# Patient Record
Sex: FEMALE | Race: WHITE | NOT HISPANIC OR LATINO | Employment: OTHER | URBAN - NONMETROPOLITAN AREA
[De-identification: names, ages, dates, MRNs, and addresses within clinical notes are randomized per-mention and may not be internally consistent; named-entity substitution may affect disease eponyms.]

---

## 2020-05-23 ENCOUNTER — APPOINTMENT (OUTPATIENT)
Dept: GENERAL RADIOLOGY | Facility: HOSPITAL | Age: 65
End: 2020-05-23

## 2020-05-23 ENCOUNTER — HOSPITAL ENCOUNTER (EMERGENCY)
Facility: HOSPITAL | Age: 65
Discharge: HOME | End: 2020-05-23
Attending: EMERGENCY MEDICINE | Admitting: EMERGENCY MEDICINE

## 2020-05-23 ENCOUNTER — APPOINTMENT (OUTPATIENT)
Dept: CT IMAGING | Facility: HOSPITAL | Age: 65
End: 2020-05-23

## 2020-05-23 VITALS
SYSTOLIC BLOOD PRESSURE: 142 MMHG | BODY MASS INDEX: 38.64 KG/M2 | HEIGHT: 62 IN | HEART RATE: 74 BPM | OXYGEN SATURATION: 95 % | WEIGHT: 210 LBS | TEMPERATURE: 98.5 F | RESPIRATION RATE: 18 BRPM | DIASTOLIC BLOOD PRESSURE: 87 MMHG

## 2020-05-23 DIAGNOSIS — R10.32 LEFT LOWER QUADRANT ABDOMINAL PAIN: Primary | ICD-10-CM

## 2020-05-23 DIAGNOSIS — N39.0 ACUTE LOWER UTI (URINARY TRACT INFECTION): ICD-10-CM

## 2020-05-23 LAB
ALBUMIN SERPL-MCNC: 4.2 G/DL (ref 3.5–5.2)
ALBUMIN/GLOB SERPL: 1.3 G/DL
ALP SERPL-CCNC: 88 U/L (ref 39–117)
ALT SERPL W P-5'-P-CCNC: 15 U/L (ref 1–33)
ANION GAP SERPL CALCULATED.3IONS-SCNC: 14.2 MMOL/L (ref 5–15)
AST SERPL-CCNC: 16 U/L (ref 1–32)
BACTERIA UR QL AUTO: ABNORMAL /HPF
BASOPHILS # BLD AUTO: 0.08 10*3/MM3 (ref 0–0.2)
BASOPHILS NFR BLD AUTO: 0.7 % (ref 0–1.5)
BILIRUB SERPL-MCNC: 0.2 MG/DL (ref 0.2–1.2)
BILIRUB UR QL STRIP: NEGATIVE
BUN BLD-MCNC: 23 MG/DL (ref 8–23)
BUN/CREAT SERPL: 15.8 (ref 7–25)
CALCIUM SPEC-SCNC: 9.8 MG/DL (ref 8.6–10.5)
CHLORIDE SERPL-SCNC: 104 MMOL/L (ref 98–107)
CLARITY UR: ABNORMAL
CO2 SERPL-SCNC: 22.8 MMOL/L (ref 22–29)
COLOR UR: YELLOW
CREAT BLD-MCNC: 1.46 MG/DL (ref 0.57–1)
DEPRECATED RDW RBC AUTO: 42.8 FL (ref 37–54)
EOSINOPHIL # BLD AUTO: 0.43 10*3/MM3 (ref 0–0.4)
EOSINOPHIL NFR BLD AUTO: 4 % (ref 0.3–6.2)
ERYTHROCYTE [DISTWIDTH] IN BLOOD BY AUTOMATED COUNT: 12.5 % (ref 12.3–15.4)
GFR SERPL CREATININE-BSD FRML MDRD: 36 ML/MIN/1.73
GLOBULIN UR ELPH-MCNC: 3.2 GM/DL
GLUCOSE BLD-MCNC: 89 MG/DL (ref 65–99)
GLUCOSE UR STRIP-MCNC: NEGATIVE MG/DL
HCT VFR BLD AUTO: 38.4 % (ref 34–46.6)
HGB BLD-MCNC: 12.7 G/DL (ref 12–15.9)
HGB UR QL STRIP.AUTO: NEGATIVE
HYALINE CASTS UR QL AUTO: ABNORMAL /LPF
IMM GRANULOCYTES # BLD AUTO: 0.13 10*3/MM3 (ref 0–0.05)
IMM GRANULOCYTES NFR BLD AUTO: 1.2 % (ref 0–0.5)
KETONES UR QL STRIP: NEGATIVE
LEUKOCYTE ESTERASE UR QL STRIP.AUTO: ABNORMAL
LIPASE SERPL-CCNC: 52 U/L (ref 13–60)
LYMPHOCYTES # BLD AUTO: 1.6 10*3/MM3 (ref 0.7–3.1)
LYMPHOCYTES NFR BLD AUTO: 14.9 % (ref 19.6–45.3)
MCH RBC QN AUTO: 31.2 PG (ref 26.6–33)
MCHC RBC AUTO-ENTMCNC: 33.1 G/DL (ref 31.5–35.7)
MCV RBC AUTO: 94.3 FL (ref 79–97)
MONOCYTES # BLD AUTO: 0.61 10*3/MM3 (ref 0.1–0.9)
MONOCYTES NFR BLD AUTO: 5.7 % (ref 5–12)
NEUTROPHILS # BLD AUTO: 7.9 10*3/MM3 (ref 1.7–7)
NEUTROPHILS NFR BLD AUTO: 73.5 % (ref 42.7–76)
NITRITE UR QL STRIP: POSITIVE
NRBC BLD AUTO-RTO: 0 /100 WBC (ref 0–0.2)
PH UR STRIP.AUTO: <=5 [PH] (ref 5–8)
PLATELET # BLD AUTO: 209 10*3/MM3 (ref 140–450)
PMV BLD AUTO: 11.2 FL (ref 6–12)
POTASSIUM BLD-SCNC: 3.8 MMOL/L (ref 3.5–5.2)
PROT SERPL-MCNC: 7.4 G/DL (ref 6–8.5)
PROT UR QL STRIP: ABNORMAL
RBC # BLD AUTO: 4.07 10*6/MM3 (ref 3.77–5.28)
RBC # UR: ABNORMAL /HPF
REF LAB TEST METHOD: ABNORMAL
SODIUM BLD-SCNC: 141 MMOL/L (ref 136–145)
SP GR UR STRIP: 1.01 (ref 1–1.03)
SQUAMOUS #/AREA URNS HPF: ABNORMAL /HPF
TROPONIN T SERPL-MCNC: <0.01 NG/ML (ref 0–0.03)
UROBILINOGEN UR QL STRIP: ABNORMAL
WBC NRBC COR # BLD: 10.75 10*3/MM3 (ref 3.4–10.8)
WBC UR QL AUTO: ABNORMAL /HPF

## 2020-05-23 PROCEDURE — 87186 SC STD MICRODIL/AGAR DIL: CPT | Performed by: EMERGENCY MEDICINE

## 2020-05-23 PROCEDURE — 84484 ASSAY OF TROPONIN QUANT: CPT | Performed by: EMERGENCY MEDICINE

## 2020-05-23 PROCEDURE — 96376 TX/PRO/DX INJ SAME DRUG ADON: CPT

## 2020-05-23 PROCEDURE — 25010000002 CEFTRIAXONE SODIUM-DEXTROSE 1-3.74 GM-%(50ML) RECONSTITUTED SOLUTION: Performed by: PHYSICIAN ASSISTANT

## 2020-05-23 PROCEDURE — 71045 X-RAY EXAM CHEST 1 VIEW: CPT

## 2020-05-23 PROCEDURE — 83690 ASSAY OF LIPASE: CPT | Performed by: EMERGENCY MEDICINE

## 2020-05-23 PROCEDURE — 25010000002 MORPHINE SULFATE (PF) 2 MG/ML SOLUTION: Performed by: EMERGENCY MEDICINE

## 2020-05-23 PROCEDURE — 74176 CT ABD & PELVIS W/O CONTRAST: CPT

## 2020-05-23 PROCEDURE — 80053 COMPREHEN METABOLIC PANEL: CPT | Performed by: EMERGENCY MEDICINE

## 2020-05-23 PROCEDURE — 96365 THER/PROPH/DIAG IV INF INIT: CPT

## 2020-05-23 PROCEDURE — 96361 HYDRATE IV INFUSION ADD-ON: CPT

## 2020-05-23 PROCEDURE — 87086 URINE CULTURE/COLONY COUNT: CPT | Performed by: EMERGENCY MEDICINE

## 2020-05-23 PROCEDURE — 96375 TX/PRO/DX INJ NEW DRUG ADDON: CPT

## 2020-05-23 PROCEDURE — 93005 ELECTROCARDIOGRAM TRACING: CPT | Performed by: EMERGENCY MEDICINE

## 2020-05-23 PROCEDURE — 99284 EMERGENCY DEPT VISIT MOD MDM: CPT

## 2020-05-23 PROCEDURE — 81001 URINALYSIS AUTO W/SCOPE: CPT | Performed by: EMERGENCY MEDICINE

## 2020-05-23 PROCEDURE — 25010000002 ONDANSETRON PER 1 MG: Performed by: EMERGENCY MEDICINE

## 2020-05-23 PROCEDURE — 25010000002 ONDANSETRON PER 1 MG: Performed by: PHYSICIAN ASSISTANT

## 2020-05-23 PROCEDURE — 85025 COMPLETE CBC W/AUTO DIFF WBC: CPT | Performed by: EMERGENCY MEDICINE

## 2020-05-23 RX ORDER — SODIUM CHLORIDE 0.9 % (FLUSH) 0.9 %
10 SYRINGE (ML) INJECTION AS NEEDED
Status: DISCONTINUED | OUTPATIENT
Start: 2020-05-23 | End: 2020-05-23 | Stop reason: HOSPADM

## 2020-05-23 RX ORDER — ONDANSETRON 4 MG/1
4 TABLET, ORALLY DISINTEGRATING ORAL EVERY 8 HOURS PRN
Qty: 8 TABLET | Refills: 0 | Status: SHIPPED | OUTPATIENT
Start: 2020-05-23 | End: 2021-05-06

## 2020-05-23 RX ORDER — CEFTRIAXONE 1 G/50ML
1 INJECTION, SOLUTION INTRAVENOUS ONCE
Status: COMPLETED | OUTPATIENT
Start: 2020-05-23 | End: 2020-05-23

## 2020-05-23 RX ORDER — CEFUROXIME AXETIL 500 MG/1
500 TABLET ORAL 2 TIMES DAILY
Qty: 14 TABLET | Refills: 0 | Status: SHIPPED | OUTPATIENT
Start: 2020-05-23 | End: 2021-05-06

## 2020-05-23 RX ORDER — HYDROCODONE BITARTRATE AND ACETAMINOPHEN 5; 325 MG/1; MG/1
1 TABLET ORAL ONCE
Status: COMPLETED | OUTPATIENT
Start: 2020-05-23 | End: 2020-05-23

## 2020-05-23 RX ORDER — SENNOSIDES 8.6 MG
650 CAPSULE ORAL EVERY 8 HOURS PRN
Qty: 18 TABLET | Refills: 0 | Status: SHIPPED | OUTPATIENT
Start: 2020-05-23 | End: 2021-05-06

## 2020-05-23 RX ORDER — MORPHINE SULFATE 2 MG/ML
2 INJECTION, SOLUTION INTRAMUSCULAR; INTRAVENOUS ONCE
Status: COMPLETED | OUTPATIENT
Start: 2020-05-23 | End: 2020-05-23

## 2020-05-23 RX ORDER — ONDANSETRON 2 MG/ML
4 INJECTION INTRAMUSCULAR; INTRAVENOUS ONCE
Status: COMPLETED | OUTPATIENT
Start: 2020-05-23 | End: 2020-05-23

## 2020-05-23 RX ORDER — ONDANSETRON 2 MG/ML
2 INJECTION INTRAMUSCULAR; INTRAVENOUS ONCE
Status: COMPLETED | OUTPATIENT
Start: 2020-05-23 | End: 2020-05-23

## 2020-05-23 RX ADMIN — HYDROCODONE BITARTRATE AND ACETAMINOPHEN 1 TABLET: 5; 325 TABLET ORAL at 20:27

## 2020-05-23 RX ADMIN — MORPHINE SULFATE 2 MG: 2 INJECTION, SOLUTION INTRAMUSCULAR; INTRAVENOUS at 19:02

## 2020-05-23 RX ADMIN — ONDANSETRON 4 MG: 2 INJECTION INTRAMUSCULAR; INTRAVENOUS at 19:02

## 2020-05-23 RX ADMIN — CEFTRIAXONE 1 G: 1 INJECTION, SOLUTION INTRAVENOUS at 20:22

## 2020-05-23 RX ADMIN — SODIUM CHLORIDE 1000 ML: 9 INJECTION, SOLUTION INTRAVENOUS at 19:00

## 2020-05-23 RX ADMIN — ONDANSETRON 2 MG: 2 INJECTION INTRAMUSCULAR; INTRAVENOUS at 20:26

## 2020-05-25 LAB — BACTERIA SPEC AEROBE CULT: ABNORMAL

## 2020-08-15 ENCOUNTER — APPOINTMENT (OUTPATIENT)
Dept: GENERAL RADIOLOGY | Facility: HOSPITAL | Age: 65
End: 2020-08-15

## 2020-08-15 ENCOUNTER — HOSPITAL ENCOUNTER (EMERGENCY)
Facility: HOSPITAL | Age: 65
Discharge: HOME | End: 2020-08-15
Attending: EMERGENCY MEDICINE | Admitting: EMERGENCY MEDICINE

## 2020-08-15 VITALS
TEMPERATURE: 97.3 F | DIASTOLIC BLOOD PRESSURE: 112 MMHG | HEIGHT: 62 IN | SYSTOLIC BLOOD PRESSURE: 159 MMHG | BODY MASS INDEX: 39.56 KG/M2 | RESPIRATION RATE: 18 BRPM | WEIGHT: 215 LBS | HEART RATE: 69 BPM | OXYGEN SATURATION: 98 %

## 2020-08-15 DIAGNOSIS — M54.50 CHRONIC LOW BACK PAIN, UNSPECIFIED BACK PAIN LATERALITY, UNSPECIFIED WHETHER SCIATICA PRESENT: Primary | ICD-10-CM

## 2020-08-15 DIAGNOSIS — R07.81 RIB PAIN: ICD-10-CM

## 2020-08-15 DIAGNOSIS — G89.29 CHRONIC LOW BACK PAIN, UNSPECIFIED BACK PAIN LATERALITY, UNSPECIFIED WHETHER SCIATICA PRESENT: Primary | ICD-10-CM

## 2020-08-15 LAB
BACTERIA UR QL AUTO: ABNORMAL /HPF
BILIRUB UR QL STRIP: NEGATIVE
CLARITY UR: CLEAR
COLOR UR: YELLOW
GLUCOSE UR STRIP-MCNC: NEGATIVE MG/DL
HGB UR QL STRIP.AUTO: NEGATIVE
HYALINE CASTS UR QL AUTO: ABNORMAL /LPF
KETONES UR QL STRIP: NEGATIVE
LEUKOCYTE ESTERASE UR QL STRIP.AUTO: NEGATIVE
NITRITE UR QL STRIP: NEGATIVE
PH UR STRIP.AUTO: 5.5 [PH] (ref 5–8)
PROT UR QL STRIP: ABNORMAL
RBC # UR: ABNORMAL /HPF
REF LAB TEST METHOD: ABNORMAL
SP GR UR STRIP: 1.02 (ref 1–1.03)
SQUAMOUS #/AREA URNS HPF: ABNORMAL /HPF
UROBILINOGEN UR QL STRIP: ABNORMAL
WBC UR QL AUTO: ABNORMAL /HPF

## 2020-08-15 PROCEDURE — 72100 X-RAY EXAM L-S SPINE 2/3 VWS: CPT

## 2020-08-15 PROCEDURE — 99283 EMERGENCY DEPT VISIT LOW MDM: CPT

## 2020-08-15 PROCEDURE — 81001 URINALYSIS AUTO W/SCOPE: CPT | Performed by: NURSE PRACTITIONER

## 2020-08-15 PROCEDURE — 71101 X-RAY EXAM UNILAT RIBS/CHEST: CPT

## 2020-08-15 RX ORDER — TRAMADOL HYDROCHLORIDE 50 MG/1
50 TABLET ORAL ONCE
Status: COMPLETED | OUTPATIENT
Start: 2020-08-15 | End: 2020-08-15

## 2020-08-15 RX ORDER — LIDOCAINE 50 MG/G
1 PATCH TOPICAL EVERY 24 HOURS
Qty: 15 EACH | Refills: 0 | Status: SHIPPED | OUTPATIENT
Start: 2020-08-15 | End: 2021-05-06

## 2020-08-15 RX ORDER — METHOCARBAMOL 500 MG/1
750 TABLET, FILM COATED ORAL ONCE
Status: COMPLETED | OUTPATIENT
Start: 2020-08-15 | End: 2020-08-15

## 2020-08-15 RX ORDER — METHOCARBAMOL 500 MG/1
500 TABLET, FILM COATED ORAL 3 TIMES DAILY
Qty: 21 TABLET | Refills: 0 | Status: SHIPPED | OUTPATIENT
Start: 2020-08-15 | End: 2021-05-06

## 2020-08-15 RX ORDER — MELOXICAM 7.5 MG/1
7.5 TABLET ORAL DAILY
Qty: 14 TABLET | Refills: 0 | Status: SHIPPED | OUTPATIENT
Start: 2020-08-15 | End: 2021-05-06

## 2020-08-15 RX ORDER — LIDOCAINE 50 MG/G
1 PATCH TOPICAL
Status: DISCONTINUED | OUTPATIENT
Start: 2020-08-15 | End: 2020-08-15 | Stop reason: HOSPADM

## 2020-08-15 RX ADMIN — TRAMADOL HYDROCHLORIDE 50 MG: 50 TABLET, FILM COATED ORAL at 20:33

## 2020-08-15 RX ADMIN — LIDOCAINE 1 PATCH: 50 PATCH CUTANEOUS at 20:35

## 2020-08-15 RX ADMIN — METHOCARBAMOL TABLETS 750 MG: 500 TABLET, COATED ORAL at 20:33

## 2020-08-16 NOTE — ED PROVIDER NOTES
Subjective   History of Present Illness  This is a 65 year old female who comes in today with multiple complaints. She reports a history of back pain and her lower back started hurting last week and has gotten worse. She also reports right lower rib pain for three days. She reports its point tenderness and does not relate to deep breathing or moving. She also reports some burning when she urinates. She reports she took some gabapentin that she had laying around. She reports she came in because she needs some help with the pain tonight.   Review of Systems   Constitutional: Negative.    HENT: Negative.    Eyes: Negative.    Respiratory: Negative.    Cardiovascular: Negative.    Gastrointestinal: Negative.    Endocrine: Negative.    Genitourinary: Positive for dysuria.   Musculoskeletal: Positive for arthralgias and back pain.   Skin: Negative.    Allergic/Immunologic: Negative.    Neurological: Negative.    Hematological: Negative.    Psychiatric/Behavioral: Negative.        Past Medical History:   Diagnosis Date   • Hypertension    • Injury of back        No Known Allergies    Past Surgical History:   Procedure Laterality Date   • ABDOMINAL SURGERY     • COLON SURGERY     • COLONOSCOPY     • TUBAL ABDOMINAL LIGATION         History reviewed. No pertinent family history.    Social History     Socioeconomic History   • Marital status:      Spouse name: Not on file   • Number of children: Not on file   • Years of education: Not on file   • Highest education level: Not on file   Tobacco Use   • Smoking status: Current Every Day Smoker     Types: Electronic Cigarette   Substance and Sexual Activity   • Alcohol use: Never     Frequency: Never   • Drug use: Never           Objective   Physical Exam   Constitutional: She appears well-developed and well-nourished.   Nursing note and vitals reviewed.  GEN: No acute distress  Head: Normocephalic, atraumatic  Eyes: Pupils equal round reactive to light  ENT: Posterior  pharynx normal in appearance, oral mucosa is moist  Chest: Nontender to palpation,  Right lower rib pain lateral side.   Cardiovascular: Regular rate  Lungs: Clear to auscultation bilaterally  Abdomen: Soft, nontender, nondistended, no peritoneal signs  Extremities: No edema, normal appearance  Neuro: GCS 15  Psych: Mood and affect are appropriate  Lumbar, right perivertebral tenderness lower lumbar. Neg straight leg raise.    Procedures           ED Course  ED Course as of Aug 15 2124   Sat Aug 15, 2020   2114 Starting to feel much better. She reports she stopped seeing her primary care provider some time ago. She reports she will get in to see someone new to help her address her numerous conditions. I advised her to we will treat her pain tonight and give her someone to follow up for her other chronic medical conditions.     [TW]      ED Course User Index  [TW] Morenita Connor, JOSÉ MANUEL                                           MDM  Number of Diagnoses or Management Options     Amount and/or Complexity of Data Reviewed  Clinical lab tests: ordered and reviewed  Tests in the radiology section of CPT®: ordered and reviewed  Review and summarize past medical records: yes  Discuss the patient with other providers: yes  Independent visualization of images, tracings, or specimens: yes    Risk of Complications, Morbidity, and/or Mortality  Presenting problems: low  Diagnostic procedures: low  Management options: low        Final diagnoses:   Chronic low back pain, unspecified back pain laterality, unspecified whether sciatica present   Rib pain            Morenita Connor APRN  08/15/20 2124

## 2020-10-04 ENCOUNTER — HOSPITAL ENCOUNTER (EMERGENCY)
Facility: HOSPITAL | Age: 65
Discharge: HOME | End: 2020-10-04
Attending: EMERGENCY MEDICINE | Admitting: EMERGENCY MEDICINE

## 2020-10-04 ENCOUNTER — APPOINTMENT (OUTPATIENT)
Dept: CT IMAGING | Facility: HOSPITAL | Age: 65
End: 2020-10-04

## 2020-10-04 VITALS
HEIGHT: 62 IN | OXYGEN SATURATION: 98 % | WEIGHT: 200 LBS | BODY MASS INDEX: 36.8 KG/M2 | SYSTOLIC BLOOD PRESSURE: 140 MMHG | TEMPERATURE: 98.7 F | HEART RATE: 72 BPM | DIASTOLIC BLOOD PRESSURE: 84 MMHG | RESPIRATION RATE: 18 BRPM

## 2020-10-04 DIAGNOSIS — R10.9 ABDOMINAL PAIN, UNSPECIFIED ABDOMINAL LOCATION: Primary | ICD-10-CM

## 2020-10-04 LAB
ALBUMIN SERPL-MCNC: 4.5 G/DL (ref 3.5–5.2)
ALBUMIN/GLOB SERPL: 1.5 G/DL
ALP SERPL-CCNC: 108 U/L (ref 39–117)
ALT SERPL W P-5'-P-CCNC: 10 U/L (ref 1–33)
ANION GAP SERPL CALCULATED.3IONS-SCNC: 7.5 MMOL/L (ref 5–15)
AST SERPL-CCNC: 13 U/L (ref 1–32)
BASOPHILS # BLD AUTO: 0.06 10*3/MM3 (ref 0–0.2)
BASOPHILS NFR BLD AUTO: 0.6 % (ref 0–1.5)
BILIRUB SERPL-MCNC: 0.3 MG/DL (ref 0–1.2)
BILIRUB UR QL STRIP: NEGATIVE
BUN SERPL-MCNC: 23 MG/DL (ref 8–23)
BUN/CREAT SERPL: 17.2 (ref 7–25)
CALCIUM SPEC-SCNC: 10 MG/DL (ref 8.6–10.5)
CHLORIDE SERPL-SCNC: 107 MMOL/L (ref 98–107)
CLARITY UR: CLEAR
CO2 SERPL-SCNC: 25.5 MMOL/L (ref 22–29)
COLOR UR: YELLOW
CREAT SERPL-MCNC: 1.34 MG/DL (ref 0.57–1)
DEPRECATED RDW RBC AUTO: 48.9 FL (ref 37–54)
EOSINOPHIL # BLD AUTO: 0.32 10*3/MM3 (ref 0–0.4)
EOSINOPHIL NFR BLD AUTO: 3 % (ref 0.3–6.2)
ERYTHROCYTE [DISTWIDTH] IN BLOOD BY AUTOMATED COUNT: 13.7 % (ref 12.3–15.4)
GFR SERPL CREATININE-BSD FRML MDRD: 40 ML/MIN/1.73
GLOBULIN UR ELPH-MCNC: 3.1 GM/DL
GLUCOSE SERPL-MCNC: 95 MG/DL (ref 65–99)
GLUCOSE UR STRIP-MCNC: NEGATIVE MG/DL
HCT VFR BLD AUTO: 39.3 % (ref 34–46.6)
HGB BLD-MCNC: 13.2 G/DL (ref 12–15.9)
HGB UR QL STRIP.AUTO: NEGATIVE
HOLD SPECIMEN: NORMAL
HOLD SPECIMEN: NORMAL
IMM GRANULOCYTES # BLD AUTO: 0.07 10*3/MM3 (ref 0–0.05)
IMM GRANULOCYTES NFR BLD AUTO: 0.6 % (ref 0–0.5)
KETONES UR QL STRIP: NEGATIVE
LEUKOCYTE ESTERASE UR QL STRIP.AUTO: NEGATIVE
LIPASE SERPL-CCNC: 46 U/L (ref 13–60)
LYMPHOCYTES # BLD AUTO: 1.55 10*3/MM3 (ref 0.7–3.1)
LYMPHOCYTES NFR BLD AUTO: 14.3 % (ref 19.6–45.3)
MCH RBC QN AUTO: 32.4 PG (ref 26.6–33)
MCHC RBC AUTO-ENTMCNC: 33.6 G/DL (ref 31.5–35.7)
MCV RBC AUTO: 96.6 FL (ref 79–97)
MONOCYTES # BLD AUTO: 0.75 10*3/MM3 (ref 0.1–0.9)
MONOCYTES NFR BLD AUTO: 6.9 % (ref 5–12)
NEUTROPHILS NFR BLD AUTO: 74.6 % (ref 42.7–76)
NEUTROPHILS NFR BLD AUTO: 8.09 10*3/MM3 (ref 1.7–7)
NITRITE UR QL STRIP: NEGATIVE
NRBC BLD AUTO-RTO: 0 /100 WBC (ref 0–0.2)
PH UR STRIP.AUTO: 6 [PH] (ref 5–8)
PLATELET # BLD AUTO: 199 10*3/MM3 (ref 140–450)
PMV BLD AUTO: 11.6 FL (ref 6–12)
POTASSIUM SERPL-SCNC: 3.8 MMOL/L (ref 3.5–5.2)
PROT SERPL-MCNC: 7.6 G/DL (ref 6–8.5)
PROT UR QL STRIP: NEGATIVE
RBC # BLD AUTO: 4.07 10*6/MM3 (ref 3.77–5.28)
SODIUM SERPL-SCNC: 140 MMOL/L (ref 136–145)
SP GR UR STRIP: 1.01 (ref 1–1.03)
UROBILINOGEN UR QL STRIP: NORMAL
WBC # BLD AUTO: 10.84 10*3/MM3 (ref 3.4–10.8)
WHOLE BLOOD HOLD SPECIMEN: NORMAL
WHOLE BLOOD HOLD SPECIMEN: NORMAL

## 2020-10-04 PROCEDURE — 25010000002 MORPHINE PER 10 MG: Performed by: EMERGENCY MEDICINE

## 2020-10-04 PROCEDURE — 96374 THER/PROPH/DIAG INJ IV PUSH: CPT

## 2020-10-04 PROCEDURE — 81003 URINALYSIS AUTO W/O SCOPE: CPT

## 2020-10-04 PROCEDURE — 96376 TX/PRO/DX INJ SAME DRUG ADON: CPT

## 2020-10-04 PROCEDURE — 99284 EMERGENCY DEPT VISIT MOD MDM: CPT

## 2020-10-04 PROCEDURE — 83690 ASSAY OF LIPASE: CPT

## 2020-10-04 PROCEDURE — 25010000002 ONDANSETRON PER 1 MG: Performed by: EMERGENCY MEDICINE

## 2020-10-04 PROCEDURE — 25010000002 IOPAMIDOL 61 % SOLUTION: Performed by: EMERGENCY MEDICINE

## 2020-10-04 PROCEDURE — 74177 CT ABD & PELVIS W/CONTRAST: CPT

## 2020-10-04 PROCEDURE — 96375 TX/PRO/DX INJ NEW DRUG ADDON: CPT

## 2020-10-04 PROCEDURE — 85025 COMPLETE CBC W/AUTO DIFF WBC: CPT

## 2020-10-04 PROCEDURE — 80053 COMPREHEN METABOLIC PANEL: CPT

## 2020-10-04 RX ORDER — POLYETHYLENE GLYCOL 3350 17 G/17G
17 POWDER, FOR SOLUTION ORAL DAILY
Qty: 100 EACH | Refills: 0 | Status: SHIPPED | OUTPATIENT
Start: 2020-10-04 | End: 2021-05-06

## 2020-10-04 RX ORDER — TRAMADOL HYDROCHLORIDE 50 MG/1
50 TABLET ORAL EVERY 8 HOURS PRN
Qty: 8 TABLET | Refills: 0 | Status: SHIPPED | OUTPATIENT
Start: 2020-10-04 | End: 2021-05-06

## 2020-10-04 RX ORDER — ONDANSETRON 2 MG/ML
4 INJECTION INTRAMUSCULAR; INTRAVENOUS ONCE
Status: COMPLETED | OUTPATIENT
Start: 2020-10-04 | End: 2020-10-04

## 2020-10-04 RX ORDER — DOCUSATE SODIUM 100 MG/1
100 CAPSULE, LIQUID FILLED ORAL 2 TIMES DAILY PRN
Qty: 30 CAPSULE | Refills: 0 | Status: SHIPPED | OUTPATIENT
Start: 2020-10-04 | End: 2021-05-06

## 2020-10-04 RX ORDER — DICYCLOMINE HCL 20 MG
20 TABLET ORAL EVERY 6 HOURS PRN
Qty: 12 TABLET | Refills: 0 | Status: SHIPPED | OUTPATIENT
Start: 2020-10-04 | End: 2021-05-06

## 2020-10-04 RX ORDER — MORPHINE SULFATE 4 MG/ML
4 INJECTION, SOLUTION INTRAMUSCULAR; INTRAVENOUS ONCE
Status: COMPLETED | OUTPATIENT
Start: 2020-10-04 | End: 2020-10-04

## 2020-10-04 RX ORDER — SODIUM CHLORIDE 0.9 % (FLUSH) 0.9 %
10 SYRINGE (ML) INJECTION AS NEEDED
Status: DISCONTINUED | OUTPATIENT
Start: 2020-10-04 | End: 2020-10-04 | Stop reason: HOSPADM

## 2020-10-04 RX ADMIN — ONDANSETRON 4 MG: 2 INJECTION INTRAMUSCULAR; INTRAVENOUS at 00:47

## 2020-10-04 RX ADMIN — SODIUM CHLORIDE 1000 ML: 9 INJECTION, SOLUTION INTRAVENOUS at 00:47

## 2020-10-04 RX ADMIN — MORPHINE SULFATE 4 MG: 4 INJECTION, SOLUTION INTRAMUSCULAR; INTRAVENOUS at 03:19

## 2020-10-04 RX ADMIN — MORPHINE SULFATE 4 MG: 4 INJECTION, SOLUTION INTRAMUSCULAR; INTRAVENOUS at 00:49

## 2020-10-04 RX ADMIN — IOPAMIDOL 100 ML: 612 INJECTION, SOLUTION INTRAVENOUS at 01:57

## 2020-10-04 NOTE — ED PROVIDER NOTES
TRIAGE CHIEF COMPLAINT:     Nursing and triage notes reviewed    Chief Complaint   Patient presents with   • Abdominal Pain      HPI: Tasha Yarbrough is a 65 y.o. female who presents to the emergency department complaining of abdominal pain.  Patient describes a sharp and aching left-sided abdominal pain that is intermittently been present over the past year.  She denies vomiting or diarrhea.  Patient states she has a history of perforated colon many years ago.  She has not had a fever that she is aware of.  Denies chest pain or shortness of breath.  No coughing.    REVIEW OF SYSTEMS: All other systems reviewed and are negative     PAST MEDICAL HISTORY:   Past Medical History:   Diagnosis Date   • Hypertension    • Injury of back         FAMILY HISTORY:   History reviewed. No pertinent family history.     SOCIAL HISTORY:   Social History     Socioeconomic History   • Marital status:      Spouse name: Not on file   • Number of children: Not on file   • Years of education: Not on file   • Highest education level: Not on file   Tobacco Use   • Smoking status: Current Every Day Smoker     Types: Electronic Cigarette   Substance and Sexual Activity   • Alcohol use: Never     Frequency: Never   • Drug use: Never        SURGICAL HISTORY:   Past Surgical History:   Procedure Laterality Date   • ABDOMINAL SURGERY     • COLON SURGERY     • COLONOSCOPY     • TUBAL ABDOMINAL LIGATION          CURRENT MEDICATIONS:      Medication List      ASK your doctor about these medications    acetaminophen 650 MG 8 hr tablet  Commonly known as: Tylenol 8 Hour  Take 1 tablet by mouth Every 8 (Eight) Hours As Needed for Mild Pain .     cefuroxime 500 MG tablet  Commonly known as: CEFTIN  Take 1 tablet by mouth 2 (Two) Times a Day.     lidocaine 5 %  Commonly known as: Lidoderm  Place 1 patch on the skin as directed by provider Daily. Remove & Discard patch within 12 hours or as directed by MD     meloxicam 7.5 MG tablet  Commonly known  as: MOBIC  Take 1 tablet by mouth Daily.     methocarbamol 500 MG tablet  Commonly known as: ROBAXIN  Take 1 tablet by mouth 3 (Three) Times a Day.     ondansetron ODT 4 MG disintegrating tablet  Commonly known as: ZOFRAN-ODT  Place 1 tablet on the tongue Every 8 (Eight) Hours As Needed for Nausea or Vomiting.             ALLERGIES: Patient has no known allergies.     PHYSICAL EXAM:   VITAL SIGNS:   Vitals:    10/04/20 0002   BP: (!) 208/114   Pulse: 78   Resp: 20   Temp: 98.7 °F (37.1 °C)   SpO2: 97%      CONSTITUTIONAL: Awake, oriented, appears non-toxic   HENT: Atraumatic, normocephalic, oral mucosa pink and moist, airway patent. Nares patent without drainage. External ears normal.   EYES: Conjunctiva clear   NECK: Trachea midline, non-tender, supple   CARDIOVASCULAR: Normal heart rate, Normal rhythm, No murmurs, rubs, gallops   PULMONARY/CHEST: Clear to auscultation, no rhonchi, wheezes, or rales. Symmetrical breath sounds.  ABDOMINAL: Non-distended, soft, left-sided abdominal tenderness- no rebound or guarding.  NEUROLOGIC: Non-focal, moving all four extremities, no gross sensory or motor deficits.   EXTREMITIES: No clubbing, cyanosis, or edema   SKIN: Warm, Dry, No erythema, No rash     ED COURSE / MEDICAL DECISION MAKING:   Tasha Yarbrough is a 65 y.o. female who presents to the emergency department for evaluation of abdominal pain.  Patient does appear significantly uncomfortable on arrival in the emergency department.  Patient is hypertensive but other vital signs stable.  Exam does reveal tenderness in the left abdomen.  Will obtain labs and imaging for further evaluation of her symptoms.    Laboratory tests are largely unremarkable.  CT scan of the abdomen and pelvis per radiology interpretation does not reveal any obvious acute abnormality.    Patient's pain is directly over her previous ostomy site, I wonder if patient has some scar tissue.  Review of CAT scan does reveal a rather large stool burden and  patient admits that she has been very constipated recently.  Will place patient on symptomatic therapy and have her follow with a GI specialist for further evaluation.    DECISION TO DISCHARGE/ADMIT: see ED care timeline     FINAL IMPRESSION:   1 --abdominal pain  2 --   3 --     Electronically signed by: Marion Scott MD, 10/4/2020 00:38 Marion Garcia MD  10/04/20 0347

## 2021-05-06 ENCOUNTER — OFFICE VISIT (OUTPATIENT)
Dept: GASTROENTEROLOGY | Facility: CLINIC | Age: 66
End: 2021-05-06

## 2021-05-06 VITALS
HEIGHT: 62 IN | DIASTOLIC BLOOD PRESSURE: 88 MMHG | BODY MASS INDEX: 37.36 KG/M2 | TEMPERATURE: 98 F | WEIGHT: 203 LBS | SYSTOLIC BLOOD PRESSURE: 140 MMHG

## 2021-05-06 DIAGNOSIS — R10.12 LEFT UPPER QUADRANT ABDOMINAL PAIN: Primary | ICD-10-CM

## 2021-05-06 DIAGNOSIS — Z01.812 PRE-PROCEDURE LAB EXAM: Primary | ICD-10-CM

## 2021-05-06 DIAGNOSIS — K58.1 IRRITABLE BOWEL SYNDROME WITH CONSTIPATION: ICD-10-CM

## 2021-05-06 DIAGNOSIS — R10.33 PERIUMBILICAL ABDOMINAL PAIN: ICD-10-CM

## 2021-05-06 DIAGNOSIS — Z12.11 ENCOUNTER FOR SCREENING FOR MALIGNANT NEOPLASM OF COLON: ICD-10-CM

## 2021-05-06 DIAGNOSIS — K59.00 CONSTIPATION, UNSPECIFIED CONSTIPATION TYPE: ICD-10-CM

## 2021-05-06 DIAGNOSIS — K43.2 INCISIONAL HERNIA, WITHOUT OBSTRUCTION OR GANGRENE: ICD-10-CM

## 2021-05-06 DIAGNOSIS — Z90.49 STATUS POST PARTIAL COLECTOMY: ICD-10-CM

## 2021-05-06 PROCEDURE — 99204 OFFICE O/P NEW MOD 45 MIN: CPT | Performed by: INTERNAL MEDICINE

## 2021-05-06 RX ORDER — BISACODYL 5 MG/1
20 TABLET, DELAYED RELEASE ORAL ONCE
Qty: 4 TABLET | Refills: 0 | Status: SHIPPED | OUTPATIENT
Start: 2021-05-06 | End: 2021-05-06

## 2021-05-06 RX ORDER — DOCUSATE SODIUM 100 MG/1
100 CAPSULE, LIQUID FILLED ORAL 2 TIMES DAILY
Qty: 60 CAPSULE | Refills: 2 | Status: ON HOLD | OUTPATIENT
Start: 2021-05-06 | End: 2022-07-27

## 2021-05-06 RX ORDER — POLYETHYLENE GLYCOL 3350 17 G/17G
17 POWDER, FOR SOLUTION ORAL DAILY
Qty: 510 G | Refills: 2 | Status: ON HOLD | OUTPATIENT
Start: 2021-05-06 | End: 2022-07-27

## 2021-05-06 RX ORDER — METOPROLOL SUCCINATE 50 MG/1
50 TABLET, EXTENDED RELEASE ORAL DAILY
COMMUNITY
End: 2022-08-03

## 2021-05-06 RX ORDER — IBUPROFEN 200 MG
200 TABLET ORAL EVERY 6 HOURS PRN
COMMUNITY
End: 2022-07-30 | Stop reason: HOSPADM

## 2021-05-06 RX ORDER — SODIUM CHLORIDE 9 MG/ML
70 INJECTION, SOLUTION INTRAVENOUS CONTINUOUS PRN
Status: SHIPPED | OUTPATIENT
Start: 2021-05-06

## 2021-05-06 RX ORDER — PANTOPRAZOLE SODIUM 40 MG/1
40 TABLET, DELAYED RELEASE ORAL DAILY
Qty: 90 TABLET | Refills: 0 | Status: SHIPPED | OUTPATIENT
Start: 2021-05-06 | End: 2021-08-04

## 2021-05-06 RX ORDER — SODIUM PHOSPHATE,MONO-DIBASIC 19G-7G/118
1 ENEMA (ML) RECTAL 2 TIMES DAILY WITH MEALS
COMMUNITY

## 2021-05-06 RX ORDER — GABAPENTIN 300 MG/1
300 CAPSULE ORAL 3 TIMES DAILY
Status: ON HOLD | COMMUNITY
End: 2022-07-27

## 2021-05-06 RX ORDER — DICYCLOMINE HCL 20 MG
20 TABLET ORAL 3 TIMES DAILY PRN
Qty: 180 TABLET | Refills: 1 | Status: ON HOLD | OUTPATIENT
Start: 2021-05-06 | End: 2022-07-27

## 2021-05-06 NOTE — PROGRESS NOTES
New Patient Consult      Date: 2021   Patient Name: Tasha Yarbrough  MRN: [id number]  : 1955     Referring Physician: Terrence Baldwin MD    Chief Complaint   Patient presents with   • Abdominal Pain       History of Present Illness: Tasha Yarbrough is a 66 y.o. female who is here today to establish care with Gastroenterology for evaluation of abdominal pain    History of  Left sided abdominal pain on and off for the last few yrs and got worse recently.   The pain is gradual in onset, intermittent, moderate to severe  in severity and sharp in nature. Associated nausea.  Frequency being almost daily now and constant. The pain may last for hours.  There is no radiation of abdominal pain. But also has some hip pain.  Eating worsens the abdominal discomfort.  No definite relieving factors of abdominal pain.     Deny any vomiting or abdominal distension.  She has history of acid eructation but no heartburn. No odynophagia or dysphagia. She is constipation and bowel movement once in 1-2 weeks. She was not taking any medication.  No recent history of change in bowel habit. Deny any hematochezia or melena. There is no prior history of liver or pancreatic disease. There is no history of anemia. Prior history of EGD or colonoscopy many yrs ago more than 15 yrs. No family history of colon cancer or any GI malignancy. Denies alcohol abuse . She is a chronic smoker.    She had a CT scan abdomen pelvis done at least twice last year in .  Which revealed prior colon surgery findings and fecal loading in the sigmoid otherwise unremarkable.  Lab studies were unremarkable except elevated creatinine.  She had left sided colectomy for ruptured colon 17 yrs ago. ??? She was been told it was ruptured. She had surgery again and had ostomy bag placed, that was changed later on.       Subjective      Past Medical History:   Past Medical History:   Diagnosis Date   • Hypertension    • Injury of back        Past Surgical  History:   Past Surgical History:   Procedure Laterality Date   • ABDOMINAL SURGERY     • COLON SURGERY     • COLONOSCOPY     • TUBAL ABDOMINAL LIGATION         Family History: History reviewed. No pertinent family history.    Social History:   Social History     Socioeconomic History   • Marital status:      Spouse name: Not on file   • Number of children: Not on file   • Years of education: Not on file   • Highest education level: Not on file   Tobacco Use   • Smoking status: Current Every Day Smoker     Types: Electronic Cigarette   Substance and Sexual Activity   • Alcohol use: Never   • Drug use: Never         Current Outpatient Medications:   •  gabapentin (NEURONTIN) 300 MG capsule, Take 300 mg by mouth 3 (Three) Times a Day., Disp: , Rfl:   •  glucosamine-chondroitin 500-400 MG capsule capsule, Take  by mouth 3 (Three) Times a Day With Meals., Disp: , Rfl:   •  ibuprofen (ADVIL,MOTRIN) 200 MG tablet, Take 200 mg by mouth Every 6 (Six) Hours As Needed for Mild Pain ., Disp: , Rfl:   •  metoprolol succinate XL (TOPROL-XL) 50 MG 24 hr tablet, Take 50 mg by mouth Daily., Disp: , Rfl:   •  bisacodyl (DULCOLAX) 5 MG EC tablet, Take 4 tablets by mouth 1 (One) Time for 1 dose. Please see prep instructions from office., Disp: 4 tablet, Rfl: 0  •  dicyclomine (BENTYL) 20 MG tablet, Take 1 tablet by mouth 3 (Three) Times a Day As Needed (abdominal pain)., Disp: 180 tablet, Rfl: 1  •  docusate sodium (COLACE) 100 MG capsule, Take 1 capsule by mouth 2 (Two) Times a Day., Disp: 60 capsule, Rfl: 2  •  pantoprazole (Protonix) 40 MG EC tablet, Take 1 tablet by mouth Daily for 90 days., Disp: 90 tablet, Rfl: 0  •  polyethylene glycol (GoLYTELY) 236 g solution, Take 4,000 mL by mouth 1 (One) Time for 1 dose. Please see prep instructions from office., Disp: 4000 mL, Rfl: 0  •  polyethylene glycol (MiraLax) 17 GM/SCOOP powder, Take 17 g by mouth Daily., Disp: 510 g, Rfl: 2    Current Facility-Administered Medications:   •  " sodium chloride 0.9 % infusion, 70 mL/hr, Intravenous, Continuous PRN, Tyree Fleming MD    No Known Allergies    Review of Systems:   Review of Systems   Constitutional: Negative for appetite change, fatigue, fever and unexpected weight loss.   HENT: Negative for trouble swallowing.    Respiratory: Negative for cough, shortness of breath and wheezing.    Cardiovascular: Negative for chest pain, palpitations and leg swelling.   Gastrointestinal: Positive for abdominal pain and nausea. Negative for abdominal distention, anal bleeding, blood in stool, constipation, diarrhea, rectal pain, vomiting, GERD and indigestion.   Genitourinary: Negative for dysuria, frequency and hematuria.   Musculoskeletal: Negative for back pain and joint swelling.   Skin: Negative for color change, rash and skin lesions.   Neurological: Negative for dizziness, syncope, speech difficulty, weakness, headache and memory problem.   Hematological: Negative for adenopathy. Does not bruise/bleed easily.   Psychiatric/Behavioral: Negative for agitation, behavioral problems, suicidal ideas and depressed mood.       The following portions of the patient's history were reviewed and updated as appropriate: allergies, current medications, past family history, past medical history, past social history, past surgical history and problem list.    Objective     Physical Exam:  Vital Signs:   Vitals:    05/06/21 1626   BP: 140/88   Temp: 98 °F (36.7 °C)   TempSrc: Temporal   Weight: 92.1 kg (203 lb)   Height: 157.5 cm (62\")       Physical Exam  Vitals and nursing note reviewed.   Constitutional:       Appearance: She is well-developed. She is obese.   HENT:      Head: Normocephalic and atraumatic.      Right Ear: External ear normal.      Left Ear: External ear normal.   Eyes:      Conjunctiva/sclera: Conjunctivae normal.   Neck:      Thyroid: No thyromegaly.      Trachea: No tracheal deviation.   Cardiovascular:      Rate and Rhythm: Normal rate " and regular rhythm.      Heart sounds: No murmur heard.     Pulmonary:      Effort: Pulmonary effort is normal. No respiratory distress.      Breath sounds: Normal breath sounds.   Abdominal:      General: Bowel sounds are normal. There is no distension.      Palpations: Abdomen is soft. There is no mass.      Tenderness: There is no abdominal tenderness (Tender abdomen in the left mid abdomen at the site of prior colostomy closure).      Hernia: No hernia (Reducible incisional hernia at the edge of her prior colostomy closure with a severe tenderness) is present.      Comments: Prior surgical midline scar noted   Musculoskeletal:         General: Normal range of motion.      Cervical back: Normal range of motion and neck supple.   Skin:     General: Skin is warm and dry.   Neurological:      General: No focal deficit present.      Mental Status: She is alert and oriented to person, place, and time.      Cranial Nerves: No cranial nerve deficit.      Sensory: No sensory deficit.   Psychiatric:         Mood and Affect: Mood normal.         Behavior: Behavior normal.         Thought Content: Thought content normal.         Judgment: Judgment normal.         Results Review:   I have reviewed the patient's new clinical and imaging results and agree with the interpretation.     No visits with results within 90 Day(s) from this visit.   Latest known visit with results is:   Admission on 10/04/2020, Discharged on 10/04/2020   Component Date Value Ref Range Status   • Glucose 10/04/2020 95  65 - 99 mg/dL Final   • BUN 10/04/2020 23  8 - 23 mg/dL Final   • Creatinine 10/04/2020 1.34* 0.57 - 1.00 mg/dL Final   • Sodium 10/04/2020 140  136 - 145 mmol/L Final   • Potassium 10/04/2020 3.8  3.5 - 5.2 mmol/L Final   • Chloride 10/04/2020 107  98 - 107 mmol/L Final   • CO2 10/04/2020 25.5  22.0 - 29.0 mmol/L Final   • Calcium 10/04/2020 10.0  8.6 - 10.5 mg/dL Final   • Total Protein 10/04/2020 7.6  6.0 - 8.5 g/dL Final   • Albumin  10/04/2020 4.50  3.50 - 5.20 g/dL Final   • ALT (SGPT) 10/04/2020 10  1 - 33 U/L Final   • AST (SGOT) 10/04/2020 13  1 - 32 U/L Final   • Alkaline Phosphatase 10/04/2020 108  39 - 117 U/L Final   • Total Bilirubin 10/04/2020 0.3  0.0 - 1.2 mg/dL Final   • eGFR Non African Amer 10/04/2020 40* >60 mL/min/1.73 Final   • Globulin 10/04/2020 3.1  gm/dL Final   • A/G Ratio 10/04/2020 1.5  g/dL Final   • BUN/Creatinine Ratio 10/04/2020 17.2  7.0 - 25.0 Final   • Anion Gap 10/04/2020 7.5  5.0 - 15.0 mmol/L Final   • Lipase 10/04/2020 46  13 - 60 U/L Final   • Color, UA 10/04/2020 Yellow  Yellow, Straw Final   • Appearance, UA 10/04/2020 Clear  Clear Final   • pH, UA 10/04/2020 6.0  5.0 - 8.0 Final   • Specific Gravity, UA 10/04/2020 1.012  1.005 - 1.030 Final   • Glucose, UA 10/04/2020 Negative  Negative Final   • Ketones, UA 10/04/2020 Negative  Negative Final   • Bilirubin, UA 10/04/2020 Negative  Negative Final   • Blood, UA 10/04/2020 Negative  Negative Final   • Protein, UA 10/04/2020 Negative  Negative Final   • Leuk Esterase, UA 10/04/2020 Negative  Negative Final   • Nitrite, UA 10/04/2020 Negative  Negative Final   • Urobilinogen, UA 10/04/2020 0.2 E.U./dL  0.2 - 1.0 E.U./dL Final   • Extra Tube 10/04/2020 hold for add-on   Final    Auto resulted   • Extra Tube 10/04/2020 Hold for add-ons.   Final    Auto resulted.   • Extra Tube 10/04/2020 hold for add-on   Final    Auto resulted   • Extra Tube 10/04/2020 Hold for add-ons.   Final    Auto resulted.   • WBC 10/04/2020 10.84* 3.40 - 10.80 10*3/mm3 Final   • RBC 10/04/2020 4.07  3.77 - 5.28 10*6/mm3 Final   • Hemoglobin 10/04/2020 13.2  12.0 - 15.9 g/dL Final   • Hematocrit 10/04/2020 39.3  34.0 - 46.6 % Final   • MCV 10/04/2020 96.6  79.0 - 97.0 fL Final   • MCH 10/04/2020 32.4  26.6 - 33.0 pg Final   • MCHC 10/04/2020 33.6  31.5 - 35.7 g/dL Final   • RDW 10/04/2020 13.7  12.3 - 15.4 % Final   • RDW-SD 10/04/2020 48.9  37.0 - 54.0 fl Final   • MPV 10/04/2020 11.6   6.0 - 12.0 fL Final   • Platelets 10/04/2020 199  140 - 450 10*3/mm3 Final   • Neutrophil % 10/04/2020 74.6  42.7 - 76.0 % Final   • Lymphocyte % 10/04/2020 14.3* 19.6 - 45.3 % Final   • Monocyte % 10/04/2020 6.9  5.0 - 12.0 % Final   • Eosinophil % 10/04/2020 3.0  0.3 - 6.2 % Final   • Basophil % 10/04/2020 0.6  0.0 - 1.5 % Final   • Immature Grans % 10/04/2020 0.6* 0.0 - 0.5 % Final   • Neutrophils, Absolute 10/04/2020 8.09* 1.70 - 7.00 10*3/mm3 Final   • Lymphocytes, Absolute 10/04/2020 1.55  0.70 - 3.10 10*3/mm3 Final   • Monocytes, Absolute 10/04/2020 0.75  0.10 - 0.90 10*3/mm3 Final   • Eosinophils, Absolute 10/04/2020 0.32  0.00 - 0.40 10*3/mm3 Final   • Basophils, Absolute 10/04/2020 0.06  0.00 - 0.20 10*3/mm3 Final   • Immature Grans, Absolute 10/04/2020 0.07* 0.00 - 0.05 10*3/mm3 Final   • nRBC 10/04/2020 0.0  0.0 - 0.2 /100 WBC Final      No radiology results for the last 90 days.    Assessment / Plan      Assessment & Plan:  1. Left upper quadrant abdominal pain  2. Periumbilical abdominal pain  3. Constipation, unspecified constipation type  4. Irritable bowel syndrome with constipation  5.  Incisional hernia    At baseline patient appears to have a irritable bowel syndrome with constipation.  However currently she has a severe pain at one site just about the prior colostomy closure with the swelling.  This appears to be a fat-containing incisional hernia with a possible intermittent obstruction.  Recent CT scan of the abdomen pelvis done showed a ventral hernia with fat as a content.  She had a recent CT scan couple of times.  That revealed a ventral hernia and colonic fecal loading.  Patient has a long history of left-sided abdominal pain with constipation.  She will have a bowel movement once in 1 to 2 weeks time with hard stool.    Patient needs to cut down and stop smoking  I have advised her to start on Metamucil 1 scoop p.o. daily  Prescription given for MiraLAX 17 g p.o. daily along with the  Colace 100 mg p.o. twice daily  I have given a prescription for antispasmodics.  Given her periumbilical pain and recent use of ibuprofen, prescription given for trial of PPI as well.   We will get a surgical consultation  We will schedule her for an EGD and colonoscopy for further evaluation    The indications, technique, alternatives and potential risk and complications were discussed with the patient including but not limited to bleeding, bowel perforations, missing lesions and anesthetic complications. The patient understands and wishes to proceed with the procedure and has given their verbal consent. Written patient education information was given to the patient.   The patient will call if they have further questions before procedure.     - Case Request; Standing  - Implement Anesthesia Orders Day of Procedure; Standing  - Obtain Informed Consent; Standing  - Verify Bowel Prep Was Successful; Standing  - Oxygen Therapy- Nasal Cannula; 2 LPM; Titrate for SPO2: equal to or greater than, 90%; Standing  - sodium chloride 0.9 % infusion  - Case Request  - Implement Anesthesia Orders Day of Procedure  - Obtain Informed Consent  - Verify Bowel Prep Was Successful  - Oxygen Therapy- Nasal Cannula; 2 LPM; Titrate for SPO2: equal to or greater than, 90%    5. Encounter for screening for malignant neoplasm of colon  Her last colonoscopy was more than 15 years ago.  No family history of any colon cancer.  She is due for screening colonoscopy and will schedule the same.     6. Status post partial colectomy  As per patient she ended up in the emergency room with abdominal pain and was told she had perforated colon for which she had a colectomy 17 years ago.  As per patient she subsequently had a laparotomy again with the stoma and stoma closure.  Etiology appears to be diverticular abscess and perforation.  No history of neoplasia.         Follow Up:   Return for Follow Up after procedure.    Tyree Fleming,  MD  [facility]  5/6/2021  17:49 EDT    Please note that portions of this note may have been completed with a voice recognition program.

## 2021-05-07 DIAGNOSIS — Z01.818 PREOP TESTING: Primary | ICD-10-CM

## 2021-05-10 ENCOUNTER — PREP FOR SURGERY (OUTPATIENT)
Dept: OTHER | Facility: HOSPITAL | Age: 66
End: 2021-05-10

## 2021-05-10 DIAGNOSIS — Z12.11 COLON CANCER SCREENING: Primary | ICD-10-CM

## 2021-05-10 RX ORDER — BISACODYL 5 MG
TABLET, DELAYED RELEASE (ENTERIC COATED) ORAL
Qty: 4 TABLET | Refills: 0 | Status: ON HOLD | OUTPATIENT
Start: 2021-05-10 | End: 2022-07-26

## 2021-05-10 RX ORDER — POLYETHYLENE GLYCOL 3350 17 G/17G
POWDER, FOR SOLUTION ORAL
Qty: 238 G | Refills: 0 | Status: ON HOLD | OUTPATIENT
Start: 2021-05-10 | End: 2022-07-27

## 2021-06-02 PROBLEM — Z12.11 COLON CANCER SCREENING: Status: ACTIVE | Noted: 2021-06-02

## 2021-06-25 ENCOUNTER — PREP FOR SURGERY (OUTPATIENT)
Dept: OTHER | Facility: HOSPITAL | Age: 66
End: 2021-06-25

## 2021-06-25 DIAGNOSIS — Z01.818 PREOP TESTING: Primary | ICD-10-CM

## 2021-06-25 DIAGNOSIS — K21.00 GASTROESOPHAGEAL REFLUX DISEASE WITH ESOPHAGITIS, UNSPECIFIED WHETHER HEMORRHAGE: Primary | ICD-10-CM

## 2021-07-02 PROBLEM — R10.32 LEFT LOWER QUADRANT ABDOMINAL PAIN: Status: ACTIVE | Noted: 2021-07-02

## 2021-07-02 PROBLEM — R10.33 PERIUMBILICAL ABDOMINAL PAIN: Status: ACTIVE | Noted: 2021-07-02

## 2021-07-02 PROBLEM — K58.1 IRRITABLE BOWEL SYNDROME WITH CONSTIPATION: Status: ACTIVE | Noted: 2021-07-02

## 2021-07-02 PROBLEM — Z12.11 ENCOUNTER FOR SCREENING FOR MALIGNANT NEOPLASM OF COLON: Status: ACTIVE | Noted: 2021-07-02

## 2021-07-08 ENCOUNTER — TELEPHONE (OUTPATIENT)
Dept: SURGERY | Facility: CLINIC | Age: 66
End: 2021-07-08

## 2022-07-26 ENCOUNTER — HOSPITAL ENCOUNTER (OUTPATIENT)
Facility: HOSPITAL | Age: 67
Setting detail: OBSERVATION
Discharge: HOME HEALTH | End: 2022-07-30
Attending: EMERGENCY MEDICINE | Admitting: FAMILY MEDICINE

## 2022-07-26 ENCOUNTER — APPOINTMENT (OUTPATIENT)
Dept: CT IMAGING | Facility: HOSPITAL | Age: 67
End: 2022-07-26

## 2022-07-26 DIAGNOSIS — N17.9 ACUTE KIDNEY INJURY: ICD-10-CM

## 2022-07-26 DIAGNOSIS — K52.9 COLITIS: ICD-10-CM

## 2022-07-26 DIAGNOSIS — N17.9 ACUTE RENAL FAILURE, UNSPECIFIED ACUTE RENAL FAILURE TYPE: ICD-10-CM

## 2022-07-26 DIAGNOSIS — R19.7 DIARRHEA OF PRESUMED INFECTIOUS ORIGIN: Primary | ICD-10-CM

## 2022-07-26 LAB
ALBUMIN SERPL-MCNC: 4 G/DL (ref 3.5–5.2)
ALBUMIN/GLOB SERPL: 1.4 G/DL
ALP SERPL-CCNC: 138 U/L (ref 39–117)
ALT SERPL W P-5'-P-CCNC: 9 U/L (ref 1–33)
ANION GAP SERPL CALCULATED.3IONS-SCNC: 18.7 MMOL/L (ref 5–15)
AST SERPL-CCNC: 9 U/L (ref 1–32)
B PARAPERT DNA SPEC QL NAA+PROBE: NOT DETECTED
B PERT DNA SPEC QL NAA+PROBE: NOT DETECTED
BASOPHILS # BLD AUTO: 0.05 10*3/MM3 (ref 0–0.2)
BASOPHILS NFR BLD AUTO: 0.4 % (ref 0–1.5)
BILIRUB SERPL-MCNC: <0.2 MG/DL (ref 0–1.2)
BUN SERPL-MCNC: 50 MG/DL (ref 8–23)
BUN/CREAT SERPL: 18.8 (ref 7–25)
C PNEUM DNA NPH QL NAA+NON-PROBE: NOT DETECTED
CALCIUM SPEC-SCNC: 9.1 MG/DL (ref 8.6–10.5)
CHLORIDE SERPL-SCNC: 113 MMOL/L (ref 98–107)
CO2 SERPL-SCNC: 10.3 MMOL/L (ref 22–29)
CREAT SERPL-MCNC: 2.66 MG/DL (ref 0.57–1)
D-LACTATE SERPL-SCNC: 0.7 MMOL/L (ref 0.5–2)
DEPRECATED RDW RBC AUTO: 53.8 FL (ref 37–54)
EGFRCR SERPLBLD CKD-EPI 2021: 19.1 ML/MIN/1.73
EOSINOPHIL # BLD AUTO: 0.17 10*3/MM3 (ref 0–0.4)
EOSINOPHIL NFR BLD AUTO: 1.5 % (ref 0.3–6.2)
ERYTHROCYTE [DISTWIDTH] IN BLOOD BY AUTOMATED COUNT: 14.7 % (ref 12.3–15.4)
FLUAV SUBTYP SPEC NAA+PROBE: NOT DETECTED
FLUBV RNA ISLT QL NAA+PROBE: NOT DETECTED
GLOBULIN UR ELPH-MCNC: 2.9 GM/DL
GLUCOSE SERPL-MCNC: 84 MG/DL (ref 65–99)
HADV DNA SPEC NAA+PROBE: NOT DETECTED
HCOV 229E RNA SPEC QL NAA+PROBE: NOT DETECTED
HCOV HKU1 RNA SPEC QL NAA+PROBE: NOT DETECTED
HCOV NL63 RNA SPEC QL NAA+PROBE: NOT DETECTED
HCOV OC43 RNA SPEC QL NAA+PROBE: NOT DETECTED
HCT VFR BLD AUTO: 41.7 % (ref 34–46.6)
HGB BLD-MCNC: 13.9 G/DL (ref 12–15.9)
HMPV RNA NPH QL NAA+NON-PROBE: NOT DETECTED
HPIV1 RNA ISLT QL NAA+PROBE: NOT DETECTED
HPIV2 RNA SPEC QL NAA+PROBE: NOT DETECTED
HPIV3 RNA NPH QL NAA+PROBE: NOT DETECTED
HPIV4 P GENE NPH QL NAA+PROBE: NOT DETECTED
HYPOCHROMIA BLD QL: NORMAL
IMM GRANULOCYTES # BLD AUTO: 0.07 10*3/MM3 (ref 0–0.05)
IMM GRANULOCYTES NFR BLD AUTO: 0.6 % (ref 0–0.5)
LYMPHOCYTES # BLD AUTO: 0.89 10*3/MM3 (ref 0.7–3.1)
LYMPHOCYTES NFR BLD AUTO: 7.8 % (ref 19.6–45.3)
M PNEUMO IGG SER IA-ACNC: NOT DETECTED
MAGNESIUM SERPL-MCNC: 2.4 MG/DL (ref 1.6–2.4)
MCH RBC QN AUTO: 32.7 PG (ref 26.6–33)
MCHC RBC AUTO-ENTMCNC: 33.3 G/DL (ref 31.5–35.7)
MCV RBC AUTO: 98.1 FL (ref 79–97)
MONOCYTES # BLD AUTO: 0.79 10*3/MM3 (ref 0.1–0.9)
MONOCYTES NFR BLD AUTO: 6.9 % (ref 5–12)
NEUTROPHILS NFR BLD AUTO: 82.8 % (ref 42.7–76)
NEUTROPHILS NFR BLD AUTO: 9.48 10*3/MM3 (ref 1.7–7)
NRBC BLD AUTO-RTO: 0 /100 WBC (ref 0–0.2)
PLATELET # BLD AUTO: 183 10*3/MM3 (ref 140–450)
PMV BLD AUTO: 12.4 FL (ref 6–12)
POTASSIUM SERPL-SCNC: 2.9 MMOL/L (ref 3.5–5.2)
PROCALCITONIN SERPL-MCNC: 0.35 NG/ML (ref 0–0.25)
PROT SERPL-MCNC: 6.9 G/DL (ref 6–8.5)
RBC # BLD AUTO: 4.25 10*6/MM3 (ref 3.77–5.28)
RHINOVIRUS RNA SPEC NAA+PROBE: NOT DETECTED
RSV RNA NPH QL NAA+NON-PROBE: NOT DETECTED
SARS-COV-2 RNA NPH QL NAA+NON-PROBE: NOT DETECTED
SMALL PLATELETS BLD QL SMEAR: ADEQUATE
SODIUM SERPL-SCNC: 142 MMOL/L (ref 136–145)
WBC MORPH BLD: NORMAL
WBC NRBC COR # BLD: 11.45 10*3/MM3 (ref 3.4–10.8)

## 2022-07-26 PROCEDURE — G0378 HOSPITAL OBSERVATION PER HR: HCPCS

## 2022-07-26 PROCEDURE — 99218 PR INITIAL OBSERVATION CARE/DAY 30 MINUTES: CPT | Performed by: INTERNAL MEDICINE

## 2022-07-26 PROCEDURE — 74176 CT ABD & PELVIS W/O CONTRAST: CPT

## 2022-07-26 PROCEDURE — 83605 ASSAY OF LACTIC ACID: CPT | Performed by: EMERGENCY MEDICINE

## 2022-07-26 PROCEDURE — 36415 COLL VENOUS BLD VENIPUNCTURE: CPT

## 2022-07-26 PROCEDURE — 83735 ASSAY OF MAGNESIUM: CPT | Performed by: EMERGENCY MEDICINE

## 2022-07-26 PROCEDURE — 85007 BL SMEAR W/DIFF WBC COUNT: CPT | Performed by: EMERGENCY MEDICINE

## 2022-07-26 PROCEDURE — 96365 THER/PROPH/DIAG IV INF INIT: CPT

## 2022-07-26 PROCEDURE — 0202U NFCT DS 22 TRGT SARS-COV-2: CPT | Performed by: EMERGENCY MEDICINE

## 2022-07-26 PROCEDURE — 25010000002 PIPERACILLIN SOD-TAZOBACTAM PER 1 G: Performed by: EMERGENCY MEDICINE

## 2022-07-26 PROCEDURE — 80053 COMPREHEN METABOLIC PANEL: CPT | Performed by: EMERGENCY MEDICINE

## 2022-07-26 PROCEDURE — 85025 COMPLETE CBC W/AUTO DIFF WBC: CPT | Performed by: EMERGENCY MEDICINE

## 2022-07-26 PROCEDURE — 99284 EMERGENCY DEPT VISIT MOD MDM: CPT

## 2022-07-26 PROCEDURE — 84145 PROCALCITONIN (PCT): CPT | Performed by: EMERGENCY MEDICINE

## 2022-07-26 PROCEDURE — 87040 BLOOD CULTURE FOR BACTERIA: CPT | Performed by: EMERGENCY MEDICINE

## 2022-07-26 RX ORDER — CALCIUM CARBONATE 200(500)MG
2 TABLET,CHEWABLE ORAL 2 TIMES DAILY PRN
Status: DISCONTINUED | OUTPATIENT
Start: 2022-07-26 | End: 2022-07-30 | Stop reason: HOSPADM

## 2022-07-26 RX ORDER — SODIUM CHLORIDE 0.9 % (FLUSH) 0.9 %
10 SYRINGE (ML) INJECTION EVERY 12 HOURS SCHEDULED
Status: DISCONTINUED | OUTPATIENT
Start: 2022-07-27 | End: 2022-07-30 | Stop reason: HOSPADM

## 2022-07-26 RX ORDER — ONDANSETRON 2 MG/ML
4 INJECTION INTRAMUSCULAR; INTRAVENOUS EVERY 6 HOURS PRN
Status: DISCONTINUED | OUTPATIENT
Start: 2022-07-26 | End: 2022-07-30 | Stop reason: HOSPADM

## 2022-07-26 RX ORDER — ACETAMINOPHEN 325 MG/1
650 TABLET ORAL EVERY 4 HOURS PRN
Status: DISCONTINUED | OUTPATIENT
Start: 2022-07-26 | End: 2022-07-30 | Stop reason: HOSPADM

## 2022-07-26 RX ORDER — SODIUM CHLORIDE, SODIUM LACTATE, POTASSIUM CHLORIDE, CALCIUM CHLORIDE 600; 310; 30; 20 MG/100ML; MG/100ML; MG/100ML; MG/100ML
125 INJECTION, SOLUTION INTRAVENOUS CONTINUOUS
Status: DISCONTINUED | OUTPATIENT
Start: 2022-07-27 | End: 2022-07-30 | Stop reason: HOSPADM

## 2022-07-26 RX ORDER — METOPROLOL SUCCINATE 50 MG/1
50 TABLET, EXTENDED RELEASE ORAL DAILY
Status: DISCONTINUED | OUTPATIENT
Start: 2022-07-27 | End: 2022-07-27

## 2022-07-26 RX ORDER — SODIUM CHLORIDE 0.9 % (FLUSH) 0.9 %
10 SYRINGE (ML) INJECTION AS NEEDED
Status: DISCONTINUED | OUTPATIENT
Start: 2022-07-26 | End: 2022-07-30 | Stop reason: HOSPADM

## 2022-07-26 RX ORDER — CHOLECALCIFEROL (VITAMIN D3) 125 MCG
5 CAPSULE ORAL NIGHTLY PRN
Status: DISCONTINUED | OUTPATIENT
Start: 2022-07-26 | End: 2022-07-30 | Stop reason: HOSPADM

## 2022-07-26 RX ORDER — HEPARIN SODIUM 5000 [USP'U]/ML
5000 INJECTION, SOLUTION INTRAVENOUS; SUBCUTANEOUS EVERY 8 HOURS SCHEDULED
Status: DISCONTINUED | OUTPATIENT
Start: 2022-07-27 | End: 2022-07-30 | Stop reason: HOSPADM

## 2022-07-26 RX ORDER — NICOTINE 21 MG/24HR
1 PATCH, TRANSDERMAL 24 HOURS TRANSDERMAL
Status: DISCONTINUED | OUTPATIENT
Start: 2022-07-26 | End: 2022-07-30 | Stop reason: HOSPADM

## 2022-07-26 RX ORDER — HYDROCODONE BITARTRATE AND ACETAMINOPHEN 7.5; 325 MG/1; MG/1
1 TABLET ORAL EVERY 8 HOURS PRN
COMMUNITY

## 2022-07-26 RX ORDER — ACETAMINOPHEN 650 MG/1
650 SUPPOSITORY RECTAL EVERY 4 HOURS PRN
Status: DISCONTINUED | OUTPATIENT
Start: 2022-07-26 | End: 2022-07-30 | Stop reason: HOSPADM

## 2022-07-26 RX ORDER — ACETAMINOPHEN 160 MG/5ML
650 SOLUTION ORAL EVERY 4 HOURS PRN
Status: DISCONTINUED | OUTPATIENT
Start: 2022-07-26 | End: 2022-07-30 | Stop reason: HOSPADM

## 2022-07-26 RX ORDER — GABAPENTIN 300 MG/1
300 CAPSULE ORAL 3 TIMES DAILY
Status: DISCONTINUED | OUTPATIENT
Start: 2022-07-27 | End: 2022-07-27

## 2022-07-26 RX ADMIN — SODIUM CHLORIDE 2000 ML: 9 INJECTION, SOLUTION INTRAVENOUS at 18:14

## 2022-07-26 RX ADMIN — Medication 1 PATCH: at 22:08

## 2022-07-26 RX ADMIN — TAZOBACTAM SODIUM AND PIPERACILLIN SODIUM 3.38 G: 375; 3 INJECTION, SOLUTION INTRAVENOUS at 21:34

## 2022-07-27 LAB
ANION GAP SERPL CALCULATED.3IONS-SCNC: 15.5 MMOL/L (ref 5–15)
ANION GAP SERPL CALCULATED.3IONS-SCNC: 18 MMOL/L (ref 5–15)
BASOPHILS # BLD AUTO: 0.02 10*3/MM3 (ref 0–0.2)
BASOPHILS NFR BLD AUTO: 0.2 % (ref 0–1.5)
BUN SERPL-MCNC: 39 MG/DL (ref 8–23)
BUN SERPL-MCNC: 46 MG/DL (ref 8–23)
BUN/CREAT SERPL: 20.8 (ref 7–25)
BUN/CREAT SERPL: 21.2 (ref 7–25)
CALCIUM SPEC-SCNC: 7.7 MG/DL (ref 8.6–10.5)
CALCIUM SPEC-SCNC: 7.9 MG/DL (ref 8.6–10.5)
CHLORIDE SERPL-SCNC: 118 MMOL/L (ref 98–107)
CHLORIDE SERPL-SCNC: 118 MMOL/L (ref 98–107)
CO2 SERPL-SCNC: 10 MMOL/L (ref 22–29)
CO2 SERPL-SCNC: 11.5 MMOL/L (ref 22–29)
CREAT SERPL-MCNC: 1.84 MG/DL (ref 0.57–1)
CREAT SERPL-MCNC: 2.21 MG/DL (ref 0.57–1)
DEPRECATED RDW RBC AUTO: 53.4 FL (ref 37–54)
EGFRCR SERPLBLD CKD-EPI 2021: 23.9 ML/MIN/1.73
EGFRCR SERPLBLD CKD-EPI 2021: 29.8 ML/MIN/1.73
EOSINOPHIL # BLD AUTO: 0.24 10*3/MM3 (ref 0–0.4)
EOSINOPHIL NFR BLD AUTO: 2.8 % (ref 0.3–6.2)
ERYTHROCYTE [DISTWIDTH] IN BLOOD BY AUTOMATED COUNT: 14.5 % (ref 12.3–15.4)
GLUCOSE SERPL-MCNC: 127 MG/DL (ref 65–99)
GLUCOSE SERPL-MCNC: 77 MG/DL (ref 65–99)
HCT VFR BLD AUTO: 35.8 % (ref 34–46.6)
HGB BLD-MCNC: 11.7 G/DL (ref 12–15.9)
IMM GRANULOCYTES # BLD AUTO: 0.05 10*3/MM3 (ref 0–0.05)
IMM GRANULOCYTES NFR BLD AUTO: 0.6 % (ref 0–0.5)
LYMPHOCYTES # BLD AUTO: 0.96 10*3/MM3 (ref 0.7–3.1)
LYMPHOCYTES NFR BLD AUTO: 11.4 % (ref 19.6–45.3)
MCH RBC QN AUTO: 32.4 PG (ref 26.6–33)
MCHC RBC AUTO-ENTMCNC: 32.7 G/DL (ref 31.5–35.7)
MCV RBC AUTO: 99.2 FL (ref 79–97)
MONOCYTES # BLD AUTO: 0.75 10*3/MM3 (ref 0.1–0.9)
MONOCYTES NFR BLD AUTO: 8.9 % (ref 5–12)
NEUTROPHILS NFR BLD AUTO: 6.42 10*3/MM3 (ref 1.7–7)
NEUTROPHILS NFR BLD AUTO: 76.1 % (ref 42.7–76)
NRBC BLD AUTO-RTO: 0 /100 WBC (ref 0–0.2)
PLATELET # BLD AUTO: 156 10*3/MM3 (ref 140–450)
PMV BLD AUTO: 12.3 FL (ref 6–12)
POTASSIUM SERPL-SCNC: 2.5 MMOL/L (ref 3.5–5.2)
POTASSIUM SERPL-SCNC: 2.7 MMOL/L (ref 3.5–5.2)
RBC # BLD AUTO: 3.61 10*6/MM3 (ref 3.77–5.28)
SODIUM SERPL-SCNC: 145 MMOL/L (ref 136–145)
SODIUM SERPL-SCNC: 146 MMOL/L (ref 136–145)
WBC NRBC COR # BLD: 8.44 10*3/MM3 (ref 3.4–10.8)

## 2022-07-27 PROCEDURE — 80048 BASIC METABOLIC PNL TOTAL CA: CPT | Performed by: INTERNAL MEDICINE

## 2022-07-27 PROCEDURE — 80048 BASIC METABOLIC PNL TOTAL CA: CPT | Performed by: FAMILY MEDICINE

## 2022-07-27 PROCEDURE — G0378 HOSPITAL OBSERVATION PER HR: HCPCS

## 2022-07-27 PROCEDURE — 85025 COMPLETE CBC W/AUTO DIFF WBC: CPT | Performed by: INTERNAL MEDICINE

## 2022-07-27 PROCEDURE — 25010000002 PIPERACILLIN SOD-TAZOBACTAM PER 1 G: Performed by: INTERNAL MEDICINE

## 2022-07-27 PROCEDURE — 25010000002 HEPARIN (PORCINE) PER 1000 UNITS: Performed by: INTERNAL MEDICINE

## 2022-07-27 PROCEDURE — 96372 THER/PROPH/DIAG INJ SC/IM: CPT

## 2022-07-27 PROCEDURE — 99225 PR SBSQ OBSERVATION CARE/DAY 25 MINUTES: CPT | Performed by: FAMILY MEDICINE

## 2022-07-27 RX ORDER — POTASSIUM CHLORIDE 750 MG/1
40 CAPSULE, EXTENDED RELEASE ORAL AS NEEDED
Status: DISCONTINUED | OUTPATIENT
Start: 2022-07-27 | End: 2022-07-27

## 2022-07-27 RX ORDER — POTASSIUM CHLORIDE 1.5 G/1.77G
40 POWDER, FOR SOLUTION ORAL AS NEEDED
Status: DISCONTINUED | OUTPATIENT
Start: 2022-07-27 | End: 2022-07-27

## 2022-07-27 RX ORDER — POTASSIUM CHLORIDE 20 MEQ/1
40 TABLET, EXTENDED RELEASE ORAL EVERY 4 HOURS
Status: COMPLETED | OUTPATIENT
Start: 2022-07-27 | End: 2022-07-27

## 2022-07-27 RX ORDER — POTASSIUM CHLORIDE 7.45 MG/ML
10 INJECTION INTRAVENOUS
Status: DISCONTINUED | OUTPATIENT
Start: 2022-07-27 | End: 2022-07-27

## 2022-07-27 RX ORDER — MONTELUKAST SODIUM 4 MG/1
1-3 TABLET, CHEWABLE ORAL DAILY
COMMUNITY

## 2022-07-27 RX ORDER — ONDANSETRON HYDROCHLORIDE 8 MG/1
8 TABLET, FILM COATED ORAL EVERY 8 HOURS PRN
COMMUNITY

## 2022-07-27 RX ORDER — METHOCARBAMOL 750 MG/1
750-1500 TABLET, FILM COATED ORAL 3 TIMES DAILY PRN
COMMUNITY

## 2022-07-27 RX ORDER — HYDROCODONE BITARTRATE AND ACETAMINOPHEN 7.5; 325 MG/1; MG/1
1 TABLET ORAL EVERY 8 HOURS PRN
Status: DISCONTINUED | OUTPATIENT
Start: 2022-07-27 | End: 2022-07-30 | Stop reason: HOSPADM

## 2022-07-27 RX ADMIN — TAZOBACTAM SODIUM AND PIPERACILLIN SODIUM 3.38 G: 375; 3 INJECTION, SOLUTION INTRAVENOUS at 21:06

## 2022-07-27 RX ADMIN — Medication 10 ML: at 09:00

## 2022-07-27 RX ADMIN — TAZOBACTAM SODIUM AND PIPERACILLIN SODIUM 3.38 G: 375; 3 INJECTION, SOLUTION INTRAVENOUS at 16:15

## 2022-07-27 RX ADMIN — POTASSIUM CHLORIDE 40 MEQ: 1500 TABLET, EXTENDED RELEASE ORAL at 12:57

## 2022-07-27 RX ADMIN — HEPARIN SODIUM 5000 UNITS: 5000 INJECTION INTRAVENOUS; SUBCUTANEOUS at 06:34

## 2022-07-27 RX ADMIN — GABAPENTIN 300 MG: 300 CAPSULE ORAL at 08:41

## 2022-07-27 RX ADMIN — METOPROLOL SUCCINATE 50 MG: 50 TABLET, EXTENDED RELEASE ORAL at 08:41

## 2022-07-27 RX ADMIN — POTASSIUM CHLORIDE 40 MEQ: 1500 TABLET, EXTENDED RELEASE ORAL at 21:06

## 2022-07-27 RX ADMIN — HEPARIN SODIUM 5000 UNITS: 5000 INJECTION INTRAVENOUS; SUBCUTANEOUS at 16:16

## 2022-07-27 RX ADMIN — POTASSIUM CHLORIDE 40 MEQ: 1500 TABLET, EXTENDED RELEASE ORAL at 17:28

## 2022-07-27 RX ADMIN — HEPARIN SODIUM 5000 UNITS: 5000 INJECTION INTRAVENOUS; SUBCUTANEOUS at 00:21

## 2022-07-27 RX ADMIN — SODIUM CHLORIDE, POTASSIUM CHLORIDE, SODIUM LACTATE AND CALCIUM CHLORIDE 125 ML/HR: 600; 310; 30; 20 INJECTION, SOLUTION INTRAVENOUS at 00:19

## 2022-07-27 RX ADMIN — Medication 10 ML: at 00:21

## 2022-07-27 RX ADMIN — TAZOBACTAM SODIUM AND PIPERACILLIN SODIUM 3.38 G: 375; 3 INJECTION, SOLUTION INTRAVENOUS at 06:24

## 2022-07-27 RX ADMIN — HEPARIN SODIUM 5000 UNITS: 5000 INJECTION INTRAVENOUS; SUBCUTANEOUS at 21:06

## 2022-07-28 PROBLEM — N17.9 ACUTE KIDNEY INJURY (HCC): Status: ACTIVE | Noted: 2022-07-28

## 2022-07-28 PROBLEM — A04.9 BACTERIAL COLITIS: Status: ACTIVE | Noted: 2022-07-28

## 2022-07-28 LAB
ADV 40+41 DNA STL QL NAA+NON-PROBE: NOT DETECTED
ANION GAP SERPL CALCULATED.3IONS-SCNC: 12.6 MMOL/L (ref 5–15)
ASTRO TYP 1-8 RNA STL QL NAA+NON-PROBE: NOT DETECTED
BUN SERPL-MCNC: 31 MG/DL (ref 8–23)
BUN/CREAT SERPL: 17.3 (ref 7–25)
C CAYETANENSIS DNA STL QL NAA+NON-PROBE: NOT DETECTED
C COLI+JEJ+UPSA DNA STL QL NAA+NON-PROBE: NOT DETECTED
C DIFF GDH STL QL: NEGATIVE
CALCIUM SPEC-SCNC: 8.9 MG/DL (ref 8.6–10.5)
CHLORIDE SERPL-SCNC: 123 MMOL/L (ref 98–107)
CO2 SERPL-SCNC: 10.4 MMOL/L (ref 22–29)
CREAT SERPL-MCNC: 1.79 MG/DL (ref 0.57–1)
CRYPTOSP DNA STL QL NAA+NON-PROBE: NOT DETECTED
E HISTOLYT DNA STL QL NAA+NON-PROBE: NOT DETECTED
EAEC PAA PLAS AGGR+AATA ST NAA+NON-PRB: NOT DETECTED
EC STX1+STX2 GENES STL QL NAA+NON-PROBE: NOT DETECTED
EGFRCR SERPLBLD CKD-EPI 2021: 30.8 ML/MIN/1.73
EPEC EAE GENE STL QL NAA+NON-PROBE: NOT DETECTED
ETEC LTA+ST1A+ST1B TOX ST NAA+NON-PROBE: NOT DETECTED
G LAMBLIA DNA STL QL NAA+NON-PROBE: NOT DETECTED
GLUCOSE SERPL-MCNC: 75 MG/DL (ref 65–99)
NOROVIRUS GI+II RNA STL QL NAA+NON-PROBE: NOT DETECTED
P SHIGELLOIDES DNA STL QL NAA+NON-PROBE: NOT DETECTED
POTASSIUM SERPL-SCNC: 3.2 MMOL/L (ref 3.5–5.2)
POTASSIUM SERPL-SCNC: 3.2 MMOL/L (ref 3.5–5.2)
POTASSIUM SERPL-SCNC: 3.4 MMOL/L (ref 3.5–5.2)
RVA RNA STL QL NAA+NON-PROBE: NOT DETECTED
S ENT+BONG DNA STL QL NAA+NON-PROBE: NOT DETECTED
SAPO I+II+IV+V RNA STL QL NAA+NON-PROBE: NOT DETECTED
SHIGELLA SP+EIEC IPAH ST NAA+NON-PROBE: NOT DETECTED
SODIUM SERPL-SCNC: 146 MMOL/L (ref 136–145)
V CHOL+PARA+VUL DNA STL QL NAA+NON-PROBE: NOT DETECTED
V CHOLERAE DNA STL QL NAA+NON-PROBE: NOT DETECTED
Y ENTEROCOL DNA STL QL NAA+NON-PROBE: NOT DETECTED

## 2022-07-28 PROCEDURE — 87324 CLOSTRIDIUM AG IA: CPT | Performed by: FAMILY MEDICINE

## 2022-07-28 PROCEDURE — 84132 ASSAY OF SERUM POTASSIUM: CPT | Performed by: FAMILY MEDICINE

## 2022-07-28 PROCEDURE — 25010000002 PIPERACILLIN SOD-TAZOBACTAM PER 1 G: Performed by: INTERNAL MEDICINE

## 2022-07-28 PROCEDURE — 25010000002 HEPARIN (PORCINE) PER 1000 UNITS: Performed by: INTERNAL MEDICINE

## 2022-07-28 PROCEDURE — 96372 THER/PROPH/DIAG INJ SC/IM: CPT

## 2022-07-28 PROCEDURE — G0378 HOSPITAL OBSERVATION PER HR: HCPCS

## 2022-07-28 PROCEDURE — 87507 IADNA-DNA/RNA PROBE TQ 12-25: CPT | Performed by: FAMILY MEDICINE

## 2022-07-28 PROCEDURE — 87449 NOS EACH ORGANISM AG IA: CPT | Performed by: FAMILY MEDICINE

## 2022-07-28 PROCEDURE — 80048 BASIC METABOLIC PNL TOTAL CA: CPT | Performed by: FAMILY MEDICINE

## 2022-07-28 PROCEDURE — 99225 PR SBSQ OBSERVATION CARE/DAY 25 MINUTES: CPT | Performed by: FAMILY MEDICINE

## 2022-07-28 RX ORDER — L.ACID,PARA/B.BIFIDUM/S.THERM 8B CELL
1 CAPSULE ORAL 2 TIMES DAILY
Status: DISCONTINUED | OUTPATIENT
Start: 2022-07-28 | End: 2022-07-30 | Stop reason: HOSPADM

## 2022-07-28 RX ORDER — POTASSIUM CHLORIDE 20 MEQ/1
40 TABLET, EXTENDED RELEASE ORAL ONCE
Status: COMPLETED | OUTPATIENT
Start: 2022-07-28 | End: 2022-07-28

## 2022-07-28 RX ADMIN — TAZOBACTAM SODIUM AND PIPERACILLIN SODIUM 3.38 G: 375; 3 INJECTION, SOLUTION INTRAVENOUS at 17:15

## 2022-07-28 RX ADMIN — POTASSIUM CHLORIDE 40 MEQ: 1500 TABLET, EXTENDED RELEASE ORAL at 08:40

## 2022-07-28 RX ADMIN — TAZOBACTAM SODIUM AND PIPERACILLIN SODIUM 3.38 G: 375; 3 INJECTION, SOLUTION INTRAVENOUS at 21:39

## 2022-07-28 RX ADMIN — HEPARIN SODIUM 5000 UNITS: 5000 INJECTION INTRAVENOUS; SUBCUTANEOUS at 05:08

## 2022-07-28 RX ADMIN — HEPARIN SODIUM 5000 UNITS: 5000 INJECTION INTRAVENOUS; SUBCUTANEOUS at 17:15

## 2022-07-28 RX ADMIN — Medication 1 CAPSULE: at 21:40

## 2022-07-28 RX ADMIN — TAZOBACTAM SODIUM AND PIPERACILLIN SODIUM 3.38 G: 375; 3 INJECTION, SOLUTION INTRAVENOUS at 05:08

## 2022-07-28 RX ADMIN — Medication 1 PATCH: at 08:34

## 2022-07-28 RX ADMIN — HEPARIN SODIUM 5000 UNITS: 5000 INJECTION INTRAVENOUS; SUBCUTANEOUS at 21:39

## 2022-07-28 RX ADMIN — Medication 10 ML: at 08:34

## 2022-07-29 LAB
ANION GAP SERPL CALCULATED.3IONS-SCNC: 10.6 MMOL/L (ref 5–15)
BUN SERPL-MCNC: 19 MG/DL (ref 8–23)
BUN/CREAT SERPL: 12.8 (ref 7–25)
CALCIUM SPEC-SCNC: 9.1 MG/DL (ref 8.6–10.5)
CHLORIDE SERPL-SCNC: 122 MMOL/L (ref 98–107)
CO2 SERPL-SCNC: 13.4 MMOL/L (ref 22–29)
CREAT SERPL-MCNC: 1.48 MG/DL (ref 0.57–1)
EGFRCR SERPLBLD CKD-EPI 2021: 38.7 ML/MIN/1.73
GLUCOSE SERPL-MCNC: 109 MG/DL (ref 65–99)
POTASSIUM SERPL-SCNC: 3.1 MMOL/L (ref 3.5–5.2)
SODIUM SERPL-SCNC: 146 MMOL/L (ref 136–145)

## 2022-07-29 PROCEDURE — 96372 THER/PROPH/DIAG INJ SC/IM: CPT

## 2022-07-29 PROCEDURE — 80048 BASIC METABOLIC PNL TOTAL CA: CPT | Performed by: FAMILY MEDICINE

## 2022-07-29 PROCEDURE — 99225 PR SBSQ OBSERVATION CARE/DAY 25 MINUTES: CPT | Performed by: FAMILY MEDICINE

## 2022-07-29 PROCEDURE — 25010000002 PIPERACILLIN SOD-TAZOBACTAM PER 1 G: Performed by: INTERNAL MEDICINE

## 2022-07-29 PROCEDURE — G0378 HOSPITAL OBSERVATION PER HR: HCPCS

## 2022-07-29 PROCEDURE — 25010000002 HEPARIN (PORCINE) PER 1000 UNITS: Performed by: INTERNAL MEDICINE

## 2022-07-29 RX ORDER — POTASSIUM CHLORIDE 750 MG/1
40 CAPSULE, EXTENDED RELEASE ORAL
Status: DISCONTINUED | OUTPATIENT
Start: 2022-07-29 | End: 2022-07-29

## 2022-07-29 RX ORDER — POTASSIUM CHLORIDE 750 MG/1
40 CAPSULE, EXTENDED RELEASE ORAL
Status: COMPLETED | OUTPATIENT
Start: 2022-07-29 | End: 2022-07-29

## 2022-07-29 RX ADMIN — HEPARIN SODIUM 5000 UNITS: 5000 INJECTION INTRAVENOUS; SUBCUTANEOUS at 14:51

## 2022-07-29 RX ADMIN — HEPARIN SODIUM 5000 UNITS: 5000 INJECTION INTRAVENOUS; SUBCUTANEOUS at 21:32

## 2022-07-29 RX ADMIN — POTASSIUM CHLORIDE 40 MEQ: 10 CAPSULE, COATED, EXTENDED RELEASE ORAL at 18:45

## 2022-07-29 RX ADMIN — POTASSIUM CHLORIDE 40 MEQ: 10 CAPSULE, COATED, EXTENDED RELEASE ORAL at 12:52

## 2022-07-29 RX ADMIN — TAZOBACTAM SODIUM AND PIPERACILLIN SODIUM 3.38 G: 375; 3 INJECTION, SOLUTION INTRAVENOUS at 05:29

## 2022-07-29 RX ADMIN — TAZOBACTAM SODIUM AND PIPERACILLIN SODIUM 3.38 G: 375; 3 INJECTION, SOLUTION INTRAVENOUS at 17:11

## 2022-07-29 RX ADMIN — Medication 1 PATCH: at 09:20

## 2022-07-29 RX ADMIN — HYDROCODONE BITARTRATE AND ACETAMINOPHEN 1 TABLET: 7.5; 325 TABLET ORAL at 21:32

## 2022-07-29 RX ADMIN — HEPARIN SODIUM 5000 UNITS: 5000 INJECTION INTRAVENOUS; SUBCUTANEOUS at 05:29

## 2022-07-29 RX ADMIN — Medication 1 CAPSULE: at 09:20

## 2022-07-29 RX ADMIN — TAZOBACTAM SODIUM AND PIPERACILLIN SODIUM 3.38 G: 375; 3 INJECTION, SOLUTION INTRAVENOUS at 21:32

## 2022-07-29 RX ADMIN — Medication 1 CAPSULE: at 21:32

## 2022-07-29 RX ADMIN — Medication 10 ML: at 10:00

## 2022-07-30 VITALS
RESPIRATION RATE: 18 BRPM | OXYGEN SATURATION: 95 % | BODY MASS INDEX: 32.05 KG/M2 | HEIGHT: 62 IN | TEMPERATURE: 98.3 F | DIASTOLIC BLOOD PRESSURE: 93 MMHG | HEART RATE: 79 BPM | SYSTOLIC BLOOD PRESSURE: 152 MMHG | WEIGHT: 174.16 LBS

## 2022-07-30 LAB
ANION GAP SERPL CALCULATED.3IONS-SCNC: 8.6 MMOL/L (ref 5–15)
BUN SERPL-MCNC: 17 MG/DL (ref 8–23)
BUN/CREAT SERPL: 12.1 (ref 7–25)
CALCIUM SPEC-SCNC: 9.7 MG/DL (ref 8.6–10.5)
CHLORIDE SERPL-SCNC: 124 MMOL/L (ref 98–107)
CO2 SERPL-SCNC: 14.4 MMOL/L (ref 22–29)
CREAT SERPL-MCNC: 1.4 MG/DL (ref 0.57–1)
EGFRCR SERPLBLD CKD-EPI 2021: 41.3 ML/MIN/1.73
GLUCOSE SERPL-MCNC: 89 MG/DL (ref 65–99)
POTASSIUM SERPL-SCNC: 3.5 MMOL/L (ref 3.5–5.2)
SODIUM SERPL-SCNC: 147 MMOL/L (ref 136–145)

## 2022-07-30 PROCEDURE — 25010000002 PIPERACILLIN SOD-TAZOBACTAM PER 1 G: Performed by: INTERNAL MEDICINE

## 2022-07-30 PROCEDURE — 96372 THER/PROPH/DIAG INJ SC/IM: CPT

## 2022-07-30 PROCEDURE — 96366 THER/PROPH/DIAG IV INF ADDON: CPT

## 2022-07-30 PROCEDURE — 99217 PR OBSERVATION CARE DISCHARGE MANAGEMENT: CPT | Performed by: FAMILY MEDICINE

## 2022-07-30 PROCEDURE — G0378 HOSPITAL OBSERVATION PER HR: HCPCS

## 2022-07-30 PROCEDURE — 25010000002 HEPARIN (PORCINE) PER 1000 UNITS: Performed by: INTERNAL MEDICINE

## 2022-07-30 PROCEDURE — 80048 BASIC METABOLIC PNL TOTAL CA: CPT | Performed by: FAMILY MEDICINE

## 2022-07-30 RX ORDER — NICOTINE 21 MG/24HR
1 PATCH, TRANSDERMAL 24 HOURS TRANSDERMAL
Qty: 14 PATCH | Refills: 0 | Status: SHIPPED | OUTPATIENT
Start: 2022-07-31 | End: 2022-08-14

## 2022-07-30 RX ORDER — METRONIDAZOLE 500 MG/1
500 TABLET ORAL 3 TIMES DAILY
Qty: 12 TABLET | Refills: 0 | Status: SHIPPED | OUTPATIENT
Start: 2022-07-30 | End: 2022-08-03

## 2022-07-30 RX ORDER — LEVOFLOXACIN 250 MG/1
250 TABLET ORAL DAILY
Qty: 2 TABLET | Refills: 0 | Status: SHIPPED | OUTPATIENT
Start: 2022-07-30 | End: 2022-08-01

## 2022-07-30 RX ORDER — L.ACID,PARA/B.BIFIDUM/S.THERM 8B CELL
1 CAPSULE ORAL 2 TIMES DAILY
Qty: 20 CAPSULE | Refills: 0 | Status: SHIPPED | OUTPATIENT
Start: 2022-07-30 | End: 2022-08-09

## 2022-07-30 RX ADMIN — Medication 1 CAPSULE: at 09:03

## 2022-07-30 RX ADMIN — Medication 1 PATCH: at 09:04

## 2022-07-30 RX ADMIN — Medication 10 ML: at 09:03

## 2022-07-30 RX ADMIN — HYDROCODONE BITARTRATE AND ACETAMINOPHEN 1 TABLET: 7.5; 325 TABLET ORAL at 10:33

## 2022-07-30 RX ADMIN — CALCIUM CARBONATE (ANTACID) CHEW TAB 500 MG 2 TABLET: 500 CHEW TAB at 00:29

## 2022-07-30 RX ADMIN — HEPARIN SODIUM 5000 UNITS: 5000 INJECTION INTRAVENOUS; SUBCUTANEOUS at 05:31

## 2022-07-30 RX ADMIN — TAZOBACTAM SODIUM AND PIPERACILLIN SODIUM 3.38 G: 375; 3 INJECTION, SOLUTION INTRAVENOUS at 05:31

## 2022-07-31 LAB
BACTERIA SPEC AEROBE CULT: NORMAL
BACTERIA SPEC AEROBE CULT: NORMAL

## 2022-08-01 ENCOUNTER — HOME HEALTH ADMISSION (OUTPATIENT)
Dept: HOME HEALTH SERVICES | Facility: HOME HEALTHCARE | Age: 67
End: 2022-08-01

## 2022-08-03 ENCOUNTER — HOME CARE VISIT (OUTPATIENT)
Dept: HOME HEALTH SERVICES | Facility: HOME HEALTHCARE | Age: 67
End: 2022-08-03

## 2022-08-03 VITALS
TEMPERATURE: 98.9 F | SYSTOLIC BLOOD PRESSURE: 156 MMHG | DIASTOLIC BLOOD PRESSURE: 89 MMHG | OXYGEN SATURATION: 96 % | HEART RATE: 92 BPM | RESPIRATION RATE: 20 BRPM

## 2022-08-03 PROCEDURE — G0299 HHS/HOSPICE OF RN EA 15 MIN: HCPCS

## 2022-08-03 NOTE — CASE MANAGEMENT/SOCIAL WORK
Discharge Planning Assessment  [facility]     Patient Name: Tasha Yarbrough  MRN: [id number]  Today's Date: 8/3/2022    Admit Date: 7/26/2022     Discharge Needs Assessment    No documentation.                Discharge Plan     Row Name 08/03/22 1500       Plan    Final Discharge Disposition Code 01 - home or self-care    Final Note Code changed due to Home Health unitl 8/3              Continued Care and Services - Discharged on 7/30/2022 Admission date: 7/26/2022 - Discharge disposition: Home-Health Care Svc    Durable Medical Equipment Coordination complete.    Service Provider Request Status Selected Services Address Phone Fax Patient Preferred    [facility]   Selected Durable Medical Equipment [address] [place], [place] [address] [address] [phone] --          Home Medical Care Coordination complete.    Service Provider Request Status Selected Services Address Phone Fax Patient Preferred     [facility]   Selected Home Health Services [address] [facility] [id number] [phone] --              Expected Discharge Date and Time     Expected Discharge Date Expected Discharge Time    Jul 30, 2022          Demographic Summary    No documentation.                Functional Status    No documentation.                Psychosocial    No documentation.                Abuse/Neglect    No documentation.                Legal    No documentation.                Substance Abuse    No documentation.                Patient Forms    No documentation.                   Deborah Montes RN

## 2022-08-04 ENCOUNTER — HOME CARE VISIT (OUTPATIENT)
Dept: HOME HEALTH SERVICES | Facility: HOME HEALTHCARE | Age: 67
End: 2022-08-04

## 2022-08-04 NOTE — HOME HEALTH
SOC Note:    Home Health ordered for: disciplines SN/PT/MSW    Reason for Hosp/Primary Dx/Co-morbidities: Colitis    Focus of Care:  assessment, medication teaching, Cardio/pulmonary assessments, nutrition/cardiac diet    Current Functional status/mobility/DME: Walker and cane. Contact gaurd assist.    HB status/Living Arrangements: Lives with spouse around the clock.    Skin Integrity/wound status: No wounds    Code Status: CPR    Fall Risk: Yes    POC confirmed with Tasha Yarbrough (representative for self) on date 8/3/22.

## 2022-08-04 NOTE — CASE COMMUNICATION
Physical therapy evaluation visit from [facility] was cancelled on 8/4/2022 due to patient adamantly refused per MSW this date stating she would like to defer any visits until next week as she is feeling unwell.      Patient contacted by phone and the following items were addressed:  -Caregiver available to provide care as needed.  -Adequate supplies in the home (e.g. food, meds)  -Patient has heat/electricity in the home    For  your records only.  As per home health protocol, MD must be notified of missed/cancelled visits; therefore the prescribed frequency was not met. 
1.83

## 2022-08-06 ENCOUNTER — HOSPITAL ENCOUNTER (OUTPATIENT)
Facility: HOSPITAL | Age: 67
Setting detail: OBSERVATION
Discharge: HOME | End: 2022-08-08
Attending: EMERGENCY MEDICINE | Admitting: FAMILY MEDICINE

## 2022-08-06 DIAGNOSIS — N17.9 AKI (ACUTE KIDNEY INJURY): Primary | ICD-10-CM

## 2022-08-06 LAB
ALBUMIN SERPL-MCNC: 3.5 G/DL (ref 3.5–5.2)
ALBUMIN/GLOB SERPL: 1.3 G/DL
ALP SERPL-CCNC: 103 U/L (ref 39–117)
ALT SERPL W P-5'-P-CCNC: 18 U/L (ref 1–33)
ANION GAP SERPL CALCULATED.3IONS-SCNC: 13.6 MMOL/L (ref 5–15)
AST SERPL-CCNC: 16 U/L (ref 1–32)
BACTERIA UR QL AUTO: ABNORMAL /HPF
BASOPHILS # BLD AUTO: 0.04 10*3/MM3 (ref 0–0.2)
BASOPHILS NFR BLD AUTO: 0.4 % (ref 0–1.5)
BILIRUB SERPL-MCNC: <0.2 MG/DL (ref 0–1.2)
BILIRUB UR QL STRIP: NEGATIVE
BUN SERPL-MCNC: 22 MG/DL (ref 8–23)
BUN/CREAT SERPL: 10.4 (ref 7–25)
CALCIUM SPEC-SCNC: 9.9 MG/DL (ref 8.6–10.5)
CHLORIDE SERPL-SCNC: 117 MMOL/L (ref 98–107)
CLARITY UR: CLEAR
CO2 SERPL-SCNC: 17.4 MMOL/L (ref 22–29)
COLOR UR: ABNORMAL
CREAT SERPL-MCNC: 2.12 MG/DL (ref 0.57–1)
DEPRECATED RDW RBC AUTO: 52.9 FL (ref 37–54)
EGFRCR SERPLBLD CKD-EPI 2021: 25.1 ML/MIN/1.73
EOSINOPHIL # BLD AUTO: 0.25 10*3/MM3 (ref 0–0.4)
EOSINOPHIL NFR BLD AUTO: 2.2 % (ref 0.3–6.2)
ERYTHROCYTE [DISTWIDTH] IN BLOOD BY AUTOMATED COUNT: 14.6 % (ref 12.3–15.4)
GLOBULIN UR ELPH-MCNC: 2.8 GM/DL
GLUCOSE SERPL-MCNC: 122 MG/DL (ref 65–99)
GLUCOSE UR STRIP-MCNC: NEGATIVE MG/DL
HCT VFR BLD AUTO: 34.3 % (ref 34–46.6)
HGB BLD-MCNC: 11.1 G/DL (ref 12–15.9)
HGB UR QL STRIP.AUTO: NEGATIVE
HOLD SPECIMEN: NORMAL
HOLD SPECIMEN: NORMAL
HYALINE CASTS UR QL AUTO: ABNORMAL /LPF
IMM GRANULOCYTES # BLD AUTO: 0.08 10*3/MM3 (ref 0–0.05)
IMM GRANULOCYTES NFR BLD AUTO: 0.7 % (ref 0–0.5)
KETONES UR QL STRIP: ABNORMAL
LEUKOCYTE ESTERASE UR QL STRIP.AUTO: ABNORMAL
LYMPHOCYTES # BLD AUTO: 0.87 10*3/MM3 (ref 0.7–3.1)
LYMPHOCYTES NFR BLD AUTO: 7.8 % (ref 19.6–45.3)
MAGNESIUM SERPL-MCNC: 2 MG/DL (ref 1.6–2.4)
MCH RBC QN AUTO: 32.1 PG (ref 26.6–33)
MCHC RBC AUTO-ENTMCNC: 32.4 G/DL (ref 31.5–35.7)
MCV RBC AUTO: 99.1 FL (ref 79–97)
MONOCYTES # BLD AUTO: 0.81 10*3/MM3 (ref 0.1–0.9)
MONOCYTES NFR BLD AUTO: 7.2 % (ref 5–12)
NEUTROPHILS NFR BLD AUTO: 81.7 % (ref 42.7–76)
NEUTROPHILS NFR BLD AUTO: 9.14 10*3/MM3 (ref 1.7–7)
NITRITE UR QL STRIP: NEGATIVE
NRBC BLD AUTO-RTO: 0 /100 WBC (ref 0–0.2)
PH UR STRIP.AUTO: 6 [PH] (ref 5–8)
PLATELET # BLD AUTO: 153 10*3/MM3 (ref 140–450)
PMV BLD AUTO: 11.9 FL (ref 6–12)
POTASSIUM SERPL-SCNC: 2.8 MMOL/L (ref 3.5–5.2)
PROT SERPL-MCNC: 6.3 G/DL (ref 6–8.5)
PROT UR QL STRIP: ABNORMAL
RBC # BLD AUTO: 3.46 10*6/MM3 (ref 3.77–5.28)
RBC # UR STRIP: ABNORMAL /HPF
REF LAB TEST METHOD: ABNORMAL
SODIUM SERPL-SCNC: 148 MMOL/L (ref 136–145)
SP GR UR STRIP: 1.02 (ref 1–1.03)
SQUAMOUS #/AREA URNS HPF: ABNORMAL /HPF
UROBILINOGEN UR QL STRIP: ABNORMAL
WBC # UR STRIP: ABNORMAL /HPF
WBC NRBC COR # BLD: 11.19 10*3/MM3 (ref 3.4–10.8)
WHOLE BLOOD HOLD COAG: NORMAL
WHOLE BLOOD HOLD SPECIMEN: NORMAL

## 2022-08-06 PROCEDURE — G0378 HOSPITAL OBSERVATION PER HR: HCPCS

## 2022-08-06 PROCEDURE — 80306 DRUG TEST PRSMV INSTRMNT: CPT | Performed by: FAMILY MEDICINE

## 2022-08-06 PROCEDURE — 81001 URINALYSIS AUTO W/SCOPE: CPT

## 2022-08-06 PROCEDURE — 80053 COMPREHEN METABOLIC PANEL: CPT

## 2022-08-06 PROCEDURE — 99284 EMERGENCY DEPT VISIT MOD MDM: CPT

## 2022-08-06 PROCEDURE — 93005 ELECTROCARDIOGRAM TRACING: CPT

## 2022-08-06 PROCEDURE — 85025 COMPLETE CBC W/AUTO DIFF WBC: CPT

## 2022-08-06 PROCEDURE — 83735 ASSAY OF MAGNESIUM: CPT

## 2022-08-06 PROCEDURE — 99220 PR INITIAL OBSERVATION CARE/DAY 70 MINUTES: CPT | Performed by: FAMILY MEDICINE

## 2022-08-06 RX ORDER — CHOLECALCIFEROL (VITAMIN D3) 125 MCG
5 CAPSULE ORAL NIGHTLY PRN
Status: DISCONTINUED | OUTPATIENT
Start: 2022-08-06 | End: 2022-08-08 | Stop reason: HOSPADM

## 2022-08-06 RX ORDER — POTASSIUM CHLORIDE 750 MG/1
40 CAPSULE, EXTENDED RELEASE ORAL ONCE
Status: COMPLETED | OUTPATIENT
Start: 2022-08-07 | End: 2022-08-07

## 2022-08-06 RX ORDER — ACETAMINOPHEN 160 MG/5ML
650 SOLUTION ORAL EVERY 4 HOURS PRN
Status: DISCONTINUED | OUTPATIENT
Start: 2022-08-06 | End: 2022-08-08 | Stop reason: HOSPADM

## 2022-08-06 RX ORDER — ONDANSETRON 2 MG/ML
4 INJECTION INTRAMUSCULAR; INTRAVENOUS EVERY 6 HOURS PRN
Status: DISCONTINUED | OUTPATIENT
Start: 2022-08-06 | End: 2022-08-08 | Stop reason: HOSPADM

## 2022-08-06 RX ORDER — ONDANSETRON 4 MG/1
4 TABLET, FILM COATED ORAL EVERY 6 HOURS PRN
Status: DISCONTINUED | OUTPATIENT
Start: 2022-08-06 | End: 2022-08-08 | Stop reason: HOSPADM

## 2022-08-06 RX ORDER — SODIUM CHLORIDE 0.9 % (FLUSH) 0.9 %
10 SYRINGE (ML) INJECTION AS NEEDED
Status: DISCONTINUED | OUTPATIENT
Start: 2022-08-06 | End: 2022-08-08 | Stop reason: HOSPADM

## 2022-08-06 RX ORDER — NICOTINE 21 MG/24HR
1 PATCH, TRANSDERMAL 24 HOURS TRANSDERMAL
Status: DISCONTINUED | OUTPATIENT
Start: 2022-08-07 | End: 2022-08-08 | Stop reason: HOSPADM

## 2022-08-06 RX ORDER — POTASSIUM CHLORIDE 750 MG/1
40 CAPSULE, EXTENDED RELEASE ORAL ONCE
Status: COMPLETED | OUTPATIENT
Start: 2022-08-06 | End: 2022-08-06

## 2022-08-06 RX ORDER — ACETAMINOPHEN 650 MG/1
650 SUPPOSITORY RECTAL EVERY 4 HOURS PRN
Status: DISCONTINUED | OUTPATIENT
Start: 2022-08-06 | End: 2022-08-08 | Stop reason: HOSPADM

## 2022-08-06 RX ORDER — DEXTROSE, SODIUM CHLORIDE, AND POTASSIUM CHLORIDE 5; .45; .15 G/100ML; G/100ML; G/100ML
100 INJECTION INTRAVENOUS CONTINUOUS
Status: DISCONTINUED | OUTPATIENT
Start: 2022-08-07 | End: 2022-08-07

## 2022-08-06 RX ORDER — SODIUM CHLORIDE 0.9 % (FLUSH) 0.9 %
10 SYRINGE (ML) INJECTION EVERY 12 HOURS SCHEDULED
Status: DISCONTINUED | OUTPATIENT
Start: 2022-08-07 | End: 2022-08-08 | Stop reason: HOSPADM

## 2022-08-06 RX ORDER — ACETAMINOPHEN 325 MG/1
650 TABLET ORAL EVERY 4 HOURS PRN
Status: DISCONTINUED | OUTPATIENT
Start: 2022-08-06 | End: 2022-08-08 | Stop reason: HOSPADM

## 2022-08-06 RX ADMIN — POTASSIUM CHLORIDE 40 MEQ: 10 CAPSULE, COATED, EXTENDED RELEASE ORAL at 22:53

## 2022-08-06 RX ADMIN — SODIUM CHLORIDE 1000 ML: 9 INJECTION, SOLUTION INTRAVENOUS at 22:53

## 2022-08-07 LAB
AMPHET+METHAMPHET UR QL: NEGATIVE
AMPHETAMINES UR QL: NEGATIVE
ANION GAP SERPL CALCULATED.3IONS-SCNC: 6.5 MMOL/L (ref 5–15)
BARBITURATES UR QL SCN: NEGATIVE
BENZODIAZ UR QL SCN: NEGATIVE
BUN SERPL-MCNC: 19 MG/DL (ref 8–23)
BUN/CREAT SERPL: 11.6 (ref 7–25)
BUPRENORPHINE SERPL-MCNC: NEGATIVE NG/ML
CALCIUM SPEC-SCNC: 8.3 MG/DL (ref 8.6–10.5)
CANNABINOIDS SERPL QL: NEGATIVE
CHLORIDE SERPL-SCNC: 118 MMOL/L (ref 98–107)
CO2 SERPL-SCNC: 14.5 MMOL/L (ref 22–29)
COCAINE UR QL: NEGATIVE
CREAT SERPL-MCNC: 1.64 MG/DL (ref 0.57–1)
CRP SERPL-MCNC: 1.12 MG/DL (ref 0–0.5)
EGFRCR SERPLBLD CKD-EPI 2021: 34.2 ML/MIN/1.73
ERYTHROCYTE [SEDIMENTATION RATE] IN BLOOD: 8 MM/HR (ref 0–30)
GLUCOSE BLDC GLUCOMTR-MCNC: 101 MG/DL (ref 70–130)
GLUCOSE SERPL-MCNC: 436 MG/DL (ref 65–99)
GLUCOSE SERPL-MCNC: 99 MG/DL (ref 65–99)
METHADONE UR QL SCN: NEGATIVE
OPIATES UR QL: POSITIVE
OXYCODONE UR QL SCN: NEGATIVE
PCP UR QL SCN: NEGATIVE
POTASSIUM SERPL-SCNC: 5 MMOL/L (ref 3.5–5.2)
PROPOXYPH UR QL: NEGATIVE
SARS-COV-2 RNA PNL SPEC NAA+PROBE: NOT DETECTED
SODIUM SERPL-SCNC: 139 MMOL/L (ref 136–145)
TRICYCLICS UR QL SCN: NEGATIVE

## 2022-08-07 PROCEDURE — 83993 ASSAY FOR CALPROTECTIN FECAL: CPT | Performed by: FAMILY MEDICINE

## 2022-08-07 PROCEDURE — 80048 BASIC METABOLIC PNL TOTAL CA: CPT | Performed by: FAMILY MEDICINE

## 2022-08-07 PROCEDURE — 82947 ASSAY GLUCOSE BLOOD QUANT: CPT | Performed by: FAMILY MEDICINE

## 2022-08-07 PROCEDURE — 96361 HYDRATE IV INFUSION ADD-ON: CPT

## 2022-08-07 PROCEDURE — 87635 SARS-COV-2 COVID-19 AMP PRB: CPT | Performed by: FAMILY MEDICINE

## 2022-08-07 PROCEDURE — 85652 RBC SED RATE AUTOMATED: CPT | Performed by: FAMILY MEDICINE

## 2022-08-07 PROCEDURE — 99225 PR SBSQ OBSERVATION CARE/DAY 25 MINUTES: CPT | Performed by: FAMILY MEDICINE

## 2022-08-07 PROCEDURE — 25010000002 ENOXAPARIN PER 10 MG: Performed by: FAMILY MEDICINE

## 2022-08-07 PROCEDURE — G0378 HOSPITAL OBSERVATION PER HR: HCPCS

## 2022-08-07 PROCEDURE — 96365 THER/PROPH/DIAG IV INF INIT: CPT

## 2022-08-07 PROCEDURE — 96372 THER/PROPH/DIAG INJ SC/IM: CPT

## 2022-08-07 PROCEDURE — 82962 GLUCOSE BLOOD TEST: CPT

## 2022-08-07 PROCEDURE — 86140 C-REACTIVE PROTEIN: CPT | Performed by: FAMILY MEDICINE

## 2022-08-07 PROCEDURE — 96366 THER/PROPH/DIAG IV INF ADDON: CPT

## 2022-08-07 RX ORDER — METHOCARBAMOL 500 MG/1
750 TABLET, FILM COATED ORAL EVERY 8 HOURS PRN
Status: DISCONTINUED | OUTPATIENT
Start: 2022-08-07 | End: 2022-08-08 | Stop reason: HOSPADM

## 2022-08-07 RX ORDER — CALCIUM CARBONATE 200(500)MG
2 TABLET,CHEWABLE ORAL 3 TIMES DAILY PRN
Status: DISCONTINUED | OUTPATIENT
Start: 2022-08-07 | End: 2022-08-08 | Stop reason: HOSPADM

## 2022-08-07 RX ORDER — ENOXAPARIN SODIUM 100 MG/ML
40 INJECTION SUBCUTANEOUS EVERY 24 HOURS
Status: DISCONTINUED | OUTPATIENT
Start: 2022-08-08 | End: 2022-08-08 | Stop reason: HOSPADM

## 2022-08-07 RX ORDER — DIPHENOXYLATE HYDROCHLORIDE AND ATROPINE SULFATE 2.5; .025 MG/1; MG/1
2 TABLET ORAL
Status: DISCONTINUED | OUTPATIENT
Start: 2022-08-07 | End: 2022-08-08 | Stop reason: HOSPADM

## 2022-08-07 RX ORDER — HYDROCODONE BITARTRATE AND ACETAMINOPHEN 7.5; 325 MG/1; MG/1
1 TABLET ORAL EVERY 8 HOURS PRN
Status: DISCONTINUED | OUTPATIENT
Start: 2022-08-07 | End: 2022-08-08 | Stop reason: HOSPADM

## 2022-08-07 RX ORDER — SODIUM CHLORIDE 450 MG/100ML
75 INJECTION, SOLUTION INTRAVENOUS CONTINUOUS
Status: DISCONTINUED | OUTPATIENT
Start: 2022-08-07 | End: 2022-08-08 | Stop reason: HOSPADM

## 2022-08-07 RX ORDER — ENOXAPARIN SODIUM 100 MG/ML
30 INJECTION SUBCUTANEOUS EVERY 24 HOURS
Status: DISCONTINUED | OUTPATIENT
Start: 2022-08-07 | End: 2022-08-07

## 2022-08-07 RX ADMIN — Medication 10 ML: at 08:33

## 2022-08-07 RX ADMIN — SODIUM CHLORIDE 75 ML/HR: 4.5 INJECTION, SOLUTION INTRAVENOUS at 20:12

## 2022-08-07 RX ADMIN — CALCIUM CARBONATE (ANTACID) CHEW TAB 500 MG 2 TABLET: 500 CHEW TAB at 18:39

## 2022-08-07 RX ADMIN — ENOXAPARIN SODIUM 30 MG: 30 INJECTION SUBCUTANEOUS at 08:33

## 2022-08-07 RX ADMIN — POTASSIUM CHLORIDE, DEXTROSE MONOHYDRATE AND SODIUM CHLORIDE 100 ML/HR: 150; 5; 450 INJECTION, SOLUTION INTRAVENOUS at 00:19

## 2022-08-07 RX ADMIN — SODIUM CHLORIDE 75 ML/HR: 4.5 INJECTION, SOLUTION INTRAVENOUS at 06:37

## 2022-08-07 RX ADMIN — Medication 1 PATCH: at 08:33

## 2022-08-07 RX ADMIN — Medication 10 ML: at 00:19

## 2022-08-07 RX ADMIN — HYDROCODONE BITARTRATE AND ACETAMINOPHEN 1 TABLET: 7.5; 325 TABLET ORAL at 08:32

## 2022-08-07 RX ADMIN — POTASSIUM CHLORIDE 40 MEQ: 10 CAPSULE, COATED, EXTENDED RELEASE ORAL at 00:40

## 2022-08-07 NOTE — ED PROVIDER NOTES
Subjective   Patient is a 67-year-old female here today with abnormal labs.  She is unsure of exactly what the abnormal lab is, she states that she received a phone call from her primary provider who informed her that she needed to go to the hospital for abnormal labs to be admitted.  Patient states that she was recently admitted here about a month ago for diarrhea and a kidney injury.  She states that she is still having diarrhea, but nowhere as intense.  Approximately having 2-3 episodes a day.  She describes her stools as green.  Does note some intermittent abdominal cramping and nausea.  No fever that she is aware of, but is having chills at times.  She did have a bowel movement after arrival to the emergency department.  History of a colon surgery stating that her colon ruptured many years ago.  She has had a history of some bowel blockages due to the scar tissue from this.        Review of Systems   Constitutional: Positive for chills and fatigue. Negative for fever.   Respiratory: Negative for cough and shortness of breath.    Cardiovascular: Negative for chest pain.   Gastrointestinal: Positive for abdominal pain, diarrhea and nausea. Negative for constipation and vomiting.   Genitourinary: Negative for dysuria and frequency.   Musculoskeletal: Negative for back pain.   Neurological: Negative for dizziness, light-headedness and headaches.   All other systems reviewed and are negative.      Past Medical History:   Diagnosis Date   • Hypertension    • Injury of back        No Known Allergies    Past Surgical History:   Procedure Laterality Date   • ABDOMINAL SURGERY     • COLON SURGERY     • COLONOSCOPY     • TUBAL ABDOMINAL LIGATION         History reviewed. No pertinent family history.    Social History     Socioeconomic History   • Marital status:    Tobacco Use   • Smoking status: Current Every Day Smoker     Packs/day: 1.00     Types: Cigarettes   • Smokeless tobacco: Never Used   Vaping Use   •  "Vaping Use: Never used   Substance and Sexual Activity   • Alcohol use: Never   • Drug use: Never   • Sexual activity: Defer           Objective    /76 (BP Location: Left arm, Patient Position: Sitting)   Pulse 84   Temp 98.4 °F (36.9 °C) (Oral)   Resp 16   Ht 157.5 cm (62\")   Wt 76.7 kg (169 lb)   SpO2 99%   BMI 30.91 kg/m²     Physical Exam  Vitals and nursing note reviewed.   Constitutional:       Appearance: Normal appearance. She is normal weight.   HENT:      Head: Normocephalic and atraumatic.   Cardiovascular:      Rate and Rhythm: Normal rate and regular rhythm.      Pulses: Normal pulses.      Heart sounds: Normal heart sounds.   Pulmonary:      Effort: Pulmonary effort is normal.      Breath sounds: Normal breath sounds.   Abdominal:      General: Abdomen is flat. Bowel sounds are normal. There is no distension.      Palpations: Abdomen is soft.      Tenderness: There is generalized abdominal tenderness.   Musculoskeletal:      Right lower leg: No edema.      Left lower leg: No edema.   Skin:     General: Skin is warm and dry.   Neurological:      General: No focal deficit present.      Mental Status: She is alert and oriented to person, place, and time.   Psychiatric:         Mood and Affect: Mood normal.         Behavior: Behavior normal.         Procedures           ED Course  ED Course as of 08/06/22 2250   Sat Aug 06, 2022   2157 WBC(!): 11.19 [TA]   2157 RBC(!): 3.46 [TA]   2157 Hemoglobin(!): 11.1 [TA]   2157 Hematocrit: 34.3 [TA]   2157 Platelets: 153 [TA]   2213 Glucose(!): 122 [TA]   2213 BUN: 22 [TA]   2213 Creatinine(!): 2.12 [TA]   2213 Sodium(!): 148 [TA]   2213 Potassium(!): 2.8 [TA]   2213 eGFR(!): 25.1 [TA]   2234 EKG interpreted by me: Sinus rhythm, normal rate, borderline QT, some nonspecific T waves, this is an abnormal EKG [MP]      ED Course User Index  [MP] Rj Lopez MD  [TA] Jignesh Valdes, APRN                                           [facility]  Number of " Diagnoses or Management Options  VIVIENNE (acute kidney injury) ([facility])  Diagnosis management comments: Patient is a 67-year-old female here today for abnormal labs.  Because she was unsure of which specific labs were abnormal, a repeat CBC and CMP were ordered.  It was found that she had a creatinine of 2.12, GFR of 25.1, and a potassium of 2.8.  Review of her medical record showed that her labs 7 days ago she had a potassium of 3.5, creatinine of 1.4, and a GFR of 43.  She has a normal magnesium level.  IV fluids and oral potassium were ordered.    Spoke with patient about her lab results and the need for potential admission.  She states that she would prefer to stay at the hospital as there has been a recent transportation issue, and it was difficult to find a ride to the hospital here as she does not have a working vehicle and the nearest family member lives in [place].  Spoke with Dr. Ballesteros about the patient and he agrees to accept the patient for observation.       Amount and/or Complexity of Data Reviewed  Clinical lab tests: reviewed and ordered  Tests in the medicine section of CPT®: reviewed and ordered  Discussion of test results with the performing providers: yes  Decide to obtain previous medical records or to obtain history from someone other than the patient: yes  Discuss the patient with other providers: yes    Patient Progress  Patient progress: stable      Final diagnoses:   VIVIENNE (acute kidney injury) ([facility])       ED Disposition  ED Disposition     ED Disposition   Decision to Admit    Condition   --    Comment   Level of Care: Med/Surg [1]   Diagnosis: VIVIENNE (acute kidney injury) ([facility]) [[id number]]   Admitting Physician: KATIE BALLESTEROS [[id number]]   Attending Physician: KATIE BALLESTEROS [[id number]]               No follow-up provider specified.       Medication List      No changes were made to your prescriptions during this visit.          Jignesh Valdes, APRN  08/06/22 [id number]

## 2022-08-07 NOTE — H&P
[facility]   HISTORY AND PHYSICAL      Name:  Tasha Yarbrough   Age:  67 y.o.  Sex:  female  :  1955  MRN:  [id number]   Visit Number:  [id number]  Admission Date:  2022  Date Of Service:  22  Primary Care Physician:  Terrence Baldwin MD    Chief Complaint:     Loose stools, abnormal labs    History Of Presenting Illness:      The patient is a 67-year-old female with medical history of chronic tobacco use, hypertension, prior perforated colon status post colectomy, with a recent diagnosis of colitis who was treated and discharged about a week ago, who had presented with complaints of loose stools.  Patient notes she had followed up with her PCP this week, Dr. Baldwin, who had instructed her to come to the emergency room secondary to abnormal kidney function.  Patient notes that she has continued to have diarrhea off and on sometimes up to 3-4 times a day.  She notes this has been present for well over a couple of months now.  She has lived with some abdominal pain for a long time, was due to have a colonoscopy over the last couple of years but never had this done.  She notes it seems like any food that she eats goes through fairly quickly.  She has not been drinking much because she is worried she will have diarrhea.  She denies any fevers or chills.  She does admit to some weight loss.    In the ER, patient was hemodynamically stable on room air.  Labs with a white count of 11 hemoglobin of 11 and platelets 153.  CMP with creatinine of 2.12 with a sodium 148 and potassium of 2.8 with a CO2 of 17.  Magnesium was 2.  Urinalysis with trace protein.  UDS for opioids.  Negative COVID testing.  The patient was given 1 L normal saline bolus and oral potassium.  Due to concern for acute on chronic renal failure with ongoing GI losses, we were asked to admit.    Review Of Systems:    All systems were reviewed and negative except as mentioned in history of presenting illness,  assessment and plan.    Past Medical History: Patient  has a past medical history of Hypertension and Injury of back.    Past Surgical History: Patient  has a past surgical history that includes Tubal ligation; Colonoscopy; Colon surgery; and Abdominal surgery.    Social History: Patient  reports that she has been smoking cigarettes. She has been smoking about 1.00 pack per day. She has never used smokeless tobacco. She reports that she does not drink alcohol and does not use drugs.    Family History: Patient's family history is not on file.    Allergies:      Patient has no known allergies.    Home Medications:    Prior to Admission Medications     Prescriptions Last Dose Informant Patient Reported? Taking?    colestipol (COLESTID) 1 g tablet   Yes No    Take 1-3 g by mouth Daily. Take one to three tablets daily    glucosamine-chondroitin 500-400 MG capsule capsule   Yes No    Take 1 capsule by mouth 3 (Three) Times a Day With Meals.    HYDROcodone-acetaminophen (NORCO) 7.5-325 MG per tablet  Medication Bottle Yes No    Take 1 tablet by mouth Every 8 (Eight) Hours As Needed for Moderate Pain .    lactobacillus acidophilus (RISAQUAD) capsule capsule   No No    Take 1 capsule by mouth 2 (Two) Times a Day for 10 days.    methocarbamol (ROBAXIN) 750 MG tablet   Yes No    Take 750-1,500 mg by mouth 3 (Three) Times a Day As Needed for Muscle Spasms. Take one to two tablets three times daily as needed    metoprolol tartrate (Lopressor) 50 MG tablet   Yes No    Take 50 mg by mouth Daily.    nicotine (NICODERM CQ) 21 MG/24HR patch  Self No No    Place 1 patch on the skin as directed by provider Daily for 14 days.    Patient not taking:  Reported on 8/3/2022    ondansetron (ZOFRAN) 8 MG tablet   Yes No    Take 8 mg by mouth Every 8 (Eight) Hours As Needed for Nausea or Vomiting.    sodium chloride 0.9 % infusion   No No        ED Medications:    Medications   sodium chloride 0.9 % flush 10 mL (has no administration in time  "range)   sodium chloride 0.9 % bolus 1,000 mL (1,000 mL Intravenous New Bag 8/6/22 2253)   potassium chloride (MICRO-K) CR capsule 40 mEq (40 mEq Oral Given 8/6/22 2253)     Vital Signs:  Temp:  [98.4 °F (36.9 °C)] 98.4 °F (36.9 °C)  Heart Rate:  [76-84] 76  Resp:  [16] 16  BP: (129-133)/(70-76) 129/70        08/06/22 2019   Weight: 76.7 kg (169 lb)     Body mass index is 30.91 kg/m².    Physical Exam:     Most recent vital Signs: /70   Pulse 76   Temp 98.4 °F (36.9 °C) (Oral)   Resp 16   Ht 157.5 cm (62\")   Wt 76.7 kg (169 lb)   SpO2 99%   BMI 30.91 kg/m²     Physical Exam  Constitutional:       Appearance: She is normal weight. She is not ill-appearing or diaphoretic.   HENT:      Right Ear: Tympanic membrane normal.      Left Ear: Tympanic membrane normal.      Nose: Nose normal. No congestion.      Mouth/Throat:      Mouth: Mucous membranes are dry.   Cardiovascular:      Rate and Rhythm: Normal rate and regular rhythm.      Pulses: Normal pulses.      Heart sounds: No murmur heard.    No gallop.   Pulmonary:      Effort: Pulmonary effort is normal. No respiratory distress.      Breath sounds: No wheezing or rales.   Abdominal:      General: Abdomen is flat. Bowel sounds are normal. There is no distension.      Palpations: There is no mass.      Tenderness: There is no abdominal tenderness. There is no guarding or rebound.      Hernia: No hernia is present.   Musculoskeletal:      Right lower leg: Edema present.      Left lower leg: Edema present.   Skin:     General: Skin is dry.      Capillary Refill: Capillary refill takes less than 2 seconds.      Findings: No bruising or lesion.   Neurological:      General: No focal deficit present.      Mental Status: She is alert and oriented to person, place, and time.      Motor: Weakness present.      Gait: Gait normal.   Psychiatric:         Mood and Affect: Mood normal.         Thought Content: Thought content normal.         Laboratory data:    I have " reviewed the labs done in the emergency room.    Results from last 7 days   Lab Units 08/06/22  2139   SODIUM mmol/L 148*   POTASSIUM mmol/L 2.8*   CHLORIDE mmol/L 117*   CO2 mmol/L 17.4*   BUN mg/dL 22   CREATININE mg/dL 2.12*   CALCIUM mg/dL 9.9   BILIRUBIN mg/dL <0.2   ALK PHOS U/L 103   ALT (SGPT) U/L 18   AST (SGOT) U/L 16   GLUCOSE mg/dL 122*     Results from last 7 days   Lab Units 08/06/22  2139   WBC 10*3/mm3 11.19*   HEMOGLOBIN g/dL 11.1*   HEMATOCRIT % 34.3   PLATELETS 10*3/mm3 153                             Results from last 7 days   Lab Units 08/06/22  2236   COLOR UA  Dark Yellow*   GLUCOSE UA  Negative   KETONES UA  Trace*   LEUKOCYTES UA  Trace*   PH, URINE  6.0   BILIRUBIN UA  Negative   UROBILINOGEN UA  0.2 E.U./dL   RBC UA /HPF 0-2*   WBC UA /HPF 0-2*       Pain Management Panel    There is no flowsheet data to display.         EKG:      Sinus rhythm, normal rate, no specific ST abnormality.    Radiology:    No radiology results for the last 3 days    Assessment:    1. Acute on chronic renal failure stage III, present admission  2. Recent colitis with ongoing diarrhea, present admission  3. Hypokalemia  4. Chronic diarrhea  5. Prior history of colectomy approximately 20 years ago  6. Chronic tobacco use  7. Chronic pain    Plan:    Acute on chronic renal failure:  Most likely related to GI losses and poor oral intake.  We will give D5 half-normal with potassium overnight.  Avoid nephrotoxic agents.    Hypokalemia:  Likely secondary to ongoing diarrheal losses.  Replace    Recent colitis and diarrhea:  Reviewed prior CT scan, as well as negative GI panel and C. difficile testing.  Patient had completed a course of oral antibiotic.  We will add as needed antidiarrheals.  Low suspicion for C. difficile.  Patient may have underlying inflammatory bowel disease.  She has failed to follow-up with both GI and general surgery for outpatient colonoscopies over the last 2-1/2 years.  Strongly recommend she  do this.  We will add a fecal calprotectin.    Patient otherwise meets observational of care with anticipate stay less than 2 midnights.  Further recommendation will be depend upon clinical course.    Risk Assessment: moderate   DVT Prophylaxis: lovenox  Code Status: Full  Diet: renal    Advance Care Planning   ACP discussion was held with the patient during this visit. Patient does not have an advance directive, declines further assistance.           Lacey Ballesteros,   08/06/22  23:33 EDT    Dictated utilizing Dragon dictation.

## 2022-08-07 NOTE — CASE MANAGEMENT/SOCIAL WORK
Discharge Planning Assessment  [facility]     Patient Name: Tasha Yarbrough  MRN: [id number]  Today's Date: 8/7/2022    Admit Date: 8/6/2022     Discharge Needs Assessment     Row Name 08/07/22 1813       Living Environment    People in Home child(debra), adult    Name(s) of People in Home Pt's 2 sons and spouse live with pt   Thai Yarbrough, spouse  Thai WORTHINGTON and Rj Yarbrough    Current Living Arrangements home    Duration at Residence 12 years    Primary Care Provided by self    Provides Primary Care For no one    Family Caregiver if Needed child(debra), adult    Family Caregiver Names see above comments    Quality of Family Relationships involved;supportive;helpful    Able to Return to Prior Arrangements yes       Resource/Environmental Concerns    Resource/Environmental Concerns none       Transition Planning    Patient/Family Anticipates Transition to home with family    Patient/Family Anticipated Services at Transition home health care    Transportation Anticipated family or friend will provide       Discharge Needs Assessment    Readmission Within the Last 30 Days no previous admission in last 30 days    Equipment Currently Used at Home walker, standard;cane, straight    Concerns to be Addressed discharge planning               Discharge Plan     Row Name 08/07/22 1817       Plan    Plan Spoke with pt and Thai Yarbrough, son about DCP   Confirmed current address and phone contacts  Thai Yarbrough, spouse [phone]  No POA or LW  PCP Terrence Baldwin MD  Pt states she can ambulate around the house using a straight cane or standard walker   Pt's 2 sons  provide transportation and take her to Dr fitzgerald, grocery store, shopping etc  Pt does her own hygiene and manages her own meds   Pt was discharged from [facility]  a week ago with [facility]   Pt states the DCP is for her to be discharged home with [facility]   Will continue to assess pt for any further needs prior to being discharged home with family  May need transportation home due to  car issues               Continued Care and Services - Admitted Since 8/6/2022    Coordination has not been started for this encounter.     Selected Continued Care - Prior Encounters Includes selections from prior encounters from 5/8/2022 to 8/7/2022    Discharged on 7/30/2022 Admission date: 7/26/2022 - Discharge disposition: Home-Health Care Svc    Durable Medical Equipment     Service Provider Selected Services Address Phone Fax Patient Preferred    [facility] [place]  Durable Medical Equipment [address] [id number] [phone] --          Home Medical Care     Service Provider Selected Services Address Phone Fax Patient Preferred    [facility]  Home Health Services [address] [place] [address] [phone] --                    Expected Discharge Date and Time     Expected Discharge Date Expected Discharge Time    Aug 9, 2022          Demographic Summary     Row Name 08/07/22 1806       General Information    Admission Type observation    Arrived From emergency department    Required Notices Provided Observation Status Notice    Referral Source admission list    Reason for Consult discharge planning    Preferred Language English       Contact Information    Permission Granted to Share Info With     Contact Information Obtained for                Functional Status     Row Name 08/07/22 1807       Functional Status    Usual Activity Tolerance fair    Functional Status Comments Pt uses a straight cane or standard walker to ambulate  Pt states she is still weak and asked [facility] to come when she is stronger  They were explaining home monitoring and showing her how to place the BP cuff etc so pt has not had [facility] but 1st visit       Functional Status, IADL    Medications independent    Meal Preparation independent    Housekeeping assistive person    Laundry assistive person    Shopping assistive person       Mental Status  Summary    Recent Changes in Mental Status/Cognitive Functioning no changes       Employment/    Employment Status retired               Psychosocial    No documentation.                Abuse/Neglect    No documentation.                Legal    No documentation.                Substance Abuse    No documentation.                Patient Forms    No documentation.                   Radha Protcor RN

## 2022-08-07 NOTE — PLAN OF CARE
Goal Outcome Evaluation:           Progress: no change  Outcome Evaluation: VSS, NEW ADMISSION, IVF'S AS ORDERED

## 2022-08-07 NOTE — PLAN OF CARE
Goal Outcome Evaluation:  Plan of Care Reviewed With: patient, family   No acute events this shift.

## 2022-08-07 NOTE — PROGRESS NOTES
"Pharmacy Consult - Enoxaparin Dosing    Tasha Yarbrough is a 67 y.o. female who has been consulted to dose enoxaparin for VTE prophylaxis.     Allergies    Patient has no known allergies.    Relevant clinical data and objective history reviewed:     [Ht: 157.5 cm (62\"); Wt: 77 kg (169 lb 12.1 oz)]  Body mass index is 31.05 kg/m².    Estimated Creatinine Clearance: 24.8 mL/min (A) (by C-G formula based on SCr of 2.12 mg/dL (H)).    Results from last 7 days   Lab Units 08/06/22  2139   HEMOGLOBIN g/dL 11.1*   HEMATOCRIT % 34.3   PLATELETS 10*3/mm3 153   CREATININE mg/dL 2.12*       Asessment/Plan    Initiate Enoxaparin 30 mg SQ every 24 hours  Pharmacy will monitor Ms. Yarbrough's renal function and clinical status and adjust the enoxaparin dose and/or frequency as needed.    Thank you,    Charlee Castano RPH,PharmD  8/7/2022  00:34 EDT      "

## 2022-08-07 NOTE — PROGRESS NOTES
[facility]    Name:  Tasha Yarbrough   Age:  67 y.o.  Sex:  female  :  1955  MRN:  [id number]   Visit Number:  [id number]  Admission Date:  2022  Date Of Service:  22  Primary Care Physician:  Terrence Baldwin MD     LOS: 0 days :    Chief Complaint:      Loose stools, abnormal labs    Subjective:    Patient was seen and examined at bedside today.  Patient sitting up comfortably in bed with no distress.  Patient is getting ready to eat her lunch meal.  Patient reports tolerating p.o. well with no nausea or vomiting however she continues to have loose stool on and off after discharge.  Patient reports that she was doing well and her diarrhea was improving after discharge however she went and followed up with her PCP,  after her labs came back, she was called to come back for abnormal kidney function.  Patient otherwise denies any abdominal pain currently.  Reports that her diarrhea has slowed down to 4 times yesterday of loose stool.  Vitals are stable and is afebrile.    Hospital Course:    The patient is a 67-year-old female with medical history of chronic tobacco use, hypertension, prior perforated colon status post colectomy, with a recent diagnosis of colitis who was treated and discharged about a week ago, who had presented with complaints of loose stools.  Patient notes she had followed up with her PCP this week, Dr. Baldwin, who had instructed her to come to the emergency room secondary to abnormal kidney function.  Patient notes that she has continued to have diarrhea off and on sometimes up to 3-4 times a day.  She notes this has been present for well over a couple of months now.  She has lived with some abdominal pain for a long time, was due to have a colonoscopy over the last couple of years but never had this done.  She notes it seems like any food that she eats goes through fairly quickly.  She has not been drinking much because she is worried  she will have diarrhea.  She denies any fevers or chills.  She does admit to some weight loss.     In the ER, patient was hemodynamically stable on room air.  Labs with a white count of 11 hemoglobin of 11 and platelets 153.  CMP with creatinine of 2.12 with a sodium 148 and potassium of 2.8 with a CO2 of 17.  Magnesium was 2.  Urinalysis with trace protein.  UDS for opioids.  Negative COVID testing.  The patient was given 1 L normal saline bolus and oral potassium.  Due to concern for acute on chronic renal failure with ongoing GI losses, we were asked to admit.    Review of Systems:     All systems were reviewed and negative except as mentioned in subjective, assessment and plan.    Vital Signs:    Temp:  [97.5 °F (36.4 °C)-98.4 °F (36.9 °C)] 97.6 °F (36.4 °C)  Heart Rate:  [63-84] 63  Resp:  [16-18] 16  BP: (114-150)/(67-90) 114/67    Intake and output:    I/O last 3 completed shifts:  In: 871.7 [P.O.:240; I.V.:631.7]  Out: 200 [Urine:200]  I/O this shift:  In: 240 [P.O.:240]  Out: -     Physical Examination:    General Appearance:  Alert and cooperative.  No acute distress.   Head:  Atraumatic and normocephalic.   Eyes: Conjunctivae and sclerae normal, no icterus. No pallor.   Throat: No oral lesions, no thrush, oral mucosa dry.   Neck: Supple, trachea midline, no thyromegaly.   Lungs:   Breath sounds heard bilaterally equally.  No wheezing or crackles. No Pleural rub or bronchial breathing.   Heart:  Normal S1 and S2, no murmur, no gallop, no rub. No JVD.   Abdomen:   Normal bowel sounds, no masses, no organomegaly. Soft, nontender, nondistended, no rebound tenderness.   Extremities: Supple, no edema, no cyanosis, no clubbing.   Skin: No bleeding or rash.   Neurologic: Alert and oriented x 3. No facial asymmetry. Moves all four limbs. No tremors.      Laboratory results:    Results from last 7 days   Lab Units 08/07/22  0627 08/07/22  0508 08/06/22  2139   SODIUM mmol/L  --  139 148*   POTASSIUM mmol/L  --  5.0  2.8*   CHLORIDE mmol/L  --  118* 117*   CO2 mmol/L  --  14.5* 17.4*   BUN mg/dL  --  19 22   CREATININE mg/dL  --  1.64* 2.12*   CALCIUM mg/dL  --  8.3* 9.9   BILIRUBIN mg/dL  --   --  <0.2   ALK PHOS U/L  --   --  103   ALT (SGPT) U/L  --   --  18   AST (SGOT) U/L  --   --  16   GLUCOSE mg/dL 99 436* 122*     Results from last 7 days   Lab Units 08/06/22  2139   WBC 10*3/mm3 11.19*   HEMOGLOBIN g/dL 11.1*   HEMATOCRIT % 34.3   PLATELETS 10*3/mm3 153                     I have reviewed the patient's laboratory results.    Radiology results:    No radiology results from the last 24 hrs  I have reviewed the patient's radiology reports.    Medication Review:     I have reviewed the patient's active and prn medications.     Problem List:      Gastroesophageal reflux disease with esophagitis    Left lower quadrant abdominal pain    VIVIENNE (acute kidney injury) (HCC)      Assessment:    1. Acute on chronic renal failure stage III, POA  2. Recent colitis with ongoing diarrhea, POA  3. Hypokalemia, POA  4. Chronic diarrhea  5. Prior history of colectomy approximately 20 years ago  6. Chronic tobacco use  7. Chronic pain    Plan:    Acute on chronic renal failure:  -Most likely related to GI losses and poor oral intake.    -cont half-normal.    -Avoid nephrotoxic agents.     Hypokalemia:  -Likely secondary to ongoing diarrheal losses.    -Replace per protocol     Recent colitis and diarrhea:  -Reviewed prior CT scan, as well as negative GI panel and C. difficile testing.    -Patient had completed a course of oral antibiotic.    -Low suspicion for C. difficile.  Patient may have underlying inflammatory bowel disease. .    -added as needed antidiarrheals.   - added a fecal calprotectin.  -She has failed to follow-up with both GI and general surgery for outpatient colonoscopies over the last 2-1/2 years.  Strongly recommend she do this    Further recommendation will be depend upon clinical course.    DVT Prophylaxis: lovenox  Code  Status: Full  Diet: renal  Discharge Plan: Likely discharge home in 1 to 2 days.    Vivienne Roa MD  08/07/22  09:59 EDT    Dictated utilizing Dragon dictation.

## 2022-08-08 VITALS
BODY MASS INDEX: 32.33 KG/M2 | OXYGEN SATURATION: 98 % | TEMPERATURE: 98 F | HEART RATE: 62 BPM | DIASTOLIC BLOOD PRESSURE: 74 MMHG | HEIGHT: 62 IN | SYSTOLIC BLOOD PRESSURE: 122 MMHG | RESPIRATION RATE: 16 BRPM | WEIGHT: 175.71 LBS

## 2022-08-08 LAB
ANION GAP SERPL CALCULATED.3IONS-SCNC: 6.5 MMOL/L (ref 5–15)
BUN SERPL-MCNC: 21 MG/DL (ref 8–23)
BUN/CREAT SERPL: 13.1 (ref 7–25)
CALCIUM SPEC-SCNC: 9.2 MG/DL (ref 8.6–10.5)
CHLORIDE SERPL-SCNC: 120 MMOL/L (ref 98–107)
CO2 SERPL-SCNC: 16.5 MMOL/L (ref 22–29)
CREAT SERPL-MCNC: 1.6 MG/DL (ref 0.57–1)
EGFRCR SERPLBLD CKD-EPI 2021: 35.2 ML/MIN/1.73
GLUCOSE SERPL-MCNC: 86 MG/DL (ref 65–99)
POTASSIUM SERPL-SCNC: 3.3 MMOL/L (ref 3.5–5.2)
SODIUM SERPL-SCNC: 143 MMOL/L (ref 136–145)

## 2022-08-08 PROCEDURE — 80048 BASIC METABOLIC PNL TOTAL CA: CPT | Performed by: FAMILY MEDICINE

## 2022-08-08 PROCEDURE — 99217 PR OBSERVATION CARE DISCHARGE MANAGEMENT: CPT | Performed by: FAMILY MEDICINE

## 2022-08-08 PROCEDURE — G0378 HOSPITAL OBSERVATION PER HR: HCPCS

## 2022-08-08 RX ORDER — LOPERAMIDE HYDROCHLORIDE 2 MG/1
2 TABLET ORAL 3 TIMES DAILY PRN
Qty: 9 TABLET | Refills: 0 | Status: SHIPPED | OUTPATIENT
Start: 2022-08-08 | End: 2022-08-11

## 2022-08-08 RX ORDER — POTASSIUM CHLORIDE 1.5 G/1.77G
40 POWDER, FOR SOLUTION ORAL AS NEEDED
Status: DISCONTINUED | OUTPATIENT
Start: 2022-08-08 | End: 2022-08-08

## 2022-08-08 RX ORDER — CALCIUM CARBONATE 200(500)MG
2 TABLET,CHEWABLE ORAL 3 TIMES DAILY PRN
Qty: 30 TABLET | Refills: 0 | Status: SHIPPED | OUTPATIENT
Start: 2022-08-08 | End: 2022-08-18

## 2022-08-08 RX ORDER — POTASSIUM CHLORIDE 750 MG/1
40 CAPSULE, EXTENDED RELEASE ORAL AS NEEDED
Status: DISCONTINUED | OUTPATIENT
Start: 2022-08-08 | End: 2022-08-08

## 2022-08-08 RX ADMIN — HYDROCODONE BITARTRATE AND ACETAMINOPHEN 1 TABLET: 7.5; 325 TABLET ORAL at 13:07

## 2022-08-08 NOTE — PLAN OF CARE
Goal Outcome Evaluation:  Plan of Care Reviewed With: patient, family   To be discharged home with family.  No acute events this shift.

## 2022-08-08 NOTE — DISCHARGE INSTRUCTIONS
-Drink plenty of fluids to stay hydrated.  -High potassium diet (like banana ) as discussed.   -Follow-up with Dr. Turner in the office during the week.  -Follow-up with gastroenterology, Dr. Santillan in the office as directed for further evaluation and possible colonoscopy.  -Follow-up with your primary care provider in 1 to 2 days for recheck on your labs and continuity of care.

## 2022-08-08 NOTE — CASE MANAGEMENT/SOCIAL WORK
Case Management Discharge Note                Selected Continued Care - Discharged on 8/8/2022 Admission date: 8/6/2022 - Discharge disposition: Home or Self Care    Destination    No services have been selected for the patient.              Durable Medical Equipment    No services have been selected for the patient.              Dialysis/Infusion    No services have been selected for the patient.              Home Medical Care    No services have been selected for the patient.              Therapy    No services have been selected for the patient.              Community Resources    No services have been selected for the patient.              Community & DME    No services have been selected for the patient.                Selected Continued Care - Prior Encounters Includes selections from prior encounters from 5/8/2022 to 8/8/2022    Discharged on 7/30/2022 Admission date: 7/26/2022 - Discharge disposition: Home-Health Care Svc    Durable Medical Equipment     Service Provider Selected Services Address Phone Fax Patient Preferred    [facility]  Durable Medical Equipment [address] [place] [address] [place] [id number] [phone] --          Home Medical Care     Service Provider Selected Services Address Phone Fax Patient Preferred    [facility]  Home Health Services [address] [place] [address] [phone] --                    Transportation Services  Private: Car    Final Discharge Disposition Code: 01 - home or self-care

## 2022-08-08 NOTE — DISCHARGE SUMMARY
[facility]   DISCHARGE SUMMARY      Name:  Tasha Yarbrough   Age:  67 y.o.  Sex:  female  :  1955  MRN:  [id number]   Visit Number:  [id number]    Admission Date:  2022  Date of Discharge:  2022  Primary Care Physician:  Terrence Baldwin MD    Important issues to note:    Patient was admitted for acute on chronic renal failure due to diarrhea/GI losses, kidney function improved with IV fluids.    Patient had already finished antibiotics for colitis treatment on previous admission.  Her diarrhea improved during hospitalization.    Patient was instructed on following up closely with GI for further diagnostics including colonoscopy and to follow-up with Dr. Turner with nephrology for CKD.    Patient follow-up with her PCP in 1 to 2 days for labs recheck and continuity of care.    Discharge Diagnoses:     1. Acute on chronic renal failure stage III, POA  2. Recent colitis with ongoing diarrhea, POA  3. Hypokalemia, POA  4. Chronic diarrhea  5. Prior history of colectomy approximately 20 years ago  6. Chronic tobacco use  7. Chronic pain      Problem List:     Active Hospital Problems    Diagnosis  POA   • VIVIENNE (acute kidney injury) (HCC) [N17.9]  Yes   • Left lower quadrant abdominal pain [R10.32]  Yes   • Gastroesophageal reflux disease with esophagitis [K21.00]  Yes      Resolved Hospital Problems   No resolved problems to display.     Presenting Problem:    Chief Complaint   Patient presents with   • Abnormal Lab      Consults:     Consulting Physician(s)             None          Procedures Performed:        History of presenting illness/Hospital Course:    The patient is a 67-year-old female with medical history of chronic tobacco use, hypertension, prior perforated colon status post colectomy, with a recent diagnosis of colitis who was treated and discharged about a week ago, who had presented with complaints of loose stools.  Patient notes she had followed up with her PCP  this week, Dr. Baldwin, who had instructed her to come to the emergency room secondary to abnormal kidney function.  Patient notes that she has continued to have diarrhea off and on sometimes up to 3-4 times a day.  She notes this has been present for well over a couple of months now.  She has lived with some abdominal pain for a long time, was due to have a colonoscopy over the last couple of years but never had this done.  She notes it seems like any food that she eats goes through fairly quickly.  She has not been drinking much because she is worried she will have diarrhea.  She denies any fevers or chills.  She does admit to some weight loss.     In the ER, patient was hemodynamically stable on room air.  Labs with a white count of 11 hemoglobin of 11 and platelets 153.  CMP with creatinine of 2.12 with a sodium 148 and potassium of 2.8 with a CO2 of 17.  Magnesium was 2.  Urinalysis with trace protein.  UDS for opioids.  Negative COVID testing.  The patient was given 1 L normal saline bolus and oral potassium.  Due to concern for acute on chronic renal failure with ongoing GI losses, we were asked to admit.    Patient was admitted and started on IV fluids for rehydration.  Kidney function improved down to near baseline.  Her kidney function was mostly related to GI losses and poor p.o. intake.  Patient tolerated p.o. well during hospitalization.  Her diarrhea has improved, stool more formed and no more watery.  Patient with recent hospitalization with similar complaints and was diagnosed with colitis, she was negative for C. difficile and GI panel was negative.  Low suspicion for C. difficile.  Patient finished antibiotics for colitis.  Patient failed to follow-up with GI and general surgery for outpatient colonoscopies in the past 2 years. Patient was seen and examined on the day of discharge.  Patient reporting feeling significantly better and wanting to go home.  Patient had 4 bowel movements yesterday and 1  bowel movement today however was more formed stool now.  Patient is able to tolerate p.o. with no issues.  Patient was counseled extensively on importance of following up with GI for colonoscopies as she may have underlying inflammatory bowel disease needs further work-up.  Patient also will be set up for an appointment follow-up with Dr. Turner in the office during the next week.  Patient verbalized understanding and agreeable to plan.  All questions were answered to her satisfaction.    Vital Signs:    Temp:  [97.3 °F (36.3 °C)-98.4 °F (36.9 °C)] 98.2 °F (36.8 °C)  Heart Rate:  [56-77] 56  Resp:  [16] 16  BP: (110-145)/(64-82) 129/82    Physical Exam:    General Appearance:  Alert and cooperative.  No acute distress.   Head:  Atraumatic and normocephalic.   Eyes: Conjunctivae and sclerae normal, no icterus. No pallor.   Ears:  Ears with no abnormalities noted.   Throat: No oral lesions, no thrush, oral mucosa moist.   Neck: Supple, trachea midline, no thyromegaly.   Back:   No kyphoscoliosis present. No tenderness to palpation.   Lungs:   Breath sounds heard bilaterally equally.  No crackles or wheezing. No Pleural rub or bronchial breathing.   Heart:  Normal S1 and S2, no murmur, no gallop, no rub. No JVD.   Abdomen:   Normal bowel sounds, no masses, no organomegaly. Soft, nontender, nondistended, no rebound tenderness.   Extremities: Supple, no edema, no cyanosis, no clubbing.   Pulses: Pulses palpable bilaterally.   Skin: No bleeding or rash.   Neurologic: Alert and oriented x 3. No facial asymmetry. Moves all four limbs. No tremors.     Pertinent Lab Results:     Results from last 7 days   Lab Units 08/08/22  0516 08/07/22  0627 08/07/22  0508 08/06/22  2139   SODIUM mmol/L 143  --  139 148*   POTASSIUM mmol/L 3.3*  --  5.0 2.8*   CHLORIDE mmol/L 120*  --  118* 117*   CO2 mmol/L 16.5*  --  14.5* 17.4*   BUN mg/dL 21  --  19 22   CREATININE mg/dL 1.60*  --  1.64* 2.12*   CALCIUM mg/dL 9.2  --  8.3* 9.9    BILIRUBIN mg/dL  --   --   --  <0.2   ALK PHOS U/L  --   --   --  103   ALT (SGPT) U/L  --   --   --  18   AST (SGOT) U/L  --   --   --  16   GLUCOSE mg/dL 86 99 436* 122*     Results from last 7 days   Lab Units 08/06/22  2139   WBC 10*3/mm3 11.19*   HEMOGLOBIN g/dL 11.1*   HEMATOCRIT % 34.3   PLATELETS 10*3/mm3 153                                   Pertinent Radiology Results:    Imaging Results (All)     None          Echo:      Condition on Discharge:      Stable.    Code status during the hospital stay:    Code Status and Medical Interventions:   Ordered at: 08/06/22 3207     Code Status (Patient has no pulse and is not breathing):    CPR (Attempt to Resuscitate)     Medical Interventions (Patient has pulse or is breathing):    Full Support     Discharge Disposition:    Home or Self Care    Discharge Medications:       Discharge Medications      New Medications      Instructions Start Date   calcium carbonate 500 MG chewable tablet  Commonly known as: TUMS   2 tablets, Oral, 3 Times Daily PRN      loperamide 2 MG tablet  Commonly known as: Imodium A-D   2 mg, Oral, 3 Times Daily PRN         Continue These Medications      Instructions Start Date   colestipol 1 g tablet  Commonly known as: COLESTID   1-3 g, Oral, Daily, Take one to three tablets daily      glucosamine-chondroitin 500-400 MG capsule capsule   1 capsule, Oral, 2 Times Daily With Meals      HYDROcodone-acetaminophen 7.5-325 MG per tablet  Commonly known as: NORCO   1 tablet, Oral, Every 8 Hours PRN      lactobacillus acidophilus capsule capsule   1 capsule, Oral, 2 Times Daily      methocarbamol 750 MG tablet  Commonly known as: ROBAXIN   750-1,500 mg, Oral, 3 Times Daily PRN, Take one to two tablets three times daily as needed      metoprolol tartrate 50 MG tablet  Commonly known as: LOPRESSOR   50 mg, Oral, Daily      nicotine 21 MG/24HR patch  Commonly known as: NICODERM CQ   1 patch, Transdermal, Every 24 Hours Scheduled      ondansetron 8 MG  tablet  Commonly known as: ZOFRAN   8 mg, Oral, Every 8 Hours PRN           Discharge Diet:   Renal    Activity at Discharge:   As tolerated    Follow-up Appointments:     Follow-up Information     Terrence Baldwin MD Follow up in 2 day(s).    Specialty: Family Medicine  Why: 09:45am  Contact information:  [address]  [place] [address]  [phone]             Tyree Fleming MD Follow up in 1 week(s).    Specialty: Gastroenterology  Contact information:  [address]  [place] [address]  [phone]             Nito Fernandez MD Follow up in 1 week(s).    Specialty: Nephrology  Contact information:  [address]  [place] [address]  [phone]                       Future Appointments   Date Time Provider Department Center   8/8/2022 11:30 AM Michelle Wright PA-C [facility]   8/9/2022 To Be Determined Ayde Gee MSW [facility] None   8/10/2022 To Be Determined Irma Fisher RN  [facility] None   8/17/2022 To Be Determined Irma Fisher RN  [facility] None   8/24/2022 To Be Determined Irma Fisher RN  [facility] None   8/31/2022 To Be Determined Irma Fisher RN  [facility] None     Test Results Pending at Discharge:    Pending Labs     Order Current Status    Calprotectin, Fecal - Stool, Per Rectum In process             Vivienne Roa MD  08/08/22  09:46 EDT    Time: I spent 32 minutes on this discharge activity which included: face-to-face encounter with the patient, reviewing the data in the system, coordination of the care with the nursing staff as well as consultants, documentation, and entering orders.     Dictated utilizing Dragon dictation.

## 2022-08-08 NOTE — PLAN OF CARE
Goal Outcome Evaluation:  Plan of Care Reviewed With: patient        Progress: improving  Outcome Evaluation: VSS,  PT REPORTS DIARRHEA HAS IMPROVED.  HAVING SMALL FORMED STOOLS.   NO C/O PAIN.

## 2022-08-10 ENCOUNTER — HOME CARE VISIT (OUTPATIENT)
Dept: HOME HEALTH SERVICES | Facility: HOME HEALTHCARE | Age: 67
End: 2022-08-10

## 2022-08-10 LAB — CALPROTECTIN STL-MCNT: 489 UG/G (ref 0–120)

## 2022-08-15 ENCOUNTER — HOME CARE VISIT (OUTPATIENT)
Dept: HOME HEALTH SERVICES | Facility: HOME HEALTHCARE | Age: 67
End: 2022-08-15

## 2022-08-15 NOTE — CASE COMMUNICATION
Patient missed a {HH Visit Type:SN} visit from [facility] on {DATE:8/10/22}.     Reason: {HH Missed Visit Reason:Patient declined}.       For your records only.   As per home health protocol, MD must be notified of missed/cancelled visits; therefore the prescribed frequency was not met.

## 2022-08-16 ENCOUNTER — HOME CARE VISIT (OUTPATIENT)
Dept: HOME HEALTH SERVICES | Facility: HOME HEALTHCARE | Age: 67
End: 2022-08-16

## 2022-08-17 ENCOUNTER — HOME CARE VISIT (OUTPATIENT)
Dept: HOME HEALTH SERVICES | Facility: HOME HEALTHCARE | Age: 67
End: 2022-08-17

## 2022-08-18 ENCOUNTER — APPOINTMENT (OUTPATIENT)
Dept: GENERAL RADIOLOGY | Facility: HOSPITAL | Age: 67
End: 2022-08-18

## 2022-08-18 ENCOUNTER — HOSPITAL ENCOUNTER (EMERGENCY)
Facility: HOSPITAL | Age: 67
Discharge: HOME | End: 2022-08-18
Attending: EMERGENCY MEDICINE | Admitting: EMERGENCY MEDICINE

## 2022-08-18 VITALS
HEIGHT: 62 IN | DIASTOLIC BLOOD PRESSURE: 90 MMHG | OXYGEN SATURATION: 97 % | HEART RATE: 86 BPM | RESPIRATION RATE: 18 BRPM | TEMPERATURE: 98.4 F | SYSTOLIC BLOOD PRESSURE: 137 MMHG | WEIGHT: 174 LBS | BODY MASS INDEX: 32.02 KG/M2

## 2022-08-18 DIAGNOSIS — I50.9 HEART FAILURE, UNSPECIFIED HF CHRONICITY, UNSPECIFIED HEART FAILURE TYPE: Primary | ICD-10-CM

## 2022-08-18 LAB
ALBUMIN SERPL-MCNC: 3.3 G/DL (ref 3.5–5.2)
ALBUMIN/GLOB SERPL: 1.1 G/DL
ALP SERPL-CCNC: 106 U/L (ref 39–117)
ALT SERPL W P-5'-P-CCNC: 9 U/L (ref 1–33)
ANION GAP SERPL CALCULATED.3IONS-SCNC: 11.3 MMOL/L (ref 5–15)
AST SERPL-CCNC: 13 U/L (ref 1–32)
BASOPHILS # BLD AUTO: 0.08 10*3/MM3 (ref 0–0.2)
BASOPHILS NFR BLD AUTO: 0.9 % (ref 0–1.5)
BILIRUB SERPL-MCNC: <0.2 MG/DL (ref 0–1.2)
BUN SERPL-MCNC: 18 MG/DL (ref 8–23)
BUN/CREAT SERPL: 11.3 (ref 7–25)
CALCIUM SPEC-SCNC: 9.7 MG/DL (ref 8.6–10.5)
CHLORIDE SERPL-SCNC: 115 MMOL/L (ref 98–107)
CO2 SERPL-SCNC: 18.7 MMOL/L (ref 22–29)
CREAT SERPL-MCNC: 1.6 MG/DL (ref 0.57–1)
DEPRECATED RDW RBC AUTO: 54.3 FL (ref 37–54)
EGFRCR SERPLBLD CKD-EPI 2021: 35.2 ML/MIN/1.73
EOSINOPHIL # BLD AUTO: 0.36 10*3/MM3 (ref 0–0.4)
EOSINOPHIL NFR BLD AUTO: 4 % (ref 0.3–6.2)
ERYTHROCYTE [DISTWIDTH] IN BLOOD BY AUTOMATED COUNT: 14.8 % (ref 12.3–15.4)
GLOBULIN UR ELPH-MCNC: 3.1 GM/DL
GLUCOSE SERPL-MCNC: 108 MG/DL (ref 65–99)
HCT VFR BLD AUTO: 32.7 % (ref 34–46.6)
HGB BLD-MCNC: 10.6 G/DL (ref 12–15.9)
IMM GRANULOCYTES # BLD AUTO: 0.09 10*3/MM3 (ref 0–0.05)
IMM GRANULOCYTES NFR BLD AUTO: 1 % (ref 0–0.5)
LIPASE SERPL-CCNC: 71 U/L (ref 13–60)
LYMPHOCYTES # BLD AUTO: 1.19 10*3/MM3 (ref 0.7–3.1)
LYMPHOCYTES NFR BLD AUTO: 13.2 % (ref 19.6–45.3)
MCH RBC QN AUTO: 32.4 PG (ref 26.6–33)
MCHC RBC AUTO-ENTMCNC: 32.4 G/DL (ref 31.5–35.7)
MCV RBC AUTO: 100 FL (ref 79–97)
MONOCYTES # BLD AUTO: 0.57 10*3/MM3 (ref 0.1–0.9)
MONOCYTES NFR BLD AUTO: 6.3 % (ref 5–12)
NEUTROPHILS NFR BLD AUTO: 6.7 10*3/MM3 (ref 1.7–7)
NEUTROPHILS NFR BLD AUTO: 74.6 % (ref 42.7–76)
NRBC BLD AUTO-RTO: 0 /100 WBC (ref 0–0.2)
NT-PROBNP SERPL-MCNC: 739.3 PG/ML (ref 0–900)
PLATELET # BLD AUTO: 240 10*3/MM3 (ref 140–450)
PMV BLD AUTO: 10.9 FL (ref 6–12)
POTASSIUM SERPL-SCNC: 3 MMOL/L (ref 3.5–5.2)
PROT SERPL-MCNC: 6.4 G/DL (ref 6–8.5)
RBC # BLD AUTO: 3.27 10*6/MM3 (ref 3.77–5.28)
SARS-COV-2 RNA PNL SPEC NAA+PROBE: NOT DETECTED
SODIUM SERPL-SCNC: 145 MMOL/L (ref 136–145)
TROPONIN T SERPL-MCNC: <0.01 NG/ML (ref 0–0.03)
WBC NRBC COR # BLD: 8.99 10*3/MM3 (ref 3.4–10.8)

## 2022-08-18 PROCEDURE — 85025 COMPLETE CBC W/AUTO DIFF WBC: CPT | Performed by: NURSE PRACTITIONER

## 2022-08-18 PROCEDURE — 84484 ASSAY OF TROPONIN QUANT: CPT | Performed by: NURSE PRACTITIONER

## 2022-08-18 PROCEDURE — 80053 COMPREHEN METABOLIC PANEL: CPT | Performed by: NURSE PRACTITIONER

## 2022-08-18 PROCEDURE — 87635 SARS-COV-2 COVID-19 AMP PRB: CPT | Performed by: NURSE PRACTITIONER

## 2022-08-18 PROCEDURE — 36415 COLL VENOUS BLD VENIPUNCTURE: CPT

## 2022-08-18 PROCEDURE — 71045 X-RAY EXAM CHEST 1 VIEW: CPT

## 2022-08-18 PROCEDURE — 81001 URINALYSIS AUTO W/SCOPE: CPT | Performed by: NURSE PRACTITIONER

## 2022-08-18 PROCEDURE — 83880 ASSAY OF NATRIURETIC PEPTIDE: CPT | Performed by: NURSE PRACTITIONER

## 2022-08-18 PROCEDURE — 83690 ASSAY OF LIPASE: CPT | Performed by: NURSE PRACTITIONER

## 2022-08-18 PROCEDURE — 99283 EMERGENCY DEPT VISIT LOW MDM: CPT

## 2022-08-18 NOTE — CASE COMMUNICATION
Patient missed a {HH Visit Type:SN} visit from [facility] on {DATE:8/15/22}.     Reason: {HH Missed Visit Reason:Patient not responding to phone calls and recorded messages}.       For your records only.   As per home health protocol, MD must be notified of missed/cancelled visits; therefore the prescribed frequency was not met.

## 2022-08-19 LAB
BACTERIA UR QL AUTO: ABNORMAL /HPF
BILIRUB UR QL STRIP: NEGATIVE
CLARITY UR: CLEAR
COLOR UR: YELLOW
GLUCOSE UR STRIP-MCNC: NEGATIVE MG/DL
HGB UR QL STRIP.AUTO: NEGATIVE
HYALINE CASTS UR QL AUTO: ABNORMAL /LPF
KETONES UR QL STRIP: ABNORMAL
LEUKOCYTE ESTERASE UR QL STRIP.AUTO: NEGATIVE
NITRITE UR QL STRIP: NEGATIVE
PH UR STRIP.AUTO: 6 [PH] (ref 5–8)
PROT UR QL STRIP: ABNORMAL
RBC # UR STRIP: ABNORMAL /HPF
REF LAB TEST METHOD: ABNORMAL
SP GR UR STRIP: >=1.03 (ref 1–1.03)
SQUAMOUS #/AREA URNS HPF: ABNORMAL /HPF
UROBILINOGEN UR QL STRIP: ABNORMAL
WBC # UR STRIP: ABNORMAL /HPF

## 2022-08-19 NOTE — ED PROVIDER NOTES
Subjective   Chief Complaint: Bilateral lower extremity swelling, malaise, shortness of breath, dysuria    History of Present Illness: Patient is a [age]-year-old female complaining of bilateral foot and leg swelling, Diarrhea, lower Abd pain, Chills and difficulty urinating x 1 week.  Patient recently was discharged from the hospital for acute on chronic renal failure.  Patient states that a couple days after being discharged her bilateral lower extremity started swelling.  Patient states as time went on she got more more tired, increased diarrhea, and increased malaise    Nurses Notes reviewed and agree, including vitals, allergies, social history and prior medical history.                Review of Systems   Constitutional: Positive for fatigue.   Respiratory: Positive for shortness of breath.    Cardiovascular: Positive for leg swelling.   Gastrointestinal: Positive for diarrhea.   Genitourinary: Positive for dysuria.       Past Medical History:   Diagnosis Date   • Hypertension    • Injury of back        Allergies   Allergen Reactions   • Ciprofloxacin Dizziness       Past Surgical History:   Procedure Laterality Date   • ABDOMINAL SURGERY     • COLON SURGERY     • COLONOSCOPY     • TUBAL ABDOMINAL LIGATION         No family history on file.    Social History     Socioeconomic History   • Marital status:    Tobacco Use   • Smoking status: Current Every Day Smoker     Packs/day: 1.00     Types: Cigarettes   • Smokeless tobacco: Never Used   Vaping Use   • Vaping Use: Never used   Substance and Sexual Activity   • Alcohol use: Never   • Drug use: Never   • Sexual activity: Defer           Objective   Physical Exam  Vitals and nursing note reviewed.   Constitutional:       Appearance: Normal appearance.   HENT:      Head: Normocephalic and atraumatic.   Eyes:      Extraocular Movements: Extraocular movements intact.      Pupils: Pupils are equal, round, and reactive to light.   Cardiovascular:      Rate and  Rhythm: Normal rate and regular rhythm.      Pulses: Normal pulses.      Heart sounds: Normal heart sounds.   Pulmonary:      Effort: Pulmonary effort is normal.      Breath sounds: Normal breath sounds.      Comments: Decreased in the bases bilaterally, scattered wheezing  Abdominal:      General: Abdomen is flat. Bowel sounds are normal.      Palpations: Abdomen is soft.   Musculoskeletal:      Cervical back: Normal range of motion and neck supple.   Skin:     Capillary Refill: Capillary refill takes less than 2 seconds.   Neurological:      General: No focal deficit present.      Mental Status: She is alert and oriented to person, place, and time. Mental status is at baseline.      GCS: GCS eye subscore is 4. GCS verbal subscore is 5. GCS motor subscore is 6.      Sensory: Sensation is intact.      Motor: Motor function is intact.      Gait: Gait is intact.   Psychiatric:         Attention and Perception: Attention and perception normal.         Mood and Affect: Mood and affect normal.         Speech: Speech normal.         Behavior: Behavior normal. Behavior is cooperative.         Procedures           ED Course                                           MDM    Final diagnoses:   Heart failure, unspecified HF chronicity, unspecified heart failure type (HCC)       ED Disposition  ED Disposition     ED Disposition   Discharge    Condition   Stable    Comment   --             Terrence Baldwin MD  [address] [place] [address] [place] [id number]  [phone]    In 1 week  Follow-up         Medication List      No changes were made to your prescriptions during this visit.          Smooth Guzman, APRN  08/18/22 4963     diabetes

## 2022-08-22 ENCOUNTER — HOME CARE VISIT (OUTPATIENT)
Dept: HOME HEALTH SERVICES | Facility: HOME HEALTHCARE | Age: 67
End: 2022-08-22

## 2022-08-22 PROCEDURE — G0495 RN CARE TRAIN/EDU IN HH: HCPCS

## 2022-08-23 NOTE — HOME HEALTH
THE FOLLOWING INSTRUCTIONS WERE REVIEWED UPON DISCHARGE:    You are being d/c'd from  services at your request.    Keep your appointment with your providers as scheduled.    Call your doctor if you develop a new or worsening symptom, especially if you develop increased SOB, CP, s/s of infection.    Continue to take the medications prescribed by your doctor. A medication list is attached. Be sure to keep them informed of all medications you take including over the counter herbal, vitamins/minerals and the ones you take only when needed.    Follow the prescribed diet as ordered by your doctor.     Instructions given to Patient.    Instructions provided per Visit.

## 2023-01-31 ENCOUNTER — TRANSCRIBE ORDERS (OUTPATIENT)
Dept: ADMINISTRATIVE | Facility: HOSPITAL | Age: 68
End: 2023-01-31
Payer: MEDICARE

## 2023-01-31 DIAGNOSIS — N18.4 CHRONIC KIDNEY DISEASE, STAGE IV (SEVERE): Primary | ICD-10-CM

## 2023-09-02 ENCOUNTER — APPOINTMENT (OUTPATIENT)
Dept: CT IMAGING | Facility: HOSPITAL | Age: 68
End: 2023-09-02
Payer: MEDICARE

## 2023-09-02 ENCOUNTER — HOSPITAL ENCOUNTER (EMERGENCY)
Facility: HOSPITAL | Age: 68
Discharge: OTHER INSTITUTION | End: 2023-09-03
Attending: STUDENT IN AN ORGANIZED HEALTH CARE EDUCATION/TRAINING PROGRAM
Payer: MEDICARE

## 2023-09-02 VITALS
TEMPERATURE: 98.6 F | SYSTOLIC BLOOD PRESSURE: 156 MMHG | BODY MASS INDEX: 24.66 KG/M2 | OXYGEN SATURATION: 98 % | HEIGHT: 62 IN | DIASTOLIC BLOOD PRESSURE: 99 MMHG | WEIGHT: 134 LBS | HEART RATE: 62 BPM | RESPIRATION RATE: 18 BRPM

## 2023-09-02 DIAGNOSIS — K80.50 CHOLEDOCHOLITHIASIS: Primary | ICD-10-CM

## 2023-09-02 LAB
ALBUMIN SERPL-MCNC: 4.1 G/DL (ref 3.5–5.2)
ALBUMIN/GLOB SERPL: 1.7 G/DL
ALP SERPL-CCNC: 87 U/L (ref 39–117)
ALT SERPL W P-5'-P-CCNC: 7 U/L (ref 1–33)
ANION GAP SERPL CALCULATED.3IONS-SCNC: 13.3 MMOL/L (ref 5–15)
AST SERPL-CCNC: 11 U/L (ref 1–32)
BACTERIA UR QL AUTO: NORMAL /HPF
BASOPHILS # BLD AUTO: 0.05 10*3/MM3 (ref 0–0.2)
BASOPHILS NFR BLD AUTO: 0.7 % (ref 0–1.5)
BILIRUB SERPL-MCNC: <0.2 MG/DL (ref 0–1.2)
BILIRUB UR QL STRIP: NEGATIVE
BUN SERPL-MCNC: 21 MG/DL (ref 8–23)
BUN/CREAT SERPL: 13.3 (ref 7–25)
CALCIUM SPEC-SCNC: 9.6 MG/DL (ref 8.6–10.5)
CHLORIDE SERPL-SCNC: 113 MMOL/L (ref 98–107)
CLARITY UR: CLEAR
CO2 SERPL-SCNC: 15.7 MMOL/L (ref 22–29)
COLOR UR: YELLOW
CREAT SERPL-MCNC: 1.58 MG/DL (ref 0.57–1)
CRP SERPL-MCNC: <0.3 MG/DL (ref 0–0.5)
D-LACTATE SERPL-SCNC: 0.6 MMOL/L (ref 0.5–2)
DEPRECATED RDW RBC AUTO: 56.1 FL (ref 37–54)
EGFRCR SERPLBLD CKD-EPI 2021: 35.5 ML/MIN/1.73
EOSINOPHIL # BLD AUTO: 0.14 10*3/MM3 (ref 0–0.4)
EOSINOPHIL NFR BLD AUTO: 1.9 % (ref 0.3–6.2)
ERYTHROCYTE [DISTWIDTH] IN BLOOD BY AUTOMATED COUNT: 13.6 % (ref 12.3–15.4)
FLUAV SUBTYP SPEC NAA+PROBE: NOT DETECTED
FLUBV RNA ISLT QL NAA+PROBE: NOT DETECTED
GLOBULIN UR ELPH-MCNC: 2.4 GM/DL
GLUCOSE SERPL-MCNC: 78 MG/DL (ref 65–99)
GLUCOSE UR STRIP-MCNC: NEGATIVE MG/DL
HCT VFR BLD AUTO: 35 % (ref 34–46.6)
HGB BLD-MCNC: 11.4 G/DL (ref 12–15.9)
HGB UR QL STRIP.AUTO: NEGATIVE
HYALINE CASTS UR QL AUTO: NORMAL /LPF
IMM GRANULOCYTES # BLD AUTO: 0.03 10*3/MM3 (ref 0–0.05)
IMM GRANULOCYTES NFR BLD AUTO: 0.4 % (ref 0–0.5)
KETONES UR QL STRIP: NEGATIVE
LEUKOCYTE ESTERASE UR QL STRIP.AUTO: NEGATIVE
LIPASE SERPL-CCNC: 126 U/L (ref 13–60)
LYMPHOCYTES # BLD AUTO: 1.55 10*3/MM3 (ref 0.7–3.1)
LYMPHOCYTES NFR BLD AUTO: 21 % (ref 19.6–45.3)
MACROCYTES BLD QL SMEAR: NORMAL
MCH RBC QN AUTO: 36.4 PG (ref 26.6–33)
MCHC RBC AUTO-ENTMCNC: 32.6 G/DL (ref 31.5–35.7)
MCV RBC AUTO: 111.8 FL (ref 79–97)
MONOCYTES # BLD AUTO: 0.46 10*3/MM3 (ref 0.1–0.9)
MONOCYTES NFR BLD AUTO: 6.2 % (ref 5–12)
NEUTROPHILS NFR BLD AUTO: 5.14 10*3/MM3 (ref 1.7–7)
NEUTROPHILS NFR BLD AUTO: 69.8 % (ref 42.7–76)
NITRITE UR QL STRIP: NEGATIVE
NRBC BLD AUTO-RTO: 0 /100 WBC (ref 0–0.2)
PH UR STRIP.AUTO: 6 [PH] (ref 5–8)
PLATELET # BLD AUTO: 188 10*3/MM3 (ref 140–450)
PMV BLD AUTO: 10.5 FL (ref 6–12)
POTASSIUM SERPL-SCNC: 4.1 MMOL/L (ref 3.5–5.2)
PROT SERPL-MCNC: 6.5 G/DL (ref 6–8.5)
PROT UR QL STRIP: ABNORMAL
RBC # BLD AUTO: 3.13 10*6/MM3 (ref 3.77–5.28)
RBC # UR STRIP: NORMAL /HPF
REF LAB TEST METHOD: NORMAL
SARS-COV-2 RNA RESP QL NAA+PROBE: NOT DETECTED
SMALL PLATELETS BLD QL SMEAR: ADEQUATE
SODIUM SERPL-SCNC: 142 MMOL/L (ref 136–145)
SP GR UR STRIP: 1.03 (ref 1–1.03)
SQUAMOUS #/AREA URNS HPF: NORMAL /HPF
TROPONIN T SERPL HS-MCNC: 11 NG/L
UROBILINOGEN UR QL STRIP: ABNORMAL
WBC # UR STRIP: NORMAL /HPF
WBC MORPH BLD: NORMAL
WBC NRBC COR # BLD: 7.37 10*3/MM3 (ref 3.4–10.8)

## 2023-09-02 PROCEDURE — 86140 C-REACTIVE PROTEIN: CPT | Performed by: STUDENT IN AN ORGANIZED HEALTH CARE EDUCATION/TRAINING PROGRAM

## 2023-09-02 PROCEDURE — 96375 TX/PRO/DX INJ NEW DRUG ADDON: CPT

## 2023-09-02 PROCEDURE — 99285 EMERGENCY DEPT VISIT HI MDM: CPT

## 2023-09-02 PROCEDURE — 83690 ASSAY OF LIPASE: CPT | Performed by: STUDENT IN AN ORGANIZED HEALTH CARE EDUCATION/TRAINING PROGRAM

## 2023-09-02 PROCEDURE — 25010000002 MORPHINE PER 10 MG: Performed by: STUDENT IN AN ORGANIZED HEALTH CARE EDUCATION/TRAINING PROGRAM

## 2023-09-02 PROCEDURE — 83605 ASSAY OF LACTIC ACID: CPT | Performed by: STUDENT IN AN ORGANIZED HEALTH CARE EDUCATION/TRAINING PROGRAM

## 2023-09-02 PROCEDURE — 96365 THER/PROPH/DIAG IV INF INIT: CPT

## 2023-09-02 PROCEDURE — 25010000002 CEFTRIAXONE SODIUM-DEXTROSE 1-3.74 GM-%(50ML) RECONSTITUTED SOLUTION: Performed by: STUDENT IN AN ORGANIZED HEALTH CARE EDUCATION/TRAINING PROGRAM

## 2023-09-02 PROCEDURE — 93005 ELECTROCARDIOGRAM TRACING: CPT | Performed by: STUDENT IN AN ORGANIZED HEALTH CARE EDUCATION/TRAINING PROGRAM

## 2023-09-02 PROCEDURE — 25010000002 ONDANSETRON PER 1 MG: Performed by: STUDENT IN AN ORGANIZED HEALTH CARE EDUCATION/TRAINING PROGRAM

## 2023-09-02 PROCEDURE — 25510000001 IOPAMIDOL 61 % SOLUTION: Performed by: STUDENT IN AN ORGANIZED HEALTH CARE EDUCATION/TRAINING PROGRAM

## 2023-09-02 PROCEDURE — 84484 ASSAY OF TROPONIN QUANT: CPT | Performed by: STUDENT IN AN ORGANIZED HEALTH CARE EDUCATION/TRAINING PROGRAM

## 2023-09-02 PROCEDURE — 81001 URINALYSIS AUTO W/SCOPE: CPT | Performed by: STUDENT IN AN ORGANIZED HEALTH CARE EDUCATION/TRAINING PROGRAM

## 2023-09-02 PROCEDURE — 85007 BL SMEAR W/DIFF WBC COUNT: CPT | Performed by: STUDENT IN AN ORGANIZED HEALTH CARE EDUCATION/TRAINING PROGRAM

## 2023-09-02 PROCEDURE — 36415 COLL VENOUS BLD VENIPUNCTURE: CPT

## 2023-09-02 PROCEDURE — 80053 COMPREHEN METABOLIC PANEL: CPT | Performed by: STUDENT IN AN ORGANIZED HEALTH CARE EDUCATION/TRAINING PROGRAM

## 2023-09-02 PROCEDURE — 85025 COMPLETE CBC W/AUTO DIFF WBC: CPT | Performed by: STUDENT IN AN ORGANIZED HEALTH CARE EDUCATION/TRAINING PROGRAM

## 2023-09-02 PROCEDURE — 87636 SARSCOV2 & INF A&B AMP PRB: CPT | Performed by: STUDENT IN AN ORGANIZED HEALTH CARE EDUCATION/TRAINING PROGRAM

## 2023-09-02 PROCEDURE — 74177 CT ABD & PELVIS W/CONTRAST: CPT

## 2023-09-02 RX ORDER — CITALOPRAM 40 MG/1
40 TABLET ORAL DAILY
COMMUNITY

## 2023-09-02 RX ORDER — CEFTRIAXONE 1 G/50ML
1000 INJECTION, SOLUTION INTRAVENOUS ONCE
Status: COMPLETED | OUTPATIENT
Start: 2023-09-02 | End: 2023-09-02

## 2023-09-02 RX ORDER — FAMOTIDINE 20 MG/1
40 TABLET, FILM COATED ORAL 3 TIMES DAILY
COMMUNITY

## 2023-09-02 RX ORDER — OXYCODONE HYDROCHLORIDE AND ACETAMINOPHEN 5; 325 MG/1; MG/1
1 TABLET ORAL ONCE
Status: COMPLETED | OUTPATIENT
Start: 2023-09-02 | End: 2023-09-02

## 2023-09-02 RX ORDER — METRONIDAZOLE 500 MG/1
500 TABLET ORAL ONCE
Status: COMPLETED | OUTPATIENT
Start: 2023-09-02 | End: 2023-09-02

## 2023-09-02 RX ORDER — MORPHINE SULFATE 2 MG/ML
2 INJECTION, SOLUTION INTRAMUSCULAR; INTRAVENOUS ONCE
Status: COMPLETED | OUTPATIENT
Start: 2023-09-02 | End: 2023-09-02

## 2023-09-02 RX ORDER — SODIUM BICARBONATE 650 MG/1
650 TABLET ORAL 2 TIMES DAILY
COMMUNITY

## 2023-09-02 RX ORDER — ONDANSETRON 2 MG/ML
4 INJECTION INTRAMUSCULAR; INTRAVENOUS ONCE
Status: COMPLETED | OUTPATIENT
Start: 2023-09-02 | End: 2023-09-02

## 2023-09-02 RX ADMIN — IOPAMIDOL 70 ML: 612 INJECTION, SOLUTION INTRAVENOUS at 20:20

## 2023-09-02 RX ADMIN — MORPHINE SULFATE 2 MG: 2 INJECTION, SOLUTION INTRAMUSCULAR; INTRAVENOUS at 23:03

## 2023-09-02 RX ADMIN — METRONIDAZOLE 500 MG: 500 TABLET ORAL at 23:03

## 2023-09-02 RX ADMIN — OXYCODONE HYDROCHLORIDE AND ACETAMINOPHEN 1 TABLET: 5; 325 TABLET ORAL at 19:39

## 2023-09-02 RX ADMIN — CEFTRIAXONE 1000 MG: 1 INJECTION, SOLUTION INTRAVENOUS at 23:02

## 2023-09-02 RX ADMIN — ONDANSETRON 4 MG: 2 INJECTION INTRAMUSCULAR; INTRAVENOUS at 23:03

## 2023-09-02 NOTE — ED PROVIDER NOTES
Subjective:  History of Present Illness:    Patient is a 68-year-old female with history of hypertension who presents today with right lower quadrant pain.  Reports sudden onset of right lower quadrant pain 1 hour prior to arrival.  Denies any nausea and vomiting.  No fever or chills.  Denies any cough, congestion, rhinorrhea.  No dysuria.  No abdominal bleeding or discharge.  Denies any change in bowel habits.      Nurses Notes reviewed and agree, including vitals, allergies, social history and prior medical history.     REVIEW OF SYSTEMS: All systems reviewed and not pertinent unless noted.  Review of Systems   Constitutional:  Negative for activity change, appetite change, chills, fatigue and fever.   HENT:  Negative for rhinorrhea, sinus pressure and sinus pain.    Eyes:  Negative for discharge and itching.   Respiratory:  Negative for cough and shortness of breath.    Cardiovascular:  Negative for chest pain and leg swelling.   Gastrointestinal:  Positive for abdominal pain. Negative for abdominal distention, diarrhea, nausea and vomiting.   Endocrine: Negative for cold intolerance and heat intolerance.   Genitourinary:  Negative for decreased urine volume, difficulty urinating, flank pain, frequency, urgency, vaginal bleeding, vaginal discharge and vaginal pain.   Musculoskeletal:  Negative for gait problem, neck pain and neck stiffness.   Skin:  Negative for color change.   Allergic/Immunologic: Negative for environmental allergies.   Neurological:  Negative for seizures, syncope, facial asymmetry and speech difficulty.   Psychiatric/Behavioral:  Negative for self-injury and suicidal ideas.      Past Medical History:   Diagnosis Date    Hypertension     Injury of back        Allergies:    Ciprofloxacin      Past Surgical History:   Procedure Laterality Date    ABDOMINAL SURGERY      COLON SURGERY      COLONOSCOPY      TUBAL ABDOMINAL LIGATION           Social History     Socioeconomic History    Marital  "status:    Tobacco Use    Smoking status: Every Day     Packs/day: 1.00     Types: Cigarettes    Smokeless tobacco: Never   Vaping Use    Vaping Use: Never used   Substance and Sexual Activity    Alcohol use: Never    Drug use: Never    Sexual activity: Defer         History reviewed. No pertinent family history.    Objective  Physical Exam:  /91   Pulse 61   Temp 98.6 °F (37 °C) (Oral)   Resp 18   Ht 157.5 cm (62\")   Wt 60.8 kg (134 lb)   SpO2 97%   BMI 24.51 kg/m²      Physical Exam  Constitutional:       General: She is not in acute distress.     Appearance: Normal appearance. She is not ill-appearing.   HENT:      Head: Normocephalic and atraumatic.      Nose: Nose normal. No congestion or rhinorrhea.      Mouth/Throat:      Mouth: Mucous membranes are dry.      Pharynx: Oropharynx is clear. No oropharyngeal exudate or posterior oropharyngeal erythema.   Eyes:      Extraocular Movements: Extraocular movements intact.      Conjunctiva/sclera: Conjunctivae normal.      Pupils: Pupils are equal, round, and reactive to light.   Cardiovascular:      Rate and Rhythm: Normal rate and regular rhythm.      Pulses: Normal pulses.      Heart sounds: Normal heart sounds.   Pulmonary:      Effort: Pulmonary effort is normal. No respiratory distress.      Breath sounds: Normal breath sounds.   Abdominal:      General: Abdomen is flat. Bowel sounds are normal. There is no distension.      Palpations: Abdomen is soft.      Tenderness: There is abdominal tenderness in the right lower quadrant.   Musculoskeletal:         General: No swelling or tenderness. Normal range of motion.      Cervical back: Normal range of motion and neck supple.   Skin:     General: Skin is warm and dry.      Capillary Refill: Capillary refill takes less than 2 seconds.   Neurological:      General: No focal deficit present.      Mental Status: She is alert and oriented to person, place, and time. Mental status is at baseline.      " Cranial Nerves: No cranial nerve deficit.      Sensory: No sensory deficit.      Motor: No weakness.      Coordination: Coordination normal.   Psychiatric:         Mood and Affect: Mood normal.         Behavior: Behavior normal.         Thought Content: Thought content normal.         Judgment: Judgment normal.       Critical Care  Performed by: Randolph Suazo MD  Authorized by: Randolph Suazo MD     Critical care provider statement:     Critical care time (minutes):  30    Critical care was necessary to treat or prevent imminent or life-threatening deterioration of the following conditions: Choledocholithiasis.    Critical care was time spent personally by me on the following activities:  Blood draw for specimens, development of treatment plan with patient or surrogate, discussions with primary provider, evaluation of patient's response to treatment, examination of patient, obtaining history from patient or surrogate, ordering and performing treatments and interventions, ordering and review of laboratory studies, ordering and review of radiographic studies, pulse oximetry and re-evaluation of patient's condition    Care discussed with: accepting provider at another facility      ED Course:    ED Course as of 09/02/23 2319   Sat Sep 02, 2023   1939 EKG interpreted by me, normal sinus rhythm no concerning ST changes noted, rate of 69 [JE]      ED Course User Index  [JE] Randolph Suazo MD       Lab Results (last 24 hours)       Procedure Component Value Units Date/Time    CBC & Differential [[id number]]  (Abnormal) Collected: 09/02/23 1932    Specimen: Blood Updated: 09/02/23 1957    Narrative:      The following orders were created for panel order CBC & Differential.  Procedure                               Abnormality         Status                     ---------                               -----------         ------                     CBC Auto Differential[[id number]]        Abnormal             Final result               Scan Slide[[id number]]                                       Final result                 Please view results for these tests on the individual orders.    Comprehensive Metabolic Panel [[id number]]  (Abnormal) Collected: 09/02/23 1932    Specimen: Blood Updated: 09/02/23 1956     Glucose 78 mg/dL      BUN 21 mg/dL      Creatinine 1.58 mg/dL      Sodium 142 mmol/L      Potassium 4.1 mmol/L      Chloride 113 mmol/L      CO2 15.7 mmol/L      Calcium 9.6 mg/dL      Total Protein 6.5 g/dL      Albumin 4.1 g/dL      ALT (SGPT) 7 U/L      AST (SGOT) 11 U/L      Alkaline Phosphatase 87 U/L      Total Bilirubin <0.2 mg/dL      Globulin 2.4 gm/dL      A/G Ratio 1.7 g/dL      BUN/Creatinine Ratio 13.3     Anion Gap 13.3 mmol/L      eGFR 35.5 mL/min/1.73     Narrative:      GFR Normal >60  Chronic Kidney Disease <60  Kidney Failure <15      Lipase [[id number]]  (Abnormal) Collected: 09/02/23 1932    Specimen: Blood Updated: 09/02/23 1956     Lipase 126 U/L     Single High Sensitivity Troponin T [[id number]]  (Abnormal) Collected: 09/02/23 1932    Specimen: Blood Updated: 09/02/23 1958     HS Troponin T 11 ng/L     Narrative:      High Sensitive Troponin T Reference Range:  <10.0 ng/L- Negative Female for AMI  <15.0 ng/L- Negative Male for AMI  >=10 - Abnormal Female indicating possible myocardial injury.  >=15 - Abnormal Male indicating possible myocardial injury.   Clinicians would have to utilize clinical acumen, EKG, Troponin, and serial changes to determine if it is an Acute Myocardial Infarction or myocardial injury due to an underlying chronic condition.         C-reactive Protein [[id number]]  (Normal) Collected: 09/02/23 1932    Specimen: Blood Updated: 09/02/23 2000     C-Reactive Protein <0.30 mg/dL     Lactic Acid, Plasma [[id number]]  (Normal) Collected: 09/02/23 1932    Specimen: Blood Updated: 09/02/23 1954     Lactate 0.6 mmol/L     CBC Auto Differential [[id number]]  (Abnormal) Collected:  09/02/23 1932    Specimen: Blood Updated: 09/02/23 1943     WBC 7.37 10*3/mm3      RBC 3.13 10*6/mm3      Hemoglobin 11.4 g/dL      Hematocrit 35.0 %      .8 fL      MCH 36.4 pg      MCHC 32.6 g/dL      RDW 13.6 %      RDW-SD 56.1 fl      MPV 10.5 fL      Platelets 188 10*3/mm3      Neutrophil % 69.8 %      Lymphocyte % 21.0 %      Monocyte % 6.2 %      Eosinophil % 1.9 %      Basophil % 0.7 %      Immature Grans % 0.4 %      Neutrophils, Absolute 5.14 10*3/mm3      Lymphocytes, Absolute 1.55 10*3/mm3      Monocytes, Absolute 0.46 10*3/mm3      Eosinophils, Absolute 0.14 10*3/mm3      Basophils, Absolute 0.05 10*3/mm3      Immature Grans, Absolute 0.03 10*3/mm3      nRBC 0.0 /100 WBC     Scan Slide [[id number]] Collected: 09/02/23 1932    Specimen: Blood Updated: 09/02/23 1957     Macrocytes Slight/1+     WBC Morphology Normal     Platelet Estimate Adequate    COVID-19 and FLU A/B PCR - Swab, Nasopharynx [[id number]]  (Normal) Collected: 09/02/23 1938    Specimen: Swab from Nasopharynx Updated: 09/02/23 2002     COVID19 Not Detected     Influenza A PCR Not Detected     Influenza B PCR Not Detected    Narrative:      Fact sheet for providers: https://www.fda.gov/media/679878/download    Fact sheet for patients: https://www.fda.gov/media/028480/download    Test performed by PCR.    Urinalysis With Culture If Indicated - Urine, Clean Catch [[id number]]  (Abnormal) Collected: 09/02/23 1949    Specimen: Urine, Clean Catch Updated: 09/02/23 1957     Color, UA Yellow     Appearance, UA Clear     pH, UA 6.0     Specific Gravity, UA 1.026     Glucose, UA Negative     Ketones, UA Negative     Bilirubin, UA Negative     Blood, UA Negative     Protein, UA 30 mg/dL (1+)     Leuk Esterase, UA Negative     Nitrite, UA Negative     Urobilinogen, UA 0.2 E.U./dL    Narrative:      In absence of clinical symptoms, the presence of pyuria, bacteria, and/or nitrites on the urinalysis result does not correlate with infection.     Urinalysis, Microscopic Only - Urine, Clean Catch [[id number]] Collected: 09/02/23 1949    Specimen: Urine, Clean Catch Updated: 09/02/23 2013     RBC, UA None Seen /HPF      WBC, UA None Seen /HPF      Comment: Urine culture not indicated.        Bacteria, UA None Seen /HPF      Squamous Epithelial Cells, UA 0-2 /HPF      Hyaline Casts, UA None Seen /LPF      Methodology Manual Light Microscopy             CT Abdomen Pelvis With Contrast    Result Date: 9/2/2023  FINAL REPORT TECHNIQUE: Axial CT images were performed from the lung bases through the symphysis pubis after the administration of intravenous contrast.  This study was performed with techniques to keep radiation doses as low as reasonably achievable (ALARA). Individualized dose reduction techniques using automated exposure control or adjustment of mA and/or kV according to the patient's size were employed. CLINICAL HISTORY: RLQ abdominal pain (Age >= 14y) FINDINGS: LOWER CHEST: The heart is normal size.  The lung bases are clear.  There is a moderate hiatal hernia.  ABDOMEN/PELVIS: Liver, gallbladder and bile ducts: The liver enhances homogeneously without suspicious focal hepatic lesion.  There has been a prior cholecystectomy.  There appears to be a small calcified density in the distal common bile duct.  There is no definite biliary duct dilatation.  Adrenal glands: The adrenal glands are morphologically unremarkable without suspicious lesion.  Kidneys, ureter and urinary bladder: No suspicious renal lesion.  No hydronephrosis.  Urinary bladder is unremarkable.  Spleen: The spleen is normal size.  Pancreas: The pancreas is grossly unremarkable.  GI systems and mesentery: No evidence of bowel obstruction.  Patient appears to be status post appendectomy.    No significant mesenteric inflammation. Lymph nodes: No definite pathologically enlarged abdominal or pelvic lymph nodes present.  Vessels: The aorta and abdominal arteries are grossly patent.  The IVC  and portal vein are patent and grossly unremarkable.  Peritoneum: No free intraperitoneal fluid or pneumoperitoneum.  Pelvic viscera: No acute findings. Body wall: No body wall contusion. No significant body wall hernias.  Bones: No acute fracture.     Impression: Findings are concerning for choledocholithiasis. Authenticated and Electronically Signed by Jens Karimi MD on 09/02/2023 09:30:03 PM        MDM      Initial impression of presenting illness: Abdominal pain    DDX: includes but is not limited to: Appendicitis, strangulated hernia, incarcerated hernia, mesenteric adenitis    Patient arrives stable with vitals interpreted by myself.     Pertinent features from physical exam: Endorsing right lower quadrant tenderness without rebound or guarding.    Initial diagnostic plan: CBC, CMP, lipase, UA, CRP, lactic acid, CT abdomen pelvis    Results from initial plan were reviewed and interpreted by me revealing concern for choledocholithiasis on CT scan, nonobstructive LFT pattern    Diagnostic information from other sources: Reviewed past medical records    Interventions / Re-evaluation: Given concerns for choledocholithiasis given morphine for pain control, Rocephin and Flagyl for antibiotic prophylaxis    Results/clinical rationale were discussed with patient at bedside    Consultations/Discussion of results with other physicians: Given my concern for choledocholithiasis discussed with [facility] in [place] for transfer given lack of access to ERCP.  Patient is excepted in transfer for further management    Disposition plan: Transfer  -----        Final diagnoses:   Choledocholithiasis          Randolph Suazo MD  09/02/23 3262

## 2023-09-03 NOTE — ED NOTES
[facility] called per Dr. Suazo to requesting GI and Hospitalist. Awaiting return call.

## 2023-09-22 ENCOUNTER — HOSPITAL ENCOUNTER (OUTPATIENT)
Facility: HOSPITAL | Age: 68
Setting detail: OBSERVATION
LOS: 1 days | Discharge: HOME | End: 2023-09-25
Attending: EMERGENCY MEDICINE | Admitting: INTERNAL MEDICINE
Payer: MEDICARE

## 2023-09-22 ENCOUNTER — APPOINTMENT (OUTPATIENT)
Dept: CT IMAGING | Facility: HOSPITAL | Age: 68
End: 2023-09-22
Payer: MEDICARE

## 2023-09-22 DIAGNOSIS — A04.72 COLITIS DUE TO CLOSTRIDIUM DIFFICILE: ICD-10-CM

## 2023-09-22 DIAGNOSIS — A04.71 RECURRENT CLOSTRIDIOIDES DIFFICILE DIARRHEA: Primary | ICD-10-CM

## 2023-09-22 DIAGNOSIS — R53.81 DEBILITY: ICD-10-CM

## 2023-09-22 LAB
ALBUMIN SERPL-MCNC: 3.4 G/DL (ref 3.5–5.2)
ALBUMIN/GLOB SERPL: 1.1 G/DL
ALP SERPL-CCNC: 124 U/L (ref 39–117)
ALT SERPL W P-5'-P-CCNC: 9 U/L (ref 1–33)
ANION GAP SERPL CALCULATED.3IONS-SCNC: 12.8 MMOL/L (ref 5–15)
AST SERPL-CCNC: 14 U/L (ref 1–32)
BASOPHILS # BLD AUTO: 0.06 10*3/MM3 (ref 0–0.2)
BASOPHILS NFR BLD AUTO: 0.4 % (ref 0–1.5)
BILIRUB SERPL-MCNC: <0.2 MG/DL (ref 0–1.2)
BUN SERPL-MCNC: 19 MG/DL (ref 8–23)
BUN/CREAT SERPL: 17.6 (ref 7–25)
CALCIUM SPEC-SCNC: 9.2 MG/DL (ref 8.6–10.5)
CHLORIDE SERPL-SCNC: 113 MMOL/L (ref 98–107)
CO2 SERPL-SCNC: 21.2 MMOL/L (ref 22–29)
CREAT SERPL-MCNC: 1.08 MG/DL (ref 0.57–1)
D-LACTATE SERPL-SCNC: 1.3 MMOL/L (ref 0.5–2)
DEPRECATED RDW RBC AUTO: 56.3 FL (ref 37–54)
EGFRCR SERPLBLD CKD-EPI 2021: 56.1 ML/MIN/1.73
EOSINOPHIL # BLD AUTO: 0.12 10*3/MM3 (ref 0–0.4)
EOSINOPHIL NFR BLD AUTO: 0.8 % (ref 0.3–6.2)
ERYTHROCYTE [DISTWIDTH] IN BLOOD BY AUTOMATED COUNT: 13.8 % (ref 12.3–15.4)
GLOBULIN UR ELPH-MCNC: 3 GM/DL
GLUCOSE SERPL-MCNC: 98 MG/DL (ref 65–99)
HCT VFR BLD AUTO: 32.7 % (ref 34–46.6)
HGB BLD-MCNC: 10.4 G/DL (ref 12–15.9)
HOLD SPECIMEN: NORMAL
HOLD SPECIMEN: NORMAL
IMM GRANULOCYTES # BLD AUTO: 0.25 10*3/MM3 (ref 0–0.05)
IMM GRANULOCYTES NFR BLD AUTO: 1.7 % (ref 0–0.5)
LIPASE SERPL-CCNC: 95 U/L (ref 13–60)
LYMPHOCYTES # BLD AUTO: 1.53 10*3/MM3 (ref 0.7–3.1)
LYMPHOCYTES NFR BLD AUTO: 10.6 % (ref 19.6–45.3)
MACROCYTES BLD QL SMEAR: NORMAL
MCH RBC QN AUTO: 35.4 PG (ref 26.6–33)
MCHC RBC AUTO-ENTMCNC: 31.8 G/DL (ref 31.5–35.7)
MCV RBC AUTO: 111.2 FL (ref 79–97)
MONOCYTES # BLD AUTO: 0.67 10*3/MM3 (ref 0.1–0.9)
MONOCYTES NFR BLD AUTO: 4.6 % (ref 5–12)
NEUTROPHILS NFR BLD AUTO: 11.85 10*3/MM3 (ref 1.7–7)
NEUTROPHILS NFR BLD AUTO: 81.9 % (ref 42.7–76)
NRBC BLD AUTO-RTO: 0 /100 WBC (ref 0–0.2)
PLAT MORPH BLD: NORMAL
PLATELET # BLD AUTO: 378 10*3/MM3 (ref 140–450)
PMV BLD AUTO: 10 FL (ref 6–12)
POTASSIUM SERPL-SCNC: 3.5 MMOL/L (ref 3.5–5.2)
PROCALCITONIN SERPL-MCNC: 0.43 NG/ML (ref 0–0.25)
PROT SERPL-MCNC: 6.4 G/DL (ref 6–8.5)
RBC # BLD AUTO: 2.94 10*6/MM3 (ref 3.77–5.28)
SODIUM SERPL-SCNC: 147 MMOL/L (ref 136–145)
TROPONIN T SERPL HS-MCNC: 15 NG/L
WBC MORPH BLD: NORMAL
WBC NRBC COR # BLD: 14.48 10*3/MM3 (ref 3.4–10.8)
WHOLE BLOOD HOLD COAG: NORMAL
WHOLE BLOOD HOLD SPECIMEN: NORMAL

## 2023-09-22 PROCEDURE — 25010000002 FENTANYL CITRATE (PF) 50 MCG/ML SOLUTION: Performed by: EMERGENCY MEDICINE

## 2023-09-22 PROCEDURE — 99285 EMERGENCY DEPT VISIT HI MDM: CPT

## 2023-09-22 PROCEDURE — 96375 TX/PRO/DX INJ NEW DRUG ADDON: CPT

## 2023-09-22 PROCEDURE — 84484 ASSAY OF TROPONIN QUANT: CPT | Performed by: EMERGENCY MEDICINE

## 2023-09-22 PROCEDURE — 74177 CT ABD & PELVIS W/CONTRAST: CPT

## 2023-09-22 PROCEDURE — 87040 BLOOD CULTURE FOR BACTERIA: CPT | Performed by: EMERGENCY MEDICINE

## 2023-09-22 PROCEDURE — 25510000001 IOPAMIDOL 61 % SOLUTION: Performed by: EMERGENCY MEDICINE

## 2023-09-22 PROCEDURE — 36415 COLL VENOUS BLD VENIPUNCTURE: CPT

## 2023-09-22 PROCEDURE — G0378 HOSPITAL OBSERVATION PER HR: HCPCS

## 2023-09-22 PROCEDURE — 85025 COMPLETE CBC W/AUTO DIFF WBC: CPT | Performed by: EMERGENCY MEDICINE

## 2023-09-22 PROCEDURE — 93005 ELECTROCARDIOGRAM TRACING: CPT | Performed by: EMERGENCY MEDICINE

## 2023-09-22 PROCEDURE — 96365 THER/PROPH/DIAG IV INF INIT: CPT

## 2023-09-22 PROCEDURE — 25010000002 ONDANSETRON PER 1 MG: Performed by: EMERGENCY MEDICINE

## 2023-09-22 PROCEDURE — 85007 BL SMEAR W/DIFF WBC COUNT: CPT | Performed by: EMERGENCY MEDICINE

## 2023-09-22 PROCEDURE — 25010000002 METRONIDAZOLE 500 MG/100ML SOLUTION: Performed by: EMERGENCY MEDICINE

## 2023-09-22 PROCEDURE — 83605 ASSAY OF LACTIC ACID: CPT | Performed by: EMERGENCY MEDICINE

## 2023-09-22 PROCEDURE — 84145 PROCALCITONIN (PCT): CPT | Performed by: EMERGENCY MEDICINE

## 2023-09-22 PROCEDURE — 83690 ASSAY OF LIPASE: CPT | Performed by: EMERGENCY MEDICINE

## 2023-09-22 PROCEDURE — 80053 COMPREHEN METABOLIC PANEL: CPT | Performed by: EMERGENCY MEDICINE

## 2023-09-22 RX ORDER — BISACODYL 5 MG/1
5 TABLET, DELAYED RELEASE ORAL DAILY PRN
Status: DISCONTINUED | OUTPATIENT
Start: 2023-09-22 | End: 2023-09-23

## 2023-09-22 RX ORDER — AMOXICILLIN 250 MG
2 CAPSULE ORAL 2 TIMES DAILY
Status: DISCONTINUED | OUTPATIENT
Start: 2023-09-23 | End: 2023-09-23

## 2023-09-22 RX ORDER — ACETAMINOPHEN 325 MG/1
650 TABLET ORAL EVERY 4 HOURS PRN
Status: DISCONTINUED | OUTPATIENT
Start: 2023-09-22 | End: 2023-09-25 | Stop reason: HOSPADM

## 2023-09-22 RX ORDER — SODIUM BICARBONATE 650 MG/1
650 TABLET ORAL 2 TIMES DAILY
Status: DISCONTINUED | OUTPATIENT
Start: 2023-09-23 | End: 2023-09-25 | Stop reason: HOSPADM

## 2023-09-22 RX ORDER — FENTANYL CITRATE 50 UG/ML
50 INJECTION, SOLUTION INTRAMUSCULAR; INTRAVENOUS ONCE
Status: COMPLETED | OUTPATIENT
Start: 2023-09-22 | End: 2023-09-22

## 2023-09-22 RX ORDER — METRONIDAZOLE 500 MG/100ML
500 INJECTION, SOLUTION INTRAVENOUS ONCE
Status: COMPLETED | OUTPATIENT
Start: 2023-09-22 | End: 2023-09-22

## 2023-09-22 RX ORDER — VANCOMYCIN HYDROCHLORIDE 125 MG/1
125 CAPSULE ORAL ONCE
Status: COMPLETED | OUTPATIENT
Start: 2023-09-22 | End: 2023-09-22

## 2023-09-22 RX ORDER — POLYETHYLENE GLYCOL 3350 17 G/17G
17 POWDER, FOR SOLUTION ORAL DAILY PRN
Status: DISCONTINUED | OUTPATIENT
Start: 2023-09-22 | End: 2023-09-23

## 2023-09-22 RX ORDER — ACETAMINOPHEN 160 MG/5ML
650 SOLUTION ORAL EVERY 4 HOURS PRN
Status: DISCONTINUED | OUTPATIENT
Start: 2023-09-22 | End: 2023-09-25 | Stop reason: HOSPADM

## 2023-09-22 RX ORDER — SODIUM CHLORIDE 0.9 % (FLUSH) 0.9 %
10 SYRINGE (ML) INJECTION EVERY 12 HOURS SCHEDULED
Status: DISCONTINUED | OUTPATIENT
Start: 2023-09-23 | End: 2023-09-25 | Stop reason: HOSPADM

## 2023-09-22 RX ORDER — SODIUM CHLORIDE 0.9 % (FLUSH) 0.9 %
10 SYRINGE (ML) INJECTION AS NEEDED
Status: DISCONTINUED | OUTPATIENT
Start: 2023-09-22 | End: 2023-09-25 | Stop reason: HOSPADM

## 2023-09-22 RX ORDER — ONDANSETRON 2 MG/ML
4 INJECTION INTRAMUSCULAR; INTRAVENOUS EVERY 6 HOURS PRN
Status: DISCONTINUED | OUTPATIENT
Start: 2023-09-22 | End: 2023-09-25 | Stop reason: HOSPADM

## 2023-09-22 RX ORDER — BISACODYL 10 MG
10 SUPPOSITORY, RECTAL RECTAL DAILY PRN
Status: DISCONTINUED | OUTPATIENT
Start: 2023-09-22 | End: 2023-09-23

## 2023-09-22 RX ORDER — HYDROCODONE BITARTRATE AND ACETAMINOPHEN 7.5; 325 MG/1; MG/1
1 TABLET ORAL EVERY 8 HOURS PRN
Status: DISCONTINUED | OUTPATIENT
Start: 2023-09-22 | End: 2023-09-25 | Stop reason: HOSPADM

## 2023-09-22 RX ORDER — ENOXAPARIN SODIUM 100 MG/ML
40 INJECTION SUBCUTANEOUS DAILY
Status: DISCONTINUED | OUTPATIENT
Start: 2023-09-23 | End: 2023-09-25 | Stop reason: HOSPADM

## 2023-09-22 RX ORDER — POTASSIUM CHLORIDE 20 MEQ/1
40 TABLET, EXTENDED RELEASE ORAL EVERY 4 HOURS
Status: COMPLETED | OUTPATIENT
Start: 2023-09-23 | End: 2023-09-23

## 2023-09-22 RX ORDER — ACETAMINOPHEN 650 MG/1
650 SUPPOSITORY RECTAL EVERY 4 HOURS PRN
Status: DISCONTINUED | OUTPATIENT
Start: 2023-09-22 | End: 2023-09-25 | Stop reason: HOSPADM

## 2023-09-22 RX ORDER — SODIUM CHLORIDE 9 MG/ML
40 INJECTION, SOLUTION INTRAVENOUS AS NEEDED
Status: DISCONTINUED | OUTPATIENT
Start: 2023-09-22 | End: 2023-09-25 | Stop reason: HOSPADM

## 2023-09-22 RX ORDER — SODIUM CHLORIDE 9 MG/ML
100 INJECTION, SOLUTION INTRAVENOUS CONTINUOUS
Status: DISCONTINUED | OUTPATIENT
Start: 2023-09-23 | End: 2023-09-24

## 2023-09-22 RX ORDER — CITALOPRAM 20 MG/1
40 TABLET ORAL DAILY
Status: DISCONTINUED | OUTPATIENT
Start: 2023-09-23 | End: 2023-09-25 | Stop reason: HOSPADM

## 2023-09-22 RX ORDER — METOPROLOL TARTRATE 50 MG/1
50 TABLET, FILM COATED ORAL DAILY
Status: DISCONTINUED | OUTPATIENT
Start: 2023-09-23 | End: 2023-09-25 | Stop reason: HOSPADM

## 2023-09-22 RX ORDER — ONDANSETRON 2 MG/ML
4 INJECTION INTRAMUSCULAR; INTRAVENOUS ONCE
Status: COMPLETED | OUTPATIENT
Start: 2023-09-22 | End: 2023-09-22

## 2023-09-22 RX ORDER — NITROGLYCERIN 0.4 MG/1
0.4 TABLET SUBLINGUAL
Status: DISCONTINUED | OUTPATIENT
Start: 2023-09-22 | End: 2023-09-25 | Stop reason: HOSPADM

## 2023-09-22 RX ADMIN — FENTANYL CITRATE 50 MCG: 50 INJECTION, SOLUTION INTRAMUSCULAR; INTRAVENOUS at 20:07

## 2023-09-22 RX ADMIN — ONDANSETRON 4 MG: 2 INJECTION INTRAMUSCULAR; INTRAVENOUS at 20:08

## 2023-09-22 RX ADMIN — IOPAMIDOL 100 ML: 612 INJECTION, SOLUTION INTRAVENOUS at 20:44

## 2023-09-22 RX ADMIN — SODIUM CHLORIDE 1000 ML: 9 INJECTION, SOLUTION INTRAVENOUS at 20:12

## 2023-09-22 RX ADMIN — FIDAXOMICIN 200 MG: 200 TABLET, FILM COATED ORAL at 23:28

## 2023-09-22 RX ADMIN — METRONIDAZOLE 500 MG: 5 INJECTION, SOLUTION INTRAVENOUS at 22:11

## 2023-09-22 RX ADMIN — VANCOMYCIN HYDROCHLORIDE 125 MG: 125 CAPSULE ORAL at 22:11

## 2023-09-22 NOTE — ED PROVIDER NOTES
HPI: Tasha Yarbrough is a 68 y.o. female who presents to the emergency department complaining of abdominal pain.  She states that 3 weeks ago she had some abdominal pain.  She was seen here and transferred to [facility] with concerns about choledocholithiasis.  Apparently underwent ERCP and had no obstruction.  She was diagnosed with C. difficile colitis.  She has ongoing diarrhea.  Had been doing okay until 2 days ago developed diffuse abdominal pain.  This is severe.  There are no alleviating or aggravating factors.  She denies fevers or vomiting.      REVIEW OF SYSTEMS: All other systems reviewed and are negative     PAST MEDICAL HISTORY:   Past Medical History:   Diagnosis Date    Hypertension     Injury of back         FAMILY HISTORY:   History reviewed. No pertinent family history.     SOCIAL HISTORY:   Social History     Socioeconomic History    Marital status:    Tobacco Use    Smoking status: Every Day     Packs/day: 1.00     Types: Cigarettes    Smokeless tobacco: Never   Vaping Use    Vaping Use: Never used   Substance and Sexual Activity    Alcohol use: Never    Drug use: Never    Sexual activity: Defer        SURGICAL HISTORY:   Past Surgical History:   Procedure Laterality Date    ABDOMINAL SURGERY      COLON SURGERY      COLONOSCOPY      TUBAL ABDOMINAL LIGATION          ALLERGIES: Ciprofloxacin, Codeine, and Pineapple       PHYSICAL EXAM:   VITAL SIGNS:   Vitals:    09/22/23 2100   BP: 139/78   Pulse: 77   Resp: 16   Temp:    SpO2: 98%      CONSTITUTIONAL: Awake, well appearing, nontoxic   HENT: Atraumatic, normocephalic, oral mucosa moist, airway patent. Nares patent without drainage. External ears normal.   EYES: Conjunctivae clear, EOMI, PERRL   NECK: Trachea midline, nontender, supple   CARDIOVASCULAR: Normal heart rate, Normal rhythm.  PULMONARY/CHEST: Normal work of breathing. Clear to auscultation, no rhonchi, wheezes, or rales.  ABDOMINAL: Nondistended, soft, diffuse abdominal  tenderness, no rebound or guarding.  NEUROLOGIC: Nonfocal, moves all four extremities, no gross sensory or motor deficits.   EXTREMITIES: Pitting edema to the knees bilaterally  SKIN: Warm, Dry, No erythema, No rash       ED COURSE / MEDICAL DECISION MAKING:     Tasha Yarbrough is a 68 y.o. female who presents to the emergency department for evaluation of abdominal pain.  Well-developed, well-nourished lady in no distress with exam as above.  Her vital signs are normal.  Her oxygen saturation is normal on room air at 99%.  Her exam is notable for mild diffuse abdominal tenderness. Will obtain labs, urinalysis, CT scan. Will give symptomatic treatment. Disposition pending.     Differential diagnosis includes c diff, colitis, obstruction, chronic pain, dehydration, megacolon among other etiologies.    EKG interpreted by me: Sinus rhythm, normal rate, some nonspecific T waves, this is an atypical EKG    Lab work notable for elevated inflammatory markers.  CT scan reveals fairly significant findings consistent with colitis likely C. difficile.  Started p.o. vancomycin and IV Flagyl.  Discussed with Dr. Kerley for admission.    Final diagnoses:   Recurrent Clostridioides difficile diarrhea        Rj Lopez MD  09/22/23 1793

## 2023-09-23 PROBLEM — D63.8 ANEMIA, CHRONIC DISEASE: Status: ACTIVE | Noted: 2023-09-23

## 2023-09-23 PROBLEM — A09 DIARRHEA OF INFECTIOUS ORIGIN: Status: ACTIVE | Noted: 2023-09-23

## 2023-09-23 PROBLEM — R10.84 GENERALIZED ABDOMINAL PAIN: Status: ACTIVE | Noted: 2023-09-23

## 2023-09-23 PROBLEM — A04.72 COLITIS DUE TO CLOSTRIDIUM DIFFICILE: Status: ACTIVE | Noted: 2023-09-23

## 2023-09-23 PROBLEM — R93.3 ABNORMAL FINDING ON GI TRACT IMAGING: Status: ACTIVE | Noted: 2023-09-23

## 2023-09-23 LAB
ANION GAP SERPL CALCULATED.3IONS-SCNC: 7.3 MMOL/L (ref 5–15)
BASOPHILS # BLD AUTO: 0.04 10*3/MM3 (ref 0–0.2)
BASOPHILS NFR BLD AUTO: 0.5 % (ref 0–1.5)
BUN SERPL-MCNC: 16 MG/DL (ref 8–23)
BUN/CREAT SERPL: 17 (ref 7–25)
CALCIUM SPEC-SCNC: 8.7 MG/DL (ref 8.6–10.5)
CHLORIDE SERPL-SCNC: 115 MMOL/L (ref 98–107)
CO2 SERPL-SCNC: 23.7 MMOL/L (ref 22–29)
CREAT SERPL-MCNC: 0.94 MG/DL (ref 0.57–1)
DEPRECATED RDW RBC AUTO: 57.4 FL (ref 37–54)
EGFRCR SERPLBLD CKD-EPI 2021: 66.2 ML/MIN/1.73
EOSINOPHIL # BLD AUTO: 0.08 10*3/MM3 (ref 0–0.4)
EOSINOPHIL NFR BLD AUTO: 0.9 % (ref 0.3–6.2)
ERYTHROCYTE [DISTWIDTH] IN BLOOD BY AUTOMATED COUNT: 13.9 % (ref 12.3–15.4)
GLUCOSE SERPL-MCNC: 75 MG/DL (ref 65–99)
HCT VFR BLD AUTO: 30.4 % (ref 34–46.6)
HGB BLD-MCNC: 9.5 G/DL (ref 12–15.9)
IMM GRANULOCYTES # BLD AUTO: 0.18 10*3/MM3 (ref 0–0.05)
IMM GRANULOCYTES NFR BLD AUTO: 2.1 % (ref 0–0.5)
LYMPHOCYTES # BLD AUTO: 0.98 10*3/MM3 (ref 0.7–3.1)
LYMPHOCYTES NFR BLD AUTO: 11.4 % (ref 19.6–45.3)
MCH RBC QN AUTO: 35.6 PG (ref 26.6–33)
MCHC RBC AUTO-ENTMCNC: 31.3 G/DL (ref 31.5–35.7)
MCV RBC AUTO: 113.9 FL (ref 79–97)
MONOCYTES # BLD AUTO: 0.5 10*3/MM3 (ref 0.1–0.9)
MONOCYTES NFR BLD AUTO: 5.8 % (ref 5–12)
NEUTROPHILS NFR BLD AUTO: 6.79 10*3/MM3 (ref 1.7–7)
NEUTROPHILS NFR BLD AUTO: 79.3 % (ref 42.7–76)
NRBC BLD AUTO-RTO: 0 /100 WBC (ref 0–0.2)
PLATELET # BLD AUTO: 298 10*3/MM3 (ref 140–450)
PMV BLD AUTO: 9.8 FL (ref 6–12)
POTASSIUM SERPL-SCNC: 3.8 MMOL/L (ref 3.5–5.2)
POTASSIUM SERPL-SCNC: 3.8 MMOL/L (ref 3.5–5.2)
RBC # BLD AUTO: 2.67 10*6/MM3 (ref 3.77–5.28)
RBC MORPH BLD: NORMAL
SMALL PLATELETS BLD QL SMEAR: ADEQUATE
SODIUM SERPL-SCNC: 146 MMOL/L (ref 136–145)
WBC MORPH BLD: NORMAL
WBC NRBC COR # BLD: 8.57 10*3/MM3 (ref 3.4–10.8)

## 2023-09-23 PROCEDURE — 84132 ASSAY OF SERUM POTASSIUM: CPT | Performed by: STUDENT IN AN ORGANIZED HEALTH CARE EDUCATION/TRAINING PROGRAM

## 2023-09-23 PROCEDURE — G0378 HOSPITAL OBSERVATION PER HR: HCPCS

## 2023-09-23 PROCEDURE — 25010000002 ENOXAPARIN PER 10 MG: Performed by: STUDENT IN AN ORGANIZED HEALTH CARE EDUCATION/TRAINING PROGRAM

## 2023-09-23 PROCEDURE — 96372 THER/PROPH/DIAG INJ SC/IM: CPT

## 2023-09-23 PROCEDURE — 85025 COMPLETE CBC W/AUTO DIFF WBC: CPT | Performed by: STUDENT IN AN ORGANIZED HEALTH CARE EDUCATION/TRAINING PROGRAM

## 2023-09-23 PROCEDURE — 96361 HYDRATE IV INFUSION ADD-ON: CPT

## 2023-09-23 PROCEDURE — 96375 TX/PRO/DX INJ NEW DRUG ADDON: CPT

## 2023-09-23 PROCEDURE — 80048 BASIC METABOLIC PNL TOTAL CA: CPT | Performed by: STUDENT IN AN ORGANIZED HEALTH CARE EDUCATION/TRAINING PROGRAM

## 2023-09-23 PROCEDURE — 99215 OFFICE O/P EST HI 40 MIN: CPT | Performed by: INTERNAL MEDICINE

## 2023-09-23 PROCEDURE — 85007 BL SMEAR W/DIFF WBC COUNT: CPT | Performed by: STUDENT IN AN ORGANIZED HEALTH CARE EDUCATION/TRAINING PROGRAM

## 2023-09-23 PROCEDURE — 25010000002 MORPHINE PER 10 MG: Performed by: STUDENT IN AN ORGANIZED HEALTH CARE EDUCATION/TRAINING PROGRAM

## 2023-09-23 RX ORDER — MORPHINE SULFATE 2 MG/ML
2 INJECTION, SOLUTION INTRAMUSCULAR; INTRAVENOUS EVERY 4 HOURS PRN
Status: DISCONTINUED | OUTPATIENT
Start: 2023-09-23 | End: 2023-09-25 | Stop reason: HOSPADM

## 2023-09-23 RX ORDER — VANCOMYCIN HYDROCHLORIDE 125 MG/1
125 CAPSULE ORAL EVERY 6 HOURS SCHEDULED
Status: DISCONTINUED | OUTPATIENT
Start: 2023-09-23 | End: 2023-09-25 | Stop reason: HOSPADM

## 2023-09-23 RX ADMIN — POTASSIUM CHLORIDE 40 MEQ: 1500 TABLET, EXTENDED RELEASE ORAL at 00:07

## 2023-09-23 RX ADMIN — FIDAXOMICIN 200 MG: 200 TABLET, FILM COATED ORAL at 09:47

## 2023-09-23 RX ADMIN — HYDROCODONE BITARTRATE AND ACETAMINOPHEN 1 TABLET: 7.5; 325 TABLET ORAL at 17:49

## 2023-09-23 RX ADMIN — POTASSIUM CHLORIDE 40 MEQ: 1500 TABLET, EXTENDED RELEASE ORAL at 05:46

## 2023-09-23 RX ADMIN — SODIUM CHLORIDE 100 ML/HR: 9 INJECTION, SOLUTION INTRAVENOUS at 10:48

## 2023-09-23 RX ADMIN — MORPHINE SULFATE 2 MG: 2 INJECTION, SOLUTION INTRAMUSCULAR; INTRAVENOUS at 20:39

## 2023-09-23 RX ADMIN — VANCOMYCIN HYDROCHLORIDE 125 MG: 125 CAPSULE ORAL at 12:46

## 2023-09-23 RX ADMIN — ENOXAPARIN SODIUM 40 MG: 100 INJECTION SUBCUTANEOUS at 00:07

## 2023-09-23 RX ADMIN — Medication 10 ML: at 09:47

## 2023-09-23 RX ADMIN — SODIUM BICARBONATE 650 MG TABLET 650 MG: at 09:47

## 2023-09-23 RX ADMIN — SODIUM BICARBONATE 650 MG TABLET 650 MG: at 20:39

## 2023-09-23 RX ADMIN — SODIUM BICARBONATE 650 MG TABLET 650 MG: at 00:07

## 2023-09-23 RX ADMIN — SODIUM CHLORIDE 100 ML/HR: 9 INJECTION, SOLUTION INTRAVENOUS at 00:07

## 2023-09-23 RX ADMIN — VANCOMYCIN HYDROCHLORIDE 125 MG: 125 CAPSULE ORAL at 17:49

## 2023-09-23 RX ADMIN — HYDROCODONE BITARTRATE AND ACETAMINOPHEN 1 TABLET: 7.5; 325 TABLET ORAL at 00:07

## 2023-09-23 RX ADMIN — Medication 10 ML: at 20:39

## 2023-09-23 RX ADMIN — METOPROLOL TARTRATE 50 MG: 50 TABLET, FILM COATED ORAL at 09:47

## 2023-09-23 RX ADMIN — HYDROCODONE BITARTRATE AND ACETAMINOPHEN 1 TABLET: 7.5; 325 TABLET ORAL at 09:46

## 2023-09-23 RX ADMIN — CITALOPRAM HYDROBROMIDE 40 MG: 20 TABLET ORAL at 09:47

## 2023-09-23 NOTE — H&P
[facility]   HISTORY AND PHYSICAL      Name:  Tasha Yarbrough   Age:  68 y.o.  Sex:  female  :  1955  MRN:  [id number]   Visit Number:  [id number]  Admission Date:  2023  Date Of Service:  23  Primary Care Physician:  Terrence Baldwin MD    Chief Complaint:     Abdominal pain, watery diarrhea    History Of Presenting Illness:      Patient is a 68-year-old woman with past medical history of hypertension, tobacco use disorder, macrocytic anemia concerning for possible MDS, GERD, anxiety and depression, chronic back pain.  Recent hospitalization with transfer for concern of choledocholithiasis, EGD/ERCP found to have torturous esophagus, hiatal hernia, retained bile, gastric erythema, no overt stone, had clear cholangiogram.  Afterwards was found to have C. difficile and was started on p.o. vancomycin.  She continued her vancomycin outpatient.  Said that it started to improve with thicker stool, and then returned back into watery diarrhea.  Presented to [facility] ED on 2023 with concern for worsening watery diarrhea abdominal pain for couple days.  She was taking her p.o. vancomycin as prescribed.    ED summary: Afebrile, vital signs stable on room air.  EKG sinus rhythm, no ST elevations or depressions.  High-sensitivity troponin 15, ACS not suspected.  Sodium 147, chloride 113, CO2 21.  Lipase 95, procalcitonin 0.43, white count 14, hemoglobin 10.  Blood cultures collected.  CT abdomen/pelvis marked mucosal thickening of a large segment of the transverse and descending colon with mucosal hyperemia and adjacent stranding within the mesentery, findings most consistent with infectious colitis such as C. difficile, diffuse anasarca which may be reactive.  No definitive evidence of obstruction.  She was provided fentanyl, vancomycin, metronidazole, Zofran, 1 L normal saline bolus.    Review Of Systems:    All systems were reviewed and negative except as mentioned in history of  presenting illness, assessment and plan.    Past Medical History: Patient  has a past medical history of Hypertension and Injury of back.    Past Surgical History: Patient  has a past surgical history that includes Tubal ligation; Colonoscopy; Colon surgery; and Abdominal surgery.    Social History: Patient  reports that she has been smoking cigarettes. She has been smoking an average of 1 pack per day. She has never used smokeless tobacco. She reports that she does not drink alcohol and does not use drugs.    Family History:  Patient's family history has been reviewed and found to be noncontributory.     Allergies:      Ciprofloxacin, Codeine, and Pineapple    Home Medications:    Prior to Admission Medications       Prescriptions Last Dose Informant Patient Reported? Taking?    citalopram (CeleXA) 40 MG tablet   Yes No    Take 1 tablet by mouth Daily.    colestipol (COLESTID) 1 g tablet   Yes No    Take 1-3 g by mouth Daily. Take one to three tablets daily    famotidine (PEPCID) 20 MG tablet   Yes No    Take 2 tablets by mouth 3 (Three) Times a Day.    glucosamine-chondroitin 500-400 MG capsule capsule   Yes No    Take 1 capsule by mouth 2 (Two) Times a Day With Meals.    HYDROcodone-acetaminophen (NORCO) 7.5-325 MG per tablet  Medication Bottle Yes No    Take 1 tablet by mouth Every 8 (Eight) Hours As Needed for Moderate Pain.    methocarbamol (ROBAXIN) 750 MG tablet   Yes No    Take 1-2 tablets by mouth 3 (Three) Times a Day As Needed for Muscle Spasms. Take one to two tablets three times daily as needed    metoprolol tartrate (LOPRESSOR) 50 MG tablet   Yes No    Take 50 mg by mouth Daily.    ondansetron (ZOFRAN) 8 MG tablet   Yes No    Take 8 mg by mouth Every 8 (Eight) Hours As Needed for Nausea or Vomiting.    sodium bicarbonate 650 MG tablet   Yes No    Take 1 tablet by mouth 2 (Two) Times a Day.    sodium chloride 0.9 % infusion   No No    vitamin D3 125 MCG (5000 UT) capsule capsule   Yes No    Take 1  "capsule by mouth Daily.          ED Medications:    Medications   sodium chloride 0.9 % flush 10 mL (has no administration in time range)   metroNIDAZOLE (FLAGYL) IVPB 500 mg (has no administration in time range)   vancomycin (VANCOCIN) capsule 125 mg (has no administration in time range)   sodium chloride 0.9 % bolus 1,000 mL (1,000 mL Intravenous New Bag 9/22/23 2012)   fentaNYL citrate (PF) (SUBLIMAZE) injection 50 mcg (50 mcg Intravenous Given 9/22/23 2007)   ondansetron (ZOFRAN) injection 4 mg (4 mg Intravenous Given 9/22/23 2008)   iopamidol (ISOVUE-300) 61 % injection 100 mL (100 mL Intravenous Given 9/22/23 2044)     Vital Signs:  Temp:  [98.4 °F (36.9 °C)] 98.4 °F (36.9 °C)  Heart Rate:  [77-82] 77  Resp:  [16] 16  BP: (139-143)/(78-83) 139/78        09/22/23 1932   Weight: 61.2 kg (135 lb)     Body mass index is 24.69 kg/m².    Physical Exam:     Most recent vital Signs: /78   Pulse 77   Temp 98.4 °F (36.9 °C) (Oral)   Resp 16   Ht 157.5 cm (62\")   Wt 61.2 kg (135 lb)   SpO2 98%   BMI 24.69 kg/m²     Physical Exam  Constitutional:       General: She is not in acute distress.     Appearance: She is ill-appearing. She is not toxic-appearing.   HENT:      Mouth/Throat:      Mouth: Mucous membranes are moist.   Eyes:      Extraocular Movements: Extraocular movements intact.   Cardiovascular:      Rate and Rhythm: Normal rate and regular rhythm.      Pulses: Normal pulses.      Heart sounds: Normal heart sounds.   Pulmonary:      Effort: Pulmonary effort is normal.      Breath sounds: Normal breath sounds.   Abdominal:      General: There is distension.      Palpations: Abdomen is soft.      Tenderness: There is abdominal tenderness.   Musculoskeletal:      Right lower leg: Edema present.      Left lower leg: Edema present.   Skin:     General: Skin is warm.   Neurological:      General: No focal deficit present.      Mental Status: She is alert and oriented to person, place, and time. "   Psychiatric:         Mood and Affect: Mood normal.         Thought Content: Thought content normal.       Laboratory data:    I have reviewed the labs done in the emergency room.    Results from last 7 days   Lab Units 09/22/23 1937   SODIUM mmol/L 147*   POTASSIUM mmol/L 3.5   CHLORIDE mmol/L 113*   CO2 mmol/L 21.2*   BUN mg/dL 19   CREATININE mg/dL 1.08*   CALCIUM mg/dL 9.2   BILIRUBIN mg/dL <0.2   ALK PHOS U/L 124*   ALT (SGPT) U/L 9   AST (SGOT) U/L 14   GLUCOSE mg/dL 98     Results from last 7 days   Lab Units 09/22/23 1937   WBC 10*3/mm3 14.48*   HEMOGLOBIN g/dL 10.4*   HEMATOCRIT % 32.7*   PLATELETS 10*3/mm3 378         Results from last 7 days   Lab Units 09/22/23 1937   HSTROP T ng/L 15*             Results from last 7 days   Lab Units 09/22/23 1937   LIPASE U/L 95*               Invalid input(s): USDES,  BLOODU, NITRITITE, BACT, EP    Pain Management Panel          Latest Ref Rng & Units 8/6/2022   Pain Management Panel   Amphetamine, Urine Qual Negative Negative    Barbiturates Screen, Urine Negative Negative    Benzodiazepine Screen, Urine Negative Negative    Buprenorphine, Screen, Urine Negative Negative    Cocaine Screen, Urine Negative Negative    Methadone Screen , Urine Negative Negative    Methamphetamine, Ur Negative Negative        EKG:      EKG sinus rhythm, no ST elevations or depressions.     Radiology:    CT Abdomen Pelvis With Contrast    Result Date: 9/22/2023  FINAL REPORT CLINICAL HISTORY: diffuse abd pain/tenderness, h/o cdiff FINDINGS: Mild subsegmental atelectasis.  No concerning findings in the lungs.  Small amount of pneumobilia noted in the liver.  Patient is status post cholecystectomy.  Spleen is unremarkable. Pancreas appears unremarkable.  Adrenal glands are unremarkable. Bilateral kidneys demonstrate numerous hypoattenuating lesions, with appearance most consistent with renal cysts.  There is a moderate size hiatal hernia containing a sizable portion of the stomach.   There is a long segment of the transverse and descending colon demonstrating marked mucosal thickening and mucosal hyperemia.  There is adjacent stranding seen within the mesentery.  Findings as can be seen in infectious colitis, such as C. difficile.  Numerous regions of postsurgical changes within the colon are noted.  No findings to suggest obstruction. Diffuse anasarca within the body wall.  Urinary bladder is unremarkable.  Patient is status post hysterectomy.  Bones: No acute findings.  Chronic appearing compression fracture of L2. Advanced degenerative changes of the lumbar spine are noted.  No aggressive appearing lytic/blastic lesion is seen.     Marked mucosal thickening of a large segment of the transverse and descending colon with mucosal hyperemia and adjacent stranding within the mesentery.  Findings are most consistent with infectious colitis, such as C. difficile.  Diffuse anasarca, which may be reactive.  No definite evidence of obstruction. Authenticated and Electronically Signed by MD Hudson Richard on 09/22/2023 09:24:20 PM     Assessment/Plan:    Observation admission 9/22/2023 with C. difficile infection failed outpatient treatment with oral vancomycin.    At time of admission patient resting comfortably in bed, pleasantly conversational, comfortably ill.  Says she is feeling a little better already.    Patient declined offer for physician to call family, said they have already been updated for the night.    C. Difficile infection  Fidaxomicin p.o., which is considered first-line treatment.  Order had to be placed in conjunction with pharmacy.      Hyponatremia  IVF.    Chronic:  hypertension, tobacco use disorder, macrocytic anemia concerning for possible MDS, GERD, anxiety and depression, chronic back pain.     Continue home medications.    Risk Assessment: High  DVT Prophylaxis: Lovenox  Code Status: Full code  Diet: Cardiac    Advance Care Planning   ACP discussion was held with the  patient during this visit. Patient does not have an advance directive, information provided.           Brian Joseph Kerley, DO  09/22/23  22:08 EDT    Dictated utilizing Dragon dictation.

## 2023-09-23 NOTE — PLAN OF CARE
Goal Outcome Evaluation:  Plan of Care Reviewed With: patient        Progress: no change  Outcome Evaluation: VSS. RA. SR on tele. IV infusing per order. no reports of pain or soa. no acute overrnight events, continue plan of care

## 2023-09-23 NOTE — CONSULTS
In Patient Consult      Date of Consultation: 2023  Patient Name: Tasha Yarbrough  MRN: [id number]  : 1955     Referring provider: Kerley, Brian Joseph, DO    Primary care provider:  Terrence Baldwin MD    Reason for consultation: Abdominal pain, diarrhea, abnormal GI imaging, C. difficile colitis.    History of Present Illness: 68-year-old female, with recent hospitalization with concern for choledocholithiasis.  Underwent an EGD and ERCP, did not have any overt stone, but had retained bile.  She does have a large hiatal hernia.  She was found to have C. difficile colitis and was started on p.o. vancomycin.  She continued her p.o. vancomycin outpatient.  Initially started to improve, but then presented to the emergency room 2023 with concern for worsening watery diarrhea and abdominal pain.  She had been taking her oral vancomycin as instructed.    A CT scan was obtained in the emergency room given her findings, which revealed marked mucosal thickening of the large segment of the transverse and descending colon with mucosal hyperemia and adjacent stranding within the mesentery.  Findings suggestive of infectious colitis such as C. difficile.  Diffuse anasarca.  No evidence of obstruction.    GI consultation requested for the above.    On chart review, she has had findings suggestive of colonic wall thickening dating back as early as July.  C. difficile at that time was negative.    C. difficile was most recently checked at [facility], where it was positive on 2023.      Subjective     Past Medical History:   Diagnosis Date    Hypertension     Injury of back        Past Surgical History:   Procedure Laterality Date    ABDOMINAL SURGERY      COLON SURGERY      COLONOSCOPY      TUBAL ABDOMINAL LIGATION         History reviewed. No pertinent family history.    Social History     Socioeconomic History    Marital status:    Tobacco Use    Smoking status: Every Day     Packs/day:  1.00     Types: Cigarettes    Smokeless tobacco: Never   Vaping Use    Vaping Use: Never used   Substance and Sexual Activity    Alcohol use: Never    Drug use: Never    Sexual activity: Defer         Current Facility-Administered Medications:     acetaminophen (TYLENOL) tablet 650 mg, 650 mg, Oral, Q4H PRN **OR** acetaminophen (TYLENOL) 160 MG/5ML oral solution 650 mg, 650 mg, Oral, Q4H PRN **OR** acetaminophen (TYLENOL) suppository 650 mg, 650 mg, Rectal, Q4H PRN, Kerley, Brian Joseph, DO    Calcium Replacement - Follow Nurse / BPA Driven Protocol, , Does not apply, PRN, Kerley, Brian Joseph, DO    citalopram (CeleXA) tablet 40 mg, 40 mg, Oral, Daily, Kerley, Brian Joseph, DO    Enoxaparin Sodium (LOVENOX) syringe 40 mg, 40 mg, Subcutaneous, Daily, Kerley, Brian Joseph, DO, 40 mg at 09/23/23 0007    fidaxomicin (DIFICID) tablet 200 mg, 200 mg, Oral, Q12H, Kerley, Brian Joseph, DO, 200 mg at 09/22/23 2328    HYDROcodone-acetaminophen (NORCO) 7.5-325 MG per tablet 1 tablet, 1 tablet, Oral, Q8H PRN, Kerley, Brian Joseph, DO, 1 tablet at 09/23/23 0007    Magnesium Standard Dose Replacement - Follow Nurse / BPA Driven Protocol, , Does not apply, PRN, Kerley, Brian Joseph, DO    metoprolol tartrate (LOPRESSOR) tablet 50 mg, 50 mg, Oral, Daily, Kerley, Brian Joseph, DO    nitroglycerin (NITROSTAT) SL tablet 0.4 mg, 0.4 mg, Sublingual, Q5 Min PRN, Kerley, Brian Joseph, DO    ondansetron (ZOFRAN) injection 4 mg, 4 mg, Intravenous, Q6H PRN, Kerley, Brian Joseph, DO    Phosphorus Replacement - Follow Nurse / BPA Driven Protocol, , Does not apply, PRN, Kerley, Brian Joseph, DO    Potassium Replacement - Follow Nurse / BPA Driven Protocol, , Does not apply, PRN, Kerley, Brian Joseph, DO    sodium bicarbonate tablet 650 mg, 650 mg, Oral, BID, Kerley, Brian Joseph, DO, 650 mg at 09/23/23 0007    sodium chloride 0.9 % flush 10 mL, 10 mL, Intravenous, PRN, Kerley, Brian Joseph,     sodium chloride 0.9 % flush 10 mL, 10 mL,  Intravenous, Q12H, Kerley, Brian Joseph,     sodium chloride 0.9 % flush 10 mL, 10 mL, Intravenous, PRN, Kerley, Brian Joseph,     sodium chloride 0.9 % infusion 40 mL, 40 mL, Intravenous, PRN, Kerley, Brian Joseph,     sodium chloride 0.9 % infusion, 100 mL/hr, Intravenous, Continuous, Kerley, Brian Joseph, DO, Last Rate: 100 mL/hr at 09/23/23 0007, 100 mL/hr at 09/23/23 0007    Allergies   Allergen Reactions    Ciprofloxacin Dizziness    Codeine Itching    Pineapple Unknown - Low Severity       Review of Systems   Gastrointestinal:  Positive for abdominal pain and diarrhea.   All other systems reviewed and are negative.     The following portions of the patient's history were reviewed and updated as appropriate: allergies, current medications, past family history, past medical history, past social history, past surgical history and problem list.    Objective     Vitals:    09/22/23 2351 09/23/23 0300 09/23/23 0500 09/23/23 0719   BP:  128/85  126/84   BP Location:  Left arm  Left arm   Patient Position:  Lying  Lying   Pulse: 72 78  74   Resp:  18  16   Temp:  97.9 °F (36.6 °C)  98.4 °F (36.9 °C)   TempSrc:  Oral  Oral   SpO2:  97%  98%   Weight:   67.4 kg (148 lb 9.4 oz)    Height:           Physical Exam  Constitutional:       General: She is not in acute distress.     Appearance: Normal appearance. She is not ill-appearing.   HENT:      Head: Normocephalic and atraumatic.      Mouth/Throat:      Mouth: Mucous membranes are moist.   Eyes:      General: No scleral icterus.     Conjunctiva/sclera: Conjunctivae normal.   Cardiovascular:      Rate and Rhythm: Normal rate.   Pulmonary:      Effort: Pulmonary effort is normal. No respiratory distress.   Abdominal:      General: There is no distension.      Palpations: Abdomen is soft.      Tenderness: There is abdominal tenderness. There is no guarding.   Musculoskeletal:         General: No swelling or tenderness.      Cervical back: Neck supple. No rigidity.    Skin:     General: Skin is warm and dry.      Capillary Refill: Capillary refill takes less than 2 seconds.      Coloration: Skin is not jaundiced or pale.   Neurological:      General: No focal deficit present.      Mental Status: She is alert and oriented to person, place, and time. Mental status is at baseline.   Psychiatric:         Mood and Affect: Mood normal.         Behavior: Behavior normal.       Results from last 7 days   Lab Units 09/23/23  0653 09/22/23  1937   SODIUM mmol/L 146* 147*   POTASSIUM mmol/L 3.8 3.5   CHLORIDE mmol/L 115* 113*   CO2 mmol/L 23.7 21.2*   BUN mg/dL 16 19   CREATININE mg/dL 0.94 1.08*   CALCIUM mg/dL 8.7 9.2   ALBUMIN g/dL  --  3.4*   BILIRUBIN mg/dL  --  <0.2   ALK PHOS U/L  --  124*   ALT (SGPT) U/L  --  9   AST (SGOT) U/L  --  14   GLUCOSE mg/dL 75 98   WBC 10*3/mm3 8.57 14.48*   HEMOGLOBIN g/dL 9.5* 10.4*   PLATELETS 10*3/mm3 298 378       Imaging Results (Last 24 Hours)       Procedure Component Value Units Date/Time    CT Abdomen Pelvis With Contrast [[id number]] Collected: 09/22/23 2124     Updated: 09/22/23 2125    Narrative:      FINAL REPORT    CLINICAL HISTORY:  diffuse abd pain/tenderness, h/o cdiff    FINDINGS:  Mild subsegmental atelectasis.  No concerning findings in the  lungs.  Small amount of pneumobilia noted in the liver.  Patient  is status post cholecystectomy.  Spleen is unremarkable.  Pancreas appears unremarkable.  Adrenal glands are unremarkable.  Bilateral kidneys demonstrate numerous hypoattenuating lesions,  with appearance most consistent with renal cysts.  There is a  moderate size hiatal hernia containing a sizable portion of the  stomach.  There is a long segment of the transverse and  descending colon demonstrating marked mucosal thickening and  mucosal hyperemia.  There is adjacent stranding seen within the  mesentery.  Findings as can be seen in infectious colitis, such  as C. difficile.  Numerous regions of postsurgical changes  within the  colon are noted.  No findings to suggest obstruction.  Diffuse anasarca within the body wall.  Urinary bladder is  unremarkable.  Patient is status post hysterectomy.  Bones: No  acute findings.  Chronic appearing compression fracture of L2.  Advanced degenerative changes of the lumbar spine are noted.  No  aggressive appearing lytic/blastic lesion is seen.      Impression:      Marked mucosal thickening of a large segment of the transverse  and descending colon with mucosal hyperemia and adjacent  stranding within the mesentery.  Findings are most consistent  with infectious colitis, such as C. difficile.  Diffuse  anasarca, which may be reactive.  No definite evidence of  obstruction.    Authenticated and Electronically Signed by MD Hudson Richard on 09/22/2023 09:24:20 PM            Assessment / Plan      Assessment/Recommendations:   Principal Problem:    C. difficile colitis  Active Problems:    Generalized abdominal pain    Diarrhea of infectious origin    Abnormal finding on GI tract imaging    68-year-old female with severe C. difficile colitis.    Has failed outpatient oral vancomycin therapy.  Has now been started on oral fidaxomicin.  Would continue this for 14 days.  CT scan revealed severe colitis.    Needs inpatient antibiotics. IV fluids.    Thank you very much for letting me participate in the care of this patient.  Please do not hesitate to call me if you have any questions.    Jairo Negro MD  [facility]  9/23/2023  09:42 EDT    Part of this note may be an electronic transcription/translation of spoken language to printed text using the Dragon Dictation System.

## 2023-09-23 NOTE — CASE MANAGEMENT/SOCIAL WORK
Discharge Planning Assessment  [facility]     Patient Name: Tasha Yarbrough  MRN: [id number]  Today's Date: 9/23/2023    Admit Date: 9/22/2023    Plan: Pt plans to dc to home. Family to provide transport but may need a cab voucher. Has used [facility] in the past.   Discharge Needs Assessment       Row Name 09/23/23 1531       Living Environment    People in Home child(debra), adult    Current Living Arrangements home    Potentially Unsafe Housing Conditions unable to assess    Primary Care Provided by self    Provides Primary Care For no one    Family Caregiver if Needed child(debra), adult    Quality of Family Relationships helpful;involved    Able to Return to Prior Arrangements yes    Living Arrangement Comments Has had [facility] in the past       Food Insecurity    Within the past 12 months, you worried that your food would run out before you got the money to buy more. Never true    Within the past 12 months, the food you bought just didn't last and you didn't have money to get more. Never true       Transition Planning    Patient/Family Anticipates Transition to home    Patient/Family Anticipated Services at Transition none    Transportation Anticipated family or friend will provide  May need cab voucher       Discharge Needs Assessment    Readmission Within the Last 30 Days no previous admission in last 30 days    Equipment Currently Used at Home commode;cane, straight    Concerns to be Addressed no discharge needs identified    Equipment Needed After Discharge none    Provided Post Acute Provider List? N/A    Provided Post Acute Provider Quality & Resource List? N/A                   Discharge Plan       Row Name 09/23/23 1535       Plan    Plan Pt plans to dc to home. Family to provide transport but may need a cab voucher. Has used [facility] in the past.    Final Discharge Disposition Code 01 - home or self-care                  Continued Care and Services - Admitted Since 9/22/2023    Coordination  has not been started for this encounter.          Demographic Summary       Row Name 09/23/23 1456       General Information    Admission Type observation    Arrived From home    Referral Source admission list    Reason for Consult discharge planning    Preferred Language English                   Functional Status       Row Name 09/23/23 1457       Functional Status    Usual Activity Tolerance good    Current Activity Tolerance good       Functional Status, IADL    Medications independent    Meal Preparation independent    Housekeeping independent    Laundry independent    Shopping independent       Mental Status    General Appearance WDL WDL                   Psychosocial       Row Name 09/23/23 1531       Behavior WDL    Behavior WDL WDL       Emotion Mood WDL    Emotion/Mood/Affect WDL WDL       Intellectual Performance WDL    Level of Consciousness Alert       Coping/Stress    Major Change/Loss/Stressor illness    Patient Personal Strengths positive attitude    Sources of Support adult child(debra)    Understanding of Condition and Treatment adequate understanding of medical condition       Developmental Stage (Eriksson's)    Developmental Stage Stage 8 (65 years-death/Late Adulthood) Integrity vs. Despair       C-SSRS (Recent)    Q1 Wished to be Dead (Past Month) no    Q2 Suicidal Thoughts (Past Month) no    Q6 Suicide Behavior (Lifetime) no       Violence Risk    Feels Like Hurting Others no    Previous Attempt to Harm Others no                   Abuse/Neglect    No documentation.                  Legal    No documentation.                  Substance Abuse    No documentation.                  Patient Forms    No documentation.                     Freda Conde

## 2023-09-23 NOTE — PLAN OF CARE
Problem: Adult Inpatient Plan of Care  Goal: Plan of Care Review  Outcome: Ongoing, Progressing  Flowsheets (Taken 9/23/2023 1816)  Progress: improving  Plan of Care Reviewed With: patient  Outcome Evaluation: MARIO OJEDA. Patient complaining of pain in abdomen and tender to touch in LUQ. MD aware- see MAR. Patient had several BMs. No acute events this shift. Discharge pending.  Goal: Patient-Specific Goal (Individualized)  Outcome: Ongoing, Progressing  Goal: Absence of Hospital-Acquired Illness or Injury  Outcome: Ongoing, Progressing  Intervention: Identify and Manage Fall Risk  Recent Flowsheet Documentation  Taken 9/23/2023 1600 by Raquel Cristina RN  Safety Promotion/Fall Prevention: safety round/check completed  Taken 9/23/2023 1400 by Raquel Cristina RN  Safety Promotion/Fall Prevention: safety round/check completed  Taken 9/23/2023 1300 by Raquel Cristina RN  Safety Promotion/Fall Prevention: safety round/check completed  Taken 9/23/2023 0947 by Raquel Cristina RN  Safety Promotion/Fall Prevention:   safety round/check completed   room organization consistent  Intervention: Prevent Skin Injury  Recent Flowsheet Documentation  Taken 9/23/2023 1600 by Raquel Cristina RN  Body Position: position changed independently  Taken 9/23/2023 1400 by Raquel Cristina RN  Body Position: position changed independently  Taken 9/23/2023 1300 by Raquel Cristina RN  Body Position:   position changed independently   sitting up in bed   weight shifting  Taken 9/23/2023 0947 by Raquel Cristina RN  Body Position:   position changed independently   supine   weight shifting  Intervention: Prevent and Manage VTE (Venous Thromboembolism) Risk  Recent Flowsheet Documentation  Taken 9/23/2023 1600 by Raquel Cristina RN  Activity Management: activity encouraged  Taken 9/23/2023 1400 by Raquel Cristina RN  Activity Management: activity encouraged  Taken 9/23/2023 1300 by Raquel Cristina  RN  Activity Management: activity encouraged  Taken 9/23/2023 0947 by Raquel Cristina RN  Activity Management: activity encouraged  Range of Motion: active ROM (range of motion) encouraged  Goal: Optimal Comfort and Wellbeing  Outcome: Ongoing, Progressing  Intervention: Provide Person-Centered Care  Recent Flowsheet Documentation  Taken 9/23/2023 0947 by Raquel Cristina RN  Trust Relationship/Rapport:   care explained   questions answered  Goal: Readiness for Transition of Care  Outcome: Ongoing, Progressing     Problem: Hypertension Comorbidity  Goal: Blood Pressure in Desired Range  Outcome: Ongoing, Progressing  Intervention: Maintain Blood Pressure Management  Recent Flowsheet Documentation  Taken 9/23/2023 1600 by Raquel Cristina RN  Medication Review/Management: medications reviewed  Taken 9/23/2023 1300 by Raquel Cristina RN  Medication Review/Management: medications reviewed  Taken 9/23/2023 0947 by Raquel Cristina RN  Medication Review/Management: medications reviewed     Problem: Fall Injury Risk  Goal: Absence of Fall and Fall-Related Injury  Outcome: Ongoing, Progressing  Intervention: Identify and Manage Contributors  Recent Flowsheet Documentation  Taken 9/23/2023 1600 by Raquel Cristina RN  Medication Review/Management: medications reviewed  Taken 9/23/2023 1300 by Raquel Cristina RN  Medication Review/Management: medications reviewed  Taken 9/23/2023 0947 by Raquel Cristina RN  Medication Review/Management: medications reviewed  Intervention: Promote Injury-Free Environment  Recent Flowsheet Documentation  Taken 9/23/2023 1600 by Raquel Cristina RN  Safety Promotion/Fall Prevention: safety round/check completed  Taken 9/23/2023 1400 by Raquel Cristina RN  Safety Promotion/Fall Prevention: safety round/check completed  Taken 9/23/2023 1300 by Raquel Cristina RN  Safety Promotion/Fall Prevention: safety round/check completed  Taken 9/23/2023 0947 by Ibeth  Raquel RN  Safety Promotion/Fall Prevention:   safety round/check completed   room organization consistent   Goal Outcome Evaluation:  Plan of Care Reviewed With: patient        Progress: improving  Outcome Evaluation: MARIO OJEDA. Patient complaining of pain in abdomen and tender to touch in LUQ. MD aware- see MAR. Patient had several BMs. No acute events this shift. Discharge pending.

## 2023-09-23 NOTE — PROGRESS NOTES
[facility]    Name:  Tasha Yarbrough   Age:  68 y.o.  Sex:  female  :  1955  MRN:  [id number]   Visit Number:  [id number]  Admission Date:  2023  Date Of Service:  23  Primary Care Physician:  Terrence Baldwin MD     LOS: 1 day :    Chief Complaint:    Abdominal pain    Subjective:  Still with some abdominal pain after loose BM this morning, has had 3 BM thus far.     Hospital Course:  Patient is a 68-year-old woman with past medical history of hypertension, tobacco use disorder, macrocytic anemia concerning for possible MDS, GERD, anxiety and depression, chronic back pain.  Recent hospitalization with transfer for concern of choledocholithiasis, EGD/ERCP found to have torturous esophagus, hiatal hernia, retained bile, gastric erythema, no overt stone, had clear cholangiogram.  Afterwards was found to have C. difficile and was started on p.o. vancomycin. She was followed by Dr. Martínez with [facility]. Said that it started to improve with thicker stool, and then returned back into watery diarrhea.  Presented to [facility] ED on 2023 with concern for worsening watery diarrhea abdominal pain for couple days.      ED summary: Afebrile, vital signs stable on room air.  EKG sinus rhythm, no ST elevations or depressions.  High-sensitivity troponin 15, ACS not suspected.  Sodium 147, chloride 113, CO2 21.  Lipase 95, procalcitonin 0.43, white count 14, hemoglobin 10.  Blood cultures collected.  CT abdomen/pelvis marked mucosal thickening of a large segment of the transverse and descending colon with mucosal hyperemia and adjacent stranding within the mesentery, findings most consistent with infectious colitis such as C. difficile, diffuse anasarca which may be reactive.  No definitive evidence of obstruction.  She was provided fentanyl, vancomycin, metronidazole, Zofran, 1 L normal saline bolus    Review of Systems:     All systems were reviewed and negative except as  mentioned in subjective, assessment and plan.    Vital Signs:    Temp:  [97.9 °F (36.6 °C)-98.4 °F (36.9 °C)] 98.4 °F (36.9 °C)  Heart Rate:  [72-82] 74  Resp:  [16-18] 16  BP: (124-143)/(71-85) 126/84    Intake and output:    I/O last 3 completed shifts:  In: 1100 [IV Piggyback:1100]  Out: -   No intake/output data recorded.    Physical Examination:  Gen-no acute distress, resting in bed  CV-RRR, S1 S2 normal, no m/r/g  Resp-CTAB, normal WOB  Abd-soft, TTP generalized worse in LLQ, ND, +BS  Ext-no edema, PPP  Neuro-A&Ox3, no focal deficits  Skin-no overt rashes noted  Psych-appropriate mood    Laboratory results:    Results from last 7 days   Lab Units 09/23/23 0940 09/23/23 0653 09/22/23 1937   SODIUM mmol/L  --  146* 147*   POTASSIUM mmol/L 3.8 3.8 3.5   CHLORIDE mmol/L  --  115* 113*   CO2 mmol/L  --  23.7 21.2*   BUN mg/dL  --  16 19   CREATININE mg/dL  --  0.94 1.08*   CALCIUM mg/dL  --  8.7 9.2   BILIRUBIN mg/dL  --   --  <0.2   ALK PHOS U/L  --   --  124*   ALT (SGPT) U/L  --   --  9   AST (SGOT) U/L  --   --  14   GLUCOSE mg/dL  --  75 98     Results from last 7 days   Lab Units 09/23/23 0653 09/22/23 1937   WBC 10*3/mm3 8.57 14.48*   HEMOGLOBIN g/dL 9.5* 10.4*   HEMATOCRIT % 30.4* 32.7*   PLATELETS 10*3/mm3 298 378         Results from last 7 days   Lab Units 09/22/23 1937   HSTROP T ng/L 15*     Results from last 7 days   Lab Units 09/22/23 2205 09/22/23 2200   BLOODCX  No growth at less than 24 hours No growth at less than 24 hours     No results for input(s): PHART, FDS1MVK, PO2ART, ELA3ZGS, BASEEXCESS in the last 8760 hours.   I have reviewed the patient's laboratory results.    Radiology results:    CT Abdomen Pelvis With Contrast    Result Date: 9/22/2023  FINAL REPORT CLINICAL HISTORY: diffuse abd pain/tenderness, h/o cdiff FINDINGS: Mild subsegmental atelectasis.  No concerning findings in the lungs.  Small amount of pneumobilia noted in the liver.  Patient is status post cholecystectomy.   Spleen is unremarkable. Pancreas appears unremarkable.  Adrenal glands are unremarkable. Bilateral kidneys demonstrate numerous hypoattenuating lesions, with appearance most consistent with renal cysts.  There is a moderate size hiatal hernia containing a sizable portion of the stomach.  There is a long segment of the transverse and descending colon demonstrating marked mucosal thickening and mucosal hyperemia.  There is adjacent stranding seen within the mesentery.  Findings as can be seen in infectious colitis, such as C. difficile.  Numerous regions of postsurgical changes within the colon are noted.  No findings to suggest obstruction. Diffuse anasarca within the body wall.  Urinary bladder is unremarkable.  Patient is status post hysterectomy.  Bones: No acute findings.  Chronic appearing compression fracture of L2. Advanced degenerative changes of the lumbar spine are noted.  No aggressive appearing lytic/blastic lesion is seen.     Impression: Marked mucosal thickening of a large segment of the transverse and descending colon with mucosal hyperemia and adjacent stranding within the mesentery.  Findings are most consistent with infectious colitis, such as C. difficile.  Diffuse anasarca, which may be reactive.  No definite evidence of obstruction. Authenticated and Electronically Signed by MD Hudson Richard on 09/22/2023 09:24:20 PM   I have reviewed the patient's radiology reports.    Medication Review:     I have reviewed the patient's active and prn medications.     Problem List:      C. difficile colitis    Generalized abdominal pain    Diarrhea of infectious origin    Abnormal finding on GI tract imaging    Anemia    Assessment/Plan:    C. difficile colitis  Generalized abdominal pain  Diarrhea of infectious origin  Abnormal finding on GI tract imaging  -- c diff positive on 9/12 at [facility], followed by Dr. Martínez at that time who recommended 10 days of vanc.   -- I discussed her case with Mode Powell  ([facility]) who recommends oral vanc over fidaxomicin    -- At DC 30 days of vancomycin --> 125mg QID x 2 weeks, and 125mg BID x 2 weeks. Will need f/u with Dr. Martínez at discharge   -- GI has seen, appreciate assistance   -- procal .43, WBC coming down, normal lactate  -- pain control  -- IVF    Anemia, Chronic, possible MDS  -- monitor    DVT Prophylaxis: Lovenox  Code Status: Full  Diet: as tolerated  Discharge Plan: home Monday with [facility] follow up     Amaris Stewart, JOSÉ MANUEL  09/23/23  10:20 EDT    Dictated utilizing Dragon dictation.

## 2023-09-24 LAB
ANION GAP SERPL CALCULATED.3IONS-SCNC: 9.5 MMOL/L (ref 5–15)
BASOPHILS # BLD AUTO: 0.03 10*3/MM3 (ref 0–0.2)
BASOPHILS NFR BLD AUTO: 0.4 % (ref 0–1.5)
BUN SERPL-MCNC: 17 MG/DL (ref 8–23)
BUN/CREAT SERPL: 18.5 (ref 7–25)
CALCIUM SPEC-SCNC: 9 MG/DL (ref 8.6–10.5)
CHLORIDE SERPL-SCNC: 112 MMOL/L (ref 98–107)
CO2 SERPL-SCNC: 21.5 MMOL/L (ref 22–29)
CREAT SERPL-MCNC: 0.92 MG/DL (ref 0.57–1)
CRP SERPL-MCNC: 0.35 MG/DL (ref 0–0.5)
DEPRECATED RDW RBC AUTO: 56.5 FL (ref 37–54)
EGFRCR SERPLBLD CKD-EPI 2021: 68 ML/MIN/1.73
EOSINOPHIL # BLD AUTO: 0.09 10*3/MM3 (ref 0–0.4)
EOSINOPHIL NFR BLD AUTO: 1.2 % (ref 0.3–6.2)
ERYTHROCYTE [DISTWIDTH] IN BLOOD BY AUTOMATED COUNT: 13.8 % (ref 12.3–15.4)
ERYTHROCYTE [SEDIMENTATION RATE] IN BLOOD: 10 MM/HR (ref 0–30)
GLUCOSE SERPL-MCNC: 72 MG/DL (ref 65–99)
HCT VFR BLD AUTO: 31.2 % (ref 34–46.6)
HGB BLD-MCNC: 9.5 G/DL (ref 12–15.9)
HYPOCHROMIA BLD QL: NORMAL
IMM GRANULOCYTES # BLD AUTO: 0.12 10*3/MM3 (ref 0–0.05)
IMM GRANULOCYTES NFR BLD AUTO: 1.6 % (ref 0–0.5)
LIPASE SERPL-CCNC: 77 U/L (ref 13–60)
LYMPHOCYTES # BLD AUTO: 1.33 10*3/MM3 (ref 0.7–3.1)
LYMPHOCYTES NFR BLD AUTO: 17.6 % (ref 19.6–45.3)
MACROCYTES BLD QL SMEAR: NORMAL
MCH RBC QN AUTO: 34.9 PG (ref 26.6–33)
MCHC RBC AUTO-ENTMCNC: 30.4 G/DL (ref 31.5–35.7)
MCV RBC AUTO: 114.7 FL (ref 79–97)
MONOCYTES # BLD AUTO: 0.45 10*3/MM3 (ref 0.1–0.9)
MONOCYTES NFR BLD AUTO: 5.9 % (ref 5–12)
NEUTROPHILS NFR BLD AUTO: 5.55 10*3/MM3 (ref 1.7–7)
NEUTROPHILS NFR BLD AUTO: 73.3 % (ref 42.7–76)
NRBC BLD AUTO-RTO: 0 /100 WBC (ref 0–0.2)
PLATELET # BLD AUTO: 285 10*3/MM3 (ref 140–450)
PMV BLD AUTO: 10.2 FL (ref 6–12)
POTASSIUM SERPL-SCNC: 4 MMOL/L (ref 3.5–5.2)
RBC # BLD AUTO: 2.72 10*6/MM3 (ref 3.77–5.28)
SMALL PLATELETS BLD QL SMEAR: ADEQUATE
SODIUM SERPL-SCNC: 143 MMOL/L (ref 136–145)
TOXIC GRANULATION: NORMAL
WBC NRBC COR # BLD: 7.57 10*3/MM3 (ref 3.4–10.8)

## 2023-09-24 PROCEDURE — G0378 HOSPITAL OBSERVATION PER HR: HCPCS

## 2023-09-24 PROCEDURE — 86140 C-REACTIVE PROTEIN: CPT | Performed by: NURSE PRACTITIONER

## 2023-09-24 PROCEDURE — 96376 TX/PRO/DX INJ SAME DRUG ADON: CPT

## 2023-09-24 PROCEDURE — 80048 BASIC METABOLIC PNL TOTAL CA: CPT | Performed by: STUDENT IN AN ORGANIZED HEALTH CARE EDUCATION/TRAINING PROGRAM

## 2023-09-24 PROCEDURE — 96372 THER/PROPH/DIAG INJ SC/IM: CPT

## 2023-09-24 PROCEDURE — 97162 PT EVAL MOD COMPLEX 30 MIN: CPT

## 2023-09-24 PROCEDURE — 83690 ASSAY OF LIPASE: CPT | Performed by: NURSE PRACTITIONER

## 2023-09-24 PROCEDURE — 85007 BL SMEAR W/DIFF WBC COUNT: CPT | Performed by: NURSE PRACTITIONER

## 2023-09-24 PROCEDURE — 85652 RBC SED RATE AUTOMATED: CPT | Performed by: NURSE PRACTITIONER

## 2023-09-24 PROCEDURE — 96361 HYDRATE IV INFUSION ADD-ON: CPT

## 2023-09-24 PROCEDURE — 25010000002 MORPHINE PER 10 MG: Performed by: STUDENT IN AN ORGANIZED HEALTH CARE EDUCATION/TRAINING PROGRAM

## 2023-09-24 PROCEDURE — 85025 COMPLETE CBC W/AUTO DIFF WBC: CPT | Performed by: NURSE PRACTITIONER

## 2023-09-24 PROCEDURE — 25010000002 ENOXAPARIN PER 10 MG: Performed by: STUDENT IN AN ORGANIZED HEALTH CARE EDUCATION/TRAINING PROGRAM

## 2023-09-24 RX ORDER — SODIUM CHLORIDE 9 MG/ML
75 INJECTION, SOLUTION INTRAVENOUS CONTINUOUS
Status: DISCONTINUED | OUTPATIENT
Start: 2023-09-24 | End: 2023-09-25 | Stop reason: HOSPADM

## 2023-09-24 RX ADMIN — VANCOMYCIN HYDROCHLORIDE 125 MG: 125 CAPSULE ORAL at 01:32

## 2023-09-24 RX ADMIN — METOPROLOL TARTRATE 50 MG: 50 TABLET, FILM COATED ORAL at 08:22

## 2023-09-24 RX ADMIN — HYDROCODONE BITARTRATE AND ACETAMINOPHEN 1 TABLET: 7.5; 325 TABLET ORAL at 08:22

## 2023-09-24 RX ADMIN — SODIUM BICARBONATE 650 MG TABLET 650 MG: at 08:22

## 2023-09-24 RX ADMIN — MORPHINE SULFATE 2 MG: 2 INJECTION, SOLUTION INTRAMUSCULAR; INTRAVENOUS at 11:19

## 2023-09-24 RX ADMIN — SODIUM CHLORIDE 100 ML/HR: 9 INJECTION, SOLUTION INTRAVENOUS at 08:21

## 2023-09-24 RX ADMIN — CITALOPRAM HYDROBROMIDE 40 MG: 20 TABLET ORAL at 08:22

## 2023-09-24 RX ADMIN — SODIUM CHLORIDE 75 ML/HR: 9 INJECTION, SOLUTION INTRAVENOUS at 17:29

## 2023-09-24 RX ADMIN — VANCOMYCIN HYDROCHLORIDE 125 MG: 125 CAPSULE ORAL at 17:28

## 2023-09-24 RX ADMIN — ENOXAPARIN SODIUM 40 MG: 100 INJECTION SUBCUTANEOUS at 08:21

## 2023-09-24 RX ADMIN — MORPHINE SULFATE 2 MG: 2 INJECTION, SOLUTION INTRAMUSCULAR; INTRAVENOUS at 21:25

## 2023-09-24 RX ADMIN — Medication 10 ML: at 08:22

## 2023-09-24 RX ADMIN — HYDROCODONE BITARTRATE AND ACETAMINOPHEN 1 TABLET: 7.5; 325 TABLET ORAL at 17:35

## 2023-09-24 RX ADMIN — ACETAMINOPHEN 650 MG: 325 TABLET, FILM COATED ORAL at 14:44

## 2023-09-24 RX ADMIN — VANCOMYCIN HYDROCHLORIDE 125 MG: 125 CAPSULE ORAL at 06:09

## 2023-09-24 RX ADMIN — SODIUM BICARBONATE 650 MG TABLET 650 MG: at 21:25

## 2023-09-24 RX ADMIN — MORPHINE SULFATE 2 MG: 2 INJECTION, SOLUTION INTRAMUSCULAR; INTRAVENOUS at 15:40

## 2023-09-24 RX ADMIN — Medication 10 ML: at 21:25

## 2023-09-24 RX ADMIN — VANCOMYCIN HYDROCHLORIDE 125 MG: 125 CAPSULE ORAL at 12:05

## 2023-09-24 NOTE — PLAN OF CARE
Goal Outcome Evaluation:  Plan of Care Reviewed With: patient        Progress: no change  Outcome Evaluation: Pt participated in PT initial evaluation this date. Pt presents seated on BSC, pleasant and agreeable to PT initial evaluation. Pt reports 7/10 abdominal pain at rest. Pt reports that she lives at home with her 2 sons in a mobile home with 0 EULALIO. Pt reports that she typically ambulates with a SPC and sometimes a RW if she is feeling weak. Pt states that her sons perform most of the cooking, cleaning and driving. Pt performed sit to stand transfer from [facility] with CGA. Pt performed all bed mobility tasks with mod (I), requiring increased time and effort. Pt ambulated x22' within room with the use of a RW and CGA for safety. Following evaluation, pt left in fowlers position in bed with call light and all needs within reach. Skilled PT treatment indicated to allow for improved functional mobility, reduce risk of falls and prevent functional decline.      Anticipated Discharge Disposition (PT): home with assist, home with home health

## 2023-09-24 NOTE — PROGRESS NOTES
[facility]    Name:  Tasha Yarbrough   Age:  68 y.o.  Sex:  female  :  1955  MRN:  [id number]   Visit Number:  [id number]  Admission Date:  2023  Date Of Service:  23  Primary Care Physician:  Terrence Baldwin MD     LOS: 1 day :    Chief Complaint:      Diarrhea    Subjective:    Patient seen and examined with no family at bedside.  States that she had completed her course Friday of vancomycin that she was given upon discharge from [place] recently for C. difficile.  Stated that Dificid was not going to be covered by her insurance.  She has had 1 bowel movement this morning.  States feeling slightly improved.    Hospital Course:    Ms. Yarbrough is a pleasant 68-year-old female with past medical history of tobacco use disorder, macrocytic anemia concerning for possible MDS, anxiety/depression chronic, chronic low back pain, and hypertension with recent hospitalization with transfer for concern for choledocholithiasis status post EGD/ERCP with torturous esophagus/hiatal hernia/retained bile/gastric erythema with no overt stone and negative cholangiogram.  Upon discharge from [place] [facility] she was noted to have Clostridium difficile and was initiated on p.o. vancomycin to follow with Dr. Martínez infectious disease.  Initially patient diarrhea had improved but then worsened with increased frequency and abdominal pain.  Upon ED presentation patient afebrile and hemodynamically stable on room air.  Pertinent labs and imaging noted sodium 147, chloride 113, CO2 21, lipase 95, procalcitonin mildly elevated at 0.43 with a WBC of 14, hemoglobin 10.  CT abdomen pelvis noted mucosal thickening of the large segment of the transverse and descending colon with mucosal hyperemia and adjacent stranding within the mesentery findings most consistent with infectious colitis such as C. difficile with diffuse anasarca which may be reactive without evidence of obstruction.  Blood  cultures were collected.  Patient was given fentanyl, vancomycin, Flagyl, Zofran, and 1 L saline bolus in the emergency department.  GI was consulted and recommended to continue Dificid given outpatient failure with p.o. vancomycin.  Unfortunately patient had difficulty getting Dificid authorized with insurance denying.  [place] infectious disease Dr. Mode Powell contacted who recommended continuing vancomycin upon discharge x30 days.  Patient will continue 125 mg of vancomycin 4 times daily x2 weeks and then 125 mg twice daily x2 weeks.  Leukocytosis improved with otherwise reassuring labs noted.  Patient will follow-up with Dr. Martínez infectious disease at [place] upon discharge.    Review of Systems:     All systems were reviewed and negative except as mentioned in subjective, assessment and plan.    Vital Signs:    Temp:  [97.6 °F (36.4 °C)-99.1 °F (37.3 °C)] 98.4 °F (36.9 °C)  Heart Rate:  [54-77] 72  Resp:  [16-20] 16  BP: (124-149)/(72-93) 149/91    Intake and output:    I/O last 3 completed shifts:  In: 1820 [P.O.:720; IV Piggyback:1100]  Out: -   I/O this shift:  In: 240 [P.O.:240]  Out: -     Physical Examination:    General Appearance:  Alert and cooperative.  Chronically ill/frail appearing 68-year-old female.   Head:  Atraumatic and normocephalic.   Eyes: Conjunctivae and sclerae normal, no icterus. No pallor.   Throat: No oral lesions, no thrush, oral mucosa moist.   Neck: Supple, trachea midline, no thyromegaly.   Lungs:   Breath sounds heard bilaterally equally.  No wheezing or crackles. No Pleural rub or bronchial breathing.  On room air unlabored.   Heart:  Normal S1 and S2, no murmur, no gallop, no rub. No JVD.   Abdomen:   Normal bowel sounds, no masses, no organomegaly. Soft, nontender, nondistended, no rebound tenderness.  Generalized tenderness throughout abdomen.   Extremities: Supple, trace bilateral lower extremity edema, no cyanosis, no clubbing.   Skin: No bleeding or rash.    Neurologic: Alert and oriented x 3. No facial asymmetry. Moves all four limbs. No tremors.  Generalized weakness.     Laboratory results:    Results from last 7 days   Lab Units 09/24/23  0554 09/23/23  0940 09/23/23  0653 09/22/23 1937   SODIUM mmol/L 143  --  146* 147*   POTASSIUM mmol/L 4.0 3.8 3.8 3.5   CHLORIDE mmol/L 112*  --  115* 113*   CO2 mmol/L 21.5*  --  23.7 21.2*   BUN mg/dL 17  --  16 19   CREATININE mg/dL 0.92  --  0.94 1.08*   CALCIUM mg/dL 9.0  --  8.7 9.2   BILIRUBIN mg/dL  --   --   --  <0.2   ALK PHOS U/L  --   --   --  124*   ALT (SGPT) U/L  --   --   --  9   AST (SGOT) U/L  --   --   --  14   GLUCOSE mg/dL 72  --  75 98     Results from last 7 days   Lab Units 09/24/23  0554 09/23/23  0653 09/22/23 1937   WBC 10*3/mm3 7.57 8.57 14.48*   HEMOGLOBIN g/dL 9.5* 9.5* 10.4*   HEMATOCRIT % 31.2* 30.4* 32.7*   PLATELETS 10*3/mm3 285 298 378         Results from last 7 days   Lab Units 09/22/23 1937   HSTROP T ng/L 15*     Results from last 7 days   Lab Units 09/22/23 2205 09/22/23 2200   BLOODCX  No growth at 24 hours No growth at 24 hours     No results for input(s): PHART, LMK2AZL, PO2ART, KMV9KFB, BASEEXCESS in the last 8760 hours.   I have reviewed the patient's laboratory results.    Radiology results:    CT Abdomen Pelvis With Contrast    Result Date: 9/22/2023  FINAL REPORT CLINICAL HISTORY: diffuse abd pain/tenderness, h/o cdiff FINDINGS: Mild subsegmental atelectasis.  No concerning findings in the lungs.  Small amount of pneumobilia noted in the liver.  Patient is status post cholecystectomy.  Spleen is unremarkable. Pancreas appears unremarkable.  Adrenal glands are unremarkable. Bilateral kidneys demonstrate numerous hypoattenuating lesions, with appearance most consistent with renal cysts.  There is a moderate size hiatal hernia containing a sizable portion of the stomach.  There is a long segment of the transverse and descending colon demonstrating marked mucosal thickening and  mucosal hyperemia.  There is adjacent stranding seen within the mesentery.  Findings as can be seen in infectious colitis, such as C. difficile.  Numerous regions of postsurgical changes within the colon are noted.  No findings to suggest obstruction. Diffuse anasarca within the body wall.  Urinary bladder is unremarkable.  Patient is status post hysterectomy.  Bones: No acute findings.  Chronic appearing compression fracture of L2. Advanced degenerative changes of the lumbar spine are noted.  No aggressive appearing lytic/blastic lesion is seen.     Impression: Marked mucosal thickening of a large segment of the transverse and descending colon with mucosal hyperemia and adjacent stranding within the mesentery.  Findings are most consistent with infectious colitis, such as C. difficile.  Diffuse anasarca, which may be reactive.  No definite evidence of obstruction. Authenticated and Electronically Signed by MD Hudson Richard on 09/22/2023 09:24:20 PM   I have reviewed the patient's radiology reports.    Medication Review:     I have reviewed the patient's active and prn medications.     Problem List:      C. difficile colitis    Generalized abdominal pain    Diarrhea of infectious origin    Abnormal finding on GI tract imaging    Anemia, chronic disease    Colitis due to Clostridium difficile      Assessment:    C. difficile colitis, POA  Generalized abdominal pain secondary to above, POA  Essential hypertension  Chronic anemia with likely underlying MDS    Plan:    -Initial C. difficile positive on 9/12 at [facility] for which patient does have follow-up with Dr. Martínez infectious disease.  -Patient will continue total 30 days of vancomycin with 125 mg 4 times daily x2 weeks and then 125 mg twice daily x2 weeks.  -Leukocytosis has improved.  -Otherwise reassuring labs and vitals noted.  -Encouraged to increase water intake and will discontinue IV fluids at this time.  -Per patient Dificid attempted with  last admission and insurance denied.  -Continue home medications as appropriate.    -Addendum consulted infectious disease [facility] Dr. Hugo who recommended fidaxomicin over oral vanc given patient already taken 9 days of vancomycin prior to this hospitalization.  Unfortunately Fidaxomicin not currently approved for patient use without corporate approval.  We will continue to monitor patient closely.  Patient continues to be afebrile with reassuring labs and vitals noted.  If worsening abdominal pain will consider repeat CT scan of abdomen pelvis at that time.    DVT Prophylaxis: Lovenox  Code Status: Full  Diet: As tolerated  Discharge Plan: Likely home tomorrow    Madai Eason, APRN  09/24/23  10:50 EDT    Dictated utilizing Dragon dictation.

## 2023-09-24 NOTE — PLAN OF CARE
Goal Outcome Evaluation:  Plan of Care Reviewed With: patient        Progress: improving  Outcome Evaluation: VSS. RA. PRN pain medication given. No acute overnight events. IV infusing per order. Continue plan of care

## 2023-09-24 NOTE — PLAN OF CARE
Problem: Adult Inpatient Plan of Care  Goal: Plan of Care Review  Outcome: Ongoing, Progressing  Flowsheets (Taken 9/24/2023 1713)  Progress: improving  Plan of Care Reviewed With: patient  Outcome Evaluation: VSS, RA. Isolation precautions maintained. Patient abdominal pain treated with PRN medications, provider made aware. Stools continue to be frequent this shift but have changed from watery to soft stools. NS infusion started per order. Antibiotics continued per order. Discharge pending.  Goal: Patient-Specific Goal (Individualized)  Outcome: Ongoing, Progressing  Goal: Absence of Hospital-Acquired Illness or Injury  Outcome: Ongoing, Progressing  Intervention: Identify and Manage Fall Risk  Recent Flowsheet Documentation  Taken 9/24/2023 1600 by Raquel Cristina RN  Safety Promotion/Fall Prevention: safety round/check completed  Taken 9/24/2023 1400 by Raquel Cristina RN  Safety Promotion/Fall Prevention: safety round/check completed  Taken 9/24/2023 1200 by Raquel Cristina RN  Safety Promotion/Fall Prevention: safety round/check completed  Taken 9/24/2023 1000 by Raquel Cristina RN  Safety Promotion/Fall Prevention: safety round/check completed  Taken 9/24/2023 0822 by Raquel Cristina RN  Safety Promotion/Fall Prevention: safety round/check completed  Intervention: Prevent Skin Injury  Recent Flowsheet Documentation  Taken 9/24/2023 1600 by Raquel Cristina RN  Body Position:   supine, legs elevated   weight shifting  Taken 9/24/2023 1400 by Raquel Cristina RN  Body Position:   supine, legs elevated   position changed independently   weight shifting  Taken 9/24/2023 1200 by Raquel Cristina RN  Body Position:   sitting up in bed   weight shifting  Taken 9/24/2023 1000 by Raquel Cristina RN  Body Position: sitting up in bed  Taken 9/24/2023 0822 by Raquel Cristina RN  Body Position:   supine   weight shifting  Intervention: Prevent and Manage VTE (Venous Thromboembolism)  Risk  Recent Flowsheet Documentation  Taken 9/24/2023 1600 by Raquel Cristina RN  Activity Management: activity encouraged  Taken 9/24/2023 1400 by Raquel Cristina RN  Activity Management: activity encouraged  Taken 9/24/2023 1200 by Raquel Cristina RN  Activity Management: activity encouraged  Taken 9/24/2023 1000 by Raquel Cristina RN  Activity Management: activity encouraged  Taken 9/24/2023 0822 by Raquel Cristina RN  Activity Management: activity encouraged  VTE Prevention/Management: sequential compression devices off  Range of Motion: active ROM (range of motion) encouraged  Intervention: Prevent Infection  Recent Flowsheet Documentation  Taken 9/24/2023 1600 by Raquel Cristina RN  Infection Prevention:   single patient room provided   rest/sleep promoted  Goal: Optimal Comfort and Wellbeing  Outcome: Ongoing, Progressing  Intervention: Monitor Pain and Promote Comfort  Recent Flowsheet Documentation  Taken 9/24/2023 0822 by Raquel Cristina RN  Pain Management Interventions: see MAR  Intervention: Provide Person-Centered Care  Recent Flowsheet Documentation  Taken 9/24/2023 0822 by Raquel Cristina RN  Trust Relationship/Rapport:   care explained   questions encouraged  Goal: Readiness for Transition of Care  Outcome: Ongoing, Progressing     Problem: Hypertension Comorbidity  Goal: Blood Pressure in Desired Range  Outcome: Ongoing, Progressing  Intervention: Maintain Blood Pressure Management  Recent Flowsheet Documentation  Taken 9/24/2023 1000 by Raquel Cristina RN  Medication Review/Management: medications reviewed  Taken 9/24/2023 0822 by Raquel Cristina RN  Medication Review/Management: medications reviewed     Problem: Fall Injury Risk  Goal: Absence of Fall and Fall-Related Injury  Outcome: Ongoing, Progressing  Intervention: Identify and Manage Contributors  Recent Flowsheet Documentation  Taken 9/24/2023 1000 by Raquel Cristina RN  Medication  Review/Management: medications reviewed  Taken 9/24/2023 0822 by Raquel Cristina RN  Medication Review/Management: medications reviewed  Intervention: Promote Injury-Free Environment  Recent Flowsheet Documentation  Taken 9/24/2023 1600 by Raquel Cristina RN  Safety Promotion/Fall Prevention: safety round/check completed  Taken 9/24/2023 1400 by Raquel Cristina RN  Safety Promotion/Fall Prevention: safety round/check completed  Taken 9/24/2023 1200 by Raquel Cristina RN  Safety Promotion/Fall Prevention: safety round/check completed  Taken 9/24/2023 1000 by Raquel Cristina RN  Safety Promotion/Fall Prevention: safety round/check completed  Taken 9/24/2023 0822 by Raquel Cristina RN  Safety Promotion/Fall Prevention: safety round/check completed   Goal Outcome Evaluation:  Plan of Care Reviewed With: patient        Progress: improving  Outcome Evaluation: VSS, RA. Isolation precautions maintained. Patient abdominal pain treated with PRN medications, provider made aware. Stools continue to be frequent this shift but have changed from watery to soft stools. NS infusion started per order. Antibiotics continued per order. Discharge pending.

## 2023-09-24 NOTE — THERAPY EVALUATION
Patient Name: Tasha Yarbrough  : 1955    MRN: [id number]                              Today's Date: 2023       Admit Date: 2023    Visit Dx:     ICD-10-CM ICD-9-CM   1. Recurrent Clostridioides difficile diarrhea  A04.71 008.45     Patient Active Problem List   Diagnosis    Colon cancer screening    Gastroesophageal reflux disease with esophagitis    Left lower quadrant abdominal pain    Irritable bowel syndrome with constipation    Encounter for screening for malignant neoplasm of colon    Periumbilical abdominal pain    Diarrhea of presumed infectious origin    Acute kidney injury    Bacterial colitis    VIVIENNE (acute kidney injury)    C. difficile colitis    Generalized abdominal pain    Diarrhea of infectious origin    Abnormal finding on GI tract imaging    Anemia, chronic disease    Colitis due to Clostridium difficile     Past Medical History:   Diagnosis Date    Hypertension     Impaired mobility     Injury of back      Past Surgical History:   Procedure Laterality Date    ABDOMINAL SURGERY      COLON SURGERY      COLONOSCOPY      TUBAL ABDOMINAL LIGATION        General Information       Row Name 23 1559          Physical Therapy Time and Intention    Document Type evaluation  -     Mode of Treatment physical therapy  -       Row Name 23 1557          General Information    Patient Profile Reviewed yes  -     Prior Level of Function --  Pt reports that she ambulated with a SPC and a RW, states her children assist with groceries, cooking and cleaning  -     Existing Precautions/Restrictions fall  -       Row Name 23 1553          Living Environment    People in Home child(debra), adult  -       Row Name 23 1559          Home Main Entrance    Number of Stairs, Main Entrance one  -       Row Name 23 1551          Stairs Within Home, Primary    Number of Stairs, Within Home, Primary none  -       Row Name 23 155          Cognition    Orientation  Status (Cognition) oriented x 4  -HW       Row Name 09/24/23 1559          Safety Issues, Functional Mobility    Safety Issues Affecting Function (Mobility) impulsivity  -HW     Impairments Affecting Function (Mobility) balance;coordination;endurance/activity tolerance;motor control;motor planning;pain;postural/trunk control;strength  -               User Key  (r) = Recorded By, (t) = Taken By, (c) = Cosigned By      Initials Name Provider Type     Rylee Mejia PT Physical Therapist                   Mobility       Row Name 09/24/23 1600          Bed Mobility    Bed Mobility bed mobility (all) activities  -     All Activities, Story (Bed Mobility) modified independence  -     Assistive Device (Bed Mobility) bed rails;head of bed elevated  -       Row Name 09/24/23 1600          Sit-Stand Transfer    Sit-Stand Story (Transfers) contact guard  -     Assistive Device (Sit-Stand Transfers) walker, front-wheeled  -       Row Name 09/24/23 1600          Gait/Stairs (Locomotion)    Story Level (Gait) contact guard  -     Assistive Device (Gait) walker, front-wheeled  -     Patient was able to Ambulate yes  -     Distance in Feet (Gait) 22  -HW     Deviations/Abnormal Patterns (Gait) bilateral deviations;gait speed decreased;stride length decreased  -HW     Bilateral Gait Deviations forward flexed posture  -     Story Level (Stairs) not tested  -               User Key  (r) = Recorded By, (t) = Taken By, (c) = Cosigned By      Initials Name Provider Type    Rylee Yeh PT Physical Therapist                   Obj/Interventions       Row Name 09/24/23 1602          Range of Motion Comprehensive    General Range of Motion bilateral lower extremity ROM WFL  -       Row Name 09/24/23 1602          Strength Comprehensive (MMT)    Comment, General Manual Muscle Testing (MMT) Assessment BLE MMT grossly 4/5  -       Row Name 09/24/23 1602          Balance    Balance  Assessment sitting static balance;sitting dynamic balance;sit to stand dynamic balance;standing static balance;standing dynamic balance  -     Static Sitting Balance independent  -HW     Dynamic Sitting Balance independent  -HW     Position, Sitting Balance unsupported;sitting edge of bed  -     Sit to Stand Dynamic Balance contact guard  -HW     Static Standing Balance contact guard  -HW     Dynamic Standing Balance contact guard  -HW     Position/Device Used, Standing Balance walker, front-wheeled  -       Row Name 09/24/23 1602          Sensory Assessment (Somatosensory)    Sensory Assessment (Somatosensory) not tested  -               User Key  (r) = Recorded By, (t) = Taken By, (c) = Cosigned By      Initials Name Provider Type    HW Rylee Mejia, PT Physical Therapist                   Goals/Plan       Row Name 09/24/23 1607          Transfer Goal 1 (PT)    Activity/Assistive Device (Transfer Goal 1, PT) transfers, all;walker, rolling  -HW     [place] Level/Cues Needed (Transfer Goal 1, PT) modified independence  -HW     Time Frame (Transfer Goal 1, PT) long term goal (LTG);10 days  -HW     Progress/Outcome (Transfer Goal 1, PT) new goal  -       Row Name 09/24/23 1607          Gait Training Goal 1 (PT)    Activity/Assistive Device (Gait Training Goal 1, PT) gait (walking locomotion);walker, rolling  -HW     [place] Level (Gait Training Goal 1, PT) modified independence  -HW     Distance (Gait Training Goal 1, PT) 50  -HW     Time Frame (Gait Training Goal 1, PT) long term goal (LTG);10 days  -HW     Progress/Outcome (Gait Training Goal 1, PT) new goal  -       Row Name 09/24/23 1607          Patient Education Goal (PT)    Activity (Patient Education Goal, PT) Pt will demonstrate the ability to perform 2x15 BLE ther-ex with mod (I) to allow for improved BLE strength and improved functional mobility  -HW     Time Frame (Patient Education Goal, PT) short term goal (STG);1 week  -HW      Progress/Outcome (Patient Education Goal, PT) new goal  -       Row Name 09/24/23 1607          Therapy Assessment/Plan (PT)    Planned Therapy Interventions (PT) balance training;gait training;home exercise program;bed mobility training;motor coordination training;neuromuscular re-education;patient/family education;postural re-education;ROM (range of motion);stair training;strengthening;stretching;transfer training  -               User Key  (r) = Recorded By, (t) = Taken By, (c) = Cosigned By      Initials Name Provider Type     Rylee Mejia, PT Physical Therapist                   Clinical Impression       Row Name 09/24/23 1602          Pain    Pretreatment Pain Rating 7/10  -     Posttreatment Pain Rating 7/10  -     Pain Location - abdomen  -     Pain Intervention(s) Ambulation/increased activity;Repositioned  -       Row Name 09/24/23 1602          Plan of Care Review    Plan of Care Reviewed With patient  -     Progress no change  -     Outcome Evaluation Pt participated in PT initial evaluation this date. Pt presents seated on [facility], pleasant and agreeable to PT initial evaluation. Pt reports 7/10 abdominal pain at rest. Pt reports that she lives at home with her 2 sons in a mobile home with 0 EULALIO. Pt reports that she typically ambulates with a SPC and sometimes a RW if she is feeling weak. Pt states that her sons perform most of the cooking, cleaning and driving. Pt performed sit to stand transfer from [facility] with CGA. Pt performed all bed mobility tasks with mod (I), requiring increased time and effort. Pt ambulated x22' within room with the use of a RW and CGA for safety. Following evaluation, pt left in fowlers position in bed with call light and all needs within reach. Skilled PT treatment indicated to allow for improved functional mobility, reduce risk of falls and prevent functional decline.  -       Row Name 09/24/23 160          Therapy Assessment/Plan (PT)    Patient/Family Therapy  Goals Statement (PT) Pt reports she would like to go home and get stronger.  -     Rehab Potential (PT) good, to achieve stated therapy goals  -     Criteria for Skilled Interventions Met (PT) yes;meets criteria;skilled treatment is necessary  -     Therapy Frequency (PT) 5 times/wk  -     Predicted Duration of Therapy Intervention (PT) 10 days  -       Row Name 09/24/23 1602          Vital Signs    Pre Systolic BP Rehab 149  -HW     Pre Treatment Diastolic BP 91  -HW     Pretreatment Heart Rate (beats/min) 83  -HW     Pre SpO2 (%) 92  -HW     O2 Delivery Pre Treatment room air  -HW     Pre Patient Position Sitting  -HW     Intra Patient Position Standing  -HW     Post Patient Position Supine  -       Row Name 09/24/23 1602          Positioning and Restraints    Pre-Treatment Position bedside commode  -HW     Post Treatment Position bed  -HW     In Bed call light within reach;encouraged to call for assist;fowlers  -               User Key  (r) = Recorded By, (t) = Taken By, (c) = Cosigned By      Initials Name Provider Type     Rylee Mejia, PT Physical Therapist                   Outcome Measures       Row Name 09/24/23 1608          How much help from another person do you currently need...    Turning from your back to your side while in flat bed without using bedrails? 4  -HW     Moving from lying on back to sitting on the side of a flat bed without bedrails? 4  -HW     Moving to and from a bed to a chair (including a wheelchair)? 3  -HW     Standing up from a chair using your arms (e.g., wheelchair, bedside chair)? 3  -HW     Climbing 3-5 steps with a railing? 3  -HW     To walk in hospital room? 3  -HW     AM-PAC 6 Clicks Score (PT) 20  -HW     Highest level of mobility 6 --> Walked 10 steps or more  -       Row Name 09/24/23 1608          Functional Assessment    Outcome Measure Options AM-PAC 6 Clicks Basic Mobility (PT)  -               User Key  (r) = Recorded By, (t) = Taken By, (c) =  Cosigned By      Initials Name Provider Type     Rylee Mejia, PT Physical Therapist                                 Physical Therapy Education       Title: PT OT SLP Therapies (In Progress)       Topic: Physical Therapy (In Progress)       Point: Mobility training (Done)       Learning Progress Summary             Patient Acceptance, E,TB, VU by  at 9/24/2023 4351    Comment: Educated pt on importance of continued functional mobility                         Point: Home exercise program (Not Started)       Learner Progress:  Not documented in this visit.              Point: Body mechanics (Not Started)       Learner Progress:  Not documented in this visit.              Point: Precautions (Not Started)       Learner Progress:  Not documented in this visit.                              User Key       Initials Effective Dates Name Provider Type Discipline     07/20/23 -  Rylee Mejia PT Physical Therapist PT                  PT Recommendation and Plan  Planned Therapy Interventions (PT): balance training, gait training, home exercise program, bed mobility training, motor coordination training, neuromuscular re-education, patient/family education, postural re-education, ROM (range of motion), stair training, strengthening, stretching, transfer training  Plan of Care Reviewed With: patient  Progress: no change  Outcome Evaluation: Pt participated in PT initial evaluation this date. Pt presents seated on [facility], pleasant and agreeable to PT initial evaluation. Pt reports 7/10 abdominal pain at rest. Pt reports that she lives at home with her 2 sons in a mobile home with 0 EULALIO. Pt reports that she typically ambulates with a SPC and sometimes a RW if she is feeling weak. Pt states that her sons perform most of the cooking, cleaning and driving. Pt performed sit to stand transfer from BS with CGA. Pt performed all bed mobility tasks with mod (I), requiring increased time and effort. Pt ambulated x22' within room with  the use of a RW and CGA for safety. Following evaluation, pt left in fowlers position in bed with call light and all needs within reach. Skilled PT treatment indicated to allow for improved functional mobility, reduce risk of falls and prevent functional decline.     Time Calculation:   PT Evaluation Complexity  History, PT Evaluation Complexity: 1-2 personal factors and/or comorbidities  Examination of Body Systems (PT Eval Complexity): total of 3 or more elements  Clinical Presentation (PT Evaluation Complexity): evolving  Clinical Decision Making (PT Evaluation Complexity): moderate complexity  Overall Complexity (PT Evaluation Complexity): moderate complexity     PT Charges       Row Name 09/24/23 1610             Time Calculation    Start Time 1512  -HW      PT Received On 09/24/23  -HW      PT Goal Re-Cert Due Date 10/04/23  -         Untimed Charges    PT Eval/Re-eval Minutes 39  -HW         Total Minutes    Untimed Charges Total Minutes 39  -HW       Total Minutes 39  -HW                User Key  (r) = Recorded By, (t) = Taken By, (c) = Cosigned By      Initials Name Provider Type     Rylee Mejia, MARCIAL Physical Therapist                  Therapy Charges for Today       Code Description Service Date Service Provider Modifiers Qty    [id number]  PT EVAL MOD COMPLEXITY 3 9/24/2023 Rylee Mejia, MARCIAL GP 1            PT G-Codes  Outcome Measure Options: AM-PAC 6 Clicks Basic Mobility (PT)  AM-PAC 6 Clicks Score (PT): 20  PT Discharge Summary  Anticipated Discharge Disposition (PT): home with assist, home with home health    Rylee Mejia PT  9/24/2023

## 2023-09-25 ENCOUNTER — HOME HEALTH ADMISSION (OUTPATIENT)
Dept: HOME HEALTH SERVICES | Facility: HOME HEALTHCARE | Age: 68
End: 2023-09-25
Payer: MEDICARE

## 2023-09-25 ENCOUNTER — DOCUMENTATION (OUTPATIENT)
Dept: HOME HEALTH SERVICES | Facility: HOME HEALTHCARE | Age: 68
End: 2023-09-25
Payer: MEDICARE

## 2023-09-25 VITALS
BODY MASS INDEX: 27.95 KG/M2 | TEMPERATURE: 98 F | RESPIRATION RATE: 16 BRPM | DIASTOLIC BLOOD PRESSURE: 82 MMHG | HEIGHT: 62 IN | OXYGEN SATURATION: 95 % | SYSTOLIC BLOOD PRESSURE: 142 MMHG | HEART RATE: 56 BPM | WEIGHT: 151.9 LBS

## 2023-09-25 LAB
ALBUMIN SERPL-MCNC: 2.9 G/DL (ref 3.5–5.2)
ANION GAP SERPL CALCULATED.3IONS-SCNC: 10.1 MMOL/L (ref 5–15)
BASOPHILS # BLD AUTO: 0.04 10*3/MM3 (ref 0–0.2)
BASOPHILS NFR BLD AUTO: 0.6 % (ref 0–1.5)
BUN SERPL-MCNC: 21 MG/DL (ref 8–23)
BUN/CREAT SERPL: 23.9 (ref 7–25)
CALCIUM SPEC-SCNC: 8.7 MG/DL (ref 8.6–10.5)
CHLORIDE SERPL-SCNC: 110 MMOL/L (ref 98–107)
CO2 SERPL-SCNC: 19.9 MMOL/L (ref 22–29)
CREAT SERPL-MCNC: 0.88 MG/DL (ref 0.57–1)
DEPRECATED RDW RBC AUTO: 56.6 FL (ref 37–54)
EGFRCR SERPLBLD CKD-EPI 2021: 71.7 ML/MIN/1.73
EOSINOPHIL # BLD AUTO: 0.07 10*3/MM3 (ref 0–0.4)
EOSINOPHIL NFR BLD AUTO: 1 % (ref 0.3–6.2)
ERYTHROCYTE [DISTWIDTH] IN BLOOD BY AUTOMATED COUNT: 13.5 % (ref 12.3–15.4)
GLUCOSE SERPL-MCNC: 75 MG/DL (ref 65–99)
HCT VFR BLD AUTO: 29.1 % (ref 34–46.6)
HGB BLD-MCNC: 9 G/DL (ref 12–15.9)
IMM GRANULOCYTES # BLD AUTO: 0.06 10*3/MM3 (ref 0–0.05)
IMM GRANULOCYTES NFR BLD AUTO: 0.9 % (ref 0–0.5)
LYMPHOCYTES # BLD AUTO: 1.03 10*3/MM3 (ref 0.7–3.1)
LYMPHOCYTES NFR BLD AUTO: 14.8 % (ref 19.6–45.3)
MACROCYTES BLD QL SMEAR: NORMAL
MCH RBC QN AUTO: 35.7 PG (ref 26.6–33)
MCHC RBC AUTO-ENTMCNC: 30.9 G/DL (ref 31.5–35.7)
MCV RBC AUTO: 115.5 FL (ref 79–97)
MONOCYTES # BLD AUTO: 0.45 10*3/MM3 (ref 0.1–0.9)
MONOCYTES NFR BLD AUTO: 6.5 % (ref 5–12)
NEUTROPHILS NFR BLD AUTO: 5.32 10*3/MM3 (ref 1.7–7)
NEUTROPHILS NFR BLD AUTO: 76.2 % (ref 42.7–76)
NRBC BLD AUTO-RTO: 0 /100 WBC (ref 0–0.2)
PHOSPHATE SERPL-MCNC: 3.8 MG/DL (ref 2.5–4.5)
PLATELET # BLD AUTO: 235 10*3/MM3 (ref 140–450)
PMV BLD AUTO: 10.5 FL (ref 6–12)
POTASSIUM SERPL-SCNC: 4.2 MMOL/L (ref 3.5–5.2)
RBC # BLD AUTO: 2.52 10*6/MM3 (ref 3.77–5.28)
SMALL PLATELETS BLD QL SMEAR: ADEQUATE
SODIUM SERPL-SCNC: 140 MMOL/L (ref 136–145)
WBC MORPH BLD: NORMAL
WBC NRBC COR # BLD: 6.97 10*3/MM3 (ref 3.4–10.8)

## 2023-09-25 PROCEDURE — 80069 RENAL FUNCTION PANEL: CPT | Performed by: NURSE PRACTITIONER

## 2023-09-25 PROCEDURE — 96372 THER/PROPH/DIAG INJ SC/IM: CPT

## 2023-09-25 PROCEDURE — 85007 BL SMEAR W/DIFF WBC COUNT: CPT | Performed by: NURSE PRACTITIONER

## 2023-09-25 PROCEDURE — 97110 THERAPEUTIC EXERCISES: CPT

## 2023-09-25 PROCEDURE — 25010000002 ENOXAPARIN PER 10 MG: Performed by: STUDENT IN AN ORGANIZED HEALTH CARE EDUCATION/TRAINING PROGRAM

## 2023-09-25 PROCEDURE — 25010000002 MORPHINE PER 10 MG: Performed by: STUDENT IN AN ORGANIZED HEALTH CARE EDUCATION/TRAINING PROGRAM

## 2023-09-25 PROCEDURE — 96376 TX/PRO/DX INJ SAME DRUG ADON: CPT

## 2023-09-25 PROCEDURE — G0378 HOSPITAL OBSERVATION PER HR: HCPCS

## 2023-09-25 PROCEDURE — 85025 COMPLETE CBC W/AUTO DIFF WBC: CPT | Performed by: NURSE PRACTITIONER

## 2023-09-25 PROCEDURE — 97116 GAIT TRAINING THERAPY: CPT

## 2023-09-25 RX ORDER — VANCOMYCIN HYDROCHLORIDE 125 MG/1
125 CAPSULE ORAL 2 TIMES DAILY
Qty: 28 CAPSULE | Refills: 0 | Status: SHIPPED | OUTPATIENT
Start: 2023-09-25 | End: 2023-10-09

## 2023-09-25 RX ORDER — FUROSEMIDE 20 MG/1
20 TABLET ORAL 4 TIMES DAILY
Status: ON HOLD | COMMUNITY
End: 2023-09-25 | Stop reason: SDUPTHER

## 2023-09-25 RX ORDER — SACCHAROMYCES BOULARDII 250 MG
250 CAPSULE ORAL 2 TIMES DAILY
COMMUNITY

## 2023-09-25 RX ORDER — SACCHAROMYCES BOULARDII 250 MG
250 CAPSULE ORAL 2 TIMES DAILY
Qty: 60 CAPSULE | Refills: 0 | Status: SHIPPED | OUTPATIENT
Start: 2023-09-25

## 2023-09-25 RX ORDER — VANCOMYCIN HYDROCHLORIDE 125 MG/1
125 CAPSULE ORAL 4 TIMES DAILY
COMMUNITY
End: 2023-09-25 | Stop reason: HOSPADM

## 2023-09-25 RX ORDER — VANCOMYCIN HYDROCHLORIDE 125 MG/1
125 CAPSULE ORAL EVERY 6 HOURS SCHEDULED
Qty: 34 CAPSULE | Refills: 0 | Status: SHIPPED | OUTPATIENT
Start: 2023-09-25 | End: 2023-10-11

## 2023-09-25 RX ORDER — FUROSEMIDE 20 MG/1
20 TABLET ORAL 4 TIMES DAILY
Start: 2023-09-25

## 2023-09-25 RX ADMIN — HYDROCODONE BITARTRATE AND ACETAMINOPHEN 1 TABLET: 7.5; 325 TABLET ORAL at 04:09

## 2023-09-25 RX ADMIN — MORPHINE SULFATE 2 MG: 2 INJECTION, SOLUTION INTRAMUSCULAR; INTRAVENOUS at 05:48

## 2023-09-25 RX ADMIN — VANCOMYCIN HYDROCHLORIDE 125 MG: 125 CAPSULE ORAL at 12:44

## 2023-09-25 RX ADMIN — CITALOPRAM HYDROBROMIDE 40 MG: 20 TABLET ORAL at 09:40

## 2023-09-25 RX ADMIN — SODIUM BICARBONATE 650 MG TABLET 650 MG: at 09:40

## 2023-09-25 RX ADMIN — MORPHINE SULFATE 2 MG: 2 INJECTION, SOLUTION INTRAMUSCULAR; INTRAVENOUS at 01:20

## 2023-09-25 RX ADMIN — VANCOMYCIN HYDROCHLORIDE 125 MG: 125 CAPSULE ORAL at 05:48

## 2023-09-25 RX ADMIN — Medication 10 ML: at 09:40

## 2023-09-25 RX ADMIN — HYDROCODONE BITARTRATE AND ACETAMINOPHEN 1 TABLET: 7.5; 325 TABLET ORAL at 10:51

## 2023-09-25 RX ADMIN — ENOXAPARIN SODIUM 40 MG: 100 INJECTION SUBCUTANEOUS at 09:40

## 2023-09-25 RX ADMIN — VANCOMYCIN HYDROCHLORIDE 125 MG: 125 CAPSULE ORAL at 01:20

## 2023-09-25 RX ADMIN — VANCOMYCIN HYDROCHLORIDE 125 MG: 125 CAPSULE ORAL at 18:18

## 2023-09-25 RX ADMIN — METOPROLOL TARTRATE 50 MG: 50 TABLET, FILM COATED ORAL at 09:40

## 2023-09-25 NOTE — THERAPY TREATMENT NOTE
Patient Name: Tasha Yarbrough  : 1955    MRN: [id number]                              Today's Date: 2023       Admit Date: 2023    Visit Dx:     ICD-10-CM ICD-9-CM   1. Recurrent Clostridioides difficile diarrhea  A04.71 008.45   2. Debility  R53.81 799.3   3. Colitis due to Clostridium difficile  A04.72 008.45     Patient Active Problem List   Diagnosis    Colon cancer screening    Gastroesophageal reflux disease with esophagitis    Left lower quadrant abdominal pain    Irritable bowel syndrome with constipation    Encounter for screening for malignant neoplasm of colon    Periumbilical abdominal pain    Diarrhea of presumed infectious origin    Acute kidney injury    Bacterial colitis    VIVIENNE (acute kidney injury)    C. difficile colitis    Generalized abdominal pain    Diarrhea of infectious origin    Abnormal finding on GI tract imaging    Anemia, chronic disease    Colitis due to Clostridium difficile     Past Medical History:   Diagnosis Date    Hypertension     Impaired mobility     Injury of back      Past Surgical History:   Procedure Laterality Date    ABDOMINAL SURGERY      COLON SURGERY      COLONOSCOPY      TUBAL ABDOMINAL LIGATION        General Information       Row Name 23 1428          Physical Therapy Time and Intention    Document Type therapy note (daily note)  -JR     Mode of Treatment physical therapy  -       Row Name 23 1428          General Information    Patient Profile Reviewed yes  -               User Key  (r) = Recorded By, (t) = Taken By, (c) = Cosigned By      Initials Name Provider Type    JR Gaye Falcon PT Physical Therapist                   Mobility       Row Name 23 1428          Bed Mobility    Bed Mobility bed mobility (all) activities  -JR     All Activities, [place] (Bed Mobility) modified independence  -JR     Assistive Device (Bed Mobility) bed rails;head of bed elevated  -       Row Name 23 1428          Sit-Stand Transfer     Sit-Stand [place] (Transfers) supervision  -     Assistive Device (Sit-Stand Transfers) cane, straight  -JR       Row Name 09/25/23 1428          Gait/Stairs (Locomotion)    [place] Level (Gait) supervision;contact guard  -JR     Assistive Device (Gait) cane, straight  -JR     Distance in Feet (Gait) 55 x 2  -JR     Deviations/Abnormal Patterns (Gait) bilateral deviations;gait speed decreased;stride length decreased  -               User Key  (r) = Recorded By, (t) = Taken By, (c) = Cosigned By      Initials Name Provider Type    Gaye Stokes PT Physical Therapist                   Obj/Interventions       Row Name 09/25/23 1428          Motor Skills    Therapeutic Exercise hip;knee;ankle  -       Row Name 09/25/23 1428          Hip (Therapeutic Exercise)    Hip (Therapeutic Exercise) strengthening exercise  -     Hip Strengthening (Therapeutic Exercise) bilateral;marching while standing;10 repetitions  -       Row Name 09/25/23 1428          Knee (Therapeutic Exercise)    Knee (Therapeutic Exercise) strengthening exercise  -     Knee Strengthening (Therapeutic Exercise) bilateral;LAQ (long arc quad);10 repetitions  -       Row Name 09/25/23 1428          Ankle (Therapeutic Exercise)    Ankle (Therapeutic Exercise) strengthening exercise  -     Ankle Strengthening (Therapeutic Exercise) bilateral;dorsiflexion;plantarflexion  -       Row Name 09/25/23 1428          Balance    Sit to Stand Dynamic Balance supervision;contact guard  -JR     Static Standing Balance supervision  -JR     Dynamic Standing Balance supervision  -JR     Position/Device Used, Standing Balance cane, straight  -JR               User Key  (r) = Recorded By, (t) = Taken By, (c) = Cosigned By      Initials Name Provider Type    Gaye Stokes PT Physical Therapist                   Goals/Plan    No documentation.                  Clinical Impression       Row Name 09/25/23 1428          Pain    Pretreatment Pain  Rating 0/10 - no pain  -JR     Posttreatment Pain Rating 0/10 - no pain  -JR       Row Name 09/25/23 1428          Plan of Care Review    Plan of Care Reviewed With patient  -JR     Progress improving  -JR     Outcome Evaluation Patient demonstrates improving strength and balance as seen by increased gait distance and decreased assistance needed.  She is able to perform bed mobility independently.  She requires no more than CGA for transfers and to walk 55 feet x 2 with standing rest, using straight cane.  She is able to perform exercises and is expected to continue therapy with home health care.  She is expected to discharge home today.  -JR       Row Name 09/25/23 1428          Positioning and Restraints    Pre-Treatment Position in bed  -JR     Post Treatment Position bed  -JR     In Bed sitting EOB;call light within reach;encouraged to call for assist;notified nsg  -JR               User Key  (r) = Recorded By, (t) = Taken By, (c) = Cosigned By      Initials Name Provider Type    Gaye Stokes, PT Physical Therapist                   Outcome Measures       Row Name 09/25/23 1428 09/25/23 0820       How much help from another person do you currently need...    Turning from your back to your side while in flat bed without using bedrails? 4  -JR 4  -MD    Moving from lying on back to sitting on the side of a flat bed without bedrails? 4  -JR 4  -MD    Moving to and from a bed to a chair (including a wheelchair)? 4  -JR 3  -MD    Standing up from a chair using your arms (e.g., wheelchair, bedside chair)? 3  -JR 3  -MD    Climbing 3-5 steps with a railing? 3  -JR 3  -MD    To walk in hospital room? 3  -JR 3  -MD    AM-PAC 6 Clicks Score (PT) 21  -JR 20  -MD    Highest level of mobility 6 --> Walked 10 steps or more  -JR 6 --> Walked 10 steps or more  -MD      Row Name 09/25/23 1428          Functional Assessment    Outcome Measure Options AM-PAC 6 Clicks Basic Mobility (PT)  -               User Key  (r) =  Recorded By, (t) = Taken By, (c) = Cosigned By      Initials Name Provider Type    JR Gaye Falcon, PT Physical Therapist    MD Herrera, Rj [place], RN Registered Nurse                                 Physical Therapy Education       Title: PT OT SLP Therapies (Resolved)       Topic: Physical Therapy (Resolved)       Point: Mobility training (Resolved)       Learning Progress Summary             Patient Acceptance, E,TB, VU by  at 9/24/2023 7521    Comment: Educated pt on importance of continued functional mobility                         Point: Home exercise program (Resolved)       Learner Progress:  Not documented in this visit.              Point: Body mechanics (Resolved)       Learner Progress:  Not documented in this visit.              Point: Precautions (Resolved)       Learner Progress:  Not documented in this visit.                              User Key       Initials Effective Dates Name Provider Type Discipline     07/20/23 -  Rylee Mejia, MARCIAL Physical Therapist PT                  PT Recommendation and Plan     Plan of Care Reviewed With: patient  Progress: improving  Outcome Evaluation: Patient demonstrates improving strength and balance as seen by increased gait distance and decreased assistance needed.  She is able to perform bed mobility independently.  She requires no more than CGA for transfers and to walk 55 feet x 2 with standing rest, using straight cane.  She is able to perform exercises and is expected to continue therapy with home health care.  She is expected to discharge home today.     Time Calculation:         PT Charges       Row Name 09/25/23 1428             Time Calculation    Start Time 1428  -      Stop Time 1454  -      Time Calculation (min) 26 min  -      PT Received On 09/25/23  -      PT Goal Re-Cert Due Date 10/04/23  -         Time Calculation- PT    Total Timed Code Minutes- PT 26 minute(s)  -         Timed Charges    55949 - PT Therapeutic Exercise  Minutes 10  -JR      97843 - Gait Training Minutes  16  -JR         Total Minutes    Timed Charges Total Minutes 26  -JR       Total Minutes 26  -JR                User Key  (r) = Recorded By, (t) = Taken By, (c) = Cosigned By      Initials Name Provider Type    Gaye Stokes, PT Physical Therapist                  Therapy Charges for Today       Code Description Service Date Service Provider Modifiers Qty    05625019298 HC PT THER PROC EA 15 MIN 9/25/2023 Gaye Falcon, PT GP 1    75253314194 HC GAIT TRAINING EA 15 MIN 9/25/2023 Gaye Falcon, PT GP 1            PT G-Codes  Outcome Measure Options: AM-PAC 6 Clicks Basic Mobility (PT)  AM-PAC 6 Clicks Score (PT): 21       Gaye Falcon, PT  9/25/2023

## 2023-09-25 NOTE — CASE MANAGEMENT/SOCIAL WORK
Case Management Discharge Note           Provided Post Acute Provider List?: N/A  Provided Post Acute Provider Quality & Resource List?: N/A    Selected Continued Care - Admitted Since 9/22/2023       Destination    No services have been selected for the patient.                Durable Medical Equipment    No services have been selected for the patient.                Dialysis/Infusion    No services have been selected for the patient.                Home Medical Care    No services have been selected for the patient.                Therapy    No services have been selected for the patient.                Community Resources    No services have been selected for the patient.                Community & DME    No services have been selected for the patient.                    Transportation Services  Private: Car    Final Discharge Disposition Code: 01 - home or self-care

## 2023-09-25 NOTE — DISCHARGE INSTRUCTIONS
Patient to be discharged home.  Continue take vancomycin as ordered.  -Continue probiotic as well.  Follow with infectious disease as scheduled.  Follow with PCP in 3 to 5 days.  Monitor closely for worsening abdominal pain/fever/decreased p.o. and return to emergency department for further concerns.

## 2023-09-25 NOTE — PLAN OF CARE
Goal Outcome Evaluation:  Plan of Care Reviewed With: patient        Progress: improving  Outcome Evaluation: VSS. RA. PRN pain medication given as ordered. no acute overnight events. continue plan of care

## 2023-09-25 NOTE — PLAN OF CARE
Goal Outcome Evaluation:  Plan of Care Reviewed With: patient        Progress: improving  Outcome Evaluation: Patient demonstrates improving strength and balance as seen by increased gait distance and decreased assistance needed.  She is able to perform bed mobility independently.  She requires no more than CGA for transfers and to walk 55 feet x 2 with standing rest, using straight cane.  She is able to perform exercises and is expected to continue therapy with home health care.  She is expected to discharge home today.

## 2023-09-25 NOTE — CASE MANAGEMENT/SOCIAL WORK
Case Management Discharge Note      Final Note: Pt discharged with [facility]    Provided Post Acute Provider List?: N/A  Provided Post Acute Provider Quality & Resource List?: N/A    Selected Continued Care - Admitted Since 9/22/2023       Destination    No services have been selected for the patient.                Durable Medical Equipment    No services have been selected for the patient.                Dialysis/Infusion    No services have been selected for the patient.                Home Medical Care Coordination complete.      Service Provider Selected Services Address Phone Fax Patient Preferred    [facility] [address] [address] [phone] --              Therapy    No services have been selected for the patient.                Community Resources    No services have been selected for the patient.                Community & DME    No services have been selected for the patient.                    Transportation Services  Private: Car    Final Discharge Disposition Code: 06 - home with home health care

## 2023-09-25 NOTE — DISCHARGE SUMMARY
[facility]   DISCHARGE SUMMARY      Name:  Tasha Yarbrough   Age:  68 y.o.  Sex:  female  :  1955  MRN:  [id number]   Visit Number:  [id number]    Admission Date:  2023  Date of Discharge:  2023  Primary Care Physician:  Terrence Baldwin MD    Important issues to note:    -Patient admitted due to generalized weakness with prior C. difficile on vancomycin and continued generalized abdominal pain.  -Patient slightly dehydrated upon admission with improvement noted with IV fluids and supportive care.  -Infectious disease and GI consulted who both suggested fidaxomicin given worsening symptoms and readmission for possible recurrence of C. difficile.  -Unfortunately cost restraints with insurance denial noted with Fidaxomicin with co-pay 1600.00.  Discussed with patient as we could continue fidaxomicin inpatient x10 days.  Given overall improvement patient is requesting to be discharged home as she feels much better.  Patient was continued on vancomycin p.o. during hospitalization with frequent soft bowel movements noted and generalized abdominal pain.  Started on probiotic.  -Otherwise reassuring labs and vitals noted.  -Strict return precautions given.    -Patient to follow with infectious disease in 1 week.  -To follow with PCP in 3 days.  -Increase water intake.  - Home health ordered upon discharge.    Discharge Diagnoses:     Clostridium difficile colitis, POA  Generalized abdominal pain secondary to above, POA  Essential hypertension  Chronic anemia with likely underlying MDS    Problem List:     Active Hospital Problems    Diagnosis  POA    **C. difficile colitis [A04.72]  Yes    Generalized abdominal pain [R10.84]  Yes    Diarrhea of infectious origin [A09]  Yes    Abnormal finding on GI tract imaging [R93.3]  Yes    Anemia, chronic disease [D63.8]  Yes    Colitis due to Clostridium difficile [A04.72]  Yes      Resolved Hospital Problems   No resolved problems to  display.     Presenting Problem:    Chief Complaint   Patient presents with    Abdominal Pain      Consults:     Consulting Physician(s)       Provider Relationship Specialty    Jairo Negro MD Consulting Physician Gastroenterology          Procedures Performed:        History of presenting illness/Hospital Course:    Ms. Yarbrough is a pleasant 68-year-old female with past medical history of tobacco use disorder, macrocytic anemia concerning for possible MDS, anxiety/depression chronic, chronic low back pain, and hypertension with recent hospitalization with transfer for concern for choledocholithiasis status post EGD/ERCP with torturous esophagus/hiatal hernia/retained bile/gastric erythema with no overt stone and negative cholangiogram.  Upon discharge from [facility] she was noted to have Clostridium difficile and was initiated on p.o. vancomycin to follow with Dr. Martínez infectious disease.  Initially patient diarrhea had improved but then worsened with increased frequency and abdominal pain.  Upon ED presentation patient afebrile and hemodynamically stable on room air.  Pertinent labs and imaging noted sodium 147, chloride 113, CO2 21, lipase 95, procalcitonin mildly elevated at 0.43 with a WBC of 14, hemoglobin 10.  CT abdomen pelvis noted mucosal thickening of the large segment of the transverse and descending colon with mucosal hyperemia and adjacent stranding within the mesentery findings most consistent with infectious colitis such as C. difficile with diffuse anasarca which may be reactive without evidence of obstruction.  Blood cultures were collected.  Patient was given fentanyl, vancomycin, Flagyl, Zofran, and 1 L saline bolus in the emergency department.  GI was consulted and recommended to continue Dificid given outpatient failure with p.o. vancomycin.  Unfortunately patient had difficulty getting Dificid authorized with insurance denying. [place] infectious disease Dr. Mode Powell contacted who  recommended continuing vancomycin upon discharge x30 days.  Patient will continue 125 mg of vancomycin 4 times daily x2 weeks and then 125 mg twice daily x2 weeks.  During admission patient was noted to have frequent soft bowel movements and generalized abdominal pain.  Infectious disease at  contacted who also recommended fidaxomicin given possible recurrence of C. difficile.  Attempted to re-order Fidaxomicin. However, cost restraints upon discharge noted with co-pay 1600.00.  Vancomycin continued.  Leukocytosis improved with otherwise reassuring labs noted.  Abdominal pain improved with patient requesting to go home and good appetite.  Again, reassuring labs and vitals noted.  Patient was initiated on probiotic.  Patient will follow-up with Dr. Martínez infectious disease at [facility] upon discharge.  Strict return precautions given.  Patient agreeable to current plan of care with all questions answered.    Vital Signs:    Temp:  [97.7 °F (36.5 °C)-98.9 °F (37.2 °C)] 98 °F (36.7 °C)  Heart Rate:  [56-68] 56  Resp:  [16-18] 16  BP: (139-162)/(81-89) 142/82    Physical Exam:    General Appearance:  Alert and cooperative.  Chronically ill/frail appearing middle-aged female.   Head:  Atraumatic and normocephalic.   Eyes: Conjunctivae and sclerae normal, no icterus. No pallor.   Ears:  Ears with no abnormalities noted.   Throat: No oral lesions, no thrush, oral mucosa moist.   Neck: Supple, trachea midline, no thyromegaly.   Back:   No kyphoscoliosis present. No tenderness to palpation.   Lungs:   Breath sounds heard bilaterally equally.  No crackles or wheezing. No Pleural rub or bronchial breathing.  Air unlabored.   Heart:  Normal S1 and S2, no murmur, no gallop, no rub. No JVD.   Abdomen:   Normal bowel sounds, no masses, no organomegaly. Soft, nontender, nondistended, no rebound tenderness.  Generalized tenderness throughout.   Extremities: Supple, no edema, no cyanosis, no clubbing.   Pulses: Pulses palpable  bilaterally.   Skin: No bleeding or rash.   Neurologic: Alert and oriented x 3. No facial asymmetry. Moves all four limbs. No tremors.  Generalized weakness.     Pertinent Lab Results:     Results from last 7 days   Lab Units 09/25/23  0530 09/24/23  0554 09/23/23  0940 09/23/23  0653 09/22/23 1937   SODIUM mmol/L 140 143  --  146* 147*   POTASSIUM mmol/L 4.2 4.0 3.8 3.8 3.5   CHLORIDE mmol/L 110* 112*  --  115* 113*   CO2 mmol/L 19.9* 21.5*  --  23.7 21.2*   BUN mg/dL 21 17  --  16 19   CREATININE mg/dL 0.88 0.92  --  0.94 1.08*   CALCIUM mg/dL 8.7 9.0  --  8.7 9.2   BILIRUBIN mg/dL  --   --   --   --  <0.2   ALK PHOS U/L  --   --   --   --  124*   ALT (SGPT) U/L  --   --   --   --  9   AST (SGOT) U/L  --   --   --   --  14   GLUCOSE mg/dL 75 72  --  75 98     Results from last 7 days   Lab Units 09/25/23  0530 09/24/23  0554 09/23/23 0653   WBC 10*3/mm3 6.97 7.57 8.57   HEMOGLOBIN g/dL 9.0* 9.5* 9.5*   HEMATOCRIT % 29.1* 31.2* 30.4*   PLATELETS 10*3/mm3 235 285 298         Results from last 7 days   Lab Units 09/22/23 1937   HSTROP T ng/L 15*             Results from last 7 days   Lab Units 09/24/23  0554   LIPASE U/L 77*         Results from last 7 days   Lab Units 09/22/23 2205 09/22/23 2200   BLOODCX  No growth at 2 days No growth at 2 days       Pertinent Radiology Results:    Imaging Results (All)       Procedure Component Value Units Date/Time    CT Abdomen Pelvis With Contrast [[id number]] Collected: 09/22/23 2124     Updated: 09/22/23 2125    Narrative:      FINAL REPORT    CLINICAL HISTORY:  diffuse abd pain/tenderness, h/o cdiff    FINDINGS:  Mild subsegmental atelectasis.  No concerning findings in the  lungs.  Small amount of pneumobilia noted in the liver.  Patient  is status post cholecystectomy.  Spleen is unremarkable.  Pancreas appears unremarkable.  Adrenal glands are unremarkable.  Bilateral kidneys demonstrate numerous hypoattenuating lesions,  with appearance most consistent with renal  cysts.  There is a  moderate size hiatal hernia containing a sizable portion of the  stomach.  There is a long segment of the transverse and  descending colon demonstrating marked mucosal thickening and  mucosal hyperemia.  There is adjacent stranding seen within the  mesentery.  Findings as can be seen in infectious colitis, such  as C. difficile.  Numerous regions of postsurgical changes  within the colon are noted.  No findings to suggest obstruction.  Diffuse anasarca within the body wall.  Urinary bladder is  unremarkable.  Patient is status post hysterectomy.  Bones: No  acute findings.  Chronic appearing compression fracture of L2.  Advanced degenerative changes of the lumbar spine are noted.  No  aggressive appearing lytic/blastic lesion is seen.      Impression:      Marked mucosal thickening of a large segment of the transverse  and descending colon with mucosal hyperemia and adjacent  stranding within the mesentery.  Findings are most consistent  with infectious colitis, such as C. difficile.  Diffuse  anasarca, which may be reactive.  No definite evidence of  obstruction.    Authenticated and Electronically Signed by MD Hudson Richard on 09/22/2023 09:24:20 PM            Echo:      Condition on Discharge:      Stable.    Code status during the hospital stay:    Code Status and Medical Interventions:   Ordered at: 09/23/23 1821     Code Status (Patient has no pulse and is not breathing):    CPR (Attempt to Resuscitate)     Medical Interventions (Patient has pulse or is breathing):    Full Support     Discharge Disposition:    Home or Self Care    Discharge Medications:       Discharge Medications        New Medications        Instructions Start Date   saccharomyces boulardii 250 MG capsule  Commonly known as: Florastor   250 mg, Oral, 2 Times Daily      vancomycin 125 MG capsule  Commonly known as: VANCOCIN   Take 1 capsule by mouth Every 6 (Six) Hours for 6 doses.      vancomycin 125 MG  capsule  Commonly known as: Vancocin   Take 1 capsule by mouth 2 (Two) Times a Day for 14 days. Continue once 4 times daily prescription is completed             Continue These Medications        Instructions Start Date   citalopram 40 MG tablet  Commonly known as: CeleXA   40 mg, Oral, Daily      famotidine 20 MG tablet  Commonly known as: PEPCID   40 mg, Oral, 3 Times Daily      glucosamine-chondroitin 500-400 MG capsule capsule   1 capsule, Oral, 2 Times Daily With Meals      HYDROcodone-acetaminophen 7.5-325 MG per tablet  Commonly known as: NORCO   1 tablet, Oral, Every 8 Hours PRN      methocarbamol 750 MG tablet  Commonly known as: ROBAXIN   750-1,500 mg, Oral, 3 Times Daily PRN, Take one to two tablets three times daily as needed      metoprolol tartrate 50 MG tablet  Commonly known as: LOPRESSOR   50 mg, Oral, Daily      ondansetron 8 MG tablet  Commonly known as: ZOFRAN   8 mg, Oral, Every 8 Hours PRN      sodium bicarbonate 650 MG tablet   650 mg, Oral, 2 Times Daily      vitamin D3 125 MCG (5000 UT) capsule capsule   5,000 Units, Oral, Daily             Stop These Medications      colestipol 1 g tablet  Commonly known as: COLESTID            Discharge Diet:     Diet Instructions       Diet: Cardiac Diets; Healthy Heart (2-3 Na+); Regular Texture (IDDSI 7); Thin (IDDSI 0)      Discharge Diet: Cardiac Diets    Cardiac Diet: Healthy Heart (2-3 Na+)    Texture: Regular Texture (IDDSI 7)    Fluid Consistency: Thin (IDDSI 0)          Activity at Discharge:     Activity Instructions       Activity as Tolerated            Follow-up Appointments:     Follow-up Information       Terrence Baldwin MD Follow up.    Specialty: Family Medicine  Contact information:  [address]  [facility] [address] [phone]               Taj Martínez MD Follow up in 1 week(s).    Specialty: Infectious Diseases  Contact information:  [address]  [facility] [id number] [phone]                            No future appointments.  Test Results Pending at Discharge:    Pending Labs       Order Current Status    Blood Culture With ORION - Blood, Arm, Left Preliminary result    Blood Culture With ORION - Blood, Hand, Left Preliminary result               Madai Eason, APRN  09/25/23  12:28 EDT    Time: I spent 48 minutes on this discharge activity which included: face-to-face encounter with the patient, reviewing the data in the system, coordination of the care with the nursing staff as well as consultants, documentation, and entering orders.     Dictated utilizing Dragon dictation.

## 2023-09-26 ENCOUNTER — HOME CARE VISIT (OUTPATIENT)
Dept: HOME HEALTH SERVICES | Facility: HOME HEALTHCARE | Age: 68
End: 2023-09-26
Payer: MEDICARE

## 2023-09-26 VITALS
HEART RATE: 75 BPM | OXYGEN SATURATION: 97 % | TEMPERATURE: 98.6 F | SYSTOLIC BLOOD PRESSURE: 132 MMHG | DIASTOLIC BLOOD PRESSURE: 80 MMHG | RESPIRATION RATE: 16 BRPM

## 2023-09-26 PROCEDURE — G0299 HHS/HOSPICE OF RN EA 15 MIN: HCPCS

## 2023-09-26 NOTE — HOME HEALTH
"SOC Note:    Home Health ordered for: disciplines SN/PT/OT    Reason for Hosp/Primary Dx/Co-morbidities: Patient is a 68-year-old female w/ PMH of tobacco use disorder, macrocytic anemia, anxiety/depression, chronic low back pain, and HTN.  Patient admitted d/t generalized weakness w/ prior CDIFF on vancomycin and continued abdominal pain.     Focus of Care: Education regarding clostridium difficile and symptom management, medication education, pain management, fall prevention    Patient's goal(s):  \"To get better.\"    Current Functional status/mobility/DME: Minimal-moderate impairment.  Use of cane or walker in the home.     HB status/Living Arrangements: Homebound.  Lives with son in trailer with large dog and several cats.     Skin Integrity/wound status: Skin intact    Code Status: CPR    Fall Risk/Safety concerns: High fall risk    Medication issues/Concerns:  None    Additional Problems/Concerns: None    SDOH Barriers (i.e. caregiver concerns, social isolation, transportation, food insecurity, environment, income etc.)/Need for MSW: Patient has transportation issues; states that she is unable to get to MD appts d/t lack of transportation. MSW consult.     Plan for next visit: Education regarding clostridium difficile and symptom management, medication education, pain management, fall prevention"

## 2023-09-27 LAB
BACTERIA SPEC AEROBE CULT: NORMAL
BACTERIA SPEC AEROBE CULT: NORMAL

## 2023-09-28 ENCOUNTER — HOME CARE VISIT (OUTPATIENT)
Dept: HOME HEALTH SERVICES | Facility: HOME HEALTHCARE | Age: 68
End: 2023-09-28
Payer: MEDICARE

## 2023-10-03 ENCOUNTER — HOME CARE VISIT (OUTPATIENT)
Dept: HOME HEALTH SERVICES | Facility: HOME HEALTHCARE | Age: 68
End: 2023-10-03
Payer: MEDICARE

## 2023-10-03 PROCEDURE — G0495 RN CARE TRAIN/EDU IN HH: HCPCS

## 2023-10-04 NOTE — HOME HEALTH
Patient requesting d/c from  services. Patient instructed to f/u with providers as scheduled and to contact PCP for any further questions or concerns.

## 2024-02-24 ENCOUNTER — HOSPITAL ENCOUNTER (INPATIENT)
Facility: HOSPITAL | Age: 69
LOS: 4 days | Discharge: HOME HEALTH | End: 2024-02-29
Attending: STUDENT IN AN ORGANIZED HEALTH CARE EDUCATION/TRAINING PROGRAM | Admitting: STUDENT IN AN ORGANIZED HEALTH CARE EDUCATION/TRAINING PROGRAM
Payer: MEDICARE

## 2024-02-24 ENCOUNTER — APPOINTMENT (OUTPATIENT)
Dept: GENERAL RADIOLOGY | Facility: HOSPITAL | Age: 69
End: 2024-02-24
Payer: MEDICARE

## 2024-02-24 DIAGNOSIS — N17.9 ACUTE RENAL FAILURE, UNSPECIFIED ACUTE RENAL FAILURE TYPE: ICD-10-CM

## 2024-02-24 DIAGNOSIS — K85.90 ACUTE PANCREATITIS, UNSPECIFIED COMPLICATION STATUS, UNSPECIFIED PANCREATITIS TYPE: Primary | ICD-10-CM

## 2024-02-24 DIAGNOSIS — R10.13 EPIGASTRIC PAIN: ICD-10-CM

## 2024-02-24 DIAGNOSIS — R53.81 DEBILITY: ICD-10-CM

## 2024-02-24 DIAGNOSIS — E87.20 METABOLIC ACIDOSIS: ICD-10-CM

## 2024-02-24 DIAGNOSIS — D72.829 LEUKOCYTOSIS, UNSPECIFIED TYPE: ICD-10-CM

## 2024-02-24 DIAGNOSIS — A41.9 SEPSIS WITH ACUTE RENAL FAILURE WITHOUT SEPTIC SHOCK, DUE TO UNSPECIFIED ORGANISM, UNSPECIFIED ACUTE RENAL FAILURE TYPE: ICD-10-CM

## 2024-02-24 DIAGNOSIS — E86.0 DEHYDRATION: ICD-10-CM

## 2024-02-24 DIAGNOSIS — N17.9 AKI (ACUTE KIDNEY INJURY): ICD-10-CM

## 2024-02-24 DIAGNOSIS — R65.20 SEPSIS WITH ACUTE RENAL FAILURE WITHOUT SEPTIC SHOCK, DUE TO UNSPECIFIED ORGANISM, UNSPECIFIED ACUTE RENAL FAILURE TYPE: ICD-10-CM

## 2024-02-24 DIAGNOSIS — A04.72 COLITIS DUE TO CLOSTRIDIUM DIFFICILE: ICD-10-CM

## 2024-02-24 DIAGNOSIS — N17.9 SEPSIS WITH ACUTE RENAL FAILURE WITHOUT SEPTIC SHOCK, DUE TO UNSPECIFIED ORGANISM, UNSPECIFIED ACUTE RENAL FAILURE TYPE: ICD-10-CM

## 2024-02-24 PROCEDURE — 82375 ASSAY CARBOXYHB QUANT: CPT

## 2024-02-24 PROCEDURE — 80053 COMPREHEN METABOLIC PANEL: CPT | Performed by: STUDENT IN AN ORGANIZED HEALTH CARE EDUCATION/TRAINING PROGRAM

## 2024-02-24 PROCEDURE — 84443 ASSAY THYROID STIM HORMONE: CPT | Performed by: FAMILY MEDICINE

## 2024-02-24 PROCEDURE — 83050 HGB METHEMOGLOBIN QUAN: CPT

## 2024-02-24 PROCEDURE — 71045 X-RAY EXAM CHEST 1 VIEW: CPT

## 2024-02-24 PROCEDURE — 83880 ASSAY OF NATRIURETIC PEPTIDE: CPT | Performed by: STUDENT IN AN ORGANIZED HEALTH CARE EDUCATION/TRAINING PROGRAM

## 2024-02-24 PROCEDURE — 0202U NFCT DS 22 TRGT SARS-COV-2: CPT | Performed by: STUDENT IN AN ORGANIZED HEALTH CARE EDUCATION/TRAINING PROGRAM

## 2024-02-24 PROCEDURE — 85007 BL SMEAR W/DIFF WBC COUNT: CPT | Performed by: STUDENT IN AN ORGANIZED HEALTH CARE EDUCATION/TRAINING PROGRAM

## 2024-02-24 PROCEDURE — 36415 COLL VENOUS BLD VENIPUNCTURE: CPT

## 2024-02-24 PROCEDURE — 85025 COMPLETE CBC W/AUTO DIFF WBC: CPT | Performed by: STUDENT IN AN ORGANIZED HEALTH CARE EDUCATION/TRAINING PROGRAM

## 2024-02-24 PROCEDURE — P9612 CATHETERIZE FOR URINE SPEC: HCPCS

## 2024-02-24 PROCEDURE — 83605 ASSAY OF LACTIC ACID: CPT | Performed by: STUDENT IN AN ORGANIZED HEALTH CARE EDUCATION/TRAINING PROGRAM

## 2024-02-24 PROCEDURE — 83690 ASSAY OF LIPASE: CPT | Performed by: STUDENT IN AN ORGANIZED HEALTH CARE EDUCATION/TRAINING PROGRAM

## 2024-02-24 PROCEDURE — 84145 PROCALCITONIN (PCT): CPT | Performed by: STUDENT IN AN ORGANIZED HEALTH CARE EDUCATION/TRAINING PROGRAM

## 2024-02-24 PROCEDURE — 82805 BLOOD GASES W/O2 SATURATION: CPT

## 2024-02-24 PROCEDURE — 36600 WITHDRAWAL OF ARTERIAL BLOOD: CPT

## 2024-02-24 PROCEDURE — 99291 CRITICAL CARE FIRST HOUR: CPT

## 2024-02-24 RX ORDER — DEXAMETHASONE SODIUM PHOSPHATE 10 MG/ML
10 INJECTION, SOLUTION INTRAMUSCULAR; INTRAVENOUS ONCE
Status: COMPLETED | OUTPATIENT
Start: 2024-02-25 | End: 2024-02-25

## 2024-02-24 RX ORDER — ONDANSETRON 2 MG/ML
4 INJECTION INTRAMUSCULAR; INTRAVENOUS ONCE
Status: COMPLETED | OUTPATIENT
Start: 2024-02-24 | End: 2024-02-25

## 2024-02-24 RX ORDER — IPRATROPIUM BROMIDE AND ALBUTEROL SULFATE 2.5; .5 MG/3ML; MG/3ML
3 SOLUTION RESPIRATORY (INHALATION) ONCE
Status: COMPLETED | OUTPATIENT
Start: 2024-02-24 | End: 2024-02-25

## 2024-02-24 RX ORDER — SODIUM CHLORIDE 0.9 % (FLUSH) 0.9 %
10 SYRINGE (ML) INJECTION AS NEEDED
Status: DISCONTINUED | OUTPATIENT
Start: 2024-02-24 | End: 2024-02-29 | Stop reason: HOSPADM

## 2024-02-25 ENCOUNTER — APPOINTMENT (OUTPATIENT)
Dept: CT IMAGING | Facility: HOSPITAL | Age: 69
End: 2024-02-25
Payer: MEDICARE

## 2024-02-25 PROBLEM — K85.90 PANCREATITIS, ACUTE: Status: ACTIVE | Noted: 2024-02-25

## 2024-02-25 LAB
A-A DO2: 16.6 MMHG
A-A DO2: 17.3 MMHG
A-A DO2: ABNORMAL
A-A DO2: ABNORMAL
ALBUMIN SERPL-MCNC: 3.8 G/DL (ref 3.5–5.2)
ALBUMIN SERPL-MCNC: 4.7 G/DL (ref 3.5–5.2)
ALBUMIN/GLOB SERPL: 1.3 G/DL
ALBUMIN/GLOB SERPL: 1.5 G/DL
ALP SERPL-CCNC: 103 U/L (ref 39–117)
ALP SERPL-CCNC: 134 U/L (ref 39–117)
ALT SERPL W P-5'-P-CCNC: 12 U/L (ref 1–33)
ALT SERPL W P-5'-P-CCNC: 12 U/L (ref 1–33)
AMORPH URATE CRY URNS QL MICRO: NORMAL /HPF
ANION GAP SERPL CALCULATED.3IONS-SCNC: 22.5 MMOL/L (ref 5–15)
ANION GAP SERPL CALCULATED.3IONS-SCNC: 23.9 MMOL/L (ref 5–15)
ARTERIAL PATENCY WRIST A: ABNORMAL
ARTERIAL PATENCY WRIST A: POSITIVE
AST SERPL-CCNC: 20 U/L (ref 1–32)
AST SERPL-CCNC: 36 U/L (ref 1–32)
ATMOSPHERIC PRESS: 733 MMHG
ATMOSPHERIC PRESS: 736 MMHG
B PARAPERT DNA SPEC QL NAA+PROBE: NOT DETECTED
B PERT DNA SPEC QL NAA+PROBE: NOT DETECTED
BACTERIA UR QL AUTO: NORMAL /HPF
BASE EXCESS BLDA CALC-SCNC: -16.5 MMOL/L (ref 0–2)
BASE EXCESS BLDA CALC-SCNC: -24.2 MMOL/L (ref 0–2)
BASE EXCESS BLDA CALC-SCNC: -27.9 MMOL/L (ref 0–2)
BASE EXCESS BLDA CALC-SCNC: -29.6 MMOL/L (ref 0–2)
BASOPHILS # BLD AUTO: 0.11 10*3/MM3 (ref 0–0.2)
BASOPHILS NFR BLD AUTO: 0.4 % (ref 0–1.5)
BDY SITE: ABNORMAL
BILIRUB SERPL-MCNC: 0.2 MG/DL (ref 0–1.2)
BILIRUB SERPL-MCNC: <0.2 MG/DL (ref 0–1.2)
BILIRUB UR QL STRIP: NEGATIVE
BUN SERPL-MCNC: 61 MG/DL (ref 8–23)
BUN SERPL-MCNC: 63 MG/DL (ref 8–23)
BUN/CREAT SERPL: 19 (ref 7–25)
BUN/CREAT SERPL: 21 (ref 7–25)
C PNEUM DNA NPH QL NAA+NON-PROBE: NOT DETECTED
CALCIUM SPEC-SCNC: 10.9 MG/DL (ref 8.6–10.5)
CALCIUM SPEC-SCNC: 9.7 MG/DL (ref 8.6–10.5)
CHLORIDE SERPL-SCNC: 108 MMOL/L (ref 98–107)
CHLORIDE SERPL-SCNC: 113 MMOL/L (ref 98–107)
CHOLEST SERPL-MCNC: 69 MG/DL (ref 0–200)
CLARITY UR: CLEAR
CO2 SERPL-SCNC: 3.1 MMOL/L (ref 22–29)
CO2 SERPL-SCNC: 4.5 MMOL/L (ref 22–29)
COHGB MFR BLD: 0.2 % (ref 0–2)
COHGB MFR BLD: 0.2 % (ref 0–2)
COHGB MFR BLD: 0.7 % (ref 0–2)
COHGB MFR BLD: 0.7 % (ref 0–2)
COLOR UR: YELLOW
CREAT SERPL-MCNC: 2.91 MG/DL (ref 0.57–1)
CREAT SERPL-MCNC: 3.31 MG/DL (ref 0.57–1)
D-LACTATE SERPL-SCNC: 1.7 MMOL/L (ref 0.5–2)
D-LACTATE SERPL-SCNC: 2.3 MMOL/L (ref 0.5–2)
DEPRECATED RDW RBC AUTO: 54.2 FL (ref 37–54)
DEPRECATED RDW RBC AUTO: 58.4 FL (ref 37–54)
EGFRCR SERPLBLD CKD-EPI 2021: 14.6 ML/MIN/1.73
EGFRCR SERPLBLD CKD-EPI 2021: 17.1 ML/MIN/1.73
EOSINOPHIL # BLD AUTO: 0 10*3/MM3 (ref 0–0.4)
EOSINOPHIL NFR BLD AUTO: 0 % (ref 0.3–6.2)
ERYTHROCYTE [DISTWIDTH] IN BLOOD BY AUTOMATED COUNT: 13.8 % (ref 12.3–15.4)
ERYTHROCYTE [DISTWIDTH] IN BLOOD BY AUTOMATED COUNT: 14.1 % (ref 12.3–15.4)
ETHANOL BLD-MCNC: <10 MG/DL (ref 0–10)
ETHANOL UR QL: <0.01 %
FLUAV SUBTYP SPEC NAA+PROBE: NOT DETECTED
FLUBV RNA ISLT QL NAA+PROBE: NOT DETECTED
GLOBULIN UR ELPH-MCNC: 2.5 GM/DL
GLOBULIN UR ELPH-MCNC: 3.6 GM/DL
GLUCOSE SERPL-MCNC: 113 MG/DL (ref 65–99)
GLUCOSE SERPL-MCNC: 126 MG/DL (ref 65–99)
GLUCOSE UR STRIP-MCNC: NEGATIVE MG/DL
HADV DNA SPEC NAA+PROBE: NOT DETECTED
HCO3 BLDA-SCNC: 2.2 MMOL/L (ref 22–28)
HCO3 BLDA-SCNC: 3.1 MMOL/L (ref 22–28)
HCO3 BLDA-SCNC: 3.8 MMOL/L (ref 22–28)
HCO3 BLDA-SCNC: 8.3 MMOL/L (ref 22–28)
HCOV 229E RNA SPEC QL NAA+PROBE: NOT DETECTED
HCOV HKU1 RNA SPEC QL NAA+PROBE: NOT DETECTED
HCOV NL63 RNA SPEC QL NAA+PROBE: NOT DETECTED
HCOV OC43 RNA SPEC QL NAA+PROBE: NOT DETECTED
HCT VFR BLD AUTO: 35.3 % (ref 34–46.6)
HCT VFR BLD AUTO: 45.9 % (ref 34–46.6)
HCT VFR BLD CALC: 33.1 %
HCT VFR BLD CALC: 35.3 %
HCT VFR BLD CALC: 38.3 %
HCT VFR BLD CALC: 44.8 %
HDLC SERPL-MCNC: 31 MG/DL (ref 40–60)
HGB BLD-MCNC: 12 G/DL (ref 12–15.9)
HGB BLD-MCNC: 15.2 G/DL (ref 12–15.9)
HGB UR QL STRIP.AUTO: ABNORMAL
HMPV RNA NPH QL NAA+NON-PROBE: NOT DETECTED
HOLD SPECIMEN: NORMAL
HOLD SPECIMEN: NORMAL
HPIV1 RNA ISLT QL NAA+PROBE: NOT DETECTED
HPIV2 RNA SPEC QL NAA+PROBE: NOT DETECTED
HPIV3 RNA NPH QL NAA+PROBE: NOT DETECTED
HPIV4 P GENE NPH QL NAA+PROBE: NOT DETECTED
HYALINE CASTS UR QL AUTO: NORMAL /LPF
IMM GRANULOCYTES # BLD AUTO: 0.69 10*3/MM3 (ref 0–0.05)
IMM GRANULOCYTES NFR BLD AUTO: 2.4 % (ref 0–0.5)
INHALED O2 CONCENTRATION: 21 %
KETONES UR QL STRIP: NEGATIVE
LDLC SERPL CALC-MCNC: 22 MG/DL (ref 0–100)
LDLC/HDLC SERPL: 0.77 {RATIO}
LEUKOCYTE ESTERASE UR QL STRIP.AUTO: NEGATIVE
LIPASE SERPL-CCNC: >3000 U/L (ref 13–60)
LYMPHOCYTES # BLD AUTO: 1.04 10*3/MM3 (ref 0.7–3.1)
LYMPHOCYTES NFR BLD AUTO: 3.6 % (ref 19.6–45.3)
Lab: ABNORMAL
M PNEUMO IGG SER IA-ACNC: NOT DETECTED
MACROCYTES BLD QL SMEAR: NORMAL
MCH RBC QN AUTO: 36.1 PG (ref 26.6–33)
MCH RBC QN AUTO: 36.6 PG (ref 26.6–33)
MCHC RBC AUTO-ENTMCNC: 33.1 G/DL (ref 31.5–35.7)
MCHC RBC AUTO-ENTMCNC: 34 G/DL (ref 31.5–35.7)
MCV RBC AUTO: 106.3 FL (ref 79–97)
MCV RBC AUTO: 110.6 FL (ref 79–97)
METHGB BLD QL: 0.3 % (ref 0–1.5)
METHGB BLD QL: 0.4 % (ref 0–1.5)
METHGB BLD QL: 0.5 % (ref 0–1.5)
METHGB BLD QL: 0.6 % (ref 0–1.5)
MODALITY: ABNORMAL
MONOCYTES # BLD AUTO: 2.02 10*3/MM3 (ref 0.1–0.9)
MONOCYTES NFR BLD AUTO: 7 % (ref 5–12)
MRSA DNA SPEC QL NAA+PROBE: ABNORMAL
NEUTROPHILS NFR BLD AUTO: 24.93 10*3/MM3 (ref 1.7–7)
NEUTROPHILS NFR BLD AUTO: 86.6 % (ref 42.7–76)
NITRITE UR QL STRIP: NEGATIVE
NOTIFIED BY: ABNORMAL
NOTIFIED WHO: ABNORMAL
NRBC BLD AUTO-RTO: 0.1 /100 WBC (ref 0–0.2)
NT-PROBNP SERPL-MCNC: 2545 PG/ML (ref 0–900)
OXYHGB MFR BLDV: 96.9 % (ref 94–99)
OXYHGB MFR BLDV: 97.4 % (ref 94–99)
OXYHGB MFR BLDV: 97.5 % (ref 94–99)
OXYHGB MFR BLDV: 97.7 % (ref 94–99)
PCO2 BLDA: 11.8 MM HG (ref 35–45)
PCO2 BLDA: 13.1 MM HG (ref 35–45)
PCO2 BLDA: 15.1 MM HG (ref 35–45)
PCO2 BLDA: 18.1 MM HG (ref 35–45)
PCO2 TEMP ADJ BLD: ABNORMAL MM[HG]
PH BLDA: 6.89 PH UNITS (ref 7.35–7.45)
PH BLDA: 6.92 PH UNITS (ref 7.35–7.45)
PH BLDA: 7.07 PH UNITS (ref 7.35–7.45)
PH BLDA: 7.27 PH UNITS (ref 7.35–7.45)
PH UR STRIP.AUTO: <=5 [PH] (ref 5–8)
PH, TEMP CORRECTED: ABNORMAL
PLATELET # BLD AUTO: 152 10*3/MM3 (ref 140–450)
PLATELET # BLD AUTO: 317 10*3/MM3 (ref 140–450)
PMV BLD AUTO: 10.2 FL (ref 6–12)
PMV BLD AUTO: 10.7 FL (ref 6–12)
PO2 BLDA: 107 MM HG (ref 75–100)
PO2 BLDA: 113 MM HG (ref 75–100)
PO2 BLDA: 135 MM HG (ref 75–100)
PO2 BLDA: 136 MM HG (ref 75–100)
PO2 TEMP ADJ BLD: ABNORMAL MM[HG]
POTASSIUM SERPL-SCNC: 3.8 MMOL/L (ref 3.5–5.2)
POTASSIUM SERPL-SCNC: 4.4 MMOL/L (ref 3.5–5.2)
PROCALCITONIN SERPL-MCNC: 0.44 NG/ML (ref 0–0.25)
PROT SERPL-MCNC: 6.3 G/DL (ref 6–8.5)
PROT SERPL-MCNC: 8.3 G/DL (ref 6–8.5)
PROT UR QL STRIP: ABNORMAL
RBC # BLD AUTO: 3.32 10*6/MM3 (ref 3.77–5.28)
RBC # BLD AUTO: 4.15 10*6/MM3 (ref 3.77–5.28)
RBC # UR STRIP: NORMAL /HPF
REF LAB TEST METHOD: NORMAL
RHINOVIRUS RNA SPEC NAA+PROBE: NOT DETECTED
RSV RNA NPH QL NAA+NON-PROBE: NOT DETECTED
SAO2 % BLDCOA: 97.5 % (ref 94–100)
SAO2 % BLDCOA: 97.8 % (ref 94–100)
SAO2 % BLDCOA: 98.8 % (ref 94–100)
SAO2 % BLDCOA: 98.9 % (ref 94–100)
SARS-COV-2 RNA NPH QL NAA+NON-PROBE: NOT DETECTED
SMALL PLATELETS BLD QL SMEAR: ADEQUATE
SODIUM SERPL-SCNC: 135 MMOL/L (ref 136–145)
SODIUM SERPL-SCNC: 140 MMOL/L (ref 136–145)
SP GR UR STRIP: 1.02 (ref 1–1.03)
SQUAMOUS #/AREA URNS HPF: NORMAL /HPF
TRIGL SERPL-MCNC: 71 MG/DL (ref 0–150)
TSH SERPL DL<=0.05 MIU/L-ACNC: 1.26 UIU/ML (ref 0.27–4.2)
UROBILINOGEN UR QL STRIP: ABNORMAL
VANCOMYCIN SERPL-MCNC: 22.7 MCG/ML (ref 5–40)
VENTILATOR MODE: ABNORMAL
VLDLC SERPL-MCNC: 16 MG/DL (ref 5–40)
WBC # UR STRIP: NORMAL /HPF
WBC MORPH BLD: NORMAL
WBC NRBC COR # BLD AUTO: 16.87 10*3/MM3 (ref 3.4–10.8)
WBC NRBC COR # BLD AUTO: 28.79 10*3/MM3 (ref 3.4–10.8)
WHOLE BLOOD HOLD COAG: NORMAL
WHOLE BLOOD HOLD SPECIMEN: NORMAL

## 2024-02-25 PROCEDURE — 82805 BLOOD GASES W/O2 SATURATION: CPT

## 2024-02-25 PROCEDURE — 25010000002 ONDANSETRON PER 1 MG: Performed by: STUDENT IN AN ORGANIZED HEALTH CARE EDUCATION/TRAINING PROGRAM

## 2024-02-25 PROCEDURE — 0 DEXTROSE 5 % SOLUTION 1,000 ML FLEX CONT: Performed by: FAMILY MEDICINE

## 2024-02-25 PROCEDURE — 36600 WITHDRAWAL OF ARTERIAL BLOOD: CPT

## 2024-02-25 PROCEDURE — 80053 COMPREHEN METABOLIC PANEL: CPT | Performed by: FAMILY MEDICINE

## 2024-02-25 PROCEDURE — 99223 1ST HOSP IP/OBS HIGH 75: CPT | Performed by: FAMILY MEDICINE

## 2024-02-25 PROCEDURE — 25010000002 PIPERACILLIN SOD-TAZOBACTAM PER 1 G: Performed by: FAMILY MEDICINE

## 2024-02-25 PROCEDURE — 82375 ASSAY CARBOXYHB QUANT: CPT

## 2024-02-25 PROCEDURE — 83050 HGB METHEMOGLOBIN QUAN: CPT

## 2024-02-25 PROCEDURE — 82077 ASSAY SPEC XCP UR&BREATH IA: CPT | Performed by: FAMILY MEDICINE

## 2024-02-25 PROCEDURE — 25010000002 HYDROMORPHONE 1 MG/ML SOLUTION: Performed by: STUDENT IN AN ORGANIZED HEALTH CARE EDUCATION/TRAINING PROGRAM

## 2024-02-25 PROCEDURE — 25010000002 PIPERACILLIN SOD-TAZOBACTAM PER 1 G: Performed by: STUDENT IN AN ORGANIZED HEALTH CARE EDUCATION/TRAINING PROGRAM

## 2024-02-25 PROCEDURE — 81001 URINALYSIS AUTO W/SCOPE: CPT | Performed by: FAMILY MEDICINE

## 2024-02-25 PROCEDURE — 87641 MR-STAPH DNA AMP PROBE: CPT | Performed by: STUDENT IN AN ORGANIZED HEALTH CARE EDUCATION/TRAINING PROGRAM

## 2024-02-25 PROCEDURE — P9612 CATHETERIZE FOR URINE SPEC: HCPCS

## 2024-02-25 PROCEDURE — 83605 ASSAY OF LACTIC ACID: CPT | Performed by: STUDENT IN AN ORGANIZED HEALTH CARE EDUCATION/TRAINING PROGRAM

## 2024-02-25 PROCEDURE — 85027 COMPLETE CBC AUTOMATED: CPT | Performed by: FAMILY MEDICINE

## 2024-02-25 PROCEDURE — 0 DEXTROSE 5 % SOLUTION 1,000 ML FLEX CONT: Performed by: STUDENT IN AN ORGANIZED HEALTH CARE EDUCATION/TRAINING PROGRAM

## 2024-02-25 PROCEDURE — 25010000002 ONDANSETRON PER 1 MG: Performed by: FAMILY MEDICINE

## 2024-02-25 PROCEDURE — 25810000003 SODIUM CHLORIDE 0.9 % SOLUTION: Performed by: STUDENT IN AN ORGANIZED HEALTH CARE EDUCATION/TRAINING PROGRAM

## 2024-02-25 PROCEDURE — 80202 ASSAY OF VANCOMYCIN: CPT | Performed by: STUDENT IN AN ORGANIZED HEALTH CARE EDUCATION/TRAINING PROGRAM

## 2024-02-25 PROCEDURE — 25010000002 DEXAMETHASONE SODIUM PHOSPHATE 10 MG/ML SOLUTION: Performed by: STUDENT IN AN ORGANIZED HEALTH CARE EDUCATION/TRAINING PROGRAM

## 2024-02-25 PROCEDURE — 25010000002 HEPARIN (PORCINE) PER 1000 UNITS: Performed by: FAMILY MEDICINE

## 2024-02-25 PROCEDURE — 80061 LIPID PANEL: CPT | Performed by: FAMILY MEDICINE

## 2024-02-25 PROCEDURE — 74176 CT ABD & PELVIS W/O CONTRAST: CPT

## 2024-02-25 PROCEDURE — 25010000002 MORPHINE PER 10 MG: Performed by: FAMILY MEDICINE

## 2024-02-25 PROCEDURE — 25010000002 VANCOMYCIN 5 G RECONSTITUTED SOLUTION: Performed by: STUDENT IN AN ORGANIZED HEALTH CARE EDUCATION/TRAINING PROGRAM

## 2024-02-25 PROCEDURE — 94640 AIRWAY INHALATION TREATMENT: CPT

## 2024-02-25 PROCEDURE — 25010000002 VANCOMYCIN PER 500 MG: Performed by: FAMILY MEDICINE

## 2024-02-25 PROCEDURE — 87040 BLOOD CULTURE FOR BACTERIA: CPT | Performed by: STUDENT IN AN ORGANIZED HEALTH CARE EDUCATION/TRAINING PROGRAM

## 2024-02-25 RX ORDER — ACETAMINOPHEN 650 MG/1
650 SUPPOSITORY RECTAL EVERY 4 HOURS PRN
Status: DISCONTINUED | OUTPATIENT
Start: 2024-02-25 | End: 2024-02-29 | Stop reason: HOSPADM

## 2024-02-25 RX ORDER — BISACODYL 10 MG
10 SUPPOSITORY, RECTAL RECTAL DAILY PRN
Status: DISCONTINUED | OUTPATIENT
Start: 2024-02-25 | End: 2024-02-29 | Stop reason: HOSPADM

## 2024-02-25 RX ORDER — ACETAMINOPHEN 160 MG/5ML
650 SOLUTION ORAL EVERY 4 HOURS PRN
Status: DISCONTINUED | OUTPATIENT
Start: 2024-02-25 | End: 2024-02-29 | Stop reason: HOSPADM

## 2024-02-25 RX ORDER — ONDANSETRON 2 MG/ML
4 INJECTION INTRAMUSCULAR; INTRAVENOUS EVERY 6 HOURS PRN
Status: DISCONTINUED | OUTPATIENT
Start: 2024-02-25 | End: 2024-02-29 | Stop reason: HOSPADM

## 2024-02-25 RX ORDER — ACETAMINOPHEN 325 MG/1
650 TABLET ORAL EVERY 4 HOURS PRN
Status: DISCONTINUED | OUTPATIENT
Start: 2024-02-25 | End: 2024-02-29 | Stop reason: HOSPADM

## 2024-02-25 RX ORDER — NALOXONE HCL 0.4 MG/ML
0.4 VIAL (ML) INJECTION
Status: DISCONTINUED | OUTPATIENT
Start: 2024-02-25 | End: 2024-02-25

## 2024-02-25 RX ORDER — HEPARIN SODIUM 5000 [USP'U]/ML
5000 INJECTION, SOLUTION INTRAVENOUS; SUBCUTANEOUS EVERY 12 HOURS SCHEDULED
Status: DISCONTINUED | OUTPATIENT
Start: 2024-02-25 | End: 2024-02-26

## 2024-02-25 RX ORDER — POLYETHYLENE GLYCOL 3350 17 G/17G
17 POWDER, FOR SOLUTION ORAL DAILY PRN
Status: DISCONTINUED | OUTPATIENT
Start: 2024-02-25 | End: 2024-02-29 | Stop reason: HOSPADM

## 2024-02-25 RX ORDER — SODIUM CHLORIDE 0.9 % (FLUSH) 0.9 %
10 SYRINGE (ML) INJECTION EVERY 12 HOURS SCHEDULED
Status: DISCONTINUED | OUTPATIENT
Start: 2024-02-25 | End: 2024-02-29 | Stop reason: HOSPADM

## 2024-02-25 RX ORDER — NALOXONE HCL 0.4 MG/ML
0.4 VIAL (ML) INJECTION
Status: DISCONTINUED | OUTPATIENT
Start: 2024-02-25 | End: 2024-02-29 | Stop reason: HOSPADM

## 2024-02-25 RX ORDER — MORPHINE SULFATE 2 MG/ML
2 INJECTION, SOLUTION INTRAMUSCULAR; INTRAVENOUS EVERY 4 HOURS PRN
Status: DISCONTINUED | OUTPATIENT
Start: 2024-02-25 | End: 2024-02-25

## 2024-02-25 RX ORDER — SODIUM CHLORIDE 9 MG/ML
40 INJECTION, SOLUTION INTRAVENOUS AS NEEDED
Status: DISCONTINUED | OUTPATIENT
Start: 2024-02-25 | End: 2024-02-29 | Stop reason: HOSPADM

## 2024-02-25 RX ORDER — SODIUM CHLORIDE 9 MG/ML
125 INJECTION, SOLUTION INTRAVENOUS CONTINUOUS
Status: DISCONTINUED | OUTPATIENT
Start: 2024-02-25 | End: 2024-02-25

## 2024-02-25 RX ORDER — ONDANSETRON 2 MG/ML
4 INJECTION INTRAMUSCULAR; INTRAVENOUS ONCE
Status: COMPLETED | OUTPATIENT
Start: 2024-02-25 | End: 2024-02-25

## 2024-02-25 RX ORDER — AMOXICILLIN 250 MG
2 CAPSULE ORAL 2 TIMES DAILY PRN
Status: DISCONTINUED | OUTPATIENT
Start: 2024-02-25 | End: 2024-02-29 | Stop reason: HOSPADM

## 2024-02-25 RX ORDER — METOPROLOL TARTRATE 50 MG/1
50 TABLET, FILM COATED ORAL DAILY
Status: DISCONTINUED | OUTPATIENT
Start: 2024-02-25 | End: 2024-02-29 | Stop reason: HOSPADM

## 2024-02-25 RX ORDER — NITROGLYCERIN 0.4 MG/1
0.4 TABLET SUBLINGUAL
Status: DISCONTINUED | OUTPATIENT
Start: 2024-02-25 | End: 2024-02-29 | Stop reason: HOSPADM

## 2024-02-25 RX ORDER — SODIUM CHLORIDE 0.9 % (FLUSH) 0.9 %
10 SYRINGE (ML) INJECTION AS NEEDED
Status: DISCONTINUED | OUTPATIENT
Start: 2024-02-25 | End: 2024-02-29 | Stop reason: HOSPADM

## 2024-02-25 RX ORDER — BISACODYL 5 MG/1
5 TABLET, DELAYED RELEASE ORAL DAILY PRN
Status: DISCONTINUED | OUTPATIENT
Start: 2024-02-25 | End: 2024-02-29 | Stop reason: HOSPADM

## 2024-02-25 RX ORDER — MORPHINE SULFATE 2 MG/ML
2 INJECTION, SOLUTION INTRAMUSCULAR; INTRAVENOUS
Status: DISCONTINUED | OUTPATIENT
Start: 2024-02-25 | End: 2024-02-29 | Stop reason: HOSPADM

## 2024-02-25 RX ADMIN — DEXAMETHASONE SODIUM PHOSPHATE 10 MG: 10 INJECTION INTRAMUSCULAR; INTRAVENOUS at 00:03

## 2024-02-25 RX ADMIN — IPRATROPIUM BROMIDE AND ALBUTEROL SULFATE 3 ML: .5; 3 SOLUTION RESPIRATORY (INHALATION) at 00:05

## 2024-02-25 RX ADMIN — Medication 10 ML: at 20:18

## 2024-02-25 RX ADMIN — HEPARIN SODIUM 5000 UNITS: 5000 INJECTION, SOLUTION INTRAVENOUS; SUBCUTANEOUS at 20:18

## 2024-02-25 RX ADMIN — PIPERACILLIN SODIUM AND TAZOBACTAM SODIUM 3.38 G: 3; .375 INJECTION, SOLUTION INTRAVENOUS at 19:15

## 2024-02-25 RX ADMIN — SODIUM CHLORIDE 500 ML: 9 INJECTION, SOLUTION INTRAVENOUS at 02:38

## 2024-02-25 RX ADMIN — HYDROMORPHONE HYDROCHLORIDE 1 MG: 1 INJECTION, SOLUTION INTRAMUSCULAR; INTRAVENOUS; SUBCUTANEOUS at 10:17

## 2024-02-25 RX ADMIN — SODIUM CHLORIDE 125 ML/HR: 9 INJECTION, SOLUTION INTRAVENOUS at 14:40

## 2024-02-25 RX ADMIN — PIPERACILLIN SODIUM AND TAZOBACTAM SODIUM 3.38 G: 3; .375 INJECTION, SOLUTION INTRAVENOUS at 01:11

## 2024-02-25 RX ADMIN — MORPHINE SULFATE 2 MG: 2 INJECTION, SOLUTION INTRAMUSCULAR; INTRAVENOUS at 20:40

## 2024-02-25 RX ADMIN — SODIUM BICARBONATE 150 MEQ: 84 INJECTION, SOLUTION INTRAVENOUS at 09:43

## 2024-02-25 RX ADMIN — SODIUM CHLORIDE 1000 ML: 9 INJECTION, SOLUTION INTRAVENOUS at 01:11

## 2024-02-25 RX ADMIN — HEPARIN SODIUM 5000 UNITS: 5000 INJECTION, SOLUTION INTRAVENOUS; SUBCUTANEOUS at 08:50

## 2024-02-25 RX ADMIN — VANCOMYCIN HYDROCHLORIDE 500 MG: 500 INJECTION, POWDER, LYOPHILIZED, FOR SOLUTION INTRAVENOUS at 17:55

## 2024-02-25 RX ADMIN — SODIUM CHLORIDE 500 ML: 9 INJECTION, SOLUTION INTRAVENOUS at 00:00

## 2024-02-25 RX ADMIN — VANCOMYCIN HYDROCHLORIDE 1250 MG: 5 INJECTION, POWDER, LYOPHILIZED, FOR SOLUTION INTRAVENOUS at 01:42

## 2024-02-25 RX ADMIN — ONDANSETRON 4 MG: 2 INJECTION INTRAMUSCULAR; INTRAVENOUS at 04:51

## 2024-02-25 RX ADMIN — SODIUM BICARBONATE 150 MEQ: 84 INJECTION, SOLUTION INTRAVENOUS at 01:26

## 2024-02-25 RX ADMIN — SODIUM BICARBONATE: 84 INJECTION, SOLUTION INTRAVENOUS at 20:11

## 2024-02-25 RX ADMIN — ONDANSETRON 4 MG: 2 INJECTION INTRAMUSCULAR; INTRAVENOUS at 00:01

## 2024-02-25 RX ADMIN — METOPROLOL TARTRATE 50 MG: 50 TABLET ORAL at 08:50

## 2024-02-25 RX ADMIN — PIPERACILLIN SODIUM AND TAZOBACTAM SODIUM 4.5 G: 4; .5 INJECTION, SOLUTION INTRAVENOUS at 06:56

## 2024-02-25 RX ADMIN — MORPHINE SULFATE 4 MG: 4 INJECTION, SOLUTION INTRAMUSCULAR; INTRAVENOUS at 04:44

## 2024-02-25 NOTE — PLAN OF CARE
Goal Outcome Evaluation:         Patient admitted to room 319. Family at bedside and updated on plan of care. Bed alarm set

## 2024-02-25 NOTE — PLAN OF CARE
- received consultation for acute kidney injury as well as severe acidosis  - 68-year-old female with history of hypertension MPO mobility has presented with abdominal pain shortness on breath, patient is found to have acute kidney injury as well as severe acidosis  - will start patient on bicarb gtt  - will give 2 amps of bicarb IV  - will check UA, urine electrolytes  - CT Ab/Pelvis-> (-) for any hydronephrosis     Thank you very much for the consultation, full consultation to follow in a.m.

## 2024-02-25 NOTE — H&P
"  [facility]      Name:  Tasha Yarbrough   Age:  68 y.o.  Sex:  female  :  1955  MRN:  [id number]   Visit Number:  [id number]  Admission Date:  2024  Date Of Service:  24  Primary Care Physician:  Terrence Baldwin MD    Chief Complaint:     Abdominal pain, shortness of air     History Of Presenting Illness:      Patient is 68 years old female with a past medical history of hypertension and impaired mobility and ADLs, who presented to the ER with a chief complaint of shortness of breath and abdominal pain.  Patient is currently awake and alert but appears to be confused, she was reporting abdominal pain and was able to tell me her name and date of birth but was confused about her situation and kept repeating \"I am here for colitis diagnosis\".  Per ER report patient has been sick for the past 5 to 6 days with cough and difficulty breathing with symptoms worsening over the past 2 days.  Patient has been refusing to come to the ER until the day of admission where she became confused and the son called EMS. Patient had COVID-19 approximately 2 or 3 weeks ago but seem to improve prior to this week.  history otherwise limited due to patient mental status and disorientation.  No family at bedside.  Patient was previously admitted here back in 2023 when she was treated for C. difficile colitis.    On ER evaluation, Patient was hypertensive with a blood pressure of 158/110, afebrile on room air.  Her workup was significant for an ABG with a pH of 6.886/pCO2 11.8/pO2 136/HCO3 2.2/saturation 98% on room air.  proBNP 2545, CO2 3.1, anion gap 23.9, creatinine 3.31, BUN 63, glucose 126, lactate 2.3, lipase above 3000, Pro-Miguel 0.44, WBC 28.7.  Respiratory panel negative.  Chest x-ray with chronic changes per my read.  CT abdomen pelvis without contrast showed Mild acute pancreatitis with retroperitoneal effusion. No abscess. Nonobstructing bilateral renal " stones. Moderate-large hiatal hernia.  Patient received dexamethasone 10 mg, DuoNeb, Zofran, 2 L of normal saline boluses and was started on Vanco and Zosyn in addition to sodium bicarb in the ED.  hospitalist consulted for admission.    Review Of Systems:    All systems were reviewed and negative except as mentioned in history of presenting illness, assessment and plan.    Past Medical History: Patient  has a past medical history of Hypertension, Impaired mobility, and Injury of back.    Past Surgical History: Patient  has a past surgical history that includes Tubal ligation; Colonoscopy; Colon surgery; and Abdominal surgery.    Social History: Patient  reports that she has been smoking cigarettes. She has been smoking an average of 1 pack per day. She has never used smokeless tobacco. She reports that she does not drink alcohol and does not use drugs.    Family History:  Patient's family history has been reviewed and found to be noncontributory.     Allergies:      Ciprofloxacin, Codeine, and Pineapple    Home Medications:    Prior to Admission Medications       Prescriptions Last Dose Informant Patient Reported? Taking?    citalopram (CeleXA) 40 MG tablet   Yes No    Take 1 tablet by mouth Daily. Indications: Major Depressive Disorder    famotidine (PEPCID) 20 MG tablet   Yes No    Take 2 tablets by mouth 3 (Three) Times a Day. Indications: Heartburn    furosemide (LASIX) 20 MG tablet   No No    Take 1 tablet by mouth 4 (Four) Times a Day. Hold until seen by PCP    glucosamine-chondroitin 500-400 MG capsule capsule   Yes No    Take 1 capsule by mouth 2 (Two) Times a Day With Meals. Indications: Joint Damage causing Pain and Loss of Function    HYDROcodone-acetaminophen (NORCO) 7.5-325 MG per tablet  Medication Bottle Yes No    Take 1 tablet by mouth Every 8 (Eight) Hours As Needed for Moderate Pain. Indications: Pain    methocarbamol (ROBAXIN) 750 MG tablet   Yes No    Take 1-2 tablets by mouth 3 (Three) Times a  Day As Needed for Muscle Spasms. Take one to two tablets three times daily as needed  Indications: Musculoskeletal Pain    metoprolol tartrate (LOPRESSOR) 50 MG tablet   Yes No    Take 50 mg by mouth Daily. Indications: High Blood Pressure Disorder    ondansetron (ZOFRAN) 8 MG tablet   Yes No    Take 8 mg by mouth Every 8 (Eight) Hours As Needed for Nausea or Vomiting. Indications: Nausea and Vomiting    saccharomyces boulardii (Florastor) 250 MG capsule   No No    Take 1 capsule by mouth 2 (Two) Times a Day.    saccharomyces boulardii (Florastor) 250 MG capsule   Yes No    Take 1 capsule by mouth 2 (Two) Times a Day. Given 9-18-23 from [facility] [place], [place]    sodium bicarbonate 650 MG tablet   Yes No    Take 1 tablet by mouth 2 (Two) Times a Day. Indications: Heartburn    vitamin D3 125 MCG (5000 UT) capsule capsule   Yes No    Take 1 capsule by mouth Daily. Indications: Vitamin D Deficiency          ED Medications:    Medications   sodium chloride 0.9 % flush 10 mL (has no administration in time range)   vancomycin 1250 mg/250 mL 0.9% NS IVPB ([facility]) (1,250 mg Intravenous New Bag 2/25/24 0142)   sodium bicarbonate 8.4 % 150 mEq in dextrose (D5W) 5 % 1,000 mL infusion (greater than 75 mEq) (150 mEq Intravenous New Bag 2/25/24 0126)   sodium chloride 0.9 % bolus 500 mL (has no administration in time range)   sodium chloride 0.9 % bolus 500 mL (0 mL Intravenous Stopped 2/25/24 0048)   ondansetron (ZOFRAN) injection 4 mg (4 mg Intravenous Given 2/25/24 0001)   ipratropium-albuterol (DUO-NEB) nebulizer solution 3 mL (3 mL Nebulization Given 2/25/24 0005)   dexAMETHasone sodium phosphate injection 10 mg (10 mg Intravenous Given 2/25/24 0003)   sodium chloride 0.9 % bolus 1,000 mL (1,000 mL Intravenous New Bag 2/25/24 0111)   piperacillin-tazobactam (ZOSYN) 3.375 g in iso-osmotic dextrose 50 ml (premix) (0 g Intravenous Stopped 2/25/24 0142)     Vital Signs:  Temp:  [94.5 °F (34.7 °C)] 94.5 °F (34.7 °C)  Heart Rate:   "[79-96] 88  Resp:  [18-21] 18  BP: (144-177)/() 147/78        02/24/24  2333   Weight: 65.8 kg (145 lb)     Body mass index is 26.52 kg/m².    Physical Exam:     Most recent vital Signs: /78 (BP Location: Left arm, Patient Position: Lying)   Pulse 88   Temp 94.5 °F (34.7 °C) (Rectal)   Resp 18   Ht 157.5 cm (62\")   Wt 65.8 kg (145 lb)   SpO2 99%   BMI 26.52 kg/m²     Physical Exam  Vitals and nursing note reviewed.   Constitutional:       Appearance: She is ill-appearing.   HENT:      Head: Normocephalic and atraumatic.      Right Ear: External ear normal.      Left Ear: External ear normal.      Nose: Nose normal.      Mouth/Throat:      Mouth: Mucous membranes are dry.   Eyes:      Extraocular Movements: Extraocular movements intact.      Conjunctiva/sclera: Conjunctivae normal.      Pupils: Pupils are equal, round, and reactive to light.   Cardiovascular:      Rate and Rhythm: Normal rate and regular rhythm.      Pulses: Normal pulses.      Heart sounds: Normal heart sounds.   Pulmonary:      Effort: Pulmonary effort is normal. No respiratory distress.      Breath sounds: Normal breath sounds. No wheezing or rhonchi.   Abdominal:      General: Bowel sounds are normal. There is no distension.      Palpations: Abdomen is soft.      Tenderness: There is generalized abdominal tenderness.   Musculoskeletal:         General: Normal range of motion.      Cervical back: Normal range of motion and neck supple.      Right lower leg: No edema.      Left lower leg: No edema.   Skin:     General: Skin is warm and dry.      Findings: No rash.   Neurological:      General: No focal deficit present.      Mental Status: She is alert. She is disoriented.      GCS: GCS eye subscore is 4. GCS verbal subscore is 5. GCS motor subscore is 6.      Cranial Nerves: No cranial nerve deficit or facial asymmetry.      Motor: No weakness or tremor.   Psychiatric:         Mood and Affect: Mood normal.         Laboratory " "data:    I have reviewed the labs done in the emergency room.    Results from last 7 days   Lab Units 02/24/24  2354   SODIUM mmol/L 135*   POTASSIUM mmol/L 4.4   CHLORIDE mmol/L 108*   CO2 mmol/L 3.1*   BUN mg/dL 63*   CREATININE mg/dL 3.31*   CALCIUM mg/dL 10.9*   BILIRUBIN mg/dL <0.2   ALK PHOS U/L 134*   ALT (SGPT) U/L 12   AST (SGOT) U/L 20   GLUCOSE mg/dL 126*     Results from last 7 days   Lab Units 02/24/24  2354   WBC 10*3/mm3 28.79*   HEMOGLOBIN g/dL 15.2   HEMATOCRIT % 45.9   PLATELETS 10*3/mm3 317             Results from last 7 days   Lab Units 02/24/24  2354   PROBNP pg/mL 2,545.0*         Results from last 7 days   Lab Units 02/24/24  2354   LIPASE U/L >3,000*     Results from last 7 days   Lab Units 02/24/24  2340   PH, ARTERIAL pH units 6.886*   PO2 ART mm Hg 136.0*   PCO2, ARTERIAL mm Hg 11.8*   HCO3 ART mmol/L 2.2*           Invalid input(s): \"USDES\", \"NITRITITE\", \"BACT\", \"EP\"    Pain Management Panel          Latest Ref Rng & Units 8/6/2022   Pain Management Panel   Amphetamine, Urine Qual Negative Negative    Barbiturates Screen, Urine Negative Negative    Benzodiazepine Screen, Urine Negative Negative    Buprenorphine, Screen, Urine Negative Negative    Cocaine Screen, Urine Negative Negative    Methadone Screen , Urine Negative Negative    Methamphetamine, Ur Negative Negative          Radiology:    CT Abdomen Pelvis Without Contrast    Result Date: 2/25/2024  FINAL REPORT TECHNIQUE: null CLINICAL HISTORY: diffuse Abdominal pain, sepsis, confusion, SOA; family states symptoms began 5-6 days ago COMPARISON: null FINDINGS: CT abdomen and pelvis without contrast Comparison: CT/SR - CT ABDOMEN PELVIS W CONTRAST - 09/22/2023 08:48 PM EDT Findings: No consolidation or effusion. Liver and adrenal glands are unremarkable. Calcified granulomas in the spleen. Gallbladder is absent. Peripancreatic stranding with retroperitoneal effusion. Both kidneys are similar in size without hydronephrosis. Small " nonobstructing bilateral renal stones. No ureteral calculi. Hypodense bilateral renal cysts. Moderate-large hiatal hernia. No bowel obstruction, pneumoperitoneum, or pneumatosis. The appendix is not seen. Calcified plaque in the nonaneurysmal aorta and iliac arteries. There are no enlarged abdominal or pelvic lymph nodes. Distended bladder without wall thickening. Uterus is unremarkable. Stable hypodense 2.3 x 2.1 cm right ovarian cyst. Degenerative dextroscoliosis of the thoracolumbar spine with chronic L2 compression deformity.     IMPRESSION: 1. Mild acute pancreatitis with retroperitoneal effusion. No abscess. 2. Nonobstructing bilateral renal stones. 3. Moderate-large hiatal hernia. Authenticated and Electronically Signed by Yonathan Coles MD on 02/25/2024 01:22:17 AM     Assessment:    Acute kidney injury, POA  Acute pancreatitis, POA  Acute metabolic encephalopathy, POA  Metabolic lactic acidosis, POA  Severe sepsis, unknown source of infection, POA  Hypertension  Impaired mobility and ADLs    Plan:    Patient is admitted for further management and treatment.    Acute kidney injury  -Nephrology consulted, appreciate recommendations.  -Avoid nephrotoxic drugs, holding home Lasix  -Continue IV fluids  -Patient with bilateral nonobstructing renal stones with no hydronephrosis on CT abdomen and pelvis.    Acute pancreatitis   -S/p cholecystectomy  -Lipid panel   -pain control  -Continue IV fluids    Sepsis  -Patient met SIRS criteria, in the settings of pancreatitis and VIVIENNE and systemic acidosis.  -Continue coverage with Vanco and Zosyn and de-escalate as able to  -Urinalysis pending  -Blood cultures obtained    Acute encephalopathy  -Likely metabolic  -Consider CT head if no improvement  -Check TSH    Metabolic lactic acidosis   -on bicarb drip   -repeat ABG in a.m.    -Continue home meds as warranted.  -Further orders as indicated per clinical course.    Risk Assessment: High  DVT Prophylaxis: Heparin prophylaxis  (benefit> risk)  Code Status: Full  Diet: N.p.o.    Advance Care Planning   ACP discussion was held with the patient during this visit. Patient does not have an advance directive, information provided.       Vivienne Roa MD  02/25/24  01:59 EST    Dictated utilizing Dragon dictation.

## 2024-02-25 NOTE — PROGRESS NOTES
Pharmacy Consult-Vancomycin Dosing  Tasha Yarbrough is a  68 y.o. female receiving vancomycin therapy.     Indication: Sepsis  Consulting Provider: Janina    Goal Trough: 15-20 mcg/mL    Current Antimicrobial Therapy  Anti-Infectives (From admission, onward)      Ordered     Dose/Rate Route Frequency Start Stop    02/25/24 0445  vancomycin 1000 mg/250 mL 0.9% NS (vial-mate)        Ordering Provider: Vivienne Roa MD    1,000 mg  over 60 Minutes Intravenous Every 48 Hours 02/25/24 2300 03/02/24 2259    02/25/24 0441  piperacillin-tazobactam (ZOSYN) 4.5 g in iso-osmotic dextrose 100 mL IVPB (premix)        Ordering Provider: Vivienne Roa MD    4.5 g  over 4 Hours Intravenous Every 12 Hours 02/25/24 0700 03/01/24 0659    02/25/24 0434  Pharmacy to Dose Zosyn        Ordering Provider: Vivienne Roa MD     Does not apply Continuous PRN 02/25/24 0434 03/01/24 0433    02/25/24 0434  Pharmacy to dose vancomycin        Ordering Provider: Vivienne Roa MD     Does not apply Continuous PRN 02/25/24 0434 03/01/24 0433    02/25/24 0039  piperacillin-tazobactam (ZOSYN) 3.375 g in iso-osmotic dextrose 50 ml (premix)        Ordering Provider: Ho Clark MD    3.375 g  over 30 Minutes Intravenous Once 02/25/24 0055 02/25/24 0142    02/25/24 0039  vancomycin 1250 mg/250 mL 0.9% NS IVPB ([facility])        Ordering Provider: Ho Clark MD    20 mg/kg × 65.8 kg  over 75 Minutes Intravenous Once 02/25/24 0055 02/25/24 0323            Allergies  Allergies as of 02/24/2024 - Reviewed 02/24/2024   Allergen Reaction Noted    Ciprofloxacin Dizziness 08/18/2022    Codeine Itching 11/17/2022    Pineapple Unknown - Low Severity 01/11/2023       Labs    Results from last 7 days   Lab Units 02/25/24  0646 02/24/24  2354   BUN mg/dL 61* 63*   CREATININE mg/dL 2.91* 3.31*       Results from last 7 days   Lab Units 02/25/24  0646 02/24/24  2354   WBC 10*3/mm3 16.87* 28.79*       Evaluation of Dosing     Last Dose Received in the  "ED/Outside Facility: Yes  Is Patient on Dialysis or Renal Replacement: No    Ht - 157.5 cm (62\")  Wt - 65.8 kg (145 lb)    Estimated Creatinine Clearance: 16.5 mL/min (A) (by C-G formula based on SCr of 2.91 mg/dL (H)).    Intake & Output (last 3 days)         02/22 0701  02/23 0700 02/23 0701  02/24 0700 02/24 0701 02/25 0700 02/25 0701 02/26 0700    IV Piggyback   2300     Total Intake(mL/kg)   2300 (35)     Other   75     Total Output   75     Net   +2225                     Microbiology and Radiology  Microbiology Results (last 10 days)       Procedure Component Value - Date/Time    Respiratory Panel PCR w/COVID-19(SARS-CoV-2) JORDYN/AMA/KIM/PAD/COR/MALICK In-House, NP Swab in UTM/VTM, 2 HR TAT - Swab, Nasopharynx [[id number]]  (Normal) Collected: 02/24/24 2339    Lab Status: Final result Specimen: Swab from Nasopharynx Updated: 02/25/24 0038     ADENOVIRUS, PCR Not Detected     Coronavirus 229E Not Detected     Coronavirus HKU1 Not Detected     Coronavirus NL63 Not Detected     Coronavirus OC43 Not Detected     COVID19 Not Detected     Human Metapneumovirus Not Detected     Human Rhinovirus/Enterovirus Not Detected     Influenza A PCR Not Detected     Influenza B PCR Not Detected     Parainfluenza Virus 1 Not Detected     Parainfluenza Virus 2 Not Detected     Parainfluenza Virus 3 Not Detected     Parainfluenza Virus 4 Not Detected     RSV, PCR Not Detected     Bordetella pertussis pcr Not Detected     Bordetella parapertussis PCR Not Detected     Chlamydophila pneumoniae PCR Not Detected     Mycoplasma pneumo by PCR Not Detected    Narrative:      In the setting of a positive respiratory panel with a viral infection PLUS a negative procalcitonin without other underlying concern for bacterial infection, consider observing off antibiotics or discontinuation of antibiotics and continue supportive care. If the respiratory panel is positive for atypical bacterial infection (Bordetella pertussis, Chlamydophila " pneumoniae, or Mycoplasma pneumoniae), consider antibiotic de-escalation to target atypical bacterial infection.            Vancomycin Levels:    Results from last 7 days   Lab Units 02/25/24  0646   VANCOMYCIN RM mcg/mL 22.70                   Assessment/Plan    Pharmacy to dose vancomycin for sepsis. Goal trough 15-20 mcg/mL.  Patient currently receiving vancomycin dosed per level due to severe VIVIENNE. Will order vancomycin 750 mg for one dose.  Assess clearance by vancomycin random level on 2/26 @ 0600  Pharmacy will continue to monitor renal function, cultures and sensitivities, and clinical status to adjust regimen as necessary.    Thank you for the consult,    Larry Foley, PharmD, BCPS   02/25/24 10:49 EST

## 2024-02-25 NOTE — CASE MANAGEMENT/SOCIAL WORK
Discharge Planning Assessment  [facility]     Patient Name: Tasha Yarbrough  MRN: [id number]  Today's Date: 2/25/2024    Admit Date: 2/24/2024    Plan: DCP   Discharge Needs Assessment       Row Name 02/25/24 1116       Living Environment    People in Home child(debra), adult    Current Living Arrangements home    Potentially Unsafe Housing Conditions none    Primary Care Provided by child(debra)    Provides Primary Care For no one, unable/limited ability to care for self    Family Caregiver if Needed child(debra), adult    Quality of Family Relationships involved    Able to Return to Prior Arrangements yes       Resource/Environmental Concerns    Resource/Environmental Concerns none    Transportation Concerns none       Transition Planning    Patient/Family Anticipates Transition to home    Patient/Family Anticipated Services at Transition none    Transportation Anticipated family or friend will provide       Discharge Needs Assessment    Readmission Within the Last 30 Days no previous admission in last 30 days    Equipment Currently Used at Home cane, straight    Concerns to be Addressed discharge planning    Anticipated Changes Related to Illness none    Equipment Needed After Discharge none                   Discharge Plan       Row Name 02/25/24 1116       Plan    Plan DCP    Patient/Family in Agreement with Plan yes    Plan Comments RANDA attempted to meet with pt at [facility] but pt was altered and unable to answer any questions at time of visit. SW contacted son, Rj, via telephone. Pt lives with both sons, Rj and Thai. Pt requries assistance with ADL's. Pt has cane that she uses at all times. Pt is current with PCP. Pt uses [facility] in [place] and would like meds to [facility]. Pt is not current with  Services. Pt does not use home O2 at baseline. Pt does not drive. Son reports goal is home with family to transport at this time. No needs identified. RANDA/SERGEI will continue to follow for any dc needs.    Final Discharge  Disposition Code 01 - home or self-care                  Continued Care and Services - Admitted Since 2/24/2024    Coordination has not been started for this encounter.          Demographic Summary       Row Name 02/25/24 1115       General Information    Admission Type inpatient    Arrived From home    Required Notices Provided Important Message from Medicare    Referral Source admission list    Reason for Consult discharge planning    Preferred Language English                   Functional Status       Row Name 02/25/24 1115       Functional Status, IADL    Medications independent    Meal Preparation assistive equipment and person    Housekeeping assistive equipment and person    Laundry assistive equipment and person    Shopping assistive equipment and person       Mental Status Summary    Recent Changes in Mental Status/Cognitive Functioning unable to assess       Employment/    Employment Status retired                   Psychosocial       Row Name 02/25/24 1116       Developmental Stage (Eriksson's)    Developmental Stage Stage 8 (65 years-death/Late Adulthood) Integrity vs. Despair                   Abuse/Neglect    No documentation.                  Legal    No documentation.                  Substance Abuse    No documentation.                  Patient Forms    No documentation.                     LYNNE Eugene

## 2024-02-25 NOTE — PAYOR COMM NOTE
"To:  Coretta  From: Mia Stallworth RN  Phone: [phone]  Fax: [phone]  NPI: [id number]  TIN: [id number]  Member ID: [id number]   MRN: [id number]    Jaymie Yarbrough (68 y.o. Female)       Date of Birth   1955    Social Security Number       Address   [address] [place] [address]    Home Phone   [phone]    MRN   [id number]       Orthodoxy   None    Marital Status                               Admission Date   24    Admission Type   Emergency    Admitting Provider   Vivienne Roa MD    Attending Provider   Kerley, Brian Joseph, DO    Department, Room/Bed   [facility] EMERGENCY DEPARTMENT,        Discharge Date       Discharge Disposition       Discharge Destination                                 Attending Provider: Kerley, Brian Joseph, DO    Allergies: Ciprofloxacin, Codeine, Pineapple    Isolation: None   Infection: COVID (rule out) (24)   Code Status: CPR    Ht: 157.5 cm (62\")   Wt: 65.8 kg (145 lb)    Admission Cmt: None   Principal Problem: Pancreatitis, acute [K85.90]                   Active Insurance as of 2024       Primary Coverage       Payor Plan Insurance Group Employer/Plan Group    ANTH MEDICARE REPLACEMENT ANTH MEDICARE ADVANTAGE [id number]       Payor Plan Address Payor Plan Phone Number Payor Plan Fax Number Effective Dates    [address] [phone]  2024 - None Entered    [facility] [address]         Subscriber Name Subscriber Birth Date Member ID       JAYMIE YARBROUGH 1955 [id number]                     Emergency Contacts        (Rel.) Home Phone Work Phone Mobile Phone    ANTONIO YARBROUGH ANDER (Son) [phone] -- --    MONIE YARBROUGH (Son) [phone] -- --                 History & Physical        Vivienne Roa MD at 24 0158            [facility] HOSPITALIST   HISTORY AND PHYSICAL      Name:  Jaymie Yarbrough   Age:  68 y.o.  Sex:  female  :  1955  MRN:  [id number]   Visit " "Number:  [id number]  Admission Date:  2/24/2024  Date Of Service:  02/25/24  Primary Care Physician:  Terrence Baldwin MD    Chief Complaint:     Abdominal pain, shortness of air     History Of Presenting Illness:      Patient is 68 years old female with a past medical history of hypertension and impaired mobility and ADLs, who presented to the ER with a chief complaint of shortness of breath and abdominal pain.  Patient is currently awake and alert but appears to be confused, she was reporting abdominal pain and was able to tell me her name and date of birth but was confused about her situation and kept repeating \"I am here for colitis diagnosis\".  Per ER report patient has been sick for the past 5 to 6 days with cough and difficulty breathing with symptoms worsening over the past 2 days.  Patient has been refusing to come to the ER until the day of admission where she became confused and the son called EMS. Patient had COVID-19 approximately 2 or 3 weeks ago but seem to improve prior to this week.  history otherwise limited due to patient mental status and disorientation.  No family at bedside.  Patient was previously admitted here back in September 2023 when she was treated for C. difficile colitis.    On ER evaluation, Patient was hypertensive with a blood pressure of 158/110, afebrile on room air.  Her workup was significant for an ABG with a pH of 6.886/pCO2 11.8/pO2 136/HCO3 2.2/saturation 98% on room air.  proBNP 2545, CO2 3.1, anion gap 23.9, creatinine 3.31, BUN 63, glucose 126, lactate 2.3, lipase above 3000, Pro-Miguel 0.44, WBC 28.7.  Respiratory panel negative.  Chest x-ray with chronic changes per my read.  CT abdomen pelvis without contrast showed Mild acute pancreatitis with retroperitoneal effusion. No abscess. Nonobstructing bilateral renal stones. Moderate-large hiatal hernia.  Patient received dexamethasone 10 mg, DuoNeb, Zofran, 2 L of normal saline boluses and was started on Vanco and Zosyn in " addition to sodium bicarb in the ED.  hospitalist consulted for admission.    Review Of Systems:    All systems were reviewed and negative except as mentioned in history of presenting illness, assessment and plan.    Past Medical History: Patient  has a past medical history of Hypertension, Impaired mobility, and Injury of back.    Past Surgical History: Patient  has a past surgical history that includes Tubal ligation; Colonoscopy; Colon surgery; and Abdominal surgery.    Social History: Patient  reports that she has been smoking cigarettes. She has been smoking an average of 1 pack per day. She has never used smokeless tobacco. She reports that she does not drink alcohol and does not use drugs.    Family History:  Patient's family history has been reviewed and found to be noncontributory.     Allergies:      Ciprofloxacin, Codeine, and Pineapple    Home Medications:    Prior to Admission Medications       Prescriptions Last Dose Informant Patient Reported? Taking?    citalopram (CeleXA) 40 MG tablet   Yes No    Take 1 tablet by mouth Daily. Indications: Major Depressive Disorder    famotidine (PEPCID) 20 MG tablet   Yes No    Take 2 tablets by mouth 3 (Three) Times a Day. Indications: Heartburn    furosemide (LASIX) 20 MG tablet   No No    Take 1 tablet by mouth 4 (Four) Times a Day. Hold until seen by PCP    glucosamine-chondroitin 500-400 MG capsule capsule   Yes No    Take 1 capsule by mouth 2 (Two) Times a Day With Meals. Indications: Joint Damage causing Pain and Loss of Function    HYDROcodone-acetaminophen (NORCO) 7.5-325 MG per tablet  Medication Bottle Yes No    Take 1 tablet by mouth Every 8 (Eight) Hours As Needed for Moderate Pain. Indications: Pain    methocarbamol (ROBAXIN) 750 MG tablet   Yes No    Take 1-2 tablets by mouth 3 (Three) Times a Day As Needed for Muscle Spasms. Take one to two tablets three times daily as needed  Indications: Musculoskeletal Pain    metoprolol tartrate (LOPRESSOR) 50 MG  tablet   Yes No    Take 50 mg by mouth Daily. Indications: High Blood Pressure Disorder    ondansetron (ZOFRAN) 8 MG tablet   Yes No    Take 8 mg by mouth Every 8 (Eight) Hours As Needed for Nausea or Vomiting. Indications: Nausea and Vomiting    saccharomyces boulardii (Florastor) 250 MG capsule   No No    Take 1 capsule by mouth 2 (Two) Times a Day.    saccharomyces boulardii (Florastor) 250 MG capsule   Yes No    Take 1 capsule by mouth 2 (Two) Times a Day. Given 9-18-23 from [facility] [place], [place]    sodium bicarbonate 650 MG tablet   Yes No    Take 1 tablet by mouth 2 (Two) Times a Day. Indications: Heartburn    vitamin D3 125 MCG (5000 UT) capsule capsule   Yes No    Take 1 capsule by mouth Daily. Indications: Vitamin D Deficiency          ED Medications:    Medications   sodium chloride 0.9 % flush 10 mL (has no administration in time range)   vancomycin 1250 mg/250 mL 0.9% NS IVPB ([facility]) (1,250 mg Intravenous New Bag 2/25/24 0142)   sodium bicarbonate 8.4 % 150 mEq in dextrose (D5W) 5 % 1,000 mL infusion (greater than 75 mEq) (150 mEq Intravenous New Bag 2/25/24 0126)   sodium chloride 0.9 % bolus 500 mL (has no administration in time range)   sodium chloride 0.9 % bolus 500 mL (0 mL Intravenous Stopped 2/25/24 0048)   ondansetron (ZOFRAN) injection 4 mg (4 mg Intravenous Given 2/25/24 0001)   ipratropium-albuterol (DUO-NEB) nebulizer solution 3 mL (3 mL Nebulization Given 2/25/24 0005)   dexAMETHasone sodium phosphate injection 10 mg (10 mg Intravenous Given 2/25/24 0003)   sodium chloride 0.9 % bolus 1,000 mL (1,000 mL Intravenous New Bag 2/25/24 0111)   piperacillin-tazobactam (ZOSYN) 3.375 g in iso-osmotic dextrose 50 ml (premix) (0 g Intravenous Stopped 2/25/24 0142)     Vital Signs:  Temp:  [94.5 °F (34.7 °C)] 94.5 °F (34.7 °C)  Heart Rate:  [79-96] 88  Resp:  [18-21] 18  BP: (144-177)/() 147/78        02/24/24  6705   Weight: 65.8 kg (145 lb)     Body mass index is 26.52 kg/m².    Physical Exam:  "    Most recent vital Signs: /78 (BP Location: Left arm, Patient Position: Lying)   Pulse 88   Temp 94.5 °F (34.7 °C) (Rectal)   Resp 18   Ht 157.5 cm (62\")   Wt 65.8 kg (145 lb)   SpO2 99%   BMI 26.52 kg/m²     Physical Exam  Vitals and nursing note reviewed.   Constitutional:       Appearance: She is ill-appearing.   HENT:      Head: Normocephalic and atraumatic.      Right Ear: External ear normal.      Left Ear: External ear normal.      Nose: Nose normal.      Mouth/Throat:      Mouth: Mucous membranes are dry.   Eyes:      Extraocular Movements: Extraocular movements intact.      Conjunctiva/sclera: Conjunctivae normal.      Pupils: Pupils are equal, round, and reactive to light.   Cardiovascular:      Rate and Rhythm: Normal rate and regular rhythm.      Pulses: Normal pulses.      Heart sounds: Normal heart sounds.   Pulmonary:      Effort: Pulmonary effort is normal. No respiratory distress.      Breath sounds: Normal breath sounds. No wheezing or rhonchi.   Abdominal:      General: Bowel sounds are normal. There is no distension.      Palpations: Abdomen is soft.      Tenderness: There is generalized abdominal tenderness.   Musculoskeletal:         General: Normal range of motion.      Cervical back: Normal range of motion and neck supple.      Right lower leg: No edema.      Left lower leg: No edema.   Skin:     General: Skin is warm and dry.      Findings: No rash.   Neurological:      General: No focal deficit present.      Mental Status: She is alert. She is disoriented.      GCS: GCS eye subscore is 4. GCS verbal subscore is 5. GCS motor subscore is 6.      Cranial Nerves: No cranial nerve deficit or facial asymmetry.      Motor: No weakness or tremor.   Psychiatric:         Mood and Affect: Mood normal.         Laboratory data:    I have reviewed the labs done in the emergency room.    Results from last 7 days   Lab Units 02/24/24  2354   SODIUM mmol/L 135*   POTASSIUM mmol/L 4.4 " "  CHLORIDE mmol/L 108*   CO2 mmol/L 3.1*   BUN mg/dL 63*   CREATININE mg/dL 3.31*   CALCIUM mg/dL 10.9*   BILIRUBIN mg/dL <0.2   ALK PHOS U/L 134*   ALT (SGPT) U/L 12   AST (SGOT) U/L 20   GLUCOSE mg/dL 126*     Results from last 7 days   Lab Units 02/24/24  2354   WBC 10*3/mm3 28.79*   HEMOGLOBIN g/dL 15.2   HEMATOCRIT % 45.9   PLATELETS 10*3/mm3 317             Results from last 7 days   Lab Units 02/24/24  2354   PROBNP pg/mL 2,545.0*         Results from last 7 days   Lab Units 02/24/24  2354   LIPASE U/L >3,000*     Results from last 7 days   Lab Units 02/24/24  2340   PH, ARTERIAL pH units 6.886*   PO2 ART mm Hg 136.0*   PCO2, ARTERIAL mm Hg 11.8*   HCO3 ART mmol/L 2.2*           Invalid input(s): \"USDES\", \"NITRITITE\", \"BACT\", \"EP\"    Pain Management Panel          Latest Ref Rng & Units 8/6/2022   Pain Management Panel   Amphetamine, Urine Qual Negative Negative    Barbiturates Screen, Urine Negative Negative    Benzodiazepine Screen, Urine Negative Negative    Buprenorphine, Screen, Urine Negative Negative    Cocaine Screen, Urine Negative Negative    Methadone Screen , Urine Negative Negative    Methamphetamine, Ur Negative Negative          Radiology:    CT Abdomen Pelvis Without Contrast    Result Date: 2/25/2024  FINAL REPORT TECHNIQUE: null CLINICAL HISTORY: diffuse Abdominal pain, sepsis, confusion, SOA; family states symptoms began 5-6 days ago COMPARISON: null FINDINGS: CT abdomen and pelvis without contrast Comparison: CT/SR - CT ABDOMEN PELVIS W CONTRAST - 09/22/2023 08:48 PM EDT Findings: No consolidation or effusion. Liver and adrenal glands are unremarkable. Calcified granulomas in the spleen. Gallbladder is absent. Peripancreatic stranding with retroperitoneal effusion. Both kidneys are similar in size without hydronephrosis. Small nonobstructing bilateral renal stones. No ureteral calculi. Hypodense bilateral renal cysts. Moderate-large hiatal hernia. No bowel obstruction, pneumoperitoneum, or " pneumatosis. The appendix is not seen. Calcified plaque in the nonaneurysmal aorta and iliac arteries. There are no enlarged abdominal or pelvic lymph nodes. Distended bladder without wall thickening. Uterus is unremarkable. Stable hypodense 2.3 x 2.1 cm right ovarian cyst. Degenerative dextroscoliosis of the thoracolumbar spine with chronic L2 compression deformity.     IMPRESSION: 1. Mild acute pancreatitis with retroperitoneal effusion. No abscess. 2. Nonobstructing bilateral renal stones. 3. Moderate-large hiatal hernia. Authenticated and Electronically Signed by Yonathan Coles MD on 02/25/2024 01:22:17 AM     Assessment:    Acute kidney injury, POA  Acute pancreatitis, POA  Acute metabolic encephalopathy, POA  Metabolic lactic acidosis, POA  Severe sepsis, unknown source of infection, POA  Hypertension  Impaired mobility and ADLs    Plan:    Patient is admitted for further management and treatment.    Acute kidney injury  -Nephrology consulted, appreciate recommendations.  -Avoid nephrotoxic drugs, holding home Lasix  -Continue IV fluids  -Patient with bilateral nonobstructing renal stones with no hydronephrosis on CT abdomen and pelvis.    Acute pancreatitis   -S/p cholecystectomy  -Lipid panel   -pain control  -Continue IV fluids    Sepsis  -Patient met SIRS criteria, in the settings of pancreatitis and VIVIENNE and systemic acidosis.  -Continue coverage with Vanco and Zosyn and de-escalate as able to  -Urinalysis pending  -Blood cultures obtained    Acute encephalopathy  -Likely metabolic  -Consider CT head if no improvement  -Check TSH    Metabolic lactic acidosis   -on bicarb drip   -repeat ABG in a.m.    -Continue home meds as warranted.  -Further orders as indicated per clinical course.    Risk Assessment: High  DVT Prophylaxis: Heparin prophylaxis (benefit> risk)  Code Status: Full  Diet: N.p.o.    Advance Care Planning  ACP discussion was held with the patient during this visit. Patient does not have an  advance directive, information provided.       Vivienne Roa MD  24  01:59 EST    Dictated utilizing Dragon dictation.    Electronically signed by Vivienne Roa MD at 24 0434          Emergency Department Notes        Ho Clark MD at 24 2328        Procedure Orders    1. Critical Care [[id number]] ordered by Ho Clark MD                  EMERGENCY DEPARTMENT ENCOUNTER    Pt Name: Tasha Yarbrough  MRN: [id number]  Pt :   1955  Room Number:    Date of encounter:  2024  PCP: Terrence Baldwin MD  ED Provider: Ho Clark MD    Historian: Patient, son, and EMS      HPI:  Chief Complaint: Shortness of air        Context: Tasha Yarbrough is a 68 y.o. female who presents to the ED c/o shortness of air.  Patient reportedly has been sick for approximately 5 or 6 days with cough and difficulty breathing.  Symptoms got much worse 2 days ago.  Patient had refused to come to emergency department until tonight when she was confused and patient son decided for her to call EMS.  Patient had COVID-19 approximately 2 or 3 weeks ago but seem to improve prior to this week.  Patient oriented to year but not month.  Patient also does not recall recent events and having difficulty describing her symptoms.  Reports abdominal pain nausea and decreased appetite as well as her difficulty breathing.  EMS reported that patient was initially satting around 100% on room air but then seemed to desat while in route with oxygen saturation of 86%.  EMS provided patient with DuoNeb treatment while in route with some improvement.  No previous oxygen requirement at home.      PAST MEDICAL HISTORY  Past Medical History:   Diagnosis Date    Hypertension     Impaired mobility     Injury of back          PAST SURGICAL HISTORY  Past Surgical History:   Procedure Laterality Date    ABDOMINAL SURGERY      COLON SURGERY      COLONOSCOPY      TUBAL ABDOMINAL LIGATION           FAMILY HISTORY  History reviewed.  No pertinent family history.      SOCIAL HISTORY  Social History     Socioeconomic History    Marital status:    Tobacco Use    Smoking status: Every Day     Packs/day: 1     Types: Cigarettes    Smokeless tobacco: Never   Vaping Use    Vaping Use: Never used   Substance and Sexual Activity    Alcohol use: Never    Drug use: Never    Sexual activity: Defer         ALLERGIES  Ciprofloxacin, Codeine, and Pineapple        REVIEW OF SYSTEMS  Review of Systems     All systems reviewed and negative except for those discussed in HPI.       PHYSICAL EXAM    I have reviewed the triage vital signs and nursing notes.    ED Triage Vitals   Temp Pulse Resp BP SpO2   -- -- -- -- --      Temp src Heart Rate Source Patient Position BP Location FiO2 (%)   -- -- -- -- --       Physical Exam    General:  Awake, alert, elderly, visible respiratory distress/increased work of breathing  HEENT: Atraumatic, normocephalic, EOMI, PERRLA, mucous membranes moist  NECK:  Supple, atraumatic  Cardiovascular:  Regular rate, regular rhythm, no murmurs, rubs, or gallops.  Extremities well perfused   Respiratory: Tachypneic.  Mild expiratory wheezing.  No significant rhonchi.  Abdominal:  Soft, nondistended, nonfocal tenderness.  No guarding or rebound.  No palpable masses  Extremity:  No visible bony abnormalities in all 4 extremities.  Full range of motion of all extremities.  Skin:  Warm and dry.  No rashes  Neuro:  AAOx3, GCS 15. Cranial nerves 2-12 grossly intact.  No focal strength or sensation deficits.  Psych:  Mood and affect appropriate.        LAB RESULTS  Recent Results (from the past 24 hour(s))   Respiratory Panel PCR w/COVID-19(SARS-CoV-2) JORDYN/AMA/KIM/PAD/COR/MALICK In-House, NP Swab in UTM/VTM, 2 HR TAT - Swab, Nasopharynx    Collection Time: 02/24/24 11:39 PM    Specimen: Nasopharynx; Swab   Result Value Ref Range    ADENOVIRUS, PCR Not Detected Not Detected    Coronavirus 229E Not Detected Not Detected    Coronavirus HKU1 Not  Detected Not Detected    Coronavirus NL63 Not Detected Not Detected    Coronavirus OC43 Not Detected Not Detected    COVID19 Not Detected Not Detected - Ref. Range    Human Metapneumovirus Not Detected Not Detected    Human Rhinovirus/Enterovirus Not Detected Not Detected    Influenza A PCR Not Detected Not Detected    Influenza B PCR Not Detected Not Detected    Parainfluenza Virus 1 Not Detected Not Detected    Parainfluenza Virus 2 Not Detected Not Detected    Parainfluenza Virus 3 Not Detected Not Detected    Parainfluenza Virus 4 Not Detected Not Detected    RSV, PCR Not Detected Not Detected    Bordetella pertussis pcr Not Detected Not Detected    Bordetella parapertussis PCR Not Detected Not Detected    Chlamydophila pneumoniae PCR Not Detected Not Detected    Mycoplasma pneumo by PCR Not Detected Not Detected   Blood Gas, Arterial With Co-Ox    Collection Time: 02/24/24 11:40 PM    Specimen: Arterial Blood   Result Value Ref Range    Site Left Radial     Rafy's Test Positive     pH, Arterial 6.886 (C) 7.350 - 7.450 pH units    pCO2, Arterial 11.8 (C) 35.0 - 45.0 mm Hg    pO2, Arterial 136.0 (H) 75.0 - 100.0 mm Hg    HCO3, Arterial 2.2 (L) 22.0 - 28.0 mmol/L    Base Excess, Arterial -29.6 (L) 0.0 - 2.0 mmol/L    O2 Saturation, Arterial 98.8 94.0 - 100.0 %    Hematocrit, Blood Gas 44.8 %    Oxyhemoglobin 97.5 94 - 99 %    Methemoglobin 0.60 0.00 - 1.50 %    Carboxyhemoglobin 0.7 0 - 2 %    A-a DO2      Barometric Pressure for Blood Gas 736 mmHg    Modality Room Air     FIO2 21 %    Ventilator Mode NA     Notified Who MD     Notified By [id number]     Notified Time 02/25/2024 00:53     pH, Temp Corrected      pCO2, Temperature Corrected      pO2, Temperature Corrected     Lactic Acid, Plasma    Collection Time: 02/24/24 11:52 PM    Specimen: Blood   Result Value Ref Range    Lactate 2.3 (C) 0.5 - 2.0 mmol/L   Comprehensive Metabolic Panel    Collection Time: 02/24/24 11:54 PM    Specimen: Blood   Result Value Ref  Range    Glucose 126 (H) 65 - 99 mg/dL    BUN 63 (H) 8 - 23 mg/dL    Creatinine 3.31 (H) 0.57 - 1.00 mg/dL    Sodium 135 (L) 136 - 145 mmol/L    Potassium 4.4 3.5 - 5.2 mmol/L    Chloride 108 (H) 98 - 107 mmol/L    CO2 3.1 (L) 22.0 - 29.0 mmol/L    Calcium 10.9 (H) 8.6 - 10.5 mg/dL    Total Protein 8.3 6.0 - 8.5 g/dL    Albumin 4.7 3.5 - 5.2 g/dL    ALT (SGPT) 12 1 - 33 U/L    AST (SGOT) 20 1 - 32 U/L    Alkaline Phosphatase 134 (H) 39 - 117 U/L    Total Bilirubin <0.2 0.0 - 1.2 mg/dL    Globulin 3.6 gm/dL    A/G Ratio 1.3 g/dL    BUN/Creatinine Ratio 19.0 7.0 - 25.0    Anion Gap 23.9 (H) 5.0 - 15.0 mmol/L    eGFR 14.6 (L) >60.0 mL/min/1.73   BNP    Collection Time: 02/24/24 11:54 PM    Specimen: Blood   Result Value Ref Range    proBNP 2,545.0 (H) 0.0 - 900.0 pg/mL   CBC Auto Differential    Collection Time: 02/24/24 11:54 PM    Specimen: Blood   Result Value Ref Range    WBC 28.79 (H) 3.40 - 10.80 10*3/mm3    RBC 4.15 3.77 - 5.28 10*6/mm3    Hemoglobin 15.2 12.0 - 15.9 g/dL    Hematocrit 45.9 34.0 - 46.6 %    .6 (H) 79.0 - 97.0 fL    MCH 36.6 (H) 26.6 - 33.0 pg    MCHC 33.1 31.5 - 35.7 g/dL    RDW 14.1 12.3 - 15.4 %    RDW-SD 58.4 (H) 37.0 - 54.0 fl    MPV 10.7 6.0 - 12.0 fL    Platelets 317 140 - 450 10*3/mm3    Neutrophil % 86.6 (H) 42.7 - 76.0 %    Lymphocyte % 3.6 (L) 19.6 - 45.3 %    Monocyte % 7.0 5.0 - 12.0 %    Eosinophil % 0.0 (L) 0.3 - 6.2 %    Basophil % 0.4 0.0 - 1.5 %    Immature Grans % 2.4 (H) 0.0 - 0.5 %    Neutrophils, Absolute 24.93 (H) 1.70 - 7.00 10*3/mm3    Lymphocytes, Absolute 1.04 0.70 - 3.10 10*3/mm3    Monocytes, Absolute 2.02 (H) 0.10 - 0.90 10*3/mm3    Eosinophils, Absolute 0.00 0.00 - 0.40 10*3/mm3    Basophils, Absolute 0.11 0.00 - 0.20 10*3/mm3    Immature Grans, Absolute 0.69 (H) 0.00 - 0.05 10*3/mm3    nRBC 0.1 0.0 - 0.2 /100 WBC   Lipase    Collection Time: 02/24/24 11:54 PM    Specimen: Blood   Result Value Ref Range    Lipase >3,000 (H) 13 - 60 U/L   Green Top (Gel)     Collection Time: 02/24/24 11:54 PM   Result Value Ref Range    Extra Tube Hold for add-ons.    Lavender Top    Collection Time: 02/24/24 11:54 PM   Result Value Ref Range    Extra Tube hold for add-on    Gold Top - SST    Collection Time: 02/24/24 11:54 PM   Result Value Ref Range    Extra Tube Hold for add-ons.    Light Blue Top    Collection Time: 02/24/24 11:54 PM   Result Value Ref Range    Extra Tube Hold for add-ons.    Scan Slide    Collection Time: 02/24/24 11:54 PM    Specimen: Blood   Result Value Ref Range    Macrocytes Slight/1+ None Seen    WBC Morphology Normal Normal    Platelet Estimate Adequate Normal   Procalcitonin    Collection Time: 02/24/24 11:54 PM    Specimen: Blood   Result Value Ref Range    Procalcitonin 0.44 (H) 0.00 - 0.25 ng/mL   Blood Gas, Arterial With Co-Ox    Collection Time: 02/25/24  2:26 AM    Specimen: Arterial Blood   Result Value Ref Range    Site Right Brachial     Rafy's Test Positive     pH, Arterial 6.925 (C) 7.350 - 7.450 pH units    pCO2, Arterial 15.1 (C) 35.0 - 45.0 mm Hg    pO2, Arterial 135.0 (H) 75.0 - 100.0 mm Hg    HCO3, Arterial 3.1 (L) 22.0 - 28.0 mmol/L    Base Excess, Arterial -27.9 (L) 0.0 - 2.0 mmol/L    O2 Saturation, Arterial 98.9 94.0 - 100.0 %    Hematocrit, Blood Gas 38.3 %    Oxyhemoglobin 97.7 94 - 99 %    Methemoglobin 0.50 0.00 - 1.50 %    Carboxyhemoglobin 0.7 0 - 2 %    A-a DO2      Barometric Pressure for Blood Gas 736 mmHg    Modality Room Air     FIO2 21 %    Ventilator Mode NA     Notified Who MD     Notified By [id number]     Notified Time 02/25/2024 03:39     Collected by [id number]     pH, Temp Corrected      pCO2, Temperature Corrected      pO2, Temperature Corrected         If labs were ordered, I independently reviewed the results and considered them in treating the patient.        RADIOLOGY  CT Abdomen Pelvis Without Contrast    Result Date: 2/25/2024  FINAL REPORT TECHNIQUE: null CLINICAL HISTORY: diffuse Abdominal pain, sepsis, confusion,  SOA; family states symptoms began 5-6 days ago COMPARISON: null FINDINGS: CT abdomen and pelvis without contrast Comparison: CT/SR - CT ABDOMEN PELVIS W CONTRAST - 09/22/2023 08:48 PM EDT Findings: No consolidation or effusion. Liver and adrenal glands are unremarkable. Calcified granulomas in the spleen. Gallbladder is absent. Peripancreatic stranding with retroperitoneal effusion. Both kidneys are similar in size without hydronephrosis. Small nonobstructing bilateral renal stones. No ureteral calculi. Hypodense bilateral renal cysts. Moderate-large hiatal hernia. No bowel obstruction, pneumoperitoneum, or pneumatosis. The appendix is not seen. Calcified plaque in the nonaneurysmal aorta and iliac arteries. There are no enlarged abdominal or pelvic lymph nodes. Distended bladder without wall thickening. Uterus is unremarkable. Stable hypodense 2.3 x 2.1 cm right ovarian cyst. Degenerative dextroscoliosis of the thoracolumbar spine with chronic L2 compression deformity.     IMPRESSION: 1. Mild acute pancreatitis with retroperitoneal effusion. No abscess. 2. Nonobstructing bilateral renal stones. 3. Moderate-large hiatal hernia. Authenticated and Electronically Signed by Yonathan Coles MD on 02/25/2024 01:22:17 AM     I ordered and independently reviewed the above noted radiographic studies.     See radiologist's dictation for official interpretation.        PROCEDURES    Critical Care    Performed by: Ho Clark MD  Authorized by: Ho Clark MD    Critical care provider statement:     Critical care time (minutes):  40    Critical care time was exclusive of:  Separately billable procedures and treating other patients and teaching time    Critical care was necessary to treat or prevent imminent or life-threatening deterioration of the following conditions:  Renal failure, respiratory failure, sepsis, shock and metabolic crisis    Critical care was time spent personally by me on the following activities:   Re-evaluation of patient's condition, pulse oximetry, ordering and review of radiographic studies, ordering and review of laboratory studies, ordering and performing treatments and interventions, development of treatment plan with patient or surrogate, evaluation of patient's response to treatment, examination of patient, interpretation of cardiac output measurements and obtaining history from patient or surrogate    I assumed direction of critical care for this patient from another provider in my specialty: no      Care discussed with: admitting provider        No orders to display       MEDICATIONS GIVEN IN ER    Medications   sodium chloride 0.9 % flush 10 mL (has no administration in time range)   sodium bicarbonate 8.4 % 150 mEq in dextrose (D5W) 5 % 1,000 mL infusion (greater than 75 mEq) (150 mEq Intravenous New Bag 2/25/24 0126)   morphine injection 4 mg (has no administration in time range)   ondansetron (ZOFRAN) injection 4 mg (has no administration in time range)   sodium chloride 0.9 % bolus 500 mL (0 mL Intravenous Stopped 2/25/24 0048)   ondansetron (ZOFRAN) injection 4 mg (4 mg Intravenous Given 2/25/24 0001)   ipratropium-albuterol (DUO-NEB) nebulizer solution 3 mL (3 mL Nebulization Given 2/25/24 0005)   dexAMETHasone sodium phosphate injection 10 mg (10 mg Intravenous Given 2/25/24 0003)   sodium chloride 0.9 % bolus 1,000 mL (0 mL Intravenous Stopped 2/25/24 0235)   piperacillin-tazobactam (ZOSYN) 3.375 g in iso-osmotic dextrose 50 ml (premix) (0 g Intravenous Stopped 2/25/24 0142)   vancomycin 1250 mg/250 mL 0.9% NS IVPB ([facility]) (1,250 mg Intravenous New Bag 2/25/24 0142)   sodium chloride 0.9 % bolus 500 mL (500 mL Intravenous New Bag 2/25/24 0238)         MEDICAL DECISION MAKING, PROGRESS, and CONSULTS    All labs, if obtained, have been independently reviewed by me.  All radiology studies, if obtained, have been reviewed by me and the radiologist dictating the report.  All EKG's, if obtained,  have been independently viewed and interpreted by me.      Discussion below represents my analysis of pertinent findings related to patient's condition, differential diagnosis, treatment plan and final disposition.    Tasha Yarbrough is a 68 y.o. female who presents to the ED c/o difficulty breathing/shortness of air.  Brought in by EMS.  Hemodynamically stable on arrival satting 100% on room air.  Differential includes was not limited to viral upper respiratory infection, pneumonia, pancreatitis, constipation, cholecystitis, hypercarbia, metabolic imbalance, pulmonary embolism.  Extensive labs and chest x-ray ordered along with ABG.  Patient given bolus of IV fluids.  Patient also given Zofran for nausea and dexamethasone for possible COPD flare with DuoNeb treatment.    Chest x-ray shows no significant cardiopulmonary abnormality on independent exam other than chronic COPD.  Labs show multiple critical abnormalities including leukocytosis of nearly 29, pH of 6.88 with hypocarbia with pCO2 of 11, acute renal failure with creatinine of 3.3 and elevated BUN, lipase over 3000 concerning for acute pancreatitis.  Patient is already had cholecystectomy making choledocholithiasis/gallstone pancreatitis less likely.  CT of abdomen pelvis without contrast to be obtained given patient's severe renal injury no contrast to be given.  Patient given additional IV fluid bolus, totaling over 30 mL/kg per sepsis guidelines.  Also empirically started on IV Zosyn and IV vancomycin.  Started on bicarb drip due to severe metabolic acidosis.  Discussed severity of lab results with patient and family at bedside who voiced understanding.    CT of abdomen pelvis shows pancreatitis but no other significant infectious abnormalities noted by radiology.  Only mildly elevated lactate.  Blood cultures obtained and pending.  Discussed patient's case with hospitalist who is agreeable with plan for admission for further treatment.  Family also  agreeable with this plan.                               Orders placed during this visit:  Orders Placed This Encounter   Procedures    Critical Care    Respiratory Panel PCR w/COVID-19(SARS-CoV-2) JORDYN/AMA/KIM/PAD/COR/MALICK In-House, NP Swab in UTM/VTM, 2 HR TAT - Swab, Nasopharynx    Blood Culture - Blood,    Blood Culture - Blood,    XR Chest 1 View    CT Abdomen Pelvis Without Contrast    Comprehensive Metabolic Panel    BNP    Blood Gas, Arterial -With Co-Ox Panel: Yes    CBC Auto Differential    Lipase    Urinalysis With Microscopic If Indicated (No Culture) - Urine, Clean Catch    Blood Gas, Arterial With Co-Ox    [facility] Draw    Scan Slide    Lactic Acid, Plasma    Procalcitonin    STAT Lactic Acid, Reflex    Blood Gas, Arterial -With Co-Ox Panel: Yes    Blood Gas, Arterial With Co-Ox    Insert Peripheral IV    Inpatient Admission    CBC & Differential    Green Top (Gel)    Lavender Top    Gold Top - SST    Light Blue Top         ED Course:    Consultants:                  Shared Decision Making:  After my consideration of clinical presentation and any laboratory/radiology studies obtained, I discussed the findings with the patient/patient representative who is in agreement with the treatment plan and the final disposition.   Risks and benefits of discharge and/or observation/admission were discussed.      AS OF 03:18 EST VITALS:    BP - 148/78  HR - 91  TEMP - 94.2 °F (34.6 °C)  O2 SATS - 100%                  DIAGNOSIS  Final diagnoses:   Acute pancreatitis, unspecified complication status, unspecified pancreatitis type   Leukocytosis, unspecified type   Metabolic acidosis   Epigastric pain   Acute renal failure, unspecified acute renal failure type   Dehydration   Sepsis with acute renal failure without septic shock, due to unspecified organism, unspecified acute renal failure type         DISPOSITION  Admit      Please note that portions of this document were completed with voice recognition software.         Ho Clark MD  02/25/24 0319      Electronically signed by Ho Clark MD at 02/25/24 0319       Vital Signs (last day)       Date/Time Temp Temp src Pulse Resp BP Patient Position SpO2    02/25/24 0850 -- -- 98 -- 137/82 -- --    02/25/24 0600 -- -- 102 22 153/89 -- 100    02/25/24 0525 97.1 (36.2) Rectal -- -- -- -- --    02/25/24 0500 -- -- 107 -- 134/79 -- 100    02/25/24 0400 -- -- 100 -- 147/91 -- 95    02/25/24 0314 -- -- 98 24 137/92 Lying 99    02/25/24 0255 94.2 (34.6) -- -- -- -- -- --    02/25/24 0229 -- -- 92 -- 158/100 -- 98    02/25/24 0200 -- -- 91 22 148/78 Lying 100    02/25/24 0145 -- -- 88 -- 152/62 -- 99    02/25/24 0130 -- -- 88 18 147/78 Lying 99    02/25/24 0115 -- -- 87 -- 169/79 -- 99    02/25/24 0110 -- -- 88 -- 154/96 -- 99    02/25/24 0045 -- -- 85 -- 177/113 -- 99    02/25/24 0030 -- -- 83 -- 163/120 -- 100    02/25/24 0015 -- -- 82 -- 153/101 -- 99    02/25/24 0005 -- -- 96 -- 163/86 -- 98    02/25/24 0002 94.5 (34.7) Rectal -- -- -- -- --    02/24/24 2355 -- -- 79 -- 150/110 -- 100    02/24/24 2333 -- -- 81 21 144/88 Lying 97          Current Facility-Administered Medications   Medication Dose Route Frequency Provider Last Rate Last Admin    acetaminophen (TYLENOL) tablet 650 mg  650 mg Oral Q4H PRN Vivienne Roa MD        Or    acetaminophen (TYLENOL) 160 MG/5ML oral solution 650 mg  650 mg Oral Q4H PRN Vivienne Roa MD        Or    acetaminophen (TYLENOL) suppository 650 mg  650 mg Rectal Q4H PRN Vivienne Roa MD        sennosides-docusate (PERICOLACE) 8.6-50 MG per tablet 2 tablet  2 tablet Oral BID PRN Vivienne Roa MD        And    polyethylene glycol (MIRALAX) packet 17 g  17 g Oral Daily PRN Vivienne Roa MD        And    bisacodyl (DULCOLAX) EC tablet 5 mg  5 mg Oral Daily PRN Vivienne Roa MD        And    bisacodyl (DULCOLAX) suppository 10 mg  10 mg Rectal Daily PRN Vivienne Roa MD        heparin (porcine) 5000 UNIT/ML injection 5,000  Units  5,000 Units Subcutaneous Q12H Vivienne Roa MD   5,000 Units at 02/25/24 0850    metoprolol tartrate (LOPRESSOR) tablet 50 mg  50 mg Oral Daily Vivienne Roa MD   50 mg at 02/25/24 0850    Morphine sulfate (PF) injection 2 mg  2 mg Intravenous Q4H PRN Vivienne Roa MD        And    naloxone (NARCAN) injection 0.4 mg  0.4 mg Intravenous Q5 Min PRN Vivienne Roa MD        nitroglycerin (NITROSTAT) SL tablet 0.4 mg  0.4 mg Sublingual Q5 Min PRN Vivienne Roa MD        ondansetron (ZOFRAN) injection 4 mg  4 mg Intravenous Q6H PRN Vivienne Roa MD        Pharmacy to dose vancomycin   Does not apply Continuous PRN Vivienne Roa MD        Pharmacy to Dose Zosyn   Does not apply Continuous PRN Vivienne Roa MD        piperacillin-tazobactam (ZOSYN) 4.5 g in iso-osmotic dextrose 100 mL IVPB (premix)  4.5 g Intravenous Q12H Vivienne Roa MD   4.5 g at 02/25/24 0656    sodium bicarbonate 8.4 % 150 mEq in dextrose (D5W) 5 % 1,000 mL infusion (greater than 75 mEq)  150 mEq Intravenous Continuous Vivienne Roa  mL/hr at 02/25/24 0534 Currently Infusing at 02/25/24 0534    sodium chloride 0.9 % flush 10 mL  10 mL Intravenous PRN Vivienne Roa MD        sodium chloride 0.9 % flush 10 mL  10 mL Intravenous Q12H Vivienne Roa MD        sodium chloride 0.9 % flush 10 mL  10 mL Intravenous PRN Vivienne Roa MD        sodium chloride 0.9 % infusion 40 mL  40 mL Intravenous PRN Vivienne Roa MD        vancomycin 1000 mg/250 mL 0.9% NS (vial-mate)  1,000 mg Intravenous Q48H Vivienne Roa MD         Current Outpatient Medications   Medication Sig Dispense Refill    citalopram (CeleXA) 40 MG tablet Take 1 tablet by mouth Daily. Indications: Major Depressive Disorder      famotidine (PEPCID) 20 MG tablet Take 2 tablets by mouth 3 (Three) Times a Day. Indications: Heartburn      furosemide (LASIX) 20 MG tablet Take 1 tablet by mouth 4 (Four) Times a Day. Hold until seen by PCP       glucosamine-chondroitin 500-400 MG capsule capsule Take 1 capsule by mouth 2 (Two) Times a Day With Meals. Indications: Joint Damage causing Pain and Loss of Function      HYDROcodone-acetaminophen (NORCO) 7.5-325 MG per tablet Take 1 tablet by mouth Every 8 (Eight) Hours As Needed for Moderate Pain. Indications: Pain      methocarbamol (ROBAXIN) 750 MG tablet Take 1-2 tablets by mouth 3 (Three) Times a Day As Needed for Muscle Spasms. Take one to two tablets three times daily as needed  Indications: Musculoskeletal Pain      metoprolol tartrate (LOPRESSOR) 50 MG tablet Take 50 mg by mouth Daily. Indications: High Blood Pressure Disorder      ondansetron (ZOFRAN) 8 MG tablet Take 8 mg by mouth Every 8 (Eight) Hours As Needed for Nausea or Vomiting. Indications: Nausea and Vomiting      saccharomyces boulardii (Florastor) 250 MG capsule Take 1 capsule by mouth 2 (Two) Times a Day. 60 capsule 0    saccharomyces boulardii (Florastor) 250 MG capsule Take 1 capsule by mouth 2 (Two) Times a Day. Given 9-18-23 from [facility] [place], [place]      sodium bicarbonate 650 MG tablet Take 1 tablet by mouth 2 (Two) Times a Day. Indications: Heartburn      vitamin D3 125 MCG (5000 UT) capsule capsule Take 1 capsule by mouth Daily. Indications: Vitamin D Deficiency       Lab Results (last 24 hours)       Procedure Component Value Units Date/Time    Vancomycin, Random [[id number]]  (Normal) Collected: 02/25/24 0646    Specimen: Blood Updated: 02/25/24 0913     Vancomycin Random 22.70 mcg/mL     Narrative:      Therapeutic Ranges for Vancomycin    Vancomycin Random   5.0-40.0 mcg/mL  Vancomycin Trough   5.0-20.0 mcg/mL  Vancomycin Peak     20.0-40.0 mcg/mL    Comprehensive Metabolic Panel [[id number]]  (Abnormal) Collected: 02/25/24 0646    Specimen: Blood Updated: 02/25/24 0733     Glucose 113 mg/dL      BUN 61 mg/dL      Creatinine 2.91 mg/dL      Sodium 140 mmol/L      Potassium 3.8 mmol/L      Comment: Slight hemolysis detected by  analyzer. Result may be falsely elevated.        Chloride 113 mmol/L      CO2 4.5 mmol/L      Calcium 9.7 mg/dL      Total Protein 6.3 g/dL      Albumin 3.8 g/dL      ALT (SGPT) 12 U/L      AST (SGOT) 36 U/L      Alkaline Phosphatase 103 U/L      Total Bilirubin 0.2 mg/dL      Globulin 2.5 gm/dL      A/G Ratio 1.5 g/dL      BUN/Creatinine Ratio 21.0     Anion Gap 22.5 mmol/L      eGFR 17.1 mL/min/1.73     Narrative:      GFR Normal >60  Chronic Kidney Disease <60  Kidney Failure <15      Lipid Panel [[id number]]  (Abnormal) Collected: 02/25/24 0646    Specimen: Blood Updated: 02/25/24 0733     Total Cholesterol 69 mg/dL      Triglycerides 71 mg/dL      HDL Cholesterol 31 mg/dL      LDL Cholesterol  22 mg/dL      VLDL Cholesterol 16 mg/dL      LDL/HDL Ratio 0.77    Narrative:      Cholesterol Reference Ranges  (U.S. Department of Health and Human Services ATP III Classifications)    Desirable          <200 mg/dL  Borderline High    200-239 mg/dL  High Risk          >240 mg/dL      Triglyceride Reference Ranges  (U.S. Department of Health and Human Services ATP III Classifications)    Normal           <150 mg/dL  Borderline High  150-199 mg/dL  High             200-499 mg/dL  Very High        >500 mg/dL    HDL Reference Ranges  (U.S. Department of Health and Human Services ATP III Classifications)    Low     <40 mg/dl (major risk factor for CHD)  High    >60 mg/dl ('negative' risk factor for CHD)        LDL Reference Ranges  (U.S. Department of Health and Human Services ATP III Classifications)    Optimal          <100 mg/dL  Near Optimal     100-129 mg/dL  Borderline High  130-159 mg/dL  High             160-189 mg/dL  Very High        >189 mg/dL    Ethanol [[id number]] Collected: 02/25/24 0646    Specimen: Blood Updated: 02/25/24 0733     Ethanol <10 mg/dL      Ethanol % <0.010 %     Narrative:      This result is for medical use only and should not be used for forensic purposes.    CBC (No Diff) [[id number]]   (Abnormal) Collected: 02/25/24 0646    Specimen: Blood Updated: 02/25/24 0712     WBC 16.87 10*3/mm3      RBC 3.32 10*6/mm3      Hemoglobin 12.0 g/dL      Hematocrit 35.3 %      .3 fL      MCH 36.1 pg      MCHC 34.0 g/dL      RDW 13.8 %      RDW-SD 54.2 fl      MPV 10.2 fL      Platelets 152 10*3/mm3     Blood Gas, Arterial With Co-Ox [[id number]]  (Abnormal) Collected: 02/25/24 0540    Specimen: Arterial Blood Updated: 02/25/24 0540     Site Left Brachial     Rafy's Test N/A     pH, Arterial 7.073 pH units      Comment: 85 Value below critical limit        pCO2, Arterial 13.1 mm Hg      Comment: 85 Value below critical limit        pO2, Arterial 113.0 mm Hg      Comment: 83 Value above reference range        HCO3, Arterial 3.8 mmol/L      Comment: 84 Value below reference range        Base Excess, Arterial -24.2 mmol/L      Comment: 84 Value below reference range        O2 Saturation, Arterial 97.8 %      Hematocrit, Blood Gas 35.3 %      Comment: 84 Value below reference range        Oxyhemoglobin 97.4 %      Methemoglobin 0.30 %      Carboxyhemoglobin 0.2 %      A-a DO2 17.3 mmHg      Barometric Pressure for Blood Gas 736 mmHg      Modality N/A     FIO2 21 %      Ventilator Mode NA     Notified Who MD     Notified By [id number]     Notified Time 02/25/2024 06:53     Collected by      Comment: Meter: [id number]     :  [id number]        pH, Temp Corrected --     pCO2, Temperature Corrected --     pO2, Temperature Corrected --    Urinalysis, Microscopic Only - Straight Cath [[id number]] Collected: 02/25/24 0521    Specimen: Urine from Straight Cath Updated: 02/25/24 0534     RBC, UA 0-2 /HPF      WBC, UA 0-2 /HPF      Bacteria, UA None Seen /HPF      Squamous Epithelial Cells, UA None Seen /HPF      Hyaline Casts, UA None Seen /LPF      Amorphous Crystals, UA Moderate/2+ /HPF      Methodology Manual Light Microscopy    Urinalysis With Microscopic If Indicated (No Culture) - Straight Cath [[id number]]   (Abnormal) Collected: 02/25/24 0521    Specimen: Urine from Straight Cath Updated: 02/25/24 0529     Color, UA Yellow     Appearance, UA Clear     pH, UA <=5.0     Specific Gravity, UA 1.020     Glucose, UA Negative     Ketones, UA Negative     Bilirubin, UA Negative     Blood, UA Moderate (2+)     Protein,  mg/dL (2+)     Leuk Esterase, UA Negative     Nitrite, UA Negative     Urobilinogen, UA 0.2 E.U./dL    TSH Rfx On Abnormal To Free T4 [[id number]]  (Normal) Collected: 02/24/24 2354    Specimen: Blood Updated: 02/25/24 0521     TSH 1.260 uIU/mL     STAT Lactic Acid, Reflex [[id number]]  (Normal) Collected: 02/25/24 0306    Specimen: Blood Updated: 02/25/24 0337     Lactate 1.7 mmol/L     Blood Gas, Arterial With Co-Ox [[id number]]  (Abnormal) Collected: 02/25/24 0226    Specimen: Arterial Blood Updated: 02/25/24 0226     Site Right Brachial     Rafy's Test Positive     pH, Arterial 6.925 pH units      Comment: 85 Value below critical limit        pCO2, Arterial 15.1 mm Hg      Comment: 85 Value below critical limit        pO2, Arterial 135.0 mm Hg      Comment: 83 Value above reference range        HCO3, Arterial 3.1 mmol/L      Comment: 84 Value below reference range        Base Excess, Arterial -27.9 mmol/L      Comment: 84 Value below reference range        O2 Saturation, Arterial 98.9 %      Hematocrit, Blood Gas 38.3 %      Oxyhemoglobin 97.7 %      Methemoglobin 0.50 %      Carboxyhemoglobin 0.7 %      A-a DO2 --     Comment: UNABLE TO CALCULATE        Barometric Pressure for Blood Gas 736 mmHg      Modality Room Air     FIO2 21 %      Ventilator Mode NA     Notified Who MD     Notified By [id number]     Notified Time 02/25/2024 03:39     Collected by [id number]     Comment: Meter: [id number]     :  [id number]        pH, Temp Corrected --     pCO2, Temperature Corrected --     pO2, Temperature Corrected --    Procalcitonin [[id number]]  (Abnormal) Collected: 02/24/24 2354    Specimen: Blood Updated:  "02/25/24 0107     Procalcitonin 0.44 ng/mL     Narrative:      As a Marker for Sepsis (Non-Neonates):    1. <0.5 ng/mL represents a low risk of severe sepsis and/or septic shock.  2. >2 ng/mL represents a high risk of severe sepsis and/or septic shock.    As a Marker for Lower Respiratory Tract Infections that require antibiotic therapy:    PCT on Admission    Antibiotic Therapy       6-12 Hrs later    >0.5                Strongly Recommended  >0.25 - <0.5        Recommended   0.1 - 0.25          Discouraged              Remeasure/reassess PCT  <0.1                Strongly Discouraged     Remeasure/reassess PCT    As 28 day mortality risk marker: \"Change in Procalcitonin Result\" (>80% or <=80%) if Day 0 (or Day 1) and Day 4 values are available. Refer to http://[email or web address]-pct-calculator.com    Change in PCT <=80%  A decrease of PCT levels below or equal to 80% defines a positive change in PCT test result representing a higher risk for 28-day all-cause mortality of patients diagnosed with severe sepsis for septic shock.    Change in PCT >80%  A decrease of PCT levels of more than 80% defines a negative change in PCT result representing a lower risk for 28-day all-cause mortality of patients diagnosed with severe sepsis or septic shock.       [place] Draw [[id number]] Collected: 02/24/24 2354    Specimen: Blood Updated: 02/25/24 0101    Narrative:      The following orders were created for panel order [place] Draw.  Procedure                               Abnormality         Status                     ---------                               -----------         ------                     Green Top (Gel)[[id number]]                                  Final result               Lavender Top[[id number]]                                     Final result               Gold Top - SST[[id number]]                                   Final result               Light Blue Top[[id number]]                                   Final result      "            Please view results for these tests on the individual orders.    Green Top (Gel) [[id number]] Collected: 02/24/24 2354    Specimen: Blood Updated: 02/25/24 0101     Extra Tube Hold for add-ons.     Comment: Auto resulted.       Lavender Top [[id number]] Collected: 02/24/24 2354    Specimen: Blood Updated: 02/25/24 0101     Extra Tube hold for add-on     Comment: Auto resulted       Gold Top - SST [[id number]] Collected: 02/24/24 2354    Specimen: Blood Updated: 02/25/24 0101     Extra Tube Hold for add-ons.     Comment: Auto resulted.       Light Blue Top [[id number]] Collected: 02/24/24 2354    Specimen: Blood Updated: 02/25/24 0101     Extra Tube Hold for add-ons.     Comment: Auto resulted       Blood Culture - Blood, Hand, Digit Right [[id number]] Collected: 02/25/24 0049    Specimen: Blood from Hand, Digit Right Updated: 02/25/24 0055    Blood Culture - Blood, Hand, Right [[id number]] Collected: 02/25/24 0043    Specimen: Blood from Hand, Right Updated: 02/25/24 0055    Lactic Acid, Plasma [[id number]]  (Abnormal) Collected: 02/24/24 2352    Specimen: Blood Updated: 02/25/24 0053     Lactate 2.3 mmol/L     Respiratory Panel PCR w/COVID-19(SARS-CoV-2) JORDYN/AMA/KIM/PAD/COR/MALICK In-House, NP Swab in UTM/VTM, 2 HR TAT - Swab, Nasopharynx [[id number]]  (Normal) Collected: 02/24/24 2339    Specimen: Swab from Nasopharynx Updated: 02/25/24 0038     ADENOVIRUS, PCR Not Detected     Coronavirus 229E Not Detected     Coronavirus HKU1 Not Detected     Coronavirus NL63 Not Detected     Coronavirus OC43 Not Detected     COVID19 Not Detected     Human Metapneumovirus Not Detected     Human Rhinovirus/Enterovirus Not Detected     Influenza A PCR Not Detected     Influenza B PCR Not Detected     Parainfluenza Virus 1 Not Detected     Parainfluenza Virus 2 Not Detected     Parainfluenza Virus 3 Not Detected     Parainfluenza Virus 4 Not Detected     RSV, PCR Not Detected     Bordetella pertussis pcr Not Detected     Bordetella  parapertussis PCR Not Detected     Chlamydophila pneumoniae PCR Not Detected     Mycoplasma pneumo by PCR Not Detected    Narrative:      In the setting of a positive respiratory panel with a viral infection PLUS a negative procalcitonin without other underlying concern for bacterial infection, consider observing off antibiotics or discontinuation of antibiotics and continue supportive care. If the respiratory panel is positive for atypical bacterial infection (Bordetella pertussis, Chlamydophila pneumoniae, or Mycoplasma pneumoniae), consider antibiotic de-escalation to target atypical bacterial infection.    Lipase [[id number]]  (Abnormal) Collected: 02/24/24 2354    Specimen: Blood Updated: 02/25/24 0034     Lipase >3,000 U/L     BNP [[id number]]  (Abnormal) Collected: 02/24/24 2354    Specimen: Blood Updated: 02/25/24 0029     proBNP 2,545.0 pg/mL     Narrative:      This assay is used as an aid in the diagnosis of individuals suspected of having heart failure. It can be used as an aid in the diagnosis of acute decompensated heart failure (ADHF) in patients presenting with signs and symptoms of ADHF to the emergency department (ED). In addition, NT-proBNP of <300 pg/mL indicates ADHF is not likely.    Age Range Result Interpretation  NT-proBNP Concentration (pg/mL:      <50             Positive            >450                   Gray                 300-450                    Negative             <300    50-75           Positive            >900                  Gray                300-900                  Negative            <300      >75             Positive            >1800                  Gray                300-1800                  Negative            <300    CBC & Differential [[id number]]  (Abnormal) Collected: 02/24/24 2354    Specimen: Blood Updated: 02/25/24 0025    Narrative:      The following orders were created for panel order CBC & Differential.  Procedure                               Abnormality          Status                     ---------                               -----------         ------                     CBC Auto Differential[[id number]]        Abnormal            Final result               Scan Slide[[id number]]                                       Final result                 Please view results for these tests on the individual orders.    Scan Slide [[id number]] Collected: 02/24/24 2354    Specimen: Blood Updated: 02/25/24 0025     Macrocytes Slight/1+     WBC Morphology Normal     Platelet Estimate Adequate    Comprehensive Metabolic Panel [[id number]]  (Abnormal) Collected: 02/24/24 2354    Specimen: Blood Updated: 02/25/24 0021     Glucose 126 mg/dL      BUN 63 mg/dL      Creatinine 3.31 mg/dL      Sodium 135 mmol/L      Potassium 4.4 mmol/L      Comment: Slight hemolysis detected by analyzer. Result may be falsely elevated.        Chloride 108 mmol/L      CO2 3.1 mmol/L      Calcium 10.9 mg/dL      Total Protein 8.3 g/dL      Albumin 4.7 g/dL      ALT (SGPT) 12 U/L      AST (SGOT) 20 U/L      Alkaline Phosphatase 134 U/L      Total Bilirubin <0.2 mg/dL      Globulin 3.6 gm/dL      A/G Ratio 1.3 g/dL      BUN/Creatinine Ratio 19.0     Anion Gap 23.9 mmol/L      eGFR 14.6 mL/min/1.73      Comment: <15 Indicative of kidney failure       Narrative:      GFR Normal >60  Chronic Kidney Disease <60  Kidney Failure <15      CBC Auto Differential [[id number]]  (Abnormal) Collected: 02/24/24 2354    Specimen: Blood Updated: 02/25/24 0012     WBC 28.79 10*3/mm3      RBC 4.15 10*6/mm3      Hemoglobin 15.2 g/dL      Hematocrit 45.9 %      .6 fL      MCH 36.6 pg      MCHC 33.1 g/dL      RDW 14.1 %      RDW-SD 58.4 fl      MPV 10.7 fL      Platelets 317 10*3/mm3      Neutrophil % 86.6 %      Lymphocyte % 3.6 %      Monocyte % 7.0 %      Eosinophil % 0.0 %      Basophil % 0.4 %      Immature Grans % 2.4 %      Neutrophils, Absolute 24.93 10*3/mm3      Lymphocytes, Absolute 1.04 10*3/mm3      Monocytes,  Absolute 2.02 10*3/mm3      Eosinophils, Absolute 0.00 10*3/mm3      Basophils, Absolute 0.11 10*3/mm3      Immature Grans, Absolute 0.69 10*3/mm3      nRBC 0.1 /100 WBC     Blood Gas, Arterial With Co-Ox [[id number]]  (Abnormal) Collected: 02/24/24 2340    Specimen: Arterial Blood Updated: 02/24/24 2340     Site Left Radial     Rafy's Test Positive     pH, Arterial 6.886 pH units      Comment: 85 Value below critical limit        pCO2, Arterial 11.8 mm Hg      Comment: 85 Value below critical limit        pO2, Arterial 136.0 mm Hg      Comment: 83 Value above reference range        HCO3, Arterial 2.2 mmol/L      Comment: 84 Value below reference range        Base Excess, Arterial -29.6 mmol/L      Comment: 84 Value below reference range        O2 Saturation, Arterial 98.8 %      Hematocrit, Blood Gas 44.8 %      Oxyhemoglobin 97.5 %      Methemoglobin 0.60 %      Carboxyhemoglobin 0.7 %      A-a DO2 --     Comment: UNABLE TO CALCULATE        Barometric Pressure for Blood Gas 736 mmHg      Modality Room Air     FIO2 21 %      Ventilator Mode NA     Comment: Meter: [id number]     :  [id number]        Notified Who MD     Notified By [id number]     Notified Time 02/25/2024 00:53     pH, Temp Corrected --     pCO2, Temperature Corrected --     pO2, Temperature Corrected --          Imaging Results (Last 24 Hours)       Procedure Component Value Units Date/Time    XR Chest 1 View [[id number]] Collected: 02/25/24 0759     Updated: 02/25/24 0804    Narrative:      PROCEDURE: XR CHEST 1 VW-     HISTORY: Shortness of air     COMPARISON: August 18, 2022.     FINDINGS: Calcified tortuous aorta. There is evidence of calcified  granulomatous disease. The heart is normal in size. The mediastinum is  unremarkable. Prominent interstitial markings may be chronic.  Questionable patchy opacities in the right supra and infrahilar regions  may represent infiltrate versus atelectasis. There is no pneumothorax.   There are no acute  osseous abnormalities.       Impression:      Questionable patchy opacities in the right supra and  infrahilar regions may represent infiltrate versus atelectasis.      Continued followup is recommended.           This report was signed and finalized on 2/25/2024 8:02 AM by Modesto Tejeda DO.       CT Abdomen Pelvis Without Contrast [[id number]] Collected: 02/25/24 0122     Updated: 02/25/24 0124    Narrative:      FINAL REPORT    TECHNIQUE:  null    CLINICAL HISTORY:  diffuse Abdominal pain, sepsis, confusion, SOA; family states  symptoms began 5-6 days ago    COMPARISON:  null    FINDINGS:  CT abdomen and pelvis without contrast    Comparison: CT/SR - CT ABDOMEN PELVIS W CONTRAST - 09/22/2023 08:48 PM EDT    Findings:    No consolidation or effusion.    Liver and adrenal glands are unremarkable.    Calcified granulomas in the spleen. Gallbladder is absent.    Peripancreatic stranding with retroperitoneal effusion.    Both kidneys are similar in size without hydronephrosis.    Small nonobstructing bilateral renal stones. No ureteral calculi.    Hypodense bilateral renal cysts.    Moderate-large hiatal hernia.    No bowel obstruction, pneumoperitoneum, or pneumatosis. The appendix is not seen.    Calcified plaque in the nonaneurysmal aorta and iliac arteries.    There are no enlarged abdominal or pelvic lymph nodes.    Distended bladder without wall thickening. Uterus is unremarkable.    Stable hypodense 2.3 x 2.1 cm right ovarian cyst.    Degenerative dextroscoliosis of the thoracolumbar spine with chronic L2 compression deformity.      Impression:      IMPRESSION:    1. Mild acute pancreatitis with retroperitoneal effusion. No abscess.    2. Nonobstructing bilateral renal stones.    3. Moderate-large hiatal hernia.    Authenticated and Electronically Signed by Yonathan Coles MD on  02/25/2024 01:22:17 AM          Orders (last 24 hrs)        Start     Ordered    02/26/24 0600  Vancomycin, Random  Morning Draw          02/25/24 0445    02/26/24 0600  Comprehensive Metabolic Panel  Daily       02/25/24 0913    02/26/24 0600  CBC & Differential  Daily       02/25/24 0913    02/26/24 0600  Procalcitonin  Daily       02/25/24 0913    02/25/24 2300  vancomycin 1000 mg/250 mL 0.9% NS (vial-mate)  Every 48 Hours         02/25/24 0445    02/25/24 0900  metoprolol tartrate (LOPRESSOR) tablet 50 mg  Daily         02/25/24 0434    02/25/24 0900  sodium chloride 0.9 % flush 10 mL  Every 12 Hours Scheduled         02/25/24 0434    02/25/24 0900  heparin (porcine) 5000 UNIT/ML injection 5,000 Units  Every 12 Hours Scheduled         02/25/24 0434    02/25/24 0859  Vancomycin, Random  Once        Comments: Add on to 2/25 AM 0646 labs if able      02/25/24 0830    02/25/24 0829  MRSA Screen, PCR (Inpatient) - Swab, Nares  Once         02/25/24 0828    02/25/24 0800  Vital Signs  Every 4 Hours       02/25/24 0434    02/25/24 0800  Oral Care  2 Times Daily       02/25/24 0434    02/25/24 0800  Neuro Checks  Every 4 Hours       02/25/24 0434    02/25/24 0702  Inpatient Nephrology Consult  IN AM        Specialty:  Nephrology  Provider:  (Not yet assigned)    02/25/24 0434    02/25/24 0700  piperacillin-tazobactam (ZOSYN) 4.5 g in iso-osmotic dextrose 100 mL IVPB (premix)  Every 12 Hours         02/25/24 0441    02/25/24 0657  Ethanol  Once         02/25/24 0656    02/25/24 0600  Incentive Spirometry  Every 4 Hours While Awake       02/25/24 0434    02/25/24 0600  CBC (No Diff)  Morning Draw         02/25/24 0434    02/25/24 0600  Comprehensive Metabolic Panel  Morning Draw         02/25/24 0434    02/25/24 0600  Blood Gas, Arterial -With Co-Ox Panel: Yes  Morning Draw         02/25/24 0434    02/25/24 0600  Lipid Panel  Morning Draw         02/25/24 0434    02/25/24 0541  Blood Gas, Arterial With Co-Ox  PROCEDURE ONCE         02/25/24 0540    02/25/24 0529  Urinalysis, Microscopic Only - Straight Cath  Once         02/25/24 0528    02/25/24 0500   Strict Intake & Output  Every Hour       02/25/24 0434    02/25/24 0435  Intake & Output  Every Shift       02/25/24 0434    02/25/24 0435  Weigh patient  Once         02/25/24 0434    02/25/24 0435  Insert Peripheral IV  Once         02/25/24 0434    02/25/24 0435  Saline Lock & Maintain IV Access  Continuous,   Status:  Canceled         02/25/24 0434    02/25/24 0435  Place Sequential Compression Device  Once         02/25/24 0434    02/25/24 0435  Maintain Sequential Compression Device  Continuous         02/25/24 0434    02/25/24 0435  Continuous Cardiac Monitoring  Continuous        Comments: Follow Standing Orders As Outlined in Process Instructions (Open Order Report to View Full Instructions)    02/25/24 0434    02/25/24 0435  Telemetry - Maintain IV Access  Continuous         02/25/24 0434    02/25/24 0435  Telemetry - Place Orders & Notify Provider of Results When Patient Experiences Acute Chest Pain, Dysrhythmia or Respiratory Distress  Until Discontinued         02/25/24 0434    02/25/24 0435  May Be Off Telemetry for Tests  Continuous         02/25/24 0434    02/25/24 0435  Activity - Ad Fang  Until Discontinued         02/25/24 0434    02/25/24 0435  Notify Provider (With Default Parameters)  Until Discontinued         02/25/24 0434    02/25/24 0435  NPO Diet NPO Type: Strict NPO  Diet Effective Now         02/25/24 0434    02/25/24 0435  Inpatient Case Management  Consult  Once        Provider:  (Not yet assigned)    02/25/24 0434    02/25/24 0435  OT Consult: Eval & Treat ADL Performance Below Baseline  Once         02/25/24 0434    02/25/24 0435  PT Consult: Eval & Treat Functional Mobility Below Baseline  Once         02/25/24 0434    02/25/24 0435  TSH Rfx On Abnormal To Free T4  Once         02/25/24 0434    02/25/24 0434  sodium chloride 0.9 % flush 10 mL  As Needed         02/25/24 0434    02/25/24 0434  sodium chloride 0.9 % infusion 40 mL  As Needed         02/25/24 0434     02/25/24 0434  acetaminophen (TYLENOL) tablet 650 mg  Every 4 Hours PRN        See Hyperspace for full Linked Orders Report.    02/25/24 0434 02/25/24 0434  acetaminophen (TYLENOL) 160 MG/5ML oral solution 650 mg  Every 4 Hours PRN        See Hyperspace for full Linked Orders Report.    02/25/24 0434 02/25/24 0434  acetaminophen (TYLENOL) suppository 650 mg  Every 4 Hours PRN        See Hyperspace for full Linked Orders Report.    02/25/24 0434 02/25/24 0434  ondansetron (ZOFRAN) injection 4 mg  Every 6 Hours PRN         02/25/24 0434    02/25/24 0434  nitroglycerin (NITROSTAT) SL tablet 0.4 mg  Every 5 Minutes PRN         02/25/24 0434 02/25/24 0434  sennosides-docusate (PERICOLACE) 8.6-50 MG per tablet 2 tablet  2 Times Daily PRN        See Hyperspace for full Linked Orders Report.    02/25/24 0434 02/25/24 0434  polyethylene glycol (MIRALAX) packet 17 g  Daily PRN        See Hyperspace for full Linked Orders Report.    02/25/24 0434 02/25/24 0434  bisacodyl (DULCOLAX) EC tablet 5 mg  Daily PRN        See Hyperspace for full Linked Orders Report.    02/25/24 0434 02/25/24 0434  bisacodyl (DULCOLAX) suppository 10 mg  Daily PRN        See Hyperspace for full Linked Orders Report.    02/25/24 0434 02/25/24 0434  Morphine sulfate (PF) injection 2 mg  Every 4 Hours PRN        See Hyperspace for full Linked Orders Report.    02/25/24 0434 02/25/24 0434  naloxone (NARCAN) injection 0.4 mg  Every 5 Minutes PRN        See Hyperspace for full Linked Orders Report.    02/25/24 0434 02/25/24 0434  Pharmacy to Dose Zosyn  Continuous PRN         02/25/24 0434 02/25/24 0434  Pharmacy to dose vancomycin  Continuous PRN         02/25/24 0434 02/25/24 0402  Code Status and Medical Interventions:  Continuous         02/25/24 0407 02/25/24 0252  STAT Lactic Acid, Reflex  PROCEDURE ONCE         02/25/24 0053    02/25/24 0227  Blood Gas, Arterial With Co-Ox  PROCEDURE ONCE         02/25/24 0226     02/25/24 0222  morphine injection 4 mg  Once         02/25/24 0206    02/25/24 0222  ondansetron (ZOFRAN) injection 4 mg  Once         02/25/24 0206    02/25/24 0213  Blood Gas, Arterial -With Co-Ox Panel: Yes  STAT         02/25/24 0213    02/25/24 0200  Inpatient Admission  Once         02/25/24 0159    02/25/24 0200  Cardiac Monitoring  Continuous,   Status:  Canceled        Comments: Follow Standing Orders As Outlined in Process Instructions (Open Order Report to View Full Instructions)    02/25/24 0159    02/25/24 0145  sodium chloride 0.9 % bolus 500 mL  Once         02/25/24 0129    02/25/24 0128  Critical Care  Once        Comments: This order was created via procedure documentation    02/25/24 0127    02/25/24 0111  sodium bicarbonate 8.4 % 150 mEq in dextrose (D5W) 5 % 1,000 mL infusion (greater than 75 mEq)  Continuous         02/25/24 0055    02/25/24 0055  piperacillin-tazobactam (ZOSYN) 3.375 g in iso-osmotic dextrose 50 ml (premix)  Once         02/25/24 0039    02/25/24 0055  vancomycin 1250 mg/250 mL 0.9% NS IVPB ([facility])  Once         02/25/24 0039    02/25/24 0042  sodium chloride 0.9 % bolus 1,000 mL  Once         02/25/24 0026    02/25/24 0039  CT Abdomen Pelvis Without Contrast  1 Time Imaging        Comments: NON-CONTRASTED STUDY      02/25/24 0038    02/25/24 0027  Blood Culture - Blood, Hand, Right  Once         02/25/24 0026    02/25/24 0027  Blood Culture - Blood, Hand, Digit Right  Once         02/25/24 0026    02/25/24 0027  Lactic Acid, Plasma  Once         02/25/24 0026    02/25/24 0027  Procalcitonin  Once         02/25/24 0026    02/25/24 0005  Scan Slide  Once         02/25/24 0004    02/25/24 0000  dexAMETHasone sodium phosphate injection 10 mg  Once         02/24/24 2344    02/24/24 2354  [place] Draw  Once         02/24/24 2353    02/24/24 2354  Green Top (Gel)  PROCEDURE ONCE         02/24/24 2353    02/24/24 2354  Lavender Top  PROCEDURE ONCE         02/24/24 2353    02/24/24  2354  Gold Top - SST  PROCEDURE ONCE         02/24/24 2353    02/24/24 2354  Light Blue Top  PROCEDURE ONCE         02/24/24 2353    02/24/24 2349  ipratropium-albuterol (DUO-NEB) nebulizer solution 3 mL  Once         02/24/24 2333    02/24/24 2349  dexAMETHasone (DECADRON) IVPB 10 mg  Once,   Status:  Discontinued         02/24/24 2333    02/24/24 2343  sodium chloride 0.9 % bolus 500 mL  Once         02/24/24 2327 02/24/24 2343  ondansetron (ZOFRAN) injection 4 mg  Once         02/24/24 2327 02/24/24 2341  Blood Gas, Arterial With Co-Ox  PROCEDURE ONCE         02/24/24 2340 02/24/24 2329  Urinalysis With Microscopic If Indicated (No Culture) - Straight Cath  Once         02/24/24 2328 02/24/24 2328  Respiratory Panel PCR w/COVID-19(SARS-CoV-2) J[facility]/AMA/KIM/PAD/COR/MALICK In-House, NP Swab in UTM/VTM, 2 HR TAT - Swab, Nasopharynx  STAT         02/24/24 2327 02/24/24 2328  Insert Peripheral IV  Once        See Hyperspace for full Linked Orders Report.    02/24/24 2327 02/24/24 2328  BNP  Once         02/24/24 2327 02/24/24 2328  Blood Gas, Arterial -With Co-Ox Panel: Yes  Once         02/24/24 2327 02/24/24 2328  XR Chest 1 View  1 Time Imaging         02/24/24 2327 02/24/24 2328  Lipase  Once         02/24/24 2327 02/24/24 2327  sodium chloride 0.9 % flush 10 mL  As Needed        See Hyperspace for full Linked Orders Report.    02/24/24 2327 02/24/24 2327  Comprehensive Metabolic Panel  Once         02/24/24 2327 02/24/24 2327  CBC & Differential  Once         02/24/24 2327 02/24/24 2327  CBC Auto Differential  PROCEDURE ONCE         02/24/24 2327    Unscheduled  Up With Assistance  As Needed       02/25/24 0434    Unscheduled  Oxygen Therapy- Nasal Cannula; Titrate 1-6 LPM Per SpO2; 90 - 95%  Continuous PRN       02/25/24 0434                  Physician Progress Notes (most recent note)    No notes of this type exist for this encounter.       Consult Notes (most recent note)     No notes of this type exist for this encounter.

## 2024-02-25 NOTE — PROGRESS NOTES
"    [facility]    Name:  Tasha Yarbrough   Age:  68 y.o.  Sex:  female  :  1955  MRN:  [id number]   Visit Number:  [id number]  Admission Date:  2024  Date Of Service:  24  Primary Care Physician:  Terrence Baldwin MD     LOS: 0 days :    Chief Complaint:      Follow-up; abdominal pain, shortness of air    Subjective:    Says she does have some pain in her abdomen.  Denies shortness of air.    Hospital Course:    Tasha Yarbrough is a 68 year old female with a past medical history of hypertension and impaired mobility and ADLs, who presented to the ER with a chief complaint of shortness of breath and abdominal pain.  At time of presentation currently awake and alert but appears to be confused, she was reporting abdominal pain and was able to tell me her name and date of birth but was confused about her situation and kept repeating \"I am here for colitis diagnosis\".  Per ER report patient has been sick for the past 5 to 6 days with cough and difficulty breathing with symptoms worsening over the past 2 days.  Patient has been refusing to come to the ER until the day of admission where she became confused and the son called EMS. Patient had COVID-19 approximately 2 or 3 weeks ago but seem to improve prior to this week.  history otherwise limited due to patient mental status and disorientation.  No family at bedside.  Patient was previously admitted here back in 2023 when she was treated for C. difficile colitis.     On ER evaluation, Patient was hypertensive with a blood pressure of 158/110, afebrile on room air.  Her workup was significant for an ABG with a pH of 6.886/pCO2 11.8/pO2 136/HCO3 2.2/saturation 98% on room air.  proBNP 2545, CO2 3.1, anion gap 23.9, creatinine 3.31, BUN 63, glucose 126, lactate 2.3, lipase above 3000, Pro-Miguel 0.44, WBC 28.7.  Respiratory panel negative.  Chest x-ray with chronic changes per my read.  CT abdomen pelvis without " contrast showed Mild acute pancreatitis with retroperitoneal effusion. No abscess. Nonobstructing bilateral renal stones. Moderate-large hiatal hernia.  Patient received dexamethasone 10 mg, DuoNeb, Zofran, 2 L of normal saline boluses and was started on Vanco and Zosyn in addition to sodium bicarb in the ED.  hospitalist consulted for admission.    Review of Systems:     All systems were reviewed and negative except as mentioned in subjective, assessment and plan.    Vital Signs:    Temp:  [94.2 °F (34.6 °C)-97.1 °F (36.2 °C)] 97.1 °F (36.2 °C)  Heart Rate:  [] 98  Resp:  [18-24] 22  BP: (134-177)/() 137/82    Intake and output:    I/O last 3 completed shifts:  In: 2300 [IV Piggyback:2300]  Out: 75 [Other:75]  No intake/output data recorded.    Physical Examination:    General Appearance:  Alert and cooperative.    Head:  Atraumatic and normocephalic.   Eyes: Conjunctivae and sclerae normal, no icterus. No pallor.   Throat: No oral lesions, no thrush, oral mucosa moist.   Neck: Supple, trachea midline, no thyromegaly.   Lungs:   Breath sounds heard bilaterally equally.  No wheezing or crackles. No Pleural rub or bronchial breathing.   Heart:  Normal S1 and S2, no murmur, no gallop, no rub. No JVD.   Abdomen:   Mild epigastric tenderness.   Extremities: Supple, no edema, no cyanosis, no clubbing.   Skin: Warm.   Neurologic: Alert and oriented x 3. No facial asymmetry. Moves all four limbs. No tremors.      Laboratory results:    Results from last 7 days   Lab Units 02/25/24  0646 02/24/24  2354   SODIUM mmol/L 140 135*   POTASSIUM mmol/L 3.8 4.4   CHLORIDE mmol/L 113* 108*   CO2 mmol/L 4.5* 3.1*   BUN mg/dL 61* 63*   CREATININE mg/dL 2.91* 3.31*   CALCIUM mg/dL 9.7 10.9*   BILIRUBIN mg/dL 0.2 <0.2   ALK PHOS U/L 103 134*   ALT (SGPT) U/L 12 12   AST (SGOT) U/L 36* 20   GLUCOSE mg/dL 113* 126*     Results from last 7 days   Lab Units 02/25/24  0646 02/24/24  2354   WBC 10*3/mm3 16.87* 28.79*   HEMOGLOBIN  g/dL 12.0 15.2   HEMATOCRIT % 35.3 45.9   PLATELETS 10*3/mm3 152 317                 Recent Labs     02/24/24  2340 02/25/24  0226 02/25/24  0540   PHART 6.886* 6.925* 7.073*   XXH5OAO 11.8* 15.1* 13.1*   PO2ART 136.0* 135.0* 113.0*   KEZ1ZAF 2.2* 3.1* 3.8*   BASEEXCESS -29.6* -27.9* -24.2*      I have reviewed the patient's laboratory results.    Radiology results:    XR Chest 1 View    Result Date: 2/25/2024  PROCEDURE: XR CHEST 1 VW-  HISTORY: Shortness of air  COMPARISON: August 18, 2022.  FINDINGS: Calcified tortuous aorta. There is evidence of calcified granulomatous disease. The heart is normal in size. The mediastinum is unremarkable. Prominent interstitial markings may be chronic. Questionable patchy opacities in the right supra and infrahilar regions may represent infiltrate versus atelectasis. There is no pneumothorax. There are no acute osseous abnormalities.      Impression: Questionable patchy opacities in the right supra and infrahilar regions may represent infiltrate versus atelectasis.  Continued followup is recommended.    This report was signed and finalized on 2/25/2024 8:02 AM by Modesto Tejeda DO.      CT Abdomen Pelvis Without Contrast    Result Date: 2/25/2024  FINAL REPORT TECHNIQUE: null CLINICAL HISTORY: diffuse Abdominal pain, sepsis, confusion, SOA; family states symptoms began 5-6 days ago COMPARISON: null FINDINGS: CT abdomen and pelvis without contrast Comparison: CT/SR - CT ABDOMEN PELVIS W CONTRAST - 09/22/2023 08:48 PM EDT Findings: No consolidation or effusion. Liver and adrenal glands are unremarkable. Calcified granulomas in the spleen. Gallbladder is absent. Peripancreatic stranding with retroperitoneal effusion. Both kidneys are similar in size without hydronephrosis. Small nonobstructing bilateral renal stones. No ureteral calculi. Hypodense bilateral renal cysts. Moderate-large hiatal hernia. No bowel obstruction, pneumoperitoneum, or pneumatosis. The appendix is not seen.  Calcified plaque in the nonaneurysmal aorta and iliac arteries. There are no enlarged abdominal or pelvic lymph nodes. Distended bladder without wall thickening. Uterus is unremarkable. Stable hypodense 2.3 x 2.1 cm right ovarian cyst. Degenerative dextroscoliosis of the thoracolumbar spine with chronic L2 compression deformity.     Impression: IMPRESSION: 1. Mild acute pancreatitis with retroperitoneal effusion. No abscess. 2. Nonobstructing bilateral renal stones. 3. Moderate-large hiatal hernia. Authenticated and Electronically Signed by Yonathan Coles MD on 02/25/2024 01:22:17 AM   I have reviewed the patient's radiology reports.    Medication Review:     I have reviewed the patient's active and prn medications.     Problem List:      Pancreatitis, acute      Assessment:     Acute kidney injury, POA  Acute pancreatitis, POA  Acute metabolic encephalopathy, POA  Metabolic lactic acidosis, POA  Severe sepsis, unknown source of infection, POA  Hypertension  Impaired mobility and ADLs     Plan:     Sitting up, no respiratory distress but does look uncomfortable.  Provide short answers, says that she does have abdominal pain.  She felt like she was in the vomit but has not.    Attempted to call son Rj, no answer.     Acute kidney injury  -Improving  -Nephrology consulted, appreciate recommendations.  -Avoid nephrotoxic drugs, holding home Lasix  -Continue IV fluids  -Patient with bilateral nonobstructing renal stones with no hydronephrosis on CT abdomen and pelvis.     Acute pancreatitis   -S/p cholecystectomy  -Lipid panel   -pain control  -Continue IV fluids     Sepsis  -Patient met SIRS criteria, in the settings of pancreatitis and VIVIENNE and systemic acidosis.  -Continue coverage with Vanco and Zosyn and de-escalate as able to  -No pattern of infection on urinalysis  -Blood cultures obtained  -Procalcitonin improving.     Acute encephalopathy  -Likely metabolic  -Consider CT head if no improvement  -Check TSH      Metabolic lactic acidosis   Metabolic acidosis persists, continue bicarb drip.  Expect improvements with sepsis treatment     -Continue home meds as warranted.  -Further orders as indicated per clinical course.     Risk Assessment: High  DVT Prophylaxis: Heparin prophylaxis (benefit> risk)  Code Status: Full  Diet: N.p.o.  Discharge Plan: At least a couple more days    I have reviewed the copied text and it is accurate as of 2/25/2024     Brian Joseph Kerley,   02/25/24  09:08 EST    Dictated utilizing Dragon dictation.

## 2024-02-25 NOTE — THERAPY EVALUATION
PT order received. PT evaluation held this date per RN. PT to follow up tomorrow as appropriate.

## 2024-02-26 LAB
A-A DO2: 55.9 MMHG
ALBUMIN SERPL-MCNC: 3 G/DL (ref 3.5–5.2)
ALBUMIN/GLOB SERPL: 1.3 G/DL
ALP SERPL-CCNC: 75 U/L (ref 39–117)
ALT SERPL W P-5'-P-CCNC: 13 U/L (ref 1–33)
ANION GAP SERPL CALCULATED.3IONS-SCNC: 15.3 MMOL/L (ref 5–15)
ANION GAP SERPL CALCULATED.3IONS-SCNC: 15.8 MMOL/L (ref 5–15)
ARTERIAL PATENCY WRIST A: ABNORMAL
AST SERPL-CCNC: 33 U/L (ref 1–32)
ATMOSPHERIC PRESS: 731 MMHG
BASE EXCESS BLDA CALC-SCNC: -12.2 MMOL/L (ref 0–2)
BASOPHILS # BLD AUTO: 0.01 10*3/MM3 (ref 0–0.2)
BASOPHILS NFR BLD AUTO: 0.1 % (ref 0–1.5)
BDY SITE: ABNORMAL
BILIRUB SERPL-MCNC: 0.2 MG/DL (ref 0–1.2)
BUN SERPL-MCNC: 49 MG/DL (ref 8–23)
BUN SERPL-MCNC: 56 MG/DL (ref 8–23)
BUN/CREAT SERPL: 20.9 (ref 7–25)
BUN/CREAT SERPL: 21.2 (ref 7–25)
CALCIUM SPEC-SCNC: 8.4 MG/DL (ref 8.6–10.5)
CALCIUM SPEC-SCNC: 8.7 MG/DL (ref 8.6–10.5)
CHLORIDE SERPL-SCNC: 116 MMOL/L (ref 98–107)
CHLORIDE SERPL-SCNC: 121 MMOL/L (ref 98–107)
CO2 SERPL-SCNC: 12.2 MMOL/L (ref 22–29)
CO2 SERPL-SCNC: 14.7 MMOL/L (ref 22–29)
COHGB MFR BLD: 1.4 % (ref 0–2)
CREAT SERPL-MCNC: 2.31 MG/DL (ref 0.57–1)
CREAT SERPL-MCNC: 2.68 MG/DL (ref 0.57–1)
DEPRECATED RDW RBC AUTO: 52.5 FL (ref 37–54)
EGFRCR SERPLBLD CKD-EPI 2021: 18.8 ML/MIN/1.73
EGFRCR SERPLBLD CKD-EPI 2021: 22.5 ML/MIN/1.73
EOSINOPHIL # BLD AUTO: 0.01 10*3/MM3 (ref 0–0.4)
EOSINOPHIL NFR BLD AUTO: 0.1 % (ref 0.3–6.2)
ERYTHROCYTE [DISTWIDTH] IN BLOOD BY AUTOMATED COUNT: 13.9 % (ref 12.3–15.4)
GLOBULIN UR ELPH-MCNC: 2.4 GM/DL
GLUCOSE SERPL-MCNC: 75 MG/DL (ref 65–99)
GLUCOSE SERPL-MCNC: 89 MG/DL (ref 65–99)
HCO3 BLDA-SCNC: 13.1 MMOL/L (ref 22–28)
HCT VFR BLD AUTO: 26 % (ref 34–46.6)
HCT VFR BLD AUTO: 26.4 % (ref 34–46.6)
HCT VFR BLD CALC: 27.8 %
HGB BLD-MCNC: 8.9 G/DL (ref 12–15.9)
HGB BLD-MCNC: 9.2 G/DL (ref 12–15.9)
IMM GRANULOCYTES # BLD AUTO: 0.11 10*3/MM3 (ref 0–0.05)
IMM GRANULOCYTES NFR BLD AUTO: 1 % (ref 0–0.5)
LIPASE SERPL-CCNC: 545 U/L (ref 13–60)
LYMPHOCYTES # BLD AUTO: 0.48 10*3/MM3 (ref 0.7–3.1)
LYMPHOCYTES NFR BLD AUTO: 4.4 % (ref 19.6–45.3)
Lab: ABNORMAL
MAGNESIUM SERPL-MCNC: 2 MG/DL (ref 1.6–2.4)
MCH RBC QN AUTO: 35.3 PG (ref 26.6–33)
MCHC RBC AUTO-ENTMCNC: 34.2 G/DL (ref 31.5–35.7)
MCV RBC AUTO: 103.2 FL (ref 79–97)
METHGB BLD QL: 0.7 % (ref 0–1.5)
MODALITY: ABNORMAL
MONOCYTES # BLD AUTO: 0.85 10*3/MM3 (ref 0.1–0.9)
MONOCYTES NFR BLD AUTO: 7.8 % (ref 5–12)
NEUTROPHILS NFR BLD AUTO: 86.6 % (ref 42.7–76)
NEUTROPHILS NFR BLD AUTO: 9.38 10*3/MM3 (ref 1.7–7)
NRBC BLD AUTO-RTO: 0 /100 WBC (ref 0–0.2)
OXYHGB MFR BLDV: 89.5 % (ref 94–99)
PCO2 BLDA: 27.1 MM HG (ref 35–45)
PCO2 TEMP ADJ BLD: ABNORMAL MM[HG]
PH BLDA: 7.29 PH UNITS (ref 7.35–7.45)
PH, TEMP CORRECTED: ABNORMAL
PLATELET # BLD AUTO: 149 10*3/MM3 (ref 140–450)
PMV BLD AUTO: 10.8 FL (ref 6–12)
PO2 BLDA: 57.1 MM HG (ref 75–100)
PO2 TEMP ADJ BLD: ABNORMAL MM[HG]
POTASSIUM SERPL-SCNC: 2.5 MMOL/L (ref 3.5–5.2)
POTASSIUM SERPL-SCNC: 2.6 MMOL/L (ref 3.5–5.2)
PROCALCITONIN SERPL-MCNC: 0.58 NG/ML (ref 0–0.25)
PROT SERPL-MCNC: 5.4 G/DL (ref 6–8.5)
RBC # BLD AUTO: 2.52 10*6/MM3 (ref 3.77–5.28)
SAO2 % BLDCOA: 91.4 % (ref 94–100)
SODIUM SERPL-SCNC: 146 MMOL/L (ref 136–145)
SODIUM SERPL-SCNC: 149 MMOL/L (ref 136–145)
VANCOMYCIN SERPL-MCNC: 21.5 MCG/ML (ref 5–40)
VENTILATOR MODE: ABNORMAL
WBC NRBC COR # BLD AUTO: 10.84 10*3/MM3 (ref 3.4–10.8)

## 2024-02-26 PROCEDURE — 25010000002 PIPERACILLIN SOD-TAZOBACTAM PER 1 G: Performed by: FAMILY MEDICINE

## 2024-02-26 PROCEDURE — 83690 ASSAY OF LIPASE: CPT | Performed by: FAMILY MEDICINE

## 2024-02-26 PROCEDURE — 97166 OT EVAL MOD COMPLEX 45 MIN: CPT

## 2024-02-26 PROCEDURE — 97162 PT EVAL MOD COMPLEX 30 MIN: CPT

## 2024-02-26 PROCEDURE — 36600 WITHDRAWAL OF ARTERIAL BLOOD: CPT

## 2024-02-26 PROCEDURE — 99232 SBSQ HOSP IP/OBS MODERATE 35: CPT | Performed by: STUDENT IN AN ORGANIZED HEALTH CARE EDUCATION/TRAINING PROGRAM

## 2024-02-26 PROCEDURE — 25010000002 POTASSIUM CHLORIDE 10 MEQ/100ML SOLUTION: Performed by: INTERNAL MEDICINE

## 2024-02-26 PROCEDURE — 25810000003 LACTATED RINGERS PER 1000 ML: Performed by: INTERNAL MEDICINE

## 2024-02-26 PROCEDURE — 82805 BLOOD GASES W/O2 SATURATION: CPT

## 2024-02-26 PROCEDURE — 25010000002 MORPHINE PER 10 MG: Performed by: FAMILY MEDICINE

## 2024-02-26 PROCEDURE — 82375 ASSAY CARBOXYHB QUANT: CPT

## 2024-02-26 PROCEDURE — 84145 PROCALCITONIN (PCT): CPT | Performed by: FAMILY MEDICINE

## 2024-02-26 PROCEDURE — 83735 ASSAY OF MAGNESIUM: CPT | Performed by: STUDENT IN AN ORGANIZED HEALTH CARE EDUCATION/TRAINING PROGRAM

## 2024-02-26 PROCEDURE — 85025 COMPLETE CBC W/AUTO DIFF WBC: CPT | Performed by: FAMILY MEDICINE

## 2024-02-26 PROCEDURE — 85014 HEMATOCRIT: CPT | Performed by: STUDENT IN AN ORGANIZED HEALTH CARE EDUCATION/TRAINING PROGRAM

## 2024-02-26 PROCEDURE — 85018 HEMOGLOBIN: CPT | Performed by: STUDENT IN AN ORGANIZED HEALTH CARE EDUCATION/TRAINING PROGRAM

## 2024-02-26 PROCEDURE — 25010000002 HEPARIN (PORCINE) PER 1000 UNITS: Performed by: FAMILY MEDICINE

## 2024-02-26 PROCEDURE — 25010000002 VANCOMYCIN PER 500 MG: Performed by: STUDENT IN AN ORGANIZED HEALTH CARE EDUCATION/TRAINING PROGRAM

## 2024-02-26 PROCEDURE — 80053 COMPREHEN METABOLIC PANEL: CPT | Performed by: FAMILY MEDICINE

## 2024-02-26 PROCEDURE — 80202 ASSAY OF VANCOMYCIN: CPT | Performed by: FAMILY MEDICINE

## 2024-02-26 PROCEDURE — 83050 HGB METHEMOGLOBIN QUAN: CPT

## 2024-02-26 RX ORDER — HYDROCODONE BITARTRATE AND ACETAMINOPHEN 7.5; 325 MG/1; MG/1
1 TABLET ORAL EVERY 8 HOURS PRN
Status: DISCONTINUED | OUTPATIENT
Start: 2024-02-26 | End: 2024-02-29 | Stop reason: HOSPADM

## 2024-02-26 RX ORDER — SODIUM CHLORIDE 9 MG/ML
125 INJECTION, SOLUTION INTRAVENOUS CONTINUOUS
Status: DISCONTINUED | OUTPATIENT
Start: 2024-02-26 | End: 2024-02-26

## 2024-02-26 RX ORDER — SODIUM BICARBONATE 650 MG/1
650 TABLET ORAL 2 TIMES DAILY
Status: DISCONTINUED | OUTPATIENT
Start: 2024-02-26 | End: 2024-02-26

## 2024-02-26 RX ORDER — SODIUM CHLORIDE, SODIUM LACTATE, POTASSIUM CHLORIDE, CALCIUM CHLORIDE 600; 310; 30; 20 MG/100ML; MG/100ML; MG/100ML; MG/100ML
75 INJECTION, SOLUTION INTRAVENOUS CONTINUOUS
Status: DISCONTINUED | OUTPATIENT
Start: 2024-02-26 | End: 2024-02-28

## 2024-02-26 RX ORDER — FAMOTIDINE 20 MG/1
40 TABLET, FILM COATED ORAL 3 TIMES DAILY
Status: DISCONTINUED | OUTPATIENT
Start: 2024-02-26 | End: 2024-02-26

## 2024-02-26 RX ORDER — POTASSIUM CHLORIDE 7.45 MG/ML
10 INJECTION INTRAVENOUS ONCE
Status: COMPLETED | OUTPATIENT
Start: 2024-02-26 | End: 2024-02-27

## 2024-02-26 RX ORDER — FAMOTIDINE 20 MG/1
40 TABLET, FILM COATED ORAL
Status: DISCONTINUED | OUTPATIENT
Start: 2024-02-27 | End: 2024-02-26

## 2024-02-26 RX ORDER — POTASSIUM CHLORIDE 750 MG/1
20 CAPSULE, EXTENDED RELEASE ORAL 2 TIMES DAILY WITH MEALS
Status: DISCONTINUED | OUTPATIENT
Start: 2024-02-26 | End: 2024-02-27

## 2024-02-26 RX ORDER — POTASSIUM CHLORIDE 20 MEQ/1
20 TABLET, EXTENDED RELEASE ORAL 2 TIMES DAILY WITH MEALS
Status: DISCONTINUED | OUTPATIENT
Start: 2024-02-26 | End: 2024-02-26

## 2024-02-26 RX ORDER — FAMOTIDINE 20 MG/1
40 TABLET, FILM COATED ORAL DAILY
Status: DISCONTINUED | OUTPATIENT
Start: 2024-02-27 | End: 2024-02-29 | Stop reason: HOSPADM

## 2024-02-26 RX ORDER — POTASSIUM CHLORIDE 20 MEQ/1
40 TABLET, EXTENDED RELEASE ORAL ONCE
Status: DISCONTINUED | OUTPATIENT
Start: 2024-02-26 | End: 2024-02-26

## 2024-02-26 RX ORDER — CITALOPRAM 20 MG/1
40 TABLET ORAL DAILY
Status: DISCONTINUED | OUTPATIENT
Start: 2024-02-26 | End: 2024-02-29 | Stop reason: HOSPADM

## 2024-02-26 RX ORDER — NICOTINE 21 MG/24HR
1 PATCH, TRANSDERMAL 24 HOURS TRANSDERMAL EVERY 24 HOURS
Status: DISCONTINUED | OUTPATIENT
Start: 2024-02-26 | End: 2024-02-29 | Stop reason: HOSPADM

## 2024-02-26 RX ORDER — POTASSIUM CHLORIDE 750 MG/1
40 CAPSULE, EXTENDED RELEASE ORAL ONCE
Qty: 4 CAPSULE | Refills: 0 | Status: COMPLETED | OUTPATIENT
Start: 2024-02-26 | End: 2024-02-26

## 2024-02-26 RX ORDER — FUROSEMIDE 20 MG/1
20 TABLET ORAL 4 TIMES DAILY
Status: DISCONTINUED | OUTPATIENT
Start: 2024-02-26 | End: 2024-02-26

## 2024-02-26 RX ORDER — POTASSIUM CHLORIDE 750 MG/1
40 CAPSULE, EXTENDED RELEASE ORAL ONCE
Status: COMPLETED | OUTPATIENT
Start: 2024-02-26 | End: 2024-02-26

## 2024-02-26 RX ADMIN — PIPERACILLIN SODIUM AND TAZOBACTAM SODIUM 3.38 G: 3; .375 INJECTION, SOLUTION INTRAVENOUS at 18:39

## 2024-02-26 RX ADMIN — METOPROLOL TARTRATE 50 MG: 50 TABLET ORAL at 09:13

## 2024-02-26 RX ADMIN — POTASSIUM CHLORIDE 40 MEQ: 10 CAPSULE, COATED, EXTENDED RELEASE ORAL at 22:03

## 2024-02-26 RX ADMIN — HEPARIN SODIUM 5000 UNITS: 5000 INJECTION, SOLUTION INTRAVENOUS; SUBCUTANEOUS at 09:13

## 2024-02-26 RX ADMIN — MORPHINE SULFATE 2 MG: 2 INJECTION, SOLUTION INTRAMUSCULAR; INTRAVENOUS at 22:06

## 2024-02-26 RX ADMIN — MUPIROCIN 1 APPLICATION: 20 OINTMENT TOPICAL at 00:22

## 2024-02-26 RX ADMIN — MORPHINE SULFATE 2 MG: 2 INJECTION, SOLUTION INTRAMUSCULAR; INTRAVENOUS at 00:22

## 2024-02-26 RX ADMIN — SODIUM BICARBONATE: 84 INJECTION, SOLUTION INTRAVENOUS at 14:35

## 2024-02-26 RX ADMIN — MUPIROCIN 1 APPLICATION: 20 OINTMENT TOPICAL at 09:13

## 2024-02-26 RX ADMIN — CITALOPRAM HYDROBROMIDE 40 MG: 20 TABLET ORAL at 22:05

## 2024-02-26 RX ADMIN — POTASSIUM CHLORIDE 10 MEQ: 7.46 INJECTION, SOLUTION INTRAVENOUS at 23:11

## 2024-02-26 RX ADMIN — SODIUM BICARBONATE: 84 INJECTION, SOLUTION INTRAVENOUS at 04:37

## 2024-02-26 RX ADMIN — Medication 1 PATCH: at 18:07

## 2024-02-26 RX ADMIN — VANCOMYCIN HYDROCHLORIDE 500 MG: 500 INJECTION, POWDER, LYOPHILIZED, FOR SOLUTION INTRAVENOUS at 18:07

## 2024-02-26 RX ADMIN — SODIUM CHLORIDE, POTASSIUM CHLORIDE, SODIUM LACTATE AND CALCIUM CHLORIDE 100 ML/HR: 600; 310; 30; 20 INJECTION, SOLUTION INTRAVENOUS at 18:48

## 2024-02-26 RX ADMIN — POTASSIUM CHLORIDE 20 MEQ: 10 CAPSULE, COATED, EXTENDED RELEASE ORAL at 12:16

## 2024-02-26 RX ADMIN — POTASSIUM CHLORIDE 20 MEQ: 10 CAPSULE, COATED, EXTENDED RELEASE ORAL at 18:07

## 2024-02-26 RX ADMIN — MORPHINE SULFATE 2 MG: 2 INJECTION, SOLUTION INTRAMUSCULAR; INTRAVENOUS at 07:53

## 2024-02-26 RX ADMIN — POTASSIUM CHLORIDE 40 MEQ: 10 CAPSULE, COATED, EXTENDED RELEASE ORAL at 09:22

## 2024-02-26 RX ADMIN — Medication 10 ML: at 22:11

## 2024-02-26 RX ADMIN — MORPHINE SULFATE 2 MG: 2 INJECTION, SOLUTION INTRAMUSCULAR; INTRAVENOUS at 14:40

## 2024-02-26 RX ADMIN — PIPERACILLIN SODIUM AND TAZOBACTAM SODIUM 3.38 G: 3; .375 INJECTION, SOLUTION INTRAVENOUS at 06:56

## 2024-02-26 RX ADMIN — MUPIROCIN 1 APPLICATION: 20 OINTMENT TOPICAL at 22:09

## 2024-02-26 RX ADMIN — MORPHINE SULFATE 2 MG: 2 INJECTION, SOLUTION INTRAMUSCULAR; INTRAVENOUS at 18:38

## 2024-02-26 NOTE — PLAN OF CARE
Goal Outcome Evaluation:  Plan of Care Reviewed With: patient        Progress: no change  Outcome Evaluation: OT eval completed. Patient is supine in bed, oriented to person only, c/o 4/10 generalized pain. Patient's urinary catheter intact. Patient did report she lives with her son and walks with a cane, further history limited. Patient moved to EOB with CGA. Required min A for UBD, mod A for LBD sitting EOB. Patient performed sit to stand with min A and took steps to chair using RW with min A; distance limited by painful bunions. Patient left reclined in chair with chair alarm and call bell within reach. Patient is expected to benefit from continued OT services prior to DC.      Anticipated Discharge Disposition (OT): home with home health, home with assist

## 2024-02-26 NOTE — PLAN OF CARE
Goal Outcome Evaluation:  Plan of Care Reviewed With: patient        Progress: no change  Outcome Evaluation: Pt participated in PT eval this date. Pt oriented to person with cues. Pt transferred to EOB with CGA. Pt performed STS transfer with Rwx and Bela. Pt attempted taking steps for gait however was experiencing too much pain in bunions on B big toes. Pt was able to take small shuffled steps with CGA to the chair using the Rwx for support. Pt is expected to benefit from skilled PT tx during this inpatient stay.      Anticipated Discharge Disposition (PT): home with assist, home with home health

## 2024-02-26 NOTE — PROGRESS NOTES
"    [facility]    Name:  Tasha Yarbrough   Age:  68 y.o.  Sex:  female  :  1955  MRN:  [id number]   Visit Number:  [id number]  Admission Date:  2024  Date Of Service:  24  Primary Care Physician:  Terrence Baldwin MD     LOS: 1 day :    Chief Complaint:      Follow-up; abdominal pain, shortness of air    Subjective:    Says she feels good today.  Denies any concerning pain.  When pressing on her abdomen she said it does feel a little sore.    Hospital Course:    Tasha Yarbrough is a 68 year old female with a past medical history of hypertension and impaired mobility and ADLs, who presented to the ER with a chief complaint of shortness of breath and abdominal pain.  At time of presentation currently awake and alert but appears to be confused, she was reporting abdominal pain and was able to tell me her name and date of birth but was confused about her situation and kept repeating \"I am here for colitis diagnosis\".  Per ER report patient has been sick for the past 5 to 6 days with cough and difficulty breathing with symptoms worsening over the past 2 days.  Patient has been refusing to come to the ER until the day of admission where she became confused and the son called EMS. Patient had COVID-19 approximately 2 or 3 weeks ago but seem to improve prior to this week.  history otherwise limited due to patient mental status and disorientation.  No family at bedside.  Patient was previously admitted here back in 2023 when she was treated for C. difficile colitis.     On ER evaluation, Patient was hypertensive with a blood pressure of 158/110, afebrile on room air.  Her workup was significant for an ABG with a pH of 6.886/pCO2 11.8/pO2 136/HCO3 2.2/saturation 98% on room air.  proBNP 2545, CO2 3.1, anion gap 23.9, creatinine 3.31, BUN 63, glucose 126, lactate 2.3, lipase above 3000, Pro-Miguel 0.44, WBC 28.7.  Respiratory panel negative.  Chest x-ray with " chronic changes per my read.  CT abdomen pelvis without contrast showed Mild acute pancreatitis with retroperitoneal effusion. No abscess. Nonobstructing bilateral renal stones. Moderate-large hiatal hernia.  Patient received dexamethasone 10 mg, DuoNeb, Zofran, 2 L of normal saline boluses and was started on Vanco and Zosyn in addition to sodium bicarb in the ED.  Hospitalist consulted for admission.    Metabolic acidosis improved with sepsis treatment and bicarb drip.  Encephalopathy appears to have resolved.  Did develop acute anemia, appears to be stable.  No obvious sign of bleed, abdomen minimally tender.    Review of Systems:     All systems were reviewed and negative except as mentioned in subjective, assessment and plan.    Vital Signs:    Temp:  [97.1 °F (36.2 °C)-98.1 °F (36.7 °C)] 98.1 °F (36.7 °C)  Heart Rate:  [72-88] 76  Resp:  [16-20] 18  BP: (106-135)/(69-93) 106/69    Intake and output:    I/O last 3 completed shifts:  In: 5947.9 [I.V.:3647.9; IV Piggyback:2300]  Out: 875 [Urine:800; Other:75]  No intake/output data recorded.    Physical Examination:    General Appearance:  Alert and cooperative.    Head:  Atraumatic and normocephalic.   Eyes: Conjunctivae and sclerae normal, no icterus. No pallor.   Throat: No oral lesions, no thrush, oral mucosa moist.   Neck: Supple, trachea midline, no thyromegaly.   Lungs:   Breath sounds heard bilaterally equally.  No wheezing or crackles. No Pleural rub or bronchial breathing.   Heart:  Normal S1 and S2, no murmur, no gallop, no rub. No JVD.   Abdomen:   Mild epigastric tenderness.   Extremities: Supple, no edema, no cyanosis, no clubbing.   Skin: Warm.   Neurologic: Alert and oriented to self. No facial asymmetry. Moves all four limbs. No tremors.      Laboratory results:    Results from last 7 days   Lab Units 02/26/24  0609 02/25/24  0646 02/24/24  2354   SODIUM mmol/L 149* 140 135*   POTASSIUM mmol/L 2.5* 3.8 4.4   CHLORIDE mmol/L 121* 113* 108*   CO2  mmol/L 12.2* 4.5* 3.1*   BUN mg/dL 56* 61* 63*   CREATININE mg/dL 2.68* 2.91* 3.31*   CALCIUM mg/dL 8.7 9.7 10.9*   BILIRUBIN mg/dL 0.2 0.2 <0.2   ALK PHOS U/L 75 103 134*   ALT (SGPT) U/L 13 12 12   AST (SGOT) U/L 33* 36* 20   GLUCOSE mg/dL 75 113* 126*     Results from last 7 days   Lab Units 02/26/24  1059 02/26/24  0609 02/25/24  0646 02/24/24  2354   WBC 10*3/mm3  --  10.84* 16.87* 28.79*   HEMOGLOBIN g/dL 9.2* 8.9* 12.0 15.2   HEMATOCRIT % 26.4* 26.0* 35.3 45.9   PLATELETS 10*3/mm3  --  149 152 317             Results from last 7 days   Lab Units 02/25/24  0049 02/25/24  0043   BLOODCX  No growth at 24 hours No growth at 24 hours     Recent Labs     02/25/24  0540 02/25/24  1348 02/26/24  0741   PHART 7.073* 7.271* 7.292*   PHL6IQX 13.1* 18.1* 27.1*   PO2ART 113.0* 107.0* 57.1*   BTD7YCK 3.8* 8.3* 13.1*   BASEEXCESS -24.2* -16.5* -12.2*      I have reviewed the patient's laboratory results.    Radiology results:    XR Chest 1 View    Result Date: 2/25/2024  PROCEDURE: XR CHEST 1 VW-  HISTORY: Shortness of air  COMPARISON: August 18, 2022.  FINDINGS: Calcified tortuous aorta. There is evidence of calcified granulomatous disease. The heart is normal in size. The mediastinum is unremarkable. Prominent interstitial markings may be chronic. Questionable patchy opacities in the right supra and infrahilar regions may represent infiltrate versus atelectasis. There is no pneumothorax. There are no acute osseous abnormalities.      Impression: Questionable patchy opacities in the right supra and infrahilar regions may represent infiltrate versus atelectasis.  Continued followup is recommended.    This report was signed and finalized on 2/25/2024 8:02 AM by Modesto Tejeda DO.      CT Abdomen Pelvis Without Contrast    Result Date: 2/25/2024  FINAL REPORT TECHNIQUE: null CLINICAL HISTORY: diffuse Abdominal pain, sepsis, confusion, SOA; family states symptoms began 5-6 days ago COMPARISON: null FINDINGS: CT abdomen and  pelvis without contrast Comparison: CT/SR - CT ABDOMEN PELVIS W CONTRAST - 09/22/2023 08:48 PM EDT Findings: No consolidation or effusion. Liver and adrenal glands are unremarkable. Calcified granulomas in the spleen. Gallbladder is absent. Peripancreatic stranding with retroperitoneal effusion. Both kidneys are similar in size without hydronephrosis. Small nonobstructing bilateral renal stones. No ureteral calculi. Hypodense bilateral renal cysts. Moderate-large hiatal hernia. No bowel obstruction, pneumoperitoneum, or pneumatosis. The appendix is not seen. Calcified plaque in the nonaneurysmal aorta and iliac arteries. There are no enlarged abdominal or pelvic lymph nodes. Distended bladder without wall thickening. Uterus is unremarkable. Stable hypodense 2.3 x 2.1 cm right ovarian cyst. Degenerative dextroscoliosis of the thoracolumbar spine with chronic L2 compression deformity.     Impression: IMPRESSION: 1. Mild acute pancreatitis with retroperitoneal effusion. No abscess. 2. Nonobstructing bilateral renal stones. 3. Moderate-large hiatal hernia. Authenticated and Electronically Signed by Yonathan Coles MD on 02/25/2024 01:22:17 AM   I have reviewed the patient's radiology reports.    Medication Review:     I have reviewed the patient's active and prn medications.     Problem List:      Pancreatitis, acute      Assessment:     Acute kidney injury, POA  Acute pancreatitis, POA  Acute metabolic encephalopathy, POA  Metabolic lactic acidosis, POA  Severe sepsis, unknown source of infection, POA  Hypertension  Impaired mobility and ADLs     Plan:     Comfortable in chair, pleasantly conversational.  Low insight and history.  Abdomen minimally tender.    Updated son Masoud via phone.      Acute kidney injury  -Improving  -Nephrology consulted, appreciate recommendations.  -Avoid nephrotoxic drugs, holding home Lasix  -Continue IV fluids  -Patient with bilateral nonobstructing renal stones with no hydronephrosis on CT  abdomen and pelvis.     Acute pancreatitis   -Improving  -S/p cholecystectomy  -Lipid panel   -pain control  -Continue IV fluids     Sepsis  -Patient met SIRS criteria, in the settings of pancreatitis and VIVIENNE and systemic acidosis.  -Continue coverage with Vanco and Zosyn and de-escalate as able to  -No pattern of infection on urinalysis  -Blood cultures obtained  -Procalcitonin improving.     Acute encephalopathy  -Likely metabolic  -Consider CT head if no improvement  -Check TSH    Anemia  No obvious sign of bleed.  Abdomen minimally tender.  After initial hemoglobin drop it appears to be stable. Initial value may have been hemoconcentrated.     Metabolic lactic acidosis   Expect improvements with sepsis treatment    Hypokalemia  Replace as needed.    Hiatal Hernia  Monitoring.      -Continue home meds as warranted.  -Further orders as indicated per clinical course.     Risk Assessment: High  DVT Prophylaxis: SCDs  Code Status: Full  Diet: Clear liquids  Discharge Plan: At least a couple more days depending on clinical improvements    I have reviewed the copied text and it is accurate as of 2/26/2024     Brian Joseph Kerley,   02/26/24  18:23 EST    Dictated utilizing Dragon dictation.

## 2024-02-26 NOTE — THERAPY EVALUATION
Patient Name: Tasha Yarbrough  : 1955    MRN: [id number]                              Today's Date: 2024       Admit Date: 2024    Visit Dx:     ICD-10-CM ICD-9-CM   1. Acute pancreatitis, unspecified complication status, unspecified pancreatitis type  K85.90 577.0   2. Leukocytosis, unspecified type  D72.829 288.60   3. Metabolic acidosis  E87.20 276.2   4. Epigastric pain  R10.13 789.06   5. Acute renal failure, unspecified acute renal failure type  N17.9 584.9   6. Dehydration  E86.0 276.51   7. Sepsis with acute renal failure without septic shock, due to unspecified organism, unspecified acute renal failure type  A41.9 038.9    R65.20 995.92    N17.9 584.9     Patient Active Problem List   Diagnosis    Colon cancer screening    Gastroesophageal reflux disease with esophagitis    Left lower quadrant abdominal pain    Irritable bowel syndrome with constipation    Encounter for screening for malignant neoplasm of colon    Periumbilical abdominal pain    Diarrhea of presumed infectious origin    Acute kidney injury    Bacterial colitis    VIVIENNE (acute kidney injury)    C. difficile colitis    Generalized abdominal pain    Diarrhea of infectious origin    Abnormal finding on GI tract imaging    Anemia, chronic disease    Colitis due to Clostridium difficile    Pancreatitis, acute     Past Medical History:   Diagnosis Date    Hypertension     Impaired mobility     Injury of back      Past Surgical History:   Procedure Laterality Date    ABDOMINAL SURGERY      COLON SURGERY      COLONOSCOPY      TUBAL ABDOMINAL LIGATION        General Information       Row Name 24 1235          OT Time and Intention    Document Type evaluation  -SD     Mode of Treatment occupational therapy  -SD       Row Name 24 1235          General Information    Patient Profile Reviewed yes  -SD     Prior Level of Function independent:;all household mobility  Lives with son, walks with a SPC  -SD     Existing  Precautions/Restrictions fall  -SD     Barriers to Rehab medically complex;cognitive status;previous functional deficit  -SD       Row Name 02/26/24 1235          Living Environment    People in Home child(debra), adult  -SD       Row Name 02/26/24 1235          Stairs Within Home, Primary    Stairs, Within Home, Primary unknown  -SD       Row Name 02/26/24 1235          Cognition    Orientation Status (Cognition) oriented to;person;verbal cues/prompts needed for orientation  -SD       Row Name 02/26/24 1235          Safety Issues, Functional Mobility    Safety Issues Affecting Function (Mobility) safety precautions follow-through/compliance;safety precaution awareness;sequencing abilities;awareness of need for assistance;insight into deficits/self-awareness  -SD     Impairments Affecting Function (Mobility) balance;endurance/activity tolerance;strength;pain  -SD               User Key  (r) = Recorded By, (t) = Taken By, (c) = Cosigned By      Initials Name Provider Type    SD Yuliana Ham OT Occupational Therapist                     Mobility/ADL's       Row Name 02/26/24 1236          Bed Mobility    Bed Mobility supine-sit  -SD     Supine-Sit [place] (Bed Mobility) contact guard  -SD     Assistive Device (Bed Mobility) head of bed elevated;bed rails  -SD       Row Name 02/26/24 1236          Transfers    Transfers sit-stand transfer  -SD       Row Name 02/26/24 1236          Sit-Stand Transfer    Sit-Stand [place] (Transfers) minimum assist (75% patient effort)  -SD     Assistive Device (Sit-Stand Transfers) walker, front-wheeled  -SD       Row Name 02/26/24 1236          Functional Mobility    Functional Mobility- Ind. Level minimum assist (75% patient effort)  -SD     Functional Mobility- Device walker, front-wheeled  -SD     Functional Mobility-Distance (Feet) 3  -SD     Functional Mobility- Safety Issues balance decreased during turns;sequencing ability decreased;step length  decreased;weight-shifting ability decreased  -SD     Functional Mobility- Comment bilateral foot bunions, unable to wear shoes  -SD       Row Name 02/26/24 1236          Activities of Daily Living    BADL Assessment/Intervention bathing;upper body dressing;lower body dressing;grooming;feeding;toileting  -SD       Row Name 02/26/24 1236          Bathing Assessment/Intervention    [place] Level (Bathing) moderate assist (50% patient effort)  -SD       Row Name 02/26/24 1236          Upper Body Dressing Assessment/Training    [place] Level (Upper Body Dressing) minimum assist (75% patient effort)  -SD       Row Name 02/26/24 1236          Lower Body Dressing Assessment/Training    [place] Level (Lower Body Dressing) moderate assist (50% patient effort)  -SD       Row Name 02/26/24 1236          Grooming Assessment/Training    [place] Level (Grooming) minimum assist (75% patient effort)  -SD       Row Name 02/26/24 1236          Self-Feeding Assessment/Training    [place] Level (Feeding) minimum assist (75% patient effort)  -SD       Row Name 02/26/24 1236          Toileting Assessment/Training    [place] Level (Toileting) maximum assist (25% patient effort)  -SD     Comment, (Toileting) urinary catheter  -SD               User Key  (r) = Recorded By, (t) = Taken By, (c) = Cosigned By      Initials Name Provider Type    Yuliana Kimbrough OT Occupational Therapist                   Obj/Interventions       Row Name 02/26/24 1238          Range of Motion Comprehensive    General Range of Motion bilateral upper extremity ROM WFL  -SD       Row Name 02/26/24 1238          Strength Comprehensive (MMT)    General Manual Muscle Testing (MMT) Assessment upper extremity strength deficits identified  -SD     Comment, General Manual Muscle Testing (MMT) Assessment UB 3+/5 - 4-/5  -SD               User Key  (r) = Recorded By, (t) = Taken By, (c) = Cosigned By      Initials Name Provider Type    SD  Yuliana Ham OT Occupational Therapist                   Goals/Plan       Row Name 02/26/24 1242          Transfer Goal 1 (OT)    Activity/Assistive Device (Transfer Goal 1, OT) sit-to-stand/stand-to-sit;walker, rolling  -SD     [place] Level/Cues Needed (Transfer Goal 1, OT) standby assist  -SD     Time Frame (Transfer Goal 1, OT) long term goal (LTG)  -SD     Progress/Outcome (Transfer Goal 1, OT) goal ongoing  -SD       Row Name 02/26/24 1242          Dressing Goal 1 (OT)    Activity/Device (Dressing Goal 1, OT) lower body dressing  -SD     [place]/Cues Needed (Dressing Goal 1, OT) contact guard required  -SD     Time Frame (Dressing Goal 1, OT) 2 weeks  -SD     Progress/Outcome (Dressing Goal 1, OT) goal ongoing  -SD       Row Name 02/26/24 1242          Toileting Goal 1 (OT)    Activity/Device (Toileting Goal 1, OT) toileting skills, all;commode;commode, bedside without drop arms  -SD     [place] Level/Cues Needed (Toileting Goal 1, OT) contact guard required  -SD     Time Frame (Toileting Goal 1, OT) 2 weeks  -SD     Progress/Outcome (Toileting Goal 1, OT) goal ongoing  -SD       Row Name 02/26/24 1242          Strength Goal 1 (OT)    Strength Goal 1 (OT) Patient to perform UB ther ex as tolerated  -SD     Time Frame (Strength Goal 1, OT) long term goal (LTG)  -SD     Progress/Outcome (Strength Goal 1, OT) goal ongoing  -SD       [facility] Name 02/26/24 1242          Therapy Assessment/Plan (OT)    Planned Therapy Interventions (OT) activity tolerance training;adaptive equipment training;BADL retraining;patient/caregiver education/training;ROM/therapeutic exercise;strengthening exercise;transfer/mobility retraining  -SD               User Key  (r) = Recorded By, (t) = Taken By, (c) = Cosigned By      Initials Name Provider Type    Yuliana Kimbrough OT Occupational Therapist                   Clinical Impression       Row Name 02/26/24 1239          Pain Scale: FACES Pre/Post-Treatment    Pain:  FACES Scale, Pretreatment 4-->hurts little more  -SD     Posttreatment Pain Rating 4-->hurts little more  -SD     Pain Location - Side/Orientation Bilateral  -SD     Pain Location - foot  -SD       Row Name 02/26/24 1239          Plan of Care Review    Plan of Care Reviewed With patient  -SD     Progress no change  -SD     Outcome Evaluation OT eval completed. Patient is supine in bed, oriented to person only, c/o 4/10 generalized pain. Patient's urinary catheter intact. Patient did report she lives with her son and walks with a cane, further history limited. Patient moved to EOB with CGA. Required min A for UBD, mod A for LBD sitting EOB. Patient performed sit to stand with min A and took steps to chair using RW with min A; distance limited by painful bunions. Patient left reclined in chair with chair alarm and call bell within reach. Patient is expected to benefit from continued OT services prior to DC.  -SD       Row Name 02/26/24 1239          Therapy Assessment/Plan (OT)    Patient/Family Therapy Goal Statement (OT) home  -SD     Rehab Potential (OT) good, to achieve stated therapy goals  -SD     Criteria for Skilled Therapeutic Interventions Met (OT) skilled treatment is necessary  -SD     Therapy Frequency (OT) 3 times/wk  5 times if indicated  -SD       Row Name 02/26/24 1239          Therapy Plan Review/Discharge Plan (OT)    Anticipated Discharge Disposition (OT) home with home health;home with assist  -SD       Row Name 02/26/24 1239          Vital Signs    O2 Delivery Pre Treatment room air  -SD     O2 Delivery Intra Treatment room air  -SD     O2 Delivery Post Treatment room air  -SD       Row Name 02/26/24 1239          Positioning and Restraints    Pre-Treatment Position in bed  -SD     Post Treatment Position chair  -SD     In Chair reclined;call light within reach;encouraged to call for assist;exit alarm on  -SD               User Key  (r) = Recorded By, (t) = Taken By, (c) = Cosigned By       Initials Name Provider Type    Yuliana Kimbrough OT Occupational Therapist                   Outcome Measures       Row Name 02/26/24 1243          How much help from another is currently needed...    Putting on and taking off regular lower body clothing? 2  -SD     Bathing (including washing, rinsing, and drying) 2  -SD     Toileting (which includes using toilet bed pan or urinal) 2  -SD     Putting on and taking off regular upper body clothing 3  -SD     Taking care of personal grooming (such as brushing teeth) 3  -SD     Eating meals 3  -SD     AM-PAC 6 Clicks Score (OT) 15  -SD       Row Name 02/26/24 1237 02/26/24 0820       How much help from another person do you currently need...    Turning from your back to your side while in flat bed without using bedrails? 4  -MS 3  -HN    Moving from lying on back to sitting on the side of a flat bed without bedrails? 3  -MS 3  -HN    Moving to and from a bed to a chair (including a wheelchair)? 3  -MS 2  -HN    Standing up from a chair using your arms (e.g., wheelchair, bedside chair)? 3  -MS 2  -HN    Climbing 3-5 steps with a railing? 2  -MS 1  -HN    To walk in hospital room? 2  -MS 1  -HN    AM-PAC 6 Clicks Score (PT) 17  -MS 12  -HN    Highest Level of Mobility Goal 5 --> Static standing  -MS 4 --> Transfer to chair/commode  -HN      Row Name 02/26/24 1243          Functional Assessment    Outcome Measure Options AM-PAC 6 Clicks Daily Activity (OT)  -SD               User Key  (r) = Recorded By, (t) = Taken By, (c) = Cosigned By      Initials Name Provider Type    Yuliana Kimbrough OT Occupational Therapist    Sebastien Brownlee, PT Physical Therapist    HN Jamee Sahni, RN Registered Nurse                    Occupational Therapy Education       Title: PT OT SLP Therapies (In Progress)       Topic: Occupational Therapy (In Progress)       Point: ADL training (Done)       Description:   Instruct learner(s) on proper safety adaptation and remediation  techniques during self care or transfers.   Instruct in proper use of assistive devices.                  Learning Progress Summary             Patient Acceptance, E,TB, VU by SD at 2/26/2024 8744    Comment: OT POC                         Point: Home exercise program (Not Started)       Description:   Instruct learner(s) on appropriate technique for monitoring, assisting and/or progressing therapeutic exercises/activities.                  Learner Progress:  Not documented in this visit.              Point: Precautions (Not Started)       Description:   Instruct learner(s) on prescribed precautions during self-care and functional transfers.                  Learner Progress:  Not documented in this visit.              Point: Body mechanics (Not Started)       Description:   Instruct learner(s) on proper positioning and spine alignment during self-care, functional mobility activities and/or exercises.                  Learner Progress:  Not documented in this visit.                              User Key       Initials Effective Dates Name Provider Type Discipline    SD 06/16/21 -  Yuliana Ham OT Occupational Therapist OT                  OT Recommendation and Plan  Planned Therapy Interventions (OT): activity tolerance training, adaptive equipment training, BADL retraining, patient/caregiver education/training, ROM/therapeutic exercise, strengthening exercise, transfer/mobility retraining  Therapy Frequency (OT): 3 times/wk (5 times if indicated)  Plan of Care Review  Plan of Care Reviewed With: patient  Progress: no change  Outcome Evaluation: OT eval completed. Patient is supine in bed, oriented to person only, c/o 4/10 generalized pain. Patient's urinary catheter intact. Patient did report she lives with her son and walks with a cane, further history limited. Patient moved to EOB with CGA. Required min A for UBD, mod A for LBD sitting EOB. Patient performed sit to stand with min A and took steps to chair  using RW with min A; distance limited by painful bunions. Patient left reclined in chair with chair alarm and call bell within reach. Patient is expected to benefit from continued OT services prior to DC.     Time Calculation:   Evaluation Complexity (OT)  Review Occupational Profile/Medical/Therapy History Complexity: expanded/moderate complexity  Assessment, Occupational Performance/Identification of Deficit Complexity: 3-5 performance deficits  Clinical Decision Making Complexity (OT): detailed assessment/moderate complexity  Overall Complexity of Evaluation (OT): moderate complexity     Time Calculation- OT       Row Name 02/26/24 1244             Time Calculation- OT    OT Start Time 1034  -SD      OT Received On 02/26/24  -SD      OT Goal Re-Cert Due Date 03/07/24  -SD         Untimed Charges    OT Eval/Re-eval Minutes 45  -SD         Total Minutes    Untimed Charges Total Minutes 45  -SD       Total Minutes 45  -SD                User Key  (r) = Recorded By, (t) = Taken By, (c) = Cosigned By      Initials Name Provider Type    Yuliana Kimbrough, OT Occupational Therapist                  Therapy Charges for Today       Code Description Service Date Service Provider Modifiers Qty    [id number]  OT EVAL MOD COMPLEXITY 3 2/26/2024 Yuliana Ham OT GO 1                 Yuliana Ham OT  2/26/2024

## 2024-02-26 NOTE — CONSULTS
[facility] Consult Note      Patient Name: Tasha Yarbrough  : 1955  MRN: [id number]  Primary Care Physician:  Terrence Baldwin MD  Referring Physician: Ho Clark MD  Date of admission: 2024    Subjective     Reason for Consult:  VIVIENNE    HPI:   Tasha Yarbrough is a 68 y.o. female with hx HTN & impaired mobility who presented yesterday with dyspnea and abd pain.  On arrival found to have VIVIENNE and acute pancreatitis.  BUN/Cr 63/3.3 with severe AG metabolic acidosis, bicarb 3, AG 24, lipase > 3000 and lactate 2.3.  CT with pancreatitis and bilat non-obs kidney stones.  Hypertensive to 150/110, now SBP low 100s.  Home meds notable for lasix (QID?).  K very low 2.5 today with Na up to 149, on NS IVF, and bicarb has crept up to 12, AG down to 16.  Cr has improved to 2.7.  She's on zosyn.  Cr was normal 0.9 on 23.  She's on liquid diet currently.  K been replaced IV and orally today.    Review of Systems:   14 point review of systems is otherwise negative except for mentioned above on HPI    Personal History     Past Medical History:   Diagnosis Date    Hypertension     Impaired mobility     Injury of back        Past Surgical History:   Procedure Laterality Date    ABDOMINAL SURGERY      COLON SURGERY      COLONOSCOPY      TUBAL ABDOMINAL LIGATION         Family History: family history is not on file.    Social History:  reports that she has been smoking cigarettes. She has been smoking an average of 1 pack per day. She has never used smokeless tobacco. She reports that she does not drink alcohol and does not use drugs.    Home Medications:  Prior to Admission medications    Medication Sig Start Date End Date Taking? Authorizing Provider   famotidine (PEPCID) 20 MG tablet Take 2 tablets by mouth 3 (Three) Times a Day. Indications: Heartburn   Yes Provider, MD Taz   HYDROcodone-acetaminophen (NORCO) 7.5-325 MG per tablet Take 1 tablet by mouth Every 8 (Eight) Hours As  Needed for Moderate Pain. Indications: Pain   Yes Taz Keller MD   citalopram (CeleXA) 40 MG tablet Take 1 tablet by mouth Daily. Indications: Major Depressive Disorder    Taz Keller MD   furosemide (LASIX) 20 MG tablet Take 1 tablet by mouth 4 (Four) Times a Day. Hold until seen by PCP 9/25/23   Madai Eason APRN   glucosamine-chondroitin 500-400 MG capsule capsule Take 1 capsule by mouth 2 (Two) Times a Day With Meals. Indications: Joint Damage causing Pain and Loss of Function    Taz Keller MD   methocarbamol (ROBAXIN) 750 MG tablet Take 1-2 tablets by mouth 3 (Three) Times a Day As Needed for Muscle Spasms. Take one to two tablets three times daily as needed  Indications: Musculoskeletal Pain    Taz Keller MD   metoprolol tartrate (LOPRESSOR) 50 MG tablet Take 50 mg by mouth Daily. Indications: High Blood Pressure Disorder 8/3/22   Taz Keller MD   ondansetron (ZOFRAN) 8 MG tablet Take 8 mg by mouth Every 8 (Eight) Hours As Needed for Nausea or Vomiting. Indications: Nausea and Vomiting    Taz Keller MD   saccharomyces boulardii (Florastor) 250 MG capsule Take 1 capsule by mouth 2 (Two) Times a Day. 9/25/23   Madai Eason APRN   saccharomyces boulardii (Florastor) 250 MG capsule Take 1 capsule by mouth 2 (Two) Times a Day. Given 9-18-23 from [facility] [place], [place]    Taz Keller MD   sodium bicarbonate 650 MG tablet Take 1 tablet by mouth 2 (Two) Times a Day. Indications: Heartburn    Taz Keller MD   vitamin D3 125 MCG (5000 UT) capsule capsule Take 1 capsule by mouth Daily. Indications: Vitamin D Deficiency    Taz Keller MD   metoprolol succinate XL (TOPROL-XL) 50 MG 24 hr tablet Take 50 mg by mouth Daily.  Patient not taking: Reported on 7/27/2022  8/3/22  Taz Keller MD       Allergies:  Allergies   Allergen Reactions    Ciprofloxacin Dizziness    Codeine Itching    Pineapple Unknown - Low  Severity       Objective     Vitals:   Temp:  [97.1 °F (36.2 °C)-98.1 °F (36.7 °C)] 98.1 °F (36.7 °C)  Heart Rate:  [72-88] 76  Resp:  [16-20] 18  BP: (106-135)/(69-93) 106/69    Intake/Output Summary (Last 24 hours) at 2/26/2024 1826  Last data filed at 2/26/2024 0437  Gross per 24 hour   Intake 2000 ml   Output 800 ml   Net 1200 ml       Physical Exam:    General Appearance: frail WF no distress, comfortable, alert   Skin: warm and dry  HEENT: oral mucosa normal, nonicteric sclera  Neck: supple, no JVD  Lungs: CTA bilat no rales  Heart: RRR, normal S1 and S2  Abdomen: soft, nontender, nondistended  : no palpable bladder  Extremities: trace pedal edema, no cyanosis or clubbing  Neuro: normal speech and mental status     Scheduled Meds:     citalopram, 40 mg, Oral, Daily  famotidine, 40 mg, Oral, TID  furosemide, 20 mg, Oral, 4x Daily  heparin (porcine), 5,000 Units, Subcutaneous, Q12H  metoprolol tartrate, 50 mg, Oral, Daily  mupirocin, , Topical, Q12H  nicotine, 1 patch, Transdermal, Q24H  piperacillin-tazobactam, 3.375 g, Intravenous, Q12H  potassium chloride, 20 mEq, Oral, BID With Meals  sodium bicarbonate, 650 mg, Oral, BID  sodium chloride, 10 mL, Intravenous, Q12H  vancomycin, 500 mg, Intravenous, Once  Vancomycin Pharmacy Intermittent/Pulse Dosing, , Does not apply, Daily      IV Meds:   Pharmacy to dose vancomycin,   Pharmacy to Dose Zosyn,   sodium chloride, 125 mL/hr        Results Reviewed:   I have personally reviewed the results from the time of this admission to 2/26/2024 18:26 EST     Lab Results   Component Value Date    GLUCOSE 75 02/26/2024    CALCIUM 8.7 02/26/2024     (H) 02/26/2024    K 2.5 (C) 02/26/2024    CO2 12.2 (L) 02/26/2024     (H) 02/26/2024    BUN 56 (H) 02/26/2024    CREATININE 2.68 (H) 02/26/2024    EGFRIFNONA 40 (L) 10/04/2020    BCR 20.9 02/26/2024    ANIONGAP 15.8 (H) 02/26/2024      Lab Results   Component Value Date    MG 2.0 02/26/2024    PHOS 3.8 09/25/2023     ALBUMIN 3.0 (L) 02/26/2024           Assessment / Plan     ASSESSMENT:  Non olig VIVIENNE - prerenal azotemia vs ATN in assoc with acute pancreatitis and vol depletion.  Also on diuretic at home.  Cr has improved 3.3 to 2.7 over 48h with IVF.  CT: non-obs stones.  BL Cr 0.9 as of Sept 2023.  UA with small blood/protein  Acute pancreatitis, mgmt per primary team, on zosyn, liq diet  Hypokalemia - severe, K 2.5, with poor PO intake, nausea, diuretic use, and K shift from bicarb drip  AG metabolic/lactic acidosis - also related to VIVIENNE/uremia, bicarb up to 12 with bicarb drip but need to hold this due to severity of hypokalemia - switch to LR  Hypernatremia, mild, Na 149 with poor oral intake and isotonic IVF  ? Hx CHF - on atypical lasix dose (QID?); no echo on file; minimal periph edema, with low serum alb (3) noted; dyspnea improved, likely was compensatory resp alkalosis in response to severe metabolic acidosis   Anemia, hgb down to 8.9 with IVF    PLAN:  K replaced, repeat BMP now  Change IVF to  cc/hr  Hold oral bicarb for now while replete K  Hold lasix     Thank you for involving us in the care of Tasha Yarbrough.  Please feel free to call with any questions.    Rj Ch MD  02/26/24  18:26 [facility]    [facility]  [phone]

## 2024-02-26 NOTE — PLAN OF CARE
Goal Outcome Evaluation:  Plan of Care Reviewed With: patient           Outcome Evaluation: Patient was hurting overnight. Pain medication given as needed which improved her pain. Vital signs have remained stable and will continue to monitor. No overnight events to report. Patient is alert to self but continues to be confused. She had urine retention which was relieved by straight cath. MRSA was found in her nairs and muprocin was started.

## 2024-02-26 NOTE — CONSULTS
"Dietitian Assessment    Patient Name: Tasha Yarbrough  YOB: 1955  MRN: [id number]  Admission date: 2/24/2024    Comment:    EMR shows wt loss. RD to f/up and add appropriate ONS.    Clinical Nutrition Assessment      Reason for Assessment MST=3 (eating poorly, unsure wt loss)   H&P  Past Medical History:   Diagnosis Date    Hypertension     Impaired mobility     Injury of back        Past Surgical History:   Procedure Laterality Date    ABDOMINAL SURGERY      COLON SURGERY      COLONOSCOPY      TUBAL ABDOMINAL LIGATION              Current Problems        Encounter Information        Trending Narrative     2/26: Pt admitted d/t acute pancreatitis. EMR shows a wt loss of 14# (9.2%) within 5 months. Pt is currently NPO. RD to f/up and add appropriate ONS.     Anthropometrics        Current Height, Weight Height: 157.5 cm (62\")  Weight: 62.4 kg (137 lb 9.1 oz) (02/26/24 0302)   Trending Weight Hx     This admission:              PTA:     Wt Readings from Last 30 Encounters:   02/26/24 0302 62.4 kg (137 lb 9.1 oz)   02/24/24 2333 65.8 kg (145 lb)   09/25/23 0323 68.9 kg (151 lb 14.4 oz)   09/24/23 0336 68.5 kg (151 lb 0.2 oz)   09/23/23 0500 67.4 kg (148 lb 9.4 oz)   09/22/23 2345 66.8 kg (147 lb 4.3 oz)   09/22/23 1932 61.2 kg (135 lb)   09/02/23 1847 60.8 kg (134 lb)   08/18/22 2132 78.9 kg (174 lb)   08/08/22 0500 79.7 kg (175 lb 11.3 oz)   08/07/22 0536 77.4 kg (170 lb 10.2 oz)   08/06/22 2340 77 kg (169 lb 12.1 oz)   08/06/22 2019 76.7 kg (169 lb)   07/30/22 0441 79 kg (174 lb 2.6 oz)   07/29/22 0417 78.7 kg (173 lb 8 oz)   07/27/22 0353 78.9 kg (173 lb 15.1 oz)   07/26/22 1552 76.7 kg (169 lb)   05/06/21 1626 92.1 kg (203 lb)   10/04/20 0002 90.7 kg (200 lb)   08/15/20 1940 97.5 kg (215 lb)   05/23/20 1814 95.3 kg (210 lb)      BMI kg/m2 Body mass index is 25.16 kg/m².     Labs        Pertinent Labs     Results from last 7 days   Lab Units 02/26/24  0609 02/25/24  0646 02/24/24  2354   SODIUM mmol/L " "149* 140 135*   POTASSIUM mmol/L 2.5* 3.8 4.4   CHLORIDE mmol/L 121* 113* 108*   CO2 mmol/L 12.2* 4.5* 3.1*   BUN mg/dL 56* 61* 63*   CREATININE mg/dL 2.68* 2.91* 3.31*   CALCIUM mg/dL 8.7 9.7 10.9*   BILIRUBIN mg/dL 0.2 0.2 <0.2   ALK PHOS U/L 75 103 134*   ALT (SGPT) U/L 13 12 12   AST (SGOT) U/L 33* 36* 20   GLUCOSE mg/dL 75 113* 126*       Results from last 7 days   Lab Units 02/26/24  0609 02/25/24  0646   MAGNESIUM mg/dL 2.0  --    HEMOGLOBIN g/dL 8.9* 12.0   HEMATOCRIT % 26.0* 35.3   TRIGLYCERIDES mg/dL  --  71       No results found for: \"HGBA1C\"         Medications       Scheduled Medications heparin (porcine), 5,000 Units, Subcutaneous, Q12H  metoprolol tartrate, 50 mg, Oral, Daily  mupirocin, , Topical, Q12H  piperacillin-tazobactam, 3.375 g, Intravenous, Q12H  sodium chloride, 10 mL, Intravenous, Q12H  Vancomycin Pharmacy Intermittent/Pulse Dosing, , Does not apply, Daily        Infusions Pharmacy to dose vancomycin,   Pharmacy to Dose Zosyn,   sodium chloride 0.45 % 925 mL with sodium bicarbonate 8.4 % 75 mEq infusion, , Last Rate: 125 mL/hr at 02/26/24 0437         PRN Medications   acetaminophen **OR** acetaminophen **OR** acetaminophen    senna-docusate sodium **AND** polyethylene glycol **AND** bisacodyl **AND** bisacodyl    Calcium Replacement - Follow Nurse / BPA Driven Protocol    influenza vaccine    Magnesium Standard Dose Replacement - Follow Nurse / BPA Driven Protocol    Morphine **AND** naloxone    nitroglycerin    ondansetron    Pharmacy to dose vancomycin    Pharmacy to Dose Zosyn    Phosphorus Replacement - Follow Nurse / BPA Driven Protocol    Potassium Replacement - Follow Nurse / BPA Driven Protocol    [COMPLETED] Insert Peripheral IV **AND** sodium chloride    sodium chloride    sodium chloride     Physical Findings        Trending Physical   Appearance, NFPE    --  Edema  No edema      Bowel Function No BM documented at this time     Tubes Peripheral IV     Chewing/Swallowing NPO   "   Skin Sacral spine wound       Estimated/Assessed Needs       Energy Requirements    EST Needs, Method, Wt used 8647-0530 kcal (25-30 kcal/kg CBW)       Protein Requirements    EST Needs, Method, Wt used 50-62 g pro (.8-1 g pro/kg CBW)       Fluid Requirements     Estimated Needs (mL/day) 8943-8043 mL       Current Nutrition Orders & Evaluation of Intake       Oral Nutrition     Food Allergies    Current PO Diet NPO Diet NPO Type: Strict NPO   Supplement    PO Evaluation     Trending % PO Intake        Nutrition Diagnosis         Nutrition Dx Problem 1 Unintended wt loss clinical condition AEB EMR shows a wt loss of 14# (9.2%) within 5 months.      Nutrition Dx Problem 2        Intervention Goal         Intervention Goal(s) Maintain CBW  Diet advancement per MD  PO intake meet >50% of estimated needs     Nutrition Intervention        RD Action No action at this time     Nutrition Prescription          Diet Prescription NPO   Supplement Prescription      Enteral Prescription        TPN Prescription      Monitor/Evaluation        Monitor Per protocol, I&O, PO intake, Pertinent labs, Weight, Skin status, GI status, Symptoms, Swallow function     RD to f/up    Electronically signed by:  Monica Montano RD  02/26/24 07:50 EST

## 2024-02-26 NOTE — THERAPY EVALUATION
Patient Name: Tasha Yarbrough  : 1955    MRN: [id number]                              Today's Date: 2024       Admit Date: 2024    Visit Dx:     ICD-10-CM ICD-9-CM   1. Acute pancreatitis, unspecified complication status, unspecified pancreatitis type  K85.90 577.0   2. Leukocytosis, unspecified type  D72.829 288.60   3. Metabolic acidosis  E87.20 276.2   4. Epigastric pain  R10.13 789.06   5. Acute renal failure, unspecified acute renal failure type  N17.9 584.9   6. Dehydration  E86.0 276.51   7. Sepsis with acute renal failure without septic shock, due to unspecified organism, unspecified acute renal failure type  A41.9 038.9    R65.20 995.92    N17.9 584.9     Patient Active Problem List   Diagnosis    Colon cancer screening    Gastroesophageal reflux disease with esophagitis    Left lower quadrant abdominal pain    Irritable bowel syndrome with constipation    Encounter for screening for malignant neoplasm of colon    Periumbilical abdominal pain    Diarrhea of presumed infectious origin    Acute kidney injury    Bacterial colitis    VIVIENNE (acute kidney injury)    C. difficile colitis    Generalized abdominal pain    Diarrhea of infectious origin    Abnormal finding on GI tract imaging    Anemia, chronic disease    Colitis due to Clostridium difficile    Pancreatitis, acute     Past Medical History:   Diagnosis Date    Hypertension     Impaired mobility     Injury of back      Past Surgical History:   Procedure Laterality Date    ABDOMINAL SURGERY      COLON SURGERY      COLONOSCOPY      TUBAL ABDOMINAL LIGATION        General Information       Row Name 24 1210          Physical Therapy Time and Intention    Document Type evaluation  -MS     Mode of Treatment physical therapy  -MS       Row Name 24 1210          General Information    Patient Profile Reviewed yes  -MS     Prior Level of Function --  unsure of PLOF d/t patient confusion, however Pt had cane in room  -MS     Existing  Precautions/Restrictions fall  -MS     Barriers to Rehab medically complex;previous functional deficit  -MS       Row Name 02/26/24 1210          Living Environment    People in Home child(debra), adult  -MS       Row Name 02/26/24 1210          Cognition    Orientation Status (Cognition) oriented to;person;verbal cues/prompts needed for orientation  -MS       Row Name 02/26/24 1210          Safety Issues, Functional Mobility    Safety Issues Affecting Function (Mobility) insight into deficits/self-awareness;safety precautions follow-through/compliance;positioning of assistive device;impulsivity;safety precaution awareness;sequencing abilities  -MS     Impairments Affecting Function (Mobility) balance;endurance/activity tolerance;strength;pain  -MS               User Key  (r) = Recorded By, (t) = Taken By, (c) = Cosigned By      Initials Name Provider Type    Sebastien Brownlee PT Physical Therapist                   Mobility       Row Name 02/26/24 1218          Bed Mobility    Bed Mobility supine-sit  -MS     Supine-Sit [place] (Bed Mobility) contact guard  -MS     Assistive Device (Bed Mobility) bed rails;head of bed elevated  -MS       Row Name 02/26/24 1218          Sit-Stand Transfer    Sit-Stand [place] (Transfers) minimum assist (75% patient effort);verbal cues  -MS     Assistive Device (Sit-Stand Transfers) walker, front-wheeled  -MS       Row Name 02/26/24 1218          Gait/Stairs (Locomotion)    [place] Level (Gait) minimum assist (75% patient effort);verbal cues  -MS     Assistive Device (Gait) walker, front-wheeled  -MS     Patient was able to Ambulate yes  -MS     Distance in Feet (Gait) 3  -MS     Deviations/Abnormal Patterns (Gait) festinating/shuffling;base of support, narrow;stride length decreased  -MS     Bilateral Gait Deviations forward flexed posture;heel strike decreased  -MS               User Key  (r) = Recorded By, (t) = Taken By, (c) = Cosigned By      Initials Name  Provider Type    Sebastien Brownlee, PT Physical Therapist                   Obj/Interventions       Row Name 02/26/24 1222          Range of Motion Comprehensive    General Range of Motion bilateral lower extremity ROM WFL  -MS       Row Name 02/26/24 1222          Strength Comprehensive (MMT)    General Manual Muscle Testing (MMT) Assessment lower extremity strength deficits identified  -MS     Comment, General Manual Muscle Testing (MMT) Assessment B LE 4-/5  -MS       Row Name 02/26/24 1222          Sensory Assessment (Somatosensory)    Sensory Assessment (Somatosensory) sensation intact  -MS               User Key  (r) = Recorded By, (t) = Taken By, (c) = Cosigned By      Initials Name Provider Type    MS Sebastien Simon, PT Physical Therapist                   Goals/Plan       Row Name 02/26/24 1236          Bed Mobility Goal 1 (PT)    Activity/Assistive Device (Bed Mobility Goal 1, PT) bed mobility activities, all  -MS     [place]/Cues Needed (Bed Mobility Goal 1, PT) modified independence  -MS     Time Frame (Bed Mobility Goal 1, PT) long term goal (LTG);2 weeks  -MS       Row Name 02/26/24 1236          Transfer Goal 1 (PT)    Activity/Assistive Device (Transfer Goal 1, PT) transfers, all;sit-to-stand/stand-to-sit  -MS     [place]/Cues Needed (Transfer Goal 1, PT) contact guard required  -MS     Time Frame (Transfer Goal 1, PT) long term goal (LTG);2 weeks  -MS       Row Name 02/26/24 1236          Gait Training Goal 1 (PT)    Activity/Assistive Device (Gait Training Goal 1, PT) gait (walking locomotion);assistive device use  -MS     [place] (Gait Training Goal 1, PT) standby assist  -MS     Distance (Gait Training Goal 1, PT) 100ft  -MS     Time Frame (Gait Training Goal 1, PT) long term goal (LTG);2 weeks  -MS       Row Name 02/26/24 1236          Patient Education Goal (PT)    Activity (Patient Education Goal, PT) ther exer x15 reps ea  -MS      [place]/Cues/Accuracy (Memory Goal 2, PT) demonstrates adequately  -MS     Time Frame (Patient Education Goal, PT) short term goal (STG);1 week  -MS       Row Name 02/26/24 1236          Therapy Assessment/Plan (PT)    Planned Therapy Interventions (PT) balance training;bed mobility training;gait training;home exercise program;strengthening;ROM (range of motion);transfer training;neuromuscular re-education;patient/family education  -MS               User Key  (r) = Recorded By, (t) = Taken By, (c) = Cosigned By      Initials Name Provider Type    MS Sebastien Simon, PT Physical Therapist                   Clinical Impression       Row Name 02/26/24 1226          Pain    Additional Documentation Pain Scale: FACES Pre/Post-Treatment (Group)  -MS       Row Name 02/26/24 1226          Pain Scale: FACES Pre/Post-Treatment    Pain: FACES Scale, Pretreatment 4-->hurts little more  -MS     Posttreatment Pain Rating 4-->hurts little more  -MS     Pain Location - Side/Orientation Bilateral  -MS     Pain Location - foot  -MS       Row Name 02/26/24 1226          Plan of Care Review    Plan of Care Reviewed With patient  -MS     Progress no change  -MS     Outcome Evaluation Pt participated in PT eval this date. Pt oriented to person with cues. Pt transferred to EOB with CGA. Pt performed STS transfer with Rwx and Bela. Pt attempted taking steps for gait however was experiencing too much pain in bunions on B big toes. Pt was able to take small shuffled steps with CGA to the chair using the Rwx for support. Pt is expected to benefit from skilled PT tx during this inpatient stay.  -MS       Row Name 02/26/24 1226          Therapy Assessment/Plan (PT)    Rehab Potential (PT) good, to achieve stated therapy goals  -MS     Criteria for Skilled Interventions Met (PT) yes;meets criteria  -MS     Therapy Frequency (PT) 5 times/wk  -MS       Row Name 02/26/24 1226          Vital Signs    O2 Delivery Pre Treatment room air  -MS      O2 Delivery Intra Treatment room air  -MS     O2 Delivery Post Treatment room air  -MS     Pre Patient Position Supine  -MS     Intra Patient Position Standing  -MS     Post Patient Position Sitting  -MS       Row Name 02/26/24 1226          Positioning and Restraints    Pre-Treatment Position in bed  -MS     Post Treatment Position chair  -MS     In Chair reclined;call light within reach;encouraged to call for assist;exit alarm on;notified nsg  -MS               User Key  (r) = Recorded By, (t) = Taken By, (c) = Cosigned By      Initials Name Provider Type    Sebastien Brownlee, MARCIAL Physical Therapist                   Outcome Measures       Row Name 02/26/24 1237 02/26/24 0820       How much help from another person do you currently need...    Turning from your back to your side while in flat bed without using bedrails? 4  -MS 3  -HN    Moving from lying on back to sitting on the side of a flat bed without bedrails? 3  -MS 3  -HN    Moving to and from a bed to a chair (including a wheelchair)? 3  -MS 2  -HN    Standing up from a chair using your arms (e.g., wheelchair, bedside chair)? 3  -MS 2  -HN    Climbing 3-5 steps with a railing? 2  -MS 1  -HN    To walk in hospital room? 2  -MS 1  -HN    AM-PAC 6 Clicks Score (PT) 17  -MS 12  -HN    Highest Level of Mobility Goal 5 --> Static standing  -MS 4 --> Transfer to chair/commode  -HN              User Key  (r) = Recorded By, (t) = Taken By, (c) = Cosigned By      Initials Name Provider Type    Sebastien Brownlee, MARCIAL Physical Therapist    HN Jamee Sahni, RN Registered Nurse                                 Physical Therapy Education       Title: PT OT SLP Therapies (In Progress)       Topic: Physical Therapy (In Progress)       Point: Mobility training (Done)       Learning Progress Summary             Patient Acceptance, E, VU by MS at 2/26/2024 1238    Comment: importance of mobility                         Point: Home exercise program (Done)       Learning  Progress Summary             Patient Acceptance, E, VU by MS at 2/26/2024 8653    Comment: importance of mobility                         Point: Body mechanics (Not Started)       Learner Progress:  Not documented in this visit.              Point: Precautions (Not Started)       Learner Progress:  Not documented in this visit.                              User Key       Initials Effective Dates Name Provider Type Discipline    MS 08/22/23 -  Sebastien Simon, PT Physical Therapist PT                  PT Recommendation and Plan  Planned Therapy Interventions (PT): balance training, bed mobility training, gait training, home exercise program, strengthening, ROM (range of motion), transfer training, neuromuscular re-education, patient/family education  Plan of Care Reviewed With: patient  Progress: no change  Outcome Evaluation: Pt participated in PT eval this date. Pt oriented to person with cues. Pt transferred to EOB with CGA. Pt performed STS transfer with Rwx and Bela. Pt attempted taking steps for gait however was experiencing too much pain in bunions on B big toes. Pt was able to take small shuffled steps with CGA to the chair using the Rwx for support. Pt is expected to benefit from skilled PT tx during this inpatient stay.     Time Calculation:   PT Evaluation Complexity  History, PT Evaluation Complexity: 1-2 personal factors and/or comorbidities  Examination of Body Systems (PT Eval Complexity): total of 3 or more elements  Clinical Presentation (PT Evaluation Complexity): evolving  Clinical Decision Making (PT Evaluation Complexity): moderate complexity  Overall Complexity (PT Evaluation Complexity): moderate complexity     PT Charges       Row Name 02/26/24 1239             Time Calculation    Start Time 1033  -MS      PT Received On 02/26/24  -MS      PT Goal Re-Cert Due Date 03/07/24  -MS         Untimed Charges    PT Eval/Re-eval Minutes 55  -MS         Total Minutes    Untimed Charges Total Minutes 55   -MS       Total Minutes 55  -MS                User Key  (r) = Recorded By, (t) = Taken By, (c) = Cosigned By      Initials Name Provider Type    Sebastien Brownlee, PT Physical Therapist                  Therapy Charges for Today       Code Description Service Date Service Provider Modifiers Qty    [id number] HC PT EVAL MOD COMPLEXITY 4 2/26/2024 Sebastien Simon, PT GP 1            PT G-Codes  AM-PAC 6 Clicks Score (PT): 17  PT Discharge Summary  Anticipated Discharge Disposition (PT): home with assist, home with home health    Sebastien Simon, MARCIAL  2/26/2024

## 2024-02-26 NOTE — CASE MANAGEMENT/SOCIAL WORK
1355: CM at bedside. Pt  was up in chair. Eyes closed. Appears sleeping, No 02 on. Resp-20 and unlabored. CM to follow . DCP remains home with son when stable. Possible Home Health services.

## 2024-02-26 NOTE — PLAN OF CARE
Goal Outcome Evaluation:  Plan of Care Reviewed With: patient, family           Outcome Evaluation: Patient much more alert today. Still confused but able to communicate needs and follow commands. Patient up in chair for majority of the day.

## 2024-02-27 LAB
A-A DO2: 17.7 MMHG
ALBUMIN SERPL-MCNC: 3.2 G/DL (ref 3.5–5.2)
ALBUMIN/GLOB SERPL: 1.3 G/DL
ALP SERPL-CCNC: 73 U/L (ref 39–117)
ALT SERPL W P-5'-P-CCNC: 14 U/L (ref 1–33)
ANION GAP SERPL CALCULATED.3IONS-SCNC: 12.5 MMOL/L (ref 5–15)
ARTERIAL PATENCY WRIST A: POSITIVE
AST SERPL-CCNC: 27 U/L (ref 1–32)
ATMOSPHERIC PRESS: 725 MMHG
BASE EXCESS BLDA CALC-SCNC: -10.3 MMOL/L (ref 0–2)
BASOPHILS # BLD AUTO: 0.01 10*3/MM3 (ref 0–0.2)
BASOPHILS NFR BLD AUTO: 0.1 % (ref 0–1.5)
BDY SITE: ABNORMAL
BILIRUB SERPL-MCNC: 0.2 MG/DL (ref 0–1.2)
BUN SERPL-MCNC: 46 MG/DL (ref 8–23)
BUN/CREAT SERPL: 24 (ref 7–25)
CALCIUM SPEC-SCNC: 8.8 MG/DL (ref 8.6–10.5)
CHLORIDE SERPL-SCNC: 116 MMOL/L (ref 98–107)
CO2 SERPL-SCNC: 15.5 MMOL/L (ref 22–29)
COHGB MFR BLD: 1.5 % (ref 0–2)
CREAT SERPL-MCNC: 1.92 MG/DL (ref 0.57–1)
DEPRECATED RDW RBC AUTO: 55.3 FL (ref 37–54)
EGFRCR SERPLBLD CKD-EPI 2021: 28.1 ML/MIN/1.73
EOSINOPHIL # BLD AUTO: 0.05 10*3/MM3 (ref 0–0.4)
EOSINOPHIL NFR BLD AUTO: 0.5 % (ref 0.3–6.2)
ERYTHROCYTE [DISTWIDTH] IN BLOOD BY AUTOMATED COUNT: 14.6 % (ref 12.3–15.4)
GLOBULIN UR ELPH-MCNC: 2.5 GM/DL
GLUCOSE SERPL-MCNC: 73 MG/DL (ref 65–99)
HCO3 BLDA-SCNC: 15 MMOL/L (ref 22–28)
HCT VFR BLD AUTO: 25.3 % (ref 34–46.6)
HCT VFR BLD CALC: 25.1 %
HGB BLD-MCNC: 8.6 G/DL (ref 12–15.9)
IMM GRANULOCYTES # BLD AUTO: 0.06 10*3/MM3 (ref 0–0.05)
IMM GRANULOCYTES NFR BLD AUTO: 0.7 % (ref 0–0.5)
INHALED O2 CONCENTRATION: 21 %
LIPASE SERPL-CCNC: 123 U/L (ref 13–60)
LYMPHOCYTES # BLD AUTO: 0.8 10*3/MM3 (ref 0.7–3.1)
LYMPHOCYTES NFR BLD AUTO: 8.7 % (ref 19.6–45.3)
Lab: ABNORMAL
MCH RBC QN AUTO: 35.7 PG (ref 26.6–33)
MCHC RBC AUTO-ENTMCNC: 34 G/DL (ref 31.5–35.7)
MCV RBC AUTO: 105 FL (ref 79–97)
METHGB BLD QL: 0.6 % (ref 0–1.5)
MODALITY: ABNORMAL
MONOCYTES # BLD AUTO: 0.79 10*3/MM3 (ref 0.1–0.9)
MONOCYTES NFR BLD AUTO: 8.6 % (ref 5–12)
NEUTROPHILS NFR BLD AUTO: 7.44 10*3/MM3 (ref 1.7–7)
NEUTROPHILS NFR BLD AUTO: 81.4 % (ref 42.7–76)
NRBC BLD AUTO-RTO: 0 /100 WBC (ref 0–0.2)
OXYHGB MFR BLDV: 95.9 % (ref 94–99)
PCO2 BLDA: 30.1 MM HG (ref 35–45)
PCO2 TEMP ADJ BLD: ABNORMAL MM[HG]
PH BLDA: 7.3 PH UNITS (ref 7.35–7.45)
PH, TEMP CORRECTED: ABNORMAL
PLATELET # BLD AUTO: 129 10*3/MM3 (ref 140–450)
PMV BLD AUTO: 10.8 FL (ref 6–12)
PO2 BLDA: 90.6 MM HG (ref 75–100)
PO2 TEMP ADJ BLD: ABNORMAL MM[HG]
POTASSIUM SERPL-SCNC: 3.2 MMOL/L (ref 3.5–5.2)
PROCALCITONIN SERPL-MCNC: 0.4 NG/ML (ref 0–0.25)
PROT SERPL-MCNC: 5.7 G/DL (ref 6–8.5)
RBC # BLD AUTO: 2.41 10*6/MM3 (ref 3.77–5.28)
SAO2 % BLDCOA: 98 % (ref 94–100)
SODIUM SERPL-SCNC: 144 MMOL/L (ref 136–145)
VANCOMYCIN SERPL-MCNC: 22.7 MCG/ML (ref 5–40)
VENTILATOR MODE: ABNORMAL
WBC NRBC COR # BLD AUTO: 9.15 10*3/MM3 (ref 3.4–10.8)

## 2024-02-27 PROCEDURE — 83050 HGB METHEMOGLOBIN QUAN: CPT

## 2024-02-27 PROCEDURE — 97530 THERAPEUTIC ACTIVITIES: CPT

## 2024-02-27 PROCEDURE — 80202 ASSAY OF VANCOMYCIN: CPT | Performed by: STUDENT IN AN ORGANIZED HEALTH CARE EDUCATION/TRAINING PROGRAM

## 2024-02-27 PROCEDURE — 36600 WITHDRAWAL OF ARTERIAL BLOOD: CPT

## 2024-02-27 PROCEDURE — 85025 COMPLETE CBC W/AUTO DIFF WBC: CPT | Performed by: FAMILY MEDICINE

## 2024-02-27 PROCEDURE — 83690 ASSAY OF LIPASE: CPT | Performed by: STUDENT IN AN ORGANIZED HEALTH CARE EDUCATION/TRAINING PROGRAM

## 2024-02-27 PROCEDURE — 84145 PROCALCITONIN (PCT): CPT | Performed by: STUDENT IN AN ORGANIZED HEALTH CARE EDUCATION/TRAINING PROGRAM

## 2024-02-27 PROCEDURE — 99232 SBSQ HOSP IP/OBS MODERATE 35: CPT | Performed by: FAMILY MEDICINE

## 2024-02-27 PROCEDURE — 82805 BLOOD GASES W/O2 SATURATION: CPT

## 2024-02-27 PROCEDURE — 25010000002 VANCOMYCIN PER 500 MG: Performed by: FAMILY MEDICINE

## 2024-02-27 PROCEDURE — 25010000002 PIPERACILLIN SOD-TAZOBACTAM PER 1 G: Performed by: FAMILY MEDICINE

## 2024-02-27 PROCEDURE — 97116 GAIT TRAINING THERAPY: CPT

## 2024-02-27 PROCEDURE — 25810000003 LACTATED RINGERS PER 1000 ML: Performed by: INTERNAL MEDICINE

## 2024-02-27 PROCEDURE — 25010000002 MORPHINE PER 10 MG: Performed by: FAMILY MEDICINE

## 2024-02-27 PROCEDURE — 80053 COMPREHEN METABOLIC PANEL: CPT | Performed by: STUDENT IN AN ORGANIZED HEALTH CARE EDUCATION/TRAINING PROGRAM

## 2024-02-27 PROCEDURE — 82375 ASSAY CARBOXYHB QUANT: CPT

## 2024-02-27 RX ORDER — POTASSIUM CHLORIDE 750 MG/1
40 CAPSULE, EXTENDED RELEASE ORAL 2 TIMES DAILY WITH MEALS
Status: COMPLETED | OUTPATIENT
Start: 2024-02-27 | End: 2024-02-27

## 2024-02-27 RX ORDER — VANCOMYCIN HYDROCHLORIDE 750 MG/150ML
750 INJECTION, SOLUTION INTRAVENOUS ONCE
Status: COMPLETED | OUTPATIENT
Start: 2024-02-27 | End: 2024-02-27

## 2024-02-27 RX ORDER — SODIUM BICARBONATE 650 MG/1
650 TABLET ORAL 2 TIMES DAILY
Status: DISCONTINUED | OUTPATIENT
Start: 2024-02-27 | End: 2024-02-28

## 2024-02-27 RX ADMIN — FAMOTIDINE 40 MG: 20 TABLET, FILM COATED ORAL at 09:54

## 2024-02-27 RX ADMIN — PIPERACILLIN SODIUM AND TAZOBACTAM SODIUM 3.38 G: 3; .375 INJECTION, SOLUTION INTRAVENOUS at 23:33

## 2024-02-27 RX ADMIN — SODIUM BICARBONATE 650 MG TABLET 650 MG: at 21:41

## 2024-02-27 RX ADMIN — HYDROCODONE BITARTRATE AND ACETAMINOPHEN 1 TABLET: 7.5; 325 TABLET ORAL at 17:38

## 2024-02-27 RX ADMIN — Medication 10 ML: at 09:54

## 2024-02-27 RX ADMIN — PIPERACILLIN SODIUM AND TAZOBACTAM SODIUM 3.38 G: 3; .375 INJECTION, SOLUTION INTRAVENOUS at 06:07

## 2024-02-27 RX ADMIN — VANCOMYCIN HYDROCHLORIDE 750 MG: 750 INJECTION, SOLUTION INTRAVENOUS at 21:41

## 2024-02-27 RX ADMIN — Medication 10 ML: at 21:47

## 2024-02-27 RX ADMIN — MORPHINE SULFATE 2 MG: 2 INJECTION, SOLUTION INTRAMUSCULAR; INTRAVENOUS at 22:18

## 2024-02-27 RX ADMIN — CITALOPRAM HYDROBROMIDE 40 MG: 20 TABLET ORAL at 09:53

## 2024-02-27 RX ADMIN — SODIUM CHLORIDE, POTASSIUM CHLORIDE, SODIUM LACTATE AND CALCIUM CHLORIDE 100 ML/HR: 600; 310; 30; 20 INJECTION, SOLUTION INTRAVENOUS at 06:05

## 2024-02-27 RX ADMIN — SODIUM BICARBONATE 650 MG TABLET 650 MG: at 09:53

## 2024-02-27 RX ADMIN — MUPIROCIN 1 APPLICATION: 20 OINTMENT TOPICAL at 09:54

## 2024-02-27 RX ADMIN — POTASSIUM CHLORIDE 40 MEQ: 10 CAPSULE, COATED, EXTENDED RELEASE ORAL at 09:54

## 2024-02-27 RX ADMIN — MUPIROCIN 1 APPLICATION: 20 OINTMENT TOPICAL at 21:29

## 2024-02-27 RX ADMIN — POTASSIUM CHLORIDE 40 MEQ: 10 CAPSULE, COATED, EXTENDED RELEASE ORAL at 17:38

## 2024-02-27 RX ADMIN — Medication 1 PATCH: at 14:49

## 2024-02-27 RX ADMIN — PIPERACILLIN SODIUM AND TAZOBACTAM SODIUM 3.38 G: 3; .375 INJECTION, SOLUTION INTRAVENOUS at 14:48

## 2024-02-27 RX ADMIN — MORPHINE SULFATE 2 MG: 2 INJECTION, SOLUTION INTRAMUSCULAR; INTRAVENOUS at 09:54

## 2024-02-27 RX ADMIN — METOPROLOL TARTRATE 50 MG: 50 TABLET ORAL at 09:53

## 2024-02-27 RX ADMIN — SODIUM CHLORIDE, POTASSIUM CHLORIDE, SODIUM LACTATE AND CALCIUM CHLORIDE 75 ML/HR: 600; 310; 30; 20 INJECTION, SOLUTION INTRAVENOUS at 22:11

## 2024-02-27 NOTE — PROGRESS NOTES
"    [facility]    Name:  Tasha Yarbrough   Age:  68 y.o.  Sex:  female  :  1955  MRN:  [id number]   Visit Number:  [id number]  Admission Date:  2024  Date Of Service:  24  Primary Care Physician:  Terrence Baldwin MD     LOS: 2 days :    Chief Complaint:      Follow-up; abdominal pain, shortness of air    Subjective:    Patient was seen and examined at bedside today.  Patient sitting up comfortably in bed with no distress.  Patient with significant improvement on her mental status compared to my last time seeing her on admission.  Patient is fully AAOx3.  Patient is engaged and answering questions appropriately.  Patient realizes that she does not remember how much sick she was and she did not expect that she was then much sick when she came in.  Patient reports feeling significantly better and abdominal pain is improving.  Patient wondering if we can advance her diet today.  She denies any other acute acute complaints.  Vitals are stable and afebrile.    Hospital Course:    Tasha Yarbrough is a 68 year old female with a past medical history of hypertension and impaired mobility and ADLs, who presented to the ER with a chief complaint of shortness of breath and abdominal pain.  At time of presentation currently awake and alert but appears to be confused, she was reporting abdominal pain and was able to tell me her name and date of birth but was confused about her situation and kept repeating \"I am here for colitis diagnosis\".  Per ER report patient has been sick for the past 5 to 6 days with cough and difficulty breathing with symptoms worsening over the past 2 days.  Patient has been refusing to come to the ER until the day of admission where she became confused and the son called EMS. Patient had COVID-19 approximately 2 or 3 weeks ago but seem to improve prior to this week.  history otherwise limited due to patient mental status and disorientation.  No " family at bedside.  Patient was previously admitted here back in September 2023 when she was treated for C. difficile colitis.     On ER evaluation, Patient was hypertensive with a blood pressure of 158/110, afebrile on room air.  Her workup was significant for an ABG with a pH of 6.886/pCO2 11.8/pO2 136/HCO3 2.2/saturation 98% on room air.  proBNP 2545, CO2 3.1, anion gap 23.9, creatinine 3.31, BUN 63, glucose 126, lactate 2.3, lipase above 3000, Pro-Miguel 0.44, WBC 28.7.  Respiratory panel negative.  Chest x-ray with chronic changes per my read.  CT abdomen pelvis without contrast showed Mild acute pancreatitis with retroperitoneal effusion. No abscess. Nonobstructing bilateral renal stones. Moderate-large hiatal hernia.  Patient received dexamethasone 10 mg, DuoNeb, Zofran, 2 L of normal saline boluses and was started on Vanco and Zosyn in addition to sodium bicarb in the ED.  Hospitalist consulted for admission.    Metabolic acidosis improved with sepsis treatment and bicarb drip.  Encephalopathy appears to have resolved.  Did develop acute anemia, appears to be stable.  No obvious sign of bleed, abdomen minimally tender.    Review of Systems:     All systems were reviewed and negative except as mentioned in subjective, assessment and plan.    Vital Signs:    Temp:  [97.5 °F (36.4 °C)-98.1 °F (36.7 °C)] 97.5 °F (36.4 °C)  Heart Rate:  [72-88] 86  Resp:  [16-18] 16  BP: ()/(60-69) 96/60    Intake and output:    I/O last 3 completed shifts:  In: 3000 [I.V.:3000]  Out: 1775 [Urine:1775]  No intake/output data recorded.    Physical Examination:    General Appearance:  Alert and cooperative.  No acute distress.   Head:  Atraumatic and normocephalic.   Eyes: Conjunctivae and sclerae normal, no icterus. No pallor.   Throat: No oral lesions, no thrush, oral mucosa moist.   Neck: Supple, trachea midline, no thyromegaly.   Lungs:   Breath sounds heard bilaterally equally.  No wheezing or crackles. No Pleural rub or  bronchial breathing.   Heart:  Normal S1 and S2, no murmur, no gallop, no rub. No JVD.   Abdomen:   Soft, nontender, nondistended, bowel sounds positive x 4.   Extremities: Supple, no edema, no cyanosis, no clubbing.   Skin: Warm.   Neurologic: Alert and oriented x 3.  No facial asymmetry. Moves all four limbs. No tremors.      Laboratory results:    Results from last 7 days   Lab Units 02/27/24  0536 02/26/24  1850 02/26/24  0609 02/25/24  0646   SODIUM mmol/L 144 146* 149* 140   POTASSIUM mmol/L 3.2* 2.6* 2.5* 3.8   CHLORIDE mmol/L 116* 116* 121* 113*   CO2 mmol/L 15.5* 14.7* 12.2* 4.5*   BUN mg/dL 46* 49* 56* 61*   CREATININE mg/dL 1.92* 2.31* 2.68* 2.91*   CALCIUM mg/dL 8.8 8.4* 8.7 9.7   BILIRUBIN mg/dL 0.2  --  0.2 0.2   ALK PHOS U/L 73  --  75 103   ALT (SGPT) U/L 14  --  13 12   AST (SGOT) U/L 27  --  33* 36*   GLUCOSE mg/dL 73 89 75 113*     Results from last 7 days   Lab Units 02/27/24  0536 02/26/24  1059 02/26/24  0609 02/25/24  0646   WBC 10*3/mm3 9.15  --  10.84* 16.87*   HEMOGLOBIN g/dL 8.6* 9.2* 8.9* 12.0   HEMATOCRIT % 25.3* 26.4* 26.0* 35.3   PLATELETS 10*3/mm3 129*  --  149 152             Results from last 7 days   Lab Units 02/25/24  0049 02/25/24  0043   BLOODCX  No growth at 2 days No growth at 2 days     Recent Labs     02/25/24  1348 02/26/24  0741 02/27/24  0522   PHART 7.271* 7.292* 7.305*   GUT7NAX 18.1* 27.1* 30.1*   PO2ART 107.0* 57.1* 90.6   UHU2VAT 8.3* 13.1* 15.0*   BASEEXCESS -16.5* -12.2* -10.3*      I have reviewed the patient's laboratory results.    Radiology results:    No radiology results from the last 24 hrs  I have reviewed the patient's radiology reports.    Medication Review:     I have reviewed the patient's active and prn medications.     Problem List:      Pancreatitis, acute      Assessment:     Acute kidney injury, POA  Acute pancreatitis, POA  Acute metabolic encephalopathy, POA  Metabolic lactic acidosis, POA  Severe sepsis, unknown source of infection,  POA  Hypertension  Impaired mobility and ADLs     Plan:     Comfortable in chair, pleasantly conversational.     Acute kidney injury  -Improving, creatinine down to 1.92  -Nephrology consulted, appreciate recommendations.  -Avoid nephrotoxic drugs, holding home Lasix  -Continue IV fluids  -Patient with bilateral nonobstructing renal stones with no hydronephrosis on CT abdomen and pelvis.     Acute pancreatitis   -Improving  -Lipase down to 123  -S/p cholecystectomy  -pain control  -Continue IV fluids  -Advance diet     Sepsis, Resolved  -Patient met SIRS criteria, in the settings of pancreatitis and VIVIENNE and systemic acidosis.  -Continued coverage with Vanco and Zosyn and de-escalate as able to  -No pattern of infection on urinalysis  -Blood cultures obt remain negative.  -Procalcitonin improving.  Leukocytosis resolved  -Plan on finishing 5 days of antibiotics     Acute encephalopathy  -Likely metabolic  -Consider CT head if no improvement  -Check TSH    Anemia  -No obvious sign of bleed.   -After initial hemoglobin drop it appears to be stable. Initial value may have been dilutional.       Metabolic lactic acidosis   Expect improvements with sepsis treatment    Hypokalemia  Replace as needed.    Hiatal Hernia  Monitoring.      -Continue home meds as warranted.  -Further orders as indicated per clinical course.     Risk Assessment: High  DVT Prophylaxis: SCDs  Code Status: Full  Diet: Clear liquids  Discharge Plan: At least a couple more days depending on clinical improvements    I have reviewed the copied text and it is accurate as of 2/27/2024     Vivienne Roa MD  02/27/24  07:03 EST    Dictated utilizing Dragon dictation.

## 2024-02-27 NOTE — THERAPY TREATMENT NOTE
Patient Name: Tasha Yarbrough  : 1955    MRN: [id number]                              Today's Date: 2024       Admit Date: 2024    Visit Dx:     ICD-10-CM ICD-9-CM   1. Acute pancreatitis, unspecified complication status, unspecified pancreatitis type  K85.90 577.0   2. Leukocytosis, unspecified type  D72.829 288.60   3. Metabolic acidosis  E87.20 276.2   4. Epigastric pain  R10.13 789.06   5. Acute renal failure, unspecified acute renal failure type  N17.9 584.9   6. Dehydration  E86.0 276.51   7. Sepsis with acute renal failure without septic shock, due to unspecified organism, unspecified acute renal failure type  A41.9 038.9    R65.20 995.92    N17.9 584.9     Patient Active Problem List   Diagnosis    Colon cancer screening    Gastroesophageal reflux disease with esophagitis    Left lower quadrant abdominal pain    Irritable bowel syndrome with constipation    Encounter for screening for malignant neoplasm of colon    Periumbilical abdominal pain    Diarrhea of presumed infectious origin    Acute kidney injury    Bacterial colitis    VIVIENNE (acute kidney injury)    C. difficile colitis    Generalized abdominal pain    Diarrhea of infectious origin    Abnormal finding on GI tract imaging    Anemia, chronic disease    Colitis due to Clostridium difficile    Pancreatitis, acute     Past Medical History:   Diagnosis Date    Hypertension     Impaired mobility     Injury of back      Past Surgical History:   Procedure Laterality Date    ABDOMINAL SURGERY      COLON SURGERY      COLONOSCOPY      TUBAL ABDOMINAL LIGATION        General Information       Row Name 24 1341          OT Time and Intention    Document Type therapy note (daily note)  -     Mode of Treatment occupational therapy  -       Row Name 24 1341          General Information    Patient Profile Reviewed yes  -     Existing Precautions/Restrictions fall  -       Row Name 24 1341          Safety Issues, Functional  Mobility    Impairments Affecting Function (Mobility) balance;endurance/activity tolerance;strength;pain  -               User Key  (r) = Recorded By, (t) = Taken By, (c) = Cosigned By      Initials Name Provider Type    Georgina Butterfield Occupational Therapist                     Mobility/ADL's       Row Name 02/27/24 1342          Bed Mobility    Supine-Sit [place] (Bed Mobility) contact guard;verbal cues;nonverbal cues (demo/gesture)  -     Assistive Device (Bed Mobility) head of bed elevated;bed rails  -       Row Name 02/27/24 1342          Sit-Stand Transfer    Sit-Stand [place] (Transfers) contact guard;verbal cues;nonverbal cues (demo/gesture)  -     Assistive Device (Sit-Stand Transfers) walker, front-wheeled  -[facility] Name 02/27/24 1342          Functional Mobility    Functional Mobility- Ind. Level contact guard assist  -     Functional Mobility- Device walker, front-wheeled  -     Functional Mobility-Distance (Feet) 64  -     Patient was able to Ambulate yes  -               User Key  (r) = Recorded By, (t) = Taken By, (c) = Cosigned By      Initials Name Provider Type    Georgina Butterfield Occupational Therapist                   Obj/Interventions    No documentation.                  Goals/Plan    No documentation.                  Clinical Impression       [facility] Name 02/27/24 1345          Pain Assessment    Pretreatment Pain Rating 0/10 - no pain  -     Posttreatment Pain Rating 4/10  -     Pain Location - abdomen  -     Pain Intervention(s) Repositioned;Ambulation/increased activity  -       Row Name 02/27/24 1349          Plan of Care Review    Plan of Care Reviewed With patient  -     Progress no change  -     Outcome Evaluation Pt seen for therapy today, pt sat eob independently, stood with cga and walked 64' usign RW and cga.  Pt reports being too tired after walking and unable to do more therapy.  Cont OT per POC.  -       Row Name 02/27/24 9953           Therapy Plan Review/Discharge Plan (OT)    Anticipated Discharge Disposition (OT) home with home health;home with assist  -       Row Name 02/27/24 1345          Vital Signs    Pre SpO2 (%) 97  -AH     Post SpO2 (%) 98  -       Row Name 02/27/24 1345          Positioning and Restraints    Pre-Treatment Position in bed  -AH     Post Treatment Position chair  -     In Chair reclined;call light within reach;encouraged to call for assist;exit alarm on;notified ns  -               User Key  (r) = Recorded By, (t) = Taken By, (c) = Cosigned By      Initials Name Provider Type    Georgina Butterfield Occupational Therapist                   Outcome Measures       Row Name 02/27/24 1348          How much help from another is currently needed...    Putting on and taking off regular lower body clothing? 3  -AH     Bathing (including washing, rinsing, and drying) 2  -AH     Toileting (which includes using toilet bed pan or urinal) 2  -AH     Putting on and taking off regular upper body clothing 3  -AH     Taking care of personal grooming (such as brushing teeth) 3  -AH     Eating meals 3  -AH     AM-PAC 6 Clicks Score (OT) 16  -       Row Name 02/27/24 1337 02/27/24 0839       How much help from another person do you currently need...    Turning from your back to your side while in flat bed without using bedrails? 4  -RM 4  -CS    Moving from lying on back to sitting on the side of a flat bed without bedrails? 4  -RM 3  -CS    Moving to and from a bed to a chair (including a wheelchair)? 3  -RM 3  -CS    Standing up from a chair using your arms (e.g., wheelchair, bedside chair)? 3  -RM 3  -CS    Climbing 3-5 steps with a railing? 2  -RM 2  -CS    To walk in hospital room? 3  -RM 2  -CS    AM-PAC 6 Clicks Score (PT) 19  -RM 17  -CS    Highest Level of Mobility Goal 6 --> Walk 10 steps or more  -RM 5 --> Static standing  -CS      Row Name 02/27/24 1348 02/27/24 1337       Functional Assessment    Outcome Measure  Options AM-PAC 6 Clicks Daily Activity (OT)  - AM-PAC 6 Clicks Basic Mobility (PT)  -              User Key  (r) = Recorded By, (t) = Taken By, (c) = Cosigned By      Initials Name Provider Type     Georgina Beasley Occupational Therapist    Anthony Sanchez, PTA Physical Therapist Assistant    Gabrielle Hutchinson, LPN Licensed Nurse                    Occupational Therapy Education       Title: PT OT SLP Therapies (In Progress)       Topic: Occupational Therapy (In Progress)       Point: ADL training (Done)       Description:   Instruct learner(s) on proper safety adaptation and remediation techniques during self care or transfers.   Instruct in proper use of assistive devices.                  Learning Progress Summary             Patient Acceptance, E,TB, VU by  at 2/27/2024 1349    Comment: benefit of activity    Acceptance, E,TB, VU by SD at 2/26/2024 1244    Comment: OT POC                         Point: Home exercise program (Not Started)       Description:   Instruct learner(s) on appropriate technique for monitoring, assisting and/or progressing therapeutic exercises/activities.                  Learner Progress:  Not documented in this visit.              Point: Precautions (Not Started)       Description:   Instruct learner(s) on prescribed precautions during self-care and functional transfers.                  Learner Progress:  Not documented in this visit.              Point: Body mechanics (Not Started)       Description:   Instruct learner(s) on proper positioning and spine alignment during self-care, functional mobility activities and/or exercises.                  Learner Progress:  Not documented in this visit.                              User Key       Initials Effective Dates Name Provider Type Discipline     06/16/21 -  Georgina Beasley Occupational Therapist OT    SD 06/16/21 -  Yuliana Ham OT Occupational Therapist OT                  OT Recommendation and Plan     Plan of  Care Review  Plan of Care Reviewed With: patient  Progress: no change  Outcome Evaluation: Pt seen for therapy today, pt sat eob independently, stood with cga and walked 64' usign RW and cga.  Pt reports being too tired after walking and unable to do more therapy.  Cont OT per POC.     Time Calculation:         Time Calculation- OT       Row Name 02/27/24 1351 02/27/24 1339          Time Calculation- OT    OT Start Time 1106  -AH --     OT Stop Time 1115  -AH --     OT Time Calculation (min) 9 min  -AH --     OT Received On 02/27/24  - --     OT Goal Re-Cert Due Date 03/07/24  - --        Timed Charges    75527 - Gait Training Minutes  -- 15  -RM     87489 - OT Therapeutic Activity Minutes 9  -AH --        Total Minutes    Timed Charges Total Minutes 9  -AH 15  -RM      Total Minutes 9  -AH 15  -RM               User Key  (r) = Recorded By, (t) = Taken By, (c) = Cosigned By      Initials Name Provider Type    Georgina Butterfield Occupational Therapist    Anthony Sanchez, PTA Physical Therapist Assistant                  Therapy Charges for Today       Code Description Service Date Service Provider Modifiers Qty    11361245409 HC OT THERAPEUTIC ACT EA 15 MIN 2/27/2024 Georgina Beasley GO 1                 Georgina Beasley  2/27/2024

## 2024-02-27 NOTE — PHARMACY RECOMMENDATION
Pharmacy Consult-Vancomycin Dosing  Tasha Yarbrough is a  68 y.o. female receiving vancomycin therapy.     Indication: Sepsis  Consulting Provider: Dr. YOKO Roa    Goal Trough: 15 to 20 mcg/mL    Current Antimicrobial Therapy  Anti-Infectives (From admission, onward)      Ordered     Dose/Rate Route Frequency Start Stop    02/27/24 0732  vancomycin in dextrose 5% 150 mL (VANCOCIN) IVPB 750 mg        Ordering Provider: Vivienne Roa MD    750 mg  200 mL/hr over 45 Minutes Intravenous Once 02/27/24 2200      02/26/24 0839  vancomycin (VANCOCIN) IVPB 500 mg in 100 mL NS        Ordering Provider: Kerley, Brian Joseph, DO    500 mg  200 mL/hr over 30 Minutes Intravenous Once 02/26/24 1800 02/26/24 1837    02/25/24 1053  piperacillin-tazobactam (ZOSYN) 3.375 g in iso-osmotic dextrose 50 ml (premix)        Ordering Provider: Vivienne Roa MD    3.375 g  over 4 Hours Intravenous Every 12 Hours 02/25/24 1900 03/01/24 0659    02/25/24 1053  vancomycin (VANCOCIN) IVPB 500 mg in 100 mL NS        Ordering Provider: Vivienne Roa MD    500 mg  200 mL/hr over 30 Minutes Intravenous Once 02/25/24 1800 02/25/24 1825    02/25/24 1053  Vancomycin Pharmacy Intermittent/Pulse Dosing        Ordering Provider: Vivienne Roa MD     Does not apply Daily 02/25/24 1109 03/01/24 0859    02/25/24 0434  Pharmacy to Dose Zosyn        Ordering Provider: Vivienne Roa MD     Does not apply Continuous PRN 02/25/24 0434 03/01/24 0433    02/25/24 0434  Pharmacy to dose vancomycin        Ordering Provider: Vivienne Roa MD     Does not apply Continuous PRN 02/25/24 0434 03/01/24 0433    02/25/24 0039  piperacillin-tazobactam (ZOSYN) 3.375 g in iso-osmotic dextrose 50 ml (premix)        Ordering Provider: Ho Clark MD    3.375 g  over 30 Minutes Intravenous Once 02/25/24 0055 02/25/24 0142    02/25/24 0039  vancomycin 1250 mg/250 mL 0.9% NS IVPB ([facility])        Ordering Provider: Ho Clark MD    20 mg/kg × 65.8 kg  over 75  "Minutes Intravenous Once 02/25/24 0055 02/25/24 0323            Allergies  Allergies as of 02/24/2024 - Reviewed 02/24/2024   Allergen Reaction Noted    Ciprofloxacin Dizziness 08/18/2022    Codeine Itching 11/17/2022    Pineapple Unknown - Low Severity 01/11/2023       Labs    Results from last 7 days   Lab Units 02/27/24  0536 02/26/24  1850 02/26/24  0609   BUN mg/dL 46* 49* 56*   CREATININE mg/dL 1.92* 2.31* 2.68*       Results from last 7 days   Lab Units 02/27/24  0536 02/26/24  0609 02/25/24  0646   WBC 10*3/mm3 9.15 10.84* 16.87*       Evaluation of Dosing     Last Dose Received in the ED/Outside Facility: Yes  Is Patient on Dialysis or Renal Replacement: No    Ht - 157.5 cm (62\")  Wt - 63.9 kg (140 lb 14 oz)    Estimated Creatinine Clearance: 24.6 mL/min (A) (by C-G formula based on SCr of 1.92 mg/dL (H)).    Intake & Output (last 3 days)         02/22 0701  02/23 0700 02/23 0701  02/24 0700 02/24 0701  02/25 0700 02/25 0701  02/26 0700    IV Piggyback   2300     Total Intake(mL/kg)   2300 (35)     Other   75     Total Output   75     Net   +2225                     Microbiology and Radiology  Microbiology Results (last 10 days)       Procedure Component Value - Date/Time    MRSA Screen, PCR (Inpatient) - Swab, Nares [[id number]]  (Abnormal) Collected: 02/25/24 1102    Lab Status: Final result Specimen: Swab from Nares Updated: 02/25/24 2148     MRSA PCR MRSA Detected    Narrative:      The negative predictive value of this diagnostic test is high and should only be used to consider de-escalating anti-MRSA therapy. A positive result may indicate colonization with MRSA and must be correlated clinically.    Blood Culture - Blood, Hand, Digit Right [[id number]]  (Normal) Collected: 02/25/24 0049    Lab Status: Preliminary result Specimen: Blood from Hand, Digit Right Updated: 02/27/24 0100     Blood Culture No growth at 2 days    Blood Culture - Blood, Hand, Right [[id number]]  (Normal) Collected: 02/25/24 0043 "    Lab Status: Preliminary result Specimen: Blood from Hand, Right Updated: 02/27/24 0100     Blood Culture No growth at 2 days    Respiratory Panel PCR w/COVID-19(SARS-CoV-2) JORDYN/AMA/KIM/PAD/COR/MALICK In-House, NP Swab in UTM/VTM, 2 HR TAT - Swab, Nasopharynx [[id number]]  (Normal) Collected: 02/24/24 1638    Lab Status: Final result Specimen: Swab from Nasopharynx Updated: 02/25/24 0038     ADENOVIRUS, PCR Not Detected     Coronavirus 229E Not Detected     Coronavirus HKU1 Not Detected     Coronavirus NL63 Not Detected     Coronavirus OC43 Not Detected     COVID19 Not Detected     Human Metapneumovirus Not Detected     Human Rhinovirus/Enterovirus Not Detected     Influenza A PCR Not Detected     Influenza B PCR Not Detected     Parainfluenza Virus 1 Not Detected     Parainfluenza Virus 2 Not Detected     Parainfluenza Virus 3 Not Detected     Parainfluenza Virus 4 Not Detected     RSV, PCR Not Detected     Bordetella pertussis pcr Not Detected     Bordetella parapertussis PCR Not Detected     Chlamydophila pneumoniae PCR Not Detected     Mycoplasma pneumo by PCR Not Detected    Narrative:      In the setting of a positive respiratory panel with a viral infection PLUS a negative procalcitonin without other underlying concern for bacterial infection, consider observing off antibiotics or discontinuation of antibiotics and continue supportive care. If the respiratory panel is positive for atypical bacterial infection (Bordetella pertussis, Chlamydophila pneumoniae, or Mycoplasma pneumoniae), consider antibiotic de-escalation to target atypical bacterial infection.            Vancomycin Levels:    Results from last 7 days   Lab Units 02/27/24  0536 02/26/24  0609 02/25/24  0646   VANCOMYCIN RM mcg/mL 22.70 21.50 22.70                   Assessment/Plan    Pharmacy to dose vancomycin for sepsis. Goal trough 15 to 20 mcg/mL.  Patient currently receiving vancomycin dosed per level due to severe VIVIENNE.   Vancomycin level 2/27  resulted at 22.7 mcg/mL. Will give 750 mg × 1 on 2/27 at 2200.  Assess clearance by vancomycin random level on 2/29 at 0600.  Pharmacy will continue to monitor renal function, cultures and sensitivities, and clinical status to adjust regimen as necessary.      Thank you for the opportunity to consult on this patient.    Mateusz Méndez [facility], Pharm.D.  02/27/24  07:32 EST

## 2024-02-27 NOTE — PLAN OF CARE
Goal Outcome Evaluation:  Plan of Care Reviewed With: patient        Progress: improving  Outcome Evaluation: Pt supine in bed and willing to participate with treatment. Pt transfered supine to sit with cga with vc's. Pt sit to stand transfer cga with rw.  Pt advanced gait  distance to 64' cga with rw.  See flowsheet for details. Cont PT per POC progressing to goals as pt tolerates.

## 2024-02-27 NOTE — PLAN OF CARE
Goal Outcome Evaluation:  Plan of Care Reviewed With: patient        Progress: no change  Outcome Evaluation: Pt seen for therapy today, pt sat eob independently, stood with cga and walked 64' usign RW and cga.  Pt reports being too tired after walking and unable to do more therapy.  Cont OT per POC.      Anticipated Discharge Disposition (OT): home with home health, home with assist

## 2024-02-27 NOTE — PROGRESS NOTES
[facility] Progress Note      Patient Name: Tasha Yarbrough  : 1955  MRN: [id number]  Primary Care Physician:  Terrence Baldwin MD  Date of admission: 2024    Subjective     Interval History:   F/u VIVIENNE    Review of Systems:   I/O 1000/975  PO intake marginal  Denies n/v or dyspnea    Objective     Vitals:   Temp:  [97.5 °F (36.4 °C)-98.1 °F (36.7 °C)] 97.5 °F (36.4 °C)  Heart Rate:  [72-88] 86  Resp:  [16-18] 16  BP: ()/(60-69) 117/65    Intake/Output Summary (Last 24 hours) at 2024 0845  Last data filed at 2024 0605  Gross per 24 hour   Intake 1000 ml   Output 975 ml   Net 25 ml       Physical Exam:    General Appearance: alert, comfortable on RA  Neck: supple, no JVD  Lungs: CTA bilat no rales  Heart: RRR, normal S1 and S2  Abdomen: soft, nontender, nondistended  : no palpable bladder  Extremities: trace BLE edema, no cyanosis or clubbing  Neuro: normal speech and mental status     Scheduled Meds:     citalopram, 40 mg, Oral, Daily  famotidine, 40 mg, Oral, Daily  metoprolol tartrate, 50 mg, Oral, Daily  mupirocin, , Topical, Q12H  nicotine, 1 patch, Transdermal, Q24H  piperacillin-tazobactam, 3.375 g, Intravenous, Q12H  potassium chloride, 20 mEq, Oral, BID With Meals  sodium chloride, 10 mL, Intravenous, Q12H  vancomycin, 750 mg, Intravenous, Once  Vancomycin Pharmacy Intermittent/Pulse Dosing, , Does not apply, Daily      IV Meds:   lactated ringers, 100 mL/hr, Last Rate: 100 mL/hr (24 0605)  Pharmacy to dose vancomycin,   Pharmacy to Dose Zosyn,         Results Reviewed:   I have personally reviewed the results from the time of this admission to 2024 08:45 EST     Results from last 7 days   Lab Units 24  0536 24  1850 24  0609 24  0646   SODIUM mmol/L 144 146* 149* 140   POTASSIUM mmol/L 3.2* 2.6* 2.5* 3.8   CHLORIDE mmol/L 116* 116* 121* 113*   CO2 mmol/L 15.5* 14.7* 12.2* 4.5*   BUN mg/dL 46* 49* 56* 61*   CREATININE  mg/dL 1.92* 2.31* 2.68* 2.91*   CALCIUM mg/dL 8.8 8.4* 8.7 9.7   BILIRUBIN mg/dL 0.2  --  0.2 0.2   ALK PHOS U/L 73  --  75 103   ALT (SGPT) U/L 14  --  13 12   AST (SGOT) U/L 27  --  33* 36*   GLUCOSE mg/dL 73 89 75 113*     Estimated Creatinine Clearance: 24.6 mL/min (A) (by C-G formula based on SCr of 1.92 mg/dL (H)).  Results from last 7 days   Lab Units 02/26/24  0609   MAGNESIUM mg/dL 2.0         Results from last 7 days   Lab Units 02/27/24  0536 02/26/24  1059 02/26/24  0609 02/25/24  0646 02/24/24  2354   WBC 10*3/mm3 9.15  --  10.84* 16.87* 28.79*   HEMOGLOBIN g/dL 8.6* 9.2* 8.9* 12.0 15.2   PLATELETS 10*3/mm3 129*  --  149 152 317           Assessment / Plan     ASSESSMENT:  Non olig VIVIENNE - prerenal azotemia in assoc with acute pancreatitis and vol depletion.  Also on diuretic at home.  Cr has improving with IVF, 1.9 today (peak 3.3).  CT: non-obs stones.  BL Cr 0.9 as of Sept 2023.  UA with small blood/protein  Acute pancreatitis, mgmt per primary team, on zosyn, liq diet  Hypokalemia - severe, K 2.5 -> 3.2, with poor PO intake, nausea, diuretic use, and K shift from bicarb drip  AG metabolic/lactic acidosis - also related to VIVIENNE/uremia, bicarb up to 15, off bicarb drip due to hypokalemia - switched IVF to LR  Hypernatremia, mild, improved  ? Hx CHF - on atypical lasix dose (QID?); no echo on file; minimal periph edema, with low serum alb (3) noted; dyspnea improved, likely was compensatory resp alkalosis in response to severe metabolic acidosis   Anemia, hgb down to 8.6 with IVF    PLAN:  Inc KCL 40meq BID x 2 doses today  Add sodium bicarb 650 BID  Dec LR IVF rate 75 cc/hr, likely can stop fluids tomorrow  Check iron stores       Rj Ch MD  02/27/24  08:45 [facility]    [facility]  [phone]

## 2024-02-27 NOTE — PROGRESS NOTES
"Dietitian Follow-up    Patient Name: Tasha Yarbrough  YOB: 1955  MRN: [id number]  Admission date: 2/24/2024    Comment:      Clinical Nutrition Follow-up   Encounter Information        Trending Narrative     2/27: Pt does not meet moderate/severe malnutrition at this time. Based on NFPE pt shows signs of mild malnutrition. She has had wt loss 2/2 diuretics and previous C.diff dx. Will send Boost glucose control daily.    2/26: Pt admitted d/t acute pancreatitis. EMR shows a wt loss of 14# (9.2%) within 5 months. Pt is currently NPO. RD to f/up and add appropriate ONS.      Anthropometrics        Current Height, Weight Height: 157.5 cm (62\")  Weight: 63.9 kg (140 lb 14 oz) (02/27/24 0326)       Trending Weight Hx     This admission:              PTA:     Wt Readings from Last 30 Encounters:   02/27/24 0326 63.9 kg (140 lb 14 oz)   02/26/24 0302 62.4 kg (137 lb 9.1 oz)   02/24/24 2333 65.8 kg (145 lb)   09/25/23 0323 68.9 kg (151 lb 14.4 oz)   09/24/23 0336 68.5 kg (151 lb 0.2 oz)   09/23/23 0500 67.4 kg (148 lb 9.4 oz)   09/22/23 2345 66.8 kg (147 lb 4.3 oz)   09/22/23 1932 61.2 kg (135 lb)   09/02/23 1847 60.8 kg (134 lb)   08/18/22 2132 78.9 kg (174 lb)   08/08/22 0500 79.7 kg (175 lb 11.3 oz)   08/07/22 0536 77.4 kg (170 lb 10.2 oz)   08/06/22 2340 77 kg (169 lb 12.1 oz)   08/06/22 2019 76.7 kg (169 lb)   07/30/22 0441 79 kg (174 lb 2.6 oz)   07/29/22 0417 78.7 kg (173 lb 8 oz)   07/27/22 0353 78.9 kg (173 lb 15.1 oz)   07/26/22 1552 76.7 kg (169 lb)   05/06/21 1626 92.1 kg (203 lb)   10/04/20 0002 90.7 kg (200 lb)   08/15/20 1940 97.5 kg (215 lb)   05/23/20 1814 95.3 kg (210 lb)      BMI kg/m2 Body mass index is 25.77 kg/m².     Labs        Pertinent Labs Results from last 7 days   Lab Units 02/27/24  0536 02/26/24  1850 02/26/24  0609 02/25/24  0646   SODIUM mmol/L 144 146* 149* 140   POTASSIUM mmol/L 3.2* 2.6* 2.5* 3.8   CHLORIDE mmol/L 116* 116* 121* 113*   CO2 mmol/L 15.5* 14.7* 12.2* 4.5*   BUN " mg/dL 46* 49* 56* 61*   CREATININE mg/dL 1.92* 2.31* 2.68* 2.91*   CALCIUM mg/dL 8.8 8.4* 8.7 9.7   BILIRUBIN mg/dL 0.2  --  0.2 0.2   ALK PHOS U/L 73  --  75 103   ALT (SGPT) U/L 14  --  13 12   AST (SGOT) U/L 27  --  33* 36*   GLUCOSE mg/dL 73 89 75 113*     Results from last 7 days   Lab Units 02/27/24  0536 02/26/24  1059 02/26/24  0609 02/25/24  0646   MAGNESIUM mg/dL  --   --  2.0  --    HEMOGLOBIN g/dL 8.6*   < > 8.9* 12.0   HEMATOCRIT % 25.3*   < > 26.0* 35.3   TRIGLYCERIDES mg/dL  --   --   --  71    < > = values in this interval not displayed.         Medications    Scheduled Medications citalopram, 40 mg, Oral, Daily  famotidine, 40 mg, Oral, Daily  metoprolol tartrate, 50 mg, Oral, Daily  mupirocin, , Topical, Q12H  nicotine, 1 patch, Transdermal, Q24H  piperacillin-tazobactam, 3.375 g, Intravenous, Q8H  potassium chloride, 40 mEq, Oral, BID With Meals  sodium bicarbonate, 650 mg, Oral, BID  sodium chloride, 10 mL, Intravenous, Q12H  vancomycin, 750 mg, Intravenous, Once  Vancomycin Pharmacy Intermittent/Pulse Dosing, , Does not apply, Daily        Infusions lactated ringers, 75 mL/hr, Last Rate: 75 mL/hr (02/27/24 0954)  Pharmacy to dose vancomycin,   Pharmacy to Dose Zosyn,         PRN Medications   acetaminophen **OR** acetaminophen **OR** acetaminophen    senna-docusate sodium **AND** polyethylene glycol **AND** bisacodyl **AND** bisacodyl    Calcium Replacement - Follow Nurse / BPA Driven Protocol    HYDROcodone-acetaminophen    influenza vaccine    Magnesium Standard Dose Replacement - Follow Nurse / BPA Driven Protocol    Morphine **AND** naloxone    nitroglycerin    ondansetron    Pharmacy to dose vancomycin    Pharmacy to Dose Zosyn    Phosphorus Replacement - Follow Nurse / BPA Driven Protocol    Potassium Replacement - Follow Nurse / BPA Driven Protocol    [COMPLETED] Insert Peripheral IV **AND** sodium chloride    sodium chloride    sodium chloride     Physical Findings        Trending  Physical   Appearance, NFPE    --  Edema  None     Bowel Function LBM: 2/26     Tubes Peripheral IV     Chewing/Swallowing WNL     Skin WNL     --  Current Nutrition Orders & Evaluation of Intake       Oral Nutrition     Food Allergies    Current PO Diet Diet: Cardiac Diets; Healthy Heart (2-3 Na+); Texture: Regular Texture (IDDSI 7); Fluid Consistency: Thin (IDDSI 0)   Supplement    PO Evaluation     Trending % PO Intake 25% x 1 meal     Nutrition Diagnosis         Nutrition Dx Problem 1 Unintended wt loss clinical condition AEB EMR shows a wt loss of 14# (9.2%) within 5 months.       Nutrition Dx Problem 2        Intervention Goal         Intervention Goal(s) Maintain CBW  Adhere to ONS  PO intake meet >50% of estimated needs     Nutrition Intervention        RD Action Will provide Boost glucose control daily     Nutrition Prescription          Diet Prescription HH   Supplement Prescription Boost glucose control daily   Enteral Nutrition Prescription     TPN Prescription       Monitor/Evaluation        Monitor Per protocol, I&O, PO intake, Supplement intake, Pertinent labs, Weight, Skin status, GI status, Symptoms     RD to f/up    Electronically signed by:  Monica Montano RD  02/27/24 15:17 EST

## 2024-02-27 NOTE — THERAPY TREATMENT NOTE
Patient Name: Tasha Yarbrough  : 1955    MRN: [id number]                              Today's Date: 2024       Admit Date: 2024    Visit Dx:     ICD-10-CM ICD-9-CM   1. Acute pancreatitis, unspecified complication status, unspecified pancreatitis type  K85.90 577.0   2. Leukocytosis, unspecified type  D72.829 288.60   3. Metabolic acidosis  E87.20 276.2   4. Epigastric pain  R10.13 789.06   5. Acute renal failure, unspecified acute renal failure type  N17.9 584.9   6. Dehydration  E86.0 276.51   7. Sepsis with acute renal failure without septic shock, due to unspecified organism, unspecified acute renal failure type  A41.9 038.9    R65.20 995.92    N17.9 584.9     Patient Active Problem List   Diagnosis    Colon cancer screening    Gastroesophageal reflux disease with esophagitis    Left lower quadrant abdominal pain    Irritable bowel syndrome with constipation    Encounter for screening for malignant neoplasm of colon    Periumbilical abdominal pain    Diarrhea of presumed infectious origin    Acute kidney injury    Bacterial colitis    VIVIENNE (acute kidney injury)    C. difficile colitis    Generalized abdominal pain    Diarrhea of infectious origin    Abnormal finding on GI tract imaging    Anemia, chronic disease    Colitis due to Clostridium difficile    Pancreatitis, acute     Past Medical History:   Diagnosis Date    Hypertension     Impaired mobility     Injury of back      Past Surgical History:   Procedure Laterality Date    ABDOMINAL SURGERY      COLON SURGERY      COLONOSCOPY      TUBAL ABDOMINAL LIGATION        General Information       Row Name 24 1320          Physical Therapy Time and Intention    Document Type therapy note (daily note)  -RM     Mode of Treatment physical therapy  -RM       Row Name 24 1320          General Information    Patient Profile Reviewed yes  -RM     Existing Precautions/Restrictions fall  -RM       Row Name 24 1320          Cognition     Orientation Status (Cognition) oriented to;person;verbal cues/prompts needed for orientation  -RM       Row Name 02/27/24 1320          Safety Issues, Functional Mobility    Safety Issues Affecting Function (Mobility) safety precautions follow-through/compliance;positioning of assistive device;safety precaution awareness;sequencing abilities  -RM     Impairments Affecting Function (Mobility) balance;endurance/activity tolerance;strength;pain  -RM               User Key  (r) = Recorded By, (t) = Taken By, (c) = Cosigned By      Initials Name Provider Type    Anthony Sanchez PTA Physical Therapist Assistant                   Mobility       Row Name 02/27/24 1322          Bed Mobility    Supine-Sit Bollinger (Bed Mobility) contact guard;verbal cues;nonverbal cues (demo/gesture)  -RM     Assistive Device (Bed Mobility) head of bed elevated;bed rails  -RM       Row Name 02/27/24 1322          Sit-Stand Transfer    Sit-Stand Bollinger (Transfers) contact guard;verbal cues;nonverbal cues (demo/gesture)  -RM     Assistive Device (Sit-Stand Transfers) walker, front-wheeled  -RM       Row Name 02/27/24 1322          Gait/Stairs (Locomotion)    Bollinger Level (Gait) contact guard;verbal cues;nonverbal cues (demo/gesture)  -RM     Assistive Device (Gait) walker, front-wheeled  -RM     Patient was able to Ambulate yes  -RM     Distance in Feet (Gait) 64'  -RM     Deviations/Abnormal Patterns (Gait) festinating/shuffling;base of support, narrow;stride length decreased  -RM     Bilateral Gait Deviations forward flexed posture;heel strike decreased  -RM               User Key  (r) = Recorded By, (t) = Taken By, (c) = Cosigned By      Initials Name Provider Type    Anthony Sanchez PTA Physical Therapist Assistant                   Obj/Interventions    No documentation.                  Goals/Plan    No documentation.                  Clinical Impression       Row Name 02/27/24 1331          Pain    Pretreatment  Pain Rating 0/10 - no pain  -RM     Posttreatment Pain Rating 4/10  -RM     Pain Location - abdomen  -RM     Pain Intervention(s) Repositioned;Ambulation/increased activity  -RM       Row Name 02/27/24 5671          Plan of Care Review    Plan of Care Reviewed With patient  -RM     Progress improving  -RM     Outcome Evaluation Pt supine in bed and willing to participate with treatment. Pt transfered supine to sit with cga with vc's. Pt sit to stand transfer cga with rw.  Pt advanced gait  distance to 64' cga with rw.  See flowsheet for details. Cont PT per POC progressing to goals as pt tolerates.  -RM       Row Name 02/27/24 1331          Vital Signs    Pre SpO2 (%) 97  -RM     O2 Delivery Pre Treatment room air  -RM     Post SpO2 (%) 98  -RM     O2 Delivery Post Treatment room air  -RM     Pre Patient Position Supine  -RM     Intra Patient Position Standing  -RM     Post Patient Position Sitting  -RM       Row Name 02/27/24 1331          Positioning and Restraints    Pre-Treatment Position in bed  -RM     Post Treatment Position chair  -RM     In Chair reclined;call light within reach;encouraged to call for assist;exit alarm on;notified nsg  -RM               User Key  (r) = Recorded By, (t) = Taken By, (c) = Cosigned By      Initials Name Provider Type    RM Anthony Barajas, PTA Physical Therapist Assistant                   Outcome Measures       Row Name 02/27/24 1337 02/27/24 0839       How much help from another person do you currently need...    Turning from your back to your side while in flat bed without using bedrails? 4  -RM 4  -CS    Moving from lying on back to sitting on the side of a flat bed without bedrails? 4  -RM 3  -CS    Moving to and from a bed to a chair (including a wheelchair)? 3  -RM 3  -CS    Standing up from a chair using your arms (e.g., wheelchair, bedside chair)? 3  -RM 3  -CS    Climbing 3-5 steps with a railing? 2  -RM 2  -CS    To walk in hospital room? 3  -RM 2  -CS    AM-PAC 6  Clicks Score (PT) 19  -RM 17  -CS    Highest Level of Mobility Goal 6 --> Walk 10 steps or more  -RM 5 --> Static standing  -CS      Row Name 02/27/24 1337          Functional Assessment    Outcome Measure Options AM-PAC 6 Clicks Basic Mobility (PT)  -               User Key  (r) = Recorded By, (t) = Taken By, (c) = Cosigned By      Initials Name Provider Type    RM Anthony Barajas, MAUREEN Physical Therapist Assistant    Gabrielle Hutchinson LPN Licensed Nurse                                 Physical Therapy Education       Title: PT OT SLP Therapies (In Progress)       Topic: Physical Therapy (In Progress)       Point: Mobility training (Done)       Learning Progress Summary             Patient Acceptance, E,TB,D, VU,NR by RM at 2/27/2024 1338    Comment: safety with mobility  improtance daily activity advancing daily as pt tolerates.    Acceptance, E, VU by MS at 2/26/2024 1238    Comment: importance of mobility                         Point: Home exercise program (Done)       Learning Progress Summary             Patient Acceptance, E, VU by MS at 2/26/2024 1238    Comment: importance of mobility                         Point: Body mechanics (Not Started)       Learner Progress:  Not documented in this visit.              Point: Precautions (Not Started)       Learner Progress:  Not documented in this visit.                              User Key       Initials Effective Dates Name Provider Type Discipline     06/16/21 -  Anthony Barajas PTA Physical Therapist Assistant PT    MS 08/22/23 -  Sebastien Simon PT Physical Therapist PT                  PT Recommendation and Plan     Plan of Care Reviewed With: patient  Progress: improving  Outcome Evaluation: Pt supine in bed and willing to participate with treatment. Pt transfered supine to sit with cga with vc's. Pt sit to stand transfer cga with rw.  Pt advanced gait  distance to 64' cga with rw.  See flowsheet for details. Cont PT per POC progressing to  goals as pt tolerates.     Time Calculation:         PT Charges       Row Name 02/27/24 1339             Time Calculation    Start Time 1056  -RM      Stop Time 1121  -RM      Time Calculation (min) 25 min  -RM      PT Received On 02/27/24  -RM      PT Goal Re-Cert Due Date 03/07/24  -RM         Time Calculation- PT    Total Timed Code Minutes- PT 25 minute(s)  -RM         Timed Charges    48635 - Gait Training Minutes  15  -RM      47415 - PT Therapeutic Activity Minutes 10  -RM         Total Minutes    Timed Charges Total Minutes 25  -RM       Total Minutes 25  -RM                User Key  (r) = Recorded By, (t) = Taken By, (c) = Cosigned By      Initials Name Provider Type    Anthony Sanchez, PTA Physical Therapist Assistant                  Therapy Charges for Today       Code Description Service Date Service Provider Modifiers Qty    81001374210 HC GAIT TRAINING EA 15 MIN 2/27/2024 Anthony Barajas, PTA GP 1    15887007659 HC PT THERAPEUTIC ACT EA 15 MIN 2/27/2024 Anthony Barajas, PTA GP 1            PT G-Codes  Outcome Measure Options: AM-PAC 6 Clicks Basic Mobility (PT)  AM-PAC 6 Clicks Score (PT): 19  AM-PAC 6 Clicks Score (OT): 15       Anthony Barajas PTA  2/27/2024

## 2024-02-28 LAB
ALBUMIN SERPL-MCNC: 3.3 G/DL (ref 3.5–5.2)
ALBUMIN/GLOB SERPL: 1.1 G/DL
ALP SERPL-CCNC: 89 U/L (ref 39–117)
ALT SERPL W P-5'-P-CCNC: 17 U/L (ref 1–33)
ANION GAP SERPL CALCULATED.3IONS-SCNC: 10.6 MMOL/L (ref 5–15)
ANISOCYTOSIS BLD QL: NORMAL
AST SERPL-CCNC: 31 U/L (ref 1–32)
BASOPHILS # BLD AUTO: 0.01 10*3/MM3 (ref 0–0.2)
BASOPHILS NFR BLD AUTO: 0.1 % (ref 0–1.5)
BILIRUB SERPL-MCNC: 0.2 MG/DL (ref 0–1.2)
BUN SERPL-MCNC: 30 MG/DL (ref 8–23)
BUN/CREAT SERPL: 15.7 (ref 7–25)
CALCIUM SPEC-SCNC: 8.6 MG/DL (ref 8.6–10.5)
CHLORIDE SERPL-SCNC: 115 MMOL/L (ref 98–107)
CO2 SERPL-SCNC: 16.4 MMOL/L (ref 22–29)
CREAT SERPL-MCNC: 1.91 MG/DL (ref 0.57–1)
DEPRECATED RDW RBC AUTO: 60.2 FL (ref 37–54)
EGFRCR SERPLBLD CKD-EPI 2021: 28.3 ML/MIN/1.73
EOSINOPHIL # BLD AUTO: 0.11 10*3/MM3 (ref 0–0.4)
EOSINOPHIL NFR BLD AUTO: 1.2 % (ref 0.3–6.2)
ERYTHROCYTE [DISTWIDTH] IN BLOOD BY AUTOMATED COUNT: 15.5 % (ref 12.3–15.4)
FERRITIN SERPL-MCNC: 353.6 NG/ML (ref 13–150)
GLOBULIN UR ELPH-MCNC: 2.9 GM/DL
GLUCOSE SERPL-MCNC: 98 MG/DL (ref 65–99)
HCT VFR BLD AUTO: 25.5 % (ref 34–46.6)
HGB BLD-MCNC: 8.6 G/DL (ref 12–15.9)
IMM GRANULOCYTES # BLD AUTO: 0.1 10*3/MM3 (ref 0–0.05)
IMM GRANULOCYTES NFR BLD AUTO: 1.1 % (ref 0–0.5)
IRON 24H UR-MRATE: 38 MCG/DL (ref 37–145)
IRON SATN MFR SERPL: 18 % (ref 20–50)
LIPASE SERPL-CCNC: 205 U/L (ref 13–60)
LYMPHOCYTES # BLD AUTO: 0.84 10*3/MM3 (ref 0.7–3.1)
LYMPHOCYTES NFR BLD AUTO: 9.3 % (ref 19.6–45.3)
MACROCYTES BLD QL SMEAR: NORMAL
MAGNESIUM SERPL-MCNC: 1.8 MG/DL (ref 1.6–2.4)
MCH RBC QN AUTO: 35.8 PG (ref 26.6–33)
MCHC RBC AUTO-ENTMCNC: 33.7 G/DL (ref 31.5–35.7)
MCV RBC AUTO: 106.3 FL (ref 79–97)
MONOCYTES # BLD AUTO: 0.67 10*3/MM3 (ref 0.1–0.9)
MONOCYTES NFR BLD AUTO: 7.4 % (ref 5–12)
NEUTROPHILS NFR BLD AUTO: 7.32 10*3/MM3 (ref 1.7–7)
NEUTROPHILS NFR BLD AUTO: 80.9 % (ref 42.7–76)
NRBC BLD AUTO-RTO: 0 /100 WBC (ref 0–0.2)
PLATELET # BLD AUTO: 150 10*3/MM3 (ref 140–450)
PMV BLD AUTO: 10.8 FL (ref 6–12)
POTASSIUM SERPL-SCNC: 3.7 MMOL/L (ref 3.5–5.2)
PROCALCITONIN SERPL-MCNC: 0.27 NG/ML (ref 0–0.25)
PROT SERPL-MCNC: 6.2 G/DL (ref 6–8.5)
RBC # BLD AUTO: 2.4 10*6/MM3 (ref 3.77–5.28)
SMALL PLATELETS BLD QL SMEAR: ADEQUATE
SODIUM SERPL-SCNC: 142 MMOL/L (ref 136–145)
TIBC SERPL-MCNC: 209 MCG/DL (ref 298–536)
TRANSFERRIN SERPL-MCNC: 140 MG/DL (ref 200–360)
VANCOMYCIN SERPL-MCNC: 24 MCG/ML (ref 5–40)
WBC MORPH BLD: NORMAL
WBC NRBC COR # BLD AUTO: 9.05 10*3/MM3 (ref 3.4–10.8)

## 2024-02-28 PROCEDURE — 25010000002 PIPERACILLIN SOD-TAZOBACTAM PER 1 G: Performed by: FAMILY MEDICINE

## 2024-02-28 PROCEDURE — 99232 SBSQ HOSP IP/OBS MODERATE 35: CPT | Performed by: FAMILY MEDICINE

## 2024-02-28 PROCEDURE — 84466 ASSAY OF TRANSFERRIN: CPT | Performed by: INTERNAL MEDICINE

## 2024-02-28 PROCEDURE — 25810000003 LACTATED RINGERS PER 1000 ML: Performed by: INTERNAL MEDICINE

## 2024-02-28 PROCEDURE — 83690 ASSAY OF LIPASE: CPT | Performed by: STUDENT IN AN ORGANIZED HEALTH CARE EDUCATION/TRAINING PROGRAM

## 2024-02-28 PROCEDURE — 85025 COMPLETE CBC W/AUTO DIFF WBC: CPT | Performed by: FAMILY MEDICINE

## 2024-02-28 PROCEDURE — 85007 BL SMEAR W/DIFF WBC COUNT: CPT | Performed by: FAMILY MEDICINE

## 2024-02-28 PROCEDURE — 80202 ASSAY OF VANCOMYCIN: CPT | Performed by: FAMILY MEDICINE

## 2024-02-28 PROCEDURE — 84145 PROCALCITONIN (PCT): CPT | Performed by: STUDENT IN AN ORGANIZED HEALTH CARE EDUCATION/TRAINING PROGRAM

## 2024-02-28 PROCEDURE — 80053 COMPREHEN METABOLIC PANEL: CPT | Performed by: STUDENT IN AN ORGANIZED HEALTH CARE EDUCATION/TRAINING PROGRAM

## 2024-02-28 PROCEDURE — 82728 ASSAY OF FERRITIN: CPT | Performed by: INTERNAL MEDICINE

## 2024-02-28 PROCEDURE — 83540 ASSAY OF IRON: CPT | Performed by: INTERNAL MEDICINE

## 2024-02-28 PROCEDURE — 83735 ASSAY OF MAGNESIUM: CPT | Performed by: INTERNAL MEDICINE

## 2024-02-28 RX ORDER — CEPHALEXIN 250 MG/1
500 CAPSULE ORAL 2 TIMES DAILY
Status: DISCONTINUED | OUTPATIENT
Start: 2024-02-28 | End: 2024-02-29 | Stop reason: HOSPADM

## 2024-02-28 RX ORDER — METRONIDAZOLE 500 MG/1
500 TABLET ORAL EVERY 8 HOURS SCHEDULED
Status: DISCONTINUED | OUTPATIENT
Start: 2024-02-28 | End: 2024-02-29 | Stop reason: HOSPADM

## 2024-02-28 RX ORDER — SODIUM BICARBONATE 650 MG/1
1300 TABLET ORAL 2 TIMES DAILY
Status: DISCONTINUED | OUTPATIENT
Start: 2024-02-28 | End: 2024-02-29

## 2024-02-28 RX ORDER — CALCIUM CARBONATE 500 MG/1
2 TABLET, CHEWABLE ORAL 3 TIMES DAILY PRN
Status: DISCONTINUED | OUTPATIENT
Start: 2024-02-28 | End: 2024-02-29 | Stop reason: HOSPADM

## 2024-02-28 RX ORDER — SODIUM BICARBONATE 650 MG/1
650 TABLET ORAL ONCE
Status: COMPLETED | OUTPATIENT
Start: 2024-02-28 | End: 2024-02-28

## 2024-02-28 RX ADMIN — Medication 10 ML: at 22:08

## 2024-02-28 RX ADMIN — METOPROLOL TARTRATE 50 MG: 50 TABLET ORAL at 09:42

## 2024-02-28 RX ADMIN — PIPERACILLIN SODIUM AND TAZOBACTAM SODIUM 3.38 G: 3; .375 INJECTION, SOLUTION INTRAVENOUS at 06:01

## 2024-02-28 RX ADMIN — METRONIDAZOLE 500 MG: 500 TABLET ORAL at 22:09

## 2024-02-28 RX ADMIN — CEPHALEXIN 500 MG: 250 CAPSULE ORAL at 22:30

## 2024-02-28 RX ADMIN — SODIUM CHLORIDE, POTASSIUM CHLORIDE, SODIUM LACTATE AND CALCIUM CHLORIDE 75 ML/HR: 600; 310; 30; 20 INJECTION, SOLUTION INTRAVENOUS at 13:02

## 2024-02-28 RX ADMIN — CALCIUM CARBONATE (ANTACID) CHEW TAB 500 MG 2 TABLET: 500 CHEW TAB at 16:58

## 2024-02-28 RX ADMIN — MUPIROCIN 1 APPLICATION: 20 OINTMENT TOPICAL at 09:42

## 2024-02-28 RX ADMIN — HYDROCODONE BITARTRATE AND ACETAMINOPHEN 1 TABLET: 7.5; 325 TABLET ORAL at 02:18

## 2024-02-28 RX ADMIN — SODIUM BICARBONATE 650 MG TABLET 1300 MG: at 22:08

## 2024-02-28 RX ADMIN — Medication 1 PATCH: at 15:32

## 2024-02-28 RX ADMIN — Medication 10 ML: at 09:42

## 2024-02-28 RX ADMIN — FAMOTIDINE 40 MG: 20 TABLET, FILM COATED ORAL at 09:41

## 2024-02-28 RX ADMIN — METRONIDAZOLE 500 MG: 500 TABLET ORAL at 15:32

## 2024-02-28 RX ADMIN — CEPHALEXIN 500 MG: 250 CAPSULE ORAL at 15:32

## 2024-02-28 RX ADMIN — CITALOPRAM HYDROBROMIDE 40 MG: 20 TABLET ORAL at 09:41

## 2024-02-28 RX ADMIN — SODIUM BICARBONATE 650 MG TABLET 650 MG: at 09:41

## 2024-02-28 RX ADMIN — MUPIROCIN 1 APPLICATION: 20 OINTMENT TOPICAL at 22:09

## 2024-02-28 RX ADMIN — SODIUM BICARBONATE 650 MG TABLET 650 MG: at 15:32

## 2024-02-28 NOTE — PLAN OF CARE
Goal Outcome Evaluation:  Plan of Care Reviewed With: patient           Outcome Evaluation: Patient did well overnight. Vital signs stable. No overnight events to report. Possible discharge home today depending on labs.

## 2024-02-28 NOTE — PROGRESS NOTES
[facility] Progress Note      Patient Name: Tasha Yarbrough  : 1955  MRN: [id number]  Primary Care Physician:  Terrence Baldwin MD  Date of admission: 2024    Subjective     Interval History:   F/u VIVIENNE    Review of Systems:   PO intake improving  Denies nausea or dyspnea   Still has roberts    Objective     Vitals:   Temp:  [97.3 °F (36.3 °C)-97.9 °F (36.6 °C)] 97.3 °F (36.3 °C)  Heart Rate:  [74-94] 89  Resp:  [16] 16  BP: (108-125)/(72-86) 122/86    Intake/Output Summary (Last 24 hours) at 2024 1509  Last data filed at 2024 1310  Gross per 24 hour   Intake 1480 ml   Output 2750 ml   Net -1270 ml       Physical Exam:    General Appearance: alert, comfortable on RA  Neck: supple, no JVD  Lungs: CTA bilat no rales  Heart: RRR, normal S1 and S2  Abdomen: soft, nontender, nondistended  : no palpable bladder  Extremities: trace BLE edema, no cyanosis or clubbing  Neuro: normal speech and mental status     Scheduled Meds:     cephalexin, 500 mg, Oral, BID  citalopram, 40 mg, Oral, Daily  famotidine, 40 mg, Oral, Daily  metoprolol tartrate, 50 mg, Oral, Daily  metroNIDAZOLE, 500 mg, Oral, Q8H  mupirocin, , Topical, Q12H  nicotine, 1 patch, Transdermal, Q24H  sodium bicarbonate, 650 mg, Oral, BID  sodium chloride, 10 mL, Intravenous, Q12H      IV Meds:   lactated ringers, 75 mL/hr, Last Rate: 75 mL/hr (24 1302)  Pharmacy Consult - Pharmacy to dose,         Results Reviewed:   I have personally reviewed the results from the time of this admission to 2024 15:09 EST     Results from last 7 days   Lab Units 24  0845 24  0536 24  1850 24  0609   SODIUM mmol/L 142 144 146* 149*   POTASSIUM mmol/L 3.7 3.2* 2.6* 2.5*   CHLORIDE mmol/L 115* 116* 116* 121*   CO2 mmol/L 16.4* 15.5* 14.7* 12.2*   BUN mg/dL 30* 46* 49* 56*   CREATININE mg/dL 1.91* 1.92* 2.31* 2.68*   CALCIUM mg/dL 8.6 8.8 8.4* 8.7   BILIRUBIN mg/dL 0.2 0.2  --  0.2   ALK PHOS U/L 89 73   --  75   ALT (SGPT) U/L 17 14  --  13   AST (SGOT) U/L 31 27  --  33*   GLUCOSE mg/dL 98 73 89 75     Estimated Creatinine Clearance: 24.7 mL/min (A) (by C-G formula based on SCr of 1.91 mg/dL (H)).  Results from last 7 days   Lab Units 02/28/24  0845 02/26/24  0609   MAGNESIUM mg/dL 1.8 2.0         Results from last 7 days   Lab Units 02/28/24  0845 02/27/24  0536 02/26/24  1059 02/26/24  0609 02/25/24  0646 02/24/24  2354   WBC 10*3/mm3 9.05 9.15  --  10.84* 16.87* 28.79*   HEMOGLOBIN g/dL 8.6* 8.6* 9.2* 8.9* 12.0 15.2   PLATELETS 10*3/mm3 150 129*  --  149 152 317           Assessment / Plan     ASSESSMENT:  Non olig VIVIENNE - prerenal azotemia in assoc with acute pancreatitis and vol depletion.  Also on diuretic at home.  Cr been improving with IVF, though stagnant 1.9 today (peak 3.3).  CT: non-obs stones.  BL Cr 0.9 as of Sept 2023.  UA with small blood/protein  Acute pancreatitis, mgmt per primary team, on zosyn, liq diet  Hypokalemia - severe, K 2.5 initially, with poor PO intake, nausea, diuretic use, and K shift from bicarb drip; now K up to 3.7  AG metabolic/lactic acidosis - also related to VIVIENNE/uremia, bicarb up slowly to 16, on oral bicarb tabs (and switched NS to LR)  Hypernatremia, mild, improved  ? Hx CHF - on atypical lasix dose (QID?); no echo on file; minimal periph edema, with low serum alb (3) noted; dyspnea improved, likely was compensatory resp alkalosis in response to severe metabolic acidosis   Anemia, hgb down to 8.6 with IVF    PLAN:  DC IVF  DC roberts  Inc [id number] TID  May be able to discharge tomorrow, will need to determine diuretic resumption, dose etc, will reassess vol status in AM      Rj Ch MD  02/28/24  15:09 [facility]    [facility]  [phone]

## 2024-02-28 NOTE — PHARMACY RECOMMENDATION
"Pharmacy Consult-Vancomycin Dosing  Tasha Yarbrough is a  68 y.o. female receiving vancomycin therapy.     Indication: Sepsis  Consulting Provider: Dr. YOKO Roa    Goal Trough: 15 to 20 mcg/mL    Current Antimicrobial Therapy  Zosyn 3.375 g IV every 8 hours       Allergies  Allergies as of 02/24/2024 - Reviewed 02/24/2024   Allergen Reaction Noted    Ciprofloxacin Dizziness 08/18/2022    Codeine Itching 11/17/2022    Pineapple Unknown - Low Severity 01/11/2023       Labs    Results from last 7 days   Lab Units 02/28/24  0845 02/27/24  0536 02/26/24  1850   BUN mg/dL 30* 46* 49*   CREATININE mg/dL 1.91* 1.92* 2.31*       Results from last 7 days   Lab Units 02/28/24  0845 02/27/24  0536 02/26/24  0609   WBC 10*3/mm3 9.05 9.15 10.84*       Evaluation of Dosing     Last Dose Received in the ED/Outside Facility: Yes  Is Patient on Dialysis or Renal Replacement: No    Ht - 157.5 cm (62\")  Wt - 63.9 kg (140 lb 14 oz)    Estimated Creatinine Clearance: 24.7 mL/min (A) (by C-G formula based on SCr of 1.91 mg/dL (H)).    Intake & Output (last 3 days)         02/22 0701  02/23 0700 02/23 0701  02/24 0700 02/24 0701  02/25 0700 02/25 0701  02/26 0700    IV Piggyback   2300     Total Intake(mL/kg)   2300 (35)     Other   75     Total Output   75     Net   +2225                     Microbiology and Radiology  Microbiology Results (last 10 days)       Procedure Component Value - Date/Time    MRSA Screen, PCR (Inpatient) - Swab, Nares [[id number]]  (Abnormal) Collected: 02/25/24 1102    Lab Status: Final result Specimen: Swab from Nares Updated: 02/25/24 2148     MRSA PCR MRSA Detected    Narrative:      The negative predictive value of this diagnostic test is high and should only be used to consider de-escalating anti-MRSA therapy. A positive result may indicate colonization with MRSA and must be correlated clinically.    Blood Culture - Blood, Hand, Digit Right [[id number]]  (Normal) Collected: 02/25/24 0049    Lab Status: " Preliminary result Specimen: Blood from Hand, Digit Right Updated: 02/28/24 0100     Blood Culture No growth at 3 days    Blood Culture - Blood, Hand, Right [[id number]]  (Normal) Collected: 02/25/24 0043    Lab Status: Preliminary result Specimen: Blood from Hand, Right Updated: 02/28/24 0100     Blood Culture No growth at 3 days    Respiratory Panel PCR w/COVID-19(SARS-CoV-2) J[facility]/AMA/KIM/PAD/COR/MALICK In-House, NP Swab in UTM/VTM, 2 HR TAT - Swab, Nasopharynx [[id number]]  (Normal) Collected: 02/24/24 2339    Lab Status: Final result Specimen: Swab from Nasopharynx Updated: 02/25/24 0038     ADENOVIRUS, PCR Not Detected     Coronavirus 229E Not Detected     Coronavirus HKU1 Not Detected     Coronavirus NL63 Not Detected     Coronavirus OC43 Not Detected     COVID19 Not Detected     Human Metapneumovirus Not Detected     Human Rhinovirus/Enterovirus Not Detected     Influenza A PCR Not Detected     Influenza B PCR Not Detected     Parainfluenza Virus 1 Not Detected     Parainfluenza Virus 2 Not Detected     Parainfluenza Virus 3 Not Detected     Parainfluenza Virus 4 Not Detected     RSV, PCR Not Detected     Bordetella pertussis pcr Not Detected     Bordetella parapertussis PCR Not Detected     Chlamydophila pneumoniae PCR Not Detected     Mycoplasma pneumo by PCR Not Detected    Narrative:      In the setting of a positive respiratory panel with a viral infection PLUS a negative procalcitonin without other underlying concern for bacterial infection, consider observing off antibiotics or discontinuation of antibiotics and continue supportive care. If the respiratory panel is positive for atypical bacterial infection (Bordetella pertussis, Chlamydophila pneumoniae, or Mycoplasma pneumoniae), consider antibiotic de-escalation to target atypical bacterial infection.            Vancomycin Levels:    Results from last 7 days   Lab Units 02/28/24  0845 02/27/24  0536 02/26/24  0609 02/25/24  0646   VANCOMYCIN RM mcg/mL  24.00 22.70 21.50 22.70                   Assessment/Plan    Pharmacy to dose vancomycin for sepsis. Goal trough 15 to 20 mcg/mL.  Patient currently receiving vancomycin dosed per level due to severe VIVIENNE.   Vancomycin level 2/28 resulted at 24 mcg/mL. Will hold vancomycin today.  Assess clearance by vancomycin random level on 2/29 at 0600.  Pharmacy will continue to monitor renal function, cultures and sensitivities, and clinical status to adjust regimen as necessary.      Mervat Vazquez, PharmD  02/28/24  09:45 EST

## 2024-02-28 NOTE — PAYOR COMM NOTE
"  UPDATED CLINICALS  UR Manager; Whit Reyes, [phone] and fax [phone]      Jaymie Yarbrough (68 y.o. Female)       Date of Birth   1955    Social Security Number       Address   [address] [place], [place] [address]    Home Phone   [phone]    MRN   [id number]       Caodaism   None    Marital Status                               Admission Date   24    Admission Type   Emergency    Admitting Provider   Kerley, Brian Joseph, DO    Attending Provider   Vivienne Roa MD    Department, Room/Bed   [facility] TELEMETRY 3, 319/1       Discharge Date       Discharge Disposition       Discharge Destination                                 Attending Provider: Vivienne Roa MD    Allergies: Ciprofloxacin, Codeine, Pineapple    Isolation: None   Infection: Other (24)   Code Status: CPR    Ht: 157.5 cm (62\")   Wt: 63.9 kg (140 lb 14 oz)    Admission Cmt: None   Principal Problem: Pancreatitis, acute [K85.90]                   Active Insurance as of 2024       Primary Coverage       Payor Plan Insurance Group Employer/Plan Group    ANTHEM MEDICARE REPLACEMENT ANTHEM MEDICARE ADVANTAGE [id number]       Payor Plan Address Payor Plan Phone Number Payor Plan Fax Number Effective Dates    [address] [phone]  2024 - None Entered    [facility] [phone]         Subscriber Name Subscriber Birth Date Member ID       JAYMIE YARBROUGH 1955 [id number]                     Emergency Contacts        (Rel.) Home Phone Work Phone Mobile Phone    EAMONANTONIO WORTHINGTON (Son) [phone] -- --    RJ YARBROUGH (Son) [phone] -- --                 Physician Progress Notes (last 48 hours)        Rj Ch MD at 24 0845              [facility] Progress Note      Patient Name: Jaymie Yarbrough  : 1955  MRN: [id number]  Primary Care Physician:  Terrence Baldwin MD  Date of admission: 2024    Subjective "     Interval History:   F/u VIVIENNE    Review of Systems:   I/O 1000/975  PO intake marginal  Denies n/v or dyspnea    Objective     Vitals:   Temp:  [97.5 °F (36.4 °C)-98.1 °F (36.7 °C)] 97.5 °F (36.4 °C)  Heart Rate:  [72-88] 86  Resp:  [16-18] 16  BP: ()/(60-69) 117/65    Intake/Output Summary (Last 24 hours) at 2/27/2024 0845  Last data filed at 2/27/2024 0605  Gross per 24 hour   Intake 1000 ml   Output 975 ml   Net 25 ml       Physical Exam:    General Appearance: alert, comfortable on RA  Neck: supple, no JVD  Lungs: CTA bilat no rales  Heart: RRR, normal S1 and S2  Abdomen: soft, nontender, nondistended  : no palpable bladder  Extremities: trace BLE edema, no cyanosis or clubbing  Neuro: normal speech and mental status     Scheduled Meds:     citalopram, 40 mg, Oral, Daily  famotidine, 40 mg, Oral, Daily  metoprolol tartrate, 50 mg, Oral, Daily  mupirocin, , Topical, Q12H  nicotine, 1 patch, Transdermal, Q24H  piperacillin-tazobactam, 3.375 g, Intravenous, Q12H  potassium chloride, 20 mEq, Oral, BID With Meals  sodium chloride, 10 mL, Intravenous, Q12H  vancomycin, 750 mg, Intravenous, Once  Vancomycin Pharmacy Intermittent/Pulse Dosing, , Does not apply, Daily      IV Meds:   lactated ringers, 100 mL/hr, Last Rate: 100 mL/hr (02/27/24 0605)  Pharmacy to dose vancomycin,   Pharmacy to Dose Zosyn,         Results Reviewed:   I have personally reviewed the results from the time of this admission to 2/27/2024 08:45 EST     Results from last 7 days   Lab Units 02/27/24  0536 02/26/24  1850 02/26/24  0609 02/25/24  0646   SODIUM mmol/L 144 146* 149* 140   POTASSIUM mmol/L 3.2* 2.6* 2.5* 3.8   CHLORIDE mmol/L 116* 116* 121* 113*   CO2 mmol/L 15.5* 14.7* 12.2* 4.5*   BUN mg/dL 46* 49* 56* 61*   CREATININE mg/dL 1.92* 2.31* 2.68* 2.91*   CALCIUM mg/dL 8.8 8.4* 8.7 9.7   BILIRUBIN mg/dL 0.2  --  0.2 0.2   ALK PHOS U/L 73  --  75 103   ALT (SGPT) U/L 14  --  13 12   AST (SGOT) U/L 27  --  33* 36*   GLUCOSE mg/dL  73 89 75 113*     Estimated Creatinine Clearance: 24.6 mL/min (A) (by C-G formula based on SCr of 1.92 mg/dL (H)).  Results from last 7 days   Lab Units 24  0609   MAGNESIUM mg/dL 2.0         Results from last 7 days   Lab Units 24  0536 24  1059 24  0609 24  0646 24  2354   WBC 10*3/mm3 9.15  --  10.84* 16.87* 28.79*   HEMOGLOBIN g/dL 8.6* 9.2* 8.9* 12.0 15.2   PLATELETS 10*3/mm3 129*  --  149 152 317           Assessment / Plan     ASSESSMENT:  Non olig VIVIENNE - prerenal azotemia in assoc with acute pancreatitis and vol depletion.  Also on diuretic at home.  Cr has improving with IVF, 1.9 today (peak 3.3).  CT: non-obs stones.  BL Cr 0.9 as of 2023.  UA with small blood/protein  Acute pancreatitis, mgmt per primary team, on zosyn, liq diet  Hypokalemia - severe, K 2.5 -> 3.2, with poor PO intake, nausea, diuretic use, and K shift from bicarb drip  AG metabolic/lactic acidosis - also related to VIVIENNE/uremia, bicarb up to 15, off bicarb drip due to hypokalemia - switched IVF to LR  Hypernatremia, mild, improved  ? Hx CHF - on atypical lasix dose (QID?); no echo on file; minimal periph edema, with low serum alb (3) noted; dyspnea improved, likely was compensatory resp alkalosis in response to severe metabolic acidosis   Anemia, hgb down to 8.6 with IVF    PLAN:  Inc KCL 40meq BID x 2 doses today  Add sodium bicarb 650 BID  Dec LR IVF rate 75 cc/hr, likely can stop fluids tomorrow  Check iron stores       Rj Ch MD  24  08:45 EST    [facility]  [phone]    Electronically signed by Rj Ch MD at 24 0914       Vivienne Roa MD at 24 0703              [facility]    PROGRESS NOTE    Name:  Tasha Yarbrough   Age:  68 y.o.  Sex:  female  :  1955  MRN:  [id number]   Visit Number:  [id number]  Admission Date:  2024  Date Of Service:  24  Primary Care Physician:   "Terrence Baldwin MD     LOS: 2 days :    Chief Complaint:      Follow-up; abdominal pain, shortness of air    Subjective:    Patient was seen and examined at bedside today.  Patient sitting up comfortably in bed with no distress.  Patient with significant improvement on her mental status compared to my last time seeing her on admission.  Patient is fully AAOx3.  Patient is engaged and answering questions appropriately.  Patient realizes that she does not remember how much sick she was and she did not expect that she was then much sick when she came in.  Patient reports feeling significantly better and abdominal pain is improving.  Patient wondering if we can advance her diet today.  She denies any other acute acute complaints.  Vitals are stable and afebrile.    Hospital Course:    Tasha Yarbrough is a 68 year old female with a past medical history of hypertension and impaired mobility and ADLs, who presented to the ER with a chief complaint of shortness of breath and abdominal pain.  At time of presentation currently awake and alert but appears to be confused, she was reporting abdominal pain and was able to tell me her name and date of birth but was confused about her situation and kept repeating \"I am here for colitis diagnosis\".  Per ER report patient has been sick for the past 5 to 6 days with cough and difficulty breathing with symptoms worsening over the past 2 days.  Patient has been refusing to come to the ER until the day of admission where she became confused and the son called EMS. Patient had COVID-19 approximately 2 or 3 weeks ago but seem to improve prior to this week.  history otherwise limited due to patient mental status and disorientation.  No family at bedside.  Patient was previously admitted here back in September 2023 when she was treated for C. difficile colitis.     On ER evaluation, Patient was hypertensive with a blood pressure of 158/110, afebrile on room air.  Her workup was significant for " an ABG with a pH of 6.886/pCO2 11.8/pO2 136/HCO3 2.2/saturation 98% on room air.  proBNP 2545, CO2 3.1, anion gap 23.9, creatinine 3.31, BUN 63, glucose 126, lactate 2.3, lipase above 3000, Pro-Miguel 0.44, WBC 28.7.  Respiratory panel negative.  Chest x-ray with chronic changes per my read.  CT abdomen pelvis without contrast showed Mild acute pancreatitis with retroperitoneal effusion. No abscess. Nonobstructing bilateral renal stones. Moderate-large hiatal hernia.  Patient received dexamethasone 10 mg, DuoNeb, Zofran, 2 L of normal saline boluses and was started on Vanco and Zosyn in addition to sodium bicarb in the ED.  Hospitalist consulted for admission.    Metabolic acidosis improved with sepsis treatment and bicarb drip.  Encephalopathy appears to have resolved.  Did develop acute anemia, appears to be stable.  No obvious sign of bleed, abdomen minimally tender.    Review of Systems:     All systems were reviewed and negative except as mentioned in subjective, assessment and plan.    Vital Signs:    Temp:  [97.5 °F (36.4 °C)-98.1 °F (36.7 °C)] 97.5 °F (36.4 °C)  Heart Rate:  [72-88] 86  Resp:  [16-18] 16  BP: ()/(60-69) 96/60    Intake and output:    I/O last 3 completed shifts:  In: 3000 [I.V.:3000]  Out: 1775 [Urine:1775]  No intake/output data recorded.    Physical Examination:    General Appearance:  Alert and cooperative.  No acute distress.   Head:  Atraumatic and normocephalic.   Eyes: Conjunctivae and sclerae normal, no icterus. No pallor.   Throat: No oral lesions, no thrush, oral mucosa moist.   Neck: Supple, trachea midline, no thyromegaly.   Lungs:   Breath sounds heard bilaterally equally.  No wheezing or crackles. No Pleural rub or bronchial breathing.   Heart:  Normal S1 and S2, no murmur, no gallop, no rub. No JVD.   Abdomen:   Soft, nontender, nondistended, bowel sounds positive x 4.   Extremities: Supple, no edema, no cyanosis, no clubbing.   Skin: Warm.   Neurologic: Alert and oriented  x 3.  No facial asymmetry. Moves all four limbs. No tremors.      Laboratory results:    Results from last 7 days   Lab Units 02/27/24  0536 02/26/24  1850 02/26/24  0609 02/25/24  0646   SODIUM mmol/L 144 146* 149* 140   POTASSIUM mmol/L 3.2* 2.6* 2.5* 3.8   CHLORIDE mmol/L 116* 116* 121* 113*   CO2 mmol/L 15.5* 14.7* 12.2* 4.5*   BUN mg/dL 46* 49* 56* 61*   CREATININE mg/dL 1.92* 2.31* 2.68* 2.91*   CALCIUM mg/dL 8.8 8.4* 8.7 9.7   BILIRUBIN mg/dL 0.2  --  0.2 0.2   ALK PHOS U/L 73  --  75 103   ALT (SGPT) U/L 14  --  13 12   AST (SGOT) U/L 27  --  33* 36*   GLUCOSE mg/dL 73 89 75 113*     Results from last 7 days   Lab Units 02/27/24  0536 02/26/24  1059 02/26/24  0609 02/25/24  0646   WBC 10*3/mm3 9.15  --  10.84* 16.87*   HEMOGLOBIN g/dL 8.6* 9.2* 8.9* 12.0   HEMATOCRIT % 25.3* 26.4* 26.0* 35.3   PLATELETS 10*3/mm3 129*  --  149 152             Results from last 7 days   Lab Units 02/25/24  0049 02/25/24  0043   BLOODCX  No growth at 2 days No growth at 2 days     Recent Labs     02/25/24  1348 02/26/24  0741 02/27/24  0522   PHART 7.271* 7.292* 7.305*   EME1PKY 18.1* 27.1* 30.1*   PO2ART 107.0* 57.1* 90.6   ZQU8PVF 8.3* 13.1* 15.0*   BASEEXCESS -16.5* -12.2* -10.3*      I have reviewed the patient's laboratory results.    Radiology results:    No radiology results from the last 24 hrs  I have reviewed the patient's radiology reports.    Medication Review:     I have reviewed the patient's active and prn medications.     Problem List:      Pancreatitis, acute      Assessment:     Acute kidney injury, POA  Acute pancreatitis, POA  Acute metabolic encephalopathy, POA  Metabolic lactic acidosis, POA  Severe sepsis, unknown source of infection, POA  Hypertension  Impaired mobility and ADLs     Plan:     Comfortable in chair, pleasantly conversational.     Acute kidney injury  -Improving, creatinine down to 1.92  -Nephrology consulted, appreciate recommendations.  -Avoid nephrotoxic drugs, holding home  Lasix  -Continue IV fluids  -Patient with bilateral nonobstructing renal stones with no hydronephrosis on CT abdomen and pelvis.     Acute pancreatitis   -Improving  -Lipase down to 123  -S/p cholecystectomy  -pain control  -Continue IV fluids  -Advance diet     Sepsis, Resolved  -Patient met SIRS criteria, in the settings of pancreatitis and VIVIENNE and systemic acidosis.  -Continued coverage with Vanco and Zosyn and de-escalate as able to  -No pattern of infection on urinalysis  -Blood cultures obt remain negative.  -Procalcitonin improving.  Leukocytosis resolved  -Plan on finishing 5 days of antibiotics     Acute encephalopathy  -Likely metabolic  -Consider CT head if no improvement  -Check TSH    Anemia  -No obvious sign of bleed.   -After initial hemoglobin drop it appears to be stable. Initial value may have been dilutional.       Metabolic lactic acidosis   Expect improvements with sepsis treatment    Hypokalemia  Replace as needed.    Hiatal Hernia  Monitoring.      -Continue home meds as warranted.  -Further orders as indicated per clinical course.     Risk Assessment: High  DVT Prophylaxis: SCDs  Code Status: Full  Diet: Clear liquids  Discharge Plan: At least a couple more days depending on clinical improvements    I have reviewed the copied text and it is accurate as of 2024     Vivienne Roa MD  24  07:03 EST    Dictated utilizing Dragon dictation.      Electronically signed by Vivienne Roa MD at 24 1452       Kerley, Brian Joseph, DO at 24 1822              [facility]    Name:  Tasha Yarbrough   Age:  68 y.o.  Sex:  female  :  1955  MRN:  [id number]   Visit Number:  [id number]  Admission Date:  2024  Date Of Service:  24  Primary Care Physician:  Terrence Baldwin MD     LOS: 1 day :    Chief Complaint:      Follow-up; abdominal pain, shortness of air    Subjective:    Says she feels good today.  Denies any concerning  "pain.  When pressing on her abdomen she said it does feel a little sore.    Hospital Course:    Tasha Yarbrough is a 68 year old female with a past medical history of hypertension and impaired mobility and ADLs, who presented to the ER with a chief complaint of shortness of breath and abdominal pain.  At time of presentation currently awake and alert but appears to be confused, she was reporting abdominal pain and was able to tell me her name and date of birth but was confused about her situation and kept repeating \"I am here for colitis diagnosis\".  Per ER report patient has been sick for the past 5 to 6 days with cough and difficulty breathing with symptoms worsening over the past 2 days.  Patient has been refusing to come to the ER until the day of admission where she became confused and the son called EMS. Patient had COVID-19 approximately 2 or 3 weeks ago but seem to improve prior to this week.  history otherwise limited due to patient mental status and disorientation.  No family at bedside.  Patient was previously admitted here back in September 2023 when she was treated for C. difficile colitis.     On ER evaluation, Patient was hypertensive with a blood pressure of 158/110, afebrile on room air.  Her workup was significant for an ABG with a pH of 6.886/pCO2 11.8/pO2 136/HCO3 2.2/saturation 98% on room air.  proBNP 2545, CO2 3.1, anion gap 23.9, creatinine 3.31, BUN 63, glucose 126, lactate 2.3, lipase above 3000, Pro-Miguel 0.44, WBC 28.7.  Respiratory panel negative.  Chest x-ray with chronic changes per my read.  CT abdomen pelvis without contrast showed Mild acute pancreatitis with retroperitoneal effusion. No abscess. Nonobstructing bilateral renal stones. Moderate-large hiatal hernia.  Patient received dexamethasone 10 mg, DuoNeb, Zofran, 2 L of normal saline boluses and was started on Vanco and Zosyn in addition to sodium bicarb in the ED.  Hospitalist consulted for admission.    Metabolic acidosis improved " with sepsis treatment and bicarb drip.  Encephalopathy appears to have resolved.  Did develop acute anemia, appears to be stable.  No obvious sign of bleed, abdomen minimally tender.    Review of Systems:     All systems were reviewed and negative except as mentioned in subjective, assessment and plan.    Vital Signs:    Temp:  [97.1 °F (36.2 °C)-98.1 °F (36.7 °C)] 98.1 °F (36.7 °C)  Heart Rate:  [72-88] 76  Resp:  [16-20] 18  BP: (106-135)/(69-93) 106/69    Intake and output:    I/O last 3 completed shifts:  In: 5947.9 [I.V.:3647.9; IV Piggyback:2300]  Out: 875 [Urine:800; Other:75]  No intake/output data recorded.    Physical Examination:    General Appearance:  Alert and cooperative.    Head:  Atraumatic and normocephalic.   Eyes: Conjunctivae and sclerae normal, no icterus. No pallor.   Throat: No oral lesions, no thrush, oral mucosa moist.   Neck: Supple, trachea midline, no thyromegaly.   Lungs:   Breath sounds heard bilaterally equally.  No wheezing or crackles. No Pleural rub or bronchial breathing.   Heart:  Normal S1 and S2, no murmur, no gallop, no rub. No JVD.   Abdomen:   Mild epigastric tenderness.   Extremities: Supple, no edema, no cyanosis, no clubbing.   Skin: Warm.   Neurologic: Alert and oriented to self. No facial asymmetry. Moves all four limbs. No tremors.      Laboratory results:    Results from last 7 days   Lab Units 02/26/24  0609 02/25/24  0646 02/24/24  2354   SODIUM mmol/L 149* 140 135*   POTASSIUM mmol/L 2.5* 3.8 4.4   CHLORIDE mmol/L 121* 113* 108*   CO2 mmol/L 12.2* 4.5* 3.1*   BUN mg/dL 56* 61* 63*   CREATININE mg/dL 2.68* 2.91* 3.31*   CALCIUM mg/dL 8.7 9.7 10.9*   BILIRUBIN mg/dL 0.2 0.2 <0.2   ALK PHOS U/L 75 103 134*   ALT (SGPT) U/L 13 12 12   AST (SGOT) U/L 33* 36* 20   GLUCOSE mg/dL 75 113* 126*     Results from last 7 days   Lab Units 02/26/24  1059 02/26/24  0609 02/25/24  0646 02/24/24  7694   WBC 10*3/mm3  --  10.84* 16.87* 28.79*   HEMOGLOBIN g/dL 9.2* 8.9* 12.0 15.2    HEMATOCRIT % 26.4* 26.0* 35.3 45.9   PLATELETS 10*3/mm3  --  149 152 317             Results from last 7 days   Lab Units 02/25/24  0049 02/25/24  0043   BLOODCX  No growth at 24 hours No growth at 24 hours     Recent Labs     02/25/24  0540 02/25/24  1348 02/26/24  0741   PHART 7.073* 7.271* 7.292*   OSH6RJN 13.1* 18.1* 27.1*   PO2ART 113.0* 107.0* 57.1*   [id number] 3.8* 8.3* 13.1*   BASEEXCESS -24.2* -16.5* -12.2*      I have reviewed the patient's laboratory results.    Radiology results:    XR Chest 1 View    Result Date: 2/25/2024  PROCEDURE: XR CHEST 1 VW-  HISTORY: Shortness of air  COMPARISON: August 18, 2022.  FINDINGS: Calcified tortuous aorta. There is evidence of calcified granulomatous disease. The heart is normal in size. The mediastinum is unremarkable. Prominent interstitial markings may be chronic. Questionable patchy opacities in the right supra and infrahilar regions may represent infiltrate versus atelectasis. There is no pneumothorax. There are no acute osseous abnormalities.      Impression: Questionable patchy opacities in the right supra and infrahilar regions may represent infiltrate versus atelectasis.  Continued followup is recommended.    This report was signed and finalized on 2/25/2024 8:02 AM by Modesto Tejeda DO.      CT Abdomen Pelvis Without Contrast    Result Date: 2/25/2024  FINAL REPORT TECHNIQUE: null CLINICAL HISTORY: diffuse Abdominal pain, sepsis, confusion, SOA; family states symptoms began 5-6 days ago COMPARISON: null FINDINGS: CT abdomen and pelvis without contrast Comparison: CT/SR - CT ABDOMEN PELVIS W CONTRAST - 09/22/2023 08:48 PM EDT Findings: No consolidation or effusion. Liver and adrenal glands are unremarkable. Calcified granulomas in the spleen. Gallbladder is absent. Peripancreatic stranding with retroperitoneal effusion. Both kidneys are similar in size without hydronephrosis. Small nonobstructing bilateral renal stones. No ureteral calculi. Hypodense bilateral  renal cysts. Moderate-large hiatal hernia. No bowel obstruction, pneumoperitoneum, or pneumatosis. The appendix is not seen. Calcified plaque in the nonaneurysmal aorta and iliac arteries. There are no enlarged abdominal or pelvic lymph nodes. Distended bladder without wall thickening. Uterus is unremarkable. Stable hypodense 2.3 x 2.1 cm right ovarian cyst. Degenerative dextroscoliosis of the thoracolumbar spine with chronic L2 compression deformity.     Impression: IMPRESSION: 1. Mild acute pancreatitis with retroperitoneal effusion. No abscess. 2. Nonobstructing bilateral renal stones. 3. Moderate-large hiatal hernia. Authenticated and Electronically Signed by Yonathan Coles MD on 02/25/2024 01:22:17 AM   I have reviewed the patient's radiology reports.    Medication Review:     I have reviewed the patient's active and prn medications.     Problem List:      Pancreatitis, acute      Assessment:     Acute kidney injury, POA  Acute pancreatitis, POA  Acute metabolic encephalopathy, POA  Metabolic lactic acidosis, POA  Severe sepsis, unknown source of infection, POA  Hypertension  Impaired mobility and ADLs     Plan:     Comfortable in chair, pleasantly conversational.  Low insight and history.  Abdomen minimally tender.    Updated son Masoud via phone.      Acute kidney injury  -Improving  -Nephrology consulted, appreciate recommendations.  -Avoid nephrotoxic drugs, holding home Lasix  -Continue IV fluids  -Patient with bilateral nonobstructing renal stones with no hydronephrosis on CT abdomen and pelvis.     Acute pancreatitis   -Improving  -S/p cholecystectomy  -Lipid panel   -pain control  -Continue IV fluids     Sepsis  -Patient met SIRS criteria, in the settings of pancreatitis and VIVIENNE and systemic acidosis.  -Continue coverage with Vanco and Zosyn and de-escalate as able to  -No pattern of infection on urinalysis  -Blood cultures obtained  -Procalcitonin improving.     Acute encephalopathy  -Likely  metabolic  -Consider CT head if no improvement  -Check TSH    Anemia  No obvious sign of bleed.  Abdomen minimally tender.  After initial hemoglobin drop it appears to be stable. Initial value may have been hemoconcentrated.     Metabolic lactic acidosis   Expect improvements with sepsis treatment    Hypokalemia  Replace as needed.    Hiatal Hernia  Monitoring.      -Continue home meds as warranted.  -Further orders as indicated per clinical course.     Risk Assessment: High  DVT Prophylaxis: SCDs  Code Status: Full  Diet: Clear liquids  Discharge Plan: At least a couple more days depending on clinical improvements    I have reviewed the copied text and it is accurate as of 2/26/2024     Brian Joseph Kerley, DO  02/26/24  18:23 EST    Dictated utilizing Dragon dictation.      Electronically signed by Kerley, Brian Joseph, DO at 02/26/24 4880

## 2024-02-28 NOTE — PLAN OF CARE
Goal Outcome Evaluation:   No acute events noted during shift. Patient resting, family at bedside. No complaints of pain, patient repositioned q2. Hourly rounding, all needs met.       Problem: Adult Inpatient Plan of Care  Goal: Plan of Care Review  Outcome: Ongoing, Progressing  Goal: Patient-Specific Goal (Individualized)  Outcome: Ongoing, Progressing  Goal: Absence of Hospital-Acquired Illness or Injury  Outcome: Ongoing, Progressing  Intervention: Identify and Manage Fall Risk  Recent Flowsheet Documentation  Taken 2/28/2024 1800 by D'Amico, Kelly, RN  Safety Promotion/Fall Prevention:   nonskid shoes/slippers when out of bed   safety round/check completed  Taken 2/28/2024 1600 by D'Amico, Kelly, RN  Safety Promotion/Fall Prevention:   nonskid shoes/slippers when out of bed   safety round/check completed  Taken 2/28/2024 1400 by D'Amico, Kelly, RN  Safety Promotion/Fall Prevention:   nonskid shoes/slippers when out of bed   safety round/check completed  Taken 2/28/2024 1200 by D'Amico, Kelly, RN  Safety Promotion/Fall Prevention:   nonskid shoes/slippers when out of bed   safety round/check completed  Taken 2/28/2024 1000 by D'Amico, Kelly, RN  Safety Promotion/Fall Prevention:   nonskid shoes/slippers when out of bed   safety round/check completed  Taken 2/28/2024 0800 by D'Amico, Kelly, RN  Safety Promotion/Fall Prevention:   activity supervised   assistive device/personal items within reach   clutter free environment maintained   gait belt   muscle strengthening facilitated   lighting adjusted   mobility aid in [facility]   fall prevention program maintained   elopement precautions   nonskid shoes/slippers when out of bed   room organization consistent   toileting scheduled   safety round/check completed  Intervention: Prevent Skin Injury  Recent Flowsheet Documentation  Taken 2/28/2024 0800 by D'Amico, Kelly, KATERINE  Skin Protection:   adhesive use limited   skin-to-skin areas padded   skin-to-device areas  padded  Intervention: Prevent and Manage VTE (Venous Thromboembolism) Risk  Recent Flowsheet Documentation  Taken 2/28/2024 0800 by D'Amico, Kelly, RN  Activity Management: activity encouraged  Goal: Optimal Comfort and Wellbeing  Outcome: Ongoing, Progressing  Intervention: Provide Person-Centered Care  Recent Flowsheet Documentation  Taken 2/28/2024 0800 by D'Amico, Kelly, RN  Trust Relationship/Rapport:   care explained   choices provided   emotional support provided   thoughts/feelings acknowledged   questions encouraged   reassurance provided   questions answered   empathic listening provided  Goal: Readiness for Transition of Care  Outcome: Ongoing, Progressing     Problem: Skin Injury Risk Increased  Goal: Skin Health and Integrity  Outcome: Ongoing, Progressing  Intervention: Promote and Optimize Oral Intake  Recent Flowsheet Documentation  Taken 2/28/2024 0800 by D'Amico, Kelly, RN  Oral Nutrition Promotion: rest periods promoted  Intervention: Optimize Skin Protection  Recent Flowsheet Documentation  Taken 2/28/2024 1800 by D'Amico, Kelly, RN  Head of Bed (HOB) Positioning: HOB elevated  Taken 2/28/2024 1600 by D'Amico, Kelly, RN  Head of Bed (HOB) Positioning: HOB elevated  Taken 2/28/2024 1400 by D'Amico, Kelly, RN  Head of Bed (HOB) Positioning: HOB elevated  Taken 2/28/2024 1200 by D'Amico, Kelly, RN  Head of Bed (HOB) Positioning: HOB elevated  Taken 2/28/2024 1000 by D'Amico, Kelly, RN  Head of Bed (HOB) Positioning: HOB elevated  Taken 2/28/2024 0800 by D'Amico, Kelly, RN  Pressure Reduction Techniques:   frequent weight shift encouraged   heels elevated off bed   positioned off wounds   pressure points protected   rest period provided between sit times   sit time limited to 2 hours   weight shift assistance provided  Head of Bed (HOB) Positioning: HOB elevated  Skin Protection:   adhesive use limited   skin-to-skin areas padded   skin-to-device areas padded     Problem: Fall Injury Risk  Goal:  Absence of Fall and Fall-Related Injury  Outcome: Ongoing, Progressing  Intervention: Identify and Manage Contributors  Recent Flowsheet Documentation  Taken 2/28/2024 0800 by D'Amico, Kelly, RN  Medication Review/Management: medications reviewed  Intervention: Promote Injury-Free Environment  Recent Flowsheet Documentation  Taken 2/28/2024 1800 by D'Amico, Kelly, RN  Safety Promotion/Fall Prevention:   nonskid shoes/slippers when out of bed   safety round/check completed  Taken 2/28/2024 1600 by D'Amico, Kelly, RN  Safety Promotion/Fall Prevention:   nonskid shoes/slippers when out of bed   safety round/check completed  Taken 2/28/2024 1400 by D'Amico, Kelly, RN  Safety Promotion/Fall Prevention:   nonskid shoes/slippers when out of bed   safety round/check completed  Taken 2/28/2024 1200 by D'Amico, Kelly, RN  Safety Promotion/Fall Prevention:   nonskid shoes/slippers when out of bed   safety round/check completed  Taken 2/28/2024 1000 by D'Amico, Kelly, RN  Safety Promotion/Fall Prevention:   nonskid shoes/slippers when out of bed   safety round/check completed  Taken 2/28/2024 0800 by D'Amico, Kelly, RN  Safety Promotion/Fall Prevention:   activity supervised   assistive device/personal items within reach   clutter free environment maintained   gait belt   muscle strengthening facilitated   lighting adjusted   mobility aid in [facility]   fall prevention program maintained   elopement precautions   nonskid shoes/slippers when out of bed   room organization consistent   toileting scheduled   safety round/check completed     Problem: Hypertension Comorbidity  Goal: Blood Pressure in Desired Range  Outcome: Ongoing, Progressing  Intervention: Maintain Blood Pressure Management  Recent Flowsheet Documentation  Taken 2/28/2024 0800 by D'Amico, Kelly, RN  Medication Review/Management: medications reviewed     Problem: Confusion Acute  Goal: Optimal Cognitive Function  Outcome: Ongoing, Progressing  Intervention: Minimize  Contributing Factors  Recent Flowsheet Documentation  Taken 2/28/2024 0800 by D'Amico, Kelly, RN  Sensory Stimulation Regulation: television on  Reorientation Measures:   calendar in view   clock in view  Environment Familiarity/Consistency:   daily routine followed   familiar objects from home provided   personal clothing/items utilized     Problem: Gas Exchange Impaired  Goal: Optimal Gas Exchange  Outcome: Ongoing, Progressing  Intervention: Optimize Oxygenation and Ventilation  Recent Flowsheet Documentation  Taken 2/28/2024 1800 by D'Amico, Kelly, RN  Head of Bed (HOB) Positioning: HOB elevated  Taken 2/28/2024 1600 by D'Amico, Kelly, RN  Head of Bed (HOB) Positioning: HOB elevated  Taken 2/28/2024 1400 by D'Amico, Kelly, RN  Head of Bed (HOB) Positioning: HOB elevated  Taken 2/28/2024 1200 by D'Amico, Kelly, RN  Head of Bed (HOB) Positioning: HOB elevated  Taken 2/28/2024 1000 by D'Amico, Kelly, RN  Head of Bed (HOB) Positioning: HOB elevated  Taken 2/28/2024 0800 by D'Amico, Kelly, RN  Head of Bed (HOB) Positioning: HOB elevated     Problem: Pain Acute  Goal: Acceptable Pain Control and Functional Ability  Outcome: Ongoing, Progressing  Intervention: Prevent or Manage Pain  Recent Flowsheet Documentation  Taken 2/28/2024 0800 by D'Amico, Kelly, RN  Sensory Stimulation Regulation: television on  Sleep/Rest Enhancement:   noise level reduced   reading promoted   regular sleep/rest pattern promoted   relaxation techniques promoted   natural light exposure provided   music provided   family presence promoted   consistent schedule promoted   awakenings minimized  Medication Review/Management: medications reviewed  Intervention: Optimize Psychosocial Wellbeing  Recent Flowsheet Documentation  Taken 2/28/2024 0800 by D'Amico, Kelly, RN  Supportive Measures:   active listening utilized   counseling provided   decision-making supported   journaling promoted   positive reinforcement provided   relaxation techniques  promoted   self-care encouraged   self-reflection promoted   self-responsibility promoted   problem-solving facilitated   guided imagery facilitated   goal-setting facilitated   verbalization of feelings encouraged  Diversional Activities: television  Spiritual Activities Assistance: hope instilled     Problem: Adjustment to Illness (Sepsis/Septic Shock)  Goal: Optimal Coping  Outcome: Ongoing, Progressing  Intervention: Optimize Psychosocial Adjustment to Illness  Recent Flowsheet Documentation  Taken 2/28/2024 0800 by D'Amico, Kelly, RN  Supportive Measures:   active listening utilized   counseling provided   decision-making supported   journaling promoted   positive reinforcement provided   relaxation techniques promoted   self-care encouraged   self-reflection promoted   self-responsibility promoted   problem-solving facilitated   guided imagery facilitated   goal-setting facilitated   verbalization of feelings encouraged     Problem: Bleeding (Sepsis/Septic Shock)  Goal: Absence of Bleeding  Outcome: Ongoing, Progressing     Problem: Glycemic Control Impaired (Sepsis/Septic Shock)  Goal: Blood Glucose Level Within Desired Range  Outcome: Ongoing, Progressing     Problem: Infection Progression (Sepsis/Septic Shock)  Goal: Absence of Infection Signs and Symptoms  Outcome: Ongoing, Progressing  Intervention: Promote Recovery  Recent Flowsheet Documentation  Taken 2/28/2024 0800 by D'Amico, Kelly, RN  Activity Management: activity encouraged  Sleep/Rest Enhancement:   noise level reduced   reading promoted   regular sleep/rest pattern promoted   relaxation techniques promoted   natural light exposure provided   music provided   family presence promoted   consistent schedule promoted   awakenings minimized     Problem: Nutrition Impaired (Sepsis/Septic Shock)  Goal: Optimal Nutrition Intake  Outcome: Ongoing, Progressing

## 2024-02-28 NOTE — PROGRESS NOTES
Pharmacokinetic Consult - Keflex and Flagyl Dosing  Tasha Yarbrough is a 68 y.o. female who has been consulted to dose Keflex and Flagyl for  Sepsis/IAI/De-escalation of treatement .    Current Antimicrobial Therapy    Anti-Infectives (From admission, onward)      Ordered     Dose/Rate Route Frequency Start Stop    02/27/24 0732  vancomycin in dextrose 5% 150 mL (VANCOCIN) IVPB 750 mg        Ordering Provider: Vivienne Roa MD    750 mg  200 mL/hr over 45 Minutes Intravenous Once 02/27/24 2200 02/27/24 2226    02/26/24 0839  vancomycin (VANCOCIN) IVPB 500 mg in 100 mL NS        Ordering Provider: Kerley, Brian Joseph, DO    500 mg  200 mL/hr over 30 Minutes Intravenous Once 02/26/24 1800 02/26/24 1837    02/25/24 1053  vancomycin (VANCOCIN) IVPB 500 mg in 100 mL NS        Ordering Provider: Vivienne Roa MD    500 mg  200 mL/hr over 30 Minutes Intravenous Once 02/25/24 1800 02/25/24 1825    02/25/24 0039  piperacillin-tazobactam (ZOSYN) 3.375 g in iso-osmotic dextrose 50 ml (premix)        Ordering Provider: Ho Clark MD    3.375 g  over 30 Minutes Intravenous Once 02/25/24 0055 02/25/24 0142    02/25/24 0039  vancomycin 1250 mg/250 mL 0.9% NS IVPB ([facility])        Ordering Provider: Ho Clark MD    20 mg/kg × 65.8 kg  over 75 Minutes Intravenous Once 02/25/24 0055 02/25/24 0323            Microbiology Results (last 10 days)       Procedure Component Value - Date/Time    MRSA Screen, PCR (Inpatient) - Swab, Nares [[id number]]  (Abnormal) Collected: 02/25/24 1102    Lab Status: Final result Specimen: Swab from Nares Updated: 02/25/24 2148     MRSA PCR MRSA Detected    Narrative:      The negative predictive value of this diagnostic test is high and should only be used to consider de-escalating anti-MRSA therapy. A positive result may indicate colonization with MRSA and must be correlated clinically.    Blood Culture - Blood, Hand, Digit Right [[id number]]  (Normal) Collected: 02/25/24 0049    Lab Status:  Preliminary result Specimen: Blood from Hand, Digit Right Updated: 02/28/24 0100     Blood Culture No growth at 3 days    Blood Culture - Blood, Hand, Right [[id number]]  (Normal) Collected: 02/25/24 0043    Lab Status: Preliminary result Specimen: Blood from Hand, Right Updated: 02/28/24 0100     Blood Culture No growth at 3 days    Respiratory Panel PCR w/COVID-19(SARS-CoV-2) JORDYN/AMA/KIM/PAD/COR/MALICK In-House, NP Swab in UTM/VTM, 2 HR TAT - Swab, Nasopharynx [[id number]]  (Normal) Collected: 02/24/24 2339    Lab Status: Final result Specimen: Swab from Nasopharynx Updated: 02/25/24 0038     ADENOVIRUS, PCR Not Detected     Coronavirus 229E Not Detected     Coronavirus HKU1 Not Detected     Coronavirus NL63 Not Detected     Coronavirus OC43 Not Detected     COVID19 Not Detected     Human Metapneumovirus Not Detected     Human Rhinovirus/Enterovirus Not Detected     Influenza A PCR Not Detected     Influenza B PCR Not Detected     Parainfluenza Virus 1 Not Detected     Parainfluenza Virus 2 Not Detected     Parainfluenza Virus 3 Not Detected     Parainfluenza Virus 4 Not Detected     RSV, PCR Not Detected     Bordetella pertussis pcr Not Detected     Bordetella parapertussis PCR Not Detected     Chlamydophila pneumoniae PCR Not Detected     Mycoplasma pneumo by PCR Not Detected    Narrative:      In the setting of a positive respiratory panel with a viral infection PLUS a negative procalcitonin without other underlying concern for bacterial infection, consider observing off antibiotics or discontinuation of antibiotics and continue supportive care. If the respiratory panel is positive for atypical bacterial infection (Bordetella pertussis, Chlamydophila pneumoniae, or Mycoplasma pneumoniae), consider antibiotic de-escalation to target atypical bacterial infection.             Allergies  Ciprofloxacin, Codeine, and Pineapple    Relevant clinical data and objective history reviewed:  Creatinine   Date Value Ref Range  Status   02/28/2024 1.91 (H) 0.57 - 1.00 mg/dL Final     Estimated Creatinine Clearance: 24.7 mL/min (A) (by C-G formula based on SCr of 1.91 mg/dL (H)).  I/O last 3 completed shifts:  In: 2360 [P.O.:360; I.V.:2000]  Out: 950 [Urine:950]  Patient weight: 63.9 kg (140 lb 14 oz)    Asessment/Plan  Initiate Keflex 500mg PO Q12h and Flagyl 500mg PO Q8h for 3 days.  Pharmacy will monitor Ms. Yarbrough's renal function and clinical status and adjust the antibiotic dose and/or frequency as needed.    Thanks,   Susan Boyd, PharmD  2/28/2024  14:08 EST

## 2024-02-28 NOTE — CASE MANAGEMENT/SOCIAL WORK
Spoke with pt at bedside regarding DCP. States she is doing so much better now because she almost didn't make it when she came in. She lives with son and plans to return there at Dc. She declines HH as she did not find them helpful in the past. Son will transport at dc. IMM discussed and signed

## 2024-02-28 NOTE — PROGRESS NOTES
"    [facility]    Name:  Tasha Yarbrough   Age:  68 y.o.  Sex:  female  :  1955  MRN:  [id number]   Visit Number:  [id number]  Admission Date:  2024  Date Of Service:  24  Primary Care Physician:  Terrence Baldwin MD     LOS: 3 days :    Chief Complaint:      Follow-up; abdominal pain, shortness of air    Subjective:    Patient was seen and examined at bedside today.  Patient sitting up comfortably in bed with no distress.  Patient continues to appear at baseline mental status and oriented x 3.  Patient reports feeling significantly better, she has tolerated p.o. well since yesterday, denies nausea or vomiting, abdominal pain improved.  Patient is asking on when she can go home, discussed treatment plan and she is in agreement.  Nephrology following.  Vitals are stable and afebrile.    Hospital Course:    Tasha Yarbrough is a 68 year old female with a past medical history of hypertension and impaired mobility and ADLs, who presented to the ER with a chief complaint of shortness of breath and abdominal pain.  At time of presentation currently awake and alert but appears to be confused, she was reporting abdominal pain and was able to tell me her name and date of birth but was confused about her situation and kept repeating \"I am here for colitis diagnosis\".  Per ER report patient has been sick for the past 5 to 6 days with cough and difficulty breathing with symptoms worsening over the past 2 days.  Patient has been refusing to come to the ER until the day of admission where she became confused and the son called EMS. Patient had COVID-19 approximately 2 or 3 weeks ago but seem to improve prior to this week.  history otherwise limited due to patient mental status and disorientation.  No family at bedside.  Patient was previously admitted here back in 2023 when she was treated for C. difficile colitis.     On ER evaluation, Patient was hypertensive with " a blood pressure of 158/110, afebrile on room air.  Her workup was significant for an ABG with a pH of 6.886/pCO2 11.8/pO2 136/HCO3 2.2/saturation 98% on room air.  proBNP 2545, CO2 3.1, anion gap 23.9, creatinine 3.31, BUN 63, glucose 126, lactate 2.3, lipase above 3000, Pro-Miguel 0.44, WBC 28.7.  Respiratory panel negative.  Chest x-ray with chronic changes per my read.  CT abdomen pelvis without contrast showed Mild acute pancreatitis with retroperitoneal effusion. No abscess. Nonobstructing bilateral renal stones. Moderate-large hiatal hernia.  Patient received dexamethasone 10 mg, DuoNeb, Zofran, 2 L of normal saline boluses and was started on Vanco and Zosyn in addition to sodium bicarb in the ED.  Hospitalist consulted for admission.    Metabolic acidosis improved with sepsis treatment and bicarb drip.  Encephalopathy appears to have resolved.  Did develop acute anemia, appears to be stable.  No obvious sign of bleed, abdomen minimally tender.    Review of Systems:     All systems were reviewed and negative except as mentioned in subjective, assessment and plan.    Vital Signs:    Temp:  [97.3 °F (36.3 °C)-98.3 °F (36.8 °C)] 97.3 °F (36.3 °C)  Heart Rate:  [59-94] 89  Resp:  [16] 16  BP: ()/(64-83) 125/83    Intake and output:    I/O last 3 completed shifts:  In: 2360 [P.O.:360; I.V.:2000]  Out: 950 [Urine:950]  No intake/output data recorded.    Physical Examination:    General Appearance:  Alert and cooperative.  No acute distress.   Head:  Atraumatic and normocephalic.   Eyes: Conjunctivae and sclerae normal, no icterus. No pallor.   Throat: No oral lesions, no thrush, oral mucosa moist.   Neck: Supple, trachea midline, no thyromegaly.   Lungs:   Breath sounds heard bilaterally equally.  No wheezing or crackles. No Pleural rub or bronchial breathing.   Heart:  Normal S1 and S2, no murmur, no gallop, no rub. No JVD.   Abdomen:   Soft, nontender, nondistended, bowel sounds positive x 4.   Extremities:  Supple, no edema, no cyanosis, no clubbing.   Skin: Warm.   Neurologic: Alert and oriented x 3.  No facial asymmetry. Moves all four limbs. No tremors.      Laboratory results:    Results from last 7 days   Lab Units 02/27/24  0536 02/26/24  1850 02/26/24  0609 02/25/24  0646   SODIUM mmol/L 144 146* 149* 140   POTASSIUM mmol/L 3.2* 2.6* 2.5* 3.8   CHLORIDE mmol/L 116* 116* 121* 113*   CO2 mmol/L 15.5* 14.7* 12.2* 4.5*   BUN mg/dL 46* 49* 56* 61*   CREATININE mg/dL 1.92* 2.31* 2.68* 2.91*   CALCIUM mg/dL 8.8 8.4* 8.7 9.7   BILIRUBIN mg/dL 0.2  --  0.2 0.2   ALK PHOS U/L 73  --  75 103   ALT (SGPT) U/L 14  --  13 12   AST (SGOT) U/L 27  --  33* 36*   GLUCOSE mg/dL 73 89 75 113*     Results from last 7 days   Lab Units 02/27/24  0536 02/26/24  1059 02/26/24  0609 02/25/24  0646   WBC 10*3/mm3 9.15  --  10.84* 16.87*   HEMOGLOBIN g/dL 8.6* 9.2* 8.9* 12.0   HEMATOCRIT % 25.3* 26.4* 26.0* 35.3   PLATELETS 10*3/mm3 129*  --  149 152             Results from last 7 days   Lab Units 02/25/24  0049 02/25/24  0043   BLOODCX  No growth at 3 days No growth at 3 days     Recent Labs     02/25/24  1348 02/26/24  0741 02/27/24  0522   PHART 7.271* 7.292* 7.305*   HNQ0QFQ 18.1* 27.1* 30.1*   PO2ART 107.0* 57.1* 90.6   TEF3YBO 8.3* 13.1* 15.0*   BASEEXCESS -16.5* -12.2* -10.3*      I have reviewed the patient's laboratory results.    Radiology results:    No radiology results from the last 24 hrs  I have reviewed the patient's radiology reports.    Medication Review:     I have reviewed the patient's active and prn medications.     Problem List:      Pancreatitis, acute      Assessment:     Acute kidney injury, POA  Acute pancreatitis, POA  Acute metabolic encephalopathy, POA  Metabolic lactic acidosis, POA  Severe sepsis, unknown source of infection, POA  Hypertension  Impaired mobility and ADLs     Plan:     Comfortable in chair, pleasantly conversational.     Acute kidney injury  -Improving, creatinine down to 1.92  -1.91  -Nephrology consulted, appreciate recommendations.  -Avoid nephrotoxic drugs, holding home Lasix  -Continue IV fluids  -Patient with bilateral nonobstructing renal stones with no hydronephrosis on CT abdomen and pelvis.     Acute pancreatitis   -Improving  -Lipase down  -S/p cholecystectomy  -pain control  -Continue IV fluids  -Advanced diet, tolerating p.o. well     Sepsis, Resolved  -Patient met SIRS criteria, in the settings of pancreatitis and VIVIENNE and systemic acidosis.  -Continued coverage with Vanco and Zosyn and de-escalate as able to  -No pattern of infection on urinalysis  -Blood cultures obt remain negative.  -Procalcitonin improving.  Leukocytosis resolved  -Discussed with pharmacy, patient has received 4 days of Vanco and Zosyn, will switch patient's antibiotics to p.o. Keflex and Flagyl x 3 days to finish 7 days of antibiotics.     Acute encephalopathy, Resolved   -Likely metabolic  -TSH WN   -Mental status improved  -patient is at baseline mental status, AAOx3.     Anemia  -No obvious sign of bleed.   -After initial hemoglobin drop it appears to be stable. Initial value may have been dilutional.    -Iron profile ordered.     Metabolic lactic acidosis   -Expect improvements with sepsis treatment    Hypokalemia  -Replace as needed.    Hiatal Hernia  -Monitoring.      -Continue home meds as warranted.  -Further orders as indicated per clinical course.  -I have reviewed the copied text and it is accurate as of 2/28/2024      DVT Prophylaxis: SCDs  Code Status: Full  Diet: Clear liquids  Discharge Plan: Likely discharge in 1 to 2 days depending on clinical improvements.    Vivienne Roa MD  02/28/24  08:47 EST    Dictated utilizing Dragon dictation.

## 2024-02-29 ENCOUNTER — READMISSION MANAGEMENT (OUTPATIENT)
Dept: CALL CENTER | Facility: HOSPITAL | Age: 69
End: 2024-02-29
Payer: MEDICARE

## 2024-02-29 VITALS
BODY MASS INDEX: 25.44 KG/M2 | DIASTOLIC BLOOD PRESSURE: 74 MMHG | WEIGHT: 138.23 LBS | RESPIRATION RATE: 18 BRPM | HEIGHT: 62 IN | TEMPERATURE: 98 F | SYSTOLIC BLOOD PRESSURE: 122 MMHG | HEART RATE: 54 BPM | OXYGEN SATURATION: 97 %

## 2024-02-29 LAB
ALBUMIN SERPL-MCNC: 2.8 G/DL (ref 3.5–5.2)
ANION GAP SERPL CALCULATED.3IONS-SCNC: 10.8 MMOL/L (ref 5–15)
BUN SERPL-MCNC: 25 MG/DL (ref 8–23)
BUN/CREAT SERPL: 16.7 (ref 7–25)
CALCIUM SPEC-SCNC: 8.7 MG/DL (ref 8.6–10.5)
CHLORIDE SERPL-SCNC: 116 MMOL/L (ref 98–107)
CO2 SERPL-SCNC: 17.2 MMOL/L (ref 22–29)
CREAT SERPL-MCNC: 1.5 MG/DL (ref 0.57–1)
DEPRECATED RDW RBC AUTO: 58.7 FL (ref 37–54)
EGFRCR SERPLBLD CKD-EPI 2021: 37.8 ML/MIN/1.73
ERYTHROCYTE [DISTWIDTH] IN BLOOD BY AUTOMATED COUNT: 15.1 % (ref 12.3–15.4)
GLUCOSE SERPL-MCNC: 85 MG/DL (ref 65–99)
HCT VFR BLD AUTO: 25.1 % (ref 34–46.6)
HGB BLD-MCNC: 8.3 G/DL (ref 12–15.9)
MCH RBC QN AUTO: 35.3 PG (ref 26.6–33)
MCHC RBC AUTO-ENTMCNC: 33.1 G/DL (ref 31.5–35.7)
MCV RBC AUTO: 106.8 FL (ref 79–97)
PHOSPHATE SERPL-MCNC: 2.7 MG/DL (ref 2.5–4.5)
PLATELET # BLD AUTO: 150 10*3/MM3 (ref 140–450)
PMV BLD AUTO: 11.3 FL (ref 6–12)
POTASSIUM SERPL-SCNC: 3.7 MMOL/L (ref 3.5–5.2)
RBC # BLD AUTO: 2.35 10*6/MM3 (ref 3.77–5.28)
SODIUM SERPL-SCNC: 144 MMOL/L (ref 136–145)
WBC NRBC COR # BLD AUTO: 7.38 10*3/MM3 (ref 3.4–10.8)

## 2024-02-29 PROCEDURE — 85027 COMPLETE CBC AUTOMATED: CPT | Performed by: FAMILY MEDICINE

## 2024-02-29 PROCEDURE — 80069 RENAL FUNCTION PANEL: CPT | Performed by: INTERNAL MEDICINE

## 2024-02-29 PROCEDURE — 99239 HOSP IP/OBS DSCHRG MGMT >30: CPT | Performed by: FAMILY MEDICINE

## 2024-02-29 RX ORDER — METRONIDAZOLE 500 MG/1
500 TABLET ORAL EVERY 8 HOURS SCHEDULED
Qty: 6 TABLET | Refills: 0 | Status: SHIPPED | OUTPATIENT
Start: 2024-02-29 | End: 2024-03-04 | Stop reason: HOSPADM

## 2024-02-29 RX ORDER — SODIUM BICARBONATE 650 MG/1
650 TABLET ORAL 2 TIMES DAILY
Status: DISCONTINUED | OUTPATIENT
Start: 2024-02-29 | End: 2024-02-29 | Stop reason: HOSPADM

## 2024-02-29 RX ORDER — SODIUM BICARBONATE 650 MG/1
650 TABLET ORAL 2 TIMES DAILY
Qty: 60 TABLET | Refills: 0 | Status: SHIPPED | OUTPATIENT
Start: 2024-02-29 | End: 2024-03-30

## 2024-02-29 RX ORDER — CEPHALEXIN 500 MG/1
500 CAPSULE ORAL 2 TIMES DAILY
Qty: 3 CAPSULE | Refills: 0 | Status: SHIPPED | OUTPATIENT
Start: 2024-02-29 | End: 2024-03-04 | Stop reason: HOSPADM

## 2024-02-29 RX ORDER — FAMOTIDINE 40 MG/1
40 TABLET, FILM COATED ORAL DAILY
Qty: 30 TABLET | Refills: 0 | Status: SHIPPED | OUTPATIENT
Start: 2024-03-01 | End: 2024-03-31

## 2024-02-29 RX ORDER — FUROSEMIDE 20 MG/1
20 TABLET ORAL DAILY
Qty: 30 TABLET | Refills: 0 | Status: SHIPPED | OUTPATIENT
Start: 2024-03-01 | End: 2024-03-31

## 2024-02-29 RX ORDER — NICOTINE 21 MG/24HR
1 PATCH, TRANSDERMAL 24 HOURS TRANSDERMAL EVERY 24 HOURS
Qty: 14 PATCH | Refills: 0 | Status: SHIPPED | OUTPATIENT
Start: 2024-02-29 | End: 2024-03-14

## 2024-02-29 RX ORDER — FUROSEMIDE 20 MG/1
20 TABLET ORAL DAILY
Status: DISCONTINUED | OUTPATIENT
Start: 2024-03-01 | End: 2024-02-29 | Stop reason: HOSPADM

## 2024-02-29 RX ADMIN — SODIUM BICARBONATE 650 MG TABLET 1300 MG: at 08:45

## 2024-02-29 RX ADMIN — METRONIDAZOLE 500 MG: 500 TABLET ORAL at 14:58

## 2024-02-29 RX ADMIN — MUPIROCIN 1 APPLICATION: 20 OINTMENT TOPICAL at 08:52

## 2024-02-29 RX ADMIN — METRONIDAZOLE 500 MG: 500 TABLET ORAL at 06:14

## 2024-02-29 RX ADMIN — CITALOPRAM HYDROBROMIDE 40 MG: 20 TABLET ORAL at 08:45

## 2024-02-29 RX ADMIN — CALCIUM CARBONATE (ANTACID) CHEW TAB 500 MG 2 TABLET: 500 CHEW TAB at 07:55

## 2024-02-29 RX ADMIN — FAMOTIDINE 40 MG: 20 TABLET, FILM COATED ORAL at 08:45

## 2024-02-29 RX ADMIN — Medication 1 PATCH: at 14:58

## 2024-02-29 RX ADMIN — CEPHALEXIN 500 MG: 250 CAPSULE ORAL at 08:45

## 2024-02-29 RX ADMIN — METOPROLOL TARTRATE 50 MG: 50 TABLET ORAL at 08:45

## 2024-02-29 RX ADMIN — Medication 10 ML: at 08:52

## 2024-02-29 RX ADMIN — HYDROCODONE BITARTRATE AND ACETAMINOPHEN 1 TABLET: 7.5; 325 TABLET ORAL at 10:00

## 2024-02-29 NOTE — DISCHARGE INSTRUCTIONS
-Take antibiotics as prescribed until finished  -Follow-up with nephrology in the office in 1 week.  -Follow-up with PCP in 2 to 3 days for recheck and continued care.  -Follow-up with gastroenterology as an outpatient for pancreatitis.

## 2024-02-29 NOTE — PLAN OF CARE
Goal Outcome Evaluation:  Plan of Care Reviewed With: patient        Progress: no change       VSS. No acute events this shift. Medications given per orders, see MAR. Pt. With no complaints at this time. Plan of care ongoing.

## 2024-02-29 NOTE — DISCHARGE SUMMARY
"    [facility]      Name:  Tasha Yarbrough   Age:  68 y.o.  Sex:  female  :  1955  MRN:  [id number]   Visit Number:  [id number]    Admission Date:  2024  Date of Discharge:  2024  Primary Care Physician:  Terrence Baldwin MD    Important issues to note:    -NAHCO3 650 BID and lasix 20mg daily at discharge per nephrology recommendations  -Discharged on Keflex and Flagyl  -Decreased famotidine to 40 mg daily  -Follow-up with Dr. Fernandez in 1 week  -Follow-up with PCP    Discharge Diagnoses:     Acute kidney injury, POA  Acute pancreatitis, POA  Acute metabolic encephalopathy, POA  Metabolic lactic acidosis, POA  Severe sepsis, unknown source of infection, POA  Hypertension  Impaired mobility and ADLs    Problem List:     Active Hospital Problems    Diagnosis  POA    **Pancreatitis, acute [K85.90]  Yes      Resolved Hospital Problems   No resolved problems to display.     Presenting Problem:    Chief Complaint   Patient presents with    Shortness of Breath      Consults:     Consulting Physician(s)         Provider   Role Specialty     Xavi Caballero MD  Consulting Physician Nephrology          Procedures Performed:        History of presenting illness/Hospital Course:    Tasha Yarbrough is a 68 year old female with a past medical history of hypertension and impaired mobility and ADLs, who presented to the ER with a chief complaint of shortness of breath and abdominal pain.  At time of presentation currently awake and alert but appears to be confused, she was reporting abdominal pain and was able to tell me her name and date of birth but was confused about her situation and kept repeating \"I am here for colitis diagnosis\".  Per ER report patient has been sick for the past 5 to 6 days with cough and difficulty breathing with symptoms worsening over the past 2 days.  Patient has been refusing to come to the ER until the day of admission where she became " confused and the son called EMS. Patient had COVID-19 approximately 2 or 3 weeks ago but seem to improve prior to this week.  history otherwise limited due to patient mental status and disorientation.  No family at bedside.  Patient was previously admitted here back in September 2023 when she was treated for C. difficile colitis.     On ER evaluation, Patient was hypertensive with a blood pressure of 158/110, afebrile on room air.  Her workup was significant for an ABG with a pH of 6.886/pCO2 11.8/pO2 136/HCO3 2.2/saturation 98% on room air.  proBNP 2545, CO2 3.1, anion gap 23.9, creatinine 3.31, BUN 63, glucose 126, lactate 2.3, lipase above 3000, Pro-Miguel 0.44, WBC 28.7.  Respiratory panel negative.  Chest x-ray with chronic changes per my read.  CT abdomen pelvis without contrast showed Mild acute pancreatitis with retroperitoneal effusion. No abscess. Nonobstructing bilateral renal stones. Moderate-large hiatal hernia.  Patient received dexamethasone 10 mg, DuoNeb, Zofran, 2 L of normal saline boluses and was started on Vanco and Zosyn in addition to sodium bicarb in the ED.  Hospitalist consulted for admission.     Metabolic acidosis improved with sepsis treatment and bicarb drip.  Encephalopathy appears to have resolved.  Did develop acute anemia, appears to be stable.  No obvious sign of bleed    Acute kidney injury, resolved  -Improving, creatinine down to 1.92 -1.91  -Nephrology consulted, appreciate recommendations.  -Avoid nephrotoxic drugs, holding home Lasix  -Continue IV fluids  -Patient with bilateral nonobstructing renal stones with no hydronephrosis on CT abdomen and pelvis.  -I personally discussed with nephrology on discharge, will keep patient on sodium bicarb 650 mg twice daily, Lasix 20 mg daily and follow-up with Dr. Fernandez in 1 week.     Acute pancreatitis, resolved  -Improved  -Lipase down  -S/p cholecystectomy  -pain control  -Continue IV fluids  -Advanced diet, tolerating p.o.  well  -Referral to follow-up with GI as an outpatient for pancreatitis and history of colitis.      Sepsis, Resolved  -Patient met SIRS criteria, in the settings of pancreatitis and VIVIENNE and systemic acidosis.  -Continued coverage with Vanco and Zosyn and de-escalate as able to  -No pattern of infection on urinalysis  -Blood cultures obt remain negative.  -Procalcitonin improving.  Leukocytosis resolved  -Discussed with pharmacy, patient has received 4 days of Vanco and Zosyn, switched patient's antibiotics at discharge to p.o. Keflex and Flagyl x 3 days to finish 7 days of antibiotics.     Acute encephalopathy, Resolved   -Likely metabolic  -TSH WN   -Mental status improved  -patient is at baseline mental status, AAOx3.      Anemia  -No obvious sign of bleed.   -After initial hemoglobin drop it appears to be stable. Initial value may have been dilutional.    -Iron profile ordered.     Metabolic lactic acidosis   -improvement with sepsis and VIVIENNE treatment    Patient was seen and examined on the day of discharge.  Patient sitting up comfortably on the side of the bed with no distress. Patient at baseline mental status and AAOx3. Patient reports significant improvement and denies any acute complaints.  Patient with no abdominal pain, nausea, vomiting and tolerating p.o. very well.  Patient is very eager to go home and to be with her pets.  Patient feels much better. Vital stable. Patient instructed on management and plan in details.  Discussed medication changes, discharged on antibiotics.  Patient follow-up with Dr. Fernandez in 1 week.  Referral to follow-up with GI as an outpatient. Patient to follow-up with PCP in 2 to 3 days for recheck and continued care. Clear return precautions discussed.  Patient verbalized understanding and agreeable to plan.  All questions were answered to the patient's satisfaction.      Vital Signs:    Temp:  [97.3 °F (36.3 °C)-98.3 °F (36.8 °C)] 98 °F (36.7 °C)  Heart Rate:  [54-96]  54  Resp:  [18] 18  BP: (121-127)/(73-97) 122/74    Physical Exam:    General Appearance:  Alert and cooperative.  No acute distress.   Head:  Atraumatic and normocephalic.   Eyes: Conjunctivae and sclerae normal, no icterus. No pallor.   Ears:  Ears with no abnormalities noted.   Throat: No oral lesions, no thrush, oral mucosa moist.   Neck: Supple, trachea midline, no thyromegaly.   Back:   No tenderness to palpation.   Lungs:   Breath sounds heard bilaterally equally.  No crackles or wheezing. No Pleural rub or bronchial breathing.   Heart:  Normal S1 and S2, no murmur, no gallop, no rub. No JVD.   Abdomen:   Normal bowel sounds, no masses, no organomegaly. Soft, nontender, nondistended, no rebound tenderness.   Extremities: Supple, no edema, no cyanosis, no clubbing.   Pulses: Pulses palpable bilaterally.   Skin: No bleeding or rash.   Neurologic: Alert and oriented x 3. No facial asymmetry. Moves all four limbs. No tremors.     Pertinent Lab Results:     Results from last 7 days   Lab Units 02/29/24  0633 02/28/24  0845 02/27/24  0536 02/26/24  1850 02/26/24  0609   SODIUM mmol/L 144 142 144   < > 149*   POTASSIUM mmol/L 3.7 3.7 3.2*   < > 2.5*   CHLORIDE mmol/L 116* 115* 116*   < > 121*   CO2 mmol/L 17.2* 16.4* 15.5*   < > 12.2*   BUN mg/dL 25* 30* 46*   < > 56*   CREATININE mg/dL 1.50* 1.91* 1.92*   < > 2.68*   CALCIUM mg/dL 8.7 8.6 8.8   < > 8.7   BILIRUBIN mg/dL  --  0.2 0.2  --  0.2   ALK PHOS U/L  --  89 73  --  75   ALT (SGPT) U/L  --  17 14  --  13   AST (SGOT) U/L  --  31 27  --  33*   GLUCOSE mg/dL 85 98 73   < > 75    < > = values in this interval not displayed.     Results from last 7 days   Lab Units 02/29/24  0633 02/28/24  0845 02/27/24  0536   WBC 10*3/mm3 7.38 9.05 9.15   HEMOGLOBIN g/dL 8.3* 8.6* 8.6*   HEMATOCRIT % 25.1* 25.5* 25.3*   PLATELETS 10*3/mm3 150 150 129*             Results from last 7 days   Lab Units 02/24/24  2354   PROBNP pg/mL 2,545.0*         Results from last 7 days   Lab  Units 02/28/24  0845   LIPASE U/L 205*     Results from last 7 days   Lab Units 02/27/24  0522   PH, ARTERIAL pH units 7.305*   PO2 ART mm Hg 90.6   PCO2, ARTERIAL mm Hg 30.1*   HCO3 ART mmol/L 15.0*     Results from last 7 days   Lab Units 02/25/24  0049 02/25/24  0043   BLOODCX  No growth at 4 days No growth at 4 days       Pertinent Radiology Results:    Imaging Results (All)       Procedure Component Value Units Date/Time    XR Chest 1 View [[id number]] Collected: 02/25/24 0759     Updated: 02/25/24 0804    Narrative:      PROCEDURE: XR CHEST 1 VW-     HISTORY: Shortness of air     COMPARISON: August 18, 2022.     FINDINGS: Calcified tortuous aorta. There is evidence of calcified  granulomatous disease. The heart is normal in size. The mediastinum is  unremarkable. Prominent interstitial markings may be chronic.  Questionable patchy opacities in the right supra and infrahilar regions  may represent infiltrate versus atelectasis. There is no pneumothorax.   There are no acute osseous abnormalities.       Impression:      Questionable patchy opacities in the right supra and  infrahilar regions may represent infiltrate versus atelectasis.      Continued followup is recommended.           This report was signed and finalized on 2/25/2024 8:02 AM by Modesto Tejeda DO.       CT Abdomen Pelvis Without Contrast [[id number]] Collected: 02/25/24 0122     Updated: 02/25/24 0124    Narrative:      FINAL REPORT    TECHNIQUE:  null    CLINICAL HISTORY:  diffuse Abdominal pain, sepsis, confusion, SOA; family states  symptoms began 5-6 days ago    COMPARISON:  null    FINDINGS:  CT abdomen and pelvis without contrast    Comparison: CT/SR - CT ABDOMEN PELVIS W CONTRAST - 09/22/2023 08:48 PM EDT    Findings:    No consolidation or effusion.    Liver and adrenal glands are unremarkable.    Calcified granulomas in the spleen. Gallbladder is absent.    Peripancreatic stranding with retroperitoneal effusion.    Both kidneys are similar  in size without hydronephrosis.    Small nonobstructing bilateral renal stones. No ureteral calculi.    Hypodense bilateral renal cysts.    Moderate-large hiatal hernia.    No bowel obstruction, pneumoperitoneum, or pneumatosis. The appendix is not seen.    Calcified plaque in the nonaneurysmal aorta and iliac arteries.    There are no enlarged abdominal or pelvic lymph nodes.    Distended bladder without wall thickening. Uterus is unremarkable.    Stable hypodense 2.3 x 2.1 cm right ovarian cyst.    Degenerative dextroscoliosis of the thoracolumbar spine with chronic L2 compression deformity.      Impression:      IMPRESSION:    1. Mild acute pancreatitis with retroperitoneal effusion. No abscess.    2. Nonobstructing bilateral renal stones.    3. Moderate-large hiatal hernia.    Authenticated and Electronically Signed by Yonathan Coles MD on  02/25/2024 01:22:17 AM            Echo:      Condition on Discharge:      Stable.    Code status during the hospital stay:    Code Status and Medical Interventions:   Ordered at: 02/25/24 0407     Code Status (Patient has no pulse and is not breathing):    CPR (Attempt to Resuscitate)     Medical Interventions (Patient has pulse or is breathing):    Full Support     Discharge Disposition:    Home-Health Care [facility] [place]    Discharge Medications:       Discharge Medications        New Medications        Instructions Start Date   cephalexin 500 MG capsule  Commonly known as: KEFLEX   500 mg, Oral, 2 Times Daily      metroNIDAZOLE 500 MG tablet  Commonly known as: FLAGYL   500 mg, Oral, Every 8 Hours Scheduled      nicotine 21 MG/24HR patch  Commonly known as: NICODERM CQ   1 patch, Transdermal, Every 24 Hours             Changes to Medications        Instructions Start Date   famotidine 40 MG tablet  Commonly known as: PEPCID  What changed:   medication strength  when to take this   40 mg, Oral, Daily   Start Date: March 1, 2024     furosemide 20 MG tablet  Commonly known as: LASIX  What  changed:   when to take this  additional instructions   20 mg, Oral, Daily   Start Date: March 1, 2024            Continue These Medications        Instructions Start Date   citalopram 40 MG tablet  Commonly known as: CeleXA   1 tablet, Oral, Daily      glucosamine-chondroitin 500-400 MG capsule capsule   1 capsule, Oral, 2 Times Daily With Meals      HYDROcodone-acetaminophen 7.5-325 MG per tablet  Commonly known as: NORCO   1 tablet, Oral, Every 8 Hours PRN      methocarbamol 750 MG tablet  Commonly known as: ROBAXIN   1-2 tablets, Oral, 3 Times Daily PRN, Take one to two tablets three times daily as needed      metoprolol tartrate 50 MG tablet  Commonly known as: LOPRESSOR   50 mg, Oral, Daily      ondansetron 8 MG tablet  Commonly known as: ZOFRAN   8 mg, Oral, Every 8 Hours PRN      Florastor 250 MG capsule  Generic drug: saccharomyces boulardii   250 mg, Oral, 2 Times Daily, Given 9-18-23 from [facility] [place], [place]      saccharomyces boulardii 250 MG capsule  Commonly known as: Florastor   250 mg, Oral, 2 Times Daily      sodium bicarbonate 650 MG tablet   650 mg, Oral, 2 Times Daily      vitamin D3 125 MCG (5000 UT) capsule capsule   1 capsule, Oral, Daily             Discharge Diet:   Cardiac    Activity at Discharge:   As tolerated    Follow-up Appointments:    Additional Instructions for the Follow-ups that You Need to Schedule       Ambulatory Referral to Home Health (Hospital)   As directed      Face to Face Visit Date: 2/29/2024   Follow-up provider for Plan of Care?: I treated the patient in an acute care facility and will not continue treatment after discharge.   Follow-up provider: CHRISTIANO DANIELS [[id number]]   Reason/Clinical Findings: Debility, VIVIENNE, pancreatitis   Describe mobility limitations that make leaving home difficult: Debility, VIVIENNE, pancreatitis   Nursing/Therapeutic Services Requested: Skilled Nursing Physical Therapy Occupational Therapy   Skilled nursing orders: Medication education   PT  orders: Transfer training Strengthening   Occupational orders: Activities of daily living Strengthening   Frequency: 1 Week 1               Follow-up Information       Nito Fernandez MD, WINNIE Follow up in 1 week(s).    Specialty: Nephrology  Contact information:  [address]  [place] [address]  [phone]               Terrence Baldwin MD Follow up in 3 day(s).    Specialty: Family Medicine  Contact information:  [address]  [place] [address]  [phone]               Tyree Fleming MD .    Specialty: Gastroenterology  Contact information:  [address]  [facility] [place]note 2[facility] [address]  [phone]                           No future appointments.  Test Results Pending at Discharge:    Pending Labs       Order Current Status    Blood Culture - Blood, Hand, Digit Right Preliminary result    Blood Culture - Blood, Hand, Right Preliminary result               Vivienne Roa MD  02/29/24  14:48 EST    Time: I spent 32 minutes on this discharge activity which included: face-to-face encounter with the patient, reviewing the data in the system, coordination of the care with the nursing staff as well as consultants, documentation, and entering orders.     Dictated utilizing Dragon dictation.

## 2024-02-29 NOTE — PROGRESS NOTES
[facility] Progress Note      Patient Name: Tasha Yarbrough  : 1955  MRN: [id number]  Primary Care Physician:  Terrence Baldwin MD  Date of admission: 2024    Subjective     Interval History:   F/u VIVIENNE    Review of Systems:   Feeling better; no dyspnea or nausea     Objective     Vitals:   Temp:  [97.3 °F (36.3 °C)-98.3 °F (36.8 °C)] 98 °F (36.7 °C)  Heart Rate:  [54-96] 54  Resp:  [18] 18  BP: (121-127)/(73-97) 122/74    Intake/Output Summary (Last 24 hours) at 2024 1251  Last data filed at 2024 0900  Gross per 24 hour   Intake 1560 ml   Output 900 ml   Net 660 ml       Physical Exam:    General Appearance: alert, comfortable on RA  Neck: supple, no JVD  Lungs: CTA bilat no rales  Heart: RRR, normal S1 and S2  Abdomen: soft, nontender, nondistended  Extremities: trace BLE edema, no cyanosis or clubbing  Neuro: normal speech and mental status     Scheduled Meds:     cephalexin, 500 mg, Oral, BID  citalopram, 40 mg, Oral, Daily  famotidine, 40 mg, Oral, Daily  metoprolol tartrate, 50 mg, Oral, Daily  metroNIDAZOLE, 500 mg, Oral, Q8H  mupirocin, , Topical, Q12H  nicotine, 1 patch, Transdermal, Q24H  sodium bicarbonate, 1,300 mg, Oral, BID  sodium chloride, 10 mL, Intravenous, Q12H      IV Meds:   Pharmacy Consult - Pharmacy to dose,         Results Reviewed:   I have personally reviewed the results from the time of this admission to 2024 12:51 EST     Results from last 7 days   Lab Units 24  0633 24  0845 24  0536 24  1850 24  0609   SODIUM mmol/L 144 142 144   < > 149*   POTASSIUM mmol/L 3.7 3.7 3.2*   < > 2.5*   CHLORIDE mmol/L 116* 115* 116*   < > 121*   CO2 mmol/L 17.2* 16.4* 15.5*   < > 12.2*   BUN mg/dL 25* 30* 46*   < > 56*   CREATININE mg/dL 1.50* 1.91* 1.92*   < > 2.68*   CALCIUM mg/dL 8.7 8.6 8.8   < > 8.7   BILIRUBIN mg/dL  --  0.2 0.2  --  0.2   ALK PHOS U/L  --  89 73  --  75   ALT (SGPT) U/L  --  17 14  --  13   AST  (SGOT) U/L  --  31 27  --  33*   GLUCOSE mg/dL 85 98 73   < > 75    < > = values in this interval not displayed.     Estimated Creatinine Clearance: 31.2 mL/min (A) (by C-G formula based on SCr of 1.5 mg/dL (H)).  Results from last 7 days   Lab Units 02/29/24  0633 02/28/24  0845 02/26/24  0609   MAGNESIUM mg/dL  --  1.8 2.0   PHOSPHORUS mg/dL 2.7  --   --          Results from last 7 days   Lab Units 02/29/24  0633 02/28/24  0845 02/27/24  0536 02/26/24  1059 02/26/24  0609 02/25/24  0646   WBC 10*3/mm3 7.38 9.05 9.15  --  10.84* 16.87*   HEMOGLOBIN g/dL 8.3* 8.6* 8.6* 9.2* 8.9* 12.0   PLATELETS 10*3/mm3 150 150 129*  --  149 152           Assessment / Plan     ASSESSMENT:  Non olig VIVIENNE - prerenal azotemia in assoc with acute pancreatitis and vol depletion.  Also on diuretic at home.  Cr slowly improving, now off IVF, 1.5 today (peak 3.3).  CT: non-obs stones.  BL Cr 0.9 as of Sept 2023.  UA with small blood/protein  Acute pancreatitis, resolving, mgmt per primary team, off zosyn, tolerating diet  Hypokalemia - severe, K 2.5 initially, with poor PO intake, nausea, diuretic use, and K shift from bicarb drip; now K up to 3.7  AG metabolic/lactic acidosis - also related to VIVIENNE/uremia, bicarb up slowly to 17, on oral bicarb tabs   Hypernatremia, mild, improved  ? Hx CHF - clarified home dose of lasix is only 20mg daily (not QID as erroneously listed, d/w son by phone who looked at pill bottles at home)  Anemia, hgb down to 8.3, some IVF dilution    PLAN:  Dec nahco3 650 BID and continue this at discharge for now  Resume lasix 20mg daily at discharge  Ok with discharge today; will arrange follow up 1-2 weeks with Dr Jim Ch MD  02/29/24  12:51 EST    [facility]  [phone]

## 2024-02-29 NOTE — CASE MANAGEMENT/SOCIAL WORK
Continued Stay Note  ADDIS Britton     Patient Name: Tasha Yarbrough  MRN: [id number]  Today's Date: 2/29/2024    Admit Date: 2/24/2024    Plan: DCP   Discharge Plan       Row Name 02/29/24 1411       Plan    Plan Comments Spoke to pt  regarding Home Health she is declining  Home Health                   Discharge Codes    No documentation.                 Expected Discharge Date and Time       Expected Discharge Date Expected Discharge Time    Mar 1, 2024               Deborah Montes RN

## 2024-03-01 ENCOUNTER — READMISSION MANAGEMENT (OUTPATIENT)
Dept: CALL CENTER | Facility: HOSPITAL | Age: 69
End: 2024-03-01
Payer: MEDICARE

## 2024-03-01 LAB
BACTERIA SPEC AEROBE CULT: NORMAL
BACTERIA SPEC AEROBE CULT: NORMAL

## 2024-03-01 NOTE — PAYOR COMM NOTE
"To:  [place]  From: Mia Stallworth RN  Phone: [phone]  Fax: [phone]  NPI: [id number]  TIN: [id number]  Member ID: [id number]  MRN: [id number]    Jaymie Yarbrough (68 y.o. Female)       Date of Birth   1955    Social Security Number       Address   [address] [address]    Home Phone   [phone]    MRN   [id number]       Zoroastrianism   None    Marital Status                               Admission Date   24    Admission Type   Emergency    Admitting Provider   Kerley, Brian Joseph, DO    Attending Provider       Department, Room/Bed   [facility] TELEMETRY 3, 319/1       Discharge Date   2024    Discharge Disposition   Home-Health Care [facility]    Discharge Destination                                 Attending Provider: (none)   Allergies: Ciprofloxacin, Codeine, Pineapple    Isolation: None   Infection: Other (24)   Code Status: Prior    Ht: 157.5 cm (62\")   Wt: 62.7 kg (138 lb 3.7 oz)    Admission Cmt: None   Principal Problem: Pancreatitis, acute [K85.90]                   Active Insurance as of 2024       Primary Coverage       Payor Plan Insurance Group Employer/Plan Group    ANTH MEDICARE REPLACEMENT ANTH MEDICARE ADVANTAGE [id number]       Payor Plan Address Payor Plan Phone Number Payor Plan Fax Number Effective Dates    [address] [phone]  2024 - None Entered    [facility] [address]         Subscriber Name Subscriber Birth Date Member ID       JAYMIE YARBROUGH 1955 [id number]                     Emergency Contacts        (Rel.) Home Phone Work Phone Mobile Phone    EAMONANTONIO WORTHINGTON (Son) [phone] -- --    EAMONMONIE (Son) [phone] -- --                 Discharge Summary        Vivienne Roa MD at 24 1447              [facility] HOSPITALIST   DISCHARGE SUMMARY      Name:  Jaymie Yarbrough   Age:  68 y.o.  Sex:  female  :  1955  MRN:  [id number]   Visit Number:  " "[id number]    Admission Date:  2/24/2024  Date of Discharge:  2/29/2024  Primary Care Physician:  Terrence Baldwin MD    Important issues to note:    -NAHCO3 650 BID and lasix 20mg daily at discharge per nephrology recommendations  -Discharged on Keflex and Flagyl  -Decreased famotidine to 40 mg daily  -Follow-up with Dr. Fernandez in 1 week  -Follow-up with PCP    Discharge Diagnoses:     Acute kidney injury, POA  Acute pancreatitis, POA  Acute metabolic encephalopathy, POA  Metabolic lactic acidosis, POA  Severe sepsis, unknown source of infection, POA  Hypertension  Impaired mobility and ADLs    Problem List:     Active Hospital Problems    Diagnosis  POA    **Pancreatitis, acute [K85.90]  Yes      Resolved Hospital Problems   No resolved problems to display.     Presenting Problem:    Chief Complaint   Patient presents with    Shortness of Breath      Consults:     Consulting Physician(s)         Provider   Role Specialty     Xavi Caballero MD  Consulting Physician Nephrology          Procedures Performed:        History of presenting illness/Hospital Course:    Tasha Yarbrough is a 68 year old female with a past medical history of hypertension and impaired mobility and ADLs, who presented to the ER with a chief complaint of shortness of breath and abdominal pain.  At time of presentation currently awake and alert but appears to be confused, she was reporting abdominal pain and was able to tell me her name and date of birth but was confused about her situation and kept repeating \"I am here for colitis diagnosis\".  Per ER report patient has been sick for the past 5 to 6 days with cough and difficulty breathing with symptoms worsening over the past 2 days.  Patient has been refusing to come to the ER until the day of admission where she became confused and the son called EMS. Patient had COVID-19 approximately 2 or 3 weeks ago but seem to improve prior to this week.  history otherwise limited due to patient mental " status and disorientation.  No family at bedside.  Patient was previously admitted here back in September 2023 when she was treated for C. difficile colitis.     On ER evaluation, Patient was hypertensive with a blood pressure of 158/110, afebrile on room air.  Her workup was significant for an ABG with a pH of 6.886/pCO2 11.8/pO2 136/HCO3 2.2/saturation 98% on room air.  proBNP 2545, CO2 3.1, anion gap 23.9, creatinine 3.31, BUN 63, glucose 126, lactate 2.3, lipase above 3000, Pro-Miguel 0.44, WBC 28.7.  Respiratory panel negative.  Chest x-ray with chronic changes per my read.  CT abdomen pelvis without contrast showed Mild acute pancreatitis with retroperitoneal effusion. No abscess. Nonobstructing bilateral renal stones. Moderate-large hiatal hernia.  Patient received dexamethasone 10 mg, DuoNeb, Zofran, 2 L of normal saline boluses and was started on Vanco and Zosyn in addition to sodium bicarb in the ED.  Hospitalist consulted for admission.     Metabolic acidosis improved with sepsis treatment and bicarb drip.  Encephalopathy appears to have resolved.  Did develop acute anemia, appears to be stable.  No obvious sign of bleed    Acute kidney injury, resolved  -Improving, creatinine down to 1.92 -1.91  -Nephrology consulted, appreciate recommendations.  -Avoid nephrotoxic drugs, holding home Lasix  -Continue IV fluids  -Patient with bilateral nonobstructing renal stones with no hydronephrosis on CT abdomen and pelvis.  -I personally discussed with nephrology on discharge, will keep patient on sodium bicarb 650 mg twice daily, Lasix 20 mg daily and follow-up with Dr. Fernandez in 1 week.     Acute pancreatitis, resolved  -Improved  -Lipase down  -S/p cholecystectomy  -pain control  -Continue IV fluids  -Advanced diet, tolerating p.o. well  -Referral to follow-up with GI as an outpatient for pancreatitis and history of colitis.      Sepsis, Resolved  -Patient met SIRS criteria, in the settings of pancreatitis and VIVIENNE  and systemic acidosis.  -Continued coverage with Vanco and Zosyn and de-escalate as able to  -No pattern of infection on urinalysis  -Blood cultures obt remain negative.  -Procalcitonin improving.  Leukocytosis resolved  -Discussed with pharmacy, patient has received 4 days of Vanco and Zosyn, switched patient's antibiotics at discharge to p.o. Keflex and Flagyl x 3 days to finish 7 days of antibiotics.     Acute encephalopathy, Resolved   -Likely metabolic  -TSH WN   -Mental status improved  -patient is at baseline mental status, AAOx3.      Anemia  -No obvious sign of bleed.   -After initial hemoglobin drop it appears to be stable. Initial value may have been dilutional.    -Iron profile ordered.     Metabolic lactic acidosis   -improvement with sepsis and VIVIENNE treatment    Patient was seen and examined on the day of discharge.  Patient sitting up comfortably on the side of the bed with no distress. Patient at baseline mental status and AAOx3. Patient reports significant improvement and denies any acute complaints.  Patient with no abdominal pain, nausea, vomiting and tolerating p.o. very well.  Patient is very eager to go home and to be with her pets.  Patient feels much better. Vital stable. Patient instructed on management and plan in details.  Discussed medication changes, discharged on antibiotics.  Patient follow-up with Dr. Fernandez in 1 week.  Referral to follow-up with GI as an outpatient. Patient to follow-up with PCP in 2 to 3 days for recheck and continued care. Clear return precautions discussed.  Patient verbalized understanding and agreeable to plan.  All questions were answered to the patient's satisfaction.      Vital Signs:    Temp:  [97.3 °F (36.3 °C)-98.3 °F (36.8 °C)] 98 °F (36.7 °C)  Heart Rate:  [54-96] 54  Resp:  [18] 18  BP: (121-127)/(73-97) 122/74    Physical Exam:    General Appearance:  Alert and cooperative.  No acute distress.   Head:  Atraumatic and normocephalic.   Eyes: Conjunctivae  and sclerae normal, no icterus. No pallor.   Ears:  Ears with no abnormalities noted.   Throat: No oral lesions, no thrush, oral mucosa moist.   Neck: Supple, trachea midline, no thyromegaly.   Back:   No tenderness to palpation.   Lungs:   Breath sounds heard bilaterally equally.  No crackles or wheezing. No Pleural rub or bronchial breathing.   Heart:  Normal S1 and S2, no murmur, no gallop, no rub. No JVD.   Abdomen:   Normal bowel sounds, no masses, no organomegaly. Soft, nontender, nondistended, no rebound tenderness.   Extremities: Supple, no edema, no cyanosis, no clubbing.   Pulses: Pulses palpable bilaterally.   Skin: No bleeding or rash.   Neurologic: Alert and oriented x 3. No facial asymmetry. Moves all four limbs. No tremors.     Pertinent Lab Results:     Results from last 7 days   Lab Units 02/29/24  0633 02/28/24  0845 02/27/24  0536 02/26/24  1850 02/26/24  0609   SODIUM mmol/L 144 142 144   < > 149*   POTASSIUM mmol/L 3.7 3.7 3.2*   < > 2.5*   CHLORIDE mmol/L 116* 115* 116*   < > 121*   CO2 mmol/L 17.2* 16.4* 15.5*   < > 12.2*   BUN mg/dL 25* 30* 46*   < > 56*   CREATININE mg/dL 1.50* 1.91* 1.92*   < > 2.68*   CALCIUM mg/dL 8.7 8.6 8.8   < > 8.7   BILIRUBIN mg/dL  --  0.2 0.2  --  0.2   ALK PHOS U/L  --  89 73  --  75   ALT (SGPT) U/L  --  17 14  --  13   AST (SGOT) U/L  --  31 27  --  33*   GLUCOSE mg/dL 85 98 73   < > 75    < > = values in this interval not displayed.     Results from last 7 days   Lab Units 02/29/24  0633 02/28/24  0845 02/27/24  0536   WBC 10*3/mm3 7.38 9.05 9.15   HEMOGLOBIN g/dL 8.3* 8.6* 8.6*   HEMATOCRIT % 25.1* 25.5* 25.3*   PLATELETS 10*3/mm3 150 150 129*             Results from last 7 days   Lab Units 02/24/24  2354   PROBNP pg/mL 2,545.0*         Results from last 7 days   Lab Units 02/28/24  0845   LIPASE U/L 205*     Results from last 7 days   Lab Units 02/27/24  0522   PH, ARTERIAL pH units 7.305*   PO2 ART mm Hg 90.6   PCO2, ARTERIAL mm Hg 30.1*   HCO3 ART mmol/L  15.0*     Results from last 7 days   Lab Units 02/25/24  0049 02/25/24  0043   BLOODCX  No growth at 4 days No growth at 4 days       Pertinent Radiology Results:    Imaging Results (All)       Procedure Component Value Units Date/Time    XR Chest 1 View [[id number]] Collected: 02/25/24 0759     Updated: 02/25/24 0804    Narrative:      PROCEDURE: XR CHEST 1 VW-     HISTORY: Shortness of air     COMPARISON: August 18, 2022.     FINDINGS: Calcified tortuous aorta. There is evidence of calcified  granulomatous disease. The heart is normal in size. The mediastinum is  unremarkable. Prominent interstitial markings may be chronic.  Questionable patchy opacities in the right supra and infrahilar regions  may represent infiltrate versus atelectasis. There is no pneumothorax.   There are no acute osseous abnormalities.       Impression:      Questionable patchy opacities in the right supra and  infrahilar regions may represent infiltrate versus atelectasis.      Continued followup is recommended.           This report was signed and finalized on 2/25/2024 8:02 AM by Modesto Tejeda DO.       CT Abdomen Pelvis Without Contrast [[id number]] Collected: 02/25/24 0122     Updated: 02/25/24 0124    Narrative:      FINAL REPORT    TECHNIQUE:  null    CLINICAL HISTORY:  diffuse Abdominal pain, sepsis, confusion, SOA; family states  symptoms began 5-6 days ago    COMPARISON:  null    FINDINGS:  CT abdomen and pelvis without contrast    Comparison: CT/SR - CT ABDOMEN PELVIS W CONTRAST - 09/22/2023 08:48 PM EDT    Findings:    No consolidation or effusion.    Liver and adrenal glands are unremarkable.    Calcified granulomas in the spleen. Gallbladder is absent.    Peripancreatic stranding with retroperitoneal effusion.    Both kidneys are similar in size without hydronephrosis.    Small nonobstructing bilateral renal stones. No ureteral calculi.    Hypodense bilateral renal cysts.    Moderate-large hiatal hernia.    No bowel obstruction,  pneumoperitoneum, or pneumatosis. The appendix is not seen.    Calcified plaque in the nonaneurysmal aorta and iliac arteries.    There are no enlarged abdominal or pelvic lymph nodes.    Distended bladder without wall thickening. Uterus is unremarkable.    Stable hypodense 2.3 x 2.1 cm right ovarian cyst.    Degenerative dextroscoliosis of the thoracolumbar spine with chronic L2 compression deformity.      Impression:      IMPRESSION:    1. Mild acute pancreatitis with retroperitoneal effusion. No abscess.    2. Nonobstructing bilateral renal stones.    3. Moderate-large hiatal hernia.    Authenticated and Electronically Signed by Yonathan Coles MD on  02/25/2024 01:22:17 AM            Echo:      Condition on Discharge:      Stable.    Code status during the hospital stay:    Code Status and Medical Interventions:   Ordered at: 02/25/24 0407     Code Status (Patient has no pulse and is not breathing):    CPR (Attempt to Resuscitate)     Medical Interventions (Patient has pulse or is breathing):    Full Support     Discharge Disposition:    [place] [facility]    Discharge Medications:       Discharge Medications        New Medications        Instructions Start Date   cephalexin 500 MG capsule  Commonly known as: KEFLEX   500 mg, Oral, 2 Times Daily      metroNIDAZOLE 500 MG tablet  Commonly known as: FLAGYL   500 mg, Oral, Every 8 Hours Scheduled      nicotine 21 MG/24HR patch  Commonly known as: NICODERM CQ   1 patch, Transdermal, Every 24 Hours             Changes to Medications        Instructions Start Date   famotidine 40 MG tablet  Commonly known as: PEPCID  What changed:   medication strength  when to take this   40 mg, Oral, Daily   Start Date: March 1, 2024     furosemide 20 MG tablet  Commonly known as: LASIX  What changed:   when to take this  additional instructions   20 mg, Oral, Daily   Start Date: March 1, 2024            Continue These Medications        Instructions Start Date   citalopram 40 MG  tablet  Commonly known as: CeleXA   1 tablet, Oral, Daily      glucosamine-chondroitin 500-400 MG capsule capsule   1 capsule, Oral, 2 Times Daily With Meals      HYDROcodone-acetaminophen 7.5-325 MG per tablet  Commonly known as: NORCO   1 tablet, Oral, Every 8 Hours PRN      methocarbamol 750 MG tablet  Commonly known as: ROBAXIN   1-2 tablets, Oral, 3 Times Daily PRN, Take one to two tablets three times daily as needed      metoprolol tartrate 50 MG tablet  Commonly known as: LOPRESSOR   50 mg, Oral, Daily      ondansetron 8 MG tablet  Commonly known as: ZOFRAN   8 mg, Oral, Every 8 Hours PRN      Florastor 250 MG capsule  Generic drug: saccharomyces boulardii   250 mg, Oral, 2 Times Daily, Given 9-18-23 from [facility] [place], [place]      saccharomyces boulardii 250 MG capsule  Commonly known as: Florastor   250 mg, Oral, 2 Times Daily      sodium bicarbonate 650 MG tablet   650 mg, Oral, 2 Times Daily      vitamin D3 125 MCG (5000 UT) capsule capsule   1 capsule, Oral, Daily             Discharge Diet:   Cardiac    Activity at Discharge:   As tolerated    Follow-up Appointments:    Additional Instructions for the Follow-ups that You Need to Schedule       Ambulatory Referral to Home Health (Hospital)   As directed      Face to Face Visit Date: 2/29/2024   Follow-up provider for Plan of Care?: I treated the patient in an acute care facility and will not continue treatment after discharge.   Follow-up provider: CHRISTIANO DANIELS [[id number]]   Reason/Clinical Findings: Debility, VIVIENNE, pancreatitis   Describe mobility limitations that make leaving home difficult: Debility, VIVIENNE, pancreatitis   Nursing/Therapeutic Services Requested: Skilled Nursing Physical Therapy Occupational Therapy   Skilled nursing orders: Medication education   PT orders: Transfer training Strengthening   Occupational orders: Activities of daily living Strengthening   Frequency: 1 Week 1               Follow-up Information       Nito Fernandez MD, FASN  Follow up in 1 week(s).    Specialty: Nephrology  Contact information:  [address]  [place] [address]  [phone]               Terrence Baldwin MD Follow up in 3 day(s).    Specialty: Family Medicine  Contact information:  [address]  [facility] [address]  [phone]               Tyree Fleming MD .    Specialty: Gastroenterology  Contact information:  [address]  [facility] [address]  [phone]                           No future appointments.  Test Results Pending at Discharge:    Pending Labs       Order Current Status    Blood Culture - Blood, Hand, Digit Right Preliminary result    Blood Culture - Blood, Hand, Right Preliminary result               Vivienne Roa MD  02/29/24  14:48 EST    Time: I spent 32 minutes on this discharge activity which included: face-to-face encounter with the patient, reviewing the data in the system, coordination of the care with the nursing staff as well as consultants, documentation, and entering orders.     Dictated utilizing Dragon dictation.      Electronically signed by Vivienne Roa MD at 02/29/24 0656

## 2024-03-01 NOTE — OUTREACH NOTE
Prep Survey      Flowsheet Row Responses   [facility]? [facility]   Is LACE score < 7 ? No   Eligibility Readm Mgmt   Discharge diagnosis Pancreatitis, acute   Does the patient have one of the following disease processes/diagnoses(primary or secondary)? Other   Does the patient have Home health ordered? No   Is there a DME ordered? No   Prep survey completed? Yes            EDWIN VIEYRA - Registered Nurse

## 2024-03-01 NOTE — OUTREACH NOTE
Medical Week 1 Survey      Flowsheet Row Responses   [facility] [place] patient discharged from? [facility]   Does the patient have one of the following disease processes/diagnoses(primary or secondary)? Other   Week 1 attempt successful? No   Unsuccessful attempts Attempt 1            Graciela Enriquez Registered Nurse

## 2024-03-02 ENCOUNTER — READMISSION MANAGEMENT (OUTPATIENT)
Dept: CALL CENTER | Facility: HOSPITAL | Age: 69
End: 2024-03-02
Payer: MEDICARE

## 2024-03-02 ENCOUNTER — APPOINTMENT (OUTPATIENT)
Dept: CT IMAGING | Facility: HOSPITAL | Age: 69
DRG: 439 | End: 2024-03-02
Payer: MEDICARE

## 2024-03-02 ENCOUNTER — HOSPITAL ENCOUNTER (INPATIENT)
Facility: HOSPITAL | Age: 69
LOS: 2 days | Discharge: HOME | DRG: 439 | End: 2024-03-04
Attending: STUDENT IN AN ORGANIZED HEALTH CARE EDUCATION/TRAINING PROGRAM | Admitting: INTERNAL MEDICINE
Payer: MEDICARE

## 2024-03-02 DIAGNOSIS — K86.1 CHRONIC PANCREATITIS, UNSPECIFIED PANCREATITIS TYPE: Primary | ICD-10-CM

## 2024-03-02 DIAGNOSIS — R74.8 ELEVATED LIPASE: ICD-10-CM

## 2024-03-02 DIAGNOSIS — D72.829 LEUKOCYTOSIS, UNSPECIFIED TYPE: ICD-10-CM

## 2024-03-02 DIAGNOSIS — K86.89 ACUTE PANCREATIC FLUID COLLECTION: ICD-10-CM

## 2024-03-02 DIAGNOSIS — R10.13 EPIGASTRIC PAIN: ICD-10-CM

## 2024-03-02 DIAGNOSIS — K85.00 IDIOPATHIC ACUTE PANCREATITIS, UNSPECIFIED COMPLICATION STATUS: ICD-10-CM

## 2024-03-02 DIAGNOSIS — K85.00 IDIOPATHIC ACUTE PANCREATITIS WITHOUT INFECTION OR NECROSIS: ICD-10-CM

## 2024-03-02 PROBLEM — K85.90 ACUTE PANCREATITIS: Status: ACTIVE | Noted: 2024-03-02

## 2024-03-02 PROBLEM — N18.30 CHRONIC KIDNEY DISEASE, STAGE III (MODERATE): Status: ACTIVE | Noted: 2024-03-02

## 2024-03-02 LAB
ALBUMIN SERPL-MCNC: 3 G/DL (ref 3.5–5.2)
ALBUMIN/GLOB SERPL: 1 G/DL
ALP SERPL-CCNC: 101 U/L (ref 39–117)
ALT SERPL W P-5'-P-CCNC: 23 U/L (ref 1–33)
ANION GAP SERPL CALCULATED.3IONS-SCNC: 13 MMOL/L (ref 5–15)
AST SERPL-CCNC: 23 U/L (ref 1–32)
BACTERIA UR QL AUTO: ABNORMAL /HPF
BASOPHILS # BLD AUTO: 0.04 10*3/MM3 (ref 0–0.2)
BASOPHILS NFR BLD AUTO: 0.3 % (ref 0–1.5)
BILIRUB SERPL-MCNC: <0.2 MG/DL (ref 0–1.2)
BILIRUB UR QL STRIP: NEGATIVE
BUN SERPL-MCNC: 17 MG/DL (ref 8–23)
BUN/CREAT SERPL: 11.2 (ref 7–25)
CALCIUM SPEC-SCNC: 8.9 MG/DL (ref 8.6–10.5)
CHLORIDE SERPL-SCNC: 111 MMOL/L (ref 98–107)
CLARITY UR: CLEAR
CO2 SERPL-SCNC: 20 MMOL/L (ref 22–29)
COLOR UR: YELLOW
CREAT SERPL-MCNC: 1.52 MG/DL (ref 0.57–1)
D-LACTATE SERPL-SCNC: 1.6 MMOL/L (ref 0.5–2)
DEPRECATED RDW RBC AUTO: 59.1 FL (ref 37–54)
EGFRCR SERPLBLD CKD-EPI 2021: 37.2 ML/MIN/1.73
EOSINOPHIL # BLD AUTO: 0.2 10*3/MM3 (ref 0–0.4)
EOSINOPHIL NFR BLD AUTO: 1.5 % (ref 0.3–6.2)
EOSINOPHIL SPEC QL MICRO: 0 % EOS/100 CELLS (ref 0–0)
ERYTHROCYTE [DISTWIDTH] IN BLOOD BY AUTOMATED COUNT: 14.8 % (ref 12.3–15.4)
GLOBULIN UR ELPH-MCNC: 2.9 GM/DL
GLUCOSE SERPL-MCNC: 89 MG/DL (ref 65–99)
GLUCOSE UR STRIP-MCNC: NEGATIVE MG/DL
HCT VFR BLD AUTO: 28.6 % (ref 34–46.6)
HGB BLD-MCNC: 9.5 G/DL (ref 12–15.9)
HGB UR QL STRIP.AUTO: NEGATIVE
HYALINE CASTS UR QL AUTO: ABNORMAL /LPF
HYPOCHROMIA BLD QL: NORMAL
IMM GRANULOCYTES # BLD AUTO: 0.14 10*3/MM3 (ref 0–0.05)
IMM GRANULOCYTES NFR BLD AUTO: 1.1 % (ref 0–0.5)
KETONES UR QL STRIP: NEGATIVE
LEUKOCYTE ESTERASE UR QL STRIP.AUTO: NEGATIVE
LIPASE SERPL-CCNC: 1026 U/L (ref 13–60)
LYMPHOCYTES # BLD AUTO: 0.95 10*3/MM3 (ref 0.7–3.1)
LYMPHOCYTES NFR BLD AUTO: 7.3 % (ref 19.6–45.3)
MACROCYTES BLD QL SMEAR: NORMAL
MCH RBC QN AUTO: 36 PG (ref 26.6–33)
MCHC RBC AUTO-ENTMCNC: 33.2 G/DL (ref 31.5–35.7)
MCV RBC AUTO: 108.3 FL (ref 79–97)
MONOCYTES # BLD AUTO: 1.15 10*3/MM3 (ref 0.1–0.9)
MONOCYTES NFR BLD AUTO: 8.8 % (ref 5–12)
NEUTROPHILS NFR BLD AUTO: 10.57 10*3/MM3 (ref 1.7–7)
NEUTROPHILS NFR BLD AUTO: 81 % (ref 42.7–76)
NITRITE UR QL STRIP: NEGATIVE
NRBC BLD AUTO-RTO: 0 /100 WBC (ref 0–0.2)
PH UR STRIP.AUTO: 6 [PH] (ref 5–8)
PLAT MORPH BLD: NORMAL
PLATELET # BLD AUTO: 190 10*3/MM3 (ref 140–450)
PMV BLD AUTO: 10.9 FL (ref 6–12)
POTASSIUM SERPL-SCNC: 4 MMOL/L (ref 3.5–5.2)
PROT SERPL-MCNC: 5.9 G/DL (ref 6–8.5)
PROT UR QL STRIP: ABNORMAL
RBC # BLD AUTO: 2.64 10*6/MM3 (ref 3.77–5.28)
RBC # UR STRIP: ABNORMAL /HPF
REF LAB TEST METHOD: ABNORMAL
SODIUM SERPL-SCNC: 144 MMOL/L (ref 136–145)
SP GR UR STRIP: 1.02 (ref 1–1.03)
SQUAMOUS #/AREA URNS HPF: ABNORMAL /HPF
UROBILINOGEN UR QL STRIP: ABNORMAL
WBC # UR STRIP: ABNORMAL /HPF
WBC MORPH BLD: NORMAL
WBC NRBC COR # BLD AUTO: 13.05 10*3/MM3 (ref 3.4–10.8)

## 2024-03-02 PROCEDURE — 74176 CT ABD & PELVIS W/O CONTRAST: CPT

## 2024-03-02 PROCEDURE — 83690 ASSAY OF LIPASE: CPT | Performed by: STUDENT IN AN ORGANIZED HEALTH CARE EDUCATION/TRAINING PROGRAM

## 2024-03-02 PROCEDURE — 25810000003 SODIUM CHLORIDE 0.9 % SOLUTION: Performed by: STUDENT IN AN ORGANIZED HEALTH CARE EDUCATION/TRAINING PROGRAM

## 2024-03-02 PROCEDURE — 85025 COMPLETE CBC W/AUTO DIFF WBC: CPT | Performed by: STUDENT IN AN ORGANIZED HEALTH CARE EDUCATION/TRAINING PROGRAM

## 2024-03-02 PROCEDURE — 83605 ASSAY OF LACTIC ACID: CPT | Performed by: STUDENT IN AN ORGANIZED HEALTH CARE EDUCATION/TRAINING PROGRAM

## 2024-03-02 PROCEDURE — 25010000002 ONDANSETRON PER 1 MG: Performed by: STUDENT IN AN ORGANIZED HEALTH CARE EDUCATION/TRAINING PROGRAM

## 2024-03-02 PROCEDURE — 99285 EMERGENCY DEPT VISIT HI MDM: CPT

## 2024-03-02 PROCEDURE — 74177 CT ABD & PELVIS W/CONTRAST: CPT

## 2024-03-02 PROCEDURE — 99222 1ST HOSP IP/OBS MODERATE 55: CPT | Performed by: INTERNAL MEDICINE

## 2024-03-02 PROCEDURE — 87205 SMEAR GRAM STAIN: CPT | Performed by: INTERNAL MEDICINE

## 2024-03-02 PROCEDURE — 80053 COMPREHEN METABOLIC PANEL: CPT | Performed by: STUDENT IN AN ORGANIZED HEALTH CARE EDUCATION/TRAINING PROGRAM

## 2024-03-02 PROCEDURE — 25010000002 MORPHINE PER 10 MG: Performed by: STUDENT IN AN ORGANIZED HEALTH CARE EDUCATION/TRAINING PROGRAM

## 2024-03-02 PROCEDURE — 25810000003 SODIUM CHLORIDE 0.9 % SOLUTION: Performed by: INTERNAL MEDICINE

## 2024-03-02 PROCEDURE — 85007 BL SMEAR W/DIFF WBC COUNT: CPT | Performed by: STUDENT IN AN ORGANIZED HEALTH CARE EDUCATION/TRAINING PROGRAM

## 2024-03-02 PROCEDURE — 81001 URINALYSIS AUTO W/SCOPE: CPT | Performed by: STUDENT IN AN ORGANIZED HEALTH CARE EDUCATION/TRAINING PROGRAM

## 2024-03-02 PROCEDURE — 25510000001 IOPAMIDOL 61 % SOLUTION: Performed by: STUDENT IN AN ORGANIZED HEALTH CARE EDUCATION/TRAINING PROGRAM

## 2024-03-02 RX ORDER — HYDROCODONE BITARTRATE AND ACETAMINOPHEN 7.5; 325 MG/1; MG/1
1 TABLET ORAL EVERY 8 HOURS PRN
Status: DISCONTINUED | OUTPATIENT
Start: 2024-03-02 | End: 2024-03-04 | Stop reason: HOSPADM

## 2024-03-02 RX ORDER — SODIUM CHLORIDE 9 MG/ML
100 INJECTION, SOLUTION INTRAVENOUS CONTINUOUS
Status: ACTIVE | OUTPATIENT
Start: 2024-03-02 | End: 2024-03-03

## 2024-03-02 RX ORDER — SODIUM CHLORIDE 0.9 % (FLUSH) 0.9 %
10 SYRINGE (ML) INJECTION AS NEEDED
Status: DISCONTINUED | OUTPATIENT
Start: 2024-03-02 | End: 2024-03-04 | Stop reason: HOSPADM

## 2024-03-02 RX ORDER — FAMOTIDINE 20 MG/1
40 TABLET, FILM COATED ORAL DAILY
Status: DISCONTINUED | OUTPATIENT
Start: 2024-03-02 | End: 2024-03-04 | Stop reason: HOSPADM

## 2024-03-02 RX ORDER — ACETAMINOPHEN 325 MG/1
650 TABLET ORAL EVERY 4 HOURS PRN
Status: DISCONTINUED | OUTPATIENT
Start: 2024-03-02 | End: 2024-03-04 | Stop reason: HOSPADM

## 2024-03-02 RX ORDER — MORPHINE SULFATE 2 MG/ML
2 INJECTION, SOLUTION INTRAMUSCULAR; INTRAVENOUS ONCE
Status: COMPLETED | OUTPATIENT
Start: 2024-03-02 | End: 2024-03-02

## 2024-03-02 RX ORDER — CALCIUM CARBONATE 500 MG/1
2 TABLET, CHEWABLE ORAL 3 TIMES DAILY PRN
Status: DISCONTINUED | OUTPATIENT
Start: 2024-03-02 | End: 2024-03-04 | Stop reason: HOSPADM

## 2024-03-02 RX ORDER — MORPHINE SULFATE 2 MG/ML
2 INJECTION, SOLUTION INTRAMUSCULAR; INTRAVENOUS
Status: ACTIVE | OUTPATIENT
Start: 2024-03-02 | End: 2024-03-03

## 2024-03-02 RX ORDER — ACETAMINOPHEN 160 MG/5ML
650 SOLUTION ORAL EVERY 4 HOURS PRN
Status: DISCONTINUED | OUTPATIENT
Start: 2024-03-02 | End: 2024-03-04 | Stop reason: HOSPADM

## 2024-03-02 RX ORDER — NITROGLYCERIN 0.4 MG/1
0.4 TABLET SUBLINGUAL
Status: DISCONTINUED | OUTPATIENT
Start: 2024-03-02 | End: 2024-03-04 | Stop reason: HOSPADM

## 2024-03-02 RX ORDER — ONDANSETRON 2 MG/ML
4 INJECTION INTRAMUSCULAR; INTRAVENOUS EVERY 6 HOURS PRN
Status: DISCONTINUED | OUTPATIENT
Start: 2024-03-02 | End: 2024-03-04 | Stop reason: HOSPADM

## 2024-03-02 RX ORDER — CITALOPRAM 20 MG/1
20 TABLET ORAL DAILY
Status: DISCONTINUED | OUTPATIENT
Start: 2024-03-02 | End: 2024-03-04 | Stop reason: HOSPADM

## 2024-03-02 RX ORDER — METOPROLOL TARTRATE 50 MG/1
50 TABLET, FILM COATED ORAL DAILY
Status: DISCONTINUED | OUTPATIENT
Start: 2024-03-02 | End: 2024-03-04 | Stop reason: HOSPADM

## 2024-03-02 RX ORDER — BISACODYL 10 MG
10 SUPPOSITORY, RECTAL RECTAL DAILY PRN
Status: DISCONTINUED | OUTPATIENT
Start: 2024-03-02 | End: 2024-03-04 | Stop reason: HOSPADM

## 2024-03-02 RX ORDER — CEPHALEXIN 250 MG/1
500 CAPSULE ORAL 2 TIMES DAILY
Status: COMPLETED | OUTPATIENT
Start: 2024-03-02 | End: 2024-03-03

## 2024-03-02 RX ORDER — FUROSEMIDE 20 MG/1
20 TABLET ORAL DAILY
Status: DISCONTINUED | OUTPATIENT
Start: 2024-03-02 | End: 2024-03-04 | Stop reason: HOSPADM

## 2024-03-02 RX ORDER — NICOTINE 21 MG/24HR
1 PATCH, TRANSDERMAL 24 HOURS TRANSDERMAL EVERY 24 HOURS
Status: DISCONTINUED | OUTPATIENT
Start: 2024-03-02 | End: 2024-03-04 | Stop reason: HOSPADM

## 2024-03-02 RX ORDER — POLYETHYLENE GLYCOL 3350 17 G/17G
17 POWDER, FOR SOLUTION ORAL DAILY PRN
Status: DISCONTINUED | OUTPATIENT
Start: 2024-03-02 | End: 2024-03-04 | Stop reason: HOSPADM

## 2024-03-02 RX ORDER — SODIUM BICARBONATE 650 MG/1
650 TABLET ORAL 2 TIMES DAILY
Status: DISCONTINUED | OUTPATIENT
Start: 2024-03-02 | End: 2024-03-04 | Stop reason: HOSPADM

## 2024-03-02 RX ORDER — BISACODYL 5 MG/1
5 TABLET, DELAYED RELEASE ORAL DAILY PRN
Status: DISCONTINUED | OUTPATIENT
Start: 2024-03-02 | End: 2024-03-04 | Stop reason: HOSPADM

## 2024-03-02 RX ORDER — SODIUM CHLORIDE 9 MG/ML
40 INJECTION, SOLUTION INTRAVENOUS AS NEEDED
Status: DISCONTINUED | OUTPATIENT
Start: 2024-03-02 | End: 2024-03-04 | Stop reason: HOSPADM

## 2024-03-02 RX ORDER — ACETAMINOPHEN 650 MG/1
650 SUPPOSITORY RECTAL EVERY 4 HOURS PRN
Status: DISCONTINUED | OUTPATIENT
Start: 2024-03-02 | End: 2024-03-04 | Stop reason: HOSPADM

## 2024-03-02 RX ORDER — ALUMINA, MAGNESIA, AND SIMETHICONE 2400; 2400; 240 MG/30ML; MG/30ML; MG/30ML
15 SUSPENSION ORAL ONCE
Status: COMPLETED | OUTPATIENT
Start: 2024-03-02 | End: 2024-03-02

## 2024-03-02 RX ORDER — SODIUM CHLORIDE 0.9 % (FLUSH) 0.9 %
10 SYRINGE (ML) INJECTION EVERY 12 HOURS SCHEDULED
Status: DISCONTINUED | OUTPATIENT
Start: 2024-03-02 | End: 2024-03-04 | Stop reason: HOSPADM

## 2024-03-02 RX ORDER — ONDANSETRON 2 MG/ML
4 INJECTION INTRAMUSCULAR; INTRAVENOUS ONCE
Status: COMPLETED | OUTPATIENT
Start: 2024-03-02 | End: 2024-03-02

## 2024-03-02 RX ORDER — NALOXONE HCL 0.4 MG/ML
0.4 VIAL (ML) INJECTION
Status: DISCONTINUED | OUTPATIENT
Start: 2024-03-02 | End: 2024-03-04 | Stop reason: HOSPADM

## 2024-03-02 RX ORDER — AMOXICILLIN 250 MG
2 CAPSULE ORAL 2 TIMES DAILY PRN
Status: DISCONTINUED | OUTPATIENT
Start: 2024-03-02 | End: 2024-03-04 | Stop reason: HOSPADM

## 2024-03-02 RX ORDER — LIDOCAINE HYDROCHLORIDE 20 MG/ML
10 SOLUTION OROPHARYNGEAL ONCE
Status: COMPLETED | OUTPATIENT
Start: 2024-03-02 | End: 2024-03-02

## 2024-03-02 RX ORDER — METRONIDAZOLE 500 MG/1
500 TABLET ORAL EVERY 8 HOURS SCHEDULED
Status: COMPLETED | OUTPATIENT
Start: 2024-03-02 | End: 2024-03-03

## 2024-03-02 RX ADMIN — MUPIROCIN 1 APPLICATION: 20 OINTMENT TOPICAL at 20:09

## 2024-03-02 RX ADMIN — METRONIDAZOLE 500 MG: 500 TABLET ORAL at 15:42

## 2024-03-02 RX ADMIN — CITALOPRAM HYDROBROMIDE 20 MG: 20 TABLET ORAL at 15:42

## 2024-03-02 RX ADMIN — SODIUM CHLORIDE 100 ML/HR: 9 INJECTION, SOLUTION INTRAVENOUS at 15:42

## 2024-03-02 RX ADMIN — IOPAMIDOL 85 ML: 612 INJECTION, SOLUTION INTRAVENOUS at 03:47

## 2024-03-02 RX ADMIN — MUPIROCIN 1 APPLICATION: 20 OINTMENT TOPICAL at 15:42

## 2024-03-02 RX ADMIN — SODIUM CHLORIDE 500 ML: 9 INJECTION, SOLUTION INTRAVENOUS at 04:36

## 2024-03-02 RX ADMIN — ALUMINUM HYDROXIDE, MAGNESIUM HYDROXIDE, AND DIMETHICONE 15 ML: 400; 400; 40 SUSPENSION ORAL at 01:27

## 2024-03-02 RX ADMIN — Medication 1 PATCH: at 15:42

## 2024-03-02 RX ADMIN — SODIUM BICARBONATE 650 MG TABLET 650 MG: at 20:09

## 2024-03-02 RX ADMIN — MORPHINE SULFATE 2 MG: 2 INJECTION, SOLUTION INTRAMUSCULAR; INTRAVENOUS at 04:09

## 2024-03-02 RX ADMIN — FUROSEMIDE 20 MG: 20 TABLET ORAL at 15:42

## 2024-03-02 RX ADMIN — ONDANSETRON 4 MG: 2 INJECTION INTRAMUSCULAR; INTRAVENOUS at 01:27

## 2024-03-02 RX ADMIN — CEPHALEXIN 500 MG: 250 CAPSULE ORAL at 20:09

## 2024-03-02 RX ADMIN — LIDOCAINE HYDROCHLORIDE 10 ML: 20 SOLUTION ORAL; TOPICAL at 01:27

## 2024-03-02 RX ADMIN — METRONIDAZOLE 500 MG: 500 TABLET ORAL at 21:23

## 2024-03-02 RX ADMIN — METOPROLOL TARTRATE 50 MG: 50 TABLET ORAL at 15:42

## 2024-03-02 RX ADMIN — SODIUM CHLORIDE 1000 ML: 9 INJECTION, SOLUTION INTRAVENOUS at 00:59

## 2024-03-02 RX ADMIN — FAMOTIDINE 40 MG: 20 TABLET, FILM COATED ORAL at 15:42

## 2024-03-02 RX ADMIN — Medication 10 ML: at 20:11

## 2024-03-02 NOTE — PAYOR COMM NOTE
"Jaymie Yarbrough (68 y.o. Female)       Date of Birth   1955    Social Security Number       Address   [address] [id number]    Home Phone   [phone]    MRN   [id number]       Scientology   None    Marital Status                               Admission Date   3/2/24    Admission Type   Emergency    Admitting Provider   Edwin Aguilar MD    Attending Provider   Edwin Aguilar MD    Department, Room/Bed   [facility] TELEMETRY 3, 322/       Discharge Date       Discharge Disposition       Discharge Destination                                 Attending Provider: Edwin Aguilar MD    Allergies: Ciprofloxacin, Codeine, Pineapple    Isolation: None   Infection: Other (24)   Code Status: CPR    Ht: 157.5 cm (62\")   Wt: 62.6 kg (138 lb)    Admission Cmt: None   Principal Problem: Pancreatitis, acute [K85.90]                   Active Insurance as of 3/2/2024       Primary Coverage       Payor Plan Insurance Group Employer/Plan Group    ANTHEM MEDICARE REPLACEMENT ANTH MEDICARE ADVANTAGE [id number]       Payor Plan Address Payor Plan Phone Number Payor Plan Fax Number Effective Dates    [address] [phone]  2024 - None Entered    [facility] [id number]         Subscriber Name Subscriber Birth Date Member ID       JAYMIE YARBROUGH 1955 [id number]                     Emergency Contacts        (Rel.) Home Phone Work Phone Mobile Phone    ANTONIO YARBROUGH (Son) [phone] -- --    MONIE YARBROUGH (Son) [phone] -- --                 History & Physical        Addi Braden MD at [address] [place] [address] MEDICINE   HISTORY AND PHYSICAL      Name:  Jaymie Yarbrough   Age:  68 y.o.  Sex:  female  :  1955  MRN:  [id number]   Visit Number:  [id number]  Admission Date:  3/2/2024  Date Of Service:  24  Primary Care Physician:  Terrence Baldwin MD     Admitting diagnosis:      Pancreatitis, acute    Gastroesophageal " reflux disease with esophagitis    Anemia, chronic disease    Chronic kidney disease, stage III (moderate)        History Of Presenting Illness:      68-year-old female with hypertension, chronic kidney disease, recently discharged from the hospital last week because of acute pancreatitis and sepsis.  She was treated for acute pancreatitis discharged and in 48 hours returns back to ER because of abdominal pain.  She started eating and had fish and her pain got worse.  In the ER workup done with her lipase upon discharge which was 200 went up to thousand and patient was having epigastric significant pain.  CT scan of the abdomen has been done 2 times and was noted to have possible development of early pseudocyst.  She was not a candidate for any surgical intervention and no bed available at [facility] so she has been recommended to be admitted here for conservative management.    At present patient denies any nausea but has significant pain.  Pain medication has been given.  She has been thirsty and wanting to drink something.  Besides that there has been no shortness of breath or any respiratory symptoms.  She is alert oriented x 3 and unlike her prior admission when she was encephalopathic and septic she is hemodynamically stable and no signs of sepsis.    Review Of Systems:     The following systems were reviewed and negative;  constitution, eyes, ENT, respiratory, cardiovascular, gastrointestinal, genitourinary, musculoskeletal, neurological and behavioral/psych,  Skin except as above.     Past Medical History:    Past Medical History:   Diagnosis Date    Hypertension     Impaired mobility     Injury of back        Past Surgical history:    Past Surgical History:   Procedure Laterality Date    ABDOMINAL SURGERY      COLON SURGERY      COLONOSCOPY      TUBAL ABDOMINAL LIGATION         Social History:    Social History     Socioeconomic History    Marital status:    Tobacco Use    Smoking status: Every Day      Current packs/day: 1.00     Types: Cigarettes    Smokeless tobacco: Never   Vaping Use    Vaping status: Never Used   Substance and Sexual Activity    Alcohol use: Never    Drug use: Never    Sexual activity: Defer       Family History:    History reviewed. No pertinent family history.      Allergies:      Ciprofloxacin, Codeine, and Pineapple    Home Medications:    Prior to Admission Medications       Prescriptions Last Dose Informant Patient Reported? Taking?    cephalexin (KEFLEX) 500 MG capsule   No No    Take 1 capsule by mouth 2 (Two) Times a Day for 3 doses. Indications: Infection Within the Abdomen    citalopram (CeleXA) 40 MG tablet   Yes No    Take 1 tablet by mouth Daily. Indications: Major Depressive Disorder    famotidine (PEPCID) 40 MG tablet   No No    Take 1 tablet by mouth Daily for 30 days. Indications: Heartburn    furosemide (LASIX) 20 MG tablet   No No    Take 1 tablet by mouth Daily for 30 days.    glucosamine-chondroitin 500-400 MG capsule capsule   Yes No    Take 1 capsule by mouth 2 (Two) Times a Day With Meals. Indications: Joint Damage causing Pain and Loss of Function    HYDROcodone-acetaminophen (NORCO) 7.5-325 MG per tablet  Medication Bottle Yes No    Take 1 tablet by mouth Every 8 (Eight) Hours As Needed for Moderate Pain. Indications: Pain    methocarbamol (ROBAXIN) 750 MG tablet   Yes No    Take 1-2 tablets by mouth 3 (Three) Times a Day As Needed for Muscle Spasms. Take one to two tablets three times daily as needed  Indications: Musculoskeletal Pain    metoprolol tartrate (LOPRESSOR) 50 MG tablet   Yes No    Take 50 mg by mouth Daily. Indications: High Blood Pressure Disorder    metroNIDAZOLE (FLAGYL) 500 MG tablet   No No    Take 1 tablet by mouth Every 8 (Eight) Hours for 6 doses. Indications: Infection Within the Abdomen    nicotine (NICODERM CQ) 21 MG/24HR patch   No No    Place 1 patch on the skin as directed by provider Daily for 14 days.    ondansetron (ZOFRAN) 8 MG tablet    Yes No    Take 8 mg by mouth Every 8 (Eight) Hours As Needed for Nausea or Vomiting. Indications: Nausea and Vomiting    saccharomyces boulardii (Florastor) 250 MG capsule   No No    Take 1 capsule by mouth 2 (Two) Times a Day.    Patient not taking:  Reported on 2/27/2024    saccharomyces boulardii (Florastor) 250 MG capsule   Yes No    Take 1 capsule by mouth 2 (Two) Times a Day. Given 9-18-23 from [facility] [place], [place]    sodium bicarbonate 650 MG tablet   No No    Take 1 tablet by mouth 2 (Two) Times a Day for 30 days.    vitamin D3 125 MCG (5000 UT) capsule capsule   Yes No    Take 1 capsule by mouth Daily. Indications: Vitamin D Deficiency                   Vital Signs:    Temp:  [96.8 °F (36 °C)-98.5 °F (36.9 °C)] 96.8 °F (36 °C)  Heart Rate:  [63-78] 63  Resp:  [18] 18  BP: (111-142)/(66-90) 137/90        03/02/24  0057   Weight: 62.6 kg (138 lb)       Body mass index is 25.24 kg/m².    Physical Exam:      General Appearance:    Alert and cooperative,  And lying comfortably in bed   Head:    Atraumatic and normocephalic, without obvious abnormality.   Eyes:            PERRLA, conjunctivae and sclerae normal, no Icterus. No pallor. Extraocular movements are within normal limits.   Ears:    Ears appear intact with no abnormalities noted.   Throat:   No oral lesions, no thrush, oral mucosa moist.   Neck:   Supple, trachea midline, no thyromegaly, no carotid bruit, no lymphadenopathy   Lungs:    Breath sounds heard bilaterally equally.  No crackles or wheezing. No Pleural rub or bronchial breathing.       Heart:    Normal S1 and S2, no murmur, no gallop, no rub. No JVD   Abdomen:     Normal bowel sounds, no masses, no organomegaly. Soft   tenderness over the gastric region nondistended, no guarding, no rebound    tenderness   Extremities:   Moves all extremities well, no edema, no cyanosis, no          clubbing   Skin:   No  bruising or rash   Neurologic:   Cranial nerves 2 - 12 grossly intact, sensation  intact, Motor power is normal and equal bilaterally.       EKG:      Sinus rhythm    Labs:    Lab Results (last 24 hours)       Procedure Component Value Units Date/Time    Lactic Acid, Plasma [[id number]]  (Normal) Collected: 03/02/24 0059    Specimen: Blood Updated: 03/02/24 0444     Lactate 1.6 mmol/L     Urinalysis, Microscopic Only - Urine, Clean Catch [[id number]]  (Abnormal) Collected: 03/02/24 0418    Specimen: Urine, Clean Catch Updated: 03/02/24 0436     RBC, UA None Seen /HPF      WBC, UA 3-5 /HPF      Bacteria, UA 1+ /HPF      Squamous Epithelial Cells, UA 3-6 /HPF      Hyaline Casts, UA None Seen /LPF      Methodology Manual Light Microscopy    Urinalysis With Microscopic If Indicated (No Culture) - Urine, Clean Catch [[id number]]  (Abnormal) Collected: 03/02/24 0418    Specimen: Urine, Clean Catch Updated: 03/02/24 0436     Color, UA Yellow     Appearance, UA Clear     pH, UA 6.0     Specific Gravity, UA 1.023     Glucose, UA Negative     Ketones, UA Negative     Bilirubin, UA Negative     Blood, UA Negative     Protein, UA 30 mg/dL (1+)     Leuk Esterase, UA Negative     Nitrite, UA Negative     Urobilinogen, UA 0.2 E.U./dL    Comprehensive Metabolic Panel [[id number]]  (Abnormal) Collected: 03/02/24 0059    Specimen: Blood Updated: 03/02/24 0231     Glucose 89 mg/dL      BUN 17 mg/dL      Creatinine 1.52 mg/dL      Sodium 144 mmol/L      Potassium 4.0 mmol/L      Comment: Slight hemolysis detected by analyzer. Result may be falsely elevated.        Chloride 111 mmol/L      CO2 20.0 mmol/L      Calcium 8.9 mg/dL      Total Protein 5.9 g/dL      Albumin 3.0 g/dL      ALT (SGPT) 23 U/L      AST (SGOT) 23 U/L      Alkaline Phosphatase 101 U/L      Total Bilirubin <0.2 mg/dL      Globulin 2.9 gm/dL      A/G Ratio 1.0 g/dL      BUN/Creatinine Ratio 11.2     Anion Gap 13.0 mmol/L      eGFR 37.2 mL/min/1.73     Narrative:      GFR Normal >60  Chronic Kidney Disease <60  Kidney Failure <15      Lipase [[id number]]   (Abnormal) Collected: 03/02/24 0059    Specimen: Blood Updated: 03/02/24 0231     Lipase 1,026 U/L     CBC & Differential [[id number]]  (Abnormal) Collected: 03/02/24 0059    Specimen: Blood Updated: 03/02/24 0222    Narrative:      The following orders were created for panel order CBC & Differential.  Procedure                               Abnormality         Status                     ---------                               -----------         ------                     CBC Auto Differential[[id number]]        Abnormal            Final result               Scan Slide[[id number]]                                       Final result                 Please view results for these tests on the individual orders.    CBC Auto Differential [[id number]]  (Abnormal) Collected: 03/02/24 0059    Specimen: Blood Updated: 03/02/24 0222     WBC 13.05 10*3/mm3      RBC 2.64 10*6/mm3      Hemoglobin 9.5 g/dL      Hematocrit 28.6 %      .3 fL      MCH 36.0 pg      MCHC 33.2 g/dL      RDW 14.8 %      RDW-SD 59.1 fl      MPV 10.9 fL      Platelets 190 10*3/mm3      Neutrophil % 81.0 %      Lymphocyte % 7.3 %      Monocyte % 8.8 %      Eosinophil % 1.5 %      Basophil % 0.3 %      Immature Grans % 1.1 %      Neutrophils, Absolute 10.57 10*3/mm3      Lymphocytes, Absolute 0.95 10*3/mm3      Monocytes, Absolute 1.15 10*3/mm3      Eosinophils, Absolute 0.20 10*3/mm3      Basophils, Absolute 0.04 10*3/mm3      Immature Grans, Absolute 0.14 10*3/mm3      nRBC 0.0 /100 WBC     Scan Slide [[id number]] Collected: 03/02/24 0059    Specimen: Blood Updated: 03/02/24 0222     Hypochromia Slight/1+     Macrocytes Slight/1+     WBC Morphology Normal     Platelet Morphology Normal            Radiology:    Imaging Results (Last 72 Hours)       Procedure Component Value Units Date/Time    CT Abdomen Pelvis With Contrast [[id number]] Collected: 03/02/24 0416     Updated: 03/02/24 0418    Narrative:      FINAL REPORT    TECHNIQUE:  null    CLINICAL  HISTORY:  Pancreatitis versus pancreatic fluid collection/abscess, CT  abd/pelvis w/o today    COMPARISON:  null    FINDINGS:  Exam: CT abdomen and pelvis with IV contrast.    Comparison: Comparison: CT/SR - CT ABDOMEN PELVIS WO CONTRAST - 03/02/2024 02:38 AM EST    CT/SR - CT ABDOMEN PELVIS WO CONTRAST - 02/25/2024 12:51 AM EST    Findings:    Small bilateral pleural effusions.    Stable moderate hiatal hernia.    No free air.    No solid liver mass. Stable hepatomegaly.    Cholecystectomy. Stable biliary ductal dilatation likely due to cholecystectomy effects.    No clinically significant biliary ductal dilation.    No splenomegaly or suspicious splenic lesions.    Persistent stranding about the pancreatic tail consistent with acute pancreatitis improved versus the exam of 02/25/2024. 6.1 x 3.0 cm fluid collection along the ventral margin of the pancreatic tail extending to and compressing the greater gastric   curvature proximally most consistent with acute fluid collection related to acute pancreatitis. This is better demonstrated on the current contrast exam. Collection contains an imperceptible wall. This is visible only in retrospect on exam of 02/25/24   when measuring 2.3 x 1.6 cm.    Adrenal glands without nodule.    No suspicious renal mass. No hydronephrosis. No significant stranding. Stable bilateral renal cysts and nonobstructing right renal stone.    Bladder without acute pathology.    Postoperative changes sigmoid colon. Dense colonic fecal retention without obstruction.    No adenopathy.    Atheromatous abdominal aortic calcifications without aneurysm.    No suspicious pelvic masses.    No acute soft tissue pathology.    No acute osseous pathology. Degenerative changes. Lumbar dextroscoliosis.      Impression:      IMPRESSION:    6.1 x 3.0 cm fluid collection along the ventral margin of the pancreatic tail extending to and compressing the greater gastric curvature proximally most consistent with  acute fluid collection related to acute pancreatitis.    This is visible only in retrospect on exam of 02/25/24 when measuring 2.3 x 1.6 cm.    Persistent stranding about the pancreatic tail consistent with acute pancreatitis improved versus the exam of 02/25/2024.    Authenticated and Electronically Signed by Luan Antoine MD  on 03/02/2024 04:16:54 AM    CT Abdomen Pelvis Without Contrast [[id number]] Collected: 03/02/24 0259     Updated: 03/02/24 0301    Narrative:      FINAL REPORT    TECHNIQUE:  null    CLINICAL HISTORY:  Right upper quadrant pain    COMPARISON:  null    FINDINGS:  Exam: CT abdomen and pelvis without IV contrast.    Comparison: CT/SR - CT ABDOMEN PELVIS WO CONTRAST - 02/25/2024 12:51 AM EST    Findings:    Small bilateral pleural effusions.    Stable moderate hiatal hernia.    No free air.    No solid liver mass. Enlarged at 20.8 cm.    Cholecystectomy.    No clinically significant biliary ductal dilation.    No splenomegaly or suspicious splenic lesions.    Stranding about the pancreatic tail again noted consistent with pancreatitis. Oval 6.1 x 3.0 cm fluid collection along the ventral margin of the pancreatic tail may reflect pancreatitis related acute fluid collection, new vs prior, and suboptimally   visualized without contrast.    Adrenal glands without nodule.    No suspicious renal mass. No hydronephrosis. No significant stranding. Stable bilateral renal cysts and nonobstructing right renal stone.    Bladder without acute pathology.    Postoperative changes sigmoid colon. Dense colonic fecal retention without obstruction.    No adenopathy.    Atheromatous abdominal aortic calcifications without aneurysm.    No suspicious pelvic masses.    No acute soft tissue pathology.    No acute osseous pathology. Degenerative changes. Lumbar dextroscoliosis.      Impression:      IMPRESSION:    Oval 6.1 x 3.0 cm fluid collection along the ventral margin of the pancreatic tail may reflect  pancreatitis related acute fluid collection, new vs prior, and suboptimally visualized without contrast.    Stranding about the pancreatic tail again noted consistent with pancreatitis.    Dense colonic fecal retention without obstruction.]    Stable hepatomegaly.    Stable hiatal hernia.    Small bilateral pleural effusions, new.    Authenticated and Electronically Signed by Luan Antoine MD  on 03/02/2024 02:59:21 AM            Assessment:    Assessment & Plan      Pancreatitis, acute    Gastroesophageal reflux disease with esophagitis    Anemia, chronic disease    Chronic kidney disease, stage III (moderate)  Pancreatic pseudocyst      Plan:     1.  Acute pancreatitis with pancreatic pseudocyst-at present she is to be treated conservatively will be admitted.  Start with IV fluids hydration again and only clear liquids.  Pain management and orders have been given   Will do slow advancement of the diet as this seems to be developing into her chronic pancreatitis  GI consult will be done on Monday when services are available    2.  Chronic kidney disease-this seems to be her baseline it is improved from the last few weeks and her creatinine is improved from around 2.5-1.5    3.  Anemia of chronic disease-H&H is stable at present    Chronic pain-at present she will be on morphine and gradually taper it down to hydrocodone for moderate pain      Addi Braden MD  03/02/24  13:52 EST    Please note that portions of this note were completed with a voice recognition program.    Electronically signed by Addi Braden MD at 03/02/24 1403          Emergency Department Notes        Mirela Scott PCT at 03/02/24 0727       Summary:[facility] called at this time for Lefevere. Call transferred.     Electronically signed by Mirela Scott PCT at 03/02/24 0728       Mirela Scott PCT at 03/02/24 0711       Summary:[facility] called at this time for  MD Michael. Michael is not here, so call was transferred to MD Eveline.     Electronically signed by Mirela Scott PCT at 03/02/24 0712       Rachael Mckeon PCT at 03/02/24 0645       Summary: Consult                 [facility] contacted to request GI consult per provider. Awaiting call back at this time.     Electronically signed by Rachael Mckeon PCT at 03/02/24 0647       Rachael Mckeon PCT at 03/02/24 0639       Summary:[facility] Consult                 Dr. Schulz transferred to Dr. Clark at this time.    Electronically signed by Rachael Mckeon PCT at 03/02/24 0640       Mike, Rachael, PCT at 03/02/24 0630       Summary:[facility] Consult                 Called [facility] for update on consult status. Awaiting update at this time.    Electronically signed by Rachael Mckeon PCT at 03/02/24 0631       Rachael Mckeon PCT at 03/02/24 0539       Summary:[facility] Consult                 [facility] contacted at this time to request GI consult. Awaiting call back at this time.     Electronically signed by Rachael Mckeon PCT at 03/02/24 0540       Lab Results (last 24 hours)       Procedure Component Value Units Date/Time    Lactic Acid, Plasma [[id number]]  (Normal) Collected: 03/02/24 0059    Specimen: Blood Updated: 03/02/24 0444     Lactate 1.6 mmol/L     Urinalysis, Microscopic Only - Urine, Clean Catch [[id number]]  (Abnormal) Collected: 03/02/24 0418    Specimen: Urine, Clean Catch Updated: 03/02/24 0436     RBC, UA None Seen /HPF      WBC, UA 3-5 /HPF      Bacteria, UA 1+ /HPF      Squamous Epithelial Cells, UA 3-6 /HPF      Hyaline Casts, UA None Seen /LPF      Methodology Manual Light Microscopy    Urinalysis With Microscopic If Indicated (No Culture) - Urine, Clean Catch [[id number]]  (Abnormal) Collected: 03/02/24 0418    Specimen: Urine, Clean Catch Updated: 03/02/24 0436     Color, UA Yellow     Appearance, UA Clear     pH, UA 6.0     Specific Gravity, UA 1.023     Glucose, UA Negative      Ketones, UA Negative     Bilirubin, UA Negative     Blood, UA Negative     Protein, UA 30 mg/dL (1+)     Leuk Esterase, UA Negative     Nitrite, UA Negative     Urobilinogen, UA 0.2 E.U./dL    Comprehensive Metabolic Panel [[id number]]  (Abnormal) Collected: 03/02/24 0059    Specimen: Blood Updated: 03/02/24 0231     Glucose 89 mg/dL      BUN 17 mg/dL      Creatinine 1.52 mg/dL      Sodium 144 mmol/L      Potassium 4.0 mmol/L      Comment: Slight hemolysis detected by analyzer. Result may be falsely elevated.        Chloride 111 mmol/L      CO2 20.0 mmol/L      Calcium 8.9 mg/dL      Total Protein 5.9 g/dL      Albumin 3.0 g/dL      ALT (SGPT) 23 U/L      AST (SGOT) 23 U/L      Alkaline Phosphatase 101 U/L      Total Bilirubin <0.2 mg/dL      Globulin 2.9 gm/dL      A/G Ratio 1.0 g/dL      BUN/Creatinine Ratio 11.2     Anion Gap 13.0 mmol/L      eGFR 37.2 mL/min/1.73     Narrative:      GFR Normal >60  Chronic Kidney Disease <60  Kidney Failure <15      Lipase [[id number]]  (Abnormal) Collected: 03/02/24 0059    Specimen: Blood Updated: 03/02/24 0231     Lipase 1,026 U/L     CBC & Differential [[id number]]  (Abnormal) Collected: 03/02/24 0059    Specimen: Blood Updated: 03/02/24 0222    Narrative:      The following orders were created for panel order CBC & Differential.  Procedure                               Abnormality         Status                     ---------                               -----------         ------                     CBC Auto Differential[[id number]]        Abnormal            Final result               Scan Slide[[id number]]                                       Final result                 Please view results for these tests on the individual orders.    CBC Auto Differential [[id number]]  (Abnormal) Collected: 03/02/24 0059    Specimen: Blood Updated: 03/02/24 0222     WBC 13.05 10*3/mm3      RBC 2.64 10*6/mm3      Hemoglobin 9.5 g/dL      Hematocrit 28.6 %      .3 fL      MCH 36.0 pg       MCHC 33.2 g/dL      RDW 14.8 %      RDW-SD 59.1 fl      MPV 10.9 fL      Platelets 190 10*3/mm3      Neutrophil % 81.0 %      Lymphocyte % 7.3 %      Monocyte % 8.8 %      Eosinophil % 1.5 %      Basophil % 0.3 %      Immature Grans % 1.1 %      Neutrophils, Absolute 10.57 10*3/mm3      Lymphocytes, Absolute 0.95 10*3/mm3      Monocytes, Absolute 1.15 10*3/mm3      Eosinophils, Absolute 0.20 10*3/mm3      Basophils, Absolute 0.04 10*3/mm3      Immature Grans, Absolute 0.14 10*3/mm3      nRBC 0.0 /100 WBC     Scan Slide [[id number]] Collected: 03/02/24 0059    Specimen: Blood Updated: 03/02/24 0222     Hypochromia Slight/1+     Macrocytes Slight/1+     WBC Morphology Normal     Platelet Morphology Normal          Imaging Results (Last 24 Hours)       Procedure Component Value Units Date/Time    CT Abdomen Pelvis With Contrast [[id number]] Collected: 03/02/24 0416     Updated: 03/02/24 0418    Narrative:      FINAL REPORT    TECHNIQUE:  null    CLINICAL HISTORY:  Pancreatitis versus pancreatic fluid collection/abscess, CT  abd/pelvis w/o today    COMPARISON:  null    FINDINGS:  Exam: CT abdomen and pelvis with IV contrast.    Comparison: Comparison: CT/SR - CT ABDOMEN PELVIS WO CONTRAST - 03/02/2024 02:38 AM EST    CT/SR - CT ABDOMEN PELVIS WO CONTRAST - 02/25/2024 12:51 AM EST    Findings:    Small bilateral pleural effusions.    Stable moderate hiatal hernia.    No free air.    No solid liver mass. Stable hepatomegaly.    Cholecystectomy. Stable biliary ductal dilatation likely due to cholecystectomy effects.    No clinically significant biliary ductal dilation.    No splenomegaly or suspicious splenic lesions.    Persistent stranding about the pancreatic tail consistent with acute pancreatitis improved versus the exam of 02/25/2024. 6.1 x 3.0 cm fluid collection along the ventral margin of the pancreatic tail extending to and compressing the greater gastric   curvature proximally most consistent with acute fluid  collection related to acute pancreatitis. This is better demonstrated on the current contrast exam. Collection contains an imperceptible wall. This is visible only in retrospect on exam of 02/25/24   when measuring 2.3 x 1.6 cm.    Adrenal glands without nodule.    No suspicious renal mass. No hydronephrosis. No significant stranding. Stable bilateral renal cysts and nonobstructing right renal stone.    Bladder without acute pathology.    Postoperative changes sigmoid colon. Dense colonic fecal retention without obstruction.    No adenopathy.    Atheromatous abdominal aortic calcifications without aneurysm.    No suspicious pelvic masses.    No acute soft tissue pathology.    No acute osseous pathology. Degenerative changes. Lumbar dextroscoliosis.      Impression:      IMPRESSION:    6.1 x 3.0 cm fluid collection along the ventral margin of the pancreatic tail extending to and compressing the greater gastric curvature proximally most consistent with acute fluid collection related to acute pancreatitis.    This is visible only in retrospect on exam of 02/25/24 when measuring 2.3 x 1.6 cm.    Persistent stranding about the pancreatic tail consistent with acute pancreatitis improved versus the exam of 02/25/2024.    Authenticated and Electronically Signed by Luan Antoine MD  on 03/02/2024 04:16:54 AM    CT Abdomen Pelvis Without Contrast [[id number]] Collected: 03/02/24 0259     Updated: 03/02/24 0301    Narrative:      FINAL REPORT    TECHNIQUE:  null    CLINICAL HISTORY:  Right upper quadrant pain    COMPARISON:  null    FINDINGS:  Exam: CT abdomen and pelvis without IV contrast.    Comparison: CT/SR - CT ABDOMEN PELVIS WO CONTRAST - 02/25/2024 12:51 AM EST    Findings:    Small bilateral pleural effusions.    Stable moderate hiatal hernia.    No free air.    No solid liver mass. Enlarged at 20.8 cm.    Cholecystectomy.    No clinically significant biliary ductal dilation.    No splenomegaly or suspicious splenic  lesions.    Stranding about the pancreatic tail again noted consistent with pancreatitis. Oval 6.1 x 3.0 cm fluid collection along the ventral margin of the pancreatic tail may reflect pancreatitis related acute fluid collection, new vs prior, and suboptimally   visualized without contrast.    Adrenal glands without nodule.    No suspicious renal mass. No hydronephrosis. No significant stranding. Stable bilateral renal cysts and nonobstructing right renal stone.    Bladder without acute pathology.    Postoperative changes sigmoid colon. Dense colonic fecal retention without obstruction.    No adenopathy.    Atheromatous abdominal aortic calcifications without aneurysm.    No suspicious pelvic masses.    No acute soft tissue pathology.    No acute osseous pathology. Degenerative changes. Lumbar dextroscoliosis.      Impression:      IMPRESSION:    Oval 6.1 x 3.0 cm fluid collection along the ventral margin of the pancreatic tail may reflect pancreatitis related acute fluid collection, new vs prior, and suboptimally visualized without contrast.    Stranding about the pancreatic tail again noted consistent with pancreatitis.    Dense colonic fecal retention without obstruction.]    Stable hepatomegaly.    Stable hiatal hernia.    Small bilateral pleural effusions, new.    Authenticated and Electronically Signed by Luan Antoine MD  on 03/02/2024 02:59:21 AM          Physician Progress Notes (last 24 hours)  Notes from 03/01/24 1441 through 03/02/24 1441   No notes of this type exist for this encounter.       Consult Notes (last 24 hours)  Notes from 03/01/24 1441 through 03/02/24 1441   No notes of this type exist for this encounter.

## 2024-03-02 NOTE — Clinical Note
Level of Care: Med/Surg [1]   Diagnosis: Pancreatitis, acute [196964]   Admitting Physician: TIAN FITZGERALD [[id number]]   Attending Physician: TIAN FITZGERALD [[id number]]   Certification: I Certify That Inpatient Hospital Services Are Medically Necessary For Greater Than 2 Midnights

## 2024-03-02 NOTE — H&P
[facility]   HISTORY AND PHYSICAL      Name:  Tasha Yarbrough   Age:  68 y.o.  Sex:  female  :  1955  MRN:  [id number]   Visit Number:  [id number]  Admission Date:  3/2/2024  Date Of Service:  24  Primary Care Physician:  Terrence Baldwin MD     Admitting diagnosis:      Pancreatitis, acute    Gastroesophageal reflux disease with esophagitis    Anemia, chronic disease    Chronic kidney disease, stage III (moderate)        History Of Presenting Illness:      68-year-old female with hypertension, chronic kidney disease, recently discharged from the hospital last week because of acute pancreatitis and sepsis.  She was treated for acute pancreatitis discharged and in 48 hours returns back to ER because of abdominal pain.  She started eating and had fish and her pain got worse.  In the ER workup done with her lipase upon discharge which was 200 went up to thousand and patient was having epigastric significant pain.  CT scan of the abdomen has been done 2 times and was noted to have possible development of early pseudocyst.  She was not a candidate for any surgical intervention and no bed available at [facility] so she has been recommended to be admitted here for conservative management.    At present patient denies any nausea but has significant pain.  Pain medication has been given.  She has been thirsty and wanting to drink something.  Besides that there has been no shortness of breath or any respiratory symptoms.  She is alert oriented x 3 and unlike her prior admission when she was encephalopathic and septic she is hemodynamically stable and no signs of sepsis.    Review Of Systems:     The following systems were reviewed and negative;  constitution, eyes, ENT, respiratory, cardiovascular, gastrointestinal, genitourinary, musculoskeletal, neurological and behavioral/psych,  Skin except as above.     Past Medical History:    Past Medical History:   Diagnosis Date     Hypertension     Impaired mobility     Injury of back        Past Surgical history:    Past Surgical History:   Procedure Laterality Date    ABDOMINAL SURGERY      COLON SURGERY      COLONOSCOPY      TUBAL ABDOMINAL LIGATION         Social History:    Social History     Socioeconomic History    Marital status:    Tobacco Use    Smoking status: Every Day     Current packs/day: 1.00     Types: Cigarettes    Smokeless tobacco: Never   Vaping Use    Vaping status: Never Used   Substance and Sexual Activity    Alcohol use: Never    Drug use: Never    Sexual activity: Defer       Family History:    History reviewed. No pertinent family history.      Allergies:      Ciprofloxacin, Codeine, and Pineapple    Home Medications:    Prior to Admission Medications       Prescriptions Last Dose Informant Patient Reported? Taking?    cephalexin (KEFLEX) 500 MG capsule   No No    Take 1 capsule by mouth 2 (Two) Times a Day for 3 doses. Indications: Infection Within the Abdomen    citalopram (CeleXA) 40 MG tablet   Yes No    Take 1 tablet by mouth Daily. Indications: Major Depressive Disorder    famotidine (PEPCID) 40 MG tablet   No No    Take 1 tablet by mouth Daily for 30 days. Indications: Heartburn    furosemide (LASIX) 20 MG tablet   No No    Take 1 tablet by mouth Daily for 30 days.    glucosamine-chondroitin 500-400 MG capsule capsule   Yes No    Take 1 capsule by mouth 2 (Two) Times a Day With Meals. Indications: Joint Damage causing Pain and Loss of Function    HYDROcodone-acetaminophen (NORCO) 7.5-325 MG per tablet  Medication Bottle Yes No    Take 1 tablet by mouth Every 8 (Eight) Hours As Needed for Moderate Pain. Indications: Pain    methocarbamol (ROBAXIN) 750 MG tablet   Yes No    Take 1-2 tablets by mouth 3 (Three) Times a Day As Needed for Muscle Spasms. Take one to two tablets three times daily as needed  Indications: Musculoskeletal Pain    metoprolol tartrate (LOPRESSOR) 50 MG tablet   Yes No    Take 50 mg  by mouth Daily. Indications: High Blood Pressure Disorder    metroNIDAZOLE (FLAGYL) 500 MG tablet   No No    Take 1 tablet by mouth Every 8 (Eight) Hours for 6 doses. Indications: Infection Within the Abdomen    nicotine (NICODERM CQ) 21 MG/24HR patch   No No    Place 1 patch on the skin as directed by provider Daily for 14 days.    ondansetron (ZOFRAN) 8 MG tablet   Yes No    Take 8 mg by mouth Every 8 (Eight) Hours As Needed for Nausea or Vomiting. Indications: Nausea and Vomiting    saccharomyces boulardii (Florastor) 250 MG capsule   No No    Take 1 capsule by mouth 2 (Two) Times a Day.    Patient not taking:  Reported on 2/27/2024    saccharomyces boulardii (Florastor) 250 MG capsule   Yes No    Take 1 capsule by mouth 2 (Two) Times a Day. Given 9-18-23 from [facility] [place], [place]    sodium bicarbonate 650 MG tablet   No No    Take 1 tablet by mouth 2 (Two) Times a Day for 30 days.    vitamin D3 125 MCG (5000 UT) capsule capsule   Yes No    Take 1 capsule by mouth Daily. Indications: Vitamin D Deficiency                   Vital Signs:    Temp:  [96.8 °F (36 °C)-98.5 °F (36.9 °C)] 96.8 °F (36 °C)  Heart Rate:  [63-78] 63  Resp:  [18] 18  BP: (111-142)/(66-90) 137/90        03/02/24  0057   Weight: 62.6 kg (138 lb)       Body mass index is 25.24 kg/m².    Physical Exam:      General Appearance:    Alert and cooperative,  And lying comfortably in bed   Head:    Atraumatic and normocephalic, without obvious abnormality.   Eyes:            PERRLA, conjunctivae and sclerae normal, no Icterus. No pallor. Extraocular movements are within normal limits.   Ears:    Ears appear intact with no abnormalities noted.   Throat:   No oral lesions, no thrush, oral mucosa moist.   Neck:   Supple, trachea midline, no thyromegaly, no carotid bruit, no lymphadenopathy   Lungs:    Breath sounds heard bilaterally equally.  No crackles or wheezing. No Pleural rub or bronchial breathing.       Heart:    Normal S1 and S2, no murmur, no  gallop, no rub. No JVD   Abdomen:     Normal bowel sounds, no masses, no organomegaly. Soft   tenderness over the gastric region nondistended, no guarding, no rebound    tenderness   Extremities:   Moves all extremities well, no edema, no cyanosis, no          clubbing   Skin:   No  bruising or rash   Neurologic:   Cranial nerves 2 - 12 grossly intact, sensation intact, Motor power is normal and equal bilaterally.       EKG:      Sinus rhythm    Labs:    Lab Results (last 24 hours)       Procedure Component Value Units Date/Time    Lactic Acid, Plasma [[id number]]  (Normal) Collected: 03/02/24 0059    Specimen: Blood Updated: 03/02/24 0444     Lactate 1.6 mmol/L     Urinalysis, Microscopic Only - Urine, Clean Catch [[id number]]  (Abnormal) Collected: 03/02/24 0418    Specimen: Urine, Clean Catch Updated: 03/02/24 0436     RBC, UA None Seen /HPF      WBC, UA 3-5 /HPF      Bacteria, UA 1+ /HPF      Squamous Epithelial Cells, UA 3-6 /HPF      Hyaline Casts, UA None Seen /LPF      Methodology Manual Light Microscopy    Urinalysis With Microscopic If Indicated (No Culture) - Urine, Clean Catch [[id number]]  (Abnormal) Collected: 03/02/24 0418    Specimen: Urine, Clean Catch Updated: 03/02/24 0436     Color, UA Yellow     Appearance, UA Clear     pH, UA 6.0     Specific Gravity, UA 1.023     Glucose, UA Negative     Ketones, UA Negative     Bilirubin, UA Negative     Blood, UA Negative     Protein, UA 30 mg/dL (1+)     Leuk Esterase, UA Negative     Nitrite, UA Negative     Urobilinogen, UA 0.2 E.U./dL    Comprehensive Metabolic Panel [[id number]]  (Abnormal) Collected: 03/02/24 0059    Specimen: Blood Updated: 03/02/24 0231     Glucose 89 mg/dL      BUN 17 mg/dL      Creatinine 1.52 mg/dL      Sodium 144 mmol/L      Potassium 4.0 mmol/L      Comment: Slight hemolysis detected by analyzer. Result may be falsely elevated.        Chloride 111 mmol/L      CO2 20.0 mmol/L      Calcium 8.9 mg/dL      Total Protein 5.9 g/dL       Albumin 3.0 g/dL      ALT (SGPT) 23 U/L      AST (SGOT) 23 U/L      Alkaline Phosphatase 101 U/L      Total Bilirubin <0.2 mg/dL      Globulin 2.9 gm/dL      A/G Ratio 1.0 g/dL      BUN/Creatinine Ratio 11.2     Anion Gap 13.0 mmol/L      eGFR 37.2 mL/min/1.73     Narrative:      GFR Normal >60  Chronic Kidney Disease <60  Kidney Failure <15      Lipase [[id number]]  (Abnormal) Collected: 03/02/24 0059    Specimen: Blood Updated: 03/02/24 0231     Lipase 1,026 U/L     CBC & Differential [[id number]]  (Abnormal) Collected: 03/02/24 0059    Specimen: Blood Updated: 03/02/24 0222    Narrative:      The following orders were created for panel order CBC & Differential.  Procedure                               Abnormality         Status                     ---------                               -----------         ------                     CBC Auto Differential[[id number]]        Abnormal            Final result               Scan Slide[[id number]]                                       Final result                 Please view results for these tests on the individual orders.    CBC Auto Differential [[id number]]  (Abnormal) Collected: 03/02/24 0059    Specimen: Blood Updated: 03/02/24 0222     WBC 13.05 10*3/mm3      RBC 2.64 10*6/mm3      Hemoglobin 9.5 g/dL      Hematocrit 28.6 %      .3 fL      MCH 36.0 pg      MCHC 33.2 g/dL      RDW 14.8 %      RDW-SD 59.1 fl      MPV 10.9 fL      Platelets 190 10*3/mm3      Neutrophil % 81.0 %      Lymphocyte % 7.3 %      Monocyte % 8.8 %      Eosinophil % 1.5 %      Basophil % 0.3 %      Immature Grans % 1.1 %      Neutrophils, Absolute 10.57 10*3/mm3      Lymphocytes, Absolute 0.95 10*3/mm3      Monocytes, Absolute 1.15 10*3/mm3      Eosinophils, Absolute 0.20 10*3/mm3      Basophils, Absolute 0.04 10*3/mm3      Immature Grans, Absolute 0.14 10*3/mm3      nRBC 0.0 /100 WBC     Scan Slide [[id number]] Collected: 03/02/24 0059    Specimen: Blood Updated: 03/02/24 0222      Hypochromia Slight/1+     Macrocytes Slight/1+     WBC Morphology Normal     Platelet Morphology Normal            Radiology:    Imaging Results (Last 72 Hours)       Procedure Component Value Units Date/Time    CT Abdomen Pelvis With Contrast [[id number]] Collected: 03/02/24 0416     Updated: 03/02/24 0418    Narrative:      FINAL REPORT    TECHNIQUE:  null    CLINICAL HISTORY:  Pancreatitis versus pancreatic fluid collection/abscess, CT  abd/pelvis w/o today    COMPARISON:  null    FINDINGS:  Exam: CT abdomen and pelvis with IV contrast.    Comparison: Comparison: CT/SR - CT ABDOMEN PELVIS WO CONTRAST - 03/02/2024 02:38 AM EST    CT/SR - CT ABDOMEN PELVIS WO CONTRAST - 02/25/2024 12:51 AM EST    Findings:    Small bilateral pleural effusions.    Stable moderate hiatal hernia.    No free air.    No solid liver mass. Stable hepatomegaly.    Cholecystectomy. Stable biliary ductal dilatation likely due to cholecystectomy effects.    No clinically significant biliary ductal dilation.    No splenomegaly or suspicious splenic lesions.    Persistent stranding about the pancreatic tail consistent with acute pancreatitis improved versus the exam of 02/25/2024. 6.1 x 3.0 cm fluid collection along the ventral margin of the pancreatic tail extending to and compressing the greater gastric   curvature proximally most consistent with acute fluid collection related to acute pancreatitis. This is better demonstrated on the current contrast exam. Collection contains an imperceptible wall. This is visible only in retrospect on exam of 02/25/24   when measuring 2.3 x 1.6 cm.    Adrenal glands without nodule.    No suspicious renal mass. No hydronephrosis. No significant stranding. Stable bilateral renal cysts and nonobstructing right renal stone.    Bladder without acute pathology.    Postoperative changes sigmoid colon. Dense colonic fecal retention without obstruction.    No adenopathy.    Atheromatous abdominal aortic calcifications  without aneurysm.    No suspicious pelvic masses.    No acute soft tissue pathology.    No acute osseous pathology. Degenerative changes. Lumbar dextroscoliosis.      Impression:      IMPRESSION:    6.1 x 3.0 cm fluid collection along the ventral margin of the pancreatic tail extending to and compressing the greater gastric curvature proximally most consistent with acute fluid collection related to acute pancreatitis.    This is visible only in retrospect on exam of 02/25/24 when measuring 2.3 x 1.6 cm.    Persistent stranding about the pancreatic tail consistent with acute pancreatitis improved versus the exam of 02/25/2024.    Authenticated and Electronically Signed by Luan Antoine MD  on 03/02/2024 04:16:54 AM    CT Abdomen Pelvis Without Contrast [[id number]] Collected: 03/02/24 0259     Updated: 03/02/24 0301    Narrative:      FINAL REPORT    TECHNIQUE:  null    CLINICAL HISTORY:  Right upper quadrant pain    COMPARISON:  null    FINDINGS:  Exam: CT abdomen and pelvis without IV contrast.    Comparison: CT/SR - CT ABDOMEN PELVIS WO CONTRAST - 02/25/2024 12:51 AM EST    Findings:    Small bilateral pleural effusions.    Stable moderate hiatal hernia.    No free air.    No solid liver mass. Enlarged at 20.8 cm.    Cholecystectomy.    No clinically significant biliary ductal dilation.    No splenomegaly or suspicious splenic lesions.    Stranding about the pancreatic tail again noted consistent with pancreatitis. Oval 6.1 x 3.0 cm fluid collection along the ventral margin of the pancreatic tail may reflect pancreatitis related acute fluid collection, new vs prior, and suboptimally   visualized without contrast.    Adrenal glands without nodule.    No suspicious renal mass. No hydronephrosis. No significant stranding. Stable bilateral renal cysts and nonobstructing right renal stone.    Bladder without acute pathology.    Postoperative changes sigmoid colon. Dense colonic fecal retention without  obstruction.    No adenopathy.    Atheromatous abdominal aortic calcifications without aneurysm.    No suspicious pelvic masses.    No acute soft tissue pathology.    No acute osseous pathology. Degenerative changes. Lumbar dextroscoliosis.      Impression:      IMPRESSION:    Oval 6.1 x 3.0 cm fluid collection along the ventral margin of the pancreatic tail may reflect pancreatitis related acute fluid collection, new vs prior, and suboptimally visualized without contrast.    Stranding about the pancreatic tail again noted consistent with pancreatitis.    Dense colonic fecal retention without obstruction.]    Stable hepatomegaly.    Stable hiatal hernia.    Small bilateral pleural effusions, new.    Authenticated and Electronically Signed by Luan Antoine MD  on 03/02/2024 02:59:21 AM            Assessment:    Assessment & Plan       Pancreatitis, acute    Gastroesophageal reflux disease with esophagitis    Anemia, chronic disease    Chronic kidney disease, stage III (moderate)  Pancreatic pseudocyst      Plan:     1.  Acute pancreatitis with pancreatic pseudocyst-at present she is to be treated conservatively will be admitted.  Start with IV fluids hydration again and only clear liquids.  Pain management and orders have been given   Will do slow advancement of the diet as this seems to be developing into her chronic pancreatitis  GI consult will be done on Monday when services are available    2.  Chronic kidney disease-this seems to be her baseline it is improved from the last few weeks and her creatinine is improved from around 2.5-1.5    3.  Anemia of chronic disease-H&H is stable at present    Chronic pain-at present she will be on morphine and gradually taper it down to hydrocodone for moderate pain      Addi Braden MD  03/02/24  13:52 EST    Please note that portions of this note were completed with a voice recognition program.

## 2024-03-02 NOTE — ED PROVIDER NOTES
EMERGENCY DEPARTMENT ENCOUNTER    Pt Name: Tasha Yarbrough  MRN: [id number]  Pt :   1955  Room Number:    Date of encounter:  3/2/2024  PCP: Terrence Baldwin MD  ED Provider: Ho Clark MD    Historian: Patient and family      HPI:  Chief Complaint: Abdominal pain        Context: Tasha Yarbrough is a 68 y.o. female who presents to the ED c/o abdominal pain.  Patient had been admitted to the hospital for acute pancreatitis and was discharged home yesterday.  Tonight started experiencing right upper and mid upper abdominal pain again so was brought back to the emergency department by an ambulance.  Pain somewhat improved on its own prior to full arrival to emergency department.  Denies any fevers.  Denies any vomiting.  Has had some occasional diarrhea.  Does not feel nearly as bad as when she was hospitalized recently.      PAST MEDICAL HISTORY  Past Medical History:   Diagnosis Date    Hypertension     Impaired mobility     Injury of back          PAST SURGICAL HISTORY  Past Surgical History:   Procedure Laterality Date    ABDOMINAL SURGERY      COLON SURGERY      COLONOSCOPY      TUBAL ABDOMINAL LIGATION           FAMILY HISTORY  History reviewed. No pertinent family history.      SOCIAL HISTORY  Social History     Socioeconomic History    Marital status:    Tobacco Use    Smoking status: Every Day     Current packs/day: 1.00     Types: Cigarettes    Smokeless tobacco: Never   Vaping Use    Vaping status: Never Used   Substance and Sexual Activity    Alcohol use: Never    Drug use: Never    Sexual activity: Defer         ALLERGIES  Ciprofloxacin, Codeine, and Pineapple        REVIEW OF SYSTEMS  Review of Systems     All systems reviewed and negative except for those discussed in HPI.       PHYSICAL EXAM    I have reviewed the triage vital signs and nursing notes.    ED Triage Vitals [24 0057]   Temp Heart Rate Resp BP SpO2   98.5 °F (36.9 °C) 78 18 132/80 98 %      Temp src Heart Rate  Source Patient Position BP Location FiO2 (%)   Oral Monitor Lying Left arm --       Physical Exam    General:  Awake, alert, no acute distress  HEENT: Atraumatic, normocephalic, EOMI, PERRLA, mucous membranes moist  NECK:  Supple, atraumatic  Cardiovascular:  Regular rate, regular rhythm, no murmurs, rubs, or gallops.  Extremities well perfused   Respiratory:  Regular rate, clear lungs to auscultation bilaterally.  No rhonchi, rales, wheezing  Abdominal:  Soft, nondistended, tenderness to palpation of epigastrium and right upper quadrant.  No guarding or rebound.  No palpable masses  Extremity:  No visible bony abnormalities in all 4 extremities.  Full range of motion of all extremities.  Skin:  Warm and dry.  No rashes  Neuro:  AAOx3, GCS 15. Cranial nerves 2-12 grossly intact.  No focal strength or sensation deficits.  Psych:  Mood and affect appropriate.        LAB RESULTS  Recent Results (from the past 24 hour(s))   Comprehensive Metabolic Panel    Collection Time: 03/02/24 12:59 AM    Specimen: Blood   Result Value Ref Range    Glucose 89 65 - 99 mg/dL    BUN 17 8 - 23 mg/dL    Creatinine 1.52 (H) 0.57 - 1.00 mg/dL    Sodium 144 136 - 145 mmol/L    Potassium 4.0 3.5 - 5.2 mmol/L    Chloride 111 (H) 98 - 107 mmol/L    CO2 20.0 (L) 22.0 - 29.0 mmol/L    Calcium 8.9 8.6 - 10.5 mg/dL    Total Protein 5.9 (L) 6.0 - 8.5 g/dL    Albumin 3.0 (L) 3.5 - 5.2 g/dL    ALT (SGPT) 23 1 - 33 U/L    AST (SGOT) 23 1 - 32 U/L    Alkaline Phosphatase 101 39 - 117 U/L    Total Bilirubin <0.2 0.0 - 1.2 mg/dL    Globulin 2.9 gm/dL    A/G Ratio 1.0 g/dL    BUN/Creatinine Ratio 11.2 7.0 - 25.0    Anion Gap 13.0 5.0 - 15.0 mmol/L    eGFR 37.2 (L) >60.0 mL/min/1.73   Lipase    Collection Time: 03/02/24 12:59 AM    Specimen: Blood   Result Value Ref Range    Lipase 1,026 (H) 13 - 60 U/L   CBC Auto Differential    Collection Time: 03/02/24 12:59 AM    Specimen: Blood   Result Value Ref Range    WBC 13.05 (H) 3.40 - 10.80 10*3/mm3    RBC  2.64 (L) 3.77 - 5.28 10*6/mm3    Hemoglobin 9.5 (L) 12.0 - 15.9 g/dL    Hematocrit 28.6 (L) 34.0 - 46.6 %    .3 (H) 79.0 - 97.0 fL    MCH 36.0 (H) 26.6 - 33.0 pg    MCHC 33.2 31.5 - 35.7 g/dL    RDW 14.8 12.3 - 15.4 %    RDW-SD 59.1 (H) 37.0 - 54.0 fl    MPV 10.9 6.0 - 12.0 fL    Platelets 190 140 - 450 10*3/mm3    Neutrophil % 81.0 (H) 42.7 - 76.0 %    Lymphocyte % 7.3 (L) 19.6 - 45.3 %    Monocyte % 8.8 5.0 - 12.0 %    Eosinophil % 1.5 0.3 - 6.2 %    Basophil % 0.3 0.0 - 1.5 %    Immature Grans % 1.1 (H) 0.0 - 0.5 %    Neutrophils, Absolute 10.57 (H) 1.70 - 7.00 10*3/mm3    Lymphocytes, Absolute 0.95 0.70 - 3.10 10*3/mm3    Monocytes, Absolute 1.15 (H) 0.10 - 0.90 10*3/mm3    Eosinophils, Absolute 0.20 0.00 - 0.40 10*3/mm3    Basophils, Absolute 0.04 0.00 - 0.20 10*3/mm3    Immature Grans, Absolute 0.14 (H) 0.00 - 0.05 10*3/mm3    nRBC 0.0 0.0 - 0.2 /100 WBC   Scan Slide    Collection Time: 03/02/24 12:59 AM    Specimen: Blood   Result Value Ref Range    Hypochromia Slight/1+ None Seen    Macrocytes Slight/1+ None Seen    WBC Morphology Normal Normal    Platelet Morphology Normal Normal   Lactic Acid, Plasma    Collection Time: 03/02/24 12:59 AM    Specimen: Blood   Result Value Ref Range    Lactate 1.6 0.5 - 2.0 mmol/L   Urinalysis With Microscopic If Indicated (No Culture) - Urine, Clean Catch    Collection Time: 03/02/24  4:18 AM    Specimen: Urine, Clean Catch   Result Value Ref Range    Color, UA Yellow Yellow, Straw    Appearance, UA Clear Clear    pH, UA 6.0 5.0 - 8.0    Specific Gravity, UA 1.023 1.005 - 1.030    Glucose, UA Negative Negative    Ketones, UA Negative Negative    Bilirubin, UA Negative Negative    Blood, UA Negative Negative    Protein, UA 30 mg/dL (1+) (A) Negative    Leuk Esterase, UA Negative Negative    Nitrite, UA Negative Negative    Urobilinogen, UA 0.2 E.U./dL 0.2 - 1.0 E.U./dL   Urinalysis, Microscopic Only - Urine, Clean Catch    Collection Time: 03/02/24  4:18 AM     Specimen: Urine, Clean Catch   Result Value Ref Range    RBC, UA None Seen None Seen, 0-2 /HPF    WBC, UA 3-5 (A) None Seen, 0-2 /HPF    Bacteria, UA 1+ (A) None Seen /HPF    Squamous Epithelial Cells, UA 3-6 (A) None Seen, 0-2 /HPF    Hyaline Casts, UA None Seen None Seen /LPF    Methodology Manual Light Microscopy        If labs were ordered, I independently reviewed the results and considered them in treating the patient.        RADIOLOGY  CT Abdomen Pelvis With Contrast    Result Date: 3/2/2024  FINAL REPORT TECHNIQUE: null CLINICAL HISTORY: Pancreatitis versus pancreatic fluid collection/abscess, CT abd/pelvis w/o today COMPARISON: null FINDINGS: Exam: CT abdomen and pelvis with IV contrast. Comparison: Comparison: CT/SR - CT ABDOMEN PELVIS WO CONTRAST - 03/02/2024 02:38 AM EST CT/SR - CT ABDOMEN PELVIS WO CONTRAST - 02/25/2024 12:51 AM EST Findings: Small bilateral pleural effusions. Stable moderate hiatal hernia. No free air. No solid liver mass. Stable hepatomegaly. Cholecystectomy. Stable biliary ductal dilatation likely due to cholecystectomy effects. No clinically significant biliary ductal dilation. No splenomegaly or suspicious splenic lesions. Persistent stranding about the pancreatic tail consistent with acute pancreatitis improved versus the exam of 02/25/2024. 6.1 x 3.0 cm fluid collection along the ventral margin of the pancreatic tail extending to and compressing the greater gastric curvature proximally most consistent with acute fluid collection related to acute pancreatitis. This is better demonstrated on the current contrast exam. Collection contains an imperceptible wall. This is visible only in retrospect on exam of 02/25/24 when measuring 2.3 x 1.6 cm. Adrenal glands without nodule. No suspicious renal mass. No hydronephrosis. No significant stranding. Stable bilateral renal cysts and nonobstructing right renal stone. Bladder without acute pathology. Postoperative changes sigmoid colon.  Dense colonic fecal retention without obstruction. No adenopathy. Atheromatous abdominal aortic calcifications without aneurysm. No suspicious pelvic masses. No acute soft tissue pathology. No acute osseous pathology. Degenerative changes. Lumbar dextroscoliosis.     IMPRESSION: 6.1 x 3.0 cm fluid collection along the ventral margin of the pancreatic tail extending to and compressing the greater gastric curvature proximally most consistent with acute fluid collection related to acute pancreatitis. This is visible only in retrospect on exam of 02/25/24 when measuring 2.3 x 1.6 cm. Persistent stranding about the pancreatic tail consistent with acute pancreatitis improved versus the exam of 02/25/2024. Authenticated and Electronically Signed by Luan Antione MD on 03/02/2024 04:16:54 AM    CT Abdomen Pelvis Without Contrast    Result Date: 3/2/2024  FINAL REPORT TECHNIQUE: null CLINICAL HISTORY: Right upper quadrant pain COMPARISON: null FINDINGS: Exam: CT abdomen and pelvis without IV contrast. Comparison: CT/SR - CT ABDOMEN PELVIS WO CONTRAST - 02/25/2024 12:51 AM EST Findings: Small bilateral pleural effusions. Stable moderate hiatal hernia. No free air. No solid liver mass. Enlarged at 20.8 cm. Cholecystectomy. No clinically significant biliary ductal dilation. No splenomegaly or suspicious splenic lesions. Stranding about the pancreatic tail again noted consistent with pancreatitis. Oval 6.1 x 3.0 cm fluid collection along the ventral margin of the pancreatic tail may reflect pancreatitis related acute fluid collection, new vs prior, and suboptimally visualized without contrast. Adrenal glands without nodule. No suspicious renal mass. No hydronephrosis. No significant stranding. Stable bilateral renal cysts and nonobstructing right renal stone. Bladder without acute pathology. Postoperative changes sigmoid colon. Dense colonic fecal retention without obstruction. No adenopathy. Atheromatous abdominal aortic  calcifications without aneurysm. No suspicious pelvic masses. No acute soft tissue pathology. No acute osseous pathology. Degenerative changes. Lumbar dextroscoliosis.     IMPRESSION: Oval 6.1 x 3.0 cm fluid collection along the ventral margin of the pancreatic tail may reflect pancreatitis related acute fluid collection, new vs prior, and suboptimally visualized without contrast. Stranding about the pancreatic tail again noted consistent with pancreatitis. Dense colonic fecal retention without obstruction.] Stable hepatomegaly. Stable hiatal hernia. Small bilateral pleural effusions, new. Authenticated and Electronically Signed by Luan Antoine MD on 03/02/2024 02:59:21 AM     I ordered and independently reviewed the above noted radiographic studies.     See radiologist's dictation for official interpretation.        PROCEDURES    Procedures    No orders to display       MEDICATIONS GIVEN IN ER    Medications   sodium chloride 0.9 % flush 10 mL (has no administration in time range)   sodium chloride 0.9 % bolus 1,000 mL (0 mL Intravenous Stopped 3/2/24 0129)   aluminum-magnesium hydroxide-simethicone (MAALOX MAX) 400-400-40 MG/5ML suspension 15 mL (15 mL Oral Given 3/2/24 0127)   Lidocaine Viscous HCl (XYLOCAINE) 2 % solution 10 mL (10 mL Mouth/Throat Given During Downtime 3/2/24 0127)   ondansetron (ZOFRAN) injection 4 mg (4 mg Intravenous Given During Downtime 3/2/24 0127)   Morphine sulfate (PF) injection 2 mg (2 mg Intravenous Given 3/2/24 0409)   iopamidol (ISOVUE-300) 61 % injection 85 mL (85 mL Intravenous Given 3/2/24 0347)   sodium chloride 0.9 % bolus 500 mL (0 mL Intravenous Stopped 3/2/24 0506)         MEDICAL DECISION MAKING, PROGRESS, and CONSULTS    All labs, if obtained, have been independently reviewed by me.  All radiology studies, if obtained, have been reviewed by me and the radiologist dictating the report.  All EKG's, if obtained, have been independently viewed and interpreted by me.       Discussion below represents my analysis of pertinent findings related to patient's condition, differential diagnosis, treatment plan and final disposition.    Tasha Yarbrough is a 68 y.o. female who presents to the ED c/o abdominal pain.  Hemodynamically stable nontoxic appearance upon arrival.  Awake alert and oriented x 4 with GCS of 15.  Differential includes but is not limited to recurrence of pancreatitis, gastritis, gastric ulcer, musculoskeletal pain, hepatitis, constipation.  Extensive labs ordered which were personally reviewed and interpreted showing mild leukocytosis of 13 along with elevated lipase of 765.      Upon chart review patient's lipase was around 205 at time of discharge.  Leukocytosis is also new compared to white blood cell count of 7.4 at discharge.  CT of abdomen pelvis without contrast was obtained and again shows evidence of pancreatitis along with new pancreatic fluid collection measuring approximately 6 cm x 3 cm, radiology recommended CT with contrast for further clarification as to the etiology of this fluid collection.    CT with contrast again shows this fluid collection, again not interpreted as abscess only acute pancreatitis fluid collection per radiology.  Upon retrospective analysis, radiology also states fluid collection was not present on previous admission CT scan, however not as large as today.  Pancreatitis likely secondary to patient advancing diet too quickly.  Given additional 500 cc bolus while in emergency department.  Patient initially excepted by hospitalist for observation admission.  Hospitalist then recommended consultation of gastroenterology for input about fluid collection.  Gastroenterology On call at [facility] in [place] was contacted as there is no gastroenterology coverage at our facility currently on call, they reported that 2 days.  Fluid collection might need drained. She reported they do not do that type of procedure at their facility and  recommended contacting  to speak with gastroenterology there for their opinion.     transfer center was contacted and spoke with physician on-call, Dr. Millard, who requested imaging be power shared over for evaluation by gastroenterology.  Disposition pending recommendations from gastroenterology at [facility].    Spoke with transfer center at  again, Dr. Millard who states that Dr. Spain of gastroenterology had reported that they do not emergently drain these fluid collections in the acute phase such as this 1, usually waiting to see if they last for more than 4 weeks.   had recommended supportive treatment for pancreatitis as before with continued monitoring and possible repeat of imaging within the next 2 to 3 days if symptoms are worsening or there are secondary signs of infection such as fever or rising white counts despite treatment.  They recommended contacting them at that time again for possible transfer if indicated.    Consulted hospitalist again for admission for continued care of acute pancreatitis.  Admit                           Orders placed during this visit:  Orders Placed This Encounter   Procedures    CT Abdomen Pelvis Without Contrast    CT Abdomen Pelvis With Contrast    Comprehensive Metabolic Panel    Lipase    Urinalysis With Microscopic If Indicated (No Culture) - Urine, Clean Catch    CBC Auto Differential    Scan Slide    Lactic Acid, Plasma    Urinalysis, Microscopic Only - Urine, Clean Catch    Insert Peripheral IV    Inpatient Admission    CBC & Differential         ED Course:    Consultants:                  Shared Decision Making:  After my consideration of clinical presentation and any laboratory/radiology studies obtained, I discussed the findings with the patient/patient representative who is in agreement with the treatment plan and the final disposition.   Risks and benefits of discharge and/or observation/admission were discussed.      AS OF 07:44 EST  VITALS:    BP - 141/75  HR - 70  TEMP - 98.5 °F (36.9 °C) (Oral)  O2 SATS - 97%                  DIAGNOSIS  Final diagnoses:   Chronic pancreatitis, unspecified pancreatitis type   Elevated lipase   Leukocytosis, unspecified type   Acute pancreatic fluid collection   Epigastric pain         DISPOSITION  Admit      Please note that portions of this document were completed with voice recognition software.        Ho Clark MD  03/03/24 0111

## 2024-03-02 NOTE — PHARMACY RECOMMENDATION
Pharmacokinetic Consult - Cephalexin and Metronidazole Dosing  Tasha Yarbrough is a 68 y.o. female who has been consulted to dose cephalexin and metronidazole for  sepsis, de-escalation of therapy .    Current Antimicrobial Therapy    Anti-Infectives (From admission, onward)      Ordered     Dose/Rate Route Frequency Start Stop    03/02/24 1348  cephalexin (KEFLEX) capsule 500 mg        Ordering Provider: Addi Braden MD    500 mg Oral 2 Times Daily 03/02/24 2100 03/04/24 0859    03/02/24 1348  metroNIDAZOLE (FLAGYL) tablet 500 mg        Ordering Provider: Addi Braden MD    500 mg Oral Every 8 Hours Scheduled 03/02/24 1445 03/04/24 0559            Microbiology Results (last 10 days)       Procedure Component Value - Date/Time    MRSA Screen, PCR (Inpatient) - Swab, Nares [[id number]]  (Abnormal) Collected: 02/25/24 1102    Lab Status: Final result Specimen: Swab from Nares Updated: 02/25/24 2148     MRSA PCR MRSA Detected    Narrative:      The negative predictive value of this diagnostic test is high and should only be used to consider de-escalating anti-MRSA therapy. A positive result may indicate colonization with MRSA and must be correlated clinically.    Blood Culture - Blood, Hand, Digit Right [[id number]]  (Normal) Collected: 02/25/24 0049    Lab Status: Final result Specimen: Blood from Hand, Digit Right Updated: 03/01/24 0100     Blood Culture No growth at 5 days    Blood Culture - Blood, Hand, Right [[id number]]  (Normal) Collected: 02/25/24 0043    Lab Status: Final result Specimen: Blood from Hand, Right Updated: 03/01/24 0100     Blood Culture No growth at 5 days    Respiratory Panel PCR w/COVID-19(SARS-CoV-2) JORDYN/AMA/KIM/PAD/COR/MALICK In-House, NP Swab in UTM/VTM, 2 HR TAT - Swab, Nasopharynx [[id number]]  (Normal) Collected: 02/24/24 2339    Lab Status: Final result Specimen: Swab from Nasopharynx Updated: 02/25/24 0038     ADENOVIRUS, PCR Not Detected     Coronavirus 229E Not Detected     Coronavirus  HKU1 Not Detected     Coronavirus NL63 Not Detected     Coronavirus OC43 Not Detected     COVID19 Not Detected     Human Metapneumovirus Not Detected     Human Rhinovirus/Enterovirus Not Detected     Influenza A PCR Not Detected     Influenza B PCR Not Detected     Parainfluenza Virus 1 Not Detected     Parainfluenza Virus 2 Not Detected     Parainfluenza Virus 3 Not Detected     Parainfluenza Virus 4 Not Detected     RSV, PCR Not Detected     Bordetella pertussis pcr Not Detected     Bordetella parapertussis PCR Not Detected     Chlamydophila pneumoniae PCR Not Detected     Mycoplasma pneumo by PCR Not Detected    Narrative:      In the setting of a positive respiratory panel with a viral infection PLUS a negative procalcitonin without other underlying concern for bacterial infection, consider observing off antibiotics or discontinuation of antibiotics and continue supportive care. If the respiratory panel is positive for atypical bacterial infection (Bordetella pertussis, Chlamydophila pneumoniae, or Mycoplasma pneumoniae), consider antibiotic de-escalation to target atypical bacterial infection.             Allergies  Ciprofloxacin, Codeine, and Pineapple    Relevant clinical data and objective history reviewed:  Creatinine   Date Value Ref Range Status   03/02/2024 1.52 (H) 0.57 - 1.00 mg/dL Final     Estimated Creatinine Clearance: 30.8 mL/min (A) (by C-G formula based on SCr of 1.52 mg/dL (H)).  I/O last 3 completed shifts:  In: 1500 [IV Piggyback:1500]  Out: -   Patient weight: 62.6 kg (138 lb)    Asessment/Plan  Initiate cephalexin 500 mg PO BID until 3/4.  Initiate metronidazole 500 mg PO q 8 hrs until 3/4.  Pharmacy will monitor Ms. Yarbrough's renal function and clinical status and adjust the cephalexin and/or metronidazole dose and/or frequency/-ies as needed.      Thank you for the opportunity to consult on this patient.    Mateusz Méndez [facility], Pharm.D.  03/02/24  14:04 EST

## 2024-03-03 LAB
ALBUMIN SERPL-MCNC: 3 G/DL (ref 3.5–5.2)
ALBUMIN/GLOB SERPL: 1.1 G/DL
ALP SERPL-CCNC: 79 U/L (ref 39–117)
ALT SERPL W P-5'-P-CCNC: 15 U/L (ref 1–33)
ANION GAP SERPL CALCULATED.3IONS-SCNC: 12.4 MMOL/L (ref 5–15)
AST SERPL-CCNC: 13 U/L (ref 1–32)
BILIRUB SERPL-MCNC: 0.2 MG/DL (ref 0–1.2)
BUN SERPL-MCNC: 13 MG/DL (ref 8–23)
BUN/CREAT SERPL: 11.6 (ref 7–25)
CALCIUM SPEC-SCNC: 8.6 MG/DL (ref 8.6–10.5)
CHLORIDE SERPL-SCNC: 108 MMOL/L (ref 98–107)
CO2 SERPL-SCNC: 20.6 MMOL/L (ref 22–29)
CREAT SERPL-MCNC: 1.12 MG/DL (ref 0.57–1)
DEPRECATED RDW RBC AUTO: 59.9 FL (ref 37–54)
EGFRCR SERPLBLD CKD-EPI 2021: 53.7 ML/MIN/1.73
ERYTHROCYTE [DISTWIDTH] IN BLOOD BY AUTOMATED COUNT: 14.5 % (ref 12.3–15.4)
GLOBULIN UR ELPH-MCNC: 2.8 GM/DL
GLUCOSE SERPL-MCNC: 91 MG/DL (ref 65–99)
HCT VFR BLD AUTO: 30.8 % (ref 34–46.6)
HGB BLD-MCNC: 9.8 G/DL (ref 12–15.9)
LIPASE SERPL-CCNC: 580 U/L (ref 13–60)
MCH RBC QN AUTO: 35.4 PG (ref 26.6–33)
MCHC RBC AUTO-ENTMCNC: 31.8 G/DL (ref 31.5–35.7)
MCV RBC AUTO: 111.2 FL (ref 79–97)
PLATELET # BLD AUTO: 204 10*3/MM3 (ref 140–450)
PMV BLD AUTO: 11.2 FL (ref 6–12)
POTASSIUM SERPL-SCNC: 3.7 MMOL/L (ref 3.5–5.2)
PROT SERPL-MCNC: 5.8 G/DL (ref 6–8.5)
RBC # BLD AUTO: 2.77 10*6/MM3 (ref 3.77–5.28)
SODIUM SERPL-SCNC: 141 MMOL/L (ref 136–145)
WBC NRBC COR # BLD AUTO: 10.49 10*3/MM3 (ref 3.4–10.8)

## 2024-03-03 PROCEDURE — 25010000002 ONDANSETRON PER 1 MG: Performed by: INTERNAL MEDICINE

## 2024-03-03 PROCEDURE — 97161 PT EVAL LOW COMPLEX 20 MIN: CPT

## 2024-03-03 PROCEDURE — 83690 ASSAY OF LIPASE: CPT | Performed by: INTERNAL MEDICINE

## 2024-03-03 PROCEDURE — 80053 COMPREHEN METABOLIC PANEL: CPT | Performed by: INTERNAL MEDICINE

## 2024-03-03 PROCEDURE — 25810000003 SODIUM CHLORIDE 0.9 % SOLUTION: Performed by: INTERNAL MEDICINE

## 2024-03-03 PROCEDURE — 85027 COMPLETE CBC AUTOMATED: CPT | Performed by: INTERNAL MEDICINE

## 2024-03-03 RX ADMIN — ONDANSETRON 4 MG: 2 INJECTION INTRAMUSCULAR; INTRAVENOUS at 08:35

## 2024-03-03 RX ADMIN — CITALOPRAM HYDROBROMIDE 20 MG: 20 TABLET ORAL at 08:22

## 2024-03-03 RX ADMIN — METRONIDAZOLE 500 MG: 500 TABLET ORAL at 21:30

## 2024-03-03 RX ADMIN — SODIUM CHLORIDE 100 ML/HR: 9 INJECTION, SOLUTION INTRAVENOUS at 08:24

## 2024-03-03 RX ADMIN — FAMOTIDINE 40 MG: 20 TABLET, FILM COATED ORAL at 08:22

## 2024-03-03 RX ADMIN — CEPHALEXIN 500 MG: 250 CAPSULE ORAL at 08:22

## 2024-03-03 RX ADMIN — SODIUM BICARBONATE 650 MG TABLET 650 MG: at 08:23

## 2024-03-03 RX ADMIN — CEPHALEXIN 500 MG: 250 CAPSULE ORAL at 20:27

## 2024-03-03 RX ADMIN — METRONIDAZOLE 500 MG: 500 TABLET ORAL at 14:35

## 2024-03-03 RX ADMIN — HYDROCODONE BITARTRATE AND ACETAMINOPHEN 1 TABLET: 7.5; 325 TABLET ORAL at 18:27

## 2024-03-03 RX ADMIN — Medication 10 ML: at 08:22

## 2024-03-03 RX ADMIN — FUROSEMIDE 20 MG: 20 TABLET ORAL at 08:22

## 2024-03-03 RX ADMIN — METRONIDAZOLE 500 MG: 500 TABLET ORAL at 05:42

## 2024-03-03 RX ADMIN — Medication 1 PATCH: at 14:36

## 2024-03-03 RX ADMIN — MUPIROCIN 1 APPLICATION: 20 OINTMENT TOPICAL at 20:30

## 2024-03-03 RX ADMIN — MUPIROCIN 1 APPLICATION: 20 OINTMENT TOPICAL at 08:23

## 2024-03-03 RX ADMIN — SODIUM BICARBONATE 650 MG TABLET 650 MG: at 20:27

## 2024-03-03 RX ADMIN — Medication 10 ML: at 20:32

## 2024-03-03 RX ADMIN — METOPROLOL TARTRATE 50 MG: 50 TABLET ORAL at 08:23

## 2024-03-03 NOTE — PROGRESS NOTES
[facility]  INTERNAL MEDICINE PROGRESS NOTE    Name:  Tasha Yarbrough   Age:  68 y.o.  Sex:  female  :  1955  MRN:  [id number]   Visit Number:  [id number]  Admission Date:  3/2/2024  Date Of Service:  24  Primary Care Physician:  Terrence Baldwin MD     LOS: 1 day :  Patient Care Team:  Terrence Baldwin MD as PCP - General (Family Medicine):      Subjective / Interval History:     68-year-old female with hypertension, chronic kidney disease, recently discharged from the hospital last week because of acute pancreatitis and sepsis.  She was treated for acute pancreatitis discharged and in 48 hours returns back to ER because of abdominal pain.  She started eating and had fish and her pain got worse.  In the ER workup done with her lipase upon discharge which was 200 went up to thousand and patient was having epigastric significant pain.  CT scan of the abdomen has been done 2 times and was noted to have possible development of early pseudocyst.  She was not a candidate for any surgical intervention and no bed available at [facility] so she has been recommended to be admitted here for conservative management.    Patient has been seen today on March 3.  She is feeling some better still having mild nausea and tenderness over the epigastric region.  She states that she is very hungry.  She has tolerated clear liquids well and wanted to advance her diet if possible      Vital Signs:    Temp:  [96.8 °F (36 °C)-98.3 °F (36.8 °C)] 97.8 °F (36.6 °C)  Heart Rate:  [55-64] 64  Resp:  [18] 18  BP: (114-144)/(71-90) 138/85    Intake and output:    I/O last 3 completed shifts:  In: 2220 [P.O.:720; IV Piggyback:1500]  Out: 350 [Urine:350]  I/O this shift:  In: 480 [P.O.:480]  Out: 200 [Urine:200]    Physical Examination:    General Appearance:    Alert and cooperative, not in any acute distress.   Head:    Atraumatic and normocephalic, without obvious abnormality.   Eyes:            PERRLA,  No pallor.  Extraocular movements are within normal limits.   Neck:   Supple,  No lymph glands, no bruit   Lungs:     Chest shape is normal. Breath sounds heard bilaterally equally.  No crackles or wheezing.     Heart:    Normal S1 and S2, no murmur,  No JVD   Abdomen:     Normal bowel sounds, no masses, no organomegaly. Soft     mild epigastric tenderness, no guarding, no rebound tenderness   Extremities:   Moves all extremities well, no edema, no cyanosis,    Skin:   No  bruising or rash.   Neurologic:   Grossly nonfocal and moves all extremities.      Laboratory results:  Results from last 7 days   Lab Units 03/02/24  0059 02/29/24  0633 02/28/24  0845 02/27/24  0536   SODIUM mmol/L 144 144 142 144   POTASSIUM mmol/L 4.0 3.7 3.7 3.2*   CHLORIDE mmol/L 111* 116* 115* 116*   CO2 mmol/L 20.0* 17.2* 16.4* 15.5*   BUN mg/dL 17 25* 30* 46*   CREATININE mg/dL 1.52* 1.50* 1.91* 1.92*   CALCIUM mg/dL 8.9 8.7 8.6 8.8   BILIRUBIN mg/dL <0.2  --  0.2 0.2   ALK PHOS U/L 101  --  89 73   ALT (SGPT) U/L 23  --  17 14   AST (SGOT) U/L 23  --  31 27   GLUCOSE mg/dL 89 85 98 73     Results from last 7 days   Lab Units 03/02/24  0059 02/29/24  0633 02/28/24  0845   WBC 10*3/mm3 13.05* 7.38 9.05   HEMOGLOBIN g/dL 9.5* 8.3* 8.6*   HEMATOCRIT % 28.6* 25.1* 25.5*   PLATELETS 10*3/mm3 190 150 150                   Radiology results:    Imaging Results (Last 24 Hours)       ** No results found for the last 24 hours. **            I have reviewed the patient's radiology reports.    Medication Review:     I have reviewed the patients active and prn medications.     Assessment:      Pancreatitis, acute    Gastroesophageal reflux disease with esophagitis    Anemia, chronic disease    Chronic kidney disease, stage III (moderate)    Acute pancreatitis          Plan:    1.  Acute pancreatitis with pancreatic pseudocyst-she has been started again on conservative treatment and IV fluids have been continued today  Will advance the diet if the lipase is coming  down to full liquid diet but I have recommended to have  small portions at a time  GI consult will be done on Monday when services are available     2.  Chronic kidney disease-this seems to be her baseline it is improved from the last few weeks and her creatinine is improved from around 2.5-1.5  .  A.m. labs are pending     3.  Anemia of chronic disease-H&H is stable at present     4.   Chronic pain-at present she will be on morphine and gradually taper it down to hydrocodone for moderate pain    Plan of care has been addressed with the patient and the nursing staff.  If she is stable after having seen the response regular diet she could be possibly discharge in the next 48 hours    Addi Braden MD  03/03/24  11:01 EST      Please note that portions of this note were completed with a voice recognition program.

## 2024-03-03 NOTE — PLAN OF CARE
Problem: Adult Inpatient Plan of Care  Goal: Plan of Care Review  Outcome: Ongoing, Not Progressing  Goal: Patient-Specific Goal (Individualized)  Outcome: Ongoing, Not Progressing  Goal: Absence of Hospital-Acquired Illness or Injury  Outcome: Ongoing, Not Progressing  Intervention: Identify and Manage Fall Risk  Recent Flowsheet Documentation  Taken 3/2/2024 2200 by Jie Burris RN  Safety Promotion/Fall Prevention: safety round/check completed  Taken 3/2/2024 2000 by Jie Burris RN  Safety Promotion/Fall Prevention: safety round/check completed  Intervention: Prevent Skin Injury  Recent Flowsheet Documentation  Taken 3/2/2024 2200 by Jie Burris RN  Body Position:   tilted   right  Taken 3/2/2024 2000 by Jie Burris RN  Body Position: supine, legs elevated  Intervention: Prevent and Manage VTE (Venous Thromboembolism) Risk  Recent Flowsheet Documentation  Taken 3/2/2024 2200 by Jie Burris RN  Activity Management: activity encouraged  Taken 3/2/2024 2000 by Jie Burris RN  Activity Management: activity encouraged  Goal: Optimal Comfort and Wellbeing  Outcome: Ongoing, Not Progressing  Goal: Readiness for Transition of Care  Outcome: Ongoing, Not Progressing     Problem: Hypertension Comorbidity  Goal: Blood Pressure in Desired Range  Outcome: Ongoing, Not Progressing     Problem: Skin Injury Risk Increased  Goal: Skin Health and Integrity  Outcome: Ongoing, Not Progressing  Intervention: Optimize Skin Protection  Recent Flowsheet Documentation  Taken 3/2/2024 2200 by Jie Burris RN  Head of Bed (HOB) Positioning: HOB elevated  Taken 3/2/2024 2000 by Jie Burris RN  Head of Bed (HOB) Positioning: HOB elevated     Problem: Pain Acute  Goal: Acceptable Pain Control and Functional Ability  Outcome: Ongoing, Not Progressing     Problem: Fluid Imbalance (Pancreatitis)  Goal: Fluid Balance  Outcome: Ongoing, Not Progressing     Problem: Infection (Pancreatitis)  Goal: Infection Symptom  Resolution  Outcome: Ongoing, Not Progressing     Problem: Nutrition Impaired (Pancreatitis)  Goal: Optimal Nutrition Intake  Outcome: Ongoing, Not Progressing     Problem: Pain (Pancreatitis)  Goal: Acceptable Pain Control  Outcome: Ongoing, Not Progressing     Problem: Respiratory Compromise (Pancreatitis)  Goal: Effective Oxygenation and Ventilation  Outcome: Ongoing, Not Progressing  Intervention: Optimize Oxygenation and Ventilation  Recent Flowsheet Documentation  Taken 3/2/2024 2200 by Jie Burris, RN  Activity Management: activity encouraged  Head of Bed (HOB) Positioning: HOB elevated  Taken 3/2/2024 2000 by Jie Burris, RN  Activity Management: activity encouraged  Head of Bed (HOB) Positioning: HOB elevated   Goal Outcome Evaluation:

## 2024-03-03 NOTE — PLAN OF CARE
Goal Outcome Evaluation:  Plan of Care Reviewed With: patient           Outcome Evaluation: PT evaluation completed this am with pt presenting supine in bed on room air and O x 4. Pt was able to come to sit on EOB with HOB raised and extended time with modified indpendence. Pt was CGA with FWW for sit to stand and ambulation 15 ft. Pt was tired post gait but no shortness of breath noted. Pt presents with deficits in strength, balance, and endurance. She is expected to improve her functional mobility with continued PT services prior to d/c.

## 2024-03-03 NOTE — CASE MANAGEMENT/SOCIAL WORK
Discharge Planning Assessment  [facility]     Patient Name: Tasha Yarbrough  MRN: [id number]  Today's Date: 3/3/2024    Admit Date: 3/2/2024    Plan: Home with family   Discharge Needs Assessment       Row Name 03/03/24 0835       Living Environment    People in Home child(debra), adult    Current Living Arrangements home    In the past 12 months has the electric, gas, oil, or water company threatened to shut off services in your home? No    Primary Care Provided by self    Provides Primary Care For no one    Family Caregiver if Needed child(debra), adult    Quality of Family Relationships involved    Able to Return to Prior Arrangements yes       Resource/Environmental Concerns    Transportation Concerns none       Transportation Needs    In the past 12 months, has lack of transportation kept you from medical appointments or from getting medications? no    In the past 12 months, has lack of transportation kept you from meetings, work, or from getting things needed for daily living? No       Food Insecurity    Within the past 12 months, you worried that your food would run out before you got the money to buy more. Never true    Within the past 12 months, the food you bought just didn't last and you didn't have money to get more. Never true       Transition Planning    Patient/Family Anticipates Transition to home with family    Transportation Anticipated family or friend will provide       Discharge Needs Assessment    Readmission Within the Last 30 Days previous discharge plan unsuccessful    Equipment Currently Used at Home walker, standard;cane, straight    Concerns to be Addressed no discharge needs identified    Anticipated Changes Related to Illness none    Provided Post Acute Provider List? N/A    Provided Post Acute Provider Quality & Resource List? N/A                   Discharge Plan       Row Name 03/03/24 0842       Plan    Plan Home with family    Patient/Family in Agreement with Plan yes    Plan Comments  Spoke to pt regarding discharge plans  Confirmed address and phone number as being correct on face sheet She lives with her sons who assists as needed .They transport to the doctor ,.Does not have a Health Surrogate and is declining information regarding this .She is a readmit and reports i wanted to go home but I might have gone home too early She is interested in education on diet for her pancreatitis Consult placed   She declines the need for Home Health                  Continued Care and Services - Admitted Since 3/2/2024    No active coordination exists for this encounter.          Demographic Summary       Row Name 03/03/24 0833       General Information    Admission Type inpatient    Arrived From emergency department    Required Notices Provided Important Message from Medicare    Referral Source admission list    Reason for Consult discharge planning    Preferred Language English                   Functional Status       Row Name 03/03/24 0833       Functional Status    Usual Activity Tolerance good    Current Activity Tolerance good       Physical Activity    On average, how many days per week do you engage in moderate to strenuous exercise (like a brisk walk)? 0 days    On average, how many minutes do you engage in exercise at this level? 0 min    Number of minutes of exercise per week 0       Functional Status, IADL    Medications independent    Meal Preparation assistive person    Housekeeping assistive person    Laundry completely dependent    Shopping completely dependent       Mental Status    General Appearance WDL WDL                   Psychosocial    No documentation.                  Abuse/Neglect    No documentation.                  Legal    No documentation.                  Substance Abuse    No documentation.                  Patient Forms    No documentation.                     Deborah Montes RN

## 2024-03-03 NOTE — THERAPY EVALUATION
Patient Name: Tasha Yarbrouhg  : 1955    MRN: [id number]                              Today's Date: 3/3/2024       Admit Date: 3/2/2024    Visit Dx:     ICD-10-CM ICD-9-CM   1. Chronic pancreatitis, unspecified pancreatitis type  K86.1 577.1   2. Elevated lipase  R74.8 790.5   3. Leukocytosis, unspecified type  D72.829 288.60   4. Acute pancreatic fluid collection  K86.89 577.8   5. Epigastric pain  R10.13 789.06     Patient Active Problem List   Diagnosis    Colon cancer screening    Gastroesophageal reflux disease with esophagitis    Left lower quadrant abdominal pain    Irritable bowel syndrome with constipation    Encounter for screening for malignant neoplasm of colon    Periumbilical abdominal pain    Diarrhea of presumed infectious origin    Acute kidney injury    Bacterial colitis    VIVIENNE (acute kidney injury)    C. difficile colitis    Generalized abdominal pain    Diarrhea of infectious origin    Abnormal finding on GI tract imaging    Anemia, chronic disease    Colitis due to Clostridium difficile    Pancreatitis, acute    Chronic kidney disease, stage III (moderate)    Acute pancreatitis     Past Medical History:   Diagnosis Date    Hypertension     Impaired mobility     Injury of back      Past Surgical History:   Procedure Laterality Date    ABDOMINAL SURGERY      COLON SURGERY      COLONOSCOPY      TUBAL ABDOMINAL LIGATION        General Information       Row Name 24 1200          Physical Therapy Time and Intention    Document Type evaluation  -TW     Mode of Treatment physical therapy  -TW       Row Name 24 1200          General Information    Patient Profile Reviewed yes  -TW     Prior Level of Function independent:;all household mobility  pt states she used FWW for amb in home. Used shower chair for tub/shower but often sponge bathed due to difficulty getting into this area of home. Also has a BSC  -TW     Existing Precautions/Restrictions fall  -TW     Barriers to Rehab medically  complex;hearing deficit;previous functional deficit  -TW       Row Name 03/03/24 1200          Living Environment    People in Home child(debra), adult  -TW       Row Name 03/03/24 1200          Home Main Entrance    Number of Stairs, Main Entrance other (see comments)  -TW     Stair Railings, Main Entrance none  -TW       Row Name 03/03/24 1200          Stairs Within Home, Primary    Stairs, Within Home, Primary one onto porch then one step into home  -TW     Number of Stairs, Within Home, Primary none  -TW       Row Name 03/03/24 1200          Cognition    Orientation Status (Cognition) oriented x 4  -TW       Row Name 03/03/24 1200          Safety Issues, Functional Mobility    Safety Issues Affecting Function (Mobility) safety precaution awareness;safety precautions follow-through/compliance  -     Impairments Affecting Function (Mobility) balance;endurance/activity tolerance;strength  -TW               User Key  (r) = Recorded By, (t) = Taken By, (c) = Cosigned By      Initials Name Provider Type    TW Cande Phelps, PT Physical Therapist                   Mobility       Row Name 03/03/24 1200          Bed Mobility    Bed Mobility supine-sit  -TW     Supine-Sit [place] (Bed Mobility) modified independence  -TW     Assistive Device (Bed Mobility) head of bed elevated  -TW     Comment, (Bed Mobility) Extended time to get herself to sit on EOB but pt able to complete task with HOB raised  -[facility] Name 03/03/24 1200          Bed-Chair Transfer    Bed-Chair [place] (Transfers) contact guard  -     Assistive Device (Bed-Chair Transfers) walker, front-wheeled  -       Row Name 03/03/24 1200          Sit-Stand Transfer    Sit-Stand [place] (Transfers) contact guard;verbal cues  -     Assistive Device (Sit-Stand Transfers) walker, front-wheeled  -       Row Name 03/03/24 1200          Gait/Stairs (Locomotion)    [place] Level (Gait) contact guard  -     Assistive Device (Gait) walker,  front-wheeled  -TW     Patient was able to Ambulate yes  -TW     Distance in Feet (Gait) 15 ft  -TW     Deviations/Abnormal Patterns (Gait) stride length decreased;gait speed decreased  -TW     Bilateral Gait Deviations forward flexed posture;heel strike decreased  -TW               User Key  (r) = Recorded By, (t) = Taken By, (c) = Cosigned By      Initials Name Provider Type    TW [place], Cande, PT Physical Therapist                   Obj/Interventions       Row Name 03/03/24 1200          Range of Motion Comprehensive    Comment, General Range of Motion BLE grossly WFLS  -TW       Row Name 03/03/24 1200          Strength Comprehensive (MMT)    Comment, General Manual Muscle Testing (MMT) Assessment BLE grossly 3/5 to 3+/5. Pt states her BLE are better with edema this date but noted BLE foot/ankle and lower leg edema  -TW       Row Name 03/03/24 1200          Balance    Balance Assessment sitting static balance;sitting dynamic balance;sit to stand dynamic balance;standing static balance;standing dynamic balance  -TW     Static Sitting Balance modified independence  -TW     Dynamic Sitting Balance modified independence  -TW     Position, Sitting Balance unsupported;sitting edge of bed  -TW     Sit to Stand Dynamic Balance contact guard  -TW     Static Standing Balance contact guard  -TW     Dynamic Standing Balance contact guard  -TW     Position/Device Used, Standing Balance supported;walker, front-wheeled  -TW               User Key  (r) = Recorded By, (t) = Taken By, (c) = Cosigned By      Initials Name Provider Type    TW [place], Cande, PT Physical Therapist                   Goals/Plan       Row Name 03/03/24 1200          Bed Mobility Goal 1 (PT)    Activity/Assistive Device (Bed Mobility Goal 1, PT) bed mobility activities, all  -TW     [place] Level/Cues Needed (Bed Mobility Goal 1, PT) independent  -TW     Time Frame (Bed Mobility Goal 1, PT) short term goal (STG);4 days  -TW     Strategies/Barriers  (Bed Mobility Goal 1, PT) with bed flat  -TW     Progress/Outcomes (Bed Mobility Goal 1, PT) goal ongoing  -TW       Row Name 03/03/24 1200          Transfer Goal 1 (PT)    Activity/Assistive Device (Transfer Goal 1, PT) sit-to-stand/stand-to-sit;bed-to-chair/chair-to-bed;toilet;walker, rolling  -TW     [place] Level/Cues Needed (Transfer Goal 1, PT) supervision required  -TW     Time Frame (Transfer Goal 1, PT) long term goal (LTG);1 week  -TW     Progress/Outcome (Transfer Goal 1, PT) goal ongoing  -TW       Row Name 03/03/24 1200          Gait Training Goal 1 (PT)    Activity/Assistive Device (Gait Training Goal 1, PT) gait (walking locomotion);decrease fall risk;increase endurance/gait distance;walker, rolling  -TW     [place] Level (Gait Training Goal 1, PT) supervision required  -TW     Distance (Gait Training Goal 1, PT) 100 ft  -TW     Time Frame (Gait Training Goal 1, PT) 1 week  -TW     Progress/Outcome (Gait Training Goal 1, PT) goal ongoing  -TW       Row Name 03/03/24 1200          Patient Education Goal (PT)    Activity (Patient Education Goal, PT) BLE ther ex 1 x 10  -TW     [place]/Cues/Accuracy (Memory Goal 2, PT) demonstrates adequately  -TW     Time Frame (Patient Education Goal, PT) 1 week;long term goal (LTG)  -TW     Progress/Outcome (Patient Education Goal, PT) goal ongoing  -TW       Row Name 03/03/24 1200          Therapy Assessment/Plan (PT)    Planned Therapy Interventions (PT) balance training;bed mobility training;patient/family education;strengthening;transfer training;gait training  -TW               User Key  (r) = Recorded By, (t) = Taken By, (c) = Cosigned By      Initials Name Provider Type    TW Cande Phelps, PT Physical Therapist                   Clinical Impression       Row Name 03/03/24 1200          Pain    Pretreatment Pain Rating 0/10 - no pain  -TW     Posttreatment Pain Rating 0/10 - no pain  -TW     Pre/Posttreatment Pain Comment pt stated she doesn't  really have pain but is nauseated.  -TW       Row Name 03/03/24 1200          Plan of Care Review    Plan of Care Reviewed With patient  -TW     Outcome Evaluation PT evaluation completed this am with pt presenting supine in bed on room air and O x 4. Pt was able to come to sit on EOB with HOB raised and extended time with modified indpendence. Pt was CGA with FWW for sit to stand and ambulation 15 ft. Pt was tired post gait but no shortness of breath noted. Pt presents with deficits in strength, balance, and endurance. She is expected to improve her functional mobility with continued PT services prior to d/c.  -TW       Row Name 03/03/24 1200          Therapy Assessment/Plan (PT)    Patient/Family Therapy Goals Statement (PT) to go home with sons to assist as needed.  -TW     Rehab Potential (PT) good, to achieve stated therapy goals  -TW     Criteria for Skilled Interventions Met (PT) yes;meets criteria  -TW     Therapy Frequency (PT) 5 times/wk  -TW     Predicted Duration of Therapy Intervention (PT) 1 wk  -TW       Row Name 03/03/24 1200          Vital Signs    Pre SpO2 (%) 98  -TW     O2 Delivery Pre Treatment room air  -TW     Pre Patient Position Supine  -TW     Intra Patient Position Standing  -TW     Post Patient Position Sitting  -TW       Row Name 03/03/24 1200          Positioning and Restraints    Pre-Treatment Position in bed  -TW     Post Treatment Position chair  -TW     In Chair notified nsg;sitting;call light within reach;encouraged to call for assist;exit alarm on;with other staff  -TW               User Key  (r) = Recorded By, (t) = Taken By, (c) = Cosigned By      Initials Name Provider Type    TW Cande Phelps, PT Physical Therapist                   Outcome Measures       Row Name 03/03/24 1200 03/03/24 0800       How much help from another person do you currently need...    Turning from your back to your side while in flat bed without using bedrails? 4  -TW 4  -LA    Moving from lying on back  to sitting on the side of a flat bed without bedrails? 3  -TW 3  -LA    Moving to and from a bed to a chair (including a wheelchair)? 3  -TW 3  -LA    Standing up from a chair using your arms (e.g., wheelchair, bedside chair)? 3  -TW 3  -LA    Climbing 3-5 steps with a railing? 2  -TW 2  -LA    To walk in hospital room? 3  -TW 2  -LA    AM-PAC 6 Clicks Score (PT) 18  -TW 17  -LA    Highest Level of Mobility Goal 6 --> Walk 10 steps or more  -TW 5 --> Static standing  -LA      Row Name 03/03/24 1200          Functional Assessment    Outcome Measure Options AM-PAC 6 Clicks Basic Mobility (PT)  -TW               User Key  (r) = Recorded By, (t) = Taken By, (c) = Cosigned By      Initials Name Provider Type    TW Cande Phelps, MARCIAL Physical Therapist    Juan A Granados, RN Registered Nurse                                 Physical Therapy Education       Title: PT OT SLP Therapies (In Progress)       Topic: Physical Therapy (In Progress)       Point: Mobility training (Done)       Learning Progress Summary             Patient Acceptance, E, VU by TW at 3/3/2024 1200    Comment: Pt education for purpose of PT evaluation and her inpt PT POC/goals                         Point: Home exercise program (Not Started)       Learner Progress:  Not documented in this visit.              Point: Body mechanics (Not Started)       Learner Progress:  Not documented in this visit.              Point: Precautions (Not Started)       Learner Progress:  Not documented in this visit.                              User Key       Initials Effective Dates Name Provider Type Discipline    TW 06/16/21 -  Cande Phelps PT Physical Therapist PT                  PT Recommendation and Plan  Planned Therapy Interventions (PT): balance training, bed mobility training, patient/family education, strengthening, transfer training, gait training  Plan of Care Reviewed With: patient  Outcome Evaluation: PT evaluation completed this am with pt presenting  supine in bed on room air and O x 4. Pt was able to come to sit on EOB with HOB raised and extended time with modified indpendence. Pt was CGA with FWW for sit to stand and ambulation 15 ft. Pt was tired post gait but no shortness of breath noted. Pt presents with deficits in strength, balance, and endurance. She is expected to improve her functional mobility with continued PT services prior to d/c.     Time Calculation:   PT Evaluation Complexity  History, PT Evaluation Complexity: 3 or more personal factors and/or comorbidities  Examination of Body Systems (PT Eval Complexity): total of 4 or more elements  Clinical Presentation (PT Evaluation Complexity): stable  Clinical Decision Making (PT Evaluation Complexity): low complexity  Overall Complexity (PT Evaluation Complexity): low complexity     PT Charges       Row Name 03/03/24 1200             Time Calculation    Stop Time 1200  -TW      PT Received On 03/03/24  -TW      PT Goal Re-Cert Due Date 05/09/24  -TW                User Key  (r) = Recorded By, (t) = Taken By, (c) = Cosigned By      Initials Name Provider Type    TW Cande Phelps PT Physical Therapist                  Therapy Charges for Today       Code Description Service Date Service Provider Modifiers Qty    [id number]  PT EVAL LOW COMPLEXITY 3 3/3/2024 Cande Phelps PT GP 1            PT G-Codes  Outcome Measure Options: AM-PAC 6 Clicks Basic Mobility (PT)  AM-PAC 6 Clicks Score (PT): 18       Cande Phelps PT  3/3/2024

## 2024-03-03 NOTE — PLAN OF CARE
Problem: Adult Inpatient Plan of Care  Goal: Absence of Hospital-Acquired Illness or Injury  Intervention: Identify and Manage Fall Risk  Recent Flowsheet Documentation  Taken 3/3/2024 1600 by Juan A Ordaz RN  Safety Promotion/Fall Prevention:   clutter free environment maintained   assistive device/personal items within reach   activity supervised   fall prevention program maintained   lighting adjusted   gait belt   nonskid shoes/slippers when out of bed   safety round/check completed   room organization consistent  Taken 3/3/2024 1400 by Juan A Ordaz RN  Safety Promotion/Fall Prevention:   clutter free environment maintained   activity supervised   assistive device/personal items within reach   fall prevention program maintained   gait belt   lighting adjusted   nonskid shoes/slippers when out of bed   room organization consistent   safety round/check completed  Taken 3/3/2024 1200 by Juan A Ordaz RN  Safety Promotion/Fall Prevention:   assistive device/personal items within reach   activity supervised   clutter free environment maintained   fall prevention program maintained   gait belt   lighting adjusted   nonskid shoes/slippers when out of bed   safety round/check completed   room organization consistent  Taken 3/3/2024 1000 by Juan A Ordaz RN  Safety Promotion/Fall Prevention:   clutter free environment maintained   assistive device/personal items within reach   activity supervised   fall prevention program maintained   gait belt   lighting adjusted   nonskid shoes/slippers when out of bed   room organization consistent   safety round/check completed  Taken 3/3/2024 0800 by Juan A Ordaz RN  Safety Promotion/Fall Prevention: (Simultaneous filing. User may be unaware of other data.)   clutter free environment maintained   assistive device/personal items within reach   activity supervised   fall prevention program maintained   gait belt   lighting adjusted   nonskid shoes/slippers when out of bed   room  organization consistent   safety round/check completed  Intervention: Prevent Skin Injury  Recent Flowsheet Documentation  Taken 3/3/2024 1600 by Juan A Ordaz RN  Body Position:   turned   left   sitting up in bed  Taken 3/3/2024 1400 by Juan A Ordaz RN  Body Position:   turned   right   sitting up in bed  Taken 3/3/2024 1200 by Juan A Ordaz RN  Body Position:   turned   left   sitting up in bed  Taken 3/3/2024 1000 by Juan A Ordaz RN  Body Position:   turned   right   sitting up in bed  Taken 3/3/2024 0800 by Juan A Ordaz RN  Body Position: (Simultaneous filing. User may be unaware of other data.)   turned   left   sitting up in bed  Skin Protection:   adhesive use limited   tubing/devices free from skin contact  Intervention: Prevent and Manage VTE (Venous Thromboembolism) Risk  Recent Flowsheet Documentation  Taken 3/3/2024 1600 by Juan A Ordaz RN  Activity Management: activity encouraged  Taken 3/3/2024 1400 by Juan A Ordaz RN  Activity Management: activity encouraged  Taken 3/3/2024 1200 by Juan A Ordaz RN  Activity Management: activity encouraged  Taken 3/3/2024 1000 by Juan A Ordaz RN  Activity Management: activity encouraged  Taken 3/3/2024 0800 by Juan A Ordaz RN  Activity Management: (Simultaneous filing. User may be unaware of other data.) activity encouraged  Range of Motion: active ROM (range of motion) encouraged  Intervention: Prevent Infection  Recent Flowsheet Documentation  Taken 3/3/2024 1600 by Juan A Ordaz RN  Infection Prevention:   environmental surveillance performed   single patient room provided  Taken 3/3/2024 1400 by Juan A Ordaz RN  Infection Prevention:   environmental surveillance performed   single patient room provided  Taken 3/3/2024 1200 by Juan A Ordaz RN  Infection Prevention:   environmental surveillance performed   single patient room provided  Taken 3/3/2024 1000 by Juan A Ordaz RN  Infection Prevention:   environmental surveillance performed   single patient  room provided  Taken 3/3/2024 0800 by Juan A Ordaz RN  Infection Prevention:   environmental surveillance performed   single patient room provided  Goal: Optimal Comfort and Wellbeing  Intervention: Provide Person-Centered Care  Recent Flowsheet Documentation  Taken 3/3/2024 0800 by Juan A Ordaz RN  Trust Relationship/Rapport:   care explained   thoughts/feelings acknowledged   reassurance provided   questions encouraged   questions answered     Problem: Hypertension Comorbidity  Goal: Blood Pressure in Desired Range  Intervention: Maintain Blood Pressure Management  Recent Flowsheet Documentation  Taken 3/3/2024 1600 by Juan A Ordaz RN  Medication Review/Management: medications reviewed  Taken 3/3/2024 1400 by Juan A Ordaz RN  Medication Review/Management: medications reviewed  Taken 3/3/2024 1200 by Juan A Ordaz RN  Medication Review/Management: medications reviewed  Taken 3/3/2024 1000 by Juan A Ordaz RN  Medication Review/Management: medications reviewed  Taken 3/3/2024 0800 by Juan A Ordaz RN  Medication Review/Management: medications reviewed     Problem: Skin Injury Risk Increased  Goal: Skin Health and Integrity  Intervention: Optimize Skin Protection  Recent Flowsheet Documentation  Taken 3/3/2024 1600 by Juan A Ordaz RN  Head of Bed (HOB) Positioning:   HOB elevated   HOB at 60-90 degrees  Taken 3/3/2024 1400 by Juan A Ordaz RN  Head of Bed (HOB) Positioning:   HOB elevated   HOB at 30-45 degrees  Taken 3/3/2024 1200 by Juan A Ordaz RN  Head of Bed (HOB) Positioning:   HOB elevated   HOB at 60-90 degrees  Taken 3/3/2024 1000 by Juan A Ordaz RN  Head of Bed (HOB) Positioning:   HOB elevated   HOB at 30-45 degrees  Taken 3/3/2024 0800 by Juan A Ordaz RN  Pressure Reduction Techniques:   frequent weight shift encouraged   weight shift assistance provided  Head of Bed (HOB) Positioning: (Simultaneous filing. User may be unaware of other data.)   HOB elevated   HOB at 60-90 degrees  Pressure  Reduction Devices: positioning supports utilized  Skin Protection:   adhesive use limited   tubing/devices free from skin contact     Problem: Pain Acute  Goal: Acceptable Pain Control and Functional Ability  Intervention: Prevent or Manage Pain  Recent Flowsheet Documentation  Taken 3/3/2024 1600 by Juan A Ordaz RN  Medication Review/Management: medications reviewed  Taken 3/3/2024 1400 by Juan A Ordaz RN  Medication Review/Management: medications reviewed  Taken 3/3/2024 1200 by Juan A Ordaz RN  Medication Review/Management: medications reviewed  Taken 3/3/2024 1000 by Juan A Ordaz RN  Medication Review/Management: medications reviewed  Taken 3/3/2024 0800 by Juan A Ordaz RN  Medication Review/Management: medications reviewed  Intervention: Optimize Psychosocial Wellbeing  Recent Flowsheet Documentation  Taken 3/3/2024 0800 by Juan A Ordaz RN  Supportive Measures: active listening utilized  Diversional Activities: television     Problem: Pain (Pancreatitis)  Goal: Acceptable Pain Control  Intervention: Monitor and Manage Pain  Recent Flowsheet Documentation  Taken 3/3/2024 0800 by Juan A Ordaz RN  Diversional Activities: television     Problem: Respiratory Compromise (Pancreatitis)  Goal: Effective Oxygenation and Ventilation  Intervention: Optimize Oxygenation and Ventilation  Recent Flowsheet Documentation  Taken 3/3/2024 1600 by Juan A Ordaz RN  Activity Management: activity encouraged  Head of Bed (HOB) Positioning:   HOB elevated   HOB at 60-90 degrees  Cough And Deep Breathing: done independently per patient  Taken 3/3/2024 1400 by Juan A Ordaz RN  Activity Management: activity encouraged  Head of Bed (HOB) Positioning:   HOB elevated   HOB at 30-45 degrees  Taken 3/3/2024 1200 by Juan A Ordaz RN  Activity Management: activity encouraged  Head of Bed (HOB) Positioning:   HOB elevated   HOB at 60-90 degrees  Cough And Deep Breathing: done independently per patient  Taken 3/3/2024 1000 by Orlin  KATERINE Velazco  Activity Management: activity encouraged  Head of Bed (HOB) Positioning:   HOB elevated   HOB at 30-45 degrees  Taken 3/3/2024 0800 by Juan A Ordaz RN  Activity Management: (Simultaneous filing. User may be unaware of other data.) activity encouraged  Head of Bed (HOB) Positioning: (Simultaneous filing. User may be unaware of other data.)   HOB elevated   HOB at 60-90 degrees  Cough And Deep Breathing: done independently per patient     Problem: Fall Injury Risk  Goal: Absence of Fall and Fall-Related Injury  Intervention: Identify and Manage Contributors  Recent Flowsheet Documentation  Taken 3/3/2024 1600 by Juan A Ordaz RN  Medication Review/Management: medications reviewed  Taken 3/3/2024 1400 by Juan A Ordaz RN  Medication Review/Management: medications reviewed  Taken 3/3/2024 1200 by Juan A Ordaz RN  Medication Review/Management: medications reviewed  Taken 3/3/2024 1000 by Juan A Ordaz RN  Medication Review/Management: medications reviewed  Taken 3/3/2024 0800 by Juan A Ordaz RN  Medication Review/Management: medications reviewed  Self-Care Promotion: independence encouraged  Intervention: Promote Injury-Free Environment  Recent Flowsheet Documentation  Taken 3/3/2024 1600 by Juan A Ordaz RN  Safety Promotion/Fall Prevention:   clutter free environment maintained   assistive device/personal items within reach   activity supervised   fall prevention program maintained   lighting adjusted   gait belt   nonskid shoes/slippers when out of bed   safety round/check completed   room organization consistent  Taken 3/3/2024 1400 by Juan A Ordaz RN  Safety Promotion/Fall Prevention:   clutter free environment maintained   activity supervised   assistive device/personal items within reach   fall prevention program maintained   gait belt   lighting adjusted   nonskid shoes/slippers when out of bed   room organization consistent   safety round/check completed  Taken 3/3/2024 1200 by Juan A Ordaz  RN  Safety Promotion/Fall Prevention:   assistive device/personal items within reach   activity supervised   clutter free environment maintained   fall prevention program maintained   gait belt   lighting adjusted   nonskid shoes/slippers when out of bed   safety round/check completed   room organization consistent  Taken 3/3/2024 1000 by Juan A Ordaz RN  Safety Promotion/Fall Prevention:   clutter free environment maintained   assistive device/personal items within reach   activity supervised   fall prevention program maintained   gait belt   lighting adjusted   nonskid shoes/slippers when out of bed   room organization consistent   safety round/check completed  Taken 3/3/2024 0800 by Juan A Ordaz RN  Safety Promotion/Fall Prevention: (Simultaneous filing. User may be unaware of other data.)   clutter free environment maintained   assistive device/personal items within reach   activity supervised   fall prevention program maintained   gait belt   lighting adjusted   nonskid shoes/slippers when out of bed   room organization consistent   safety round/check completed   Goal Outcome Evaluation:           Progress: improving  Outcome Evaluation: Patient participated in therapy today. Has ambulated to the bedside commode multiple times throuhgout shift. Diet advanced, pt tolerated well. Some complaints of nausea this morning, see MAR. Will continue to monitor.

## 2024-03-03 NOTE — OUTREACH NOTE
Medical Week 1 Survey      Flowsheet Row Responses   [facility] patient discharged from? [facility]   Does the patient have one of the following disease processes/diagnoses(primary or secondary)? Other   Week 1 attempt successful? No   Unsuccessful attempts Attempt 2   Revoke Readmitted            ALDEN GRADY - Registered Nurse

## 2024-03-04 ENCOUNTER — READMISSION MANAGEMENT (OUTPATIENT)
Dept: CALL CENTER | Facility: HOSPITAL | Age: 69
End: 2024-03-04
Payer: MEDICARE

## 2024-03-04 VITALS
RESPIRATION RATE: 18 BRPM | WEIGHT: 149.47 LBS | DIASTOLIC BLOOD PRESSURE: 86 MMHG | BODY MASS INDEX: 27.51 KG/M2 | OXYGEN SATURATION: 96 % | HEIGHT: 62 IN | HEART RATE: 81 BPM | SYSTOLIC BLOOD PRESSURE: 118 MMHG | TEMPERATURE: 97.1 F

## 2024-03-04 LAB
ALBUMIN SERPL-MCNC: 2.4 G/DL (ref 3.5–5.2)
ALBUMIN/GLOB SERPL: 1 G/DL
ALP SERPL-CCNC: 65 U/L (ref 39–117)
ALT SERPL W P-5'-P-CCNC: 11 U/L (ref 1–33)
ANION GAP SERPL CALCULATED.3IONS-SCNC: 8.6 MMOL/L (ref 5–15)
AST SERPL-CCNC: 11 U/L (ref 1–32)
BILIRUB SERPL-MCNC: <0.2 MG/DL (ref 0–1.2)
BUN SERPL-MCNC: 16 MG/DL (ref 8–23)
BUN/CREAT SERPL: 13.2 (ref 7–25)
CALCIUM SPEC-SCNC: 8.2 MG/DL (ref 8.6–10.5)
CHLORIDE SERPL-SCNC: 116 MMOL/L (ref 98–107)
CO2 SERPL-SCNC: 19.4 MMOL/L (ref 22–29)
CREAT SERPL-MCNC: 1.21 MG/DL (ref 0.57–1)
DEPRECATED RDW RBC AUTO: 57.3 FL (ref 37–54)
EGFRCR SERPLBLD CKD-EPI 2021: 48.9 ML/MIN/1.73
ERYTHROCYTE [DISTWIDTH] IN BLOOD BY AUTOMATED COUNT: 14.1 % (ref 12.3–15.4)
GLOBULIN UR ELPH-MCNC: 2.5 GM/DL
GLUCOSE SERPL-MCNC: 80 MG/DL (ref 65–99)
HCT VFR BLD AUTO: 25.6 % (ref 34–46.6)
HGB BLD-MCNC: 8.2 G/DL (ref 12–15.9)
MCH RBC QN AUTO: 35.2 PG (ref 26.6–33)
MCHC RBC AUTO-ENTMCNC: 32 G/DL (ref 31.5–35.7)
MCV RBC AUTO: 109.9 FL (ref 79–97)
PLATELET # BLD AUTO: 176 10*3/MM3 (ref 140–450)
PMV BLD AUTO: 10.8 FL (ref 6–12)
POTASSIUM SERPL-SCNC: 3.9 MMOL/L (ref 3.5–5.2)
PROT SERPL-MCNC: 4.9 G/DL (ref 6–8.5)
RBC # BLD AUTO: 2.33 10*6/MM3 (ref 3.77–5.28)
SODIUM SERPL-SCNC: 144 MMOL/L (ref 136–145)
WBC NRBC COR # BLD AUTO: 8.07 10*3/MM3 (ref 3.4–10.8)

## 2024-03-04 PROCEDURE — 85027 COMPLETE CBC AUTOMATED: CPT | Performed by: INTERNAL MEDICINE

## 2024-03-04 PROCEDURE — 80053 COMPREHEN METABOLIC PANEL: CPT | Performed by: INTERNAL MEDICINE

## 2024-03-04 RX ORDER — HYDROCODONE BITARTRATE AND ACETAMINOPHEN 7.5; 325 MG/1; MG/1
1 TABLET ORAL EVERY 8 HOURS PRN
Qty: 5 TABLET | Refills: 0 | Status: SHIPPED | OUTPATIENT
Start: 2024-03-04

## 2024-03-04 RX ADMIN — METOPROLOL TARTRATE 50 MG: 50 TABLET ORAL at 08:37

## 2024-03-04 RX ADMIN — SODIUM BICARBONATE 650 MG TABLET 650 MG: at 08:36

## 2024-03-04 RX ADMIN — Medication 10 ML: at 08:37

## 2024-03-04 RX ADMIN — MUPIROCIN 1 APPLICATION: 20 OINTMENT TOPICAL at 08:37

## 2024-03-04 RX ADMIN — FUROSEMIDE 20 MG: 20 TABLET ORAL at 08:36

## 2024-03-04 RX ADMIN — CITALOPRAM HYDROBROMIDE 20 MG: 20 TABLET ORAL at 08:37

## 2024-03-04 RX ADMIN — FAMOTIDINE 40 MG: 20 TABLET, FILM COATED ORAL at 08:37

## 2024-03-04 NOTE — CONSULTS
"Dietitian Assessment    Patient Name: Tasha Yarbrough  YOB: 1955  MRN: [id number]  Admission date: 3/2/2024    Comment:    Clinical Nutrition Assessment      Reason for Assessment Diet education    H&P  Past Medical History:   Diagnosis Date    Hypertension     Impaired mobility     Injury of back        Past Surgical History:   Procedure Laterality Date    ABDOMINAL SURGERY      COLON SURGERY      COLONOSCOPY      TUBAL ABDOMINAL LIGATION              Current Problems   Acute pancreatitis  CKD  Anemia     Encounter Information        Trending Narrative     3/4: Pt w/ CKD and acute pancreatitis - attached education to discharge paperwork to be given to patient. Pt discharging today. Recommend pt to avoid high fat, high sodium, high potassium, and phosphorus foods.      Anthropometrics        Current Height, Weight Height: 157.5 cm (62\")  Weight: 67.8 kg (149 lb 7.6 oz) (03/04/24 0415)   Trending Weight Hx     This admission:              PTA:     Wt Readings from Last 30 Encounters:   03/04/24 0415 67.8 kg (149 lb 7.6 oz)   03/03/24 0430 62.6 kg (138 lb 0.1 oz)   03/02/24 0057 62.6 kg (138 lb)   02/29/24 0339 62.7 kg (138 lb 3.7 oz)   02/27/24 0326 63.9 kg (140 lb 14 oz)   02/26/24 0302 62.4 kg (137 lb 9.1 oz)   02/24/24 2333 65.8 kg (145 lb)   09/25/23 0323 68.9 kg (151 lb 14.4 oz)   09/24/23 0336 68.5 kg (151 lb 0.2 oz)   09/23/23 0500 67.4 kg (148 lb 9.4 oz)   09/22/23 2345 66.8 kg (147 lb 4.3 oz)   09/22/23 1932 61.2 kg (135 lb)   09/02/23 1847 60.8 kg (134 lb)   08/18/22 2132 78.9 kg (174 lb)   08/08/22 0500 79.7 kg (175 lb 11.3 oz)   08/07/22 0536 77.4 kg (170 lb 10.2 oz)   08/06/22 2340 77 kg (169 lb 12.1 oz)   08/06/22 2019 76.7 kg (169 lb)   07/30/22 0441 79 kg (174 lb 2.6 oz)   07/29/22 0417 78.7 kg (173 lb 8 oz)   07/27/22 0353 78.9 kg (173 lb 15.1 oz)   07/26/22 1552 76.7 kg (169 lb)   05/06/21 1626 92.1 kg (203 lb)   10/04/20 0002 90.7 kg (200 lb)   08/15/20 1940 97.5 kg (215 lb)   05/23/20 " "1814 95.3 kg (210 lb)      BMI kg/m2 Body mass index is 27.34 kg/m².     Labs        Pertinent Labs     Results from last 7 days   Lab Units 24  0511 24  1203 24  0059   SODIUM mmol/L 144 141 144   POTASSIUM mmol/L 3.9 3.7 4.0   CHLORIDE mmol/L 116* 108* 111*   CO2 mmol/L 19.4* 20.6* 20.0*   BUN mg/dL 16 13 17   CREATININE mg/dL 1.21* 1.12* 1.52*   CALCIUM mg/dL 8.2* 8.6 8.9   BILIRUBIN mg/dL <0.2 0.2 <0.2   ALK PHOS U/L 65 79 101   ALT (SGPT) U/L 11 15 23   AST (SGOT) U/L 11 13 23   GLUCOSE mg/dL 80 91 89       Results from last 7 days   Lab Units 24  0511 24  0059 24  0633 24  0845   MAGNESIUM mg/dL  --   --   --  1.8   PHOSPHORUS mg/dL  --   --  2.7  --    HEMOGLOBIN g/dL 8.2*   < > 8.3* 8.6*   HEMATOCRIT % 25.6*   < > 25.1* 25.5*    < > = values in this interval not displayed.       No results found for: \"HGBA1C\"         Medications       Scheduled Medications citalopram, 20 mg, Oral, Daily  famotidine, 40 mg, Oral, Daily  furosemide, 20 mg, Oral, Daily  metoprolol tartrate, 50 mg, Oral, Daily  mupirocin, , Topical, Q12H  nicotine, 1 patch, Transdermal, Q24H  sodium bicarbonate, 650 mg, Oral, BID  sodium chloride, 10 mL, Intravenous, Q12H        Infusions Pharmacy Consult - Pharmacy to dose,   Pharmacy Consult - Pharmacy to dose,          PRN Medications   acetaminophen **OR** acetaminophen **OR** acetaminophen    senna-docusate sodium **AND** polyethylene glycol **AND** bisacodyl **AND** bisacodyl    calcium carbonate    Calcium Replacement - Follow Nurse / BPA Driven Protocol    HYDROcodone-acetaminophen    influenza vaccine    Magnesium Standard Dose Replacement - Follow Nurse / BPA Driven Protocol    [] Morphine **AND** naloxone    nitroglycerin    ondansetron    Pharmacy Consult - Pharmacy to dose    Pharmacy Consult - Pharmacy to dose    Phosphorus Replacement - Follow Nurse / BPA Driven Protocol    Potassium Replacement - Follow Nurse / BPA Driven " Protocol    [COMPLETED] Insert Peripheral IV **AND** sodium chloride    [COMPLETED] Insert Peripheral IV **AND** sodium chloride    sodium chloride    sodium chloride     Physical Findings        Trending Physical   Appearance, NFPE    --  Edema  None reported   Bowel Function Last bm 3/4   Tubes None   Chewing/Swallowing WNL    Skin WNL      Estimated/Assessed Needs       Energy Requirements    EST Needs, Method, Wt used 1675-2010kcals/day using 25-30kcals/kg       Protein Requirements    EST Needs, Method, Wt used 67g protein per day using 1g/kg       Fluid Requirements     Estimated Needs (mL/day) 2010mL per day        Current Nutrition Orders & Evaluation of Intake       Oral Nutrition     Food Allergies    Current PO Diet Diet: Gastrointestinal; Fiber-Restricted, Fat-Restricted; Fluid Consistency: Thin (IDDSI 0)   Supplement    PO Evaluation     Trending % PO Intake 3/4: 92% x 3 meals     Enteral Nutrition    Enteral Route    Order, Modulars, Flushes    Residual/Tolerance    TF Observation         Parenteral Nutrition     TPN Route    Total # Days on TPN    TPN Order, Lipid Details    MVI & Trace Element Freq    TPN Observation       Nutrition Diagnosis         Nutrition Dx Problem 1 Altered nutrition r/t lab values r/t acute pancreatitis as evidenced by lipase of 582 U/L       Nutrition Dx Problem 2        Intervention Goal         Intervention Goal(s) Adherence to low fat, low sodium, low phosphorus, and low potassium diet regimen      Nutrition Intervention        RD Action Attached diet education to discharge planning paperwork      Nutrition Prescription          Diet Prescription GI fiber restricted   Supplement Prescription      Enteral Prescription        TPN Prescription      Monitor/Evaluation        Monitor Per protocol, Pertinent labs, Weight, Symptoms, Hemodynamic stability     RD available PRN.     Electronically signed by:  Esequiel Loredo RD  03/04/24 09:32 EST

## 2024-03-04 NOTE — OUTREACH NOTE
Prep Survey      Flowsheet Row Responses   [facility] facility patient discharged from? [facility]   Is LACE score < 7 ? No   Eligibility Readm Mgmt   Discharge diagnosis Pancreatitis, acute   Does the patient have one of the following disease processes/diagnoses(primary or secondary)? Other   Does the patient have Home health ordered? Yes   What is the Home health agency?  declined HH   Is there a DME ordered? No   Prep survey completed? Yes            Aster CRABTREE - Registered Nurse

## 2024-03-04 NOTE — PLAN OF CARE
Goal Outcome Evaluation:  Plan of Care Reviewed With: patient           Outcome Evaluation: Patient did well overnight. No complaints of pain. Vitals signs have remained stable and will continue to monitor. No overnight events to report. Possible discharge in a day or two.

## 2024-03-04 NOTE — CASE MANAGEMENT/SOCIAL WORK
Case Management Discharge Note      Final Note: Refused Home Health    Provided Post Acute Provider List?: N/A  Provided Post Acute Provider Quality & Resource List?: N/A    Selected Continued Care - Discharged on 3/4/2024 Admission date: 3/2/2024 - Discharge disposition: Home or Self Care         Transportation Services  Private: Car    Final Discharge Disposition Code: 01 - home or self-care

## 2024-03-04 NOTE — DISCHARGE SUMMARY
[facility]   DISCHARGE SUMMARY      Name:  Tasha Yarbrough   Age:  68 y.o.  Sex:  female  :  1955  MRN:  [id number]   Visit Number:  [id number]    Admission Date:  3/2/2024  Date of Discharge:  3/4/2024  Primary Care Physician:  Terrence Baldwin MD    Important issues to note:    -Patient admitted for acute abdominal pain with pancreatitis noted on CT scan.  Improved with overall conservative measures with IV fluids and pain control.  -Labs and imaging reviewed.  No known cause of pancreatitis identified in the past.  -She does have follow-up with GI due to recurrent colitis and pancreatitis symptoms.  -Patient with otherwise reassuring labs and vitals noted.  She is able to tolerate p.o. intake.  She will be discharged home with strict return precautions given.    Discharge Diagnoses:     Acute pancreatitis with pseudocyst, idiopathic, POA  Acute colitis, POA  Chronic kidney disease stage III  Chronic anemia  GERD  Essential hypertension    Problem List:     Active Hospital Problems    Diagnosis  POA    **Pancreatitis, acute [K85.90]  Yes    Chronic kidney disease, stage III (moderate) [N18.30]  Yes    Anemia, chronic disease [D63.8]  Yes    C. difficile colitis [A04.72]  Yes    Gastroesophageal reflux disease with esophagitis [K21.00]  Yes      Resolved Hospital Problems   No resolved problems to display.     Presenting Problem:    Chief Complaint   Patient presents with    Abdominal Pain      Consults:     Consulting Physician(s)                     None              Procedures Performed:        History of presenting illness/Hospital Course:    Ms. Yarbrough is a pleasant 68-year-old female with pertinent past medical history of chronic kidney disease, hypertension, pancreatitis, and colitis due to C. difficile several months ago who presented to the emergency department for abdominal pain.  Patient recently discharged for acute colitis on Keflex and Flagyl.  Patient had been  discharged and returned to the emergency department within 48 hours for abdominal pain after eating fish.  Patient does have known history of colitis for which she attributes to C. difficile that she had several months ago.  Has appointment to see GI.  Does have known idiopathic pancreatitis.    Upon ED presentation patient hemodynamically stable on room air.  Pertinent labs and imaging included WBC 13, hemoglobin 9.5, lipase 1026, creatinine 1.5 which is baseline, CT abdomen pelvis noted 6.1 x 3.0 cm fluid collection along the ventral margin of the pancreatic tail extending to and compressing the greater gastric curvature approximately most consistent with acute fluid collection related to acute pancreatitis.  Supportive care continued.  Patient overall improvement and able to advance diet as tolerated.  She will follow-up with GI as scheduled.  Diet education given.  Patient did complete Keflex and Flagyl.  Strict return precautions given.    Vital Signs:    Temp:  [97.1 °F (36.2 °C)-98.6 °F (37 °C)] 97.1 °F (36.2 °C)  Heart Rate:  [54-81] 81  Resp:  [18] 18  BP: (100-126)/(67-86) 118/86    Physical Exam:    General Appearance:  Alert and cooperative.  Chronically ill/frail appearing elderly female.   Head:  Atraumatic and normocephalic.   Eyes: Conjunctivae and sclerae normal, no icterus. No pallor.   Ears:  Ears with no abnormalities noted.   Throat: No oral lesions, no thrush, oral mucosa moist.   Neck: Supple, trachea midline, no thyromegaly.   Back:   No kyphoscoliosis present. No tenderness to palpation.   Lungs:   Breath sounds heard bilaterally equally.  No crackles or wheezing. No Pleural rub or bronchial breathing.  On room air unlabored.   Heart:  Normal S1 and S2, no murmur, no gallop, no rub. No JVD.   Abdomen:   Normal bowel sounds, no masses, no organomegaly. Soft, nontender, nondistended, no rebound tenderness.   Extremities: Supple, no edema, no cyanosis, no clubbing.   Pulses: Pulses palpable  bilaterally.   Skin: No bleeding or rash.   Neurologic: Alert and oriented x 3. No facial asymmetry. Moves all four limbs. No tremors.  Generalized weakness.     Pertinent Lab Results:     Results from last 7 days   Lab Units 03/04/24  0511 03/03/24  1203 03/02/24  0059   SODIUM mmol/L 144 141 144   POTASSIUM mmol/L 3.9 3.7 4.0   CHLORIDE mmol/L 116* 108* 111*   CO2 mmol/L 19.4* 20.6* 20.0*   BUN mg/dL 16 13 17   CREATININE mg/dL 1.21* 1.12* 1.52*   CALCIUM mg/dL 8.2* 8.6 8.9   BILIRUBIN mg/dL <0.2 0.2 <0.2   ALK PHOS U/L 65 79 101   ALT (SGPT) U/L 11 15 23   AST (SGOT) U/L 11 13 23   GLUCOSE mg/dL 80 91 89     Results from last 7 days   Lab Units 03/04/24  0511 03/03/24  1203 03/02/24  0059   WBC 10*3/mm3 8.07 10.49 13.05*   HEMOGLOBIN g/dL 8.2* 9.8* 9.5*   HEMATOCRIT % 25.6* 30.8* 28.6*   PLATELETS 10*3/mm3 176 204 190                     Results from last 7 days   Lab Units 03/03/24  1203   LIPASE U/L 580*     Results from last 7 days   Lab Units 02/27/24  0522   PH, ARTERIAL pH units 7.305*   PO2 ART mm Hg 90.6   PCO2, ARTERIAL mm Hg 30.1*   HCO3 ART mmol/L 15.0*           Pertinent Radiology Results:    Imaging Results (All)       Procedure Component Value Units Date/Time    CT Abdomen Pelvis With Contrast [[id number]] Collected: 03/02/24 0416     Updated: 03/02/24 0418    Narrative:      FINAL REPORT    TECHNIQUE:  null    CLINICAL HISTORY:  Pancreatitis versus pancreatic fluid collection/abscess, CT  abd/pelvis w/o today    COMPARISON:  null    FINDINGS:  Exam: CT abdomen and pelvis with IV contrast.    Comparison: Comparison: CT/SR - CT ABDOMEN PELVIS WO CONTRAST - 03/02/2024 02:38 AM EST    CT/SR - CT ABDOMEN PELVIS WO CONTRAST - 02/25/2024 12:51 AM EST    Findings:    Small bilateral pleural effusions.    Stable moderate hiatal hernia.    No free air.    No solid liver mass. Stable hepatomegaly.    Cholecystectomy. Stable biliary ductal dilatation likely due to cholecystectomy effects.    No clinically  significant biliary ductal dilation.    No splenomegaly or suspicious splenic lesions.    Persistent stranding about the pancreatic tail consistent with acute pancreatitis improved versus the exam of 02/25/2024. 6.1 x 3.0 cm fluid collection along the ventral margin of the pancreatic tail extending to and compressing the greater gastric   curvature proximally most consistent with acute fluid collection related to acute pancreatitis. This is better demonstrated on the current contrast exam. Collection contains an imperceptible wall. This is visible only in retrospect on exam of 02/25/24   when measuring 2.3 x 1.6 cm.    Adrenal glands without nodule.    No suspicious renal mass. No hydronephrosis. No significant stranding. Stable bilateral renal cysts and nonobstructing right renal stone.    Bladder without acute pathology.    Postoperative changes sigmoid colon. Dense colonic fecal retention without obstruction.    No adenopathy.    Atheromatous abdominal aortic calcifications without aneurysm.    No suspicious pelvic masses.    No acute soft tissue pathology.    No acute osseous pathology. Degenerative changes. Lumbar dextroscoliosis.      Impression:      IMPRESSION:    6.1 x 3.0 cm fluid collection along the ventral margin of the pancreatic tail extending to and compressing the greater gastric curvature proximally most consistent with acute fluid collection related to acute pancreatitis.    This is visible only in retrospect on exam of 02/25/24 when measuring 2.3 x 1.6 cm.    Persistent stranding about the pancreatic tail consistent with acute pancreatitis improved versus the exam of 02/25/2024.    Authenticated and Electronically Signed by Luan Antoine MD  on 03/02/2024 04:16:54 AM    CT Abdomen Pelvis Without Contrast [[id number]] Collected: 03/02/24 0259     Updated: 03/02/24 0301    Narrative:      FINAL REPORT    TECHNIQUE:  null    CLINICAL HISTORY:  Right upper quadrant  pain    COMPARISON:  null    FINDINGS:  Exam: CT abdomen and pelvis without IV contrast.    Comparison: CT/SR - CT ABDOMEN PELVIS WO CONTRAST - 02/25/2024 12:51 AM EST    Findings:    Small bilateral pleural effusions.    Stable moderate hiatal hernia.    No free air.    No solid liver mass. Enlarged at 20.8 cm.    Cholecystectomy.    No clinically significant biliary ductal dilation.    No splenomegaly or suspicious splenic lesions.    Stranding about the pancreatic tail again noted consistent with pancreatitis. Oval 6.1 x 3.0 cm fluid collection along the ventral margin of the pancreatic tail may reflect pancreatitis related acute fluid collection, new vs prior, and suboptimally   visualized without contrast.    Adrenal glands without nodule.    No suspicious renal mass. No hydronephrosis. No significant stranding. Stable bilateral renal cysts and nonobstructing right renal stone.    Bladder without acute pathology.    Postoperative changes sigmoid colon. Dense colonic fecal retention without obstruction.    No adenopathy.    Atheromatous abdominal aortic calcifications without aneurysm.    No suspicious pelvic masses.    No acute soft tissue pathology.    No acute osseous pathology. Degenerative changes. Lumbar dextroscoliosis.      Impression:      IMPRESSION:    Oval 6.1 x 3.0 cm fluid collection along the ventral margin of the pancreatic tail may reflect pancreatitis related acute fluid collection, new vs prior, and suboptimally visualized without contrast.    Stranding about the pancreatic tail again noted consistent with pancreatitis.    Dense colonic fecal retention without obstruction.]    Stable hepatomegaly.    Stable hiatal hernia.    Small bilateral pleural effusions, new.    Authenticated and Electronically Signed by Luan Antoine MD  on 03/02/2024 02:59:21 AM            Echo:      Condition on Discharge:      Stable.    Code status during the hospital stay:    Code Status and Medical  Interventions:   Ordered at: 03/02/24 1348     Code Status (Patient has no pulse and is not breathing):    CPR (Attempt to Resuscitate)     Medical Interventions (Patient has pulse or is breathing):    Full Support     Discharge Disposition:    Home or Self Care    Discharge Medications:       Discharge Medications        Changes to Medications        Instructions Start Date   HYDROcodone-acetaminophen 7.5-325 MG per tablet  Commonly known as: NORCO  What changed: Another medication with the same name was added. Make sure you understand how and when to take each.   1 tablet, Oral, Every 8 Hours PRN      HYDROcodone-acetaminophen 7.5-325 MG per tablet  Commonly known as: TALIB  What changed: You were already taking a medication with the same name, and this prescription was added. Make sure you understand how and when to take each.   1 tablet, Oral, Every 8 Hours PRN             Continue These Medications        Instructions Start Date   citalopram 40 MG tablet  Commonly known as: CeleXA   1 tablet, Oral, Daily      famotidine 40 MG tablet  Commonly known as: PEPCID   40 mg, Oral, Daily      furosemide 20 MG tablet  Commonly known as: LASIX   20 mg, Oral, Daily      glucosamine-chondroitin 500-400 MG capsule capsule   1 capsule, Oral, 2 Times Daily With Meals      methocarbamol 750 MG tablet  Commonly known as: ROBAXIN   1-2 tablets, Oral, 3 Times Daily PRN, Take one to two tablets three times daily as needed      metoprolol tartrate 50 MG tablet  Commonly known as: LOPRESSOR   50 mg, Oral, Daily      nicotine 21 MG/24HR patch  Commonly known as: NICODERM CQ   1 patch, Transdermal, Every 24 Hours      ondansetron 8 MG tablet  Commonly known as: ZOFRAN   8 mg, Oral, Every 8 Hours PRN      sodium bicarbonate 650 MG tablet   650 mg, Oral, 2 Times Daily      vitamin D3 125 MCG (5000 UT) capsule capsule   1 capsule, Oral, Daily             Stop These Medications      cephalexin 500 MG capsule  Commonly known as: KEFLEX      Florastor 250 MG capsule  Generic drug: saccharomyces boulardii     metroNIDAZOLE 500 MG tablet  Commonly known as: FLAGYL     saccharomyces boulardii 250 MG capsule  Commonly known as: Florastor            Discharge Diet:     Diet Instructions       Diet: Gastrointestinal Diets; Fiber-Restricted, Fat-Restricted; Thin (IDDSI 0)      Discharge Diet: Gastrointestinal Diets    Gastrointestinal Diet:  Fiber-Restricted  Fat-Restricted       Fluid Consistency: Thin (IDDSI 0)          Activity at Discharge:     Activity Instructions       Up WIth Assist            Follow-up Appointments:    Additional Instructions for the Follow-ups that You Need to Schedule       Ambulatory Referral to Home Health (Hospital)   As directed      Face to Face Visit Date: 3/4/2024   Follow-up provider for Plan of Care?: I treated the patient in an acute care facility and will not continue treatment after discharge.   Follow-up provider: TIAN FITZGERALD [[id number]]   Reason/Clinical Findings: debility   Describe mobility limitations that make leaving home difficult: debility   Nursing/Therapeutic Services Requested: Physical Therapy Occupational Therapy   PT orders: Strengthening Home safety assessment Gait Training Therapeutic exercise   Weight Bearing Status: As Tolerated   Occupational orders: Activities of daily living Strengthening Energy conservation Home safety assessment               Follow-up Information       Terrence Baldwin MD Follow up on 3/11/2024.    Specialty: Family Medicine  Why: @ 2:30 PM  Contact information:  [facility]  [place] [address]  [phone]               Michelle Wright PA-C. Go on 5/22/2024.    Specialties: Physician Assistant, Gastroenterology  Why: @ 9:30am  Contact information:  [address]  [address]  [place] [address]  [phone]                           Future Appointments   Date Time Provider Department Center   5/22/2024  9:30 AM Michelle Wright PA-C [facility]     Test Results  Pending at Discharge:           Madai Eason, APRN  03/04/24  12:09 EST    Time: I spent 45 minutes on this discharge activity which included: face-to-face encounter with the patient, reviewing the data in the system, coordination of the care with the nursing staff as well as consultants, documentation, and entering orders.     Dictated utilizing Dragon dictation.

## 2024-03-04 NOTE — PAYOR COMM NOTE
"To:  Export  From: Mia Stallworth RN  Phone: [phone]  Fax: [phone]  NPI: [id number]  TIN: [id number]  Member ID: [id number]  MRN: [id number]    Jaymie Yarbrough (68 y.o. Female)       Date of Birth   1955    Social Security Number       Address   [address] [place], [place] [address]    Home Phone   [phone]    MRN   [id number]       Alevism   None    Marital Status                               Admission Date   3/2/24    Admission Type   Emergency    Admitting Provider   Edwin Aguilar MD    Attending Provider       Department, Room/Bed   [facility] TELEMETRY 3, 322/1       Discharge Date   3/4/2024    Discharge Disposition   Home or Self Care    Discharge Destination                                 Attending Provider: (none)   Allergies: Ciprofloxacin, Codeine, Pineapple    Isolation: None   Infection: Other (24)   Code Status: Prior    Ht: 157.5 cm (62\")   Wt: 67.8 kg (149 lb 7.6 oz)    Admission Cmt: None   Principal Problem: Pancreatitis, acute [K85.90]                   Active Insurance as of 3/2/2024       Primary Coverage       Payor Plan Insurance Group Employer/Plan Group    ANTHEM MEDICARE REPLACEMENT ANTHEM MEDICARE ADVANTAGE [id number]       Payor Plan Address Payor Plan Phone Number Payor Plan Fax Number Effective Dates    [address] [phone]  2024 - None Entered    [facility] [address]         Subscriber Name Subscriber Birth Date Member ID       JAYMIE YARBROUGH 1955 [id number]                     Emergency Contacts        (Rel.) Home Phone Work Phone Mobile Phone    EAMONANTONIO WORTHINGTON (Son) [phone] -- --    EAMONMONIE WAN (Son) [phone] -- --                 Discharge Summary        Madai Eason APRN at 24 0834              [facility] HOSPITALIST   DISCHARGE SUMMARY      Name:  Jaymie Yarbrough   Age:  68 y.o.  Sex:  female  :  1955  MRN:  [id number]   Visit Number:  [id number]    Admission " Date:  3/2/2024  Date of Discharge:  3/4/2024  Primary Care Physician:  Terrence Baldwin MD    Important issues to note:    -Patient admitted for acute abdominal pain with pancreatitis noted on CT scan.  Improved with overall conservative measures with IV fluids and pain control.  -Labs and imaging reviewed.  No known cause of pancreatitis identified in the past.  -She does have follow-up with GI due to recurrent colitis and pancreatitis symptoms.  -Patient with otherwise reassuring labs and vitals noted.  She is able to tolerate p.o. intake.  She will be discharged home with strict return precautions given.    Discharge Diagnoses:     Acute pancreatitis with pseudocyst, idiopathic, POA  Acute colitis, POA  Chronic kidney disease stage III  Chronic anemia  GERD  Essential hypertension    Problem List:     Active Hospital Problems    Diagnosis  POA    **Pancreatitis, acute [K85.90]  Yes    Chronic kidney disease, stage III (moderate) [N18.30]  Yes    Anemia, chronic disease [D63.8]  Yes    C. difficile colitis [A04.72]  Yes    Gastroesophageal reflux disease with esophagitis [K21.00]  Yes      Resolved Hospital Problems   No resolved problems to display.     Presenting Problem:    Chief Complaint   Patient presents with    Abdominal Pain      Consults:     Consulting Physician(s)                     None              Procedures Performed:        History of presenting illness/Hospital Course:    Ms. Yarbrough is a pleasant 68-year-old female with pertinent past medical history of chronic kidney disease, hypertension, pancreatitis, and colitis due to C. difficile several months ago who presented to the emergency department for abdominal pain.  Patient recently discharged for acute colitis on Keflex and Flagyl.  Patient had been discharged and returned to the emergency department within 48 hours for abdominal pain after eating fish.  Patient does have known history of colitis for which she attributes to C. difficile that  she had several months ago.  Has appointment to see GI.  Does have known idiopathic pancreatitis.    Upon ED presentation patient hemodynamically stable on room air.  Pertinent labs and imaging included WBC 13, hemoglobin 9.5, lipase 1026, creatinine 1.5 which is baseline, CT abdomen pelvis noted 6.1 x 3.0 cm fluid collection along the ventral margin of the pancreatic tail extending to and compressing the greater gastric curvature approximately most consistent with acute fluid collection related to acute pancreatitis.  Supportive care continued.  Patient overall improvement and able to advance diet as tolerated.  She will follow-up with GI as scheduled.  Diet education given.  Patient did complete Keflex and Flagyl.  Strict return precautions given.    Vital Signs:    Temp:  [97.1 °F (36.2 °C)-98.6 °F (37 °C)] 97.1 °F (36.2 °C)  Heart Rate:  [54-81] 81  Resp:  [18] 18  BP: (100-126)/(67-86) 118/86    Physical Exam:    General Appearance:  Alert and cooperative.  Chronically ill/frail appearing elderly female.   Head:  Atraumatic and normocephalic.   Eyes: Conjunctivae and sclerae normal, no icterus. No pallor.   Ears:  Ears with no abnormalities noted.   Throat: No oral lesions, no thrush, oral mucosa moist.   Neck: Supple, trachea midline, no thyromegaly.   Back:   No kyphoscoliosis present. No tenderness to palpation.   Lungs:   Breath sounds heard bilaterally equally.  No crackles or wheezing. No Pleural rub or bronchial breathing.  On room air unlabored.   Heart:  Normal S1 and S2, no murmur, no gallop, no rub. No JVD.   Abdomen:   Normal bowel sounds, no masses, no organomegaly. Soft, nontender, nondistended, no rebound tenderness.   Extremities: Supple, no edema, no cyanosis, no clubbing.   Pulses: Pulses palpable bilaterally.   Skin: No bleeding or rash.   Neurologic: Alert and oriented x 3. No facial asymmetry. Moves all four limbs. No tremors.  Generalized weakness.     Pertinent Lab Results:     Results  from last 7 days   Lab Units 03/04/24  0511 03/03/24  1203 03/02/24  0059   SODIUM mmol/L 144 141 144   POTASSIUM mmol/L 3.9 3.7 4.0   CHLORIDE mmol/L 116* 108* 111*   CO2 mmol/L 19.4* 20.6* 20.0*   BUN mg/dL 16 13 17   CREATININE mg/dL 1.21* 1.12* 1.52*   CALCIUM mg/dL 8.2* 8.6 8.9   BILIRUBIN mg/dL <0.2 0.2 <0.2   ALK PHOS U/L 65 79 101   ALT (SGPT) U/L 11 15 23   AST (SGOT) U/L 11 13 23   GLUCOSE mg/dL 80 91 89     Results from last 7 days   Lab Units 03/04/24  0511 03/03/24  1203 03/02/24  0059   WBC 10*3/mm3 8.07 10.49 13.05*   HEMOGLOBIN g/dL 8.2* 9.8* 9.5*   HEMATOCRIT % 25.6* 30.8* 28.6*   PLATELETS 10*3/mm3 176 204 190                     Results from last 7 days   Lab Units 03/03/24  1203   LIPASE U/L 580*     Results from last 7 days   Lab Units 02/27/24  0522   PH, ARTERIAL pH units 7.305*   PO2 ART mm Hg 90.6   PCO2, ARTERIAL mm Hg 30.1*   HCO3 ART mmol/L 15.0*           Pertinent Radiology Results:    Imaging Results (All)       Procedure Component Value Units Date/Time    CT Abdomen Pelvis With Contrast [[id number]] Collected: 03/02/24 0416     Updated: 03/02/24 0418    Narrative:      FINAL REPORT    TECHNIQUE:  null    CLINICAL HISTORY:  Pancreatitis versus pancreatic fluid collection/abscess, CT  abd/pelvis w/o today    COMPARISON:  null    FINDINGS:  Exam: CT abdomen and pelvis with IV contrast.    Comparison: Comparison: CT/SR - CT ABDOMEN PELVIS WO CONTRAST - 03/02/2024 02:38 AM EST    CT/SR - CT ABDOMEN PELVIS WO CONTRAST - 02/25/2024 12:51 AM EST    Findings:    Small bilateral pleural effusions.    Stable moderate hiatal hernia.    No free air.    No solid liver mass. Stable hepatomegaly.    Cholecystectomy. Stable biliary ductal dilatation likely due to cholecystectomy effects.    No clinically significant biliary ductal dilation.    No splenomegaly or suspicious splenic lesions.    Persistent stranding about the pancreatic tail consistent with acute pancreatitis improved versus the exam of  02/25/2024. 6.1 x 3.0 cm fluid collection along the ventral margin of the pancreatic tail extending to and compressing the greater gastric   curvature proximally most consistent with acute fluid collection related to acute pancreatitis. This is better demonstrated on the current contrast exam. Collection contains an imperceptible wall. This is visible only in retrospect on exam of 02/25/24   when measuring 2.3 x 1.6 cm.    Adrenal glands without nodule.    No suspicious renal mass. No hydronephrosis. No significant stranding. Stable bilateral renal cysts and nonobstructing right renal stone.    Bladder without acute pathology.    Postoperative changes sigmoid colon. Dense colonic fecal retention without obstruction.    No adenopathy.    Atheromatous abdominal aortic calcifications without aneurysm.    No suspicious pelvic masses.    No acute soft tissue pathology.    No acute osseous pathology. Degenerative changes. Lumbar dextroscoliosis.      Impression:      IMPRESSION:    6.1 x 3.0 cm fluid collection along the ventral margin of the pancreatic tail extending to and compressing the greater gastric curvature proximally most consistent with acute fluid collection related to acute pancreatitis.    This is visible only in retrospect on exam of 02/25/24 when measuring 2.3 x 1.6 cm.    Persistent stranding about the pancreatic tail consistent with acute pancreatitis improved versus the exam of 02/25/2024.    Authenticated and Electronically Signed by Luan Antoine MD  on 03/02/2024 04:16:54 AM    CT Abdomen Pelvis Without Contrast [[id number]] Collected: 03/02/24 0259     Updated: 03/02/24 0301    Narrative:      FINAL REPORT    TECHNIQUE:  null    CLINICAL HISTORY:  Right upper quadrant pain    COMPARISON:  null    FINDINGS:  Exam: CT abdomen and pelvis without IV contrast.    Comparison: CT/SR - CT ABDOMEN PELVIS WO CONTRAST - 02/25/2024 12:51 AM EST    Findings:    Small bilateral pleural effusions.    Stable  moderate hiatal hernia.    No free air.    No solid liver mass. Enlarged at 20.8 cm.    Cholecystectomy.    No clinically significant biliary ductal dilation.    No splenomegaly or suspicious splenic lesions.    Stranding about the pancreatic tail again noted consistent with pancreatitis. Oval 6.1 x 3.0 cm fluid collection along the ventral margin of the pancreatic tail may reflect pancreatitis related acute fluid collection, new vs prior, and suboptimally   visualized without contrast.    Adrenal glands without nodule.    No suspicious renal mass. No hydronephrosis. No significant stranding. Stable bilateral renal cysts and nonobstructing right renal stone.    Bladder without acute pathology.    Postoperative changes sigmoid colon. Dense colonic fecal retention without obstruction.    No adenopathy.    Atheromatous abdominal aortic calcifications without aneurysm.    No suspicious pelvic masses.    No acute soft tissue pathology.    No acute osseous pathology. Degenerative changes. Lumbar dextroscoliosis.      Impression:      IMPRESSION:    Oval 6.1 x 3.0 cm fluid collection along the ventral margin of the pancreatic tail may reflect pancreatitis related acute fluid collection, new vs prior, and suboptimally visualized without contrast.    Stranding about the pancreatic tail again noted consistent with pancreatitis.    Dense colonic fecal retention without obstruction.]    Stable hepatomegaly.    Stable hiatal hernia.    Small bilateral pleural effusions, new.    Authenticated and Electronically Signed by Luan Antoine MD  on 03/02/2024 02:59:21 AM            Echo:      Condition on Discharge:      Stable.    Code status during the hospital stay:    Code Status and Medical Interventions:   Ordered at: 03/02/24 1348     Code Status (Patient has no pulse and is not breathing):    CPR (Attempt to Resuscitate)     Medical Interventions (Patient has pulse or is breathing):    Full Support     Discharge  Disposition:    Home or Self Care    Discharge Medications:       Discharge Medications        Changes to Medications        Instructions Start Date   HYDROcodone-acetaminophen 7.5-325 MG per tablet  Commonly known as: NORCO  What changed: Another medication with the same name was added. Make sure you understand how and when to take each.   1 tablet, Oral, Every 8 Hours PRN      HYDROcodone-acetaminophen 7.5-325 MG per tablet  Commonly known as: NORCO  What changed: You were already taking a medication with the same name, and this prescription was added. Make sure you understand how and when to take each.   1 tablet, Oral, Every 8 Hours PRN             Continue These Medications        Instructions Start Date   citalopram 40 MG tablet  Commonly known as: CeleXA   1 tablet, Oral, Daily      famotidine 40 MG tablet  Commonly known as: PEPCID   40 mg, Oral, Daily      furosemide 20 MG tablet  Commonly known as: LASIX   20 mg, Oral, Daily      glucosamine-chondroitin 500-400 MG capsule capsule   1 capsule, Oral, 2 Times Daily With Meals      methocarbamol 750 MG tablet  Commonly known as: ROBAXIN   1-2 tablets, Oral, 3 Times Daily PRN, Take one to two tablets three times daily as needed      metoprolol tartrate 50 MG tablet  Commonly known as: LOPRESSOR   50 mg, Oral, Daily      nicotine 21 MG/24HR patch  Commonly known as: NICODERM CQ   1 patch, Transdermal, Every 24 Hours      ondansetron 8 MG tablet  Commonly known as: ZOFRAN   8 mg, Oral, Every 8 Hours PRN      sodium bicarbonate 650 MG tablet   650 mg, Oral, 2 Times Daily      vitamin D3 125 MCG (5000 UT) capsule capsule   1 capsule, Oral, Daily             Stop These Medications      cephalexin 500 MG capsule  Commonly known as: KEFLEX     Florastor 250 MG capsule  Generic drug: saccharomyces boulardii     metroNIDAZOLE 500 MG tablet  Commonly known as: FLAGYL     saccharomyces boulardii 250 MG capsule  Commonly known as: Florastor            Discharge Diet:      Diet Instructions       Diet: Gastrointestinal Diets; Fiber-Restricted, Fat-Restricted; Thin (IDDSI 0)      Discharge Diet: Gastrointestinal Diets    Gastrointestinal Diet:  Fiber-Restricted  Fat-Restricted       Fluid Consistency: Thin (IDDSI 0)          Activity at Discharge:     Activity Instructions       Up WIth Assist            Follow-up Appointments:    Additional Instructions for the Follow-ups that You Need to Schedule       Ambulatory Referral to Home Health (Hospital)   As directed      Face to Face Visit Date: 3/4/2024   Follow-up provider for Plan of Care?: I treated the patient in an acute care facility and will not continue treatment after discharge.   Follow-up provider: TIAN FITZGERALD [[id number]]   Reason/Clinical Findings: debility   Describe mobility limitations that make leaving home difficult: debility   Nursing/Therapeutic Services Requested: Physical Therapy Occupational Therapy   PT orders: Strengthening Home safety assessment Gait Training Therapeutic exercise   Weight Bearing Status: As Tolerated   Occupational orders: Activities of daily living Strengthening Energy conservation Home safety assessment               Follow-up Information       Terrence Baldwin MD Follow up on 3/11/2024.    Specialty: Family Medicine  Why: @ 2:30 PM  Contact information:  [address]  [place] [address]  [phone]               Michelle Wright PA-C. Go on 5/22/2024.    Specialties: Physician Assistant, Gastroenterology  Why: @ 9:30am  Contact information:  [address]  [address]  [place] [address]  [phone]                           Future Appointments   Date Time Provider Department Center   5/22/2024  9:30 AM Michelle Wright PA-C [facility]     Test Results Pending at Discharge:           JOSÉ MANUEL Sparks  03/04/24  12:09 EST    Time: I spent 45 minutes on this discharge activity which included: face-to-face encounter with the patient, reviewing the data in the system,  coordination of the care with the nursing staff as well as consultants, documentation, and entering orders.     Dictated utilizing Dragon dictation.      Electronically signed by Madai Eason APRN at 03/04/24 1312

## 2024-03-04 NOTE — CASE MANAGEMENT/SOCIAL WORK
Continued Stay Note  ADDIS Britton     Patient Name: Tasha Yarbrough  MRN: [id number]  Today's Date: 3/4/2024    Admit Date: 3/2/2024    Plan: spoke with pt in room; stated trying to reach son to transport home; discussed home health and order; stated she did not want home health, was adamant about this; stated i had before and i don't want; informed NP   Discharge Plan       Row Name 03/04/24 0923       Plan    Plan spoke with pt in room; stated trying to reach son to transport home; discussed home health and order; stated she did not want home health, was adamant about this; stated i had before and i don't want; informed NP      Row Name 03/04/24 0903       Plan    Final Discharge Disposition Code 01 - home or self-care                   Discharge Codes    No documentation.                 Expected Discharge Date and Time       Expected Discharge Date Expected Discharge Time    Mar 4, 2024               Whit Reyes RN

## 2024-03-04 NOTE — DISCHARGE INSTRUCTIONS
Patient to be discharged home.  Follow-up with GI outpatient.  Continue bland diet as tolerated.  Return to emergency department for any worsening symptoms.

## 2024-03-07 ENCOUNTER — READMISSION MANAGEMENT (OUTPATIENT)
Dept: CALL CENTER | Facility: HOSPITAL | Age: 69
End: 2024-03-07
Payer: MEDICARE

## 2024-03-07 NOTE — OUTREACH NOTE
Medical Week 1 Survey      Flowsheet Row Responses   [facility] patient discharged from? [place]   Does the patient have one of the following disease processes/diagnoses(primary or secondary)? Other   Week 1 attempt successful? No   Unsuccessful attempts Attempt 1            EDWIN MARTINEZ - Registered Nurse

## 2024-03-09 ENCOUNTER — APPOINTMENT (OUTPATIENT)
Dept: CT IMAGING | Facility: HOSPITAL | Age: 69
End: 2024-03-09
Payer: MEDICARE

## 2024-03-09 ENCOUNTER — HOSPITAL ENCOUNTER (EMERGENCY)
Facility: HOSPITAL | Age: 69
Discharge: OTHER INSTITUTION | End: 2024-03-09
Attending: STUDENT IN AN ORGANIZED HEALTH CARE EDUCATION/TRAINING PROGRAM
Payer: MEDICARE

## 2024-03-09 VITALS
DIASTOLIC BLOOD PRESSURE: 94 MMHG | BODY MASS INDEX: 26.87 KG/M2 | HEART RATE: 68 BPM | RESPIRATION RATE: 20 BRPM | OXYGEN SATURATION: 94 % | HEIGHT: 62 IN | TEMPERATURE: 98 F | SYSTOLIC BLOOD PRESSURE: 154 MMHG | WEIGHT: 146 LBS

## 2024-03-09 DIAGNOSIS — K85.90 ACUTE RECURRENT PANCREATITIS: Primary | ICD-10-CM

## 2024-03-09 DIAGNOSIS — K86.3 PANCREATIC PSEUDOCYST: ICD-10-CM

## 2024-03-09 DIAGNOSIS — R10.13 EPIGASTRIC PAIN: ICD-10-CM

## 2024-03-09 LAB
ALBUMIN SERPL-MCNC: 3.2 G/DL (ref 3.5–5.2)
ALBUMIN/GLOB SERPL: 1.1 G/DL
ALP SERPL-CCNC: 90 U/L (ref 39–117)
ALT SERPL W P-5'-P-CCNC: 13 U/L (ref 1–33)
AMYLASE SERPL-CCNC: 936 U/L (ref 28–100)
ANION GAP SERPL CALCULATED.3IONS-SCNC: 15 MMOL/L (ref 5–15)
AST SERPL-CCNC: 15 U/L (ref 1–32)
BASOPHILS # BLD AUTO: 0.03 10*3/MM3 (ref 0–0.2)
BASOPHILS NFR BLD AUTO: 0.3 % (ref 0–1.5)
BILIRUB SERPL-MCNC: <0.2 MG/DL (ref 0–1.2)
BUN SERPL-MCNC: 22 MG/DL (ref 8–23)
BUN/CREAT SERPL: 15.7 (ref 7–25)
CALCIUM SPEC-SCNC: 9.1 MG/DL (ref 8.6–10.5)
CHLORIDE SERPL-SCNC: 111 MMOL/L (ref 98–107)
CO2 SERPL-SCNC: 17 MMOL/L (ref 22–29)
CREAT SERPL-MCNC: 1.4 MG/DL (ref 0.57–1)
DEPRECATED RDW RBC AUTO: 58.7 FL (ref 37–54)
EGFRCR SERPLBLD CKD-EPI 2021: 41.1 ML/MIN/1.73
EOSINOPHIL # BLD AUTO: 0.1 10*3/MM3 (ref 0–0.4)
EOSINOPHIL NFR BLD AUTO: 1 % (ref 0.3–6.2)
ERYTHROCYTE [DISTWIDTH] IN BLOOD BY AUTOMATED COUNT: 13.9 % (ref 12.3–15.4)
GLOBULIN UR ELPH-MCNC: 2.9 GM/DL
GLUCOSE SERPL-MCNC: 84 MG/DL (ref 65–99)
HCT VFR BLD AUTO: 30.6 % (ref 34–46.6)
HGB BLD-MCNC: 9.5 G/DL (ref 12–15.9)
HYPOCHROMIA BLD QL: NORMAL
IMM GRANULOCYTES # BLD AUTO: 0.13 10*3/MM3 (ref 0–0.05)
IMM GRANULOCYTES NFR BLD AUTO: 1.3 % (ref 0–0.5)
LIPASE SERPL-CCNC: 1978 U/L (ref 13–60)
LYMPHOCYTES # BLD AUTO: 0.9 10*3/MM3 (ref 0.7–3.1)
LYMPHOCYTES NFR BLD AUTO: 8.9 % (ref 19.6–45.3)
MACROCYTES BLD QL SMEAR: NORMAL
MAGNESIUM SERPL-MCNC: 2.1 MG/DL (ref 1.6–2.4)
MCH RBC QN AUTO: 35.6 PG (ref 26.6–33)
MCHC RBC AUTO-ENTMCNC: 31 G/DL (ref 31.5–35.7)
MCV RBC AUTO: 114.6 FL (ref 79–97)
MONOCYTES # BLD AUTO: 0.48 10*3/MM3 (ref 0.1–0.9)
MONOCYTES NFR BLD AUTO: 4.8 % (ref 5–12)
NEUTROPHILS NFR BLD AUTO: 8.45 10*3/MM3 (ref 1.7–7)
NEUTROPHILS NFR BLD AUTO: 83.7 % (ref 42.7–76)
NRBC BLD AUTO-RTO: 0 /100 WBC (ref 0–0.2)
PLATELET # BLD AUTO: 301 10*3/MM3 (ref 140–450)
PMV BLD AUTO: 10.4 FL (ref 6–12)
POTASSIUM SERPL-SCNC: 5 MMOL/L (ref 3.5–5.2)
PROT SERPL-MCNC: 6.1 G/DL (ref 6–8.5)
RBC # BLD AUTO: 2.67 10*6/MM3 (ref 3.77–5.28)
SMALL PLATELETS BLD QL SMEAR: ADEQUATE
SODIUM SERPL-SCNC: 143 MMOL/L (ref 136–145)
WBC MORPH BLD: NORMAL
WBC NRBC COR # BLD AUTO: 10.09 10*3/MM3 (ref 3.4–10.8)

## 2024-03-09 PROCEDURE — 25810000003 SODIUM CHLORIDE 0.9 % SOLUTION: Performed by: STUDENT IN AN ORGANIZED HEALTH CARE EDUCATION/TRAINING PROGRAM

## 2024-03-09 PROCEDURE — 36415 COLL VENOUS BLD VENIPUNCTURE: CPT

## 2024-03-09 PROCEDURE — 93005 ELECTROCARDIOGRAM TRACING: CPT | Performed by: STUDENT IN AN ORGANIZED HEALTH CARE EDUCATION/TRAINING PROGRAM

## 2024-03-09 PROCEDURE — 74177 CT ABD & PELVIS W/CONTRAST: CPT

## 2024-03-09 PROCEDURE — 85025 COMPLETE CBC W/AUTO DIFF WBC: CPT | Performed by: STUDENT IN AN ORGANIZED HEALTH CARE EDUCATION/TRAINING PROGRAM

## 2024-03-09 PROCEDURE — 96374 THER/PROPH/DIAG INJ IV PUSH: CPT

## 2024-03-09 PROCEDURE — 99285 EMERGENCY DEPT VISIT HI MDM: CPT

## 2024-03-09 PROCEDURE — 96375 TX/PRO/DX INJ NEW DRUG ADDON: CPT

## 2024-03-09 PROCEDURE — 25010000002 MORPHINE PER 10 MG: Performed by: STUDENT IN AN ORGANIZED HEALTH CARE EDUCATION/TRAINING PROGRAM

## 2024-03-09 PROCEDURE — 25510000001 IOPAMIDOL 61 % SOLUTION: Performed by: STUDENT IN AN ORGANIZED HEALTH CARE EDUCATION/TRAINING PROGRAM

## 2024-03-09 PROCEDURE — 25010000002 HYDROMORPHONE 1 MG/ML SOLUTION: Performed by: STUDENT IN AN ORGANIZED HEALTH CARE EDUCATION/TRAINING PROGRAM

## 2024-03-09 PROCEDURE — 82150 ASSAY OF AMYLASE: CPT | Performed by: STUDENT IN AN ORGANIZED HEALTH CARE EDUCATION/TRAINING PROGRAM

## 2024-03-09 PROCEDURE — 83735 ASSAY OF MAGNESIUM: CPT | Performed by: STUDENT IN AN ORGANIZED HEALTH CARE EDUCATION/TRAINING PROGRAM

## 2024-03-09 PROCEDURE — 85007 BL SMEAR W/DIFF WBC COUNT: CPT | Performed by: STUDENT IN AN ORGANIZED HEALTH CARE EDUCATION/TRAINING PROGRAM

## 2024-03-09 PROCEDURE — 25010000002 ONDANSETRON PER 1 MG: Performed by: STUDENT IN AN ORGANIZED HEALTH CARE EDUCATION/TRAINING PROGRAM

## 2024-03-09 PROCEDURE — 83690 ASSAY OF LIPASE: CPT | Performed by: STUDENT IN AN ORGANIZED HEALTH CARE EDUCATION/TRAINING PROGRAM

## 2024-03-09 PROCEDURE — 80053 COMPREHEN METABOLIC PANEL: CPT | Performed by: STUDENT IN AN ORGANIZED HEALTH CARE EDUCATION/TRAINING PROGRAM

## 2024-03-09 RX ORDER — ONDANSETRON 2 MG/ML
4 INJECTION INTRAMUSCULAR; INTRAVENOUS ONCE
Status: COMPLETED | OUTPATIENT
Start: 2024-03-09 | End: 2024-03-09

## 2024-03-09 RX ORDER — SODIUM CHLORIDE 0.9 % (FLUSH) 0.9 %
10 SYRINGE (ML) INJECTION AS NEEDED
Status: DISCONTINUED | OUTPATIENT
Start: 2024-03-09 | End: 2024-03-09 | Stop reason: HOSPADM

## 2024-03-09 RX ADMIN — HYDROMORPHONE HYDROCHLORIDE 0.5 MG: 1 INJECTION, SOLUTION INTRAMUSCULAR; INTRAVENOUS; SUBCUTANEOUS at 04:15

## 2024-03-09 RX ADMIN — SODIUM CHLORIDE 1000 ML: 9 INJECTION, SOLUTION INTRAVENOUS at 03:31

## 2024-03-09 RX ADMIN — IOPAMIDOL 85 ML: 612 INJECTION, SOLUTION INTRAVENOUS at 04:08

## 2024-03-09 RX ADMIN — MORPHINE SULFATE 4 MG: 4 INJECTION, SOLUTION INTRAMUSCULAR; INTRAVENOUS at 03:34

## 2024-03-09 RX ADMIN — ONDANSETRON 4 MG: 2 INJECTION INTRAMUSCULAR; INTRAVENOUS at 03:31

## 2024-03-09 NOTE — ED PROVIDER NOTES
EMERGENCY DEPARTMENT ENCOUNTER    Pt Name: Tasha Yarbrough  MRN: [id number]  Pt :   1955  Room Number:    Date of encounter:  3/9/2024  PCP: Terrence Baldwin MD  ED Provider: Ho Clark MD    Historian: Patient      HPI:  Chief Complaint: Abdominal pain        Context: Tasha Yarbrough is a 68 y.o. female who presents to the ED c/o abdominal pain.  Patient was discharged from hospital approximately 5 days ago after admission for recurrent pancreatitis.  Has been taking light diet at home as instructed but has been having worsening pain that started Friday morning.  Also reports nausea.  Has had recurrent diarrhea today.  Denies any fevers.  States pain is in her upper mid abdomen but spread throughout the entire abdomen.      PAST MEDICAL HISTORY  Past Medical History:   Diagnosis Date    Hypertension     Impaired mobility     Injury of back          PAST SURGICAL HISTORY  Past Surgical History:   Procedure Laterality Date    ABDOMINAL SURGERY      COLON SURGERY      COLONOSCOPY      TUBAL ABDOMINAL LIGATION           FAMILY HISTORY  History reviewed. No pertinent family history.      SOCIAL HISTORY  Social History     Socioeconomic History    Marital status:    Tobacco Use    Smoking status: Every Day     Current packs/day: 1.00     Types: Cigarettes    Smokeless tobacco: Never   Vaping Use    Vaping status: Never Used   Substance and Sexual Activity    Alcohol use: Never    Drug use: Never    Sexual activity: Defer         ALLERGIES  Ciprofloxacin, Codeine, and Pineapple        REVIEW OF SYSTEMS  Review of Systems     All systems reviewed and negative except for those discussed in HPI.       PHYSICAL EXAM    I have reviewed the triage vital signs and nursing notes.    ED Triage Vitals [24 0243]   Temp Heart Rate Resp BP SpO2   98 °F (36.7 °C) 67 20 159/95 98 %      Temp src Heart Rate Source Patient Position BP Location FiO2 (%)   Oral Monitor Lying Left arm --       Physical  Exam    General:  Awake, alert, no acute distress  HEENT: Atraumatic, normocephalic, EOMI, PERRLA, mucous membranes moist  NECK:  Supple, atraumatic  Cardiovascular:  Regular rate, regular rhythm, no murmurs, rubs, or gallops.  Extremities well perfused   Respiratory:  Regular rate, clear lungs to auscultation bilaterally.  No rhonchi, rales, wheezing  Abdominal:  Soft, mildly distended, diffusely tender worse in epigastrium.  No guarding or rebound.  No palpable masses  Extremity:  No visible bony abnormalities in all 4 extremities.  Full range of motion of all extremities.  Skin:  Warm and dry.  No rashes  Neuro:  AAOx3, GCS 15. Cranial nerves 2-12 grossly intact.  No focal strength or sensation deficits.  Psych:  Mood and affect appropriate.        LAB RESULTS  Recent Results (from the past 24 hour(s))   Comprehensive Metabolic Panel    Collection Time: 03/09/24  3:25 AM    Specimen: Blood   Result Value Ref Range    Glucose 84 65 - 99 mg/dL    BUN 22 8 - 23 mg/dL    Creatinine 1.40 (H) 0.57 - 1.00 mg/dL    Sodium 143 136 - 145 mmol/L    Potassium 5.0 3.5 - 5.2 mmol/L    Chloride 111 (H) 98 - 107 mmol/L    CO2 17.0 (L) 22.0 - 29.0 mmol/L    Calcium 9.1 8.6 - 10.5 mg/dL    Total Protein 6.1 6.0 - 8.5 g/dL    Albumin 3.2 (L) 3.5 - 5.2 g/dL    ALT (SGPT) 13 1 - 33 U/L    AST (SGOT) 15 1 - 32 U/L    Alkaline Phosphatase 90 39 - 117 U/L    Total Bilirubin <0.2 0.0 - 1.2 mg/dL    Globulin 2.9 gm/dL    A/G Ratio 1.1 g/dL    BUN/Creatinine Ratio 15.7 7.0 - 25.0    Anion Gap 15.0 5.0 - 15.0 mmol/L    eGFR 41.1 (L) >60.0 mL/min/1.73   Lipase    Collection Time: 03/09/24  3:25 AM    Specimen: Blood   Result Value Ref Range    Lipase 1,978 (H) 13 - 60 U/L   Magnesium    Collection Time: 03/09/24  3:25 AM    Specimen: Blood   Result Value Ref Range    Magnesium 2.1 1.6 - 2.4 mg/dL   Amylase    Collection Time: 03/09/24  3:25 AM    Specimen: Blood   Result Value Ref Range    Amylase 936 (H) 28 - 100 U/L   CBC Auto Differential     Collection Time: 03/09/24  3:25 AM    Specimen: Blood   Result Value Ref Range    WBC 10.09 3.40 - 10.80 10*3/mm3    RBC 2.67 (L) 3.77 - 5.28 10*6/mm3    Hemoglobin 9.5 (L) 12.0 - 15.9 g/dL    Hematocrit 30.6 (L) 34.0 - 46.6 %    .6 (H) 79.0 - 97.0 fL    MCH 35.6 (H) 26.6 - 33.0 pg    MCHC 31.0 (L) 31.5 - 35.7 g/dL    RDW 13.9 12.3 - 15.4 %    RDW-SD 58.7 (H) 37.0 - 54.0 fl    MPV 10.4 6.0 - 12.0 fL    Platelets 301 140 - 450 10*3/mm3    Neutrophil % 83.7 (H) 42.7 - 76.0 %    Lymphocyte % 8.9 (L) 19.6 - 45.3 %    Monocyte % 4.8 (L) 5.0 - 12.0 %    Eosinophil % 1.0 0.3 - 6.2 %    Basophil % 0.3 0.0 - 1.5 %    Immature Grans % 1.3 (H) 0.0 - 0.5 %    Neutrophils, Absolute 8.45 (H) 1.70 - 7.00 10*3/mm3    Lymphocytes, Absolute 0.90 0.70 - 3.10 10*3/mm3    Monocytes, Absolute 0.48 0.10 - 0.90 10*3/mm3    Eosinophils, Absolute 0.10 0.00 - 0.40 10*3/mm3    Basophils, Absolute 0.03 0.00 - 0.20 10*3/mm3    Immature Grans, Absolute 0.13 (H) 0.00 - 0.05 10*3/mm3    nRBC 0.0 0.0 - 0.2 /100 WBC   Scan Slide    Collection Time: 03/09/24  3:25 AM    Specimen: Blood   Result Value Ref Range    Hypochromia Slight/1+ None Seen    Macrocytes Slight/1+ None Seen    WBC Morphology Normal Normal    Platelet Estimate Adequate Normal       If labs were ordered, I independently reviewed the results and considered them in treating the patient.        RADIOLOGY  CT Abdomen Pelvis With Contrast    Result Date: 3/9/2024  FINAL REPORT TECHNIQUE: null CLINICAL HISTORY: Severe umbilical and epigastric abdominal pain and nausea, recent pancreatitis COMPARISON: null FINDINGS: Exam: CT abdomen and pelvis with IV contrast. Comparison: 03/02/2024 03:36 AM EST: CT,SR: CT ABDOMEN PELVIS W CONTRAST Findings: Small bilateral pleural effusions without significant change. Stable moderate hiatal hernia. No free air. No solid liver mass. Stable hepatomegaly. Cholecystectomy. Stable biliary ductal dilatation. No splenomegaly or suspicious splenic  lesions. Stable mild stranding about the pancreatic tail consistent with pancreatitis. Slight decrease in fluid collection along the ventral margin of the pancreatic tail extending to and compressing the greater gastric curvature. Adrenal glands without nodule. No suspicious renal mass. No hydronephrosis. No significant stranding. Stable bilateral renal cysts and nonobstructing right renal stone. Bladder without acute pathology. Postoperative changes sigmoid colon. Dense colonic fecal retention without obstruction. No adenopathy. Atheromatous abdominal aortic calcifications and tortuosity without aneurysm. No suspicious pelvic masses. No acute soft tissue pathology. No acute osseous pathology. Degenerative changes. Lumbar dextroscoliosis.     IMPRESSION: Stable mild pancreatitis changes at the tail. Slight improvement in peripancreatic fluid collection may reflect pseudocyst. Dense colonic fecal retention without obstruction. Small bilateral pleural effusions without significant change. Stable moderate hiatal hernia. Authenticated and Electronically Signed by Luan Antoine MD on 03/09/2024 04:49:38 AM     I ordered and independently reviewed the above noted radiographic studies.     See radiologist's dictation for official interpretation.        PROCEDURES    Procedures    ECG 12 Lead Other; upper abd pain   Final Result          MEDICATIONS GIVEN IN ER    Medications   sodium chloride 0.9 % flush 10 mL (has no administration in time range)   sodium chloride 0.9 % bolus 1,000 mL (0 mL Intravenous Transferred to External Facility 3/9/24 4845)   ondansetron (ZOFRAN) injection 4 mg (4 mg Intravenous Given 3/9/24 7923)   morphine injection 4 mg (4 mg Intravenous Given 3/9/24 4414)   iopamidol (ISOVUE-300) 61 % injection 85 mL (85 mL Intravenous Given 3/9/24 1794)   HYDROmorphone (DILAUDID) injection 0.5 mg (0.5 mg Intravenous Given 3/9/24 9419)         MEDICAL DECISION MAKING, PROGRESS, and CONSULTS    All labs, if  obtained, have been independently reviewed by me.  All radiology studies, if obtained, have been reviewed by me and the radiologist dictating the report.  All EKG's, if obtained, have been independently viewed and interpreted by me.      Discussion below represents my analysis of pertinent findings related to patient's condition, differential diagnosis, treatment plan and final disposition.    Tasha Yarbrough is a 68 y.o. female who presents to the ED c/o abdominal pain.  Hemodynamically stable on arrival and mentating appropriately.  Patient has had 2 recent hospitalizations for recurrent pancreatitis.  Concern for recurrence again versus development of pancreatic pseudocyst or abscess.  Differential also includes viral gastroenteritis, constipation, bowel obstruction.  Extensive labs ordered along with CT of abdomen pelvis with contrast.  Patient given 1 L bolus of IV normal saline.  Patient given IV morphine for pain control along with IV Zofran for nausea.    EKG was obtained which was personally reviewed and interpreted showing evidence of sinus rhythm with rate of 63 and no evidence of acute ischemic changes/STEMI, some wandering baseline secondary to patient movement but no significant ST elevations or acute abnormal T wave inversions at this time.    Labs show evidence of again recurrent acute pancreatitis with lipase now above 1900.  It had been trended down to around 500 during her hospitalization last week.  Also elevated amylase above 900.  No significant leukocytosis.  Stable chronic microcytic anemia.  No critical electrolyte abnormalities Cron emergent correction.  CT scan again shows evidence of peripancreatic fluid collection, now slightly decreased in size per radiology but noted to potentially be a pseudocyst on today's imaging compared to previous imaging per radiology.  No gastroenterology coverage at our facility over the weekend.  Due to this being patient's third time with acute pancreatitis and  a 2-week timeframe, concern for need for evaluation by gastroenterology for possible pancreatic pseudocyst or other process causing recurrent pancreatitis.  Contacted  transfer center and spoke with physician on-call Dr. Arechiga who accepted patient for transfer to  emergency department for further treatment.  Patient and family at bedside agreeable with this plan and patient was transferred.                                 Orders placed during this visit:  Orders Placed This Encounter   Procedures    CT Abdomen Pelvis With Contrast    Comprehensive Metabolic Panel    Lipase    Magnesium    Amylase    CBC Auto Differential    Scan Slide    ECG 12 Lead Other; upper abd pain    Insert Peripheral IV    CBC & Differential         ED Course:    Consultants:                  Shared Decision Making:  After my consideration of clinical presentation and any laboratory/radiology studies obtained, I discussed the findings with the patient/patient representative who is in agreement with the treatment plan and the final disposition.   Risks and benefits of discharge and/or observation/admission were discussed.      AS OF 06:39 EST VITALS:    BP - 154/94  HR - 68  TEMP - 98 °F (36.7 °C) (Oral)  O2 SATS - 94%                  DIAGNOSIS  Final diagnoses:   Acute recurrent pancreatitis   Pancreatic pseudocyst   Epigastric pain         DISPOSITION  Transfer to       Please note that portions of this document were completed with voice recognition software.        Ho Clark MD  03/09/24 0614

## 2024-03-09 NOTE — ED NOTES
[facility] Called per Dr. Clark to request GI at this time. [facility] to call when they are more available.   How Severe Is It?: mild Is This A New Presentation, Or A Follow-Up?: Follow Up Acne Females Only: When Was Your Last Menstrual Period?: 09/07/18

## 2024-03-12 ENCOUNTER — READMISSION MANAGEMENT (OUTPATIENT)
Dept: CALL CENTER | Facility: HOSPITAL | Age: 69
End: 2024-03-12
Payer: MEDICARE

## 2024-03-12 NOTE — OUTREACH NOTE
Medical Week 1 Survey      Flowsheet Row Responses   [facility] patient discharged from? [facility]   Does the patient have one of the following disease processes/diagnoses(primary or secondary)? Other   Week 1 attempt successful? Yes   Call start time 1259   Call end time 1302   Discharge diagnosis Pancreatitis, acute   Meds reviewed with patient/caregiver? Yes   Is the patient having any side effects they believe may be caused by any medication additions or changes? No   Does the patient have all medications ordered at discharge? N/A   Is the patient taking all medications as directed (includes completed medication regime)? Yes   Does the patient have a primary care provider?  Yes   What is the Home health agency?  declined HH   Has home health visited the patient within 72 hours of discharge? N/A   Did the patient receive a copy of their discharge instructions? Yes   Nursing interventions Reviewed instructions with patient   What is the patient's perception of their health status since discharge? Improving   Is the patient/caregiver able to teach back signs and symptoms related to disease process for when to call PCP? Yes   Is the patient/caregiver able to teach back signs and symptoms related to disease process for when to call 911? Yes   Is the patient/caregiver able to teach back the hierarchy of who to call/visit for symptoms/problems? PCP, Specialist, Home health nurse, Urgent Care, ED, 911 Yes   If the patient is a current smoker, are they able to teach back resources for cessation? [id number]   Week 1 call completed? Yes   Call end time 1302            Radha H - Registered Nurse

## 2024-03-16 ENCOUNTER — HOSPITAL ENCOUNTER (EMERGENCY)
Facility: HOSPITAL | Age: 69
Discharge: OTHER INSTITUTION | End: 2024-03-16
Attending: STUDENT IN AN ORGANIZED HEALTH CARE EDUCATION/TRAINING PROGRAM
Payer: MEDICARE

## 2024-03-16 ENCOUNTER — APPOINTMENT (OUTPATIENT)
Dept: CT IMAGING | Facility: HOSPITAL | Age: 69
End: 2024-03-16
Payer: MEDICARE

## 2024-03-16 VITALS
WEIGHT: 146 LBS | TEMPERATURE: 98.9 F | SYSTOLIC BLOOD PRESSURE: 161 MMHG | BODY MASS INDEX: 26.87 KG/M2 | HEART RATE: 66 BPM | DIASTOLIC BLOOD PRESSURE: 85 MMHG | OXYGEN SATURATION: 96 % | HEIGHT: 62 IN | RESPIRATION RATE: 18 BRPM

## 2024-03-16 DIAGNOSIS — K85.90 ACUTE PANCREATITIS, UNSPECIFIED COMPLICATION STATUS, UNSPECIFIED PANCREATITIS TYPE: Primary | ICD-10-CM

## 2024-03-16 DIAGNOSIS — R18.8 INTRAABDOMINAL FLUID COLLECTION: ICD-10-CM

## 2024-03-16 LAB
ALBUMIN SERPL-MCNC: 3.5 G/DL (ref 3.5–5.2)
ALBUMIN/GLOB SERPL: 1.1 G/DL
ALP SERPL-CCNC: 109 U/L (ref 39–117)
ALT SERPL W P-5'-P-CCNC: 9 U/L (ref 1–33)
ANION GAP SERPL CALCULATED.3IONS-SCNC: 11.6 MMOL/L (ref 5–15)
AST SERPL-CCNC: 12 U/L (ref 1–32)
BASOPHILS # BLD AUTO: 0.05 10*3/MM3 (ref 0–0.2)
BASOPHILS NFR BLD AUTO: 0.7 % (ref 0–1.5)
BILIRUB SERPL-MCNC: 0.2 MG/DL (ref 0–1.2)
BUN SERPL-MCNC: 17 MG/DL (ref 8–23)
BUN/CREAT SERPL: 13.1 (ref 7–25)
CALCIUM SPEC-SCNC: 9.4 MG/DL (ref 8.6–10.5)
CHLORIDE SERPL-SCNC: 111 MMOL/L (ref 98–107)
CO2 SERPL-SCNC: 18.4 MMOL/L (ref 22–29)
CREAT SERPL-MCNC: 1.3 MG/DL (ref 0.57–1)
DEPRECATED RDW RBC AUTO: 56.8 FL (ref 37–54)
EGFRCR SERPLBLD CKD-EPI 2021: 44.9 ML/MIN/1.73
EOSINOPHIL # BLD AUTO: 0.11 10*3/MM3 (ref 0–0.4)
EOSINOPHIL NFR BLD AUTO: 1.6 % (ref 0.3–6.2)
ERYTHROCYTE [DISTWIDTH] IN BLOOD BY AUTOMATED COUNT: 13.4 % (ref 12.3–15.4)
GLOBULIN UR ELPH-MCNC: 3.1 GM/DL
GLUCOSE SERPL-MCNC: 75 MG/DL (ref 65–99)
HCT VFR BLD AUTO: 31.6 % (ref 34–46.6)
HGB BLD-MCNC: 10 G/DL (ref 12–15.9)
IMM GRANULOCYTES # BLD AUTO: 0.05 10*3/MM3 (ref 0–0.05)
IMM GRANULOCYTES NFR BLD AUTO: 0.7 % (ref 0–0.5)
LIPASE SERPL-CCNC: 419 U/L (ref 13–60)
LYMPHOCYTES # BLD AUTO: 1 10*3/MM3 (ref 0.7–3.1)
LYMPHOCYTES NFR BLD AUTO: 14.7 % (ref 19.6–45.3)
MACROCYTES BLD QL SMEAR: NORMAL
MCH RBC QN AUTO: 36.4 PG (ref 26.6–33)
MCHC RBC AUTO-ENTMCNC: 31.6 G/DL (ref 31.5–35.7)
MCV RBC AUTO: 114.9 FL (ref 79–97)
MONOCYTES # BLD AUTO: 0.36 10*3/MM3 (ref 0.1–0.9)
MONOCYTES NFR BLD AUTO: 5.3 % (ref 5–12)
NEUTROPHILS NFR BLD AUTO: 5.25 10*3/MM3 (ref 1.7–7)
NEUTROPHILS NFR BLD AUTO: 77 % (ref 42.7–76)
NRBC BLD AUTO-RTO: 0 /100 WBC (ref 0–0.2)
PLATELET # BLD AUTO: 324 10*3/MM3 (ref 140–450)
PMV BLD AUTO: 10.3 FL (ref 6–12)
POTASSIUM SERPL-SCNC: 5.1 MMOL/L (ref 3.5–5.2)
PROT SERPL-MCNC: 6.6 G/DL (ref 6–8.5)
RBC # BLD AUTO: 2.75 10*6/MM3 (ref 3.77–5.28)
SMALL PLATELETS BLD QL SMEAR: ADEQUATE
SODIUM SERPL-SCNC: 141 MMOL/L (ref 136–145)
WBC MORPH BLD: NORMAL
WBC NRBC COR # BLD AUTO: 6.82 10*3/MM3 (ref 3.4–10.8)

## 2024-03-16 PROCEDURE — 80053 COMPREHEN METABOLIC PANEL: CPT | Performed by: STUDENT IN AN ORGANIZED HEALTH CARE EDUCATION/TRAINING PROGRAM

## 2024-03-16 PROCEDURE — 96376 TX/PRO/DX INJ SAME DRUG ADON: CPT

## 2024-03-16 PROCEDURE — 96374 THER/PROPH/DIAG INJ IV PUSH: CPT

## 2024-03-16 PROCEDURE — 25010000002 HYDROMORPHONE 1 MG/ML SOLUTION: Performed by: STUDENT IN AN ORGANIZED HEALTH CARE EDUCATION/TRAINING PROGRAM

## 2024-03-16 PROCEDURE — 85007 BL SMEAR W/DIFF WBC COUNT: CPT | Performed by: STUDENT IN AN ORGANIZED HEALTH CARE EDUCATION/TRAINING PROGRAM

## 2024-03-16 PROCEDURE — 25810000003 SODIUM CHLORIDE 0.9 % SOLUTION: Performed by: STUDENT IN AN ORGANIZED HEALTH CARE EDUCATION/TRAINING PROGRAM

## 2024-03-16 PROCEDURE — 96375 TX/PRO/DX INJ NEW DRUG ADDON: CPT

## 2024-03-16 PROCEDURE — 74177 CT ABD & PELVIS W/CONTRAST: CPT

## 2024-03-16 PROCEDURE — 85025 COMPLETE CBC W/AUTO DIFF WBC: CPT | Performed by: STUDENT IN AN ORGANIZED HEALTH CARE EDUCATION/TRAINING PROGRAM

## 2024-03-16 PROCEDURE — 96361 HYDRATE IV INFUSION ADD-ON: CPT

## 2024-03-16 PROCEDURE — 25510000001 IOPAMIDOL 61 % SOLUTION: Performed by: STUDENT IN AN ORGANIZED HEALTH CARE EDUCATION/TRAINING PROGRAM

## 2024-03-16 PROCEDURE — 83690 ASSAY OF LIPASE: CPT | Performed by: STUDENT IN AN ORGANIZED HEALTH CARE EDUCATION/TRAINING PROGRAM

## 2024-03-16 PROCEDURE — 25010000002 ONDANSETRON PER 1 MG: Performed by: STUDENT IN AN ORGANIZED HEALTH CARE EDUCATION/TRAINING PROGRAM

## 2024-03-16 PROCEDURE — 99285 EMERGENCY DEPT VISIT HI MDM: CPT

## 2024-03-16 RX ORDER — ONDANSETRON 2 MG/ML
4 INJECTION INTRAMUSCULAR; INTRAVENOUS ONCE
Status: COMPLETED | OUTPATIENT
Start: 2024-03-16 | End: 2024-03-16

## 2024-03-16 RX ORDER — SODIUM CHLORIDE 0.9 % (FLUSH) 0.9 %
10 SYRINGE (ML) INJECTION AS NEEDED
Status: DISCONTINUED | OUTPATIENT
Start: 2024-03-16 | End: 2024-03-17 | Stop reason: HOSPADM

## 2024-03-16 RX ADMIN — IOPAMIDOL 100 ML: 612 INJECTION, SOLUTION INTRAVENOUS at 18:37

## 2024-03-16 RX ADMIN — SODIUM CHLORIDE 1000 ML: 9 INJECTION, SOLUTION INTRAVENOUS at 14:36

## 2024-03-16 RX ADMIN — HYDROMORPHONE HYDROCHLORIDE 0.5 MG: 1 INJECTION, SOLUTION INTRAMUSCULAR; INTRAVENOUS; SUBCUTANEOUS at 18:10

## 2024-03-16 RX ADMIN — HYDROMORPHONE HYDROCHLORIDE 0.5 MG: 1 INJECTION, SOLUTION INTRAMUSCULAR; INTRAVENOUS; SUBCUTANEOUS at 15:46

## 2024-03-16 RX ADMIN — HYDROMORPHONE HYDROCHLORIDE 0.5 MG: 1 INJECTION, SOLUTION INTRAMUSCULAR; INTRAVENOUS; SUBCUTANEOUS at 21:05

## 2024-03-16 RX ADMIN — ONDANSETRON 4 MG: 2 INJECTION INTRAMUSCULAR; INTRAVENOUS at 14:36

## 2024-03-16 RX ADMIN — HYDROMORPHONE HYDROCHLORIDE 0.5 MG: 1 INJECTION, SOLUTION INTRAMUSCULAR; INTRAVENOUS; SUBCUTANEOUS at 14:36

## 2024-03-16 NOTE — ED PROVIDER NOTES
EMERGENCY DEPARTMENT ENCOUNTER    Pt Name: Tasha Yarbrough  MRN: [id number]  Pt :   1955  Room Number:    Date of encounter:  3/16/2024  PCP: Terrence Baldwin MD  ED Provider: Darron Austin DO      HPI:  Chief Complaint: Abdominal pain    Context: Tasha Yarbrough is a 68 y.o. female with past medical history of pancreatitis and pseudocyst who presents to the ER with epigastric abdominal pain.  Recently hospitalized at [facility] for pancreatitis.  Discharged home last week.  Ate a hamburger yesterday and developed recurrent symptoms.  Complains of epigastric abdominal pain and nausea.  Rated severe.  PCP started her on hydrocodone, but this has not been helping the pain.  Denies fever, chills, vomiting, diarrhea, constipation, problems with urination or bowel movements.    PAST MEDICAL HISTORY  Past Medical History:   Diagnosis Date    Hypertension     Impaired mobility     Injury of back        PAST SURGICAL HISTORY  Past Surgical History:   Procedure Laterality Date    ABDOMINAL SURGERY      COLON SURGERY      COLONOSCOPY      TUBAL ABDOMINAL LIGATION         FAMILY HISTORY  History reviewed. No pertinent family history.    SOCIAL HISTORY  Social History     Socioeconomic History    Marital status:    Tobacco Use    Smoking status: Every Day     Current packs/day: 1.00     Types: Cigarettes    Smokeless tobacco: Never   Vaping Use    Vaping status: Never Used   Substance and Sexual Activity    Alcohol use: Never    Drug use: Never    Sexual activity: Defer       ALLERGIES  Ciprofloxacin, Codeine, and Pineapple    REVIEW OF SYSTEMS  All systems reviewed and negative except for those discussed in HPI.     PHYSICAL EXAM  ED Triage Vitals   Temp Pulse Resp BP SpO2   -- -- -- -- --      Temp src Heart Rate Source Patient Position BP Location FiO2 (%)   -- -- -- -- --     I have reviewed the triage vital signs and nursing notes.    General: Alert.  Appears chronically ill, but  nontoxic.  No acute distress.  Head: Normocephalic.  Atraumatic.  Eyes: No scleral icterus.  ENT: Moist mucous membranes.  Cardiovascular: Regular rate and rhythm.  No murmurs.  No rubs.  2+ distal pulses bilaterally.  Respiratory: Equal breath sounds bilaterally.  No rales.  No rhonchi.  No wheezing.  GI: Abdomen is soft.  Nondistended.  Positive tenderness palpation epigastric region.  No rebound.  No guarding.  No CVA tenderness.  MSK: Moves all 4 extremities.  Neurologic: Oriented x 3.  No focal deficits.  Skin: No erythema.  No edema.  Psych: Normal mood and affect.    LAB RESULTS  Recent Results (from the past 24 hour(s))   Lipase    Collection Time: 03/16/24  2:10 PM    Specimen: Blood   Result Value Ref Range    Lipase 419 (H) 13 - 60 U/L   Comprehensive Metabolic Panel    Collection Time: 03/16/24  2:10 PM    Specimen: Blood   Result Value Ref Range    Glucose 75 65 - 99 mg/dL    BUN 17 8 - 23 mg/dL    Creatinine 1.30 (H) 0.57 - 1.00 mg/dL    Sodium 141 136 - 145 mmol/L    Potassium 5.1 3.5 - 5.2 mmol/L    Chloride 111 (H) 98 - 107 mmol/L    CO2 18.4 (L) 22.0 - 29.0 mmol/L    Calcium 9.4 8.6 - 10.5 mg/dL    Total Protein 6.6 6.0 - 8.5 g/dL    Albumin 3.5 3.5 - 5.2 g/dL    ALT (SGPT) 9 1 - 33 U/L    AST (SGOT) 12 1 - 32 U/L    Alkaline Phosphatase 109 39 - 117 U/L    Total Bilirubin 0.2 0.0 - 1.2 mg/dL    Globulin 3.1 gm/dL    A/G Ratio 1.1 g/dL    BUN/Creatinine Ratio 13.1 7.0 - 25.0    Anion Gap 11.6 5.0 - 15.0 mmol/L    eGFR 44.9 (L) >60.0 mL/min/1.73   CBC Auto Differential    Collection Time: 03/16/24  2:10 PM    Specimen: Blood   Result Value Ref Range    WBC 6.82 3.40 - 10.80 10*3/mm3    RBC 2.75 (L) 3.77 - 5.28 10*6/mm3    Hemoglobin 10.0 (L) 12.0 - 15.9 g/dL    Hematocrit 31.6 (L) 34.0 - 46.6 %    .9 (H) 79.0 - 97.0 fL    MCH 36.4 (H) 26.6 - 33.0 pg    MCHC 31.6 31.5 - 35.7 g/dL    RDW 13.4 12.3 - 15.4 %    RDW-SD 56.8 (H) 37.0 - 54.0 fl    MPV 10.3 6.0 - 12.0 fL    Platelets 324 140 - 450  10*3/mm3    Neutrophil % 77.0 (H) 42.7 - 76.0 %    Lymphocyte % 14.7 (L) 19.6 - 45.3 %    Monocyte % 5.3 5.0 - 12.0 %    Eosinophil % 1.6 0.3 - 6.2 %    Basophil % 0.7 0.0 - 1.5 %    Immature Grans % 0.7 (H) 0.0 - 0.5 %    Neutrophils, Absolute 5.25 1.70 - 7.00 10*3/mm3    Lymphocytes, Absolute 1.00 0.70 - 3.10 10*3/mm3    Monocytes, Absolute 0.36 0.10 - 0.90 10*3/mm3    Eosinophils, Absolute 0.11 0.00 - 0.40 10*3/mm3    Basophils, Absolute 0.05 0.00 - 0.20 10*3/mm3    Immature Grans, Absolute 0.05 0.00 - 0.05 10*3/mm3    nRBC 0.0 0.0 - 0.2 /100 WBC   Scan Slide    Collection Time: 03/16/24  2:10 PM    Specimen: Blood   Result Value Ref Range    Macrocytes Slight/1+ None Seen    WBC Morphology Normal Normal    Platelet Estimate Adequate Normal       RADIOLOGY  CT Abdomen Pelvis With Contrast    Result Date: 3/16/2024  FINAL REPORT TECHNIQUE: Axial CT images were performed from the lung bases through the symphysis pubis after the administration of intravenous contrast.  This study was performed with techniques to keep radiation doses as low as reasonably achievable (ALARA). Individualized dose reduction techniques using automated exposure control or adjustment of mA and/or kV according to the patient's size were employed. CLINICAL HISTORY: Epigastric abdominal pain COMPARISON: 3/9/2024 FINDINGS: LOWER CHEST: The heart is normal size.  There are small pleural effusions with lung base atelectasis.  There is a moderate hiatal hernia.  ABDOMEN/PELVIS:  Liver, gallbladder and bile ducts: The liver enhances homogeneously without suspicious focal hepatic lesion.  There has been a prior cholecystectomy.  There is no definite biliary duct dilatation.  Adrenal glands: The adrenal glands are morphologically unremarkable without suspicious lesion.  Kidneys, ureter and urinary bladder: No suspicious renal lesion.  No hydronephrosis.  Urinary bladder is unremarkable.  Spleen: The spleen is normal size.  Pancreas: There is  increased peripancreatic inflammation now extending into the root of the small bowel mesentery.  There is a somewhat complex peripancreatic fluid collection which has increased in size.  GI systems and mesentery: No evidence of bowel obstruction.  The appendix is visualized and unremarkable in appearance.  Lymph nodes: No definite pathologically enlarged abdominal or pelvic lymph nodes present.  Vessels: The aorta and abdominal arteries are grossly patent.  The IVC and portal vein are patent and grossly unremarkable.  Peritoneum: There is small volume perihepatic free fluid.  Pelvic viscera: No acute findings.  Body wall: No body wall contusion. No significant body wall hernias.  Bones: No acute fracture.     Worsening acute pancreatitis with increased size of the peripancreatic fluid collection. Authenticated and Electronically Signed by Jens Karimi MD on 03/16/2024 08:02:33 PM     PROCEDURES  Procedures    MEDICATIONS GIVEN IN ER  Medications   sodium chloride 0.9 % flush 10 mL (has no administration in time range)   HYDROmorphone (DILAUDID) injection 0.5 mg (0.5 mg Intravenous Given 3/16/24 1436)   ondansetron (ZOFRAN) injection 4 mg (4 mg Intravenous Given 3/16/24 1436)   sodium chloride 0.9 % bolus 1,000 mL (0 mL Intravenous Stopped 3/16/24 1734)   HYDROmorphone (DILAUDID) injection 0.5 mg (0.5 mg Intravenous Given 3/16/24 1546)   HYDROmorphone (DILAUDID) injection 0.5 mg (0.5 mg Intravenous Given 3/16/24 1810)   iopamidol (ISOVUE-300) 61 % injection 100 mL (100 mL Intravenous Given 3/16/24 1837)   HYDROmorphone (DILAUDID) injection 0.5 mg (0.5 mg Intravenous Given 3/16/24 2105)       MEDICAL DECISION MAKING  68 y.o. female with past medical history listed above who presents with epigastric abdominal pain.    Patient arrives via EMS.  I have reviewed the EMS documentation/notes and included that information in my HPI.    Vital signs remarkable for hypertension, otherwise within normal limits.    Based on  clinical presentation and physical exam, differential diagnosis includes, but is not limited to, pancreatitis, pseudocyst, doubt obstruction.    I have discussed the indication, risk, and alternatives of the following high risk medications: IV Dilaudid    External notes reviewed.   ER note from 3/9/2024 reviewed.  Patient presented again with abdominal pain.  Recent diagnosis of pancreatitis.  Labs at that time showed lipase of 1900.  CT scan on this day showed evidence of stable mild pancreatitis changes at the tail.  Improvement of peripancreatic fluid collection which may represent pseudocyst. Patient was transferred to [facility] for gastroenterology consultation.  Hospitalist discharge summary from 3/4/2024 reviewed.  Patient mated with acute pancreatitis with pseudocyst and acute colitis.  Radiology report from 3-24-24 for CT abdomen pelvis with contrast showed 6 x 3 cm fluid collection pancreatic tail extending to and compressing the greater gastric culture most consistent with an acute fluid collection related to acute pancreatitis.  Persistent stranding about the pancreatic tail consistent with acute pancreatitis.  Improved from exam on 2/25/2024.    At least 3 different tests have been ordered on this patient.    Please see ED course below for my interpretation of the ED workup.  ED Course as of 03/16/24 2138   Sat Mar 16, 2024   2009 I reviewed the labs listed above. Clinically unremarkable.    Notable abnormalities include: Hemoglobin 10.0, lipase 419, creatinine 1.3.    Old laboratory data was reviewed from the medical records and compared to today's results.   [JS]   2010 I have independently reviewed and interpreted the CT abdomen pelvis.  My interpretation is negative for small bowel obstruction.  Pancreatitis and pseudocyst present.   [JS]      ED Course User Index  [JS] Darron Austin DO      Radiologist also notes the following findings: Worsening pancreatitis and  increased peripancreatic fluid collection.    Gastroenterology consultation unavailable locally, therefore patient will require transfer to tertiary care center with gastroenterology abilities.    I discussed the findings of the ED workup with the patient and her son at bedside.  Patient was previously admitted at [facility].  They voiced concerns about going back to .  Request [facility] or [facility].    2115: Case discussed with Dr. Gonzales (hospitalist) at [facility].  Agrees to assess the patient for transfer as long as gastroenterology is comfortable with consultation given fluid collection.    2126: Case discussed with Dr. Reid (GI) at [facility].  Given increasing fluid size and possible need for surgical intervention he recommends transfer to [facility].    2135: Case discussed and transfer accepted by Dr. Ho (triage physician) at [facility].  Assessed the patient for transfer to [facility] ED.    REPEAT VITAL SIGNS  AS OF 21:38 EDT VITALS:  BP - 173/93  HR - 66  TEMP - 98.9 °F (37.2 °C)  O2 SATS - 95%    DIAGNOSIS  Final diagnoses:   Acute pancreatitis, unspecified complication status, unspecified pancreatitis type   Intraabdominal fluid collection       DISPOSITION  ED Disposition       ED Disposition   Transfer to Another Facility     Condition   --    Comment   --                     Please note that portions of this document were completed with voice recognition software.        Darron Austin DO  03/17/24 [id number]

## 2024-03-19 ENCOUNTER — READMISSION MANAGEMENT (OUTPATIENT)
Dept: CALL CENTER | Facility: HOSPITAL | Age: 69
End: 2024-03-19
Payer: MEDICARE

## 2024-03-19 NOTE — OUTREACH NOTE
Medical Week 2 Survey      Flowsheet Row Responses   [facility] facility patient discharged from? [facility]   Does the patient have one of the following disease processes/diagnoses(primary or secondary)? Other   Week 2 attempt successful? No   Unsuccessful attempts Attempt 1   Revoke Readmitted  [readmitted to  on 3/17]            Starr WORTHINGTON - Registered Nurse

## 2024-03-23 ENCOUNTER — APPOINTMENT (OUTPATIENT)
Dept: CT IMAGING | Facility: HOSPITAL | Age: 69
End: 2024-03-23
Payer: MEDICARE

## 2024-03-23 ENCOUNTER — HOSPITAL ENCOUNTER (EMERGENCY)
Facility: HOSPITAL | Age: 69
Discharge: HOME | End: 2024-03-23
Attending: EMERGENCY MEDICINE
Payer: MEDICARE

## 2024-03-23 VITALS
HEART RATE: 58 BPM | HEIGHT: 62 IN | OXYGEN SATURATION: 96 % | WEIGHT: 145.94 LBS | RESPIRATION RATE: 16 BRPM | BODY MASS INDEX: 26.86 KG/M2 | DIASTOLIC BLOOD PRESSURE: 90 MMHG | SYSTOLIC BLOOD PRESSURE: 146 MMHG | TEMPERATURE: 98.1 F

## 2024-03-23 DIAGNOSIS — R10.13 EPIGASTRIC PAIN: Primary | ICD-10-CM

## 2024-03-23 LAB
ALBUMIN SERPL-MCNC: 3.2 G/DL (ref 3.5–5.2)
ALBUMIN/GLOB SERPL: 1.1 G/DL
ALP SERPL-CCNC: 111 U/L (ref 39–117)
ALT SERPL W P-5'-P-CCNC: 13 U/L (ref 1–33)
ANION GAP SERPL CALCULATED.3IONS-SCNC: 10.9 MMOL/L (ref 5–15)
AST SERPL-CCNC: 19 U/L (ref 1–32)
BACTERIA UR QL AUTO: ABNORMAL /HPF
BASOPHILS # BLD AUTO: 0.02 10*3/MM3 (ref 0–0.2)
BASOPHILS NFR BLD AUTO: 0.5 % (ref 0–1.5)
BILIRUB SERPL-MCNC: <0.2 MG/DL (ref 0–1.2)
BILIRUB UR QL STRIP: NEGATIVE
BUN SERPL-MCNC: 17 MG/DL (ref 8–23)
BUN/CREAT SERPL: 13.7 (ref 7–25)
CALCIUM SPEC-SCNC: 8.8 MG/DL (ref 8.6–10.5)
CHLORIDE SERPL-SCNC: 107 MMOL/L (ref 98–107)
CLARITY UR: CLEAR
CO2 SERPL-SCNC: 25.1 MMOL/L (ref 22–29)
COLOR UR: YELLOW
CREAT SERPL-MCNC: 1.24 MG/DL (ref 0.57–1)
CRP SERPL-MCNC: 6.2 MG/DL (ref 0–0.5)
D-LACTATE SERPL-SCNC: 0.8 MMOL/L (ref 0.5–2)
DEPRECATED RDW RBC AUTO: 56.2 FL (ref 37–54)
EGFRCR SERPLBLD CKD-EPI 2021: 47.5 ML/MIN/1.73
EOSINOPHIL # BLD AUTO: 0.14 10*3/MM3 (ref 0–0.4)
EOSINOPHIL NFR BLD AUTO: 3.4 % (ref 0.3–6.2)
ERYTHROCYTE [DISTWIDTH] IN BLOOD BY AUTOMATED COUNT: 13.1 % (ref 12.3–15.4)
GLOBULIN UR ELPH-MCNC: 3 GM/DL
GLUCOSE SERPL-MCNC: 74 MG/DL (ref 65–99)
GLUCOSE UR STRIP-MCNC: NEGATIVE MG/DL
HCT VFR BLD AUTO: 29.3 % (ref 34–46.6)
HGB BLD-MCNC: 8.8 G/DL (ref 12–15.9)
HGB UR QL STRIP.AUTO: NEGATIVE
HOLD SPECIMEN: NORMAL
HOLD SPECIMEN: NORMAL
HYALINE CASTS UR QL AUTO: ABNORMAL /LPF
IMM GRANULOCYTES # BLD AUTO: 0.03 10*3/MM3 (ref 0–0.05)
IMM GRANULOCYTES NFR BLD AUTO: 0.7 % (ref 0–0.5)
KETONES UR QL STRIP: NEGATIVE
LEUKOCYTE ESTERASE UR QL STRIP.AUTO: NEGATIVE
LIPASE SERPL-CCNC: 153 U/L (ref 13–60)
LYMPHOCYTES # BLD AUTO: 0.54 10*3/MM3 (ref 0.7–3.1)
LYMPHOCYTES NFR BLD AUTO: 12.9 % (ref 19.6–45.3)
MACROCYTES BLD QL SMEAR: NORMAL
MCH RBC QN AUTO: 34.8 PG (ref 26.6–33)
MCHC RBC AUTO-ENTMCNC: 30 G/DL (ref 31.5–35.7)
MCV RBC AUTO: 115.8 FL (ref 79–97)
MONOCYTES # BLD AUTO: 0.37 10*3/MM3 (ref 0.1–0.9)
MONOCYTES NFR BLD AUTO: 8.9 % (ref 5–12)
NEUTROPHILS NFR BLD AUTO: 3.07 10*3/MM3 (ref 1.7–7)
NEUTROPHILS NFR BLD AUTO: 73.6 % (ref 42.7–76)
NITRITE UR QL STRIP: NEGATIVE
NRBC BLD AUTO-RTO: 0 /100 WBC (ref 0–0.2)
PH UR STRIP.AUTO: 6.5 [PH] (ref 5–8)
PLATELET # BLD AUTO: 208 10*3/MM3 (ref 140–450)
PMV BLD AUTO: 11.4 FL (ref 6–12)
POTASSIUM SERPL-SCNC: 4.5 MMOL/L (ref 3.5–5.2)
PROCALCITONIN SERPL-MCNC: 0.31 NG/ML (ref 0–0.25)
PROT SERPL-MCNC: 6.2 G/DL (ref 6–8.5)
PROT UR QL STRIP: ABNORMAL
RBC # BLD AUTO: 2.53 10*6/MM3 (ref 3.77–5.28)
RBC # UR STRIP: ABNORMAL /HPF
REF LAB TEST METHOD: ABNORMAL
SMALL PLATELETS BLD QL SMEAR: ADEQUATE
SODIUM SERPL-SCNC: 143 MMOL/L (ref 136–145)
SP GR UR STRIP: 1.02 (ref 1–1.03)
SQUAMOUS #/AREA URNS HPF: ABNORMAL /HPF
TROPONIN T SERPL HS-MCNC: 13 NG/L
UROBILINOGEN UR QL STRIP: ABNORMAL
WBC # UR STRIP: ABNORMAL /HPF
WBC MORPH BLD: NORMAL
WBC NRBC COR # BLD AUTO: 4.17 10*3/MM3 (ref 3.4–10.8)
WHOLE BLOOD HOLD COAG: NORMAL
WHOLE BLOOD HOLD SPECIMEN: NORMAL

## 2024-03-23 PROCEDURE — 25510000001 IOPAMIDOL 61 % SOLUTION: Performed by: EMERGENCY MEDICINE

## 2024-03-23 PROCEDURE — 80053 COMPREHEN METABOLIC PANEL: CPT | Performed by: EMERGENCY MEDICINE

## 2024-03-23 PROCEDURE — 81001 URINALYSIS AUTO W/SCOPE: CPT | Performed by: EMERGENCY MEDICINE

## 2024-03-23 PROCEDURE — 25010000002 MORPHINE PER 10 MG: Performed by: EMERGENCY MEDICINE

## 2024-03-23 PROCEDURE — 85025 COMPLETE CBC W/AUTO DIFF WBC: CPT | Performed by: EMERGENCY MEDICINE

## 2024-03-23 PROCEDURE — 99285 EMERGENCY DEPT VISIT HI MDM: CPT

## 2024-03-23 PROCEDURE — 85007 BL SMEAR W/DIFF WBC COUNT: CPT | Performed by: EMERGENCY MEDICINE

## 2024-03-23 PROCEDURE — 36415 COLL VENOUS BLD VENIPUNCTURE: CPT

## 2024-03-23 PROCEDURE — 84484 ASSAY OF TROPONIN QUANT: CPT | Performed by: EMERGENCY MEDICINE

## 2024-03-23 PROCEDURE — 74177 CT ABD & PELVIS W/CONTRAST: CPT

## 2024-03-23 PROCEDURE — 25810000003 LACTATED RINGERS SOLUTION: Performed by: EMERGENCY MEDICINE

## 2024-03-23 PROCEDURE — 86140 C-REACTIVE PROTEIN: CPT

## 2024-03-23 PROCEDURE — 96376 TX/PRO/DX INJ SAME DRUG ADON: CPT

## 2024-03-23 PROCEDURE — 83605 ASSAY OF LACTIC ACID: CPT

## 2024-03-23 PROCEDURE — 93005 ELECTROCARDIOGRAM TRACING: CPT | Performed by: EMERGENCY MEDICINE

## 2024-03-23 PROCEDURE — 84145 PROCALCITONIN (PCT): CPT

## 2024-03-23 PROCEDURE — 96374 THER/PROPH/DIAG INJ IV PUSH: CPT

## 2024-03-23 PROCEDURE — 25010000002 FENTANYL CITRATE (PF) 50 MCG/ML SOLUTION: Performed by: EMERGENCY MEDICINE

## 2024-03-23 PROCEDURE — 96375 TX/PRO/DX INJ NEW DRUG ADDON: CPT

## 2024-03-23 PROCEDURE — 25010000002 ONDANSETRON PER 1 MG: Performed by: EMERGENCY MEDICINE

## 2024-03-23 PROCEDURE — 83690 ASSAY OF LIPASE: CPT | Performed by: EMERGENCY MEDICINE

## 2024-03-23 RX ORDER — OXYCODONE HYDROCHLORIDE 5 MG/1
5 TABLET ORAL EVERY 4 HOURS PRN
Qty: 12 TABLET | Refills: 0 | Status: SHIPPED | OUTPATIENT
Start: 2024-03-23

## 2024-03-23 RX ORDER — ONDANSETRON 2 MG/ML
4 INJECTION INTRAMUSCULAR; INTRAVENOUS ONCE
Status: COMPLETED | OUTPATIENT
Start: 2024-03-23 | End: 2024-03-23

## 2024-03-23 RX ORDER — SODIUM CHLORIDE 0.9 % (FLUSH) 0.9 %
10 SYRINGE (ML) INJECTION AS NEEDED
Status: DISCONTINUED | OUTPATIENT
Start: 2024-03-23 | End: 2024-03-24 | Stop reason: HOSPADM

## 2024-03-23 RX ORDER — FENTANYL CITRATE 50 UG/ML
75 INJECTION, SOLUTION INTRAMUSCULAR; INTRAVENOUS
Status: DISCONTINUED | OUTPATIENT
Start: 2024-03-23 | End: 2024-03-24 | Stop reason: HOSPADM

## 2024-03-23 RX ADMIN — SODIUM CHLORIDE, POTASSIUM CHLORIDE, SODIUM LACTATE AND CALCIUM CHLORIDE 1000 ML: 600; 310; 30; 20 INJECTION, SOLUTION INTRAVENOUS at 17:35

## 2024-03-23 RX ADMIN — ONDANSETRON 4 MG: 2 INJECTION INTRAMUSCULAR; INTRAVENOUS at 17:35

## 2024-03-23 RX ADMIN — MORPHINE SULFATE 4 MG: 4 INJECTION, SOLUTION INTRAMUSCULAR; INTRAVENOUS at 17:35

## 2024-03-23 RX ADMIN — FENTANYL CITRATE 75 MCG: 50 INJECTION, SOLUTION INTRAMUSCULAR; INTRAVENOUS at 20:44

## 2024-03-23 RX ADMIN — ONDANSETRON 4 MG: 2 INJECTION INTRAMUSCULAR; INTRAVENOUS at 20:44

## 2024-03-23 RX ADMIN — IOPAMIDOL 100 ML: 612 INJECTION, SOLUTION INTRAVENOUS at 18:22

## 2024-03-23 NOTE — ED PROVIDER NOTES
EMERGENCY DEPARTMENT ENCOUNTER    Pt Name: Tasha Yarbrough  MRN: [id number]  Pt :   1955  Room Number:    Date of encounter:  3/23/2024  PCP: Terrence Baldwin MD  ED Provider: Prasad Hull PA-C    Historian: Patient, patient's son, nursing notes      HPI:  Chief Complaint: Abdominal pain         Context: Tasha Yarbrough is a 68 y.o. female with a past medical history of CKD and hypertension who presents to the ED c/o abdominal pain for the last several weeks.  Patient states she has been admitted to the hospital multiple times over these past several weeks.  She was recently discharged from [facility] last week sent home with pain medication but states that her pain has worsened and her pain medicines are gone.  Patient also reports associated symptoms of nausea and vomiting.      PAST MEDICAL HISTORY  Past Medical History:   Diagnosis Date    Hypertension     Impaired mobility     Injury of back          PAST SURGICAL HISTORY  Past Surgical History:   Procedure Laterality Date    ABDOMINAL SURGERY      COLON SURGERY      COLONOSCOPY      TUBAL ABDOMINAL LIGATION           FAMILY HISTORY  No family history on file.      SOCIAL HISTORY  Social History     Socioeconomic History    Marital status:    Tobacco Use    Smoking status: Every Day     Current packs/day: 1.00     Types: Cigarettes    Smokeless tobacco: Never   Vaping Use    Vaping status: Never Used   Substance and Sexual Activity    Alcohol use: Never    Drug use: Never    Sexual activity: Defer         ALLERGIES  Ciprofloxacin, Codeine, and Pineapple        REVIEW OF SYSTEMS  Review of Systems   Constitutional:  Negative for chills and fever.   HENT:  Negative for congestion and sore throat.    Respiratory:  Negative for cough and shortness of breath.    Cardiovascular:  Negative for chest pain.   Gastrointestinal:  Positive for abdominal distention, abdominal pain, nausea and vomiting. Negative for blood in stool.    Genitourinary:  Negative for dysuria.   Musculoskeletal:  Negative for back pain.   Skin:  Negative for wound.   Neurological:  Negative for dizziness and headaches.   Psychiatric/Behavioral:  Negative for confusion.    All other systems reviewed and are negative.         All systems reviewed and negative except for those discussed in HPI.       PHYSICAL EXAM    I have reviewed the triage vital signs and nursing notes.    ED Triage Vitals   Temp Pulse Resp BP SpO2   -- -- -- -- --      Temp src Heart Rate Source Patient Position BP Location FiO2 (%)   -- -- -- -- --       Physical Exam  Vitals and nursing note reviewed.   Constitutional:       General: She is not in acute distress.     Appearance: She is not ill-appearing, toxic-appearing or diaphoretic.   HENT:      Head: Normocephalic and atraumatic.      Mouth/Throat:      Mouth: Mucous membranes are moist.      Pharynx: Oropharynx is clear.   Eyes:      Extraocular Movements: Extraocular movements intact.   Cardiovascular:      Rate and Rhythm: Normal rate.      Heart sounds: Normal heart sounds.   Pulmonary:      Effort: Pulmonary effort is normal. No respiratory distress.      Breath sounds: Normal breath sounds. No wheezing or rales.   Abdominal:      General: There is distension.      Palpations: Abdomen is soft.      Tenderness: There is abdominal tenderness in the right upper quadrant, epigastric area, periumbilical area and left upper quadrant. There is no right CVA tenderness, left CVA tenderness, guarding or rebound.   Skin:     General: Skin is warm and dry.      Findings: No rash.   Neurological:      General: No focal deficit present.      Mental Status: She is alert and oriented to person, place, and time.             LAB RESULTS  Recent Results (from the past 24 hour(s))   Comprehensive Metabolic Panel    Collection Time: 03/23/24  4:32 PM    Specimen: Blood   Result Value Ref Range    Glucose 74 65 - 99 mg/dL    BUN 17 8 - 23 mg/dL    Creatinine  1.24 (H) 0.57 - 1.00 mg/dL    Sodium 143 136 - 145 mmol/L    Potassium 4.5 3.5 - 5.2 mmol/L    Chloride 107 98 - 107 mmol/L    CO2 25.1 22.0 - 29.0 mmol/L    Calcium 8.8 8.6 - 10.5 mg/dL    Total Protein 6.2 6.0 - 8.5 g/dL    Albumin 3.2 (L) 3.5 - 5.2 g/dL    ALT (SGPT) 13 1 - 33 U/L    AST (SGOT) 19 1 - 32 U/L    Alkaline Phosphatase 111 39 - 117 U/L    Total Bilirubin <0.2 0.0 - 1.2 mg/dL    Globulin 3.0 gm/dL    A/G Ratio 1.1 g/dL    BUN/Creatinine Ratio 13.7 7.0 - 25.0    Anion Gap 10.9 5.0 - 15.0 mmol/L    eGFR 47.5 (L) >60.0 mL/min/1.73   Lipase    Collection Time: 03/23/24  4:32 PM    Specimen: Blood   Result Value Ref Range    Lipase 153 (H) 13 - 60 U/L   Single High Sensitivity Troponin T    Collection Time: 03/23/24  4:32 PM    Specimen: Blood   Result Value Ref Range    HS Troponin T 13 <14 ng/L   Green Top (Gel)    Collection Time: 03/23/24  4:32 PM   Result Value Ref Range    Extra Tube Hold for add-ons.    Lavender Top    Collection Time: 03/23/24  4:32 PM   Result Value Ref Range    Extra Tube hold for add-on    Gold Top - SST    Collection Time: 03/23/24  4:32 PM   Result Value Ref Range    Extra Tube Hold for add-ons.    Light Blue Top    Collection Time: 03/23/24  4:32 PM   Result Value Ref Range    Extra Tube Hold for add-ons.    CBC Auto Differential    Collection Time: 03/23/24  4:32 PM    Specimen: Blood   Result Value Ref Range    WBC 4.17 3.40 - 10.80 10*3/mm3    RBC 2.53 (L) 3.77 - 5.28 10*6/mm3    Hemoglobin 8.8 (L) 12.0 - 15.9 g/dL    Hematocrit 29.3 (L) 34.0 - 46.6 %    .8 (H) 79.0 - 97.0 fL    MCH 34.8 (H) 26.6 - 33.0 pg    MCHC 30.0 (L) 31.5 - 35.7 g/dL    RDW 13.1 12.3 - 15.4 %    RDW-SD 56.2 (H) 37.0 - 54.0 fl    MPV 11.4 6.0 - 12.0 fL    Platelets 208 140 - 450 10*3/mm3    Neutrophil % 73.6 42.7 - 76.0 %    Lymphocyte % 12.9 (L) 19.6 - 45.3 %    Monocyte % 8.9 5.0 - 12.0 %    Eosinophil % 3.4 0.3 - 6.2 %    Basophil % 0.5 0.0 - 1.5 %    Immature Grans % 0.7 (H) 0.0 - 0.5 %     Neutrophils, Absolute 3.07 1.70 - 7.00 10*3/mm3    Lymphocytes, Absolute 0.54 (L) 0.70 - 3.10 10*3/mm3    Monocytes, Absolute 0.37 0.10 - 0.90 10*3/mm3    Eosinophils, Absolute 0.14 0.00 - 0.40 10*3/mm3    Basophils, Absolute 0.02 0.00 - 0.20 10*3/mm3    Immature Grans, Absolute 0.03 0.00 - 0.05 10*3/mm3    nRBC 0.0 0.0 - 0.2 /100 WBC   Procalcitonin    Collection Time: 03/23/24  4:32 PM    Specimen: Blood   Result Value Ref Range    Procalcitonin 0.31 (H) 0.00 - 0.25 ng/mL   C-reactive Protein    Collection Time: 03/23/24  4:32 PM    Specimen: Blood   Result Value Ref Range    C-Reactive Protein 6.20 (H) 0.00 - 0.50 mg/dL   Scan Slide    Collection Time: 03/23/24  4:32 PM    Specimen: Blood   Result Value Ref Range    Macrocytes Mod/2+ None Seen    WBC Morphology Normal Normal    Platelet Estimate Adequate Normal   Urinalysis With Microscopic If Indicated (No Culture) - Urine, Clean Catch    Collection Time: 03/23/24  5:22 PM    Specimen: Urine, Clean Catch   Result Value Ref Range    Color, UA Yellow Yellow, Straw    Appearance, UA Clear Clear    pH, UA 6.5 5.0 - 8.0    Specific Gravity, UA 1.024 1.005 - 1.030    Glucose, UA Negative Negative    Ketones, UA Negative Negative    Bilirubin, UA Negative Negative    Blood, UA Negative Negative    Protein, UA 30 mg/dL (1+) (A) Negative    Leuk Esterase, UA Negative Negative    Nitrite, UA Negative Negative    Urobilinogen, UA 0.2 E.U./dL 0.2 - 1.0 E.U./dL   Urinalysis, Microscopic Only - Urine, Clean Catch    Collection Time: 03/23/24  5:22 PM    Specimen: Urine, Clean Catch   Result Value Ref Range    RBC, UA None Seen None Seen, 0-2 /HPF    WBC, UA None Seen None Seen, 0-2 /HPF    Bacteria, UA None Seen None Seen /HPF    Squamous Epithelial Cells, UA 3-6 (A) None Seen, 0-2 /HPF    Hyaline Casts, UA None Seen None Seen /LPF    Methodology Manual Light Microscopy    Lactic Acid, Plasma    Collection Time: 03/23/24  7:34 PM    Specimen: Arm, Right; Blood   Result Value  Ref Range    Lactate 0.8 0.5 - 2.0 mmol/L       If labs were ordered, I independently reviewed the results and considered them in treating the patient.        RADIOLOGY  CT Abdomen Pelvis With Contrast    Result Date: 3/23/2024  FINAL REPORT TECHNIQUE: After the administration of oral and intravenous contrast, axial images were obtained through the abdomen and pelvis by computed tomography.  The study was performed with techniques to keep radiation dose as low as reasonably achievable, (ALARA). Individual dose reduction techniques using automated exposure control or adjustment of mA and/or kV according to the patient's size were employed. CLINICAL HISTORY: Nausea/vomiting FINDINGS: There are small bilateral pleural effusions partially included. There is a large hiatal hernia containing proximal stomach, abdominal fat, and fluid.  There are 2 discrete upper abdominal fluid collections which abut the anterior pancreas and may reflect pancreatic pseudocysts or abscesses.  The larger, slightly more inferior collection centered to the right of midline measures 5.3 x 5.0 cm.  The smaller, more cephalad collection to the left of midline measures 5.4 x 3.0 cm.  Both have enhancing walls and exhibit some inflammation in the adjacent fat.  There are small bilateral renal cyst not requiring follow-up.  There are a couple of nonobstructing right renal calculi present.  Prior cholecystectomy with mild biliary dilatation which is not uncommon postcholecystectomy.  Small amount of pneumobilia is present as well.  Remaining abdominal organs are unremarkable.  Aorta is ectatic, moderately calcified, but not aneurysmal.  Appendix is not identified on this exam without oral contrast.  Severe constipation.  No diverticular disease.  No evidence of bowel obstruction on this exam without oral contrast.  There is a supraumbilical hernia to the right of midline containing fat and without associated inflammation.  No adnexal masses.   Minimal ascites.     1.  2 enhancing fluid collections abutting the pancreas which may reflect pseudocysts or abscesses.  2.  Large hiatal hernia. 3.  Right nephrolithiasis, nonobstructing.  4.  Severe constipation without obstruction.  5.  Small pleural effusions with minimal ascites Authenticated and Electronically Signed by Sanket Lamb MD on 03/23/2024 07:22:00 PM     I ordered and independently reviewed the above noted radiographic studies.      I viewed images of CT abdomen and pelvis which showed evidence of peripancreatic fluid per my independent interpretation.    See radiologist's dictation for official interpretation.        PROCEDURES    Procedures    ECG 12 Lead ED Triage Standing Order; Abdominal Pain (Upper)   Final Result          MEDICATIONS GIVEN IN ER    Medications   sodium chloride 0.9 % flush 10 mL (has no administration in time range)   fentaNYL citrate (PF) (SUBLIMAZE) injection 75 mcg (75 mcg Intravenous Given 3/23/24 2044)   lactated ringers bolus 1,000 mL (0 mL Intravenous Stopped 3/23/24 1817)   morphine injection 4 mg (4 mg Intravenous Given 3/23/24 1735)   ondansetron (ZOFRAN) injection 4 mg (4 mg Intravenous Given 3/23/24 1735)   iopamidol (ISOVUE-300) 61 % injection 100 mL (100 mL Intravenous Given 3/23/24 1822)   ondansetron (ZOFRAN) injection 4 mg (4 mg Intravenous Given 3/23/24 2044)         MEDICAL DECISION MAKING, PROGRESS, and CONSULTS    All labs, if obtained, have been independently reviewed by me.  All radiology studies, if obtained, have been reviewed by me and the radiologist dictating the report.  All EKG's, if obtained, have been independently viewed and interpreted by me/my attending physician.      Discussion below represents my analysis of pertinent findings related to patient's condition, differential diagnosis, treatment plan and final disposition.    Patient presented for evaluation of abdominal pain, gated by 5-week history of acute pancreatitis requiring multiple  admissions to the hospital.  Extensive workup was initiated.  CBC showed no leukocytosis.  Hemoglobin 8.8, hematocrit 2.93 consistent with patient's baseline over the last several weeks.  CMP notable for creatinine 1.24.  Lipase 153*presents to improvement over the past several weeks.  Troponin within normal limits C-reactive protein is significantly elevated 6.2.  Procalcitonin 0.31.  Urinalysis nonconcerning for infection and lactate is within normal limits.  CT abdomen pelvis per my own interpretation showed evidence of Evgeny pancreatic fluid.  Radiologist report showed concern for 2 Evgeny pancreatic fluid collections concerning for pseudocyst versus abscess.  Patient does not pain the clinical picture of a patient with an abscess she is afebrile vital signs are stable she has no white count lactic is normal and she is nontoxic-appearing.      Consulted gastroenterology on-call physician Dr. Fleming who recommended since he does not perform ERCP procedures to contact [facility].    I consulted  gastroenterology Dr. Spain.  Patient is well-known to Dr. Spain, based on patient's clinical picture vital signs and CT imaging which he was able to visualize he did not suspect abscess to be the cause of the patient's peripancreatic fluid.  Rather he believes that pseudocyst that is not mature enough to the point of being ready for drainage.  Dr. Spain did not think the patient warranted transfer for admission as her pseudocyst is not drainable but rather moved up her follow-up appointment sooner and  gastroenterology will contact the patient to accelerate her outpatient follow-up.    Patient was prescribed oxycodone for pain and discharged home in stable condition with the instructions to follow-up with [facility] soon as possible and very strict ED return precautions of which she verbalized understanding of and agreement with.                     Differential diagnosis:    Differential  diagnosis included was limited to gastritis, GERD, pancreatitis, small bowel obstruction, gastroenteritis, colitis, UTI, constipation      Additional sources:    - Discussed/ obtained information from independent historians: Patient's son at bedside    - External (non-ED) record review: Recent hospital admission records, recent radiology reports    - Chronic or social conditions impacting care: None    - Shared decision making: Discussed risk versus benefit of discharge home versus admission to the hospital and through shared decision making patient and I agreed on a plan of discharge with close or outpatient GI follow-up.      Orders placed during this visit:  Orders Placed This Encounter   Procedures    CT Abdomen Pelvis With Contrast    [facility] Draw    Comprehensive Metabolic Panel    Lipase    Single High Sensitivity Troponin T    Urinalysis With Microscopic If Indicated (No Culture) - Urine, Clean Catch    CBC Auto Differential    Lactic Acid, Plasma    Procalcitonin    C-reactive Protein    Scan Slide    Urinalysis, Microscopic Only - Urine, Clean Catch    NPO Diet NPO Type: Strict NPO    Undress & Gown    Continuous Pulse Oximetry    Vital Signs    Oxygen Therapy- Nasal Cannula; Titrate 1-6 LPM Per SpO2; 90 - 95%    ECG 12 Lead ED Triage Standing Order; Abdominal Pain (Upper)    Insert Peripheral IV    CBC & Differential    Green Top (Gel)    Lavender Top    Gold Top - SST    Light Blue Top         Additional orders considered but not ordered:      ED Course:    Consultants: Consulted [facility] gastroenterology Dr. Spain, consulted [facility] on-call gastroenterologist Dr. Fleming                Shared Decision Making:  After my consideration of clinical presentation and any laboratory/radiology studies obtained, I discussed the findings with the patient/patient representative who is in agreement with the treatment plan and the final disposition.   Risks and benefits of discharge and/or  observation/admission were discussed.       AS OF 23:51 EDT VITALS:    BP - 146/90  HR - 58  TEMP - 98.1 °F (36.7 °C) (Oral)  O2 SATS - 96%                  DIAGNOSIS  Final diagnoses:   Epigastric pain         DISPOSITION  Discharge home      Please note that portions of this document were completed with voice recognition software.      Prasad Hull PA-C  03/23/24 [id number]

## 2024-03-24 NOTE — ED NOTES
[facility] contacted at this time to request GI consult per provider. Awaiting call back at this time

## 2024-03-28 ENCOUNTER — APPOINTMENT (OUTPATIENT)
Dept: GENERAL RADIOLOGY | Facility: HOSPITAL | Age: 69
End: 2024-03-28
Payer: MEDICARE

## 2024-03-28 ENCOUNTER — HOSPITAL ENCOUNTER (EMERGENCY)
Facility: HOSPITAL | Age: 69
Discharge: HOME | End: 2024-03-28
Attending: EMERGENCY MEDICINE | Admitting: EMERGENCY MEDICINE
Payer: MEDICARE

## 2024-03-28 ENCOUNTER — APPOINTMENT (OUTPATIENT)
Dept: CT IMAGING | Facility: HOSPITAL | Age: 69
End: 2024-03-28
Payer: MEDICARE

## 2024-03-28 VITALS
DIASTOLIC BLOOD PRESSURE: 98 MMHG | OXYGEN SATURATION: 95 % | TEMPERATURE: 98.1 F | HEIGHT: 62 IN | SYSTOLIC BLOOD PRESSURE: 158 MMHG | WEIGHT: 146 LBS | BODY MASS INDEX: 26.87 KG/M2 | RESPIRATION RATE: 18 BRPM | HEART RATE: 65 BPM

## 2024-03-28 DIAGNOSIS — R11.2 NAUSEA AND VOMITING IN ADULT: ICD-10-CM

## 2024-03-28 DIAGNOSIS — R10.9 ACUTE ABDOMINAL PAIN: ICD-10-CM

## 2024-03-28 DIAGNOSIS — K85.90 ACUTE PANCREATITIS, UNSPECIFIED COMPLICATION STATUS, UNSPECIFIED PANCREATITIS TYPE: Primary | ICD-10-CM

## 2024-03-28 LAB
ALBUMIN SERPL-MCNC: 3.2 G/DL (ref 3.5–5.2)
ALBUMIN/GLOB SERPL: 1 G/DL
ALP SERPL-CCNC: 136 U/L (ref 39–117)
ALT SERPL W P-5'-P-CCNC: 8 U/L (ref 1–33)
ANION GAP SERPL CALCULATED.3IONS-SCNC: 16.8 MMOL/L (ref 5–15)
AST SERPL-CCNC: 15 U/L (ref 1–32)
BASOPHILS # BLD AUTO: 0.03 10*3/MM3 (ref 0–0.2)
BASOPHILS NFR BLD AUTO: 0.4 % (ref 0–1.5)
BILIRUB SERPL-MCNC: 0.2 MG/DL (ref 0–1.2)
BUN SERPL-MCNC: 17 MG/DL (ref 8–23)
BUN/CREAT SERPL: 15.5 (ref 7–25)
CALCIUM SPEC-SCNC: 8.7 MG/DL (ref 8.6–10.5)
CHLORIDE SERPL-SCNC: 101 MMOL/L (ref 98–107)
CO2 SERPL-SCNC: 21.2 MMOL/L (ref 22–29)
CREAT SERPL-MCNC: 1.1 MG/DL (ref 0.57–1)
D-LACTATE SERPL-SCNC: 1.2 MMOL/L (ref 0.5–2)
DEPRECATED RDW RBC AUTO: 55.7 FL (ref 37–54)
EGFRCR SERPLBLD CKD-EPI 2021: 54.8 ML/MIN/1.73
EOSINOPHIL # BLD AUTO: 0.06 10*3/MM3 (ref 0–0.4)
EOSINOPHIL NFR BLD AUTO: 0.7 % (ref 0.3–6.2)
ERYTHROCYTE [DISTWIDTH] IN BLOOD BY AUTOMATED COUNT: 13.8 % (ref 12.3–15.4)
GLOBULIN UR ELPH-MCNC: 3.1 GM/DL
GLUCOSE SERPL-MCNC: 87 MG/DL (ref 65–99)
HCT VFR BLD AUTO: 30.3 % (ref 34–46.6)
HGB BLD-MCNC: 9.7 G/DL (ref 12–15.9)
IMM GRANULOCYTES # BLD AUTO: 0.15 10*3/MM3 (ref 0–0.05)
IMM GRANULOCYTES NFR BLD AUTO: 1.8 % (ref 0–0.5)
LIPASE SERPL-CCNC: 113 U/L (ref 13–60)
LYMPHOCYTES # BLD AUTO: 1.01 10*3/MM3 (ref 0.7–3.1)
LYMPHOCYTES NFR BLD AUTO: 12.3 % (ref 19.6–45.3)
MACROCYTES BLD QL SMEAR: NORMAL
MCH RBC QN AUTO: 35.3 PG (ref 26.6–33)
MCHC RBC AUTO-ENTMCNC: 32 G/DL (ref 31.5–35.7)
MCV RBC AUTO: 110.2 FL (ref 79–97)
MONOCYTES # BLD AUTO: 0.51 10*3/MM3 (ref 0.1–0.9)
MONOCYTES NFR BLD AUTO: 6.2 % (ref 5–12)
NEUTROPHILS NFR BLD AUTO: 6.45 10*3/MM3 (ref 1.7–7)
NEUTROPHILS NFR BLD AUTO: 78.6 % (ref 42.7–76)
NRBC BLD AUTO-RTO: 0 /100 WBC (ref 0–0.2)
PLAT MORPH BLD: NORMAL
PLATELET # BLD AUTO: 303 10*3/MM3 (ref 140–450)
PMV BLD AUTO: 10.7 FL (ref 6–12)
POTASSIUM SERPL-SCNC: 4 MMOL/L (ref 3.5–5.2)
PROT SERPL-MCNC: 6.3 G/DL (ref 6–8.5)
RBC # BLD AUTO: 2.75 10*6/MM3 (ref 3.77–5.28)
SODIUM SERPL-SCNC: 139 MMOL/L (ref 136–145)
TROPONIN T SERPL HS-MCNC: 13 NG/L
WBC MORPH BLD: NORMAL
WBC NRBC COR # BLD AUTO: 8.21 10*3/MM3 (ref 3.4–10.8)

## 2024-03-28 PROCEDURE — 96375 TX/PRO/DX INJ NEW DRUG ADDON: CPT

## 2024-03-28 PROCEDURE — 84484 ASSAY OF TROPONIN QUANT: CPT | Performed by: EMERGENCY MEDICINE

## 2024-03-28 PROCEDURE — 25010000002 MORPHINE PER 10 MG: Performed by: EMERGENCY MEDICINE

## 2024-03-28 PROCEDURE — 83605 ASSAY OF LACTIC ACID: CPT | Performed by: EMERGENCY MEDICINE

## 2024-03-28 PROCEDURE — 93005 ELECTROCARDIOGRAM TRACING: CPT | Performed by: EMERGENCY MEDICINE

## 2024-03-28 PROCEDURE — 83690 ASSAY OF LIPASE: CPT | Performed by: EMERGENCY MEDICINE

## 2024-03-28 PROCEDURE — 96374 THER/PROPH/DIAG INJ IV PUSH: CPT

## 2024-03-28 PROCEDURE — 80053 COMPREHEN METABOLIC PANEL: CPT | Performed by: EMERGENCY MEDICINE

## 2024-03-28 PROCEDURE — 25510000001 IOPAMIDOL 61 % SOLUTION: Performed by: EMERGENCY MEDICINE

## 2024-03-28 PROCEDURE — 74177 CT ABD & PELVIS W/CONTRAST: CPT

## 2024-03-28 PROCEDURE — 96376 TX/PRO/DX INJ SAME DRUG ADON: CPT

## 2024-03-28 PROCEDURE — 25810000003 SODIUM CHLORIDE 0.9 % SOLUTION: Performed by: EMERGENCY MEDICINE

## 2024-03-28 PROCEDURE — 71045 X-RAY EXAM CHEST 1 VIEW: CPT

## 2024-03-28 PROCEDURE — 25010000002 ONDANSETRON PER 1 MG: Performed by: EMERGENCY MEDICINE

## 2024-03-28 PROCEDURE — 85025 COMPLETE CBC W/AUTO DIFF WBC: CPT | Performed by: EMERGENCY MEDICINE

## 2024-03-28 PROCEDURE — 99285 EMERGENCY DEPT VISIT HI MDM: CPT

## 2024-03-28 PROCEDURE — 85007 BL SMEAR W/DIFF WBC COUNT: CPT | Performed by: EMERGENCY MEDICINE

## 2024-03-28 RX ORDER — OXYCODONE HYDROCHLORIDE AND ACETAMINOPHEN 5; 325 MG/1; MG/1
1 TABLET ORAL EVERY 6 HOURS PRN
Qty: 12 TABLET | Refills: 0 | Status: SHIPPED | OUTPATIENT
Start: 2024-03-28

## 2024-03-28 RX ORDER — ONDANSETRON 4 MG/1
4 TABLET, FILM COATED ORAL EVERY 6 HOURS PRN
Qty: 20 TABLET | Refills: 0 | Status: SHIPPED | OUTPATIENT
Start: 2024-03-28 | End: 2024-04-02

## 2024-03-28 RX ORDER — ONDANSETRON 2 MG/ML
4 INJECTION INTRAMUSCULAR; INTRAVENOUS ONCE
Status: COMPLETED | OUTPATIENT
Start: 2024-03-28 | End: 2024-03-28

## 2024-03-28 RX ORDER — SODIUM CHLORIDE 0.9 % (FLUSH) 0.9 %
10 SYRINGE (ML) INJECTION AS NEEDED
Status: DISCONTINUED | OUTPATIENT
Start: 2024-03-28 | End: 2024-03-28 | Stop reason: HOSPADM

## 2024-03-28 RX ADMIN — SODIUM CHLORIDE 1000 ML: 9 INJECTION, SOLUTION INTRAVENOUS at 09:58

## 2024-03-28 RX ADMIN — MORPHINE SULFATE 4 MG: 4 INJECTION, SOLUTION INTRAMUSCULAR; INTRAVENOUS at 09:58

## 2024-03-28 RX ADMIN — MORPHINE SULFATE 4 MG: 4 INJECTION, SOLUTION INTRAMUSCULAR; INTRAVENOUS at 11:38

## 2024-03-28 RX ADMIN — ONDANSETRON 4 MG: 2 INJECTION INTRAMUSCULAR; INTRAVENOUS at 09:58

## 2024-03-28 RX ADMIN — IOPAMIDOL 100 ML: 612 INJECTION, SOLUTION INTRAVENOUS at 10:35

## 2024-03-28 NOTE — Clinical Note
[facility] EMERGENCY DEPARTMENT  [address] [address]  Phone: [phone]    Tasha Yarbrough was seen and treated in our emergency department on 3/28/2024.  She may return to work on 03/31/2024.         Thank you for choosing [facility].    Rohan Desir MD

## 2024-03-28 NOTE — ED PROVIDER NOTES
Subjective   History of Present Illness  68-year-old female with a previous history of pancreatitis who presents with a complaint of upper abdominal pain.  She reports she began to have the abdominal pain yesterday.  Pain is isolated to the epigastric region.  She does report associated nausea.  She feels like her overall bowel and urinary function has been normal with no reported dysuria.  No chest pain cough or shortness of breath.  No sick contacts.  No new medications.  She is awake and alert and nontoxic in appearance.  She denies alcohol use.  She has had previous abdominal surgeries in the form of cholecystectomy.  The patient is also had previous bowel perforation with a colostomy the past that has ultimately been reversed.  As stated she does think that she has passed gas within the last 24 hours but she is unsure if she passed gas within the last 12.      Review of Systems   Constitutional:  Positive for activity change and appetite change. Negative for chills, fatigue and fever.   HENT:  Negative for congestion, ear pain, postnasal drip, sinus pressure and sore throat.    Eyes:  Negative for pain, redness and visual disturbance.   Respiratory:  Negative for cough, chest tightness and shortness of breath.    Cardiovascular:  Negative for chest pain, palpitations and leg swelling.   Gastrointestinal:  Positive for abdominal pain and nausea. Negative for anal bleeding, blood in stool, diarrhea and vomiting.   Endocrine: Negative for polydipsia and polyuria.   Genitourinary:  Negative for difficulty urinating, dysuria, frequency and urgency.   Musculoskeletal:  Negative for arthralgias, back pain and neck pain.   Skin:  Negative for pallor and rash.   Allergic/Immunologic: Negative for environmental allergies and immunocompromised state.   Neurological:  Negative for dizziness, weakness and headaches.   Hematological:  Negative for adenopathy.   Psychiatric/Behavioral:  Negative for confusion, self-injury and  suicidal ideas. The patient is not nervous/anxious.    All other systems reviewed and are negative.      Past Medical History:   Diagnosis Date    Hypertension     Impaired mobility     Injury of back        Allergies   Allergen Reactions    Ciprofloxacin Dizziness    Codeine Itching    Pineapple Unknown - Low Severity       Past Surgical History:   Procedure Laterality Date    ABDOMINAL SURGERY      COLON SURGERY      COLONOSCOPY      TUBAL ABDOMINAL LIGATION         History reviewed. No pertinent family history.    Social History     Socioeconomic History    Marital status:    Tobacco Use    Smoking status: Every Day     Current packs/day: 1.00     Types: Cigarettes    Smokeless tobacco: Never   Vaping Use    Vaping status: Never Used   Substance and Sexual Activity    Alcohol use: Never    Drug use: Never    Sexual activity: Defer           Objective   Physical Exam  Vitals and nursing note reviewed.   Constitutional:       General: She is not in acute distress.     Appearance: Normal appearance. She is well-developed. She is not toxic-appearing or diaphoretic.   HENT:      Head: Normocephalic and atraumatic.      Right Ear: External ear normal.      Left Ear: External ear normal.      Nose: Nose normal.   Eyes:      General: Lids are normal.      Pupils: Pupils are equal, round, and reactive to light.   Neck:      Trachea: No tracheal deviation.   Cardiovascular:      Rate and Rhythm: Normal rate and regular rhythm.      Pulses: No decreased pulses.      Heart sounds: Normal heart sounds. No murmur heard.     No friction rub. No gallop.   Pulmonary:      Effort: Pulmonary effort is normal. No respiratory distress.      Breath sounds: Normal breath sounds. No decreased breath sounds, wheezing, rhonchi or rales.   Abdominal:      General: Bowel sounds are normal.      Palpations: Abdomen is soft.      Tenderness: There is abdominal tenderness in the right upper quadrant, epigastric area and left upper  quadrant. There is no guarding or rebound.   Musculoskeletal:         General: No deformity. Normal range of motion.      Cervical back: Normal range of motion and neck supple.   Lymphadenopathy:      Cervical: No cervical adenopathy.   Skin:     General: Skin is warm and dry.      Findings: No rash.   Neurological:      Mental Status: She is alert and oriented to person, place, and time.      Cranial Nerves: No cranial nerve deficit.      Sensory: No sensory deficit.   Psychiatric:         Speech: Speech normal.         Behavior: Behavior normal.         Thought Content: Thought content normal.         Judgment: Judgment normal.         Procedures           ED Course                                     CELESTE reviewed by Rohan Desir MD       Medical Decision Making  Differential diagnosis includes pancreatitis, chronic abdominal pain, gastritis, bowel obstruction, dehydration, renal insufficiency, other unspecified etiology.    Labs show normal white count, baseline kidney function, no significant electrolyte abnormalities.  Lipase is 113 which is actually better than the patient's previous comparisons.  Normal troponin.  Normal lactic acid level.    Vital signs show hypertension that improved with pain medication and observation.  The patient received IV fluids, morphine, and Zofran.    Chest x-ray shows right suprahilar opacity suggesting pneumonia.  However this has been previously seen on the patient's x-ray, this has previously been treated, the patient has no respiratory symptoms with normal oxygen saturations.    CT scan abdomen pelvis shows evidence of pancreatitis associated with 2 fluid collections findings are similar to previous imaging.  However there is interval decrease in one of the 2 fluid collections.  No other acute abnormalities.    The patient appears better after pain medication nausea medication and IV fluids here in the ER.    The patient will be discharged with pain medication,  nausea medication, the advised to observe a bland diet, and follow-up with GI on outpatient basis.      Problems Addressed:  Acute abdominal pain: complicated acute illness or injury with systemic symptoms  Acute pancreatitis, unspecified complication status, unspecified pancreatitis type: complicated acute illness or injury with systemic symptoms  Nausea and vomiting in adult: complicated acute illness or injury with systemic symptoms    Amount and/or Complexity of Data Reviewed  External Data Reviewed: labs, radiology and ECG.  Labs: ordered. Decision-making details documented in ED Course.  Radiology: ordered and independent interpretation performed. Decision-making details documented in ED Course.  ECG/medicine tests: ordered and independent interpretation performed. Decision-making details documented in ED Course.     Details: EKG independently interpreted by myself shows sinus rhythm without acute ischemic changes.    Risk  Prescription drug management.        Final diagnoses:   Acute pancreatitis, unspecified complication status, unspecified pancreatitis type   Acute abdominal pain   Nausea and vomiting in adult       ED Disposition  ED Disposition       ED Disposition   Discharge    Condition   Stable    Comment   --               Terrence Baldwin MD  [address] [facility] [address] [place] [address] [phone]    In 1 week      Cisco Spain MD   [facility]  [address] [place] [address] [phone]    Schedule an appointment as soon as possible for a visit            Medication List        New Prescriptions      oxyCODONE-acetaminophen 5-325 MG per tablet  Commonly known as: PERCOCET  Take 1 tablet by mouth Every 6 (Six) Hours As Needed for Moderate Pain or Severe Pain for up to 12 doses.            Changed      * ondansetron 8 MG tablet  Commonly known as: ZOFRAN  What changed: Another medication with the same name was added. Make sure you understand how and when to  take each.     * ondansetron 4 MG tablet  Commonly known as: ZOFRAN  Take 1 tablet by mouth Every 6 (Six) Hours As Needed for Nausea or Vomiting for up to 5 days.  What changed: You were already taking a medication with the same name, and this prescription was added. Make sure you understand how and when to take each.           * This list has 2 medication(s) that are the same as other medications prescribed for you. Read the directions carefully, and ask your doctor or other care provider to review them with you.                   Where to Get Your Medications        These medications were sent to [facility]/pharmacy #[id number] - [place], [place] - [address] [place] [address] [phone]  - [phone] [address] [address]      Phone: [phone]   ondansetron 4 MG tablet  oxyCODONE-acetaminophen 5-325 MG per tablet            Rohan Desir MD  03/28/24 7410

## 2024-03-28 NOTE — DISCHARGE INSTRUCTIONS
Take pain medication as prescribed.    Make sure to drink plenty of fluids.    Take Zofran as needed for nausea.    Follow-up with GI for outpatient evaluation as scheduled.    Return to the ER as needed.

## 2024-03-28 NOTE — Clinical Note
[facility] EMERGENCY DEPARTMENT  [address] [facility] [address]  Phone: [phone]    Tasha Yarbrough was seen and treated in our emergency department on 3/28/2024.  She may return to work on 03/31/2024.         Thank you for choosing [facility].    Rohan Desir MD

## 2024-04-06 ENCOUNTER — APPOINTMENT (OUTPATIENT)
Dept: GENERAL RADIOLOGY | Facility: HOSPITAL | Age: 69
DRG: 439 | End: 2024-04-06
Payer: MEDICARE

## 2024-04-06 ENCOUNTER — HOSPITAL ENCOUNTER (EMERGENCY)
Facility: HOSPITAL | Age: 69
Discharge: HOME | DRG: 439 | End: 2024-04-07
Attending: EMERGENCY MEDICINE
Payer: MEDICARE

## 2024-04-06 ENCOUNTER — APPOINTMENT (OUTPATIENT)
Dept: CT IMAGING | Facility: HOSPITAL | Age: 69
DRG: 439 | End: 2024-04-06
Payer: MEDICARE

## 2024-04-06 VITALS
TEMPERATURE: 98 F | HEART RATE: 68 BPM | OXYGEN SATURATION: 99 % | SYSTOLIC BLOOD PRESSURE: 174 MMHG | WEIGHT: 145 LBS | BODY MASS INDEX: 26.68 KG/M2 | RESPIRATION RATE: 18 BRPM | HEIGHT: 62 IN | DIASTOLIC BLOOD PRESSURE: 106 MMHG

## 2024-04-06 DIAGNOSIS — R10.13 EPIGASTRIC ABDOMINAL PAIN: ICD-10-CM

## 2024-04-06 DIAGNOSIS — K86.3 PANCREATIC PSEUDOCYST: Primary | ICD-10-CM

## 2024-04-06 LAB
ALBUMIN SERPL-MCNC: 3.3 G/DL (ref 3.5–5.2)
ALBUMIN/GLOB SERPL: 1.2 G/DL
ALP SERPL-CCNC: 128 U/L (ref 39–117)
ALT SERPL W P-5'-P-CCNC: 6 U/L (ref 1–33)
ANION GAP SERPL CALCULATED.3IONS-SCNC: 12.2 MMOL/L (ref 5–15)
AST SERPL-CCNC: 11 U/L (ref 1–32)
BACTERIA UR QL AUTO: ABNORMAL /HPF
BASOPHILS # BLD AUTO: 0.05 10*3/MM3 (ref 0–0.2)
BASOPHILS NFR BLD AUTO: 0.7 % (ref 0–1.5)
BILIRUB SERPL-MCNC: <0.2 MG/DL (ref 0–1.2)
BILIRUB UR QL STRIP: NEGATIVE
BUN SERPL-MCNC: 21 MG/DL (ref 8–23)
BUN/CREAT SERPL: 17.5 (ref 7–25)
CALCIUM SPEC-SCNC: 8.5 MG/DL (ref 8.6–10.5)
CHLORIDE SERPL-SCNC: 115 MMOL/L (ref 98–107)
CLARITY UR: CLEAR
CO2 SERPL-SCNC: 12.8 MMOL/L (ref 22–29)
COLOR UR: YELLOW
CREAT SERPL-MCNC: 1.2 MG/DL (ref 0.57–1)
D-LACTATE SERPL-SCNC: 0.5 MMOL/L (ref 0.5–2)
DEPRECATED RDW RBC AUTO: 66.4 FL (ref 37–54)
EGFRCR SERPLBLD CKD-EPI 2021: 49.4 ML/MIN/1.73
EOSINOPHIL # BLD AUTO: 0.08 10*3/MM3 (ref 0–0.4)
EOSINOPHIL NFR BLD AUTO: 1 % (ref 0.3–6.2)
ERYTHROCYTE [DISTWIDTH] IN BLOOD BY AUTOMATED COUNT: 15.8 % (ref 12.3–15.4)
GLOBULIN UR ELPH-MCNC: 2.7 GM/DL
GLUCOSE SERPL-MCNC: 80 MG/DL (ref 65–99)
GLUCOSE UR STRIP-MCNC: NEGATIVE MG/DL
HCT VFR BLD AUTO: 30.6 % (ref 34–46.6)
HGB BLD-MCNC: 9.5 G/DL (ref 12–15.9)
HGB UR QL STRIP.AUTO: NEGATIVE
HOLD SPECIMEN: NORMAL
HOLD SPECIMEN: NORMAL
HYALINE CASTS UR QL AUTO: ABNORMAL /LPF
IMM GRANULOCYTES # BLD AUTO: 0.08 10*3/MM3 (ref 0–0.05)
IMM GRANULOCYTES NFR BLD AUTO: 1 % (ref 0–0.5)
KETONES UR QL STRIP: NEGATIVE
LEUKOCYTE ESTERASE UR QL STRIP.AUTO: NEGATIVE
LIPASE SERPL-CCNC: 397 U/L (ref 13–60)
LYMPHOCYTES # BLD AUTO: 1.66 10*3/MM3 (ref 0.7–3.1)
LYMPHOCYTES NFR BLD AUTO: 21.7 % (ref 19.6–45.3)
MACROCYTES BLD QL SMEAR: NORMAL
MCH RBC QN AUTO: 35.2 PG (ref 26.6–33)
MCHC RBC AUTO-ENTMCNC: 31 G/DL (ref 31.5–35.7)
MCV RBC AUTO: 113.3 FL (ref 79–97)
MONOCYTES # BLD AUTO: 0.49 10*3/MM3 (ref 0.1–0.9)
MONOCYTES NFR BLD AUTO: 6.4 % (ref 5–12)
NEUTROPHILS NFR BLD AUTO: 5.28 10*3/MM3 (ref 1.7–7)
NEUTROPHILS NFR BLD AUTO: 69.2 % (ref 42.7–76)
NITRITE UR QL STRIP: NEGATIVE
NRBC BLD AUTO-RTO: 0 /100 WBC (ref 0–0.2)
PH UR STRIP.AUTO: 5.5 [PH] (ref 5–8)
PLATELET # BLD AUTO: 309 10*3/MM3 (ref 140–450)
PMV BLD AUTO: 10.2 FL (ref 6–12)
POTASSIUM SERPL-SCNC: 4.5 MMOL/L (ref 3.5–5.2)
PROT SERPL-MCNC: 6 G/DL (ref 6–8.5)
PROT UR QL STRIP: ABNORMAL
RBC # BLD AUTO: 2.7 10*6/MM3 (ref 3.77–5.28)
RBC # UR STRIP: ABNORMAL /HPF
REF LAB TEST METHOD: ABNORMAL
SMALL PLATELETS BLD QL SMEAR: ADEQUATE
SODIUM SERPL-SCNC: 140 MMOL/L (ref 136–145)
SP GR UR STRIP: 1.02 (ref 1–1.03)
SQUAMOUS #/AREA URNS HPF: ABNORMAL /HPF
TROPONIN T SERPL HS-MCNC: 12 NG/L
UROBILINOGEN UR QL STRIP: ABNORMAL
WBC # UR STRIP: ABNORMAL /HPF
WBC MORPH BLD: NORMAL
WBC NRBC COR # BLD AUTO: 7.64 10*3/MM3 (ref 3.4–10.8)
WHOLE BLOOD HOLD COAG: NORMAL
WHOLE BLOOD HOLD SPECIMEN: NORMAL

## 2024-04-06 PROCEDURE — 96374 THER/PROPH/DIAG INJ IV PUSH: CPT

## 2024-04-06 PROCEDURE — 96375 TX/PRO/DX INJ NEW DRUG ADDON: CPT

## 2024-04-06 PROCEDURE — 96376 TX/PRO/DX INJ SAME DRUG ADON: CPT

## 2024-04-06 PROCEDURE — 36415 COLL VENOUS BLD VENIPUNCTURE: CPT

## 2024-04-06 PROCEDURE — 85025 COMPLETE CBC W/AUTO DIFF WBC: CPT

## 2024-04-06 PROCEDURE — 25810000003 SODIUM CHLORIDE 0.9 % SOLUTION

## 2024-04-06 PROCEDURE — 25510000001 IOPAMIDOL 61 % SOLUTION: Performed by: EMERGENCY MEDICINE

## 2024-04-06 PROCEDURE — 81001 URINALYSIS AUTO W/SCOPE: CPT

## 2024-04-06 PROCEDURE — 25010000002 ONDANSETRON PER 1 MG

## 2024-04-06 PROCEDURE — 74177 CT ABD & PELVIS W/CONTRAST: CPT

## 2024-04-06 PROCEDURE — 85007 BL SMEAR W/DIFF WBC COUNT: CPT

## 2024-04-06 PROCEDURE — 80053 COMPREHEN METABOLIC PANEL: CPT

## 2024-04-06 PROCEDURE — 83605 ASSAY OF LACTIC ACID: CPT

## 2024-04-06 PROCEDURE — 25010000002 MORPHINE PER 10 MG: Performed by: EMERGENCY MEDICINE

## 2024-04-06 PROCEDURE — 84484 ASSAY OF TROPONIN QUANT: CPT

## 2024-04-06 PROCEDURE — 99285 EMERGENCY DEPT VISIT HI MDM: CPT

## 2024-04-06 PROCEDURE — 96361 HYDRATE IV INFUSION ADD-ON: CPT

## 2024-04-06 PROCEDURE — 71045 X-RAY EXAM CHEST 1 VIEW: CPT

## 2024-04-06 PROCEDURE — 93005 ELECTROCARDIOGRAM TRACING: CPT

## 2024-04-06 PROCEDURE — 83690 ASSAY OF LIPASE: CPT

## 2024-04-06 RX ORDER — OXYCODONE HYDROCHLORIDE AND ACETAMINOPHEN 5; 325 MG/1; MG/1
1 TABLET ORAL EVERY 6 HOURS PRN
Qty: 10 TABLET | Refills: 0 | Status: SHIPPED | OUTPATIENT
Start: 2024-04-06

## 2024-04-06 RX ORDER — SODIUM CHLORIDE 0.9 % (FLUSH) 0.9 %
10 SYRINGE (ML) INJECTION AS NEEDED
Status: DISCONTINUED | OUTPATIENT
Start: 2024-04-06 | End: 2024-04-07 | Stop reason: HOSPADM

## 2024-04-06 RX ORDER — ONDANSETRON 4 MG/1
4 TABLET, ORALLY DISINTEGRATING ORAL EVERY 8 HOURS PRN
Qty: 12 TABLET | Refills: 0 | Status: SHIPPED | OUTPATIENT
Start: 2024-04-06

## 2024-04-06 RX ORDER — ONDANSETRON 2 MG/ML
4 INJECTION INTRAMUSCULAR; INTRAVENOUS ONCE
Status: COMPLETED | OUTPATIENT
Start: 2024-04-06 | End: 2024-04-06

## 2024-04-06 RX ADMIN — IOPAMIDOL 100 ML: 612 INJECTION, SOLUTION INTRAVENOUS at 22:04

## 2024-04-06 RX ADMIN — ONDANSETRON 4 MG: 2 INJECTION INTRAMUSCULAR; INTRAVENOUS at 20:36

## 2024-04-06 RX ADMIN — SODIUM CHLORIDE 1000 ML: 9 INJECTION, SOLUTION INTRAVENOUS at 20:36

## 2024-04-06 RX ADMIN — MORPHINE SULFATE 4 MG: 4 INJECTION, SOLUTION INTRAMUSCULAR; INTRAVENOUS at 20:36

## 2024-04-06 RX ADMIN — MORPHINE SULFATE 4 MG: 4 INJECTION, SOLUTION INTRAMUSCULAR; INTRAVENOUS at 22:21

## 2024-04-07 NOTE — DISCHARGE INSTRUCTIONS
Follow-up with GI clinic on Wednesday as planned.  I sent Zofran as needed for nausea vomiting and pain medicine.  Take these as directed and as needed.  Recommended gentle diet and advance as tolerated.

## 2024-04-07 NOTE — ED PROVIDER NOTES
"Subjective  History of Present Illness:    This is a 68-year-old female, history of pancreatitis, recently discharged from this facility for pancreatitis presenting today for evaluation of epigastric abdominal pain.  She denies any chest pain.  Reports \"maybe a hint\" of shortness of air.  Reports pain is epigastric in nature, has been worsening since she was discharged, particularly worsened today.  No urinary symptoms.  Nausea without vomiting.  Multiple episodes of diarrhea.  No hematochezia no melena no urinary symptoms.      Nurses Notes reviewed and agree, including vitals, allergies, social history and prior medical history.     REVIEW OF SYSTEMS: All systems reviewed and not pertinent unless noted.  Review of Systems   Constitutional:  Negative for fever.   Respiratory:  Positive for shortness of breath. Negative for cough.    Cardiovascular:  Negative for chest pain.   Gastrointestinal:  Positive for abdominal pain, diarrhea and nausea. Negative for vomiting.   Genitourinary:  Negative for dysuria.   All other systems reviewed and are negative.      Past Medical History:   Diagnosis Date    Hypertension     Impaired mobility     Injury of back     Pancreatitis        Allergies:    Ciprofloxacin, Codeine, and Pineapple      Past Surgical History:   Procedure Laterality Date    ABDOMINAL SURGERY      COLON SURGERY      COLONOSCOPY      TUBAL ABDOMINAL LIGATION           Social History     Socioeconomic History    Marital status:    Tobacco Use    Smoking status: Every Day     Current packs/day: 1.00     Types: Cigarettes    Smokeless tobacco: Never   Vaping Use    Vaping status: Never Used   Substance and Sexual Activity    Alcohol use: Never    Drug use: Never    Sexual activity: Defer         History reviewed. No pertinent family history.    Objective  Physical Exam:  /100   Pulse 75   Temp 98 °F (36.7 °C) (Oral)   Resp 18   Ht 157.5 cm (62\")   Wt 65.8 kg (145 lb)   SpO2 96%   BMI 26.52 " kg/m²      Physical Exam  Vitals and nursing note reviewed.   Constitutional:       General: She is not in acute distress.     Appearance: She is not ill-appearing, toxic-appearing or diaphoretic.      Comments: Appears older than stated age   HENT:      Head: Normocephalic and atraumatic.      Mouth/Throat:      Pharynx: Oropharynx is clear.   Eyes:      Extraocular Movements: Extraocular movements intact.   Cardiovascular:      Rate and Rhythm: Normal rate and regular rhythm.      Heart sounds: Normal heart sounds.   Pulmonary:      Effort: Pulmonary effort is normal. No respiratory distress.      Breath sounds: Normal breath sounds. No stridor. No wheezing, rhonchi or rales.   Abdominal:      General: Abdomen is flat. There is no distension.      Palpations: Abdomen is soft.      Tenderness: There is generalized abdominal tenderness and tenderness in the epigastric area. There is no guarding.   Skin:     General: Skin is warm and dry.      Capillary Refill: Capillary refill takes less than 2 seconds.   Neurological:      General: No focal deficit present.      Mental Status: She is alert and oriented to person, place, and time.   Psychiatric:         Mood and Affect: Mood normal.         Behavior: Behavior normal.               Procedures    ED Course:    ED Course as of 04/06/24 2336   Sat Apr 06, 2024   2140 EKG: I reviewed and independently interpreted the EKG as:  Sinus rhythm rate of 69 bpm, normal axis, normal intervals, no ST elevation, no T wave inversions [CS]      ED Course User Index  [CS] Juan Luis Martins MD       Lab Results (last 24 hours)       Procedure Component Value Units Date/Time    Urinalysis With Microscopic If Indicated (No Culture) - Urine, Clean Catch [[id number]]  (Abnormal) Collected: 04/06/24 2029    Specimen: Urine, Clean Catch Updated: 04/06/24 2042     Color, UA Yellow     Appearance, UA Clear     pH, UA 5.5     Specific Gravity, UA 1.025     Glucose, UA Negative      Ketones, UA Negative     Bilirubin, UA Negative     Blood, UA Negative     Protein, UA 30 mg/dL (1+)     Leuk Esterase, UA Negative     Nitrite, UA Negative     Urobilinogen, UA 0.2 E.U./dL    Urinalysis, Microscopic Only - Urine, Clean Catch [[id number]]  (Abnormal) Collected: 04/06/24 2029    Specimen: Urine, Clean Catch Updated: 04/06/24 2047     RBC, UA None Seen /HPF      WBC, UA None Seen /HPF      Bacteria, UA None Seen /HPF      Squamous Epithelial Cells, UA 13-20 /HPF      Hyaline Casts, UA None Seen /LPF      Methodology Manual Light Microscopy    CBC & Differential [[id number]]  (Abnormal) Collected: 04/06/24 2124    Specimen: Blood Updated: 04/06/24 2200    Narrative:      The following orders were created for panel order CBC & Differential.  Procedure                               Abnormality         Status                     ---------                               -----------         ------                     CBC Auto Differential[[id number]]        Abnormal            Final result               Scan Slide[[id number]]                                       Final result                 Please view results for these tests on the individual orders.    Comprehensive Metabolic Panel [[id number]]  (Abnormal) Collected: 04/06/24 2124    Specimen: Blood Updated: 04/06/24 2149     Glucose 80 mg/dL      BUN 21 mg/dL      Creatinine 1.20 mg/dL      Sodium 140 mmol/L      Potassium 4.5 mmol/L      Chloride 115 mmol/L      CO2 12.8 mmol/L      Calcium 8.5 mg/dL      Total Protein 6.0 g/dL      Albumin 3.3 g/dL      ALT (SGPT) 6 U/L      AST (SGOT) 11 U/L      Alkaline Phosphatase 128 U/L      Total Bilirubin <0.2 mg/dL      Globulin 2.7 gm/dL      A/G Ratio 1.2 g/dL      BUN/Creatinine Ratio 17.5     Anion Gap 12.2 mmol/L      eGFR 49.4 mL/min/1.73     Narrative:      GFR Normal >60  Chronic Kidney Disease <60  Kidney Failure <15      Lipase [[id number]]  (Abnormal) Collected: 04/06/24 2124    Specimen: Blood Updated:  04/06/24 2157     Lipase 397 U/L     Single High Sensitivity Troponin T [[id number]]  (Normal) Collected: 04/06/24 2124    Specimen: Blood Updated: 04/06/24 2151     HS Troponin T 12 ng/L     Narrative:      High Sensitive Troponin T Reference Range:  <14.0 ng/L- Negative Female for AMI  <22.0 ng/L- Negative Male for AMI  >=14 - Abnormal Female indicating possible myocardial injury.  >=22 - Abnormal Male indicating possible myocardial injury.   Clinicians would have to utilize clinical acumen, EKG, Troponin, and serial changes to determine if it is an Acute Myocardial Infarction or myocardial injury due to an underlying chronic condition.         CBC Auto Differential [[id number]]  (Abnormal) Collected: 04/06/24 2124    Specimen: Blood Updated: 04/06/24 2140     WBC 7.64 10*3/mm3      RBC 2.70 10*6/mm3      Hemoglobin 9.5 g/dL      Hematocrit 30.6 %      .3 fL      MCH 35.2 pg      MCHC 31.0 g/dL      RDW 15.8 %      RDW-SD 66.4 fl      MPV 10.2 fL      Platelets 309 10*3/mm3      Neutrophil % 69.2 %      Lymphocyte % 21.7 %      Monocyte % 6.4 %      Eosinophil % 1.0 %      Basophil % 0.7 %      Immature Grans % 1.0 %      Neutrophils, Absolute 5.28 10*3/mm3      Lymphocytes, Absolute 1.66 10*3/mm3      Monocytes, Absolute 0.49 10*3/mm3      Eosinophils, Absolute 0.08 10*3/mm3      Basophils, Absolute 0.05 10*3/mm3      Immature Grans, Absolute 0.08 10*3/mm3      nRBC 0.0 /100 WBC     Scan Slide [[id number]] Collected: 04/06/24 2124    Specimen: Blood Updated: 04/06/24 2200     Macrocytes Slight/1+     WBC Morphology Normal     Platelet Estimate Adequate    Lactic Acid, Plasma [[id number]]  (Normal) Collected: 04/06/24 2124    Specimen: Blood Updated: 04/06/24 2258     Lactate 0.5 mmol/L              CT Abdomen Pelvis With Contrast    Result Date: 4/6/2024  FINAL REPORT TECHNIQUE: null CLINICAL HISTORY: hx of pancreatitis worsening epigastric abdominal pain prior 03/28/2024 COMPARISON: null FINDINGS: CT of the  abdomen and pelvis after administration of IV contrast. Technique: CT from the lung bases through the ischial tuberosities after administration of IV contrast Comparison: CT/[place]/SR - CT ABDOMEN PELVIS W CONTRAST - 03/28/2024 10:36 AM EDT CT/SR - CT ABDOMEN PELVIS W CONTRAST - 03/09/2024 03:58 AM EST Findings: Normal lung bases. Moderate hiatal hernia. Normal liver. No masses. The gallbladder is unremarkable by CT. Normal appearing adrenal glands. Normal spleen. No masses. The spleen appears normal in size. Numerous simple renal cysts. Nonobstructive right nephrolithiasis. No renal mass. No hydronephrosis. Pancreatic pseudocyst adjacent to the greater curvature of the stomach measuring 1.6 x 3.0 cm, decreased in size. Additional pancreatic pseudocyst posterior to the distal stomach measuring 5.4 x 1.7 cm, decreased in size. No convincing evidence for acute pancreatitis. Normal retroperitoneal structures. No adenopathy. Normal caliber abdominal aorta. Unremarkable urinary bladder. Normal bones. Hernia in the epigastric region containing only fat.     Impression: Impression: There are 2 pancreatic pseudocysts, decreased in size. Authenticated and Electronically Signed by Francisco Rahman MD on 04/06/2024 10:57:37 PM        MDM     Amount and/or Complexity of Data Reviewed  Clinical lab tests: reviewed  Tests in the medicine section of CPT®: reviewed  Decide to obtain previous medical records or to obtain history from someone other than the patient: yes        Initial impression of presenting illness: This is a 68-year-old female presenting today for evaluation of epigastric abdominal pain    DDX: includes but is not limited to: Pancreatitis, colitis, diverticulitis, viral GI illness, arrhythmia, UTI, others    Patient arrives hemodynamically stable, hypertensive, afebrile nontachycardic nonhypoxic and nontoxic with vitals interpreted by myself.     Pertinent features from physical exam: Elis soft without rigidity, but  patient has been of most particularly in the epigastric region.  Cardiac auscultation regular rate and rhythm lungs were clear..    Initial diagnostic plan: CBC CMP lipase urinalysis troponin EKG chest x-ray CT abdomen pelvis with contrast    Results from initial plan were reviewed and interpreted by me revealing chest x-ray reveals consistent patchy right suprahilar infiltrates, appears similar compared to prior, no respiratory symptoms, this is also been documented on prior ER notes, less suspicious for pneumonia.  CT abdomen pelvis per radiology reveals 2 pancreatic pseudocyst that have decreased in size.  No evidence of active pancreatitis.  CBC with a hemoglobin of 9.5 hematocrit of 30.6.  Appears stable.  Lipase of 397, improved from several weeks ago, lactic normal.  Troponin normal.  CMP with a creatinine of 1.2, CO2 of 12.8, alkaline phosphatase of 128 GFR 49.4.  Urinalysis appears contaminated but no evidence of active infection and no ketones.  EKG reveals sinus rhythm rate of 69 no ST or T wave inversions.  Opponent was normal.  Labs appear overall reassuring.    Diagnostic information from other sources: Record reviewed    Interventions / Re-evaluation: Morphine Zofran 1 L fluids.  Second dose of morphine given, patient's pain is well under control at this time for the patient.  She has been tolerating p.o. today without vomiting.  Given no evidence of active pancreatitis with pseudocyst that have decreased in size, believe patient would appropriate for outpatient follow-up as she has close follow-up on Wednesday with GI at .    Results/clinical rationale were discussed with patient at bedside    Consultations/Discussion of results with other physicians: Discussed plan of care with attending physician.    Disposition plan: Discharge.  Will send more pain control and Zofran.  Patient has follow-up closely with GI on Wednesday of this week.  Gave return precautions.  She was agreeable plan at  bedside.  -----    Final diagnoses:   Pancreatic pseudocyst   Epigastric abdominal pain          Shawn oGod PA-C  04/06/24 [id number]

## 2024-04-08 ENCOUNTER — HOSPITAL ENCOUNTER (INPATIENT)
Facility: HOSPITAL | Age: 69
LOS: 3 days | Discharge: HOME | DRG: 439 | End: 2024-04-11
Attending: STUDENT IN AN ORGANIZED HEALTH CARE EDUCATION/TRAINING PROGRAM | Admitting: INTERNAL MEDICINE
Payer: MEDICARE

## 2024-04-08 ENCOUNTER — APPOINTMENT (OUTPATIENT)
Dept: CT IMAGING | Facility: HOSPITAL | Age: 69
DRG: 439 | End: 2024-04-08
Payer: MEDICARE

## 2024-04-08 DIAGNOSIS — R10.9 ACUTE ABDOMINAL PAIN: ICD-10-CM

## 2024-04-08 DIAGNOSIS — K85.90 ACUTE PANCREATITIS, UNSPECIFIED COMPLICATION STATUS, UNSPECIFIED PANCREATITIS TYPE: Primary | ICD-10-CM

## 2024-04-08 DIAGNOSIS — R52 INTRACTABLE PAIN: ICD-10-CM

## 2024-04-08 DIAGNOSIS — K86.3 PANCREATIC PSEUDOCYST: ICD-10-CM

## 2024-04-08 LAB
ALBUMIN SERPL-MCNC: 3.9 G/DL (ref 3.5–5.2)
ALBUMIN/GLOB SERPL: 1.1 G/DL
ALP SERPL-CCNC: 148 U/L (ref 39–117)
ALT SERPL W P-5'-P-CCNC: 7 U/L (ref 1–33)
ANION GAP SERPL CALCULATED.3IONS-SCNC: 13.1 MMOL/L (ref 5–15)
AST SERPL-CCNC: 15 U/L (ref 1–32)
BASOPHILS # BLD AUTO: 0.07 10*3/MM3 (ref 0–0.2)
BASOPHILS NFR BLD AUTO: 0.6 % (ref 0–1.5)
BILIRUB SERPL-MCNC: 0.2 MG/DL (ref 0–1.2)
BUN SERPL-MCNC: 28 MG/DL (ref 8–23)
BUN/CREAT SERPL: 19.9 (ref 7–25)
CALCIUM SPEC-SCNC: 9.7 MG/DL (ref 8.6–10.5)
CHLORIDE SERPL-SCNC: 109 MMOL/L (ref 98–107)
CO2 SERPL-SCNC: 11.9 MMOL/L (ref 22–29)
CREAT SERPL-MCNC: 1.41 MG/DL (ref 0.57–1)
D-LACTATE SERPL-SCNC: 0.6 MMOL/L (ref 0.5–2)
DEPRECATED RDW RBC AUTO: 65.1 FL (ref 37–54)
EGFRCR SERPLBLD CKD-EPI 2021: 40.7 ML/MIN/1.73
EOSINOPHIL # BLD AUTO: 0.24 10*3/MM3 (ref 0–0.4)
EOSINOPHIL NFR BLD AUTO: 1.9 % (ref 0.3–6.2)
ERYTHROCYTE [DISTWIDTH] IN BLOOD BY AUTOMATED COUNT: 15.7 % (ref 12.3–15.4)
GLOBULIN UR ELPH-MCNC: 3.6 GM/DL
GLUCOSE SERPL-MCNC: 73 MG/DL (ref 65–99)
HCT VFR BLD AUTO: 38.1 % (ref 34–46.6)
HGB BLD-MCNC: 11.9 G/DL (ref 12–15.9)
IMM GRANULOCYTES # BLD AUTO: 0.09 10*3/MM3 (ref 0–0.05)
IMM GRANULOCYTES NFR BLD AUTO: 0.7 % (ref 0–0.5)
LIPASE SERPL-CCNC: 1571 U/L (ref 13–60)
LYMPHOCYTES # BLD AUTO: 1.29 10*3/MM3 (ref 0.7–3.1)
LYMPHOCYTES NFR BLD AUTO: 10.2 % (ref 19.6–45.3)
MACROCYTES BLD QL SMEAR: NORMAL
MCH RBC QN AUTO: 35.1 PG (ref 26.6–33)
MCHC RBC AUTO-ENTMCNC: 31.2 G/DL (ref 31.5–35.7)
MCV RBC AUTO: 112.4 FL (ref 79–97)
MICROCYTES BLD QL: NORMAL
MONOCYTES # BLD AUTO: 0.75 10*3/MM3 (ref 0.1–0.9)
MONOCYTES NFR BLD AUTO: 5.9 % (ref 5–12)
NEUTROPHILS NFR BLD AUTO: 10.21 10*3/MM3 (ref 1.7–7)
NEUTROPHILS NFR BLD AUTO: 80.7 % (ref 42.7–76)
NRBC BLD AUTO-RTO: 0 /100 WBC (ref 0–0.2)
PLAT MORPH BLD: NORMAL
PLATELET # BLD AUTO: 320 10*3/MM3 (ref 140–450)
PMV BLD AUTO: 9.8 FL (ref 6–12)
POTASSIUM SERPL-SCNC: 4.7 MMOL/L (ref 3.5–5.2)
PROT SERPL-MCNC: 7.5 G/DL (ref 6–8.5)
RBC # BLD AUTO: 3.39 10*6/MM3 (ref 3.77–5.28)
SODIUM SERPL-SCNC: 134 MMOL/L (ref 136–145)
WBC MORPH BLD: NORMAL
WBC NRBC COR # BLD AUTO: 12.65 10*3/MM3 (ref 3.4–10.8)

## 2024-04-08 PROCEDURE — 83605 ASSAY OF LACTIC ACID: CPT

## 2024-04-08 PROCEDURE — 85025 COMPLETE CBC W/AUTO DIFF WBC: CPT

## 2024-04-08 PROCEDURE — 93005 ELECTROCARDIOGRAM TRACING: CPT

## 2024-04-08 PROCEDURE — 85007 BL SMEAR W/DIFF WBC COUNT: CPT

## 2024-04-08 PROCEDURE — 99222 1ST HOSP IP/OBS MODERATE 55: CPT | Performed by: INTERNAL MEDICINE

## 2024-04-08 PROCEDURE — 25810000003 SODIUM CHLORIDE 0.9 % SOLUTION

## 2024-04-08 PROCEDURE — 25010000002 ONDANSETRON PER 1 MG

## 2024-04-08 PROCEDURE — 80053 COMPREHEN METABOLIC PANEL: CPT

## 2024-04-08 PROCEDURE — 74177 CT ABD & PELVIS W/CONTRAST: CPT

## 2024-04-08 PROCEDURE — 83690 ASSAY OF LIPASE: CPT

## 2024-04-08 PROCEDURE — 99285 EMERGENCY DEPT VISIT HI MDM: CPT

## 2024-04-08 PROCEDURE — 84478 ASSAY OF TRIGLYCERIDES: CPT | Performed by: INTERNAL MEDICINE

## 2024-04-08 PROCEDURE — 25010000002 HYDROMORPHONE 1 MG/ML SOLUTION: Performed by: STUDENT IN AN ORGANIZED HEALTH CARE EDUCATION/TRAINING PROGRAM

## 2024-04-08 PROCEDURE — 25510000001 IOPAMIDOL 61 % SOLUTION: Performed by: STUDENT IN AN ORGANIZED HEALTH CARE EDUCATION/TRAINING PROGRAM

## 2024-04-08 RX ORDER — SODIUM CHLORIDE 0.9 % (FLUSH) 0.9 %
10 SYRINGE (ML) INJECTION EVERY 12 HOURS SCHEDULED
Status: DISCONTINUED | OUTPATIENT
Start: 2024-04-08 | End: 2024-04-11 | Stop reason: HOSPADM

## 2024-04-08 RX ORDER — SODIUM CHLORIDE 9 MG/ML
125 INJECTION, SOLUTION INTRAVENOUS CONTINUOUS
Status: DISCONTINUED | OUTPATIENT
Start: 2024-04-08 | End: 2024-04-10

## 2024-04-08 RX ORDER — SODIUM CHLORIDE 9 MG/ML
40 INJECTION, SOLUTION INTRAVENOUS AS NEEDED
Status: DISCONTINUED | OUTPATIENT
Start: 2024-04-08 | End: 2024-04-11 | Stop reason: HOSPADM

## 2024-04-08 RX ORDER — NITROGLYCERIN 0.4 MG/1
0.4 TABLET SUBLINGUAL
Status: DISCONTINUED | OUTPATIENT
Start: 2024-04-08 | End: 2024-04-11 | Stop reason: HOSPADM

## 2024-04-08 RX ORDER — ONDANSETRON 2 MG/ML
4 INJECTION INTRAMUSCULAR; INTRAVENOUS EVERY 6 HOURS PRN
Status: DISCONTINUED | OUTPATIENT
Start: 2024-04-08 | End: 2024-04-11 | Stop reason: HOSPADM

## 2024-04-08 RX ORDER — CHOLECALCIFEROL (VITAMIN D3) 125 MCG
5 CAPSULE ORAL NIGHTLY PRN
Status: DISCONTINUED | OUTPATIENT
Start: 2024-04-08 | End: 2024-04-11 | Stop reason: HOSPADM

## 2024-04-08 RX ORDER — BISACODYL 5 MG/1
5 TABLET, DELAYED RELEASE ORAL DAILY PRN
Status: DISCONTINUED | OUTPATIENT
Start: 2024-04-08 | End: 2024-04-10

## 2024-04-08 RX ORDER — HEPARIN SODIUM 5000 [USP'U]/ML
5000 INJECTION, SOLUTION INTRAVENOUS; SUBCUTANEOUS EVERY 12 HOURS SCHEDULED
Status: DISCONTINUED | OUTPATIENT
Start: 2024-04-08 | End: 2024-04-11 | Stop reason: HOSPADM

## 2024-04-08 RX ORDER — ACETAMINOPHEN 650 MG/1
650 SUPPOSITORY RECTAL EVERY 4 HOURS PRN
Status: DISCONTINUED | OUTPATIENT
Start: 2024-04-08 | End: 2024-04-11 | Stop reason: HOSPADM

## 2024-04-08 RX ORDER — NALOXONE HCL 0.4 MG/ML
0.4 VIAL (ML) INJECTION
Status: DISCONTINUED | OUTPATIENT
Start: 2024-04-08 | End: 2024-04-11 | Stop reason: HOSPADM

## 2024-04-08 RX ORDER — BISACODYL 10 MG
10 SUPPOSITORY, RECTAL RECTAL DAILY PRN
Status: DISCONTINUED | OUTPATIENT
Start: 2024-04-08 | End: 2024-04-10

## 2024-04-08 RX ORDER — ACETAMINOPHEN 325 MG/1
650 TABLET ORAL EVERY 4 HOURS PRN
Status: DISCONTINUED | OUTPATIENT
Start: 2024-04-08 | End: 2024-04-11 | Stop reason: HOSPADM

## 2024-04-08 RX ORDER — POLYETHYLENE GLYCOL 3350 17 G/17G
17 POWDER, FOR SOLUTION ORAL DAILY PRN
Status: DISCONTINUED | OUTPATIENT
Start: 2024-04-08 | End: 2024-04-10

## 2024-04-08 RX ORDER — ACETAMINOPHEN 160 MG/5ML
650 SOLUTION ORAL EVERY 4 HOURS PRN
Status: DISCONTINUED | OUTPATIENT
Start: 2024-04-08 | End: 2024-04-11 | Stop reason: HOSPADM

## 2024-04-08 RX ORDER — SODIUM CHLORIDE 0.9 % (FLUSH) 0.9 %
10 SYRINGE (ML) INJECTION AS NEEDED
Status: DISCONTINUED | OUTPATIENT
Start: 2024-04-08 | End: 2024-04-11 | Stop reason: HOSPADM

## 2024-04-08 RX ORDER — AMOXICILLIN 250 MG
2 CAPSULE ORAL 2 TIMES DAILY PRN
Status: DISCONTINUED | OUTPATIENT
Start: 2024-04-08 | End: 2024-04-10

## 2024-04-08 RX ORDER — ONDANSETRON 2 MG/ML
4 INJECTION INTRAMUSCULAR; INTRAVENOUS ONCE
Status: COMPLETED | OUTPATIENT
Start: 2024-04-08 | End: 2024-04-08

## 2024-04-08 RX ADMIN — HYDROMORPHONE HYDROCHLORIDE 1 MG: 1 INJECTION, SOLUTION INTRAMUSCULAR; INTRAVENOUS; SUBCUTANEOUS at 21:41

## 2024-04-08 RX ADMIN — Medication 10 ML: at 23:59

## 2024-04-08 RX ADMIN — ONDANSETRON 4 MG: 2 INJECTION INTRAMUSCULAR; INTRAVENOUS at 20:11

## 2024-04-08 RX ADMIN — IOPAMIDOL 100 ML: 612 INJECTION, SOLUTION INTRAVENOUS at 20:58

## 2024-04-08 RX ADMIN — HYDROMORPHONE HYDROCHLORIDE 1 MG: 1 INJECTION, SOLUTION INTRAMUSCULAR; INTRAVENOUS; SUBCUTANEOUS at 20:12

## 2024-04-08 RX ADMIN — SODIUM CHLORIDE 500 ML: 9 INJECTION, SOLUTION INTRAVENOUS at 20:15

## 2024-04-08 NOTE — ED PROVIDER NOTES
Subjective  History of Present Illness:    This is a 68-year-old female, history of recurrent pancreatitis, has been seen multiple times in various ERs over the last couple of months.  Follows with  gastroenterology.  She was seen here 2 days ago by myself, had a CT scan that showed decreasing size of pancreatic pseudocyst with no acute pathology.  She prior to this had another CT scan performed a few weeks before that also revealed no acute pathology.  Patient has been discharged home on antiemetics and pain meds but now returning for worsening pain.  She was scheduled to follow-up this week with  GI but presents here again for evaluation.  She is continuing to have epigastric region abdominal pain with nausea and diarrhea.  No known fevers.  No urinary symptoms.  Patient reports not much has really changed from other day other than her nausea has increased and her pain continues.      Nurses Notes reviewed and agree, including vitals, allergies, social history and prior medical history.     REVIEW OF SYSTEMS: All systems reviewed and not pertinent unless noted.  Review of Systems   Constitutional:  Negative for fever.   Respiratory:  Negative for shortness of breath.    Cardiovascular:  Negative for chest pain.   Gastrointestinal:  Positive for abdominal pain, diarrhea, nausea and vomiting.   Genitourinary:  Negative for dysuria.   All other systems reviewed and are negative.      Past Medical History:   Diagnosis Date    Hypertension     Impaired mobility     Injury of back     Pancreatitis        Allergies:    Ciprofloxacin, Codeine, and Pineapple      Past Surgical History:   Procedure Laterality Date    ABDOMINAL SURGERY      COLON SURGERY      COLONOSCOPY      TUBAL ABDOMINAL LIGATION           Social History     Socioeconomic History    Marital status:    Tobacco Use    Smoking status: Every Day     Current packs/day: 1.00     Types: Cigarettes    Smokeless tobacco: Never   Vaping Use    Vaping  "status: Never Used   Substance and Sexual Activity    Alcohol use: Never    Drug use: Never    Sexual activity: Defer         History reviewed. No pertinent family history.    Objective  Physical Exam:  /97 (BP Location: Left arm, Patient Position: Lying)   Pulse 85   Temp 98 °F (36.7 °C)   Resp 18   Ht 157.5 cm (62\")   Wt 65.8 kg (145 lb)   SpO2 99%   BMI 26.52 kg/m²      Physical Exam  Vitals and nursing note reviewed.   Constitutional:       General: She is not in acute distress.     Appearance: She is not toxic-appearing or diaphoretic.      Comments: Appears much older than stated age   HENT:      Head: Normocephalic and atraumatic.      Mouth/Throat:      Pharynx: Oropharynx is clear.   Eyes:      Extraocular Movements: Extraocular movements intact.   Cardiovascular:      Rate and Rhythm: Normal rate and regular rhythm.      Heart sounds: Normal heart sounds.   Pulmonary:      Effort: Pulmonary effort is normal. No respiratory distress.   Abdominal:      General: Abdomen is flat.      Palpations: Abdomen is soft.      Tenderness: There is abdominal tenderness in the epigastric area. There is no guarding.   Skin:     General: Skin is warm and dry.      Capillary Refill: Capillary refill takes less than 2 seconds.   Neurological:      General: No focal deficit present.      Mental Status: She is alert and oriented to person, place, and time.   Psychiatric:         Mood and Affect: Mood is anxious.               Procedures    ED Course:    ED Course as of 04/08/24 2206 Mon Apr 08, 2024 2151 ATTENDING ATTESTATION  HPI: 68-year-old female with past medical history of recurrent pancreatitis currently follows with GI at  who presents with abdominal pain.    MDM: ED workup reviewed.  CBC shows leukocytosis, hemoglobin 1.9, creatinine 1.41, normal liver function, lipase 1500.  CT abdomen pelvis currently pending.     [JS]      ED Course User Index  [JS] Darron Austin DO       Lab Results (last 24 " hours)       Procedure Component Value Units Date/Time    CBC Auto Differential [[id number]]  (Abnormal) Collected: 04/08/24 2003    Specimen: Blood Updated: 04/08/24 2025     WBC 12.65 10*3/mm3      RBC 3.39 10*6/mm3      Hemoglobin 11.9 g/dL      Hematocrit 38.1 %      .4 fL      MCH 35.1 pg      MCHC 31.2 g/dL      RDW 15.7 %      RDW-SD 65.1 fl      MPV 9.8 fL      Platelets 320 10*3/mm3      Neutrophil % 80.7 %      Lymphocyte % 10.2 %      Monocyte % 5.9 %      Eosinophil % 1.9 %      Basophil % 0.6 %      Immature Grans % 0.7 %      Neutrophils, Absolute 10.21 10*3/mm3      Lymphocytes, Absolute 1.29 10*3/mm3      Monocytes, Absolute 0.75 10*3/mm3      Eosinophils, Absolute 0.24 10*3/mm3      Basophils, Absolute 0.07 10*3/mm3      Immature Grans, Absolute 0.09 10*3/mm3      nRBC 0.0 /100 WBC     Comprehensive Metabolic Panel [[id number]]  (Abnormal) Collected: 04/08/24 2003    Specimen: Blood Updated: 04/08/24 2030     Glucose 73 mg/dL      BUN 28 mg/dL      Creatinine 1.41 mg/dL      Sodium 134 mmol/L      Potassium 4.7 mmol/L      Comment: Slight hemolysis detected by analyzer. Result may be falsely elevated.        Chloride 109 mmol/L      CO2 11.9 mmol/L      Calcium 9.7 mg/dL      Total Protein 7.5 g/dL      Albumin 3.9 g/dL      ALT (SGPT) 7 U/L      AST (SGOT) 15 U/L      Alkaline Phosphatase 148 U/L      Total Bilirubin 0.2 mg/dL      Globulin 3.6 gm/dL      A/G Ratio 1.1 g/dL      BUN/Creatinine Ratio 19.9     Anion Gap 13.1 mmol/L      eGFR 40.7 mL/min/1.73     Narrative:      GFR Normal >60  Chronic Kidney Disease <60  Kidney Failure <15      Lipase [[id number]]  (Abnormal) Collected: 04/08/24 2003    Specimen: Blood Updated: 04/08/24 2037     Lipase 1,571 U/L     Scan Slide [[id number]] Collected: 04/08/24 2003    Specimen: Blood Updated: 04/08/24 2026     Microcytes Slight/1+     Macrocytes Mod/2+     WBC Morphology Normal     Platelet Morphology Normal    Lactic Acid, Plasma [[id number]]   (Normal) Collected: 04/08/24 2040    Specimen: Blood Updated: 04/08/24 2105     Lactate 0.6 mmol/L              CT Abdomen Pelvis With Contrast    Result Date: 4/8/2024  FINAL REPORT TECHNIQUE: null CLINICAL HISTORY: epigastric pain rule out pancreatitis COMPARISON: null FINDINGS: CT abdomen and pelvis with contrast Comparison: 04/06/2024 Findings: Lung bases are clear. No acute bony abnormalities. Degenerative change throughout the spine and hips. Large hiatal hernia noted. Multiple ventral wall defects noted. Protrusion of bowel noted partially into defects. No full bowel involvement demonstrated. Liver, adrenal glands and spleen unremarkable. Cholecystectomy. Peripancreatic stranding and minimal fluid again noted. Findings are consistent with pancreatitis, unchanged. Stable 3.1 cm cystic abnormality in pancreatic tail. 2.5 x 1.2 cm pancreatic body cystic abnormality. These are unchanged from previous study. Findings likely represent pseudocysts. Bilateral kidneys demonstrate no focal abnormality. Renal cysts without stones or hydronephrosis. Abdominal aorta is normal in caliber. No free fluid or adenopathy in the pelvis. No acute diverticulitis. Appendix not identified. Uterus atrophic. No adnexal abnormality.     Impression: Impression: Acute pancreatitis with probable pancreatic pseudocysts No significant change from previous study Authenticated and Electronically Signed by Eugene Red MD on 04/08/2024 09:55:42 PM        MDM     Amount and/or Complexity of Data Reviewed  Clinical lab tests: reviewed  Tests in the medicine section of CPT®: reviewed  Decide to obtain previous medical records or to obtain history from someone other than the patient: yes        Initial impression of presenting illness: This is a 68-year-old female present emergency room there for evaluation of epigastric abdominal pain nausea vomiting diarrhea    DDX: includes but is not limited to: Recurrent pancreatitis, chronic pain,  others    Patient arrives hemodynamically stable afebrile nontachycardic nonhypoxic with vitals interpreted by myself.     Pertinent features from physical exam: Appears much older than stated age.  Epigastric abdominal tenderness noted.  Without rebound guarding or rigidity, no focal signs of peritonitis.  Oropharynx is clear.  Lungs clear.    Initial diagnostic plan: CBC CMP lipase lactic acid EKG, CT abdomen pelvis with contrast.    Results from initial plan were reviewed and interpreted by me revealing CBC reveals leukocytosis 12.65 which has doubled over the last couple days, likely secondary to vomiting.  CMP with a creatinine elevation 1.41 sodium of 134 and BUN of 28, lipase of 1571 which is also more than tripled since last visit in the last 2 days.  Lactic acid was normal.  CT abdomen pelvis per radiology IMPRESSION:  Impression:     Acute pancreatitis with probable pancreatic pseudocysts  EKG sinus rhythm rate of 75.      Diagnostic information from other sources: Record reviewed    Interventions / Re-evaluation: Will give Zofran and Dilaudid.  500 cc bolus of fluids    Results/clinical rationale were discussed with patient at bedside.  Given findings concerning for acute pancreatitis with further increased lipase and worsening labs compared to several days ago with intractable pain and not doing well with at home pain control and nausea control, will admit to the hospital service    Consultations/Discussion of results with other physicians: Discussed with hospitalist, Dr. Damon.  Agreeable to accept for admission    Disposition plan: Admit to the hospital service.  Inpatient telemetry level of care.  -----    Final diagnoses:   Acute pancreatitis, unspecified complication status, unspecified pancreatitis type   Pancreatic pseudocyst   Intractable pain          Shawn Good PA-C  04/08/24 2205

## 2024-04-09 LAB
ANION GAP SERPL CALCULATED.3IONS-SCNC: 10.5 MMOL/L (ref 5–15)
ANISOCYTOSIS BLD QL: NORMAL
BASOPHILS # BLD AUTO: 0.07 10*3/MM3 (ref 0–0.2)
BASOPHILS NFR BLD AUTO: 0.8 % (ref 0–1.5)
BUN SERPL-MCNC: 25 MG/DL (ref 8–23)
BUN/CREAT SERPL: 20.3 (ref 7–25)
CALCIUM SPEC-SCNC: 9.1 MG/DL (ref 8.6–10.5)
CHLORIDE SERPL-SCNC: 114 MMOL/L (ref 98–107)
CO2 SERPL-SCNC: 12.5 MMOL/L (ref 22–29)
CREAT SERPL-MCNC: 1.23 MG/DL (ref 0.57–1)
DEPRECATED RDW RBC AUTO: 66.1 FL (ref 37–54)
EGFRCR SERPLBLD CKD-EPI 2021: 48 ML/MIN/1.73
EOSINOPHIL # BLD AUTO: 0.26 10*3/MM3 (ref 0–0.4)
EOSINOPHIL NFR BLD AUTO: 2.9 % (ref 0.3–6.2)
ERYTHROCYTE [DISTWIDTH] IN BLOOD BY AUTOMATED COUNT: 15.6 % (ref 12.3–15.4)
GLUCOSE SERPL-MCNC: 65 MG/DL (ref 65–99)
HCT VFR BLD AUTO: 34 % (ref 34–46.6)
HGB BLD-MCNC: 10.5 G/DL (ref 12–15.9)
IMM GRANULOCYTES # BLD AUTO: 0.06 10*3/MM3 (ref 0–0.05)
IMM GRANULOCYTES NFR BLD AUTO: 0.7 % (ref 0–0.5)
LYMPHOCYTES # BLD AUTO: 1.23 10*3/MM3 (ref 0.7–3.1)
LYMPHOCYTES NFR BLD AUTO: 13.6 % (ref 19.6–45.3)
MACROCYTES BLD QL SMEAR: NORMAL
MAGNESIUM SERPL-MCNC: 2.3 MG/DL (ref 1.6–2.4)
MCH RBC QN AUTO: 35.1 PG (ref 26.6–33)
MCHC RBC AUTO-ENTMCNC: 30.9 G/DL (ref 31.5–35.7)
MCV RBC AUTO: 113.7 FL (ref 79–97)
MONOCYTES # BLD AUTO: 0.63 10*3/MM3 (ref 0.1–0.9)
MONOCYTES NFR BLD AUTO: 7 % (ref 5–12)
NEUTROPHILS NFR BLD AUTO: 6.79 10*3/MM3 (ref 1.7–7)
NEUTROPHILS NFR BLD AUTO: 75 % (ref 42.7–76)
NRBC BLD AUTO-RTO: 0 /100 WBC (ref 0–0.2)
PLATELET # BLD AUTO: 247 10*3/MM3 (ref 140–450)
PMV BLD AUTO: 10.3 FL (ref 6–12)
POIKILOCYTOSIS BLD QL SMEAR: NORMAL
POTASSIUM SERPL-SCNC: 4.5 MMOL/L (ref 3.5–5.2)
RBC # BLD AUTO: 2.99 10*6/MM3 (ref 3.77–5.28)
SMALL PLATELETS BLD QL SMEAR: ADEQUATE
SODIUM SERPL-SCNC: 137 MMOL/L (ref 136–145)
TRIGL SERPL-MCNC: 165 MG/DL (ref 0–150)
WBC MORPH BLD: NORMAL
WBC NRBC COR # BLD AUTO: 9.04 10*3/MM3 (ref 3.4–10.8)

## 2024-04-09 PROCEDURE — 85025 COMPLETE CBC W/AUTO DIFF WBC: CPT | Performed by: INTERNAL MEDICINE

## 2024-04-09 PROCEDURE — 83735 ASSAY OF MAGNESIUM: CPT | Performed by: FAMILY MEDICINE

## 2024-04-09 PROCEDURE — 25010000002 ONDANSETRON PER 1 MG: Performed by: INTERNAL MEDICINE

## 2024-04-09 PROCEDURE — 99232 SBSQ HOSP IP/OBS MODERATE 35: CPT | Performed by: FAMILY MEDICINE

## 2024-04-09 PROCEDURE — 80048 BASIC METABOLIC PNL TOTAL CA: CPT | Performed by: INTERNAL MEDICINE

## 2024-04-09 PROCEDURE — 25010000002 HYDROMORPHONE 1 MG/ML SOLUTION: Performed by: INTERNAL MEDICINE

## 2024-04-09 PROCEDURE — 25010000002 HEPARIN (PORCINE) PER 1000 UNITS: Performed by: INTERNAL MEDICINE

## 2024-04-09 PROCEDURE — 85007 BL SMEAR W/DIFF WBC COUNT: CPT | Performed by: INTERNAL MEDICINE

## 2024-04-09 PROCEDURE — 25810000003 SODIUM CHLORIDE 0.9 % SOLUTION: Performed by: INTERNAL MEDICINE

## 2024-04-09 PROCEDURE — 99222 1ST HOSP IP/OBS MODERATE 55: CPT | Performed by: INTERNAL MEDICINE

## 2024-04-09 RX ORDER — METHOCARBAMOL 750 MG/1
750 TABLET, FILM COATED ORAL 4 TIMES DAILY PRN
Status: ON HOLD | COMMUNITY

## 2024-04-09 RX ADMIN — ONDANSETRON 4 MG: 2 INJECTION INTRAMUSCULAR; INTRAVENOUS at 07:24

## 2024-04-09 RX ADMIN — Medication 10 ML: at 08:12

## 2024-04-09 RX ADMIN — ONDANSETRON 4 MG: 2 INJECTION INTRAMUSCULAR; INTRAVENOUS at 17:22

## 2024-04-09 RX ADMIN — HYDROMORPHONE HYDROCHLORIDE 1 MG: 1 INJECTION, SOLUTION INTRAMUSCULAR; INTRAVENOUS; SUBCUTANEOUS at 13:55

## 2024-04-09 RX ADMIN — HYDROMORPHONE HYDROCHLORIDE 1 MG: 1 INJECTION, SOLUTION INTRAMUSCULAR; INTRAVENOUS; SUBCUTANEOUS at 19:16

## 2024-04-09 RX ADMIN — HEPARIN SODIUM 5000 UNITS: 5000 INJECTION INTRAVENOUS; SUBCUTANEOUS at 00:05

## 2024-04-09 RX ADMIN — Medication 5 MG: at 20:51

## 2024-04-09 RX ADMIN — HYDROMORPHONE HYDROCHLORIDE 1 MG: 1 INJECTION, SOLUTION INTRAMUSCULAR; INTRAVENOUS; SUBCUTANEOUS at 02:47

## 2024-04-09 RX ADMIN — ONDANSETRON 4 MG: 2 INJECTION INTRAMUSCULAR; INTRAVENOUS at 02:52

## 2024-04-09 RX ADMIN — HYDROMORPHONE HYDROCHLORIDE 1 MG: 1 INJECTION, SOLUTION INTRAMUSCULAR; INTRAVENOUS; SUBCUTANEOUS at 00:02

## 2024-04-09 RX ADMIN — SODIUM CHLORIDE 200 ML/HR: 9 INJECTION, SOLUTION INTRAVENOUS at 10:41

## 2024-04-09 RX ADMIN — Medication 10 ML: at 20:15

## 2024-04-09 RX ADMIN — HYDROMORPHONE HYDROCHLORIDE 1 MG: 1 INJECTION, SOLUTION INTRAMUSCULAR; INTRAVENOUS; SUBCUTANEOUS at 10:41

## 2024-04-09 RX ADMIN — HEPARIN SODIUM 5000 UNITS: 5000 INJECTION INTRAVENOUS; SUBCUTANEOUS at 08:12

## 2024-04-09 RX ADMIN — SODIUM CHLORIDE 200 ML/HR: 9 INJECTION, SOLUTION INTRAVENOUS at 00:01

## 2024-04-09 RX ADMIN — HYDROMORPHONE HYDROCHLORIDE 1 MG: 1 INJECTION, SOLUTION INTRAMUSCULAR; INTRAVENOUS; SUBCUTANEOUS at 21:35

## 2024-04-09 RX ADMIN — HYDROMORPHONE HYDROCHLORIDE 1 MG: 1 INJECTION, SOLUTION INTRAMUSCULAR; INTRAVENOUS; SUBCUTANEOUS at 06:26

## 2024-04-09 RX ADMIN — HEPARIN SODIUM 5000 UNITS: 5000 INJECTION INTRAVENOUS; SUBCUTANEOUS at 20:15

## 2024-04-09 RX ADMIN — HYDROMORPHONE HYDROCHLORIDE 1 MG: 1 INJECTION, SOLUTION INTRAMUSCULAR; INTRAVENOUS; SUBCUTANEOUS at 17:10

## 2024-04-09 NOTE — ED NOTES
Dispatch called for non-emergent transfer to [facility] ED.    Recreation Therapy Progress Note    Date:  04/09/2024    Time:  1400    Group Title:  Leisure skills    Mood:  Less Anxious but focused on discharge.    Behavior:  Patient is less anxious more calm but definitely focused on discharge.  Patient did participate in recreation group at minimum level    Affect:  Less anxious more calm better social skills but still anxious and worried about discharge    Speech:  Patient talking more with staff with less prompts needed    Cognition:  Patient more alert able to complete assignments with less one-to-one hands-on needed    Participation Level:  Patient participated 50% in group and then wanted to go lay down    Intervention:  Continue RT services          Recreation Therapist   Signature:  Anna Calvillo ma ctrs

## 2024-04-09 NOTE — PROGRESS NOTES
[facility]    Name:  Tasha Yarbrough   Age:  68 y.o.  Sex:  female  :  1955  MRN:  [id number]   Visit Number:  [id number]  Admission Date:  2024  Date Of Service:  24  Primary Care Physician:  Terrence Baldwin MD     LOS: 1 day :    Chief Complaint:      Abdominal pain     Subjective:    Patient was seen and examined at bedside today.  Patient sitting up comfortably in bed with no distress.  Son at bedside.  Patient reports significant improvement and currently pain is controlled.  Patient reports that her pain was severe that wanted her to come to the ER.  Patient denies any other acute discomfort or complaints currently.  She has been taking ice chips with no issues.  Vitals are stable and afebrile.  Dr. Santillan consulted came into the room to see the patient after my evaluation.    Hospital Course:    Patient is a 68-year-old female history significant for hypertension, CKD stage III who presents to the emergency room with ongoing nausea/vomiting and abdominal pain.  Patient seen in the emergency room multiple times, follows with  gastroenterology.  Last seen in the ER 2024 for same complaints.  At that time, patient discharged home with antiemetics.  Scheduled follow-up with  GI.  She returns tonight with worsening abdominal pain.  States that she could not wait to see her GI doctor.  Admits to nausea/vomiting, diffuse abdominal pain, no diarrhea.  Poor p.o. intake.    Upon arrival to the ER patient afebrile hemodynamically stable and nonhypoxic on room air.  Significant labs include sodium 134, chloride 109.  Creatinine 1.41 (baseline 1).  Triglycerides 165.  Lipase 1571.  WBC 12.65.  Normal lactic acid.  CT abdomen pelvis with evidence of acute pancreatitis with probable pancreatic pseudocyst.  Patient received 500 mL bolus and pain medicine.  Hospitalist asked to admit.    Review of Systems:     All systems were reviewed and negative  except as mentioned in subjective, assessment and plan.    Vital Signs:    Temp:  [98 °F (36.7 °C)] 98 °F (36.7 °C)  Heart Rate:  [72-96] 80  Resp:  [17-18] 17  BP: (109-161)/() 135/79    Intake and output:    I/O last 3 completed shifts:  In: 500 [IV Piggyback:500]  Out: -   No intake/output data recorded.    Physical Examination:    General Appearance:  Alert and cooperative.  No acute distress.   Head:  Atraumatic and normocephalic.   Eyes: Conjunctivae and sclerae normal, no icterus. No pallor.   Throat: No oral lesions, no thrush, oral mucosa moist.   Neck: Supple, trachea midline, no thyromegaly.   Lungs:   Breath sounds heard bilaterally equally.  No wheezing or crackles. No Pleural rub or bronchial breathing.   Heart:  Normal S1 and S2, no murmur, no gallop, no rub. No JVD.   Abdomen:   Normal bowel sounds, no masses, no organomegaly. Soft, nontender, nondistended, no rebound tenderness.   Extremities: Supple, no edema, no cyanosis, no clubbing.   Skin: No bleeding or rash.   Neurologic: Alert and oriented x 3. No facial asymmetry. Moves all four limbs. No tremors.      Laboratory results:    Results from last 7 days   Lab Units 04/09/24  0548 04/08/24 2003 04/06/24 2124   SODIUM mmol/L 137 134* 140   POTASSIUM mmol/L 4.5 4.7 4.5   CHLORIDE mmol/L 114* 109* 115*   CO2 mmol/L 12.5* 11.9* 12.8*   BUN mg/dL 25* 28* 21   CREATININE mg/dL 1.23* 1.41* 1.20*   CALCIUM mg/dL 9.1 9.7 8.5*   BILIRUBIN mg/dL  --  0.2 <0.2   ALK PHOS U/L  --  148* 128*   ALT (SGPT) U/L  --  7 6   AST (SGOT) U/L  --  15 11   GLUCOSE mg/dL 65 73 80     Results from last 7 days   Lab Units 04/09/24 0548 04/08/24 2003 04/06/24 2124   WBC 10*3/mm3 9.04 12.65* 7.64   HEMOGLOBIN g/dL 10.5* 11.9* 9.5*   HEMATOCRIT % 34.0 38.1 30.6*   PLATELETS 10*3/mm3 247 320 309         Results from last 7 days   Lab Units 04/06/24  2124   HSTROP T ng/L 12         Recent Labs     02/25/24  1348 02/26/24  0741 02/27/24  0522   PHART 7.271* 7.292*  7.305*   NCS1KFH 18.1* 27.1* 30.1*   PO2ART 107.0* 57.1* 90.6   UWW3LGI 8.3* 13.1* 15.0*   BASEEXCESS -16.5* -12.2* -10.3*      I have reviewed the patient's laboratory results.    Radiology results:    CT Abdomen Pelvis With Contrast    Result Date: 4/8/2024  FINAL REPORT TECHNIQUE: null CLINICAL HISTORY: epigastric pain rule out pancreatitis COMPARISON: null FINDINGS: CT abdomen and pelvis with contrast Comparison: 04/06/2024 Findings: Lung bases are clear. No acute bony abnormalities. Degenerative change throughout the spine and hips. Large hiatal hernia noted. Multiple ventral wall defects noted. Protrusion of bowel noted partially into defects. No full bowel involvement demonstrated. Liver, adrenal glands and spleen unremarkable. Cholecystectomy. Peripancreatic stranding and minimal fluid again noted. Findings are consistent with pancreatitis, unchanged. Stable 3.1 cm cystic abnormality in pancreatic tail. 2.5 x 1.2 cm pancreatic body cystic abnormality. These are unchanged from previous study. Findings likely represent pseudocysts. Bilateral kidneys demonstrate no focal abnormality. Renal cysts without stones or hydronephrosis. Abdominal aorta is normal in caliber. No free fluid or adenopathy in the pelvis. No acute diverticulitis. Appendix not identified. Uterus atrophic. No adnexal abnormality.     Impression: Impression: Acute pancreatitis with probable pancreatic pseudocysts No significant change from previous study Authenticated and Electronically Signed by Eugene Red MD on 04/08/2024 09:55:42 PM   I have reviewed the patient's radiology reports.    Medication Review:     I have reviewed the patient's active and prn medications.     Problem List:      Acute pancreatitis      Assessment:    Acute pancreatitis POA  VIVIENNE on CKD POA  Hypovolemic hyponatremia POA  Hypertension  Impaired mobility and ADLs    Plan:    Acute pancreatitis  -Unclear etiology  -Denies alcohol use.  Patient is status  postcholecystectomy.  Triglycerides not significantly elevated.  -IV fluid resuscitation  -Monitor urine output  -Advance diet slowly as tolerated  -Pain control and antiemetics as needed  -Dr. Santillan with gastroenterology consulted, appreciate recommendations.     VIVIENNE  Hyponatremia, resolved  -Likely from dehydration due to nausea/vomiting and poor p.o. intake.  -IV fluid  -Maintain normotension and avoid nephrotoxic medications     Further orders as indicated per clinical course.  I have reviewed the copied text and it is accurate as of 4/9/2024     DVT Prophylaxis: Heparin subcu  Code Status: Full  Diet: N.p.o.  Discharge Plan: Likely home in 1 to 2 days.    Vivienne Roa MD  04/09/24  10:50 EDT    Dictated utilizing Dragon dictation.

## 2024-04-09 NOTE — PAYOR COMM NOTE
"TO:BC   FROM:MILDRED DAUGHERTY, RN PHONE [phone]  CLINICALS REF# [id number]    Jaymie Yarbrough (68 y.o. Female)       Date of Birth   1955    Social Security Number       Address   [address] [facility] [address]    Home Phone   [phone]    MRN   [id number]       Anabaptism   None    Marital Status                               Admission Date   24    Admission Type   Emergency    Admitting Provider   Janak Damon DO    Attending Provider   Vivienne Roa MD    Department, Room/Bed   [facility] EMERGENCY DEPARTMENT,        Discharge Date       Discharge Disposition       Discharge Destination                                 Attending Provider: Vivienne Roa MD    Allergies: Ciprofloxacin, Codeine, Pineapple    Isolation: None   Infection: Other (24)   Code Status: CPR    Ht: 157.5 cm (62\")   Wt: 65.8 kg (145 lb)    Admission Cmt: None   Principal Problem: Acute pancreatitis [K85.90]                   Active Insurance as of 2024       Primary Coverage       Payor Plan Insurance Group Employer/Plan Group    ANTHEM MEDICARE REPLACEMENT ANTHEM MEDICARE ADVANTAGE [id number]       Payor Plan Address Payor Plan Phone Number Payor Plan Fax Number Effective Dates    [address] [phone]  2024 - None Entered    [facility] [id number]         Subscriber Name Subscriber Birth Date Member ID       JAYMIE YARBROUGH 1955 [id number]                     Emergency Contacts        (Rel.) Home Phone Work Phone Mobile Phone    ANTONIO YARBROUGH (Son) [phone] -- --    MONIE YARBROUGH (Son) [phone] -- --                 History & Physical        Janak Damon DO at 24 2317            [facility] HOSPITALIST   HISTORY AND PHYSICAL      Name:  Jaymie Yarbrough   Age:  68 y.o.  Sex:  female  :  1955  MRN:  [id number]   Visit Number:  [id number]  Admission Date:  2024  Date Of Service:  24  Primary " Care Physician:  Terrence Baldwin MD    Chief Complaint:     Abdominal pain    History Of Presenting Illness:      Patient is a 68-year-old female history significant for hypertension, CKD stage III who presents to the emergency room with ongoing nausea/vomiting and abdominal pain.  Patient seen in the emergency room multiple times, follows with  gastroenterology.  Last seen in the ER 4/6/2024 for same complaints.  At that time, patient discharged home with antiemetics.  Scheduled follow-up with  GI.  She returns tonight with worsening abdominal pain.  States that she could not wait to see her GI doctor.  Admits to nausea/vomiting, diffuse abdominal pain, no diarrhea.  Poor p.o. intake.  Upon arrival to the ER patient afebrile hemodynamically stable and nonhypoxic on room air.  Significant labs include sodium 134, chloride 109.  Creatinine 1.41 (baseline 1).  Triglycerides 165.  Lipase 1571.  WBC 12.65.  Normal lactic acid.  CT abdomen pelvis with evidence of acute pancreatitis with probable pancreatic pseudocyst.  Patient received 500 mL bolus and pain medicine.  Hospitalist asked to admit.    Review Of Systems:    All systems were reviewed and negative except as mentioned in history of presenting illness, assessment and plan.    Past Medical History: Patient  has a past medical history of Hypertension, Impaired mobility, Injury of back, and Pancreatitis.    Past Surgical History: Patient  has a past surgical history that includes Tubal ligation; Colonoscopy; Colon surgery; and Abdominal surgery.    Social History: Patient  reports that she has been smoking cigarettes. She has never used smokeless tobacco. She reports that she does not drink alcohol and does not use drugs.    Family History:  Patient's family history has been reviewed and found to be noncontributory.     Allergies:      Ciprofloxacin, Codeine, and Pineapple    Home Medications:    Prior to Admission Medications       Prescriptions Last Dose  Informant Patient Reported? Taking?    citalopram (CeleXA) 40 MG tablet   Yes No    Take 1 tablet by mouth Daily. Indications: Major Depressive Disorder    furosemide (LASIX) 20 MG tablet   No No    Take 1 tablet by mouth Daily for 30 days.    glucosamine-chondroitin 500-400 MG capsule capsule   Yes No    Take 1 capsule by mouth 2 (Two) Times a Day With Meals. Indications: Joint Damage causing Pain and Loss of Function    HYDROcodone-acetaminophen (NORCO) 7.5-325 MG per tablet  Medication Bottle Yes No    Take 1 tablet by mouth Every 8 (Eight) Hours As Needed for Moderate Pain. Indications: Pain    HYDROcodone-acetaminophen (NORCO) 7.5-325 MG per tablet   No No    Take 1 tablet by mouth Every 8 (Eight) Hours As Needed for Moderate Pain. Indications: Pain    methocarbamol (ROBAXIN) 750 MG tablet   Yes No    Take 1-2 tablets by mouth 3 (Three) Times a Day As Needed for Muscle Spasms. Take one to two tablets three times daily as needed  Indications: Musculoskeletal Pain    metoprolol tartrate (LOPRESSOR) 50 MG tablet   Yes No    Take 50 mg by mouth Daily. Indications: High Blood Pressure Disorder    ondansetron (ZOFRAN) 8 MG tablet   Yes No    Take 8 mg by mouth Every 8 (Eight) Hours As Needed for Nausea or Vomiting. Indications: Nausea and Vomiting    ondansetron ODT (ZOFRAN-ODT) 4 MG disintegrating tablet   No No    Place 1 tablet on the tongue Every 8 (Eight) Hours As Needed for Nausea or Vomiting.    oxyCODONE (Roxicodone) 5 MG immediate release tablet   No No    Take 1 tablet by mouth Every 4 (Four) Hours As Needed for Moderate Pain.    oxyCODONE-acetaminophen (PERCOCET) 5-325 MG per tablet   No No    Take 1 tablet by mouth Every 6 (Six) Hours As Needed for Moderate Pain or Severe Pain for up to 12 doses.    oxyCODONE-acetaminophen (PERCOCET) 5-325 MG per tablet   No No    Take 1 tablet by mouth Every 6 (Six) Hours As Needed for Severe Pain.    vitamin D3 125 MCG (5000 UT) capsule capsule   Yes No    Take 1  "capsule by mouth Daily. Indications: Vitamin D Deficiency          ED Medications:    Medications   sodium chloride 0.9 % bolus 500 mL (0 mL Intravenous Stopped 4/8/24 2142)   ondansetron (ZOFRAN) injection 4 mg (4 mg Intravenous Given 4/8/24 2011)   HYDROmorphone (DILAUDID) injection 1 mg (1 mg Intravenous Given 4/8/24 2012)   iopamidol (ISOVUE-300) 61 % injection 100 mL (100 mL Intravenous Given 4/8/24 2058)   HYDROmorphone (DILAUDID) injection 1 mg (1 mg Intravenous Given 4/8/24 2141)     Vital Signs:  Temp:  [98 °F (36.7 °C)] 98 °F (36.7 °C)  Heart Rate:  [85-96] 85  Resp:  [18] 18  BP: (129-161)/() 147/98        04/08/24  1755   Weight: 65.8 kg (145 lb)     Body mass index is 26.52 kg/m².    Physical Exam:     Most recent vital Signs: /98   Pulse 85   Temp 98 °F (36.7 °C)   Resp 18   Ht 157.5 cm (62\")   Wt 65.8 kg (145 lb)   SpO2 99%   BMI 26.52 kg/m²     Physical Exam  Vitals reviewed.   Constitutional:       Appearance: She is ill-appearing.   HENT:      Head: Normocephalic and atraumatic.      Right Ear: External ear normal.      Left Ear: External ear normal.      Mouth/Throat:      Mouth: Mucous membranes are moist.      Pharynx: Oropharynx is clear.   Eyes:      Extraocular Movements: Extraocular movements intact.      Conjunctiva/sclera: Conjunctivae normal.   Cardiovascular:      Rate and Rhythm: Normal rate and regular rhythm.      Pulses: Normal pulses.      Heart sounds: Normal heart sounds.   Pulmonary:      Effort: Pulmonary effort is normal.      Breath sounds: Normal breath sounds.   Abdominal:      General: Bowel sounds are normal.      Tenderness: There is abdominal tenderness. There is guarding.   Musculoskeletal:      Right lower leg: No edema.      Left lower leg: No edema.   Skin:     General: Skin is warm and dry.   Neurological:      General: No focal deficit present.      Mental Status: She is alert and oriented to person, place, and time.         Laboratory data:    I " have reviewed the labs done in the emergency room.    Results from last 7 days   Lab Units 04/08/24 2003 04/06/24 2124   SODIUM mmol/L 134* 140   POTASSIUM mmol/L 4.7 4.5   CHLORIDE mmol/L 109* 115*   CO2 mmol/L 11.9* 12.8*   BUN mg/dL 28* 21   CREATININE mg/dL 1.41* 1.20*   CALCIUM mg/dL 9.7 8.5*   BILIRUBIN mg/dL 0.2 <0.2   ALK PHOS U/L 148* 128*   ALT (SGPT) U/L 7 6   AST (SGOT) U/L 15 11   GLUCOSE mg/dL 73 80     Results from last 7 days   Lab Units 04/08/24 2003 04/06/24 2124   WBC 10*3/mm3 12.65* 7.64   HEMOGLOBIN g/dL 11.9* 9.5*   HEMATOCRIT % 38.1 30.6*   PLATELETS 10*3/mm3 320 309         Results from last 7 days   Lab Units 04/06/24 2124   HSTROP T ng/L 12             Results from last 7 days   Lab Units 04/08/24 2003   LIPASE U/L 1,571*         Results from last 7 days   Lab Units 04/06/24 2029   COLOR UA  Yellow   GLUCOSE UA  Negative   KETONES UA  Negative   BLOOD UA  Negative   LEUKOCYTES UA  Negative   PH, URINE  5.5   BILIRUBIN UA  Negative   UROBILINOGEN UA  0.2 E.U./dL   RBC UA /HPF None Seen   WBC UA /HPF None Seen       Pain Management Panel          Latest Ref Rng & Units 8/6/2022   Pain Management Panel   Amphetamine, Urine Qual Negative Negative    Barbiturates Screen, Urine Negative Negative    Benzodiazepine Screen, Urine Negative Negative    Buprenorphine, Screen, Urine Negative Negative    Cocaine Screen, Urine Negative Negative    Methadone Screen , Urine Negative Negative    Methamphetamine, Ur Negative Negative        EKG:      EKG personally reviewed, sinus rhythm with rate of 75 bpm.  No acute ST or T wave changes.    Radiology:    CT Abdomen Pelvis With Contrast    Result Date: 4/8/2024  FINAL REPORT TECHNIQUE: null CLINICAL HISTORY: epigastric pain rule out pancreatitis COMPARISON: null FINDINGS: CT abdomen and pelvis with contrast Comparison: 04/06/2024 Findings: Lung bases are clear. No acute bony abnormalities. Degenerative change throughout the spine and hips. Large  hiatal hernia noted. Multiple ventral wall defects noted. Protrusion of bowel noted partially into defects. No full bowel involvement demonstrated. Liver, adrenal glands and spleen unremarkable. Cholecystectomy. Peripancreatic stranding and minimal fluid again noted. Findings are consistent with pancreatitis, unchanged. Stable 3.1 cm cystic abnormality in pancreatic tail. 2.5 x 1.2 cm pancreatic body cystic abnormality. These are unchanged from previous study. Findings likely represent pseudocysts. Bilateral kidneys demonstrate no focal abnormality. Renal cysts without stones or hydronephrosis. Abdominal aorta is normal in caliber. No free fluid or adenopathy in the pelvis. No acute diverticulitis. Appendix not identified. Uterus atrophic. No adnexal abnormality.     Impression: Acute pancreatitis with probable pancreatic pseudocysts No significant change from previous study Authenticated and Electronically Signed by Eugene Red MD on 04/08/2024 09:55:42 PM    XR Chest 1 View    Result Date: 4/7/2024  PROCEDURE: XR CHEST 1 VW-  HISTORY: Upper Abdominal Pain triage protocol, mid upper abdominal pain, patient thinks its pancreatitis.  COMPARISON: March 28, 2024.  FINDINGS: The heart is upper limits of normal, similar to prior. Patient is rotated to the right. Hiatal hernia seen on recent CT again noted.. The lungs are clear. The mediastinum is unremarkable. There is no pneumothorax.  There are no acute osseous abnormalities. Apical lordotic positioning noted.      No acute cardiopulmonary process.     This report was signed and finalized on 4/7/2024 8:13 AM by Lucía Bethea MD.      CT Abdomen Pelvis With Contrast    Result Date: 4/6/2024  FINAL REPORT TECHNIQUE: null CLINICAL HISTORY: hx of pancreatitis worsening epigastric abdominal pain prior 03/28/2024 COMPARISON: null FINDINGS: CT of the abdomen and pelvis after administration of IV contrast. Technique: CT from the lung bases through the ischial tuberosities  after administration of IV contrast Comparison: CT/AZ/SR - CT ABDOMEN PELVIS W CONTRAST - 03/28/2024 10:36 AM EDT CT/SR - CT ABDOMEN PELVIS W CONTRAST - 03/09/2024 03:58 AM EST Findings: Normal lung bases. Moderate hiatal hernia. Normal liver. No masses. The gallbladder is unremarkable by CT. Normal appearing adrenal glands. Normal spleen. No masses. The spleen appears normal in size. Numerous simple renal cysts. Nonobstructive right nephrolithiasis. No renal mass. No hydronephrosis. Pancreatic pseudocyst adjacent to the greater curvature of the stomach measuring 1.6 x 3.0 cm, decreased in size. Additional pancreatic pseudocyst posterior to the distal stomach measuring 5.4 x 1.7 cm, decreased in size. No convincing evidence for acute pancreatitis. Normal retroperitoneal structures. No adenopathy. Normal caliber abdominal aorta. Unremarkable urinary bladder. Normal bones. Hernia in the epigastric region containing only fat.     Impression: There are 2 pancreatic pseudocysts, decreased in size. Authenticated and Electronically Signed by Francisco Rahman MD on 04/06/2024 10:57:37 PM     Assessment:    Acute pancreatitis POA  VIVIENNE on CKD POA  Hypovolemic hyponatremia POA  Hypertension  Impaired mobility and ADLs    Plan:    Acute pancreatitis  -Unclear etiology  -Denies alcohol use.  Patient is status postcholecystectomy.  Triglycerides not significantly elevated.  -Aggressive IV fluid resuscitation  -Monitor urine output  -Advance diet slowly as tolerated  -Pain control and antiemetics as needed    VIVIENNE  Hyponatremia  -Likely from dehydration due to nausea/vomiting and poor p.o. intake.  -IV fluid  -Maintain normotension and avoid nephrotoxic medications    Risk Assessment: Moderate  DVT Prophylaxis: Heparin subcu  Code Status: Full  Diet: N.p.o.             Janak Damon DO  04/08/24  23:17 EDT    Dictated utilizing Dragon dictation.      Electronically signed by Janak Damon DO at 04/09/24 0046          Emergency  Department Notes        Shawn Good PA-C at 04/08/24 4629          Subjective  History of Present Illness:    This is a 68-year-old female, history of recurrent pancreatitis, has been seen multiple times in various ERs over the last couple of months.  Follows with  gastroenterology.  She was seen here 2 days ago by myself, had a CT scan that showed decreasing size of pancreatic pseudocyst with no acute pathology.  She prior to this had another CT scan performed a few weeks before that also revealed no acute pathology.  Patient has been discharged home on antiemetics and pain meds but now returning for worsening pain.  She was scheduled to follow-up this week with  GI but presents here again for evaluation.  She is continuing to have epigastric region abdominal pain with nausea and diarrhea.  No known fevers.  No urinary symptoms.  Patient reports not much has really changed from other day other than her nausea has increased and her pain continues.      Nurses Notes reviewed and agree, including vitals, allergies, social history and prior medical history.     REVIEW OF SYSTEMS: All systems reviewed and not pertinent unless noted.  Review of Systems   Constitutional:  Negative for fever.   Respiratory:  Negative for shortness of breath.    Cardiovascular:  Negative for chest pain.   Gastrointestinal:  Positive for abdominal pain, diarrhea, nausea and vomiting.   Genitourinary:  Negative for dysuria.   All other systems reviewed and are negative.      Past Medical History:   Diagnosis Date    Hypertension     Impaired mobility     Injury of back     Pancreatitis        Allergies:    Ciprofloxacin, Codeine, and Pineapple      Past Surgical History:   Procedure Laterality Date    ABDOMINAL SURGERY      COLON SURGERY      COLONOSCOPY      TUBAL ABDOMINAL LIGATION           Social History     Socioeconomic History    Marital status:    Tobacco Use    Smoking status: Every Day     Current packs/day: 1.00     " Types: Cigarettes    Smokeless tobacco: Never   Vaping Use    Vaping status: Never Used   Substance and Sexual Activity    Alcohol use: Never    Drug use: Never    Sexual activity: Defer         History reviewed. No pertinent family history.    Objective  Physical Exam:  /97 (BP Location: Left arm, Patient Position: Lying)   Pulse 85   Temp 98 °F (36.7 °C)   Resp 18   Ht 157.5 cm (62\")   Wt 65.8 kg (145 lb)   SpO2 99%   BMI 26.52 kg/m²      Physical Exam  Vitals and nursing note reviewed.   Constitutional:       General: She is not in acute distress.     Appearance: She is not toxic-appearing or diaphoretic.      Comments: Appears much older than stated age   HENT:      Head: Normocephalic and atraumatic.      Mouth/Throat:      Pharynx: Oropharynx is clear.   Eyes:      Extraocular Movements: Extraocular movements intact.   Cardiovascular:      Rate and Rhythm: Normal rate and regular rhythm.      Heart sounds: Normal heart sounds.   Pulmonary:      Effort: Pulmonary effort is normal. No respiratory distress.   Abdominal:      General: Abdomen is flat.      Palpations: Abdomen is soft.      Tenderness: There is abdominal tenderness in the epigastric area. There is no guarding.   Skin:     General: Skin is warm and dry.      Capillary Refill: Capillary refill takes less than 2 seconds.   Neurological:      General: No focal deficit present.      Mental Status: She is alert and oriented to person, place, and time.   Psychiatric:         Mood and Affect: Mood is anxious.               Procedures    ED Course:    ED Course as of 04/08/24 2206 Mon Apr 08, 2024 2151 ATTENDING ATTESTATION  HPI: 68-year-old female with past medical history of recurrent pancreatitis currently follows with GI at  who presents with abdominal pain.    MDM: ED workup reviewed.  CBC shows leukocytosis, hemoglobin 1.9, creatinine 1.41, normal liver function, lipase 1500.  CT abdomen pelvis currently pending.     [JS]      ED " Course User Index  [JS] Draron Austin DO       Lab Results (last 24 hours)       Procedure Component Value Units Date/Time    CBC Auto Differential [[id number]]  (Abnormal) Collected: 04/08/24 2003    Specimen: Blood Updated: 04/08/24 2025     WBC 12.65 10*3/mm3      RBC 3.39 10*6/mm3      Hemoglobin 11.9 g/dL      Hematocrit 38.1 %      .4 fL      MCH 35.1 pg      MCHC 31.2 g/dL      RDW 15.7 %      RDW-SD 65.1 fl      MPV 9.8 fL      Platelets 320 10*3/mm3      Neutrophil % 80.7 %      Lymphocyte % 10.2 %      Monocyte % 5.9 %      Eosinophil % 1.9 %      Basophil % 0.6 %      Immature Grans % 0.7 %      Neutrophils, Absolute 10.21 10*3/mm3      Lymphocytes, Absolute 1.29 10*3/mm3      Monocytes, Absolute 0.75 10*3/mm3      Eosinophils, Absolute 0.24 10*3/mm3      Basophils, Absolute 0.07 10*3/mm3      Immature Grans, Absolute 0.09 10*3/mm3      nRBC 0.0 /100 WBC     Comprehensive Metabolic Panel [[id number]]  (Abnormal) Collected: 04/08/24 2003    Specimen: Blood Updated: 04/08/24 2030     Glucose 73 mg/dL      BUN 28 mg/dL      Creatinine 1.41 mg/dL      Sodium 134 mmol/L      Potassium 4.7 mmol/L      Comment: Slight hemolysis detected by analyzer. Result may be falsely elevated.        Chloride 109 mmol/L      CO2 11.9 mmol/L      Calcium 9.7 mg/dL      Total Protein 7.5 g/dL      Albumin 3.9 g/dL      ALT (SGPT) 7 U/L      AST (SGOT) 15 U/L      Alkaline Phosphatase 148 U/L      Total Bilirubin 0.2 mg/dL      Globulin 3.6 gm/dL      A/G Ratio 1.1 g/dL      BUN/Creatinine Ratio 19.9     Anion Gap 13.1 mmol/L      eGFR 40.7 mL/min/1.73     Narrative:      GFR Normal >60  Chronic Kidney Disease <60  Kidney Failure <15      Lipase [[id number]]  (Abnormal) Collected: 04/08/24 2003    Specimen: Blood Updated: 04/08/24 2037     Lipase 1,571 U/L     Scan Slide [[id number]] Collected: 04/08/24 2003    Specimen: Blood Updated: 04/08/24 2026     Microcytes Slight/1+     Macrocytes Mod/2+     WBC Morphology Normal      Platelet Morphology Normal    Lactic Acid, Plasma [[id number]]  (Normal) Collected: 04/08/24 2040    Specimen: Blood Updated: 04/08/24 2105     Lactate 0.6 mmol/L              CT Abdomen Pelvis With Contrast    Result Date: 4/8/2024  FINAL REPORT TECHNIQUE: null CLINICAL HISTORY: epigastric pain rule out pancreatitis COMPARISON: null FINDINGS: CT abdomen and pelvis with contrast Comparison: 04/06/2024 Findings: Lung bases are clear. No acute bony abnormalities. Degenerative change throughout the spine and hips. Large hiatal hernia noted. Multiple ventral wall defects noted. Protrusion of bowel noted partially into defects. No full bowel involvement demonstrated. Liver, adrenal glands and spleen unremarkable. Cholecystectomy. Peripancreatic stranding and minimal fluid again noted. Findings are consistent with pancreatitis, unchanged. Stable 3.1 cm cystic abnormality in pancreatic tail. 2.5 x 1.2 cm pancreatic body cystic abnormality. These are unchanged from previous study. Findings likely represent pseudocysts. Bilateral kidneys demonstrate no focal abnormality. Renal cysts without stones or hydronephrosis. Abdominal aorta is normal in caliber. No free fluid or adenopathy in the pelvis. No acute diverticulitis. Appendix not identified. Uterus atrophic. No adnexal abnormality.     Impression: Impression: Acute pancreatitis with probable pancreatic pseudocysts No significant change from previous study Authenticated and Electronically Signed by Eugene Red MD on 04/08/2024 09:55:42 PM        MDM     Amount and/or Complexity of Data Reviewed  Clinical lab tests: reviewed  Tests in the medicine section of CPT®: reviewed  Decide to obtain previous medical records or to obtain history from someone other than the patient: yes        Initial impression of presenting illness: This is a 68-year-old female present emergency room there for evaluation of epigastric abdominal pain nausea vomiting diarrhea    DDX: includes  but is not limited to: Recurrent pancreatitis, chronic pain, others    Patient arrives hemodynamically stable afebrile nontachycardic nonhypoxic with vitals interpreted by myself.     Pertinent features from physical exam: Appears much older than stated age.  Epigastric abdominal tenderness noted.  Without rebound guarding or rigidity, no focal signs of peritonitis.  Oropharynx is clear.  Lungs clear.    Initial diagnostic plan: CBC CMP lipase lactic acid EKG, CT abdomen pelvis with contrast.    Results from initial plan were reviewed and interpreted by me revealing CBC reveals leukocytosis 12.65 which has doubled over the last couple days, likely secondary to vomiting.  CMP with a creatinine elevation 1.41 sodium of 134 and BUN of 28, lipase of 1571 which is also more than tripled since last visit in the last 2 days.  Lactic acid was normal.  CT abdomen pelvis per radiology IMPRESSION:  Impression:     Acute pancreatitis with probable pancreatic pseudocysts  EKG sinus rhythm rate of 75.      Diagnostic information from other sources: Record reviewed    Interventions / Re-evaluation: Will give Zofran and Dilaudid.  500 cc bolus of fluids    Results/clinical rationale were discussed with patient at bedside.  Given findings concerning for acute pancreatitis with further increased lipase and worsening labs compared to several days ago with intractable pain and not doing well with at home pain control and nausea control, will admit to the hospital service    Consultations/Discussion of results with other physicians: Discussed with hospitalist, Dr. Damon.  Agreeable to accept for admission    Disposition plan: Admit to the hospital service.  Inpatient telemetry level of care.  -----    Final diagnoses:   Acute pancreatitis, unspecified complication status, unspecified pancreatitis type   Pancreatic pseudocyst   Intractable pain          Shawn Good PA-C  04/08/24 9661      Electronically signed by Shawn Good  MONIQUE CLEARY at 04/08/24 2207       Vital Signs (last day)       Date/Time Temp Temp src Pulse Resp BP Patient Position SpO2    04/09/24 0602 -- -- 72 -- 118/69 -- 98    04/09/24 0532 -- -- 73 -- 125/70 -- 96    04/09/24 0502 -- -- 73 -- 122/78 -- 98    04/09/24 0432 -- -- 72 -- 109/67 -- 97    04/09/24 0402 -- -- 75 -- 116/72 -- 98    04/09/24 0328 -- -- 74 -- 117/72 -- 98    04/09/24 0258 -- -- 76 -- 119/78 -- 98    04/09/24 0230 -- -- 75 17 111/67 -- 98    04/09/24 0200 -- -- 76 -- 116/77 -- 97    04/09/24 0030 -- -- 80 -- 128/78 -- 97    04/08/24 2335 -- -- 81 -- -- -- 99    04/08/24 2334 -- -- 84 -- -- -- 99    04/08/24 2333 -- -- 82 -- -- -- 98    04/08/24 2332 -- -- 80 -- -- -- 99    04/08/24 2330 -- -- 80 -- 137/85 -- --    04/08/24 2306 -- -- 81 -- -- -- 98    04/08/24 2305 -- -- 80 -- -- -- 99    04/08/24 2304 -- -- -- -- -- -- 99    04/08/24 2303 -- -- 86 -- -- -- 98    04/08/24 2302 -- -- -- -- -- -- 98    04/08/24 2301 -- -- 81 -- -- -- --    04/08/24 2300 -- -- -- -- 126/81 -- --    04/08/24 2230 -- -- 85 -- 147/98 -- 99    04/08/24 2212 -- -- 85 -- 161/100 Lying 99    04/08/24 2146 -- -- -- -- 152/97 Lying --    04/08/24 2144 -- -- 85 -- 152/97 -- 99    04/08/24 1755 98 (36.7) -- 96 18 129/89 -- 96          Current Facility-Administered Medications   Medication Dose Route Frequency Provider Last Rate Last Admin    acetaminophen (TYLENOL) tablet 650 mg  650 mg Oral Q4H PRN Janak Damon DO        Or    acetaminophen (TYLENOL) 160 MG/5ML oral solution 650 mg  650 mg Oral Q4H PRN Janak Damon DO        Or    acetaminophen (TYLENOL) suppository 650 mg  650 mg Rectal Q4H PRN Janak Damon DO        sennosides-docusate (PERICOLACE) 8.6-50 MG per tablet 2 tablet  2 tablet Oral BID PRN Janak Damon DO        And    polyethylene glycol (MIRALAX) packet 17 g  17 g Oral Daily PRN Janak Damon DO        And    bisacodyl (DULCOLAX) EC tablet 5 mg  5 mg Oral Daily PRN Janak Damon DO        And     bisacodyl (DULCOLAX) suppository 10 mg  10 mg Rectal Daily PRN Janak Damon, DO        heparin (porcine) 5000 UNIT/ML injection 5,000 Units  5,000 Units Subcutaneous Q12H Janak Damon, DO   5,000 Units at 04/09/24 0005    HYDROmorphone (DILAUDID) injection 1 mg  1 mg Intravenous Q2H PRN Janak Damon, DO   1 mg at 04/09/24 0626    And    naloxone (NARCAN) injection 0.4 mg  0.4 mg Intravenous Q5 Min PRN Janak Damon, DO        melatonin tablet 5 mg  5 mg Oral Nightly PRN Janak Damon, DO        nitroglycerin (NITROSTAT) SL tablet 0.4 mg  0.4 mg Sublingual Q5 Min PRN Janak Damon,         ondansetron (ZOFRAN) injection 4 mg  4 mg Intravenous Q6H PRN Janak Damon, DO   4 mg at 04/09/24 0252    sodium chloride 0.9 % flush 10 mL  10 mL Intravenous Q12H Janak Damon,    10 mL at 04/08/24 2359    sodium chloride 0.9 % flush 10 mL  10 mL Intravenous PRN Janak Damon,         sodium chloride 0.9 % infusion 40 mL  40 mL Intravenous PRN Janak Damon,         sodium chloride 0.9 % infusion  200 mL/hr Intravenous Continuous Janak Damon  mL/hr at 04/09/24 0243 200 mL/hr at 04/09/24 0243     Current Outpatient Medications   Medication Sig Dispense Refill    citalopram (CeleXA) 40 MG tablet Take 1 tablet by mouth Daily. Indications: Major Depressive Disorder      furosemide (LASIX) 20 MG tablet Take 1 tablet by mouth Daily for 30 days. 30 tablet 0    glucosamine-chondroitin 500-400 MG capsule capsule Take 1 capsule by mouth 2 (Two) Times a Day With Meals. Indications: Joint Damage causing Pain and Loss of Function      HYDROcodone-acetaminophen (NORCO) 7.5-325 MG per tablet Take 1 tablet by mouth Every 8 (Eight) Hours As Needed for Moderate Pain. Indications: Pain      HYDROcodone-acetaminophen (NORCO) 7.5-325 MG per tablet Take 1 tablet by mouth Every 8 (Eight) Hours As Needed for Moderate Pain. Indications: Pain 5 tablet 0    methocarbamol (ROBAXIN) 750 MG tablet Take 1-2 tablets by  mouth 3 (Three) Times a Day As Needed for Muscle Spasms. Take one to two tablets three times daily as needed  Indications: Musculoskeletal Pain      metoprolol tartrate (LOPRESSOR) 50 MG tablet Take 50 mg by mouth Daily. Indications: High Blood Pressure Disorder      ondansetron (ZOFRAN) 8 MG tablet Take 8 mg by mouth Every 8 (Eight) Hours As Needed for Nausea or Vomiting. Indications: Nausea and Vomiting      ondansetron ODT (ZOFRAN-ODT) 4 MG disintegrating tablet Place 1 tablet on the tongue Every 8 (Eight) Hours As Needed for Nausea or Vomiting. 12 tablet 0    oxyCODONE (Roxicodone) 5 MG immediate release tablet Take 1 tablet by mouth Every 4 (Four) Hours As Needed for Moderate Pain. 12 tablet 0    oxyCODONE-acetaminophen (PERCOCET) 5-325 MG per tablet Take 1 tablet by mouth Every 6 (Six) Hours As Needed for Moderate Pain or Severe Pain for up to 12 doses. 12 tablet 0    oxyCODONE-acetaminophen (PERCOCET) 5-325 MG per tablet Take 1 tablet by mouth Every 6 (Six) Hours As Needed for Severe Pain. 10 tablet 0    vitamin D3 125 MCG (5000 UT) capsule capsule Take 1 capsule by mouth Daily. Indications: Vitamin D Deficiency       Lab Results (last 24 hours)       Procedure Component Value Units Date/Time    Scan Slide [[id number]] Collected: 04/09/24 0548    Specimen: Blood Updated: 04/09/24 0619    Basic Metabolic Panel [[id number]] Collected: 04/09/24 0548    Specimen: Blood Updated: 04/09/24 0614    CBC Auto Differential [[id number]] Collected: 04/09/24 0548    Specimen: Blood Updated: 04/09/24 0614    Triglycerides [[id number]]  (Abnormal) Collected: 04/08/24 2003    Specimen: Blood Updated: 04/09/24 0012     Triglycerides 165 mg/dL     Lactic Acid, Plasma [[id number]]  (Normal) Collected: 04/08/24 2040    Specimen: Blood Updated: 04/08/24 2105     Lactate 0.6 mmol/L     Lipase [[id number]]  (Abnormal) Collected: 04/08/24 2003    Specimen: Blood Updated: 04/08/24 2037     Lipase 1,571 U/L     Comprehensive Metabolic Panel  [[id number]]  (Abnormal) Collected: 04/08/24 2003    Specimen: Blood Updated: 04/08/24 2030     Glucose 73 mg/dL      BUN 28 mg/dL      Creatinine 1.41 mg/dL      Sodium 134 mmol/L      Potassium 4.7 mmol/L      Comment: Slight hemolysis detected by analyzer. Result may be falsely elevated.        Chloride 109 mmol/L      CO2 11.9 mmol/L      Calcium 9.7 mg/dL      Total Protein 7.5 g/dL      Albumin 3.9 g/dL      ALT (SGPT) 7 U/L      AST (SGOT) 15 U/L      Alkaline Phosphatase 148 U/L      Total Bilirubin 0.2 mg/dL      Globulin 3.6 gm/dL      A/G Ratio 1.1 g/dL      BUN/Creatinine Ratio 19.9     Anion Gap 13.1 mmol/L      eGFR 40.7 mL/min/1.73     Narrative:      GFR Normal >60  Chronic Kidney Disease <60  Kidney Failure <15      Scan Slide [[id number]] Collected: 04/08/24 2003    Specimen: Blood Updated: 04/08/24 2026     Microcytes Slight/1+     Macrocytes Mod/2+     WBC Morphology Normal     Platelet Morphology Normal    CBC Auto Differential [[id number]]  (Abnormal) Collected: 04/08/24 2003    Specimen: Blood Updated: 04/08/24 2025     WBC 12.65 10*3/mm3      RBC 3.39 10*6/mm3      Hemoglobin 11.9 g/dL      Hematocrit 38.1 %      .4 fL      MCH 35.1 pg      MCHC 31.2 g/dL      RDW 15.7 %      RDW-SD 65.1 fl      MPV 9.8 fL      Platelets 320 10*3/mm3      Neutrophil % 80.7 %      Lymphocyte % 10.2 %      Monocyte % 5.9 %      Eosinophil % 1.9 %      Basophil % 0.6 %      Immature Grans % 0.7 %      Neutrophils, Absolute 10.21 10*3/mm3      Lymphocytes, Absolute 1.29 10*3/mm3      Monocytes, Absolute 0.75 10*3/mm3      Eosinophils, Absolute 0.24 10*3/mm3      Basophils, Absolute 0.07 10*3/mm3      Immature Grans, Absolute 0.09 10*3/mm3      nRBC 0.0 /100 WBC           Imaging Results (Last 24 Hours)       Procedure Component Value Units Date/Time    CT Abdomen Pelvis With Contrast [[id number]] Collected: 04/08/24 2155     Updated: 04/08/24 2157    Narrative:      FINAL REPORT    TECHNIQUE:  null    CLINICAL  HISTORY:  epigastric pain rule out pancreatitis    COMPARISON:  null    FINDINGS:  CT abdomen and pelvis with contrast    Comparison: 04/06/2024    Findings:    Lung bases are clear. No acute bony abnormalities.    Degenerative change throughout the spine and hips.    Large hiatal hernia noted.    Multiple ventral wall defects noted.    Protrusion of bowel noted partially into defects.    No full bowel involvement demonstrated.    Liver, adrenal glands and spleen unremarkable.    Cholecystectomy.    Peripancreatic stranding and minimal fluid again noted.    Findings are consistent with pancreatitis, unchanged.    Stable 3.1 cm cystic abnormality in pancreatic tail.    2.5 x 1.2 cm pancreatic body cystic abnormality.    These are unchanged from previous study.    Findings likely represent pseudocysts.    Bilateral kidneys demonstrate no focal abnormality.    Renal cysts without stones or hydronephrosis.    Abdominal aorta is normal in caliber.    No free fluid or adenopathy in the pelvis.    No acute diverticulitis. Appendix not identified.    Uterus atrophic. No adnexal abnormality.      Impression:      Impression:    Acute pancreatitis with probable pancreatic pseudocysts    No significant change from previous study    Authenticated and Electronically Signed by Eugene Red MD on  04/08/2024 09:55:42 PM          Physician Progress Notes (last 24 hours)  Notes from 04/08/24 0639 through 04/09/24 0639   No notes of this type exist for this encounter.       Consult Notes (last 24 hours)  Notes from 04/08/24 0639 through 04/09/24 0639   No notes of this type exist for this encounter.

## 2024-04-09 NOTE — CASE MANAGEMENT/SOCIAL WORK
Discharge Planning Assessment  [facility]     Patient Name: Tasha Yarbrough  MRN: [id number]  Today's Date: 4/9/2024    Admit Date: 4/8/2024    Plan: The patient is awake and able to answer questions.  She is a current patient of Dr. Baldwin and gets her medications from [facility].  She elects to enroll in Meds to Bed.  She uses a walker, cane, bedside commode, and shower chair at home.  She denies the need for additional DME or services when she returns home.  The patient does smoke cigarettes; she declines resources for smoking cessation.  At the time of DC the patient plans to return to the home she shares with her 2 adult sons who serve as her primary caregivers.  Questions and concerns were addressed, IMM delivered at the time of this conversation.  Will provide additional resources and information upon patient request.  The patient also endorses food insecutiry at times; will provide a food bag at time of DC.   Discharge Needs Assessment       Row Name 04/09/24 1106       Living Environment    People in Home child(debra), adult    Name(s) of People in Home Thai and Rj Yarbrough, opal    Current Living Arrangements home    Duration at Residence 14 years    Potentially Unsafe Housing Conditions none    In the past 12 months has the electric, gas, oil, or water company threatened to shut off services in your home? No    Primary Care Provided by self;child(debra)    Provides Primary Care For no one, unable/limited ability to care for self    Family Caregiver if Needed none    Quality of Family Relationships helpful;involved;supportive    Able to Return to Prior Arrangements yes       Resource/Environmental Concerns    Resource/Environmental Concerns none    Transportation Concerns none       Transportation Needs    In the past 12 months, has lack of transportation kept you from medical appointments or from getting medications? no    In the past 12 months, has lack of transportation kept you from meetings, work, or from getting  things needed for daily living? No       Transition Planning    Patient/Family Anticipates Transition to home with family    Patient/Family Anticipated Services at Transition none    Transportation Anticipated family or friend will provide       Discharge Needs Assessment    Readmission Within the Last 30 Days previous discharge plan unsuccessful    Equipment Currently Used at Home cane, straight;commode;shower chair;walker, rolling    Concerns to be Addressed denies needs/concerns at this time    Anticipated Changes Related to Illness none    Equipment Needed After Discharge none    Provided Post Acute Provider List? N/A    N/A Provider List Comment Patient plans to DC home; no DME neeeds    Provided Post Acute Provider Quality & Resource List? N/A    N/A Quality & Resource List Comment Patient plans to DC home; no DME neeeds                   Discharge Plan       Row Name 04/09/24 1108       Plan    Plan The patient is awake and able to answer questions.  She is a current patient of Dr. Baldwin and gets her medications from [facility].  She elects to enroll in Meds to Bed.  She uses a walker, cane, bedside commode, and shower chair at home.  She denies the need for additional DME or services when she returns home.  The patient does smoke cigarettes; she declines resources for smoking cessation.  At the time of DC the patient plans to return to the home she shares with her 2 adult sons who serve as her primary caregivers.  Questions and concerns were addressed, IMM delivered at the time of this conversation.  Will provide additional resources and information upon patient request.  The patient also endorses food insecutiry at times; will provide a food bag at time of DC.    Patient/Family in Agreement with Plan yes    Provided Post Acute Provider List? N/A    N/A Provider List Comment Patient plans to DC home; no DME neeeds    Provided Post Acute Provider Quality & Resource List? N/A    N/A Quality & Resource List Comment  Patient plans to DC home; no DME neeeds    Plan Comments Food bag on date of DC    Final Discharge Disposition Code 01 - home or self-care    Final Note Plans to DC home with sons                  Continued Care and Services - Admitted Since 4/8/2024    No active coordination exists for this encounter.          Demographic Summary       Row Name 04/09/24 1059       General Information    Admission Type inpatient    Arrived From emergency department    Required Notices Provided Important Message from Medicare    Referral Source admission list    Reason for Consult discharge planning    Preferred Language English       Contact Information    Permission Granted to Share Info With                    Functional Status       Row Name 04/09/24 1059       Functional Status    Usual Activity Tolerance good    Current Activity Tolerance moderate       Physical Activity    On average, how many days per week do you engage in moderate to strenuous exercise (like a brisk walk)? 0 days    On average, how many minutes do you engage in exercise at this level? 0 min    Number of minutes of exercise per week 0       Assessment of Health Literacy    How often do you have someone help you read hospital materials? Never    How often do you have problems learning about your medical condition because of difficulty understanding written information? Never    How often do you have a problem understanding what is told to you about your medical condition? Never    How confident are you filling out medical forms by yourself? Extremely    Health Literacy Excellent       Functional Status, IADL    Medications independent    Meal Preparation assistive person    Housekeeping assistive person    Laundry assistive person    Shopping assistive person       Mental Status    General Appearance WDL WDL       Mental Status Summary    Recent Changes in Mental Status/Cognitive Functioning no changes                   Psychosocial       Row Name  04/09/24 1100       Values/Beliefs    Spiritual, Cultural Beliefs, [employer] Practices, Values that Affect Care no       Behavior WDL    Behavior WDL WDL       Emotion Mood WDL    Emotion/Mood/Affect WDL WDL       Speech WDL    Speech WDL WDL       Perceptual State WDL    Perceptual State WDL WDL       Thought Process WDL    Thought Process WDL WDL       Intellectual Performance WDL    Intellectual Performance WDL WDL                   Abuse/Neglect       Row Name 04/09/24 1100       Personal Safety    Feels Unsafe at Home or Work/School no    Feels Threatened by Someone no    Does Anyone Try to Keep You From Having Contact with Others or Doing Things Outside Your Home? no    Physical Signs of Abuse Present no                   Legal       Row Name 04/09/24 1100       Financial Resource Strain    How hard is it for you to pay for the very basics like food, housing, medical care, and heating? Not very                   Substance Abuse       Row Name 04/09/24 1100       Substance Use    Substance Use Status current tobacco use    Last Tobacco Use Date 04/08/24  Patient declines smoking cessation resources                   Patient Forms       Row Name 04/09/24 1112       Patient Forms    Important Message from Medicare (IMM) Delivered    Delivered to Patient    Method of delivery In person                      Katia Holland RN

## 2024-04-09 NOTE — CONSULTS
In Patient Consult      Date of Consultation: 2024  Patient Name: Tasha Yarbrough  MRN: [id number]  : 1955     Referring provider: Vivienne Roa MD    Primary care provider:  Terrence Baldwin MD    Reason for consultation: Pancreatitis.    History of Present Illness: This is a 68-year-old female patient with a prior history of hypertension, hyperlipidemia, CKD stage III, history of pancreatitis with a pseudocyst, history of C. difficile colitis, chronic constipation, tobacco abuse was brought in emergency room on 2024 with complaints of nausea vomiting and progressive abdominal pain.    Patient has been in and out of the hospital for the past 3 to 4 months with abdominal pain and pancreatitis and pancreatic pseudocyst.  This time she developed again acute episode of nausea vomiting with a upper and mid abdominal pain.  As pain got worse came to emergency room for evaluation.    Patient was seen by me in may 2021 and not come for a follow-up or did not keep with appointment for repeat colonoscopy.  In 2023 patient was admitted and was seen by Dr. Montano for abdominal pain diarrhea and C. difficile colitis.  He was admitted at [facility] before the hospital admission here in September with concern for choledocholithiasis noted to the CT scan done on 2023 for which he underwent a EGD and ERCP by Dr. Uriostegui which did not reveal any overt stone but had a retained bile possible sludge.  Cholangiogram was clear.  Treatment for C. difficile colitis with vancomycin and due to persistent symptoms he was treated with fidaxomicin 14 days.    In February he presented with abdominal pain at [facility] and noted to have a mild pancreatitis with retroperitoneal effusion.  Referral was done on 2024 revealed 16 to 3 cm fluid collection along the ventral margin of the pancreatic tail likely pancreatitis with acute fluid collection.  CT scan done on 3/16/2024 revealed worsening  acute pancreatitis findings.  Pelvis done on 3/23/2024 revealed 2 enhancing fluid collections abutting the pancreas reflecting pseudocyst.  3/28/2024 revealed similar findings of pseudocyst with mild interval decrease in the overall fluid collection.  CT abdomen pelvis done on 4/6/2024 revealed a pseudocyst but size improved compared to prior CT.  CT pelvis done with contrast on 4/8/2024 on admission revealed acute pancreatitis with possible pancreatic pseudocyst size improved 3 cm and the other one 2.5 to 1.2 cm compared to prior size.     In the emergency room he was noted to have a borderline sodium of 134 chloride of 109 CO2 11.9.  BUN was 28 creatinine was 1.41.  Lipase was thousand 5 and 71.  WBC was 12,000 hemoglobin 11.9 g/dL platelet count of 3 20,000.  She was admitted for acute pancreatitis and GI was consulted.      Subjective     Past Medical History:   Diagnosis Date    Hypertension     Impaired mobility     Injury of back     Pancreatitis        Past Surgical History:   Procedure Laterality Date    ABDOMINAL SURGERY      COLON SURGERY      COLONOSCOPY      TUBAL ABDOMINAL LIGATION         History reviewed. No pertinent family history.    Social History     Socioeconomic History    Marital status:    Tobacco Use    Smoking status: Every Day     Current packs/day: 1.00     Types: Cigarettes    Smokeless tobacco: Never   Vaping Use    Vaping status: Never Used   Substance and Sexual Activity    Alcohol use: Never    Drug use: Never    Sexual activity: Defer         Current Facility-Administered Medications:     acetaminophen (TYLENOL) tablet 650 mg, 650 mg, Oral, Q4H PRN **OR** acetaminophen (TYLENOL) 160 MG/5ML oral solution 650 mg, 650 mg, Oral, Q4H PRN **OR** acetaminophen (TYLENOL) suppository 650 mg, 650 mg, Rectal, Q4H PRN, Janak Damon DO    sennosides-docusate (PERICOLACE) 8.6-50 MG per tablet 2 tablet, 2 tablet, Oral, BID PRN **AND** polyethylene glycol (MIRALAX) packet 17 g, 17 g,  Oral, Daily PRN **AND** bisacodyl (DULCOLAX) EC tablet 5 mg, 5 mg, Oral, Daily PRN **AND** bisacodyl (DULCOLAX) suppository 10 mg, 10 mg, Rectal, Daily PRN, Janak Damon, DO    heparin (porcine) 5000 UNIT/ML injection 5,000 Units, 5,000 Units, Subcutaneous, Q12H, Janak Damon, DO, 5,000 Units at 04/09/24 0812    HYDROmorphone (DILAUDID) injection 1 mg, 1 mg, Intravenous, Q2H PRN, 1 mg at 04/09/24 1355 **AND** naloxone (NARCAN) injection 0.4 mg, 0.4 mg, Intravenous, Q5 Min PRN, Janak Damon,     melatonin tablet 5 mg, 5 mg, Oral, Nightly PRN, Janak Damon, DO    nitroglycerin (NITROSTAT) SL tablet 0.4 mg, 0.4 mg, Sublingual, Q5 Min PRN, Janak Damon,     ondansetron (ZOFRAN) injection 4 mg, 4 mg, Intravenous, Q6H PRN, Janak Damon, , 4 mg at 04/09/24 0724    sodium chloride 0.9 % flush 10 mL, 10 mL, Intravenous, Q12H, Janak Damon, , 10 mL at 04/09/24 0812    sodium chloride 0.9 % flush 10 mL, 10 mL, Intravenous, PRN, Janak Damon,     sodium chloride 0.9 % infusion 40 mL, 40 mL, Intravenous, PRN, Janak Damon,     sodium chloride 0.9 % infusion, 200 mL/hr, Intravenous, Continuous, Janak Damon, , Last Rate: 200 mL/hr at 04/09/24 1041, 200 mL/hr at 04/09/24 1041    Allergies   Allergen Reactions    Ciprofloxacin Dizziness    Codeine Itching    Pineapple Unknown - Low Severity       Review of Systems   Constitutional:  Negative for appetite change, fatigue, fever and unexpected weight loss.   HENT:  Negative for trouble swallowing.    Gastrointestinal:  Positive for abdominal pain and nausea. Negative for abdominal distention, anal bleeding, blood in stool, constipation, diarrhea, rectal pain, vomiting, GERD and indigestion.        The following portions of the patient's history were reviewed and updated as appropriate: allergies, current medications, past family history, past medical history, past social history, past surgical history and problem list.    Objective  "    Vitals:    04/09/24 1213 04/09/24 1215 04/09/24 1216 04/09/24 1300   BP:    131/82   BP Location:    Left arm   Patient Position:    Lying   Pulse: 80 79 79 81   Resp:    18   Temp:    97.7 °F (36.5 °C)   TempSrc:    Oral   SpO2: 98% 98% 98% 97%   Weight:    60.8 kg (134 lb 0.6 oz)   Height:    157.5 cm (62\")       Physical Exam  Constitutional:       Appearance: Normal appearance.   HENT:      Head: Normocephalic and atraumatic.   Eyes:      Conjunctiva/sclera: Conjunctivae normal.   Abdominal:      General: Abdomen is flat. There is no distension.      Palpations: There is no mass.      Tenderness: There is abdominal tenderness (Mild left upper abdominal tenderness and mid abdominal tenderness). There is no guarding or rebound.      Hernia: A hernia (Mid abdominal reducible hernia noted) is present.   Musculoskeletal:      Cervical back: Normal range of motion and neck supple.   Neurological:      Mental Status: She is alert.         Results from last 7 days   Lab Units 04/09/24  0548 04/08/24 2003 04/06/24  2124   SODIUM mmol/L 137 134* 140   POTASSIUM mmol/L 4.5 4.7 4.5   CHLORIDE mmol/L 114* 109* 115*   CO2 mmol/L 12.5* 11.9* 12.8*   BUN mg/dL 25* 28* 21   CREATININE mg/dL 1.23* 1.41* 1.20*   CALCIUM mg/dL 9.1 9.7 8.5*   ALBUMIN g/dL  --  3.9 3.3*   BILIRUBIN mg/dL  --  0.2 <0.2   ALK PHOS U/L  --  148* 128*   ALT (SGPT) U/L  --  7 6   AST (SGOT) U/L  --  15 11   GLUCOSE mg/dL 65 73 80   WBC 10*3/mm3 9.04 12.65* 7.64   HEMOGLOBIN g/dL 10.5* 11.9* 9.5*   PLATELETS 10*3/mm3 247 320 309       Imaging Results (Last 24 Hours)       Procedure Component Value Units Date/Time    CT Abdomen Pelvis With Contrast [[id number]] Collected: 04/08/24 2155     Updated: 04/08/24 2157    Narrative:      FINAL REPORT    TECHNIQUE:  null    CLINICAL HISTORY:  epigastric pain rule out pancreatitis    COMPARISON:  null    FINDINGS:  CT abdomen and pelvis with contrast    Comparison: 04/06/2024    Findings:    Lung bases are " clear. No acute bony abnormalities.    Degenerative change throughout the spine and hips.    Large hiatal hernia noted.    Multiple ventral wall defects noted.    Protrusion of bowel noted partially into defects.    No full bowel involvement demonstrated.    Liver, adrenal glands and spleen unremarkable.    Cholecystectomy.    Peripancreatic stranding and minimal fluid again noted.    Findings are consistent with pancreatitis, unchanged.    Stable 3.1 cm cystic abnormality in pancreatic tail.    2.5 x 1.2 cm pancreatic body cystic abnormality.    These are unchanged from previous study.    Findings likely represent pseudocysts.    Bilateral kidneys demonstrate no focal abnormality.    Renal cysts without stones or hydronephrosis.    Abdominal aorta is normal in caliber.    No free fluid or adenopathy in the pelvis.    No acute diverticulitis. Appendix not identified.    Uterus atrophic. No adnexal abnormality.      Impression:      Impression:    Acute pancreatitis with probable pancreatic pseudocysts    No significant change from previous study    Authenticated and Electronically Signed by Eugene Red MD on  04/08/2024 09:55:42 PM            Assessment / Plan      Assessment/Recommendations:     Acute on chronic pancreatitis  Resolving pancreatic pseudocyst  Tobacco abuse  Patient has been in and out of the hospital for the past 6 months with acute recurrent pancreatitis.  She has a remote history of pancreatitis?  From gallstones many years ago.  Recently in September 2023 she had a ERCP for possible CBD sludge.  Since then she has been having multiple episodes of pancreatitis with a pseudocyst formation.  She drank alcohol in the past but nothing recently.  She is a chronic smoker.  Unclear whether these episodes of pancreatitis are related to her ERCP.    She is known to have a pseudocyst measuring about 5 to 6 cm in size that has significantly reduced in size with a current CT scan.  Patient is clinically  doing well without any significant tenderness or systemic involvement.    We had a discussion regarding absolute abstinence from smoking to prevent the recurrent episodes of pancreatitis  Keep n.p.o. today and consider advancing the diet in a.m. tomorrow  Recommend conservative therapy with IV fluids, analgesics  Antiemetics analgesics as appropriate  Dulcolax 2 tablets p.o. daily along with the MiraLAX 17 g p.o. daily  Patient would benefit with Linzess  290 mcg p.o. daily on discharge  Patient had an appointment with GI at [facility] tomorrow and advised to reschedule the appointment    Chronic constipation unspecified /opioid-induced constipation  History of IBS with constipation  Incisional hernia  CKD stage III  Colon cancer screening  History of C. difficile colitis  Status post partial colectomy  Patient likely had a diverticular perforation 17 years ago and had a partial colectomy details unknown.  No prior colonoscopy.  Patient did not keep up the appointment for colonoscopy from the office.    Recommend screening colonoscopy as OP       Thank you very much for letting me participate in the care of this patient.  Please do not hesitate to call me if you have any questions.    Tyree Fleming MD  [facility] [place]  4/9/2024  14:34 EDT    Please note that portions of this note may have been completed with a voice recognition program.

## 2024-04-09 NOTE — H&P
[facility]      Name:  Tasha Yarbrough   Age:  68 y.o.  Sex:  female  :  1955  MRN:  [id number]   Visit Number:  [id number]  Admission Date:  2024  Date Of Service:  24  Primary Care Physician:  Terrence Baldwin MD    Chief Complaint:     Abdominal pain    History Of Presenting Illness:      Patient is a 68-year-old female history significant for hypertension, CKD stage III who presents to the emergency room with ongoing nausea/vomiting and abdominal pain.  Patient seen in the emergency room multiple times, follows with  gastroenterology.  Last seen in the ER 2024 for same complaints.  At that time, patient discharged home with antiemetics.  Scheduled follow-up with  GI.  She returns tonight with worsening abdominal pain.  States that she could not wait to see her GI doctor.  Admits to nausea/vomiting, diffuse abdominal pain, no diarrhea.  Poor p.o. intake.  Upon arrival to the ER patient afebrile hemodynamically stable and nonhypoxic on room air.  Significant labs include sodium 134, chloride 109.  Creatinine 1.41 (baseline 1).  Triglycerides 165.  Lipase 1571.  WBC 12.65.  Normal lactic acid.  CT abdomen pelvis with evidence of acute pancreatitis with probable pancreatic pseudocyst.  Patient received 500 mL bolus and pain medicine.  Hospitalist asked to admit.    Review Of Systems:    All systems were reviewed and negative except as mentioned in history of presenting illness, assessment and plan.    Past Medical History: Patient  has a past medical history of Hypertension, Impaired mobility, Injury of back, and Pancreatitis.    Past Surgical History: Patient  has a past surgical history that includes Tubal ligation; Colonoscopy; Colon surgery; and Abdominal surgery.    Social History: Patient  reports that she has been smoking cigarettes. She has never used smokeless tobacco. She reports that she does not drink alcohol and does not use  drugs.    Family History:  Patient's family history has been reviewed and found to be noncontributory.     Allergies:      Ciprofloxacin, Codeine, and Pineapple    Home Medications:    Prior to Admission Medications       Prescriptions Last Dose Informant Patient Reported? Taking?    citalopram (CeleXA) 40 MG tablet   Yes No    Take 1 tablet by mouth Daily. Indications: Major Depressive Disorder    furosemide (LASIX) 20 MG tablet   No No    Take 1 tablet by mouth Daily for 30 days.    glucosamine-chondroitin 500-400 MG capsule capsule   Yes No    Take 1 capsule by mouth 2 (Two) Times a Day With Meals. Indications: Joint Damage causing Pain and Loss of Function    HYDROcodone-acetaminophen (NORCO) 7.5-325 MG per tablet  Medication Bottle Yes No    Take 1 tablet by mouth Every 8 (Eight) Hours As Needed for Moderate Pain. Indications: Pain    HYDROcodone-acetaminophen (NORCO) 7.5-325 MG per tablet   No No    Take 1 tablet by mouth Every 8 (Eight) Hours As Needed for Moderate Pain. Indications: Pain    methocarbamol (ROBAXIN) 750 MG tablet   Yes No    Take 1-2 tablets by mouth 3 (Three) Times a Day As Needed for Muscle Spasms. Take one to two tablets three times daily as needed  Indications: Musculoskeletal Pain    metoprolol tartrate (LOPRESSOR) 50 MG tablet   Yes No    Take 50 mg by mouth Daily. Indications: High Blood Pressure Disorder    ondansetron (ZOFRAN) 8 MG tablet   Yes No    Take 8 mg by mouth Every 8 (Eight) Hours As Needed for Nausea or Vomiting. Indications: Nausea and Vomiting    ondansetron ODT (ZOFRAN-ODT) 4 MG disintegrating tablet   No No    Place 1 tablet on the tongue Every 8 (Eight) Hours As Needed for Nausea or Vomiting.    oxyCODONE (Roxicodone) 5 MG immediate release tablet   No No    Take 1 tablet by mouth Every 4 (Four) Hours As Needed for Moderate Pain.    oxyCODONE-acetaminophen (PERCOCET) 5-325 MG per tablet   No No    Take 1 tablet by mouth Every 6 (Six) Hours As Needed for Moderate Pain  "or Severe Pain for up to 12 doses.    oxyCODONE-acetaminophen (PERCOCET) 5-325 MG per tablet   No No    Take 1 tablet by mouth Every 6 (Six) Hours As Needed for Severe Pain.    vitamin D3 125 MCG (5000 UT) capsule capsule   Yes No    Take 1 capsule by mouth Daily. Indications: Vitamin D Deficiency          ED Medications:    Medications   sodium chloride 0.9 % bolus 500 mL (0 mL Intravenous Stopped 4/8/24 2142)   ondansetron (ZOFRAN) injection 4 mg (4 mg Intravenous Given 4/8/24 2011)   HYDROmorphone (DILAUDID) injection 1 mg (1 mg Intravenous Given 4/8/24 2012)   iopamidol (ISOVUE-300) 61 % injection 100 mL (100 mL Intravenous Given 4/8/24 2058)   HYDROmorphone (DILAUDID) injection 1 mg (1 mg Intravenous Given 4/8/24 2141)     Vital Signs:  Temp:  [98 °F (36.7 °C)] 98 °F (36.7 °C)  Heart Rate:  [85-96] 85  Resp:  [18] 18  BP: (129-161)/() 147/98        04/08/24  1755   Weight: 65.8 kg (145 lb)     Body mass index is 26.52 kg/m².    Physical Exam:     Most recent vital Signs: /98   Pulse 85   Temp 98 °F (36.7 °C)   Resp 18   Ht 157.5 cm (62\")   Wt 65.8 kg (145 lb)   SpO2 99%   BMI 26.52 kg/m²     Physical Exam  Vitals reviewed.   Constitutional:       Appearance: She is ill-appearing.   HENT:      Head: Normocephalic and atraumatic.      Right Ear: External ear normal.      Left Ear: External ear normal.      Mouth/Throat:      Mouth: Mucous membranes are moist.      Pharynx: Oropharynx is clear.   Eyes:      Extraocular Movements: Extraocular movements intact.      Conjunctiva/sclera: Conjunctivae normal.   Cardiovascular:      Rate and Rhythm: Normal rate and regular rhythm.      Pulses: Normal pulses.      Heart sounds: Normal heart sounds.   Pulmonary:      Effort: Pulmonary effort is normal.      Breath sounds: Normal breath sounds.   Abdominal:      General: Bowel sounds are normal.      Tenderness: There is abdominal tenderness. There is guarding.   Musculoskeletal:      Right lower leg: No " edema.      Left lower leg: No edema.   Skin:     General: Skin is warm and dry.   Neurological:      General: No focal deficit present.      Mental Status: She is alert and oriented to person, place, and time.         Laboratory data:    I have reviewed the labs done in the emergency room.    Results from last 7 days   Lab Units 04/08/24 2003 04/06/24 2124   SODIUM mmol/L 134* 140   POTASSIUM mmol/L 4.7 4.5   CHLORIDE mmol/L 109* 115*   CO2 mmol/L 11.9* 12.8*   BUN mg/dL 28* 21   CREATININE mg/dL 1.41* 1.20*   CALCIUM mg/dL 9.7 8.5*   BILIRUBIN mg/dL 0.2 <0.2   ALK PHOS U/L 148* 128*   ALT (SGPT) U/L 7 6   AST (SGOT) U/L 15 11   GLUCOSE mg/dL 73 80     Results from last 7 days   Lab Units 04/08/24 2003 04/06/24 2124   WBC 10*3/mm3 12.65* 7.64   HEMOGLOBIN g/dL 11.9* 9.5*   HEMATOCRIT % 38.1 30.6*   PLATELETS 10*3/mm3 320 309         Results from last 7 days   Lab Units 04/06/24 2124   HSTROP T ng/L 12             Results from last 7 days   Lab Units 04/08/24 2003   LIPASE U/L 1,571*         Results from last 7 days   Lab Units 04/06/24 2029   COLOR UA  Yellow   GLUCOSE UA  Negative   KETONES UA  Negative   BLOOD UA  Negative   LEUKOCYTES UA  Negative   PH, URINE  5.5   BILIRUBIN UA  Negative   UROBILINOGEN UA  0.2 E.U./dL   RBC UA /HPF None Seen   WBC UA /HPF None Seen       Pain Management Panel          Latest Ref Rng & Units 8/6/2022   Pain Management Panel   Amphetamine, Urine Qual Negative Negative    Barbiturates Screen, Urine Negative Negative    Benzodiazepine Screen, Urine Negative Negative    Buprenorphine, Screen, Urine Negative Negative    Cocaine Screen, Urine Negative Negative    Methadone Screen , Urine Negative Negative    Methamphetamine, Ur Negative Negative        EKG:      EKG personally reviewed, sinus rhythm with rate of 75 bpm.  No acute ST or T wave changes.    Radiology:    CT Abdomen Pelvis With Contrast    Result Date: 4/8/2024  FINAL REPORT TECHNIQUE: null CLINICAL HISTORY:  epigastric pain rule out pancreatitis COMPARISON: null FINDINGS: CT abdomen and pelvis with contrast Comparison: 04/06/2024 Findings: Lung bases are clear. No acute bony abnormalities. Degenerative change throughout the spine and hips. Large hiatal hernia noted. Multiple ventral wall defects noted. Protrusion of bowel noted partially into defects. No full bowel involvement demonstrated. Liver, adrenal glands and spleen unremarkable. Cholecystectomy. Peripancreatic stranding and minimal fluid again noted. Findings are consistent with pancreatitis, unchanged. Stable 3.1 cm cystic abnormality in pancreatic tail. 2.5 x 1.2 cm pancreatic body cystic abnormality. These are unchanged from previous study. Findings likely represent pseudocysts. Bilateral kidneys demonstrate no focal abnormality. Renal cysts without stones or hydronephrosis. Abdominal aorta is normal in caliber. No free fluid or adenopathy in the pelvis. No acute diverticulitis. Appendix not identified. Uterus atrophic. No adnexal abnormality.     Impression: Acute pancreatitis with probable pancreatic pseudocysts No significant change from previous study Authenticated and Electronically Signed by Eugene Red MD on 04/08/2024 09:55:42 PM    XR Chest 1 View    Result Date: 4/7/2024  PROCEDURE: XR CHEST 1 VW-  HISTORY: Upper Abdominal Pain triage protocol, mid upper abdominal pain, patient thinks its pancreatitis.  COMPARISON: March 28, 2024.  FINDINGS: The heart is upper limits of normal, similar to prior. Patient is rotated to the right. Hiatal hernia seen on recent CT again noted.. The lungs are clear. The mediastinum is unremarkable. There is no pneumothorax.  There are no acute osseous abnormalities. Apical lordotic positioning noted.      No acute cardiopulmonary process.     This report was signed and finalized on 4/7/2024 8:13 AM by Lucía Bethea MD.      CT Abdomen Pelvis With Contrast    Result Date: 4/6/2024  FINAL REPORT TECHNIQUE: null CLINICAL  HISTORY: hx of pancreatitis worsening epigastric abdominal pain prior 03/28/2024 COMPARISON: null FINDINGS: CT of the abdomen and pelvis after administration of IV contrast. Technique: CT from the lung bases through the ischial tuberosities after administration of IV contrast Comparison: CT/PA/SR - CT ABDOMEN PELVIS W CONTRAST - 03/28/2024 10:36 AM EDT CT/SR - CT ABDOMEN PELVIS W CONTRAST - 03/09/2024 03:58 AM EST Findings: Normal lung bases. Moderate hiatal hernia. Normal liver. No masses. The gallbladder is unremarkable by CT. Normal appearing adrenal glands. Normal spleen. No masses. The spleen appears normal in size. Numerous simple renal cysts. Nonobstructive right nephrolithiasis. No renal mass. No hydronephrosis. Pancreatic pseudocyst adjacent to the greater curvature of the stomach measuring 1.6 x 3.0 cm, decreased in size. Additional pancreatic pseudocyst posterior to the distal stomach measuring 5.4 x 1.7 cm, decreased in size. No convincing evidence for acute pancreatitis. Normal retroperitoneal structures. No adenopathy. Normal caliber abdominal aorta. Unremarkable urinary bladder. Normal bones. Hernia in the epigastric region containing only fat.     Impression: There are 2 pancreatic pseudocysts, decreased in size. Authenticated and Electronically Signed by Francisco Rahman MD on 04/06/2024 10:57:37 PM     Assessment:    Acute pancreatitis POA  VIVIENNE on CKD POA  Hypovolemic hyponatremia POA  Hypertension  Impaired mobility and ADLs    Plan:    Acute pancreatitis  -Unclear etiology  -Denies alcohol use.  Patient is status postcholecystectomy.  Triglycerides not significantly elevated.  -Aggressive IV fluid resuscitation  -Monitor urine output  -Advance diet slowly as tolerated  -Pain control and antiemetics as needed    VIVIENNE  Hyponatremia  -Likely from dehydration due to nausea/vomiting and poor p.o. intake.  -IV fluid  -Maintain normotension and avoid nephrotoxic medications    Risk Assessment:  Moderate  DVT Prophylaxis: Heparin subcu  Code Status: Full  Diet: N.p.o.             Janak Damon DO  04/08/24  23:17 EDT    Dictated utilizing Dragon dictation.

## 2024-04-10 LAB
ANION GAP SERPL CALCULATED.3IONS-SCNC: 15.7 MMOL/L (ref 5–15)
BUN SERPL-MCNC: 16 MG/DL (ref 8–23)
BUN/CREAT SERPL: 15.1 (ref 7–25)
CALCIUM SPEC-SCNC: 8.9 MG/DL (ref 8.6–10.5)
CHLORIDE SERPL-SCNC: 115 MMOL/L (ref 98–107)
CO2 SERPL-SCNC: 10.3 MMOL/L (ref 22–29)
CREAT SERPL-MCNC: 1.06 MG/DL (ref 0.57–1)
DEPRECATED RDW RBC AUTO: 67.8 FL (ref 37–54)
EGFRCR SERPLBLD CKD-EPI 2021: 57 ML/MIN/1.73
ERYTHROCYTE [DISTWIDTH] IN BLOOD BY AUTOMATED COUNT: 16 % (ref 12.3–15.4)
GLUCOSE BLDC GLUCOMTR-MCNC: 70 MG/DL (ref 70–130)
GLUCOSE BLDC GLUCOMTR-MCNC: 79 MG/DL (ref 70–130)
GLUCOSE BLDC GLUCOMTR-MCNC: 93 MG/DL (ref 70–130)
GLUCOSE SERPL-MCNC: 36 MG/DL (ref 65–99)
HCT VFR BLD AUTO: 34.2 % (ref 34–46.6)
HGB BLD-MCNC: 10.4 G/DL (ref 12–15.9)
LIPASE SERPL-CCNC: 364 U/L (ref 13–60)
MCH RBC QN AUTO: 35 PG (ref 26.6–33)
MCHC RBC AUTO-ENTMCNC: 30.4 G/DL (ref 31.5–35.7)
MCV RBC AUTO: 115.2 FL (ref 79–97)
PLATELET # BLD AUTO: 208 10*3/MM3 (ref 140–450)
PMV BLD AUTO: 10.1 FL (ref 6–12)
POTASSIUM SERPL-SCNC: 4.2 MMOL/L (ref 3.5–5.2)
RBC # BLD AUTO: 2.97 10*6/MM3 (ref 3.77–5.28)
SODIUM SERPL-SCNC: 141 MMOL/L (ref 136–145)
WBC NRBC COR # BLD AUTO: 4.97 10*3/MM3 (ref 3.4–10.8)

## 2024-04-10 PROCEDURE — 25010000002 HEPARIN (PORCINE) PER 1000 UNITS: Performed by: INTERNAL MEDICINE

## 2024-04-10 PROCEDURE — 80048 BASIC METABOLIC PNL TOTAL CA: CPT | Performed by: FAMILY MEDICINE

## 2024-04-10 PROCEDURE — 82948 REAGENT STRIP/BLOOD GLUCOSE: CPT

## 2024-04-10 PROCEDURE — 25010000002 HYDROMORPHONE 1 MG/ML SOLUTION: Performed by: INTERNAL MEDICINE

## 2024-04-10 PROCEDURE — 99232 SBSQ HOSP IP/OBS MODERATE 35: CPT | Performed by: PHYSICIAN ASSISTANT

## 2024-04-10 PROCEDURE — 85027 COMPLETE CBC AUTOMATED: CPT | Performed by: FAMILY MEDICINE

## 2024-04-10 PROCEDURE — 99232 SBSQ HOSP IP/OBS MODERATE 35: CPT | Performed by: FAMILY MEDICINE

## 2024-04-10 PROCEDURE — 97162 PT EVAL MOD COMPLEX 30 MIN: CPT

## 2024-04-10 PROCEDURE — 83690 ASSAY OF LIPASE: CPT | Performed by: FAMILY MEDICINE

## 2024-04-10 PROCEDURE — 25810000003 SODIUM CHLORIDE 0.9 % SOLUTION: Performed by: FAMILY MEDICINE

## 2024-04-10 RX ORDER — AMOXICILLIN 250 MG
2 CAPSULE ORAL 2 TIMES DAILY PRN
Status: DISCONTINUED | OUTPATIENT
Start: 2024-04-10 | End: 2024-04-11 | Stop reason: HOSPADM

## 2024-04-10 RX ORDER — NICOTINE POLACRILEX 4 MG
1 LOZENGE BUCCAL
Status: DISCONTINUED | OUTPATIENT
Start: 2024-04-10 | End: 2024-04-11 | Stop reason: HOSPADM

## 2024-04-10 RX ORDER — BISACODYL 5 MG/1
5 TABLET, DELAYED RELEASE ORAL DAILY PRN
Status: DISCONTINUED | OUTPATIENT
Start: 2024-04-10 | End: 2024-04-11 | Stop reason: HOSPADM

## 2024-04-10 RX ORDER — BISACODYL 10 MG
10 SUPPOSITORY, RECTAL RECTAL DAILY PRN
Status: DISCONTINUED | OUTPATIENT
Start: 2024-04-10 | End: 2024-04-11 | Stop reason: HOSPADM

## 2024-04-10 RX ORDER — POLYETHYLENE GLYCOL 3350 17 G/17G
17 POWDER, FOR SOLUTION ORAL DAILY
Status: DISCONTINUED | OUTPATIENT
Start: 2024-04-10 | End: 2024-04-11 | Stop reason: HOSPADM

## 2024-04-10 RX ORDER — DEXTROSE MONOHYDRATE 25 G/50ML
25 INJECTION, SOLUTION INTRAVENOUS
Status: DISCONTINUED | OUTPATIENT
Start: 2024-04-10 | End: 2024-04-11 | Stop reason: HOSPADM

## 2024-04-10 RX ORDER — HYDROCODONE BITARTRATE AND ACETAMINOPHEN 7.5; 325 MG/1; MG/1
1 TABLET ORAL EVERY 8 HOURS PRN
Status: DISCONTINUED | OUTPATIENT
Start: 2024-04-10 | End: 2024-04-11 | Stop reason: HOSPADM

## 2024-04-10 RX ORDER — CITALOPRAM 20 MG/1
40 TABLET ORAL DAILY
Status: DISCONTINUED | OUTPATIENT
Start: 2024-04-10 | End: 2024-04-10

## 2024-04-10 RX ADMIN — HYDROMORPHONE HYDROCHLORIDE 1 MG: 1 INJECTION, SOLUTION INTRAMUSCULAR; INTRAVENOUS; SUBCUTANEOUS at 04:14

## 2024-04-10 RX ADMIN — Medication 10 ML: at 20:01

## 2024-04-10 RX ADMIN — DEXTROSE MONOHYDRATE 25 G: 25 INJECTION, SOLUTION INTRAVENOUS at 07:20

## 2024-04-10 RX ADMIN — HYDROMORPHONE HYDROCHLORIDE 1 MG: 1 INJECTION, SOLUTION INTRAMUSCULAR; INTRAVENOUS; SUBCUTANEOUS at 16:07

## 2024-04-10 RX ADMIN — HYDROMORPHONE HYDROCHLORIDE 1 MG: 1 INJECTION, SOLUTION INTRAMUSCULAR; INTRAVENOUS; SUBCUTANEOUS at 06:35

## 2024-04-10 RX ADMIN — HYDROMORPHONE HYDROCHLORIDE 1 MG: 1 INJECTION, SOLUTION INTRAMUSCULAR; INTRAVENOUS; SUBCUTANEOUS at 01:10

## 2024-04-10 RX ADMIN — HYDROMORPHONE HYDROCHLORIDE 1 MG: 1 INJECTION, SOLUTION INTRAMUSCULAR; INTRAVENOUS; SUBCUTANEOUS at 19:59

## 2024-04-10 RX ADMIN — HEPARIN SODIUM 5000 UNITS: 5000 INJECTION INTRAVENOUS; SUBCUTANEOUS at 08:30

## 2024-04-10 RX ADMIN — HYDROMORPHONE HYDROCHLORIDE 1 MG: 1 INJECTION, SOLUTION INTRAMUSCULAR; INTRAVENOUS; SUBCUTANEOUS at 22:06

## 2024-04-10 RX ADMIN — HYDROMORPHONE HYDROCHLORIDE 1 MG: 1 INJECTION, SOLUTION INTRAMUSCULAR; INTRAVENOUS; SUBCUTANEOUS at 14:08

## 2024-04-10 RX ADMIN — Medication 10 ML: at 08:30

## 2024-04-10 RX ADMIN — HYDROCODONE BITARTRATE AND ACETAMINOPHEN 1 TABLET: 7.5; 325 TABLET ORAL at 18:30

## 2024-04-10 RX ADMIN — HYDROCODONE BITARTRATE AND ACETAMINOPHEN 1 TABLET: 7.5; 325 TABLET ORAL at 09:53

## 2024-04-10 RX ADMIN — HYDROMORPHONE HYDROCHLORIDE 1 MG: 1 INJECTION, SOLUTION INTRAMUSCULAR; INTRAVENOUS; SUBCUTANEOUS at 11:52

## 2024-04-10 RX ADMIN — SODIUM CHLORIDE 125 ML/HR: 9 INJECTION, SOLUTION INTRAVENOUS at 01:09

## 2024-04-10 RX ADMIN — HEPARIN SODIUM 5000 UNITS: 5000 INJECTION INTRAVENOUS; SUBCUTANEOUS at 20:01

## 2024-04-10 RX ADMIN — SODIUM CHLORIDE 125 ML/HR: 9 INJECTION, SOLUTION INTRAVENOUS at 13:16

## 2024-04-10 NOTE — CASE MANAGEMENT/SOCIAL WORK
1400: CM spoke with pt at bedside. DCP remains Home with family. Confirmed the need for a food bag when discharged.

## 2024-04-10 NOTE — PROGRESS NOTES
Inpatient Consult      Date of Consultation: April 10, 2024  Patient Name: Tasha Yarbrough  MRN: [id number]  : 1955     Referring provider: Vivienne Roa MD    Primary care provider:  Terrence Baldwin MD    Reason for consultation: pancreatitis    Interval history:   4/10/2024  She is hungry this morning and would like to eat. She has less abdominal pain today than yesterday. She has had ice chips only. She does request more pain medication, was not able to get any today due to access. She had a couple of bowel movements today, declines any constipation medications now.     2024  This is a 68-year-old female patient with a prior history of hypertension, hyperlipidemia, CKD stage III, history of pancreatitis with a pseudocyst, history of C. difficile colitis, chronic constipation, tobacco abuse was brought in emergency room on 2024 with complaints of nausea vomiting and progressive abdominal pain.     Patient has been in and out of the hospital for the past 3 to 4 months with abdominal pain and pancreatitis and pancreatic pseudocyst.  This time she developed again acute episode of nausea vomiting with a upper and mid abdominal pain.  As pain got worse came to emergency room for evaluation.     Patient was seen by me in may 2021 and not come for a follow-up or did not keep with appointment for repeat colonoscopy.  In 2023 patient was admitted and was seen by Dr. Montano for abdominal pain diarrhea and C. difficile colitis.  He was admitted at [facility] before the hospital admission here in September with concern for choledocholithiasis noted to the CT scan done on 2023 for which he underwent a EGD and ERCP by Dr. Uriostegui which did not reveal any overt stone but had a retained bile possible sludge.  Cholangiogram was clear.  Treatment for C. difficile colitis with vancomycin and due to persistent symptoms he was treated with fidaxomicin 14 days.     In February he presented with  abdominal pain at [facility] and noted to have a mild pancreatitis with retroperitoneal effusion.  Referral was done on 03/02/2024 revealed 16 to 3 cm fluid collection along the ventral margin of the pancreatic tail likely pancreatitis with acute fluid collection.  CT scan done on 3/16/2024 revealed worsening acute pancreatitis findings.  Pelvis done on 3/23/2024 revealed 2 enhancing fluid collections abutting the pancreas reflecting pseudocyst.  3/28/2024 revealed similar findings of pseudocyst with mild interval decrease in the overall fluid collection.  CT abdomen pelvis done on 4/6/2024 revealed a pseudocyst but size improved compared to prior CT.  CT pelvis done with contrast on 4/8/2024 on admission revealed acute pancreatitis with possible pancreatic pseudocyst size improved 3 cm and the other one 2.5 to 1.2 cm compared to prior size.      In the emergency room he was noted to have a borderline sodium of 134 chloride of 109 CO2 11.9.  BUN was 28 creatinine was 1.41.  Lipase was thousand 5 and 71.  WBC was 12,000 hemoglobin 11.9 g/dL platelet count of 3 20,000.  She was admitted for acute pancreatitis and GI was consulted.     Subjective     Past Medical History:   Diagnosis Date    Hypertension     Impaired mobility     Injury of back     Pancreatitis        Past Surgical History:   Procedure Laterality Date    ABDOMINAL SURGERY      COLON SURGERY      COLONOSCOPY      TUBAL ABDOMINAL LIGATION       History reviewed. No pertinent family history.    Social History     Socioeconomic History    Marital status:    Tobacco Use    Smoking status: Every Day     Current packs/day: 1.00     Types: Cigarettes    Smokeless tobacco: Never   Vaping Use    Vaping status: Never Used   Substance and Sexual Activity    Alcohol use: Never    Drug use: Never    Sexual activity: Defer     Current Facility-Administered Medications:     acetaminophen (TYLENOL) tablet 650 mg, 650 mg, Oral, Q4H PRN **OR**  acetaminophen (TYLENOL) 160 MG/5ML oral solution 650 mg, 650 mg, Oral, Q4H PRN **OR** acetaminophen (TYLENOL) suppository 650 mg, 650 mg, Rectal, Q4H PRN, Janak Damon DO    sennosides-docusate (PERICOLACE) 8.6-50 MG per tablet 2 tablet, 2 tablet, Oral, BID PRN **AND** polyethylene glycol (MIRALAX) packet 17 g, 17 g, Oral, Daily **AND** bisacodyl (DULCOLAX) EC tablet 5 mg, 5 mg, Oral, Daily PRN **AND** bisacodyl (DULCOLAX) suppository 10 mg, 10 mg, Rectal, Daily PRN, Vivienne Roa MD    dextrose (D50W) (25 g/50 mL) IV injection 25 g, 25 g, Intravenous, Q15 Min PRN, Vivienne Roa MD, 25 g at 04/10/24 0720    dextrose (GLUTOSE) oral gel 1 tube, 1 tube, Oral, Q15 Min PRN, Vivienne Roa MD    glucagon (GLUCAGEN) injection 1 mg, 1 mg, Subcutaneous, Q15 Min PRN, Vivienne Roa MD    heparin (porcine) 5000 UNIT/ML injection 5,000 Units, 5,000 Units, Subcutaneous, Q12H, Janak Damon DO, 5,000 Units at 04/10/24 0830    HYDROcodone-acetaminophen (NORCO) 7.5-325 MG per tablet 1 tablet, 1 tablet, Oral, Q8H PRN, Vivienne Roa MD, 1 tablet at 04/10/24 0953    HYDROmorphone (DILAUDID) injection 1 mg, 1 mg, Intravenous, Q2H PRN, 1 mg at 04/10/24 1408 **AND** naloxone (NARCAN) injection 0.4 mg, 0.4 mg, Intravenous, Q5 Min PRN, Janak Damon DO    melatonin tablet 5 mg, 5 mg, Oral, Nightly PRN, Janak Damon DO, 5 mg at 04/09/24 2051    nitroglycerin (NITROSTAT) SL tablet 0.4 mg, 0.4 mg, Sublingual, Q5 Min PRN, Janak Damon DO    ondansetron (ZOFRAN) injection 4 mg, 4 mg, Intravenous, Q6H PRN, Janak Damon DO, 4 mg at 04/09/24 1722    sodium chloride 0.9 % flush 10 mL, 10 mL, Intravenous, Q12H, Janak Damon DO, 10 mL at 04/10/24 0830    sodium chloride 0.9 % flush 10 mL, 10 mL, Intravenous, PRN, Janak Damon DO    sodium chloride 0.9 % infusion 40 mL, 40 mL, Intravenous, PRN, Janak Damon,     sodium chloride 0.9 % infusion, 125 mL/hr, Intravenous, Continuous, Vivienne Roa MD, Last  Rate: 125 mL/hr at 04/10/24 1316, 125 mL/hr at 04/10/24 1316    Allergies   Allergen Reactions    Ciprofloxacin Dizziness    Codeine Itching    Pineapple Unknown - Low Severity     Review of Systems   Constitutional:  Positive for appetite change (improved).   HENT:  Negative for trouble swallowing.    Gastrointestinal:  Positive for abdominal pain. Negative for anal bleeding, constipation, nausea and vomiting.     The following portions of the patient's history were reviewed and updated as appropriate: allergies, current medications, past family history, past medical history, past social history, past surgical history and problem list.    Objective     Vitals:    04/10/24 0346 04/10/24 0749 04/10/24 1150 04/10/24 1543   BP: 122/71 114/74 136/86 117/64   BP Location: Right arm Left arm Right arm Left arm   Patient Position: Lying Lying Lying Lying   Pulse: 89 74 82    Resp: 18 17 18 16   Temp: 98.2 °F (36.8 °C) 98 °F (36.7 °C) 98.5 °F (36.9 °C) 98.2 °F (36.8 °C)   TempSrc: Oral Temporal Temporal Temporal   SpO2: 96% 99% 99% 98%   Weight: 60 kg (132 lb 4.4 oz)      Height:         Physical Exam  Constitutional:       General: She is not in acute distress.     Appearance: Normal appearance. She is well-developed. She is not diaphoretic.   HENT:      Head: Normocephalic and atraumatic.      Right Ear: External ear normal.      Left Ear: External ear normal.      Nose: Nose normal.   Eyes:      General: No scleral icterus.        Right eye: No discharge.         Left eye: No discharge.      Conjunctiva/sclera: Conjunctivae normal.   Neck:      Trachea: No tracheal deviation.   Pulmonary:      Effort: Pulmonary effort is normal. No respiratory distress.   Abdominal:      General: There is distension (bloated).      Tenderness: There is abdominal tenderness (generalized but moderate in epigastric).      Comments: Mildly hyperactive bowel sounds   Musculoskeletal:         General: Normal range of motion.      Cervical  back: Normal range of motion.   Skin:     Coloration: Skin is not pale.      Findings: No erythema or rash.   Neurological:      Mental Status: She is alert and oriented to person, place, and time.      Coordination: Coordination normal.   Psychiatric:         Mood and Affect: Mood normal.         Behavior: Behavior normal.         Thought Content: Thought content normal.         Judgment: Judgment normal.       Results from last 7 days   Lab Units 04/10/24  0626 04/09/24  0548 04/08/24 2003 04/06/24  2124   SODIUM mmol/L 141 137 134* 140   POTASSIUM mmol/L 4.2 4.5 4.7 4.5   CHLORIDE mmol/L 115* 114* 109* 115*   CO2 mmol/L 10.3* 12.5* 11.9* 12.8*   BUN mg/dL 16 25* 28* 21   CREATININE mg/dL 1.06* 1.23* 1.41* 1.20*   CALCIUM mg/dL 8.9 9.1 9.7 8.5*   ALBUMIN g/dL  --   --  3.9 3.3*   BILIRUBIN mg/dL  --   --  0.2 <0.2   ALK PHOS U/L  --   --  148* 128*   ALT (SGPT) U/L  --   --  7 6   AST (SGOT) U/L  --   --  15 11   GLUCOSE mg/dL 36* 65 73 80   WBC 10*3/mm3 4.97 9.04 12.65* 7.64   HEMOGLOBIN g/dL 10.4* 10.5* 11.9* 9.5*   PLATELETS 10*3/mm3 208 247 320 309     CTAP with contrast 4/8/2024  Findings:  Lung bases are clear. No acute bony abnormalities.  Degenerative change throughout the spine and hips.  Large hiatal hernia noted.  Multiple ventral wall defects noted.  Protrusion of bowel noted partially into defects.  No full bowel involvement demonstrated.  Liver, adrenal glands and spleen unremarkable.  Cholecystectomy.  Peripancreatic stranding and minimal fluid again noted.  Findings are consistent with pancreatitis, unchanged.  Stable 3.1 cm cystic abnormality in pancreatic tail.  2.5 x 1.2 cm pancreatic body cystic abnormality.  These are unchanged from previous study.  Findings likely represent pseudocysts.  Bilateral kidneys demonstrate no focal abnormality.  Renal cysts without stones or hydronephrosis.  Abdominal aorta is normal in caliber.  No free fluid or adenopathy in the pelvis.  No acute diverticulitis.  Appendix not identified.  Uterus atrophic. No adnexal abnormality.  IMPRESSION:  Acute pancreatitis with probable pancreatic pseudocysts  No significant change from previous study    Assessment / Plan    Assessment/Recommendations:  4/10/2024  Acute on chronic pancreatitis  Resolving pancreatic pseudocyst  Tobacco abuse  Patient has been in and out of the hospital for the past 6 months with acute recurrent pancreatitis.  She has a remote history of pancreatitis from suspected gallstones many years ago. Had cholecystectomy.  September 2023, she had a ERCP for possible CBD sludge.  Since then she has been having multiple episodes of pancreatitis with a pseudocyst formation.  She drank alcohol in the past but nothing recently.  She is a chronic smoker.  Unclear whether these episodes of pancreatitis are related to her ERCP.     She is known to have a pseudocyst measuring about 5 to 6 cm in size that has significantly reduced in size with a current CT scan.  Patient is clinically doing well without any significant tenderness or systemic involvement.     Stop smoking, she will work on this  Avoid alcohol and marijuana  Advance diet as tolerated  Antiemetics and analgesics as appropriate  Recommend start Linzess  290 mcg p.o. daily for constipation on discharge  Follow up with her GI as op after discharge     Status post partial colectomy  Patient likely had a diverticular perforation 17 years ago and had a partial colectomy details unknown.  No prior colonoscopy.  Patient did not keep up the appointment for colonoscopy from the office.     Recommend screening colonoscopy as op      Thank you very much for letting me participate in the care of this patient.  Please do not hesitate to call me if you have any questions.    Michelle Wright PA-C  [facility]  4/10/2024  15:44 EDT    Please note that portions of this note may have been completed with a voice recognition program.

## 2024-04-10 NOTE — PROGRESS NOTES
"Dietitian Assessment    Patient Name: Tasha Yarbrough  YOB: 1955  MRN: [id number]  Admission date: 4/8/2024    Comment:    Pt with poor PO intake and wt loss. Will add ONS once diet advances.     Clinical Nutrition Assessment      Reason for Assessment MST   H&P  Past Medical History:   Diagnosis Date    Hypertension     Impaired mobility     Injury of back     Pancreatitis        Past Surgical History:   Procedure Laterality Date    ABDOMINAL SURGERY      COLON SURGERY      COLONOSCOPY      TUBAL ABDOMINAL LIGATION              Current Problems        Encounter Information        Trending Narrative     4/10: Pt admitted d/t acute pancreatitis. Pt noted with ongoing nausea, vomiting, abdominal pain, and poor PO Intake. EMR shows wt loss of 6# (4.3%) within ~ 1 month. Pt is currently NPO. Will add ONS once diet advances.      Anthropometrics        Current Height, Weight Height: 157.5 cm (62\")  Weight: 60 kg (132 lb 4.4 oz) (04/10/24 0346)   Trending Weight Hx     This admission:              PTA:     Wt Readings from Last 30 Encounters:   04/10/24 0346 60 kg (132 lb 4.4 oz)   04/09/24 1300 60.8 kg (134 lb 0.6 oz)   04/08/24 1755 65.8 kg (145 lb)   04/06/24 1945 65.8 kg (145 lb)   03/28/24 0900 66.2 kg (146 lb)   03/23/24 1651 66.2 kg (145 lb 15.1 oz)   03/16/24 1353 66.2 kg (146 lb)   03/09/24 0243 66.2 kg (146 lb)   03/04/24 0415 67.8 kg (149 lb 7.6 oz)   03/03/24 0430 62.6 kg (138 lb 0.1 oz)   03/02/24 0057 62.6 kg (138 lb)   02/29/24 0339 62.7 kg (138 lb 3.7 oz)   02/27/24 0326 63.9 kg (140 lb 14 oz)   02/26/24 0302 62.4 kg (137 lb 9.1 oz)   02/24/24 2333 65.8 kg (145 lb)   09/25/23 0323 68.9 kg (151 lb 14.4 oz)   09/24/23 0336 68.5 kg (151 lb 0.2 oz)   09/23/23 0500 67.4 kg (148 lb 9.4 oz)   09/22/23 2345 66.8 kg (147 lb 4.3 oz)   09/22/23 1932 61.2 kg (135 lb)   09/02/23 1847 60.8 kg (134 lb)   08/18/22 2132 78.9 kg (174 lb)   08/08/22 0500 79.7 kg (175 lb 11.3 oz)   08/07/22 0536 77.4 kg (170 lb " "10.2 oz)   08/06/22 2340 77 kg (169 lb 12.1 oz)   08/06/22 2019 76.7 kg (169 lb)   07/30/22 0441 79 kg (174 lb 2.6 oz)   07/29/22 0417 78.7 kg (173 lb 8 oz)   07/27/22 0353 78.9 kg (173 lb 15.1 oz)   07/26/22 1552 76.7 kg (169 lb)   05/06/21 1626 92.1 kg (203 lb)   10/04/20 0002 90.7 kg (200 lb)   08/15/20 1940 97.5 kg (215 lb)   05/23/20 1814 95.3 kg (210 lb)      BMI kg/m2 Body mass index is 24.19 kg/m².     Labs        Pertinent Labs     Results from last 7 days   Lab Units 04/10/24  0626 04/09/24  0548 04/08/24  2003 04/06/24  2124   SODIUM mmol/L 141 137 134* 140   POTASSIUM mmol/L 4.2 4.5 4.7 4.5   CHLORIDE mmol/L 115* 114* 109* 115*   CO2 mmol/L 10.3* 12.5* 11.9* 12.8*   BUN mg/dL 16 25* 28* 21   CREATININE mg/dL 1.06* 1.23* 1.41* 1.20*   CALCIUM mg/dL 8.9 9.1 9.7 8.5*   BILIRUBIN mg/dL  --   --  0.2 <0.2   ALK PHOS U/L  --   --  148* 128*   ALT (SGPT) U/L  --   --  7 6   AST (SGOT) U/L  --   --  15 11   GLUCOSE mg/dL 36* 65 73 80       Results from last 7 days   Lab Units 04/10/24  0626 04/09/24  0548 04/08/24  2003   MAGNESIUM mg/dL  --  2.3  --    HEMOGLOBIN g/dL 10.4* 10.5* 11.9*   HEMATOCRIT % 34.2 34.0 38.1   TRIGLYCERIDES mg/dL  --   --  165*       No results found for: \"HGBA1C\"         Medications       Scheduled Medications heparin (porcine), 5,000 Units, Subcutaneous, Q12H  sodium chloride, 10 mL, Intravenous, Q12H        Infusions sodium chloride, 125 mL/hr, Last Rate: 125 mL/hr (04/10/24 0109)         PRN Medications   acetaminophen **OR** acetaminophen **OR** acetaminophen    senna-docusate sodium **AND** polyethylene glycol **AND** bisacodyl **AND** bisacodyl    dextrose    dextrose    glucagon (human recombinant)    HYDROmorphone **AND** naloxone    melatonin    nitroglycerin    ondansetron    sodium chloride    sodium chloride     Physical Findings        Trending Physical   Appearance, NFPE    --  Edema  1+     Bowel Function LBM: 4/10     Tubes Peripheral IV     Chewing/Swallowing NPO   "   Skin WNL       Estimated/Assessed Needs       Energy Requirements    EST Needs, Method, Wt used 2230-8357 kcal (25-30 kcal/kg CBW)       Protein Requirements    EST Needs, Method, Wt used 48-60 g pro (.8-1 g pro/kg CBW)       Fluid Requirements     Estimated Needs (mL/day) 6292-6544 mL       Current Nutrition Orders & Evaluation of Intake       Oral Nutrition     Food Allergies Pineapple   Current PO Diet NPO Diet NPO Type: Strict NPO, Sips with Meds, Ice Chips   Supplement    PO Evaluation     Trending % PO Intake      Enteral Nutrition    Enteral Route    Order, Modulars, Flushes    Residual/Tolerance    TF Observation         Parenteral Nutrition     TPN Route    Total # Days on TPN    TPN Order, Lipid Details    MVI & Trace Element Freq    TPN Observation       Nutrition Diagnosis         Nutrition Dx Problem 1 Altered GI function r/t acute pancreatitis AEB nausea, vomiting, abdominal pain, and poor PO intake.       Nutrition Dx Problem 2        Intervention Goal         Intervention Goal(s) Maintain CBW  Diet advancement per MD     Nutrition Intervention        RD Action Will add ONS once diet advances     Nutrition Prescription          Diet Prescription NPO   Supplement Prescription      Enteral Prescription        TPN Prescription      Monitor/Evaluation        Monitor Per protocol, I&O, PO intake, Pertinent labs, Weight, Skin status, GI status, Symptoms, POC/GOC, Swallow function, Hemodynamic stability     RD to f/up    Electronically signed by:  Monica Montano RD  04/10/24 08:48 EDT

## 2024-04-10 NOTE — PLAN OF CARE
Goal Outcome Evaluation:  Plan of Care Reviewed With: patient        Progress: no change  Outcome Evaluation: Vital Signs Stable. Complaints of pain, see MAR. Patient tolerating intake. Will continue to monitor.

## 2024-04-10 NOTE — PLAN OF CARE
Problem: Adult Inpatient Plan of Care  Goal: Plan of Care Review  Outcome: Ongoing, Progressing  Goal: Patient-Specific Goal (Individualized)  Outcome: Ongoing, Progressing  Goal: Absence of Hospital-Acquired Illness or Injury  Outcome: Ongoing, Progressing  Intervention: Identify and Manage Fall Risk  Recent Flowsheet Documentation  Taken 4/10/2024 0300 by Johanny Rubalcava RN  Safety Promotion/Fall Prevention:   safety round/check completed   clutter free environment maintained   assistive device/personal items within reach   activity supervised  Taken 4/10/2024 0200 by Johanny Rubalcava RN  Safety Promotion/Fall Prevention:   safety round/check completed   clutter free environment maintained   assistive device/personal items within reach   activity supervised  Taken 4/10/2024 0000 by Johanny Rubalcava RN  Safety Promotion/Fall Prevention:   safety round/check completed   clutter free environment maintained   assistive device/personal items within reach   activity supervised  Taken 4/9/2024 2200 by Johanny Rubalcava RN  Safety Promotion/Fall Prevention:   safety round/check completed   clutter free environment maintained   assistive device/personal items within reach   activity supervised  Taken 4/9/2024 2005 by Johanny Rubalcava RN  Safety Promotion/Fall Prevention:   safety round/check completed   activity supervised   assistive device/personal items within reach   clutter free environment maintained  Intervention: Prevent Skin Injury  Recent Flowsheet Documentation  Taken 4/10/2024 0200 by Johanny Rubalcava RN  Body Position: supine, legs elevated  Taken 4/10/2024 0000 by Johanny Rubalcava RN  Body Position:   upper extremity elevated   supine, legs elevated  Taken 4/9/2024 2200 by Johanny Rubalcava RN  Body Position:   sitting up in bed   legs elevated  Taken 4/9/2024 2005 by Johanny Rubalcava RN  Body Position:   supine, legs elevated   sitting up in bed   legs elevated  Intervention: Prevent and Manage VTE (Venous  Thromboembolism) Risk  Recent Flowsheet Documentation  Taken 4/9/2024 2005 by Johanny Rubalcava RN  Activity Management: up to bedside commode  Intervention: Prevent Infection  Recent Flowsheet Documentation  Taken 4/10/2024 0300 by Johanny Rubalcava RN  Infection Prevention:   cohorting utilized   environmental surveillance performed   equipment surfaces disinfected  Taken 4/10/2024 0200 by Johanny Rubalcava RN  Infection Prevention:   cohorting utilized   environmental surveillance performed   equipment surfaces disinfected  Taken 4/10/2024 0000 by Johanny Rubalcava RN  Infection Prevention:   environmental surveillance performed   cohorting utilized  Taken 4/9/2024 2200 by Johanny Rubalcava RN  Infection Prevention:   environmental surveillance performed   cohorting utilized   equipment surfaces disinfected  Taken 4/9/2024 2005 by Johanny Rubalcava RN  Infection Prevention:   cohorting utilized   equipment surfaces disinfected   environmental surveillance performed  Goal: Optimal Comfort and Wellbeing  Outcome: Ongoing, Progressing  Intervention: Monitor Pain and Promote Comfort  Recent Flowsheet Documentation  Taken 4/9/2024 2135 by Johanny Rubalcava RN  Pain Management Interventions:   see MAR   position adjusted  Goal: Readiness for Transition of Care  Outcome: Ongoing, Progressing     Problem: Skin Injury Risk Increased  Goal: Skin Health and Integrity  Outcome: Ongoing, Progressing  Intervention: Optimize Skin Protection  Recent Flowsheet Documentation  Taken 4/10/2024 0200 by Johanny Rubalcava RN  Head of Bed (HOB) Positioning: HOB elevated  Taken 4/10/2024 0000 by Johanny Rubalcava RN  Head of Bed (HOB) Positioning: HOB at 30 degrees  Taken 4/9/2024 2200 by Johanny Rubalcava RN  Head of Bed (HOB) Positioning: HOB at 30 degrees  Taken 4/9/2024 2005 by Johanny Rubalcava RN  Head of Bed (HOB) Positioning: HOB at 30 degrees     Problem: Fluid Imbalance (Pancreatitis)  Goal: Fluid Balance  Outcome: Ongoing, Progressing      Problem: Infection (Pancreatitis)  Goal: Infection Symptom Resolution  Outcome: Ongoing, Progressing     Problem: Nutrition Impaired (Pancreatitis)  Goal: Optimal Nutrition Intake  Outcome: Ongoing, Progressing     Problem: Pain (Pancreatitis)  Goal: Acceptable Pain Control  Outcome: Ongoing, Progressing  Intervention: Monitor and Manage Pain  Recent Flowsheet Documentation  Taken 4/9/2024 2135 by Johanny Rubalcava RN  Pain Management Interventions:   see MAR   position adjusted     Problem: Respiratory Compromise (Pancreatitis)  Goal: Effective Oxygenation and Ventilation  Outcome: Ongoing, Progressing  Intervention: Optimize Oxygenation and Ventilation  Recent Flowsheet Documentation  Taken 4/10/2024 0200 by Johanny Rubalcava RN  Head of Bed (HOB) Positioning: HOB elevated  Taken 4/10/2024 0000 by Johanny Rubalcava RN  Head of Bed (HOB) Positioning: HOB at 30 degrees  Cough And Deep Breathing: done independently per patient  Taken 4/9/2024 2200 by Johanny Rubalcava RN  Head of Bed (HOB) Positioning: HOB at 30 degrees  Taken 4/9/2024 2005 by Johanny Rubalcava RN  Activity Management: up to bedside commode  Head of Bed (HOB) Positioning: HOB at 30 degrees  Cough And Deep Breathing: done independently per patient     Problem: Fall Injury Risk  Goal: Absence of Fall and Fall-Related Injury  Outcome: Ongoing, Progressing  Intervention: Identify and Manage Contributors  Recent Flowsheet Documentation  Taken 4/9/2024 2005 by Johanny Rubalcava RN  Medication Review/Management: medications reviewed  Intervention: Promote Injury-Free Environment  Recent Flowsheet Documentation  Taken 4/10/2024 0300 by Johanny Rubalcava RN  Safety Promotion/Fall Prevention:   safety round/check completed   clutter free environment maintained   assistive device/personal items within reach   activity supervised  Taken 4/10/2024 0200 by Johanny Rubalcava RN  Safety Promotion/Fall Prevention:   safety round/check completed   clutter free environment  maintained   assistive device/personal items within reach   activity supervised  Taken 4/10/2024 0000 by Johanny Rubalcava RN  Safety Promotion/Fall Prevention:   safety round/check completed   clutter free environment maintained   assistive device/personal items within reach   activity supervised  Taken 4/9/2024 2200 by Johanny Rubalcava RN  Safety Promotion/Fall Prevention:   safety round/check completed   clutter free environment maintained   assistive device/personal items within reach   activity supervised  Taken 4/9/2024 2005 by Johanny Rubalcava RN  Safety Promotion/Fall Prevention:   safety round/check completed   activity supervised   assistive device/personal items within reach   clutter free environment maintained   Goal Outcome Evaluation:      Dilaudid given for pain. Patient up to bedside commode with x1 assist. Vitals stable. Patient resting at this time.

## 2024-04-10 NOTE — PROGRESS NOTES
[facility] HOSPITALIST    PROGRESS NOTE    Name:  Tasha Yarbrough   Age:  69 y.o.  Sex:  female  :  1955  MRN:  [id number]   Visit Number:  [id number]  Admission Date:  2024  Date Of Service:  04/10/24  Primary Care Physician:  Terrence Baldwin MD     LOS: 2 days :    Chief Complaint:      Abdominal pain     Subjective:    Patient was seen and examined at bedside today.  Patient sitting up comfortably in bed with no distress.  No family at bedside.  Patient reports significant improvement of her pain, she still requiring pain medicine intermittently however pain is mostly currently controlled, no longer feeling any nauseous and she is willing to try p.o. today.  She had an episode of hypoglycemia this morning with her labs while she was n.p.o.  Vitals are stable and afebrile.    Hospital Course:    Patient is a 68-year-old female history significant for hypertension, CKD stage III who presents to the emergency room with ongoing nausea/vomiting and abdominal pain.  Patient seen in the emergency room multiple times, follows with  gastroenterology.  Last seen in the ER 2024 for same complaints.  At that time, patient discharged home with antiemetics.  Scheduled follow-up with  GI.  She returns tonight with worsening abdominal pain.  States that she could not wait to see her GI doctor.  Admits to nausea/vomiting, diffuse abdominal pain, no diarrhea.  Poor p.o. intake.    Upon arrival to the ER patient afebrile hemodynamically stable and nonhypoxic on room air.  Significant labs include sodium 134, chloride 109.  Creatinine 1.41 (baseline 1).  Triglycerides 165.  Lipase 1571.  WBC 12.65.  Normal lactic acid.  CT abdomen pelvis with evidence of acute pancreatitis with probable pancreatic pseudocyst.  Patient received 500 mL bolus and pain medicine.  Hospitalist asked to admit.    Review of Systems:     All systems were reviewed and negative except as mentioned in subjective, assessment  and plan.    Vital Signs:    Temp:  [97.7 °F (36.5 °C)-98.2 °F (36.8 °C)] 98 °F (36.7 °C)  Heart Rate:  [74-89] 89  Resp:  [17-18] 17  BP: (107-144)/(68-86) 114/74    Intake and output:    I/O last 3 completed shifts:  In: 1500 [I.V.:1000; IV Piggyback:500]  Out: 1600 [Urine:1600]  No intake/output data recorded.    Physical Examination:    General Appearance:  Alert and cooperative.  No acute distress.   Head:  Atraumatic and normocephalic.   Eyes: Conjunctivae and sclerae normal, no icterus. No pallor.   Throat: No oral lesions, no thrush, oral mucosa moist.   Neck: Supple, trachea midline, no thyromegaly.   Lungs:   Breath sounds heard bilaterally equally.  No wheezing or crackles. No Pleural rub or bronchial breathing.   Heart:  Normal S1 and S2, no murmur, no gallop, no rub. No JVD.   Abdomen:   Normal bowel sounds, no masses, no organomegaly. Soft, nontender, nondistended, no rebound tenderness.   Extremities: Supple, no edema, no cyanosis, no clubbing.   Skin: No bleeding or rash.   Neurologic: Alert and oriented x 3. No facial asymmetry. Moves all four limbs. No tremors.      Laboratory results:    Results from last 7 days   Lab Units 04/10/24  0626 04/09/24  0548 04/08/24 2003 04/06/24  2124   SODIUM mmol/L 141 137 134* 140   POTASSIUM mmol/L 4.2 4.5 4.7 4.5   CHLORIDE mmol/L 115* 114* 109* 115*   CO2 mmol/L 10.3* 12.5* 11.9* 12.8*   BUN mg/dL 16 25* 28* 21   CREATININE mg/dL 1.06* 1.23* 1.41* 1.20*   CALCIUM mg/dL 8.9 9.1 9.7 8.5*   BILIRUBIN mg/dL  --   --  0.2 <0.2   ALK PHOS U/L  --   --  148* 128*   ALT (SGPT) U/L  --   --  7 6   AST (SGOT) U/L  --   --  15 11   GLUCOSE mg/dL 36* 65 73 80     Results from last 7 days   Lab Units 04/10/24  0626 04/09/24  0548 04/08/24 2003   WBC 10*3/mm3 4.97 9.04 12.65*   HEMOGLOBIN g/dL 10.4* 10.5* 11.9*   HEMATOCRIT % 34.2 34.0 38.1   PLATELETS 10*3/mm3 208 247 320         Results from last 7 days   Lab Units 04/06/24  2124   HSTROP T ng/L 12         Recent Labs      02/25/24  1348 02/26/24  0741 02/27/24  0522   PHART 7.271* 7.292* 7.305*   [id number] 18.1* 27.1* 30.1*   PO2ART 107.0* 57.1* 90.6   [id number] 8.3* 13.1* 15.0*   BASEEXCESS -16.5* -12.2* -10.3*      I have reviewed the patient's laboratory results.    Radiology results:    CT Abdomen Pelvis With Contrast    Result Date: 4/8/2024  FINAL REPORT TECHNIQUE: null CLINICAL HISTORY: epigastric pain rule out pancreatitis COMPARISON: null FINDINGS: CT abdomen and pelvis with contrast Comparison: 04/06/2024 Findings: Lung bases are clear. No acute bony abnormalities. Degenerative change throughout the spine and hips. Large hiatal hernia noted. Multiple ventral wall defects noted. Protrusion of bowel noted partially into defects. No full bowel involvement demonstrated. Liver, adrenal glands and spleen unremarkable. Cholecystectomy. Peripancreatic stranding and minimal fluid again noted. Findings are consistent with pancreatitis, unchanged. Stable 3.1 cm cystic abnormality in pancreatic tail. 2.5 x 1.2 cm pancreatic body cystic abnormality. These are unchanged from previous study. Findings likely represent pseudocysts. Bilateral kidneys demonstrate no focal abnormality. Renal cysts without stones or hydronephrosis. Abdominal aorta is normal in caliber. No free fluid or adenopathy in the pelvis. No acute diverticulitis. Appendix not identified. Uterus atrophic. No adnexal abnormality.     Impression: Impression: Acute pancreatitis with probable pancreatic pseudocysts No significant change from previous study Authenticated and Electronically Signed by Eugene Red MD on 04/08/2024 09:55:42 PM   I have reviewed the patient's radiology reports.    Medication Review:     I have reviewed the patient's active and prn medications.     Problem List:      Acute pancreatitis      Assessment:    Acute pancreatitis POA  VIVIENNE on CKD POA  Hypovolemic hyponatremia POA  Hypertension  Impaired mobility and ADLs    Plan:    Acute  pancreatitis  -Unclear etiology  -Denies alcohol use.  Patient is status postcholecystectomy.  Triglycerides not significantly elevated.  -IV fluid resuscitation  -Monitor urine output  -Advance diet slowly as tolerated  -Pain control and antiemetics as needed  -Dr. Santillan with gastroenterology consulted, appreciate recommendations.  -GI recommended conservative management  - smoking cessation and absolute abstinence from smoking to prevent the recurrent episodes of pancreatitis   -Will advance her diet today as tolerated  -Discontinue Celexa   -Patient will need to reschedule her GI appointment in [place] for outpatient follow-up.    Hypoglycemia  -While n.p.o.  -Advancing diet today  -D50 as needed     VIVIENNE, resolved  Hyponatremia, resolved  -Likely from dehydration due to nausea/vomiting and poor p.o. intake.  -IV fluid  -Maintain normotension and avoid nephrotoxic medications     Further orders as indicated per clinical course.  I have reviewed the copied text and it is accurate as of 4/10/2024     DVT Prophylaxis: Heparin subcu  Code Status: Full  Diet: Liquid diet and advance as tolerated  Discharge Plan: Likely home in 1 to 2 days.    Vivienne Roa MD  04/10/24  09:42 EDT    Dictated utilizing Dragon dictation.

## 2024-04-10 NOTE — PLAN OF CARE
Goal Outcome Evaluation:  Plan of Care Reviewed With: patient        Progress: no change  Outcome Evaluation: Pt participated in PT eval this date. Pt presents with deficits in strength, transfers and gait. Pt reports she lives with her sons and is independent at baseline with use of cane. Pt reports 7/10 pain in abdomen. Pt transferred to EOB with SBA. Pt declined distance ambulation this date due to pain, agreeable to get OOB to chair. Pt transferred to standing with CGA adn ambulated 5 ft to chair with CGA and HHA. Pt is expected to benefit frm skilled PT tx during this inpateint stay.      Anticipated Discharge Disposition (PT): home with assist, home with home health

## 2024-04-10 NOTE — THERAPY EVALUATION
Patient Name: Tasha Yarbrough  : 1955    MRN: [id number]                              Today's Date: 4/10/2024       Admit Date: 2024    Visit Dx:     ICD-10-CM ICD-9-CM   1. Acute pancreatitis, unspecified complication status, unspecified pancreatitis type  K85.90 577.0   2. Pancreatic pseudocyst  K86.3 577.2   3. Intractable pain  R52 780.96     Patient Active Problem List   Diagnosis    Colon cancer screening    Gastroesophageal reflux disease with esophagitis    Left lower quadrant abdominal pain    Irritable bowel syndrome with constipation    Encounter for screening for malignant neoplasm of colon    Periumbilical abdominal pain    Diarrhea of presumed infectious origin    Acute kidney injury    Bacterial colitis    VIVIENNE (acute kidney injury)    C. difficile colitis    Generalized abdominal pain    Diarrhea of infectious origin    Abnormal finding on GI tract imaging    Anemia, chronic disease    Colitis due to Clostridium difficile    Pancreatitis, acute    Chronic kidney disease, stage III (moderate)    Acute pancreatitis     Past Medical History:   Diagnosis Date    Hypertension     Impaired mobility     Injury of back     Pancreatitis      Past Surgical History:   Procedure Laterality Date    ABDOMINAL SURGERY      COLON SURGERY      COLONOSCOPY      TUBAL ABDOMINAL LIGATION        General Information       Row Name 04/10/24 0987          Physical Therapy Time and Intention    Document Type evaluation  -MS     Mode of Treatment physical therapy  -MS       Row Name 04/10/24 0944          General Information    Patient Profile Reviewed yes  -MS     Prior Level of Function independent:;all household mobility  uses cane  -MS     Barriers to Rehab medically complex  -MS       Row Name 04/10/24 0915          Living Environment    People in Home child(debra), adult  -MS       Row Name 04/10/24 0982          Home Main Entrance    Number of Stairs, Main Entrance one  -MS       Row Name 04/10/24 0997           Cognition    Orientation Status (Cognition) oriented x 4  -MS       Row Name 04/10/24 0958          Safety Issues, Functional Mobility    Safety Issues Affecting Function (Mobility) safety precautions follow-through/compliance  -MS     Impairments Affecting Function (Mobility) pain;strength  -MS               User Key  (r) = Recorded By, (t) = Taken By, (c) = Cosigned By      Initials Name Provider Type    MS Sebastien Simon, PT Physical Therapist                   Mobility       Row Name 04/10/24 0958          Bed Mobility    Bed Mobility supine-sit  -MS     Supine-Sit [place] (Bed Mobility) standby assist  -MS     Assistive Device (Bed Mobility) bed rails;draw sheet;head of bed elevated  -MS       Row Name 04/10/24 0958          Bed-Chair Transfer    Bed-Chair [place] (Transfers) contact guard  -MS     Assistive Device (Bed-Chair Transfers) other (see comments)  gait belt  -MS       Row Name 04/10/24 0958          Sit-Stand Transfer    Sit-Stand [place] (Transfers) contact guard  -MS     Assistive Device (Sit-Stand Transfers) other (see comments)  gait belt, HHA  -MS       Row Name 04/10/24 0958          Gait/Stairs (Locomotion)    [place] Level (Gait) contact guard  -MS     Assistive Device (Gait) other (see comments)  gait belt, HHA  -MS     Patient was able to Ambulate yes  -MS     Distance in Feet (Gait) 5  -MS     Deviations/Abnormal Patterns (Gait) festinating/shuffling;gait speed decreased  -MS               User Key  (r) = Recorded By, (t) = Taken By, (c) = Cosigned By      Initials Name Provider Type    MS Sebastien Simon, PT Physical Therapist                   Obj/Interventions       Row Name 04/10/24 0959          Range of Motion Comprehensive    General Range of Motion bilateral lower extremity ROM WFL  -MS       Row Name 04/10/24 0959          Strength Comprehensive (MMT)    General Manual Muscle Testing (MMT) Assessment lower extremity strength deficits identified  -MS      Comment, General Manual Muscle Testing (MMT) Assessment B LE 4-/5  -MS               User Key  (r) = Recorded By, (t) = Taken By, (c) = Cosigned By      Initials Name Provider Type    Sebastien Brownlee, PT Physical Therapist                   Goals/Plan       Row Name 04/10/24 1005          Bed Mobility Goal 1 (PT)    Activity/Assistive Device (Bed Mobility Goal 1, PT) bed mobility activities, all  -MS     [place] Level/Cues Needed (Bed Mobility Goal 1, PT) modified independence  -MS     Time Frame (Bed Mobility Goal 1, PT) long term goal (LTG);2 weeks  -MS       Row Name 04/10/24 1005          Transfer Goal 1 (PT)    Activity/Assistive Device (Transfer Goal 1, PT) transfers, all;sit-to-stand/stand-to-sit  -MS     [place] Level/Cues Needed (Transfer Goal 1, PT) standby assist  -MS     Time Frame (Transfer Goal 1, PT) long term goal (LTG);2 weeks  -MS       Row Name 04/10/24 1005          Gait Training Goal 1 (PT)    Activity/Assistive Device (Gait Training Goal 1, PT) gait (walking locomotion);assistive device use  -MS     [place] Level (Gait Training Goal 1, PT) standby assist  -MS     Distance (Gait Training Goal 1, PT) 200  -MS     Time Frame (Gait Training Goal 1, PT) long term goal (LTG);2 weeks  -MS       Row Name 04/10/24 1005          Patient Education Goal (PT)    Activity (Patient Education Goal, PT) ther exer x15 reps ea  -MS     [place]/Cues/Accuracy (Memory Goal 2, PT) demonstrates adequately  -MS     Time Frame (Patient Education Goal, PT) long term goal (LTG);2 weeks  -MS       Row Name 04/10/24 1005          Therapy Assessment/Plan (PT)    Planned Therapy Interventions (PT) balance training;bed mobility training;gait training;home exercise program;stair training;strengthening;stretching;patient/family education;transfer training;ROM (range of motion);neuromuscular re-education  -MS               User Key  (r) = Recorded By, (t) = Taken By, (c) = Cosigned By      Initials Name  Provider Type    MS Sebastien Simon, PT Physical Therapist                   Clinical Impression       Row Name 04/10/24 1003          Pain    Pretreatment Pain Rating 7/10  -MS     Posttreatment Pain Rating 7/10  -MS     Pain Location - abdomen  -MS     Pain Intervention(s) Repositioned;Ambulation/increased activity  -MS       Row Name 04/10/24 1003          Plan of Care Review    Plan of Care Reviewed With patient  -MS     Progress no change  -MS     Outcome Evaluation Pt participated in PT eval this date. Pt presents with deficits in strength, transfers and gait. Pt reports she lives with her sons and is independent at baseline with use of cane. Pt reports 7/10 pain in abdomen. Pt transferred to EOB with SBA. Pt declined distance ambulation this date due to pain, agreeable to get OOB to chair. Pt transferred to standing with CGA adn ambulated 5 ft to chair with CGA and HHA. Pt is expected to benefit frm skilled PT tx during this inpateint stay.  -MS       Row Name 04/10/24 1003          Therapy Assessment/Plan (PT)    Patient/Family Therapy Goals Statement (PT) to go home  -MS     Rehab Potential (PT) good, to achieve stated therapy goals  -MS     Criteria for Skilled Interventions Met (PT) yes;meets criteria  -MS     Therapy Frequency (PT) 5 times/wk  -MS       Row Name 04/10/24 1003          Vital Signs    Pre SpO2 (%) 98  -MS     O2 Delivery Pre Treatment room air  -MS     O2 Delivery Intra Treatment room air  -MS     Post SpO2 (%) 98  -MS     O2 Delivery Post Treatment room air  -MS     Pre Patient Position Supine  -MS     Intra Patient Position Standing  -MS     Post Patient Position Sitting  -MS       Row Name 04/10/24 1003          Positioning and Restraints    Pre-Treatment Position in bed  -MS     Post Treatment Position chair  -MS     In Chair call light within reach;encouraged to call for assist;reclined;exit alarm on  -MS               User Key  (r) = Recorded By, (t) = Taken By, (c) = Cosigned By       Initials Name Provider Type    MS Sebastien Simon PT Physical Therapist                   Outcome Measures       Row Name 04/10/24 1006          How much help from another person do you currently need...    Turning from your back to your side while in flat bed without using bedrails? 3  -MS     Moving from lying on back to sitting on the side of a flat bed without bedrails? 3  -MS     Moving to and from a bed to a chair (including a wheelchair)? 3  -MS     Standing up from a chair using your arms (e.g., wheelchair, bedside chair)? 3  -MS     Climbing 3-5 steps with a railing? 3  -MS     To walk in hospital room? 3  -MS     AM-PAC 6 Clicks Score (PT) 18  -MS     Highest Level of Mobility Goal 6 --> Walk 10 steps or more  -MS               User Key  (r) = Recorded By, (t) = Taken By, (c) = Cosigned By      Initials Name Provider Type    MS Sebastien Simon PT Physical Therapist                                 Physical Therapy Education       Title: PT OT SLP Therapies (In Progress)       Topic: Physical Therapy (In Progress)       Point: Mobility training (Done)       Learning Progress Summary             Patient Acceptance, E, VU by MS at 4/10/2024 1006    Comment: importance of mobility                         Point: Home exercise program (Done)       Learning Progress Summary             Patient Acceptance, E, VU by MS at 4/10/2024 1006    Comment: importance of mobility                         Point: Body mechanics (Not Started)       Learner Progress:  Not documented in this visit.              Point: Precautions (Not Started)       Learner Progress:  Not documented in this visit.                              User Key       Initials Effective Dates Name Provider Type Discipline    MS 08/22/23 -  Sebastien Simon PT Physical Therapist PT                  PT Recommendation and Plan  Planned Therapy Interventions (PT): balance training, bed mobility training, gait training, home exercise program, stair  training, strengthening, stretching, patient/family education, transfer training, ROM (range of motion), neuromuscular re-education  Plan of Care Reviewed With: patient  Progress: no change  Outcome Evaluation: Pt participated in PT eval this date. Pt presents with deficits in strength, transfers and gait. Pt reports she lives with her sons and is independent at baseline with use of cane. Pt reports 7/10 pain in abdomen. Pt transferred to EOB with SBA. Pt declined distance ambulation this date due to pain, agreeable to get OOB to chair. Pt transferred to standing with CGA adn ambulated 5 ft to chair with CGA and HHA. Pt is expected to benefit frm skilled PT tx during this inpateint stay.     Time Calculation:   PT Evaluation Complexity  History, PT Evaluation Complexity: 1-2 personal factors and/or comorbidities  Examination of Body Systems (PT Eval Complexity): total of 3 or more elements  Clinical Presentation (PT Evaluation Complexity): evolving  Clinical Decision Making (PT Evaluation Complexity): moderate complexity  Overall Complexity (PT Evaluation Complexity): moderate complexity     PT Charges       Row Name 04/10/24 1007             Time Calculation    Start Time 0934  -MS      PT Received On 04/10/24  -MS      PT Goal Re-Cert Due Date 04/20/24  -MS         Untimed Charges    PT Eval/Re-eval Minutes 62  -MS         Total Minutes    Untimed Charges Total Minutes 62  -MS       Total Minutes 62  -MS                User Key  (r) = Recorded By, (t) = Taken By, (c) = Cosigned By      Initials Name Provider Type    Sebastien Brownlee, PT Physical Therapist                  Therapy Charges for Today       Code Description Service Date Service Provider Modifiers Qty    48098234303 HC PT EVAL MOD COMPLEXITY 4 4/10/2024 Sebastien Simon, PT GP 1            PT G-Codes  AM-PAC 6 Clicks Score (PT): 18  PT Discharge Summary  Anticipated Discharge Disposition (PT): home with assist, home with home health    Sebastien  Alfred, PT  4/10/2024

## 2024-04-11 ENCOUNTER — READMISSION MANAGEMENT (OUTPATIENT)
Dept: CALL CENTER | Facility: HOSPITAL | Age: 69
End: 2024-04-11
Payer: MEDICARE

## 2024-04-11 VITALS
DIASTOLIC BLOOD PRESSURE: 81 MMHG | SYSTOLIC BLOOD PRESSURE: 134 MMHG | BODY MASS INDEX: 24.34 KG/M2 | HEART RATE: 76 BPM | RESPIRATION RATE: 18 BRPM | WEIGHT: 132.28 LBS | HEIGHT: 62 IN | TEMPERATURE: 98 F | OXYGEN SATURATION: 100 %

## 2024-04-11 LAB
ANION GAP SERPL CALCULATED.3IONS-SCNC: 11.5 MMOL/L (ref 5–15)
BUN SERPL-MCNC: 12 MG/DL (ref 8–23)
BUN/CREAT SERPL: 12 (ref 7–25)
CALCIUM SPEC-SCNC: 9.1 MG/DL (ref 8.6–10.5)
CHLORIDE SERPL-SCNC: 116 MMOL/L (ref 98–107)
CO2 SERPL-SCNC: 14.5 MMOL/L (ref 22–29)
CREAT SERPL-MCNC: 1 MG/DL (ref 0.57–1)
DEPRECATED RDW RBC AUTO: 67.4 FL (ref 37–54)
EGFRCR SERPLBLD CKD-EPI 2021: 61.1 ML/MIN/1.73
ERYTHROCYTE [DISTWIDTH] IN BLOOD BY AUTOMATED COUNT: 16 % (ref 12.3–15.4)
GLUCOSE SERPL-MCNC: 72 MG/DL (ref 65–99)
HCT VFR BLD AUTO: 31.9 % (ref 34–46.6)
HGB BLD-MCNC: 9.9 G/DL (ref 12–15.9)
MCH RBC QN AUTO: 34.9 PG (ref 26.6–33)
MCHC RBC AUTO-ENTMCNC: 31 G/DL (ref 31.5–35.7)
MCV RBC AUTO: 112.3 FL (ref 79–97)
PLATELET # BLD AUTO: 196 10*3/MM3 (ref 140–450)
PMV BLD AUTO: 10.5 FL (ref 6–12)
POTASSIUM SERPL-SCNC: 3.7 MMOL/L (ref 3.5–5.2)
RBC # BLD AUTO: 2.84 10*6/MM3 (ref 3.77–5.28)
SODIUM SERPL-SCNC: 142 MMOL/L (ref 136–145)
WBC NRBC COR # BLD AUTO: 5.38 10*3/MM3 (ref 3.4–10.8)

## 2024-04-11 PROCEDURE — 80048 BASIC METABOLIC PNL TOTAL CA: CPT | Performed by: FAMILY MEDICINE

## 2024-04-11 PROCEDURE — 25010000002 HEPARIN (PORCINE) PER 1000 UNITS: Performed by: INTERNAL MEDICINE

## 2024-04-11 PROCEDURE — 99238 HOSP IP/OBS DSCHRG MGMT 30/<: CPT | Performed by: FAMILY MEDICINE

## 2024-04-11 PROCEDURE — 85027 COMPLETE CBC AUTOMATED: CPT | Performed by: FAMILY MEDICINE

## 2024-04-11 PROCEDURE — 25010000002 HYDROMORPHONE 1 MG/ML SOLUTION: Performed by: INTERNAL MEDICINE

## 2024-04-11 RX ORDER — OXYCODONE HYDROCHLORIDE AND ACETAMINOPHEN 5; 325 MG/1; MG/1
1 TABLET ORAL EVERY 8 HOURS PRN
Qty: 12 TABLET | Refills: 0 | Status: SHIPPED | OUTPATIENT
Start: 2024-04-11 | End: 2024-04-15

## 2024-04-11 RX ADMIN — HYDROMORPHONE HYDROCHLORIDE 1 MG: 1 INJECTION, SOLUTION INTRAMUSCULAR; INTRAVENOUS; SUBCUTANEOUS at 00:15

## 2024-04-11 RX ADMIN — HEPARIN SODIUM 5000 UNITS: 5000 INJECTION INTRAVENOUS; SUBCUTANEOUS at 08:19

## 2024-04-11 RX ADMIN — HYDROCODONE BITARTRATE AND ACETAMINOPHEN 1 TABLET: 7.5; 325 TABLET ORAL at 03:01

## 2024-04-11 RX ADMIN — HYDROMORPHONE HYDROCHLORIDE 1 MG: 1 INJECTION, SOLUTION INTRAMUSCULAR; INTRAVENOUS; SUBCUTANEOUS at 05:02

## 2024-04-11 RX ADMIN — Medication 10 ML: at 08:19

## 2024-04-11 RX ADMIN — HYDROMORPHONE HYDROCHLORIDE 1 MG: 1 INJECTION, SOLUTION INTRAMUSCULAR; INTRAVENOUS; SUBCUTANEOUS at 07:33

## 2024-04-11 NOTE — PLAN OF CARE
Goal Outcome Evaluation:  Plan of Care Reviewed With: patient        Progress: no change  Outcome Evaluation: Discharge today

## 2024-04-11 NOTE — DISCHARGE INSTRUCTIONS
-We discontinued Celexa, please disregard this medicine.  Please discuss with your primary care provider as directed.  -Avoid smoking as discussed  -Follow-up with gastroenterology Dr. Santillan in the office as scheduled.  - Follow-up with your PCP in 2 to 3 days for recheck and continued care.

## 2024-04-11 NOTE — PLAN OF CARE
Problem: Adult Inpatient Plan of Care  Goal: Plan of Care Review  Outcome: Ongoing, Progressing  Goal: Patient-Specific Goal (Individualized)  Outcome: Ongoing, Progressing  Goal: Absence of Hospital-Acquired Illness or Injury  Outcome: Ongoing, Progressing  Intervention: Identify and Manage Fall Risk  Recent Flowsheet Documentation  Taken 4/11/2024 0200 by Janet Ordonez RN  Safety Promotion/Fall Prevention:   activity supervised   assistive device/personal items within reach   clutter free environment maintained   safety round/check completed  Taken 4/11/2024 0000 by Janet Ordonez RN  Safety Promotion/Fall Prevention:   activity supervised   assistive device/personal items within reach   clutter free environment maintained   safety round/check completed  Taken 4/10/2024 2200 by Janet Ordonez RN  Safety Promotion/Fall Prevention:   activity supervised   assistive device/personal items within reach   clutter free environment maintained   safety round/check completed  Taken 4/10/2024 2000 by Janet Ordonez RN  Safety Promotion/Fall Prevention:   activity supervised   assistive device/personal items within reach   clutter free environment maintained   safety round/check completed  Intervention: Prevent Skin Injury  Recent Flowsheet Documentation  Taken 4/11/2024 0200 by Janet Ordonez RN  Body Position:   tilted   left   position changed independently  Taken 4/11/2024 0000 by Janet Ordonez RN  Body Position:   position changed independently   sitting up in bed  Skin Protection:   adhesive use limited   incontinence pads utilized   tubing/devices free from skin contact  Taken 4/10/2024 2200 by Janet Ordonez RN  Body Position:   position changed independently   sitting up in bed  Skin Protection:   adhesive use limited   incontinence pads utilized   tubing/devices free from skin contact  Taken 4/10/2024 2000 by Janet Ordonez RN  Body Position:   position changed independently    sitting up in bed  Skin Protection:   adhesive use limited   tubing/devices free from skin contact   incontinence pads utilized  Intervention: Prevent and Manage VTE (Venous Thromboembolism) Risk  Recent Flowsheet Documentation  Taken 4/11/2024 0200 by Janet Ordnoez RN  Activity Management: activity minimized  Taken 4/11/2024 0000 by Janet Ordonez RN  Activity Management: activity encouraged  Taken 4/10/2024 2200 by Janet Ordonez RN  Activity Management: activity encouraged  Taken 4/10/2024 2000 by Janet Ordonez RN  Activity Management: activity encouraged  Intervention: Prevent Infection  Recent Flowsheet Documentation  Taken 4/11/2024 0200 by Janet Ordonez RN  Infection Prevention:   environmental surveillance performed   rest/sleep promoted  Taken 4/11/2024 0000 by Janet Ordonez RN  Infection Prevention: environmental surveillance performed  Taken 4/10/2024 2200 by Janet Ordonez RN  Infection Prevention: environmental surveillance performed  Taken 4/10/2024 2000 by Janet Ordonez RN  Infection Prevention: environmental surveillance performed  Goal: Optimal Comfort and Wellbeing  Outcome: Ongoing, Progressing  Intervention: Monitor Pain and Promote Comfort  Recent Flowsheet Documentation  Taken 4/11/2024 0015 by Janet Ordonez RN  Pain Management Interventions: see MAR  Taken 4/11/2024 0000 by Janet Ordonez RN  Pain Management Interventions:   position adjusted   pillow support provided  Taken 4/10/2024 2200 by Janet Ordonez RN  Pain Management Interventions: see MAR  Taken 4/10/2024 2000 by Janet Ordonez RN  Pain Management Interventions: see MAR  Goal: Readiness for Transition of Care  Outcome: Ongoing, Progressing     Problem: Skin Injury Risk Increased  Goal: Skin Health and Integrity  Outcome: Ongoing, Progressing  Intervention: Optimize Skin Protection  Recent Flowsheet Documentation  Taken 4/11/2024 0200 by Janet Ordonez RN  Head of Bed (HOB)  Positioning: HOB elevated  Taken 4/11/2024 0000 by Janet Ordonez RN  Head of Bed ([facility]) Positioning: [facility] elevated  Skin Protection:   adhesive use limited   incontinence pads utilized   tubing/devices free from skin contact  Taken 4/10/2024 2200 by Janet Ordonez RN  Head of Bed ([facility]) Positioning: HOB elevated  Skin Protection:   adhesive use limited   incontinence pads utilized   tubing/devices free from skin contact  Taken 4/10/2024 2000 by Janet Ordonez RN  Pressure Reduction Techniques: frequent weight shift encouraged  Head of Bed ([facility]) Positioning: [facility] elevated  Pressure Reduction Devices: pressure-redistributing mattress utilized  Skin Protection:   adhesive use limited   tubing/devices free from skin contact   incontinence pads utilized     Problem: Fluid Imbalance (Pancreatitis)  Goal: Fluid Balance  Outcome: Ongoing, Progressing     Problem: Infection (Pancreatitis)  Goal: Infection Symptom Resolution  Outcome: Ongoing, Progressing     Problem: Nutrition Impaired (Pancreatitis)  Goal: Optimal Nutrition Intake  Outcome: Ongoing, Progressing     Problem: Pain (Pancreatitis)  Goal: Acceptable Pain Control  Outcome: Ongoing, Progressing  Intervention: Monitor and Manage Pain  Recent Flowsheet Documentation  Taken 4/11/2024 0015 by Janet Ordonez RN  Pain Management Interventions: see MAR  Taken 4/11/2024 0000 by Janet Ordonez RN  Pain Management Interventions:   position adjusted   pillow support provided  Taken 4/10/2024 2200 by Janet Ordonez RN  Pain Management Interventions: see MAR  Taken 4/10/2024 2000 by Janet Ordonez RN  Pain Management Interventions: see MAR     Problem: Respiratory Compromise (Pancreatitis)  Goal: Effective Oxygenation and Ventilation  Outcome: Ongoing, Progressing  Intervention: Optimize Oxygenation and Ventilation  Recent Flowsheet Documentation  Taken 4/11/2024 0200 by Janet Ordonez RN  Activity Management: activity minimized  Head of Bed ([facility])  Positioning: HOB elevated  Taken 4/11/2024 0000 by Janet Ordonez RN  Activity Management: activity encouraged  Head of Bed (HOB) Positioning: HOB elevated  Taken 4/10/2024 2200 by Janet Ordonez RN  Activity Management: activity encouraged  Head of Bed (HOB) Positioning: HOB elevated  Taken 4/10/2024 2000 by Janet Ordonez RN  Activity Management: activity encouraged  Head of Bed (HOB) Positioning: HOB elevated     Problem: Fall Injury Risk  Goal: Absence of Fall and Fall-Related Injury  Outcome: Ongoing, Progressing  Intervention: Identify and Manage Contributors  Recent Flowsheet Documentation  Taken 4/11/2024 0000 by Janet Ordonez RN  Medication Review/Management: medications reviewed  Taken 4/10/2024 2200 by Janet Ordonez RN  Medication Review/Management: medications reviewed  Taken 4/10/2024 2000 by Janet Ordonez RN  Medication Review/Management: medications reviewed  Intervention: Promote Injury-Free Environment  Recent Flowsheet Documentation  Taken 4/11/2024 0200 by Janet Ordonez RN  Safety Promotion/Fall Prevention:   activity supervised   assistive device/personal items within reach   clutter free environment maintained   safety round/check completed  Taken 4/11/2024 0000 by Janet Ordonez RN  Safety Promotion/Fall Prevention:   activity supervised   assistive device/personal items within reach   clutter free environment maintained   safety round/check completed  Taken 4/10/2024 2200 by Janet Ordonez RN  Safety Promotion/Fall Prevention:   activity supervised   assistive device/personal items within reach   clutter free environment maintained   safety round/check completed  Taken 4/10/2024 2000 by Janet Ordonez RN  Safety Promotion/Fall Prevention:   activity supervised   assistive device/personal items within reach   clutter free environment maintained   safety round/check completed   Goal Outcome Evaluation:      Plan of care reviewed with patient. Patient  has rested intermittently tonight. Patient continues to have abd/rib pain but she has tolerated advancing her diet without nausea or vomiting. No other significant changes this shift.

## 2024-04-11 NOTE — DISCHARGE SUMMARY
[facility]      Name:  Tasha Yarbrough   Age:  69 y.o.  Sex:  female  :  1955  MRN:  [id number]   Visit Number:  [id number]    Admission Date:  2024  Date of Discharge:  2024  Primary Care Physician:  Terrence Baldwin MD    Important issues to note:    -Discontinue Celexa, patient to discuss antidepressants with her PCP.  -Patient follow-up with GI    Discharge Diagnoses:     Acute pancreatitis POA, improved  VIVIENNE on CKD POA, resolved  Hypovolemic hyponatremia POA, resolved  Hypertension  Impaired mobility and ADLs    Problem List:     Active Hospital Problems    Diagnosis  POA    **Acute pancreatitis [K85.90]  Yes      Resolved Hospital Problems   No resolved problems to display.     Presenting Problem:    Chief Complaint   Patient presents with    Abdominal Pain    Nausea    Diarrhea      Consults:     Consulting Physician(s)         Provider   Role Specialty     Tyree Fleming MD      Consulting Physician Gastroenterology          Procedures Performed:        History of presenting illness/Hospital Course:    Patient is a 68-year-old female history significant for hypertension, CKD stage III who presents to the emergency room with ongoing nausea/vomiting and abdominal pain.  Patient seen in the emergency room multiple times, follows with  gastroenterology.  Last seen in the ER 2024 for same complaints.  At that time, patient discharged home with antiemetics.  Scheduled follow-up with  GI.  She returns tonight with worsening abdominal pain.  States that she could not wait to see her GI doctor.  Admits to nausea/vomiting, diffuse abdominal pain, no diarrhea.  Poor p.o. intake.     Upon arrival to the ER patient afebrile hemodynamically stable and nonhypoxic on room air.  Significant labs include sodium 134, chloride 109.  Creatinine 1.41 (baseline 1).  Triglycerides 165.  Lipase 1571.  WBC 12.65.  Normal lactic acid.  CT abdomen pelvis  with evidence of acute pancreatitis with probable pancreatic pseudocyst.  Patient received 500 mL bolus and pain medicine.  Hospitalist asked to admit.    Acute pancreatitis  -Unclear etiology  -Denies alcohol use.  Patient is status postcholecystectomy.  Triglycerides not significantly elevated.  -IV fluid resuscitation  -Monitor urine output  -Advance diet slowly as tolerated  -Pain control and antiemetics as needed  -Dr. Santillan with gastroenterology consulted, appreciate recommendations.  -GI recommended conservative management  - smoking cessation and absolute abstinence from smoking to prevent the recurrent episodes of pancreatitis   -Diet advanced and tolerated well  -Discontinue Celexa (could be a rare cause of pancreatitis?)  -Patient will need to reschedule her GI appointment in [place] for outpatient follow-up.    Hypoglycemia, resolved  -While n.p.o.  -Advanced diet today  -D50 as needed     VIVIENNE, resolved  Hyponatremia, resolved  -Likely from dehydration due to nausea/vomiting and poor p.o. intake.  -IV fluid  -Maintain normotension and avoid nephrotoxic medications     Patient was seen and examined on the day of discharge.  Patient sitting up comfortably in bed with no distress.  Patient reports improvement and feeling better today, she is feeling ready to go home.  Patient advised on diet and was tolerating p.o. well.  Pain improved and controlled. Patient with no other acute complaints or pain.  Discussed discontinuing Celexa and discussing antidepressant treatments with her PCP.  Patient follow-up with Dr. Santillan's office with her appointment scheduled.  Counseled extensively on smoking cessation, patient reported that she will try but declines nicotine patch.  Patient declined Linzess on discharge  Reports that her bowels have been moving well. Patient instructed on management and plan in details. Patient to follow-up with PCP in 2 to 3 days for recheck and continued care. Clear return  precautions discussed.  Patient verbalized understanding and agreeable to plan.  All questions were answered to the patient's satisfaction.    Vital Signs:    Temp:  [97.7 °F (36.5 °C)-98.5 °F (36.9 °C)] 98 °F (36.7 °C)  Heart Rate:  [75-91] 75  Resp:  [16-18] 18  BP: (112-136)/(64-86) 134/81    Physical Exam:    General Appearance:  Alert and cooperative.  No acute distress.   Head:  Atraumatic and normocephalic.   Eyes: Conjunctivae and sclerae normal, no icterus. No pallor.   Ears:  Ears with no abnormalities noted.   Throat: No oral lesions, no thrush, oral mucosa moist.   Neck: Supple, trachea midline, no thyromegaly.   Back:   No kyphoscoliosis present. No tenderness to palpation.   Lungs:   Breath sounds heard bilaterally equally.  No crackles or wheezing. No Pleural rub or bronchial breathing.   Heart:  Normal S1 and S2, no murmur, no gallop, no rub. No JVD.   Abdomen:   Normal bowel sounds, no masses, no organomegaly. Soft, nontender, nondistended, no rebound tenderness.   Extremities: Supple, no edema, no cyanosis, no clubbing.   Pulses: Pulses palpable bilaterally.   Skin: No bleeding or rash.   Neurologic: Alert and oriented x 3. No facial asymmetry. Moves all four limbs. No tremors.     Pertinent Lab Results:     Results from last 7 days   Lab Units 04/11/24  0659 04/10/24  0626 04/09/24  0548 04/08/24 2003 04/06/24  2124   SODIUM mmol/L 142 141 137 134* 140   POTASSIUM mmol/L 3.7 4.2 4.5 4.7 4.5   CHLORIDE mmol/L 116* 115* 114* 109* 115*   CO2 mmol/L 14.5* 10.3* 12.5* 11.9* 12.8*   BUN mg/dL 12 16 25* 28* 21   CREATININE mg/dL 1.00 1.06* 1.23* 1.41* 1.20*   CALCIUM mg/dL 9.1 8.9 9.1 9.7 8.5*   BILIRUBIN mg/dL  --   --   --  0.2 <0.2   ALK PHOS U/L  --   --   --  148* 128*   ALT (SGPT) U/L  --   --   --  7 6   AST (SGOT) U/L  --   --   --  15 11   GLUCOSE mg/dL 72 36* 65 73 80     Results from last 7 days   Lab Units 04/11/24  0659 04/10/24  0626 04/09/24  0548   WBC 10*3/mm3 5.38 4.97 9.04    HEMOGLOBIN g/dL 9.9* 10.4* 10.5*   HEMATOCRIT % 31.9* 34.2 34.0   PLATELETS 10*3/mm3 196 208 247         Results from last 7 days   Lab Units 04/06/24 2124   HSTROP T ng/L 12             Results from last 7 days   Lab Units 04/10/24  0626   LIPASE U/L 364*               Pertinent Radiology Results:    Imaging Results (All)       Procedure Component Value Units Date/Time    CT Abdomen Pelvis With Contrast [[id number]] Collected: 04/08/24 2155     Updated: 04/08/24 2157    Narrative:      FINAL REPORT    TECHNIQUE:  null    CLINICAL HISTORY:  epigastric pain rule out pancreatitis    COMPARISON:  null    FINDINGS:  CT abdomen and pelvis with contrast    Comparison: 04/06/2024    Findings:    Lung bases are clear. No acute bony abnormalities.    Degenerative change throughout the spine and hips.    Large hiatal hernia noted.    Multiple ventral wall defects noted.    Protrusion of bowel noted partially into defects.    No full bowel involvement demonstrated.    Liver, adrenal glands and spleen unremarkable.    Cholecystectomy.    Peripancreatic stranding and minimal fluid again noted.    Findings are consistent with pancreatitis, unchanged.    Stable 3.1 cm cystic abnormality in pancreatic tail.    2.5 x 1.2 cm pancreatic body cystic abnormality.    These are unchanged from previous study.    Findings likely represent pseudocysts.    Bilateral kidneys demonstrate no focal abnormality.    Renal cysts without stones or hydronephrosis.    Abdominal aorta is normal in caliber.    No free fluid or adenopathy in the pelvis.    No acute diverticulitis. Appendix not identified.    Uterus atrophic. No adnexal abnormality.      Impression:      Impression:    Acute pancreatitis with probable pancreatic pseudocysts    No significant change from previous study    Authenticated and Electronically Signed by Eugene Red MD on  04/08/2024 09:55:42 PM            Echo:      Condition on Discharge:      Stable.    Code status during  the hospital stay:    Code Status and Medical Interventions:   Ordered at: 04/08/24 3757     Code Status (Patient has no pulse and is not breathing):    CPR (Attempt to Resuscitate)     Medical Interventions (Patient has pulse or is breathing):    Full Support     Discharge Disposition:    Home or Self Care    Discharge Medications:       Discharge Medications        Changes to Medications        Instructions Start Date   oxyCODONE-acetaminophen 5-325 MG per tablet  Commonly known as: PERCOCET  What changed: when to take this   1 tablet, Oral, Every 8 Hours PRN             Continue These Medications        Instructions Start Date   glucosamine-chondroitin 500-400 MG capsule capsule   1 capsule, Oral, 2 Times Daily With Meals      methocarbamol 750 MG tablet  Commonly known as: ROBAXIN   750 mg, Oral, 4 Times Daily PRN      ondansetron ODT 4 MG disintegrating tablet  Commonly known as: ZOFRAN-ODT   4 mg, Translingual, Every 8 Hours PRN      vitamin D3 125 MCG (5000 UT) capsule capsule   1 capsule, Oral, Daily             Stop These Medications      citalopram 40 MG tablet  Commonly known as: CeleXA     furosemide 20 MG tablet  Commonly known as: LASIX            Discharge Diet:   GI soft    Activity at Discharge:   As tolerated    Follow-up Appointments:     Follow-up Information       Terrence Baldwin MD Follow up in 3 day(s).    Specialty: Family Medicine  Contact information:  [address]  [place] [address]  [phone]               Tyree Fleming MD. Go on 5/22/2024.    Specialty: Gastroenterology  Why: Appointment with PEARL Anderson  Contact information:  [address]  [facility] [address]  [phone]                           Future Appointments   Date Time Provider Department Center   5/22/2024  9:30 AM Michelle Wright PA-C [facility]     Test Results Pending at Discharge:           Vivienne Roa MD  04/11/24  08:55 EDT    Time: I spent 28 minutes on  this discharge activity which included: face-to-face encounter with the patient, reviewing the data in the system, coordination of the care with the nursing staff as well as consultants, documentation, and entering orders.     Dictated utilizing Dragon dictation.

## 2024-04-11 NOTE — CASE MANAGEMENT/SOCIAL WORK
Case Management Discharge Note      Final Note: DC home with family to transport. Denied any needs. IMM signed,    Provided Post Acute Provider List?: N/A  N/A Provider List Comment: Patient plans to DC home; no DME neeeds  Provided Post Acute Provider Quality & Resource List?: N/A  N/A Quality & Resource List Comment: Patient plans to DC home; no DME neeeds    Selected Continued Care - Discharged on 4/11/2024 Admission date: 4/8/2024 - Discharge disposition: Home or Self Care      Destination    No services have been selected for the patient.                Durable Medical Equipment    No services have been selected for the patient.                Dialysis/Infusion    No services have been selected for the patient.                Home Medical Care    No services have been selected for the patient.                Therapy    No services have been selected for the patient.                Community Resources    No services have been selected for the patient.                Community & DME    No services have been selected for the patient.                    Transportation Services  Private: Car    Final Discharge Disposition Code: 01 - home or self-care

## 2024-04-12 NOTE — OUTREACH NOTE
Prep Survey      Flowsheet Row Responses   Denominational facility patient discharged from? [place]   Is LACE score < 7 ? No   Eligibility Readm Mgmt   Discharge diagnosis Acute pancreatitis   Does the patient have one of the following disease processes/diagnoses(primary or secondary)? Other   Does the patient have Home health ordered? No   Is there a DME ordered? No   Prep survey completed? Yes            EDWIN VIEYRA - Registered Nurse

## 2024-04-12 NOTE — PAYOR COMM NOTE
"  D/C SUMMARY  UR MANAGER; HELEN PROCTOR, [phone] AND [phone]      Jaymie Yarbrough (69 y.o. Female)       Date of Birth   1955    Social Security Number       Address   [address] [id number]    Home Phone   [phone]    MRN   [id number]       Congregation   None    Marital Status                               Admission Date   4/8/24    Admission Type   Emergency    Admitting Provider   Janak Damon DO    Attending Provider       Department, Room/Bed   [facility] TELEMETRY 4, 430/1       Discharge Date   4/11/2024    Discharge Disposition   Home or Self Care    Discharge Destination                                 Attending Provider: (none)   Allergies: Ciprofloxacin, Codeine, Pineapple    Isolation: None   Infection: Other (02/26/24)   Code Status: Prior    Ht: 157.5 cm (62\")   Wt: 60 kg (132 lb 4.4 oz)    Admission Cmt: None   Principal Problem: Acute pancreatitis [K85.90]                   Active Insurance as of 4/8/2024       Primary Coverage       Payor Plan Insurance Group Employer/Plan Group    ANTH MEDICARE REPLACEMENT ANTHEM MEDICARE ADVANTAGE [id number]       Payor Plan Address Payor Plan Phone Number Payor Plan Fax Number Effective Dates    [id number] [phone]  1/1/2024 - None Entered    [facility] [id number]         Subscriber Name Subscriber Birth Date Member ID       JAYMIE YARBROUGH 1955 [id number]                     Emergency Contacts        (Rel.) Home Phone Work Phone Mobile Phone    ANTONIO YARBROUGH (Son) [phone] -- --    MONIE YARBROUGH (Son) [phone] -- --              Physician Progress Notes (last 24 hours)  Notes from 04/11/24 0845 through 04/12/24 0845   No notes of this type exist for this encounter.          Discharge Summary        Vivienne Roa MD at 04/11/24 0855              [facility] HOSPITALIST   DISCHARGE SUMMARY      Name:  Jaymie Yarbrough   Age:  69 y.o.  Sex:  " female  :  1955  MRN:  [id number]   Visit Number:  [id number]    Admission Date:  2024  Date of Discharge:  2024  Primary Care Physician:  Terrence Baldwin MD    Important issues to note:    -Discontinue Celexa, patient to discuss antidepressants with her PCP.  -Patient follow-up with GI    Discharge Diagnoses:     Acute pancreatitis POA, improved  VIVIENNE on CKD POA, resolved  Hypovolemic hyponatremia POA, resolved  Hypertension  Impaired mobility and ADLs    Problem List:     Active Hospital Problems    Diagnosis  POA    **Acute pancreatitis [K85.90]  Yes      Resolved Hospital Problems   No resolved problems to display.     Presenting Problem:    Chief Complaint   Patient presents with    Abdominal Pain    Nausea    Diarrhea      Consults:     Consulting Physician(s)         Provider   Role Specialty     Tyree Fleming MD      Consulting Physician Gastroenterology          Procedures Performed:        History of presenting illness/Hospital Course:    Patient is a 68-year-old female history significant for hypertension, CKD stage III who presents to the emergency room with ongoing nausea/vomiting and abdominal pain.  Patient seen in the emergency room multiple times, follows with  gastroenterology.  Last seen in the ER 2024 for same complaints.  At that time, patient discharged home with antiemetics.  Scheduled follow-up with  GI.  She returns tonight with worsening abdominal pain.  States that she could not wait to see her GI doctor.  Admits to nausea/vomiting, diffuse abdominal pain, no diarrhea.  Poor p.o. intake.     Upon arrival to the ER patient afebrile hemodynamically stable and nonhypoxic on room air.  Significant labs include sodium 134, chloride 109.  Creatinine 1.41 (baseline 1).  Triglycerides 165.  Lipase 1571.  WBC 12.65.  Normal lactic acid.  CT abdomen pelvis with evidence of acute pancreatitis with probable pancreatic pseudocyst.  Patient received 500 mL bolus and  pain medicine.  Hospitalist asked to admit.    Acute pancreatitis  -Unclear etiology  -Denies alcohol use.  Patient is status postcholecystectomy.  Triglycerides not significantly elevated.  -IV fluid resuscitation  -Monitor urine output  -Advance diet slowly as tolerated  -Pain control and antiemetics as needed  -Dr. Santillan with gastroenterology consulted, appreciate recommendations.  -GI recommended conservative management  - smoking cessation and absolute abstinence from smoking to prevent the recurrent episodes of pancreatitis   -Diet advanced and tolerated well  -Discontinue Celexa (could be a rare cause of pancreatitis?)  -Patient will need to reschedule her GI appointment in [place] for outpatient follow-up.    Hypoglycemia, resolved  -While n.p.o.  -Advanced diet today  -D50 as needed     VIVIENNE, resolved  Hyponatremia, resolved  -Likely from dehydration due to nausea/vomiting and poor p.o. intake.  -IV fluid  -Maintain normotension and avoid nephrotoxic medications     Patient was seen and examined on the day of discharge.  Patient sitting up comfortably in bed with no distress.  Patient reports improvement and feeling better today, she is feeling ready to go home.  Patient advised on diet and was tolerating p.o. well.  Pain improved and controlled. Patient with no other acute complaints or pain.  Discussed discontinuing Celexa and discussing antidepressant treatments with her PCP.  Patient follow-up with Dr. Santillan's office with her appointment scheduled.  Counseled extensively on smoking cessation, patient reported that she will try but declines nicotine patch.  Patient declined Linzess on discharge  Reports that her bowels have been moving well. Patient instructed on management and plan in details. Patient to follow-up with PCP in 2 to 3 days for recheck and continued care. Clear return precautions discussed.  Patient verbalized understanding and agreeable to plan.  All questions were answered to the  patient's satisfaction.    Vital Signs:    Temp:  [97.7 °F (36.5 °C)-98.5 °F (36.9 °C)] 98 °F (36.7 °C)  Heart Rate:  [75-91] 75  Resp:  [16-18] 18  BP: (112-136)/(64-86) 134/81    Physical Exam:    General Appearance:  Alert and cooperative.  No acute distress.   Head:  Atraumatic and normocephalic.   Eyes: Conjunctivae and sclerae normal, no icterus. No pallor.   Ears:  Ears with no abnormalities noted.   Throat: No oral lesions, no thrush, oral mucosa moist.   Neck: Supple, trachea midline, no thyromegaly.   Back:   No kyphoscoliosis present. No tenderness to palpation.   Lungs:   Breath sounds heard bilaterally equally.  No crackles or wheezing. No Pleural rub or bronchial breathing.   Heart:  Normal S1 and S2, no murmur, no gallop, no rub. No JVD.   Abdomen:   Normal bowel sounds, no masses, no organomegaly. Soft, nontender, nondistended, no rebound tenderness.   Extremities: Supple, no edema, no cyanosis, no clubbing.   Pulses: Pulses palpable bilaterally.   Skin: No bleeding or rash.   Neurologic: Alert and oriented x 3. No facial asymmetry. Moves all four limbs. No tremors.     Pertinent Lab Results:     Results from last 7 days   Lab Units 04/11/24  0659 04/10/24  0626 04/09/24  0548 04/08/24 2003 04/06/24  2124   SODIUM mmol/L 142 141 137 134* 140   POTASSIUM mmol/L 3.7 4.2 4.5 4.7 4.5   CHLORIDE mmol/L 116* 115* 114* 109* 115*   CO2 mmol/L 14.5* 10.3* 12.5* 11.9* 12.8*   BUN mg/dL 12 16 25* 28* 21   CREATININE mg/dL 1.00 1.06* 1.23* 1.41* 1.20*   CALCIUM mg/dL 9.1 8.9 9.1 9.7 8.5*   BILIRUBIN mg/dL  --   --   --  0.2 <0.2   ALK PHOS U/L  --   --   --  148* 128*   ALT (SGPT) U/L  --   --   --  7 6   AST (SGOT) U/L  --   --   --  15 11   GLUCOSE mg/dL 72 36* 65 73 80     Results from last 7 days   Lab Units 04/11/24  0659 04/10/24  0626 04/09/24  0548   WBC 10*3/mm3 5.38 4.97 9.04   HEMOGLOBIN g/dL 9.9* 10.4* 10.5*   HEMATOCRIT % 31.9* 34.2 34.0   PLATELETS 10*3/mm3 196 208 247         Results from last 7  days   Lab Units 04/06/24 2124   HSTROP T ng/L 12             Results from last 7 days   Lab Units 04/10/24  0626   LIPASE U/L 364*               Pertinent Radiology Results:    Imaging Results (All)       Procedure Component Value Units Date/Time    CT Abdomen Pelvis With Contrast [[id number]] Collected: 04/08/24 2155     Updated: 04/08/24 2157    Narrative:      FINAL REPORT    TECHNIQUE:  null    CLINICAL HISTORY:  epigastric pain rule out pancreatitis    COMPARISON:  null    FINDINGS:  CT abdomen and pelvis with contrast    Comparison: 04/06/2024    Findings:    Lung bases are clear. No acute bony abnormalities.    Degenerative change throughout the spine and hips.    Large hiatal hernia noted.    Multiple ventral wall defects noted.    Protrusion of bowel noted partially into defects.    No full bowel involvement demonstrated.    Liver, adrenal glands and spleen unremarkable.    Cholecystectomy.    Peripancreatic stranding and minimal fluid again noted.    Findings are consistent with pancreatitis, unchanged.    Stable 3.1 cm cystic abnormality in pancreatic tail.    2.5 x 1.2 cm pancreatic body cystic abnormality.    These are unchanged from previous study.    Findings likely represent pseudocysts.    Bilateral kidneys demonstrate no focal abnormality.    Renal cysts without stones or hydronephrosis.    Abdominal aorta is normal in caliber.    No free fluid or adenopathy in the pelvis.    No acute diverticulitis. Appendix not identified.    Uterus atrophic. No adnexal abnormality.      Impression:      Impression:    Acute pancreatitis with probable pancreatic pseudocysts    No significant change from previous study    Authenticated and Electronically Signed by Eugene Red MD on  04/08/2024 09:55:42 PM            Echo:      Condition on Discharge:      Stable.    Code status during the hospital stay:    Code Status and Medical Interventions:   Ordered at: 04/08/24 4923     Code Status (Patient has no pulse  and is not breathing):    CPR (Attempt to Resuscitate)     Medical Interventions (Patient has pulse or is breathing):    Full Support     Discharge Disposition:    Home or Self Care    Discharge Medications:       Discharge Medications        Changes to Medications        Instructions Start Date   oxyCODONE-acetaminophen 5-325 MG per tablet  Commonly known as: PERCOCET  What changed: when to take this   1 tablet, Oral, Every 8 Hours PRN             Continue These Medications        Instructions Start Date   glucosamine-chondroitin 500-400 MG capsule capsule   1 capsule, Oral, 2 Times Daily With Meals      methocarbamol 750 MG tablet  Commonly known as: ROBAXIN   750 mg, Oral, 4 Times Daily PRN      ondansetron ODT 4 MG disintegrating tablet  Commonly known as: ZOFRAN-ODT   4 mg, Translingual, Every 8 Hours PRN      vitamin D3 125 MCG (5000 UT) capsule capsule   1 capsule, Oral, Daily             Stop These Medications      citalopram 40 MG tablet  Commonly known as: CeleXA     furosemide 20 MG tablet  Commonly known as: LASIX            Discharge Diet:   GI soft    Activity at Discharge:   As tolerated    Follow-up Appointments:     Follow-up Information       Terrence Baldwin MD Follow up in 3 day(s).    Specialty: Family Medicine  Contact information:  [address]   [facility] [address]  [facility] [place] [address]  [phone]               Tyree Fleming MD. Go on 5/22/2024.    Specialty: Gastroenterology  Why: Appointment with PEARL Anderson  Contact information:  [address]  [facility] [place] [id number] [phone]                           Future Appointments   Date Time Provider Department Center   5/22/2024  9:30 AM Michelle Wright PA-C [facility]     Test Results Pending at Discharge:           Vivienne Roa MD  04/11/24  08:55 EDT    Time: I spent 28 minutes on this discharge activity which included: face-to-face encounter with the patient, reviewing the data in the system, coordination  of the care with the nursing staff as well as consultants, documentation, and entering orders.     Dictated utilizing Dragon dictation.      Electronically signed by Vivienne Roa MD at 04/11/24 2669

## 2024-04-16 ENCOUNTER — READMISSION MANAGEMENT (OUTPATIENT)
Dept: CALL CENTER | Facility: HOSPITAL | Age: 69
End: 2024-04-16
Payer: MEDICARE

## 2024-04-16 NOTE — OUTREACH NOTE
Medical Week 1 Survey      Flowsheet Row Responses   [facility] facility patient discharged from? [facility]   Does the patient have one of the following disease processes/diagnoses(primary or secondary)? Other   Week 1 attempt successful? No   Unsuccessful attempts Attempt 1            Jessica CRABTREE - Registered Nurse

## 2024-04-20 ENCOUNTER — APPOINTMENT (OUTPATIENT)
Dept: CT IMAGING | Facility: HOSPITAL | Age: 69
End: 2024-04-20
Payer: MEDICARE

## 2024-04-20 ENCOUNTER — HOSPITAL ENCOUNTER (INPATIENT)
Facility: HOSPITAL | Age: 69
LOS: 3 days | Discharge: HOME HEALTH | End: 2024-04-24
Attending: EMERGENCY MEDICINE | Admitting: INTERNAL MEDICINE
Payer: MEDICARE

## 2024-04-20 DIAGNOSIS — R11.2 NAUSEA AND VOMITING IN ADULT: ICD-10-CM

## 2024-04-20 DIAGNOSIS — K85.00 IDIOPATHIC ACUTE PANCREATITIS, UNSPECIFIED COMPLICATION STATUS: Primary | ICD-10-CM

## 2024-04-20 DIAGNOSIS — N17.9 ACUTE KIDNEY INJURY: ICD-10-CM

## 2024-04-20 DIAGNOSIS — E87.20 METABOLIC ACIDOSIS: ICD-10-CM

## 2024-04-20 DIAGNOSIS — K85.90 ACUTE PANCREATITIS WITHOUT INFECTION OR NECROSIS, UNSPECIFIED PANCREATITIS TYPE: ICD-10-CM

## 2024-04-20 DIAGNOSIS — R10.13 EPIGASTRIC PAIN: ICD-10-CM

## 2024-04-20 LAB
BASOPHILS # BLD AUTO: 0.04 10*3/MM3 (ref 0–0.2)
BASOPHILS NFR BLD AUTO: 0.4 % (ref 0–1.5)
D-LACTATE SERPL-SCNC: 0.4 MMOL/L (ref 0.5–2)
DEPRECATED RDW RBC AUTO: 67.7 FL (ref 37–54)
EOSINOPHIL # BLD AUTO: 0.12 10*3/MM3 (ref 0–0.4)
EOSINOPHIL NFR BLD AUTO: 1.3 % (ref 0.3–6.2)
ERYTHROCYTE [DISTWIDTH] IN BLOOD BY AUTOMATED COUNT: 16.2 % (ref 12.3–15.4)
HCT VFR BLD AUTO: 35.3 % (ref 34–46.6)
HGB BLD-MCNC: 11.3 G/DL (ref 12–15.9)
IMM GRANULOCYTES # BLD AUTO: 0.11 10*3/MM3 (ref 0–0.05)
IMM GRANULOCYTES NFR BLD AUTO: 1.2 % (ref 0–0.5)
LIPASE SERPL-CCNC: 722 U/L (ref 13–60)
LYMPHOCYTES # BLD AUTO: 1.74 10*3/MM3 (ref 0.7–3.1)
LYMPHOCYTES NFR BLD AUTO: 18.5 % (ref 19.6–45.3)
MAGNESIUM SERPL-MCNC: 2.5 MG/DL (ref 1.6–2.4)
MCH RBC QN AUTO: 36.1 PG (ref 26.6–33)
MCHC RBC AUTO-ENTMCNC: 32 G/DL (ref 31.5–35.7)
MCV RBC AUTO: 112.8 FL (ref 79–97)
MONOCYTES # BLD AUTO: 0.6 10*3/MM3 (ref 0.1–0.9)
MONOCYTES NFR BLD AUTO: 6.4 % (ref 5–12)
NEUTROPHILS NFR BLD AUTO: 6.82 10*3/MM3 (ref 1.7–7)
NEUTROPHILS NFR BLD AUTO: 72.2 % (ref 42.7–76)
NRBC BLD AUTO-RTO: 0 /100 WBC (ref 0–0.2)
PLATELET # BLD AUTO: 286 10*3/MM3 (ref 140–450)
PMV BLD AUTO: 10 FL (ref 6–12)
RBC # BLD AUTO: 3.13 10*6/MM3 (ref 3.77–5.28)
WBC NRBC COR # BLD AUTO: 9.43 10*3/MM3 (ref 3.4–10.8)

## 2024-04-20 PROCEDURE — 25010000002 ONDANSETRON PER 1 MG: Performed by: EMERGENCY MEDICINE

## 2024-04-20 PROCEDURE — 85007 BL SMEAR W/DIFF WBC COUNT: CPT | Performed by: EMERGENCY MEDICINE

## 2024-04-20 PROCEDURE — 36415 COLL VENOUS BLD VENIPUNCTURE: CPT

## 2024-04-20 PROCEDURE — 25810000003 SODIUM CHLORIDE 0.9 % SOLUTION: Performed by: EMERGENCY MEDICINE

## 2024-04-20 PROCEDURE — 99291 CRITICAL CARE FIRST HOUR: CPT

## 2024-04-20 PROCEDURE — 83735 ASSAY OF MAGNESIUM: CPT | Performed by: EMERGENCY MEDICINE

## 2024-04-20 PROCEDURE — 25010000002 MORPHINE PER 10 MG: Performed by: EMERGENCY MEDICINE

## 2024-04-20 PROCEDURE — 85025 COMPLETE CBC W/AUTO DIFF WBC: CPT | Performed by: EMERGENCY MEDICINE

## 2024-04-20 PROCEDURE — 83690 ASSAY OF LIPASE: CPT | Performed by: EMERGENCY MEDICINE

## 2024-04-20 PROCEDURE — 80053 COMPREHEN METABOLIC PANEL: CPT | Performed by: EMERGENCY MEDICINE

## 2024-04-20 PROCEDURE — 83605 ASSAY OF LACTIC ACID: CPT | Performed by: EMERGENCY MEDICINE

## 2024-04-20 RX ORDER — SODIUM CHLORIDE 0.9 % (FLUSH) 0.9 %
10 SYRINGE (ML) INJECTION AS NEEDED
Status: DISCONTINUED | OUTPATIENT
Start: 2024-04-20 | End: 2024-04-24 | Stop reason: HOSPADM

## 2024-04-20 RX ORDER — ONDANSETRON 2 MG/ML
4 INJECTION INTRAMUSCULAR; INTRAVENOUS ONCE
Status: COMPLETED | OUTPATIENT
Start: 2024-04-20 | End: 2024-04-20

## 2024-04-20 RX ADMIN — ONDANSETRON 4 MG: 2 INJECTION INTRAMUSCULAR; INTRAVENOUS at 22:57

## 2024-04-20 RX ADMIN — SODIUM CHLORIDE 1000 ML: 9 INJECTION, SOLUTION INTRAVENOUS at 22:57

## 2024-04-20 RX ADMIN — MORPHINE SULFATE 4 MG: 4 INJECTION, SOLUTION INTRAMUSCULAR; INTRAVENOUS at 22:57

## 2024-04-20 NOTE — Clinical Note
Level of Care: Telemetry [5]   Diagnosis: Metabolic acidosis [680782]   Admitting Physician: TIAN FITZGERALD [[id number]]   Attending Physician: TIAN FITZGERALD [[id number]]

## 2024-04-21 ENCOUNTER — READMISSION MANAGEMENT (OUTPATIENT)
Dept: CALL CENTER | Facility: HOSPITAL | Age: 69
End: 2024-04-21
Payer: MEDICARE

## 2024-04-21 ENCOUNTER — APPOINTMENT (OUTPATIENT)
Dept: CT IMAGING | Facility: HOSPITAL | Age: 69
End: 2024-04-21
Payer: MEDICARE

## 2024-04-21 PROBLEM — E87.20 METABOLIC ACIDOSIS: Status: ACTIVE | Noted: 2024-04-21

## 2024-04-21 PROBLEM — N39.0 ACUTE UTI (URINARY TRACT INFECTION): Status: ACTIVE | Noted: 2024-04-21

## 2024-04-21 LAB
A-A DO2: 10.7 MMHG
A-A DO2: 18.2 MMHG
ALBUMIN SERPL-MCNC: 3.6 G/DL (ref 3.5–5.2)
ALBUMIN SERPL-MCNC: 4 G/DL (ref 3.5–5.2)
ALBUMIN/GLOB SERPL: 1.3 G/DL
ALBUMIN/GLOB SERPL: 1.3 G/DL
ALP SERPL-CCNC: 104 U/L (ref 39–117)
ALP SERPL-CCNC: 131 U/L (ref 39–117)
ALT SERPL W P-5'-P-CCNC: 7 U/L (ref 1–33)
ALT SERPL W P-5'-P-CCNC: 8 U/L (ref 1–33)
ANION GAP SERPL CALCULATED.3IONS-SCNC: 12.8 MMOL/L (ref 5–15)
ANION GAP SERPL CALCULATED.3IONS-SCNC: 14.1 MMOL/L (ref 5–15)
ANION GAP SERPL CALCULATED.3IONS-SCNC: 9.7 MMOL/L (ref 5–15)
ARTERIAL PATENCY WRIST A: ABNORMAL
ARTERIAL PATENCY WRIST A: POSITIVE
AST SERPL-CCNC: 11 U/L (ref 1–32)
AST SERPL-CCNC: 13 U/L (ref 1–32)
ATMOSPHERIC PRESS: 736 MMHG
ATMOSPHERIC PRESS: 737 MMHG
BACTERIA UR QL AUTO: ABNORMAL /HPF
BASE EXCESS BLDA CALC-SCNC: -16.6 MMOL/L (ref 0–2)
BASE EXCESS BLDA CALC-SCNC: -17.9 MMOL/L (ref 0–2)
BDY SITE: ABNORMAL
BDY SITE: ABNORMAL
BILIRUB SERPL-MCNC: 0.2 MG/DL (ref 0–1.2)
BILIRUB SERPL-MCNC: <0.2 MG/DL (ref 0–1.2)
BILIRUB UR QL STRIP: NEGATIVE
BUN SERPL-MCNC: 24 MG/DL (ref 8–23)
BUN SERPL-MCNC: 24 MG/DL (ref 8–23)
BUN SERPL-MCNC: 25 MG/DL (ref 8–23)
BUN/CREAT SERPL: 15.2 (ref 7–25)
BUN/CREAT SERPL: 16.8 (ref 7–25)
BUN/CREAT SERPL: 17.5 (ref 7–25)
CALCIUM SPEC-SCNC: 10.1 MG/DL (ref 8.6–10.5)
CALCIUM SPEC-SCNC: 9.5 MG/DL (ref 8.6–10.5)
CALCIUM SPEC-SCNC: 9.6 MG/DL (ref 8.6–10.5)
CHLORIDE SERPL-SCNC: 111 MMOL/L (ref 98–107)
CHLORIDE SERPL-SCNC: 113 MMOL/L (ref 98–107)
CHLORIDE SERPL-SCNC: 116 MMOL/L (ref 98–107)
CLARITY UR: CLEAR
CO2 SERPL-SCNC: 11.2 MMOL/L (ref 22–29)
CO2 SERPL-SCNC: 13.3 MMOL/L (ref 22–29)
CO2 SERPL-SCNC: 8.9 MMOL/L (ref 22–29)
COHGB MFR BLD: 1.7 % (ref 0–2)
COHGB MFR BLD: 2.2 % (ref 0–2)
COLOR UR: YELLOW
CREAT SERPL-MCNC: 1.37 MG/DL (ref 0.57–1)
CREAT SERPL-MCNC: 1.43 MG/DL (ref 0.57–1)
CREAT SERPL-MCNC: 1.65 MG/DL (ref 0.57–1)
DEPRECATED RDW RBC AUTO: 69.9 FL (ref 37–54)
EGFRCR SERPLBLD CKD-EPI 2021: 33.5 ML/MIN/1.73
EGFRCR SERPLBLD CKD-EPI 2021: 39.8 ML/MIN/1.73
EGFRCR SERPLBLD CKD-EPI 2021: 41.9 ML/MIN/1.73
ERYTHROCYTE [DISTWIDTH] IN BLOOD BY AUTOMATED COUNT: 16.4 % (ref 12.3–15.4)
GLOBULIN UR ELPH-MCNC: 2.8 GM/DL
GLOBULIN UR ELPH-MCNC: 3 GM/DL
GLUCOSE BLDC GLUCOMTR-MCNC: 102 MG/DL (ref 70–130)
GLUCOSE BLDC GLUCOMTR-MCNC: 98 MG/DL (ref 70–130)
GLUCOSE SERPL-MCNC: 111 MG/DL (ref 65–99)
GLUCOSE SERPL-MCNC: 81 MG/DL (ref 65–99)
GLUCOSE SERPL-MCNC: 86 MG/DL (ref 65–99)
GLUCOSE UR STRIP-MCNC: NEGATIVE MG/DL
HCO3 BLDA-SCNC: 9.4 MMOL/L (ref 22–28)
HCO3 BLDA-SCNC: 9.9 MMOL/L (ref 22–28)
HCT VFR BLD AUTO: 35.4 % (ref 34–46.6)
HCT VFR BLD CALC: 32.5 %
HCT VFR BLD CALC: 33.8 %
HGB BLD-MCNC: 11.1 G/DL (ref 12–15.9)
HGB UR QL STRIP.AUTO: NEGATIVE
HYALINE CASTS UR QL AUTO: ABNORMAL /LPF
HYPOCHROMIA BLD QL: NORMAL
INHALED O2 CONCENTRATION: 21 %
KETONES UR QL STRIP: NEGATIVE
LEUKOCYTE ESTERASE UR QL STRIP.AUTO: ABNORMAL
LIPASE SERPL-CCNC: 470 U/L (ref 13–60)
Lab: ABNORMAL
MACROCYTES BLD QL SMEAR: NORMAL
MCH RBC QN AUTO: 35.8 PG (ref 26.6–33)
MCHC RBC AUTO-ENTMCNC: 31.4 G/DL (ref 31.5–35.7)
MCV RBC AUTO: 114.2 FL (ref 79–97)
METHGB BLD QL: 0.5 % (ref 0–1.5)
METHGB BLD QL: 0.6 % (ref 0–1.5)
MODALITY: ABNORMAL
MODALITY: ABNORMAL
NITRITE UR QL STRIP: NEGATIVE
NOTIFIED BY: ABNORMAL
NOTIFIED BY: ABNORMAL
NOTIFIED WHO: ABNORMAL
NOTIFIED WHO: ABNORMAL
OXYHGB MFR BLDV: 95.9 % (ref 94–99)
OXYHGB MFR BLDV: 96.3 % (ref 94–99)
PCO2 BLDA: 25.5 MM HG (ref 35–45)
PCO2 BLDA: 26.4 MM HG (ref 35–45)
PCO2 TEMP ADJ BLD: ABNORMAL MM[HG]
PCO2 TEMP ADJ BLD: ABNORMAL MM[HG]
PH BLDA: 7.16 PH UNITS (ref 7.35–7.45)
PH BLDA: 7.2 PH UNITS (ref 7.35–7.45)
PH UR STRIP.AUTO: 5.5 [PH] (ref 5–8)
PH, TEMP CORRECTED: ABNORMAL
PH, TEMP CORRECTED: ABNORMAL
PLATELET # BLD AUTO: 272 10*3/MM3 (ref 140–450)
PMV BLD AUTO: 9.9 FL (ref 6–12)
PO2 BLDA: 104 MM HG (ref 75–100)
PO2 BLDA: 97.9 MM HG (ref 75–100)
PO2 TEMP ADJ BLD: ABNORMAL MM[HG]
PO2 TEMP ADJ BLD: ABNORMAL MM[HG]
POTASSIUM SERPL-SCNC: 4.8 MMOL/L (ref 3.5–5.2)
POTASSIUM SERPL-SCNC: 4.9 MMOL/L (ref 3.5–5.2)
POTASSIUM SERPL-SCNC: 6 MMOL/L (ref 3.5–5.2)
PROT SERPL-MCNC: 6.4 G/DL (ref 6–8.5)
PROT SERPL-MCNC: 7 G/DL (ref 6–8.5)
PROT UR QL STRIP: ABNORMAL
RBC # BLD AUTO: 3.1 10*6/MM3 (ref 3.77–5.28)
RBC # UR STRIP: ABNORMAL /HPF
REF LAB TEST METHOD: ABNORMAL
SAO2 % BLDCOA: 98.5 % (ref 94–100)
SAO2 % BLDCOA: 98.6 % (ref 94–100)
SMALL PLATELETS BLD QL SMEAR: ADEQUATE
SODIUM SERPL-SCNC: 135 MMOL/L (ref 136–145)
SODIUM SERPL-SCNC: 136 MMOL/L (ref 136–145)
SODIUM SERPL-SCNC: 139 MMOL/L (ref 136–145)
SP GR UR STRIP: 1.02 (ref 1–1.03)
SQUAMOUS #/AREA URNS HPF: ABNORMAL /HPF
UROBILINOGEN UR QL STRIP: ABNORMAL
VENTILATOR MODE: ABNORMAL
VENTILATOR MODE: ABNORMAL
WBC # UR STRIP: ABNORMAL /HPF
WBC MORPH BLD: NORMAL
WBC NRBC COR # BLD AUTO: 10.64 10*3/MM3 (ref 3.4–10.8)

## 2024-04-21 PROCEDURE — 25010000002 HYDROMORPHONE 1 MG/ML SOLUTION: Performed by: INTERNAL MEDICINE

## 2024-04-21 PROCEDURE — 82948 REAGENT STRIP/BLOOD GLUCOSE: CPT | Performed by: INTERNAL MEDICINE

## 2024-04-21 PROCEDURE — 25010000002 ENOXAPARIN PER 10 MG: Performed by: EMERGENCY MEDICINE

## 2024-04-21 PROCEDURE — 82805 BLOOD GASES W/O2 SATURATION: CPT

## 2024-04-21 PROCEDURE — 0 DEXTROSE 5 % SOLUTION 1,000 ML FLEX CONT: Performed by: INTERNAL MEDICINE

## 2024-04-21 PROCEDURE — 25810000003 LACTATED RINGERS PER 1000 ML: Performed by: INTERNAL MEDICINE

## 2024-04-21 PROCEDURE — 63710000001 PREDNISONE PER 1 MG: Performed by: INTERNAL MEDICINE

## 2024-04-21 PROCEDURE — 83690 ASSAY OF LIPASE: CPT | Performed by: INTERNAL MEDICINE

## 2024-04-21 PROCEDURE — 83050 HGB METHEMOGLOBIN QUAN: CPT

## 2024-04-21 PROCEDURE — 36600 WITHDRAWAL OF ARTERIAL BLOOD: CPT

## 2024-04-21 PROCEDURE — 25010000002 METHYLPREDNISOLONE PER 125 MG: Performed by: EMERGENCY MEDICINE

## 2024-04-21 PROCEDURE — 25010000002 HYDROMORPHONE 1 MG/ML SOLUTION: Performed by: EMERGENCY MEDICINE

## 2024-04-21 PROCEDURE — 25010000002 DIPHENHYDRAMINE PER 50 MG: Performed by: EMERGENCY MEDICINE

## 2024-04-21 PROCEDURE — 99222 1ST HOSP IP/OBS MODERATE 55: CPT | Performed by: INTERNAL MEDICINE

## 2024-04-21 PROCEDURE — 74176 CT ABD & PELVIS W/O CONTRAST: CPT

## 2024-04-21 PROCEDURE — 25010000002 ONDANSETRON PER 1 MG: Performed by: INTERNAL MEDICINE

## 2024-04-21 PROCEDURE — 63710000001 INSULIN REGULAR HUMAN PER 5 UNITS: Performed by: INTERNAL MEDICINE

## 2024-04-21 PROCEDURE — 63710000001 DIPHENHYDRAMINE PER 50 MG: Performed by: INTERNAL MEDICINE

## 2024-04-21 PROCEDURE — 25810000003 LACTATED RINGERS SOLUTION: Performed by: INTERNAL MEDICINE

## 2024-04-21 PROCEDURE — 80053 COMPREHEN METABOLIC PANEL: CPT | Performed by: INTERNAL MEDICINE

## 2024-04-21 PROCEDURE — 81001 URINALYSIS AUTO W/SCOPE: CPT | Performed by: EMERGENCY MEDICINE

## 2024-04-21 PROCEDURE — 82375 ASSAY CARBOXYHB QUANT: CPT

## 2024-04-21 PROCEDURE — 25810000003 LACTATED RINGERS PER 1000 ML: Performed by: EMERGENCY MEDICINE

## 2024-04-21 PROCEDURE — 25010000002 CEFTRIAXONE PER 250 MG: Performed by: INTERNAL MEDICINE

## 2024-04-21 PROCEDURE — 85027 COMPLETE CBC AUTOMATED: CPT | Performed by: INTERNAL MEDICINE

## 2024-04-21 PROCEDURE — 25010000002 CALCIUM GLUCONATE PER 10 ML: Performed by: INTERNAL MEDICINE

## 2024-04-21 PROCEDURE — 25010000002 EPINEPHRINE 1 MG/ML SOLUTION: Performed by: EMERGENCY MEDICINE

## 2024-04-21 RX ORDER — L.ACID,PARA/B.BIFIDUM/S.THERM 8B CELL
1 CAPSULE ORAL 2 TIMES DAILY
Status: DISCONTINUED | OUTPATIENT
Start: 2024-04-21 | End: 2024-04-24 | Stop reason: HOSPADM

## 2024-04-21 RX ORDER — POLYETHYLENE GLYCOL 3350 17 G/17G
17 POWDER, FOR SOLUTION ORAL DAILY PRN
Status: DISCONTINUED | OUTPATIENT
Start: 2024-04-21 | End: 2024-04-24 | Stop reason: HOSPADM

## 2024-04-21 RX ORDER — CALCIUM GLUCONATE 94 MG/ML
1 INJECTION, SOLUTION INTRAVENOUS ONCE
Status: COMPLETED | OUTPATIENT
Start: 2024-04-21 | End: 2024-04-21

## 2024-04-21 RX ORDER — BISACODYL 5 MG/1
5 TABLET, DELAYED RELEASE ORAL DAILY PRN
Status: DISCONTINUED | OUTPATIENT
Start: 2024-04-21 | End: 2024-04-24 | Stop reason: HOSPADM

## 2024-04-21 RX ORDER — BISACODYL 10 MG
10 SUPPOSITORY, RECTAL RECTAL DAILY PRN
Status: DISCONTINUED | OUTPATIENT
Start: 2024-04-21 | End: 2024-04-24 | Stop reason: HOSPADM

## 2024-04-21 RX ORDER — SODIUM CHLORIDE, SODIUM LACTATE, POTASSIUM CHLORIDE, CALCIUM CHLORIDE 600; 310; 30; 20 MG/100ML; MG/100ML; MG/100ML; MG/100ML
100 INJECTION, SOLUTION INTRAVENOUS CONTINUOUS
Status: DISCONTINUED | OUTPATIENT
Start: 2024-04-21 | End: 2024-04-21

## 2024-04-21 RX ORDER — ONDANSETRON 2 MG/ML
4 INJECTION INTRAMUSCULAR; INTRAVENOUS EVERY 6 HOURS PRN
Status: DISCONTINUED | OUTPATIENT
Start: 2024-04-21 | End: 2024-04-24 | Stop reason: HOSPADM

## 2024-04-21 RX ORDER — SODIUM CHLORIDE 0.9 % (FLUSH) 0.9 %
10 SYRINGE (ML) INJECTION AS NEEDED
Status: DISCONTINUED | OUTPATIENT
Start: 2024-04-21 | End: 2024-04-24 | Stop reason: HOSPADM

## 2024-04-21 RX ORDER — PREDNISONE 20 MG/1
40 TABLET ORAL
Status: DISPENSED | OUTPATIENT
Start: 2024-04-21 | End: 2024-04-24

## 2024-04-21 RX ORDER — DEXTROSE AND SODIUM CHLORIDE 5; .9 G/100ML; G/100ML
999 INJECTION, SOLUTION INTRAVENOUS CONTINUOUS
Status: DISCONTINUED | OUTPATIENT
Start: 2024-04-21 | End: 2024-04-21

## 2024-04-21 RX ORDER — DIPHENHYDRAMINE HCL 25 MG
25 CAPSULE ORAL 3 TIMES DAILY
Status: DISPENSED | OUTPATIENT
Start: 2024-04-21 | End: 2024-04-22

## 2024-04-21 RX ORDER — ACETAMINOPHEN 160 MG/5ML
650 SOLUTION ORAL EVERY 4 HOURS PRN
Status: DISCONTINUED | OUTPATIENT
Start: 2024-04-21 | End: 2024-04-24 | Stop reason: HOSPADM

## 2024-04-21 RX ORDER — ACETAMINOPHEN 325 MG/1
650 TABLET ORAL EVERY 4 HOURS PRN
Status: DISCONTINUED | OUTPATIENT
Start: 2024-04-21 | End: 2024-04-24 | Stop reason: HOSPADM

## 2024-04-21 RX ORDER — AMOXICILLIN 250 MG
2 CAPSULE ORAL 2 TIMES DAILY PRN
Status: DISCONTINUED | OUTPATIENT
Start: 2024-04-21 | End: 2024-04-24 | Stop reason: HOSPADM

## 2024-04-21 RX ORDER — DEXTROSE MONOHYDRATE 25 G/50ML
25 INJECTION, SOLUTION INTRAVENOUS ONCE
Status: COMPLETED | OUTPATIENT
Start: 2024-04-21 | End: 2024-04-21

## 2024-04-21 RX ORDER — ENOXAPARIN SODIUM 100 MG/ML
40 INJECTION SUBCUTANEOUS DAILY
Status: DISCONTINUED | OUTPATIENT
Start: 2024-04-22 | End: 2024-04-24 | Stop reason: HOSPADM

## 2024-04-21 RX ORDER — SODIUM CHLORIDE, SODIUM LACTATE, POTASSIUM CHLORIDE, CALCIUM CHLORIDE 600; 310; 30; 20 MG/100ML; MG/100ML; MG/100ML; MG/100ML
75 INJECTION, SOLUTION INTRAVENOUS CONTINUOUS
Status: DISCONTINUED | OUTPATIENT
Start: 2024-04-21 | End: 2024-04-24

## 2024-04-21 RX ORDER — ACETAMINOPHEN 650 MG/1
650 SUPPOSITORY RECTAL EVERY 4 HOURS PRN
Status: DISCONTINUED | OUTPATIENT
Start: 2024-04-21 | End: 2024-04-24 | Stop reason: HOSPADM

## 2024-04-21 RX ORDER — ENOXAPARIN SODIUM 100 MG/ML
30 INJECTION SUBCUTANEOUS EVERY 24 HOURS
Status: DISCONTINUED | OUTPATIENT
Start: 2024-04-21 | End: 2024-04-21

## 2024-04-21 RX ORDER — SODIUM CHLORIDE 0.9 % (FLUSH) 0.9 %
10 SYRINGE (ML) INJECTION EVERY 12 HOURS SCHEDULED
Status: DISCONTINUED | OUTPATIENT
Start: 2024-04-21 | End: 2024-04-24 | Stop reason: HOSPADM

## 2024-04-21 RX ORDER — METHYLPREDNISOLONE SODIUM SUCCINATE 125 MG/2ML
125 INJECTION, POWDER, LYOPHILIZED, FOR SOLUTION INTRAMUSCULAR; INTRAVENOUS ONCE
Status: COMPLETED | OUTPATIENT
Start: 2024-04-21 | End: 2024-04-21

## 2024-04-21 RX ORDER — SODIUM CHLORIDE 9 MG/ML
40 INJECTION, SOLUTION INTRAVENOUS AS NEEDED
Status: DISCONTINUED | OUTPATIENT
Start: 2024-04-21 | End: 2024-04-24 | Stop reason: HOSPADM

## 2024-04-21 RX ORDER — NALOXONE HCL 0.4 MG/ML
0.4 VIAL (ML) INJECTION
Status: DISCONTINUED | OUTPATIENT
Start: 2024-04-21 | End: 2024-04-24 | Stop reason: HOSPADM

## 2024-04-21 RX ORDER — DIPHENHYDRAMINE HYDROCHLORIDE 50 MG/ML
25 INJECTION INTRAMUSCULAR; INTRAVENOUS ONCE
Status: COMPLETED | OUTPATIENT
Start: 2024-04-21 | End: 2024-04-21

## 2024-04-21 RX ADMIN — HYDROMORPHONE HYDROCHLORIDE 0.5 MG: 1 INJECTION, SOLUTION INTRAMUSCULAR; INTRAVENOUS; SUBCUTANEOUS at 20:26

## 2024-04-21 RX ADMIN — Medication 1 CAPSULE: at 09:04

## 2024-04-21 RX ADMIN — DIPHENHYDRAMINE HYDROCHLORIDE 25 MG: 25 CAPSULE ORAL at 20:27

## 2024-04-21 RX ADMIN — SODIUM CHLORIDE, POTASSIUM CHLORIDE, SODIUM LACTATE AND CALCIUM CHLORIDE 100 ML/HR: 600; 310; 30; 20 INJECTION, SOLUTION INTRAVENOUS at 17:21

## 2024-04-21 RX ADMIN — PREDNISONE 40 MG: 20 TABLET ORAL at 07:30

## 2024-04-21 RX ADMIN — INSULIN HUMAN 10 UNITS: 100 INJECTION, SOLUTION PARENTERAL at 15:06

## 2024-04-21 RX ADMIN — SODIUM CHLORIDE, POTASSIUM CHLORIDE, SODIUM LACTATE AND CALCIUM CHLORIDE 75 ML/HR: 600; 310; 30; 20 INJECTION, SOLUTION INTRAVENOUS at 00:57

## 2024-04-21 RX ADMIN — SODIUM CHLORIDE, POTASSIUM CHLORIDE, SODIUM LACTATE AND CALCIUM CHLORIDE 100 ML/HR: 600; 310; 30; 20 INJECTION, SOLUTION INTRAVENOUS at 08:46

## 2024-04-21 RX ADMIN — ENOXAPARIN SODIUM 30 MG: 100 INJECTION SUBCUTANEOUS at 01:58

## 2024-04-21 RX ADMIN — CALCIUM GLUCONATE 1 G: 98 INJECTION, SOLUTION INTRAVENOUS at 15:36

## 2024-04-21 RX ADMIN — HYDROMORPHONE HYDROCHLORIDE 0.5 MG: 1 INJECTION, SOLUTION INTRAMUSCULAR; INTRAVENOUS; SUBCUTANEOUS at 12:32

## 2024-04-21 RX ADMIN — HYDROMORPHONE HYDROCHLORIDE 0.5 MG: 1 INJECTION, SOLUTION INTRAMUSCULAR; INTRAVENOUS; SUBCUTANEOUS at 18:23

## 2024-04-21 RX ADMIN — Medication 1 CAPSULE: at 01:57

## 2024-04-21 RX ADMIN — Medication 150 MEQ: at 15:37

## 2024-04-21 RX ADMIN — Medication 1 CAPSULE: at 20:27

## 2024-04-21 RX ADMIN — Medication 10 ML: at 20:29

## 2024-04-21 RX ADMIN — DIPHENHYDRAMINE HYDROCHLORIDE 25 MG: 50 INJECTION, SOLUTION INTRAMUSCULAR; INTRAVENOUS at 02:35

## 2024-04-21 RX ADMIN — HYDROMORPHONE HYDROCHLORIDE 0.5 MG: 1 INJECTION, SOLUTION INTRAMUSCULAR; INTRAVENOUS; SUBCUTANEOUS at 09:49

## 2024-04-21 RX ADMIN — HYDROMORPHONE HYDROCHLORIDE 0.5 MG: 1 INJECTION, SOLUTION INTRAMUSCULAR; INTRAVENOUS; SUBCUTANEOUS at 04:45

## 2024-04-21 RX ADMIN — ONDANSETRON 4 MG: 2 INJECTION INTRAMUSCULAR; INTRAVENOUS at 04:44

## 2024-04-21 RX ADMIN — CEFTRIAXONE SODIUM 1000 MG: 1 INJECTION, POWDER, FOR SOLUTION INTRAMUSCULAR; INTRAVENOUS at 02:10

## 2024-04-21 RX ADMIN — DEXTROSE MONOHYDRATE 25 G: 25 INJECTION, SOLUTION INTRAVENOUS at 15:07

## 2024-04-21 RX ADMIN — HYDROMORPHONE HYDROCHLORIDE 0.5 MG: 1 INJECTION, SOLUTION INTRAMUSCULAR; INTRAVENOUS; SUBCUTANEOUS at 15:35

## 2024-04-21 RX ADMIN — Medication 10 ML: at 09:47

## 2024-04-21 RX ADMIN — SODIUM BICARBONATE 150 MEQ: 84 INJECTION, SOLUTION INTRAVENOUS at 09:43

## 2024-04-21 RX ADMIN — DIPHENHYDRAMINE HYDROCHLORIDE 25 MG: 25 CAPSULE ORAL at 09:46

## 2024-04-21 RX ADMIN — SODIUM CHLORIDE, POTASSIUM CHLORIDE, SODIUM LACTATE AND CALCIUM CHLORIDE 500 ML: 600; 310; 30; 20 INJECTION, SOLUTION INTRAVENOUS at 03:28

## 2024-04-21 RX ADMIN — HYDROMORPHONE HYDROCHLORIDE 0.5 MG: 1 INJECTION, SOLUTION INTRAMUSCULAR; INTRAVENOUS; SUBCUTANEOUS at 22:23

## 2024-04-21 RX ADMIN — METHYLPREDNISOLONE SODIUM SUCCINATE 125 MG: 125 INJECTION, POWDER, FOR SOLUTION INTRAMUSCULAR; INTRAVENOUS at 02:35

## 2024-04-21 RX ADMIN — EPINEPHRINE 0.3 MG: 1 INJECTION, SOLUTION, CONCENTRATE INTRAVENOUS at 02:34

## 2024-04-21 RX ADMIN — HYDROMORPHONE HYDROCHLORIDE 0.5 MG: 1 INJECTION, SOLUTION INTRAMUSCULAR; INTRAVENOUS; SUBCUTANEOUS at 00:34

## 2024-04-21 RX ADMIN — ONDANSETRON 4 MG: 2 INJECTION INTRAMUSCULAR; INTRAVENOUS at 11:05

## 2024-04-21 RX ADMIN — HYDROMORPHONE HYDROCHLORIDE 0.5 MG: 1 INJECTION, SOLUTION INTRAMUSCULAR; INTRAVENOUS; SUBCUTANEOUS at 07:30

## 2024-04-21 NOTE — H&P
[facility]   HISTORY AND PHYSICAL      Name:  Tasha Yarbrough   Age:  69 y.o.  Sex:  female  :  1955  MRN:  [id number]   Visit Number:  [id number]  Admission Date:  2024  Date Of Service:  24  Primary Care Physician:  Terrence Baldwin MD    Chief Complaint:     Abdominal pain.    History Of Presenting Illness:      Tasha Yarbrough is a 69-year-old female with a history of hypertension, recent history of recurrent pancreatitis discharged from this facility on 2024 was brought to the emergency room by her son with symptoms of epigastric abdominal pain since the last couple of days.  Patient has been vomiting for the last couple of days and has not been eating much.  She also gives history of feeling cold and chills.  Started having some dysuria today.  Her son lives with her.  According to the son, patient has been admitted multiple times to  and here for similar problems in the last 1 month.  No exact reasoning was given to her pancreatitis.  She was told that she has a couple of cysts in her pancreas.  She denies any chest pain or shortness of breath at this time.    In the emergency room, she was afebrile and hemodynamically stable saturating at 99% on room air.  CMP was remarkable for a CO2 of 9, BUN 25, creatinine 1.65 (baseline 1), alkaline phosphatase 131.  LFT was otherwise unremarkable.  Lipase was 722.  Lactic acid was 0.4.  Hemoglobin 11 .  Urine analysis showed 11-20 WBCs with 3-6 squamous epithelial cells and negative nitrite.  Due to multiple recent CT scans, abdominal CT scan was not obtained.  ABG showed a pH of 7.16, pCO2 26, pO2 104 and bicarb of 9.  Patient was given a liter of normal saline bolus, IV morphine and Zofran in the emergency room.  She was subsequently placed on lactated Ringer's at 75 mill per hour and was subsequently admitted to the medical floor with telemetry for pancreatitis, VIVIENNE and metabolic acidosis of uncertain  etiology.    Review Of Systems:    All systems were reviewed and negative except as mentioned in history of presenting illness, assessment and plan.    Past Medical History: Patient  has a past medical history of Hypertension, Impaired mobility, Injury of back, and Pancreatitis.    Past Surgical History: Patient  has a past surgical history that includes Tubal ligation; Colonoscopy; Colon surgery; and Abdominal surgery.    Social History: Patient  reports that she has been smoking cigarettes. She has never used smokeless tobacco. She reports that she does not drink alcohol and does not use drugs.    Family History:  Patient   Denies any family history of pancreatitis.    Allergies:      Ciprofloxacin, Codeine, and Pineapple    Home Medications:    Prior to Admission Medications       Prescriptions Last Dose Informant Patient Reported? Taking?    glucosamine-chondroitin 500-400 MG capsule capsule   Yes No    Take 1 capsule by mouth 2 (Two) Times a Day With Meals. Indications: Joint Damage causing Pain and Loss of Function    methocarbamol (ROBAXIN) 750 MG tablet   Yes No    Take 1 tablet by mouth 4 (Four) Times a Day As Needed for Muscle Spasms.    ondansetron ODT (ZOFRAN-ODT) 4 MG disintegrating tablet   No No    Place 1 tablet on the tongue Every 8 (Eight) Hours As Needed for Nausea or Vomiting.    vitamin D3 125 MCG (5000 UT) capsule capsule   Yes No    Take 1 capsule by mouth Daily. Indications: Vitamin D Deficiency     ED Medications:    Medications   sodium chloride 0.9 % flush 10 mL (has no administration in time range)   morphine injection 4 mg (4 mg Intravenous Given 4/20/24 2257)   ondansetron (ZOFRAN) injection 4 mg (4 mg Intravenous Given 4/20/24 2257)   sodium chloride 0.9 % bolus 1,000 mL (1,000 mL Intravenous New Bag 4/20/24 2257)   HYDROmorphone (DILAUDID) injection 0.5 mg (0.5 mg Intravenous Given 4/21/24 0034)     Vital Signs:  Temp:  [97.5 °F (36.4 °C)] 97.5 °F (36.4 °C)  Heart Rate:  [73-76]  "76  Resp:  [18-20] 18  BP: (148-154)/() 148/95        04/20/24  2201   Weight: 65.8 kg (145 lb)     Body mass index is 26.52 kg/m².    Physical Exam:     Most recent vital Signs: /95 (BP Location: Left arm, Patient Position: Sitting)   Pulse 76   Temp 97.5 °F (36.4 °C) (Oral)   Resp 18   Ht 157.5 cm (62\")   Wt 65.8 kg (145 lb)   SpO2 99%   BMI 26.52 kg/m²     Physical Exam  Constitutional:       General: She is not in acute distress.     Appearance: She is ill-appearing.   HENT:      Head: Normocephalic and atraumatic.      Right Ear: External ear normal.      Left Ear: External ear normal.      Nose: Nose normal.      Mouth/Throat:      Mouth: Mucous membranes are moist.   Eyes:      Extraocular Movements: Extraocular movements intact.      Conjunctiva/sclera: Conjunctivae normal.   Cardiovascular:      Rate and Rhythm: Normal rate and regular rhythm.      Pulses: Normal pulses.      Heart sounds: Normal heart sounds. No murmur heard.  Pulmonary:      Effort: Pulmonary effort is normal.      Breath sounds: Normal breath sounds. No wheezing or rales.   Abdominal:      General: Bowel sounds are normal.      Palpations: Abdomen is soft.      Tenderness: There is abdominal tenderness. There is no guarding or rebound.      Comments: Boggy abdomen.  Diffuse tenderness noted on minimal palpation.  No rebound tenderness.  Long midline surgical scar noted from previous colectomy.   Musculoskeletal:         General: Normal range of motion.      Cervical back: Neck supple.      Right lower leg: No edema.      Left lower leg: No edema.   Skin:     General: Skin is warm.      Findings: No erythema or rash.   Neurological:      General: No focal deficit present.      Mental Status: She is alert and oriented to person, place, and time. Mental status is at baseline.      Comments: Moves all 4 limbs but does have generalized weakness.   Psychiatric:         Mood and Affect: Mood normal.         Behavior: Behavior " normal.       Laboratory data:    I have reviewed the labs done in the emergency room.    Results from last 7 days   Lab Units 04/20/24  2319   SODIUM mmol/L 139   POTASSIUM mmol/L 4.9   CHLORIDE mmol/L 116*   CO2 mmol/L 8.9*   BUN mg/dL 25*   CREATININE mg/dL 1.65*   CALCIUM mg/dL 9.6   BILIRUBIN mg/dL <0.2   ALK PHOS U/L 131*   ALT (SGPT) U/L 7   AST (SGOT) U/L 11   GLUCOSE mg/dL 81     Results from last 7 days   Lab Units 04/20/24  2319   WBC 10*3/mm3 9.43   HEMOGLOBIN g/dL 11.3*   HEMATOCRIT % 35.3   PLATELETS 10*3/mm3 286       Results from last 7 days   Lab Units 04/20/24  2319   LIPASE U/L 722*     Results from last 7 days   Lab Units 04/21/24  0015   PH, ARTERIAL pH units 7.158*   PO2 ART mm Hg 104.0*   PCO2, ARTERIAL mm Hg 26.4*   HCO3 ART mmol/L 9.4*     Results from last 7 days   Lab Units 04/21/24  0022   COLOR UA  Yellow   GLUCOSE UA  Negative   KETONES UA  Negative   BLOOD UA  Negative   LEUKOCYTES UA  Small (1+)*   PH, URINE  5.5   BILIRUBIN UA  Negative   UROBILINOGEN UA  0.2 E.U./dL   RBC UA /HPF None Seen   WBC UA /HPF 11-20*     Radiology:    No radiology results for the last 3 days    Assessment:    Acute recurrent pancreatitis, POA.  Acute kidney injury, POA.  Acute urinary tract infection.  Acute metabolic acidosis of uncertain etiology, POA.  Essential hypertension.    Plan:    Acute recurrent pancreatitis.  - Patient has history of pancreatitis of uncertain etiology.  - Keep her n.p.o. with ice chips and IV fluids.  - Dilaudid and Zofran for symptomatic control.    Acute kidney injury/metabolic acidosis.  - Patient does have a bump in creatinine likely secondary to dehydration.  - Continue IV fluids and repeat basic metabolic panel in the morning.  - Exact etiology of her acidosis is uncertain but could be related to the renal failure.  She does not have any evidence of elevated lactic acid.  - I will hold off on bicarb drip at this time.    Acute urinary tract infection.  - We will treat her  with ceftriaxone for 3 days.    I have discussed the patient's condition and treatment plan with her son Thai who is at the bedside.      Risk Assessment: Moderate  DVT Prophylaxis: Enoxaparin  Code Status: Full  Diet: N.p.o.      Edwin Aguilar MD  04/21/24  00:49 EDT    Dictated utilizing Dragon dictation.

## 2024-04-21 NOTE — NURSING NOTE
"This nurse called [facility] at this time for report. They stated they will \"call this nurse back later.\"    "

## 2024-04-21 NOTE — PROGRESS NOTES
[facility]    Name:  Tasha Yarbrough   Age:  69 y.o.  Sex:  female  :  1955  MRN:  [id number]   Visit Number:  [id number]  Admission Date:  2024  Date Of Service:  24  Primary Care Physician:  Terrence Baldwin MD    Patient was seen and examined. Pertinent laboratory and radiology data were reviewed.    Vital signs:    Vital Signs (last 24 hours)          0700   0659  0700   1127   Most Recent      Temp (°F)   97.5       97.5 (36.4)  2201    Heart Rate 73 -  123    71 -  84     71  1123    Resp 18 -  20       18  0428    BP 80/53 -  162/96    95/60 -  132/64     113/77  1100    SpO2 (%) 92 -  100    97 -  98     98  1123            -Patient with worsening metabolic acidosis, nonanion gap.  Suspect secondary to GI losses.  -Initiate bicarbonate drip, repeat BMP this afternoon  -Nephrology consultation  -Risk versus benefit weighed, repeat CT abdomen pelvis obtained due to progressively worsening metabolic acidemia and concern for GI process.  Shows mild peripancreatic stranding.  There are also multiple air-fluid levels in small bowel loops which otherwise not significantly dilated may reflect underlying ileus.  -Jose n.p.oAbdullahi Dmaon DO  24  11:27 EDT    Dictated utilizing Dragon dictation.

## 2024-04-21 NOTE — PROGRESS NOTES
"    [facility]    Name:  Tasha Yarbrough   Age:  69 y.o.  Sex:  female  :  1955  MRN:  [id number]   Visit Number:  [id number]  Admission Date:  2024  Date Of Service:  24  Primary Care Physician:  Terrence Baldwin MD    Patient developed anaphylaxis following Rocephin infusion causing shortness of breath, tachycardia and drop in blood pressures.  Patient was given 0.3 mg of epinephrine intramuscularly, IV Solu-Medrol and IV Benadryl was given in the emergency room.  Rocephin was added to the patient's allergy list.  Rocephin order was discontinued.    Vital signs:    Vital Signs (last 24 hours)          0700   0659  07 0319   Most Recent      Temp (°F)     97.5     97.5 (36.4)  2201    Heart Rate   73 -  123     93  0310    Resp   18 -  20     18  0207    BP   80/53 -  162/96     124/86  0310    SpO2 (%)   92 -  100     100  0310     Due to low blood pressures, she received a bolus of lactated Ringer at 500 mL with improvement in her pressures.    Patient is currently feeling better but patient state that she \"I was going to die\" but now \"I am feeling better\".  Systolic blood pressures have improved into the 110s.  We will hold off on further antibiotics therapy at this time and check Blood work in the morning.  Discussed with nursing staff.    Edwin Aguilar MD  24  03:19 EDT    Dictated utilizing Dragon dictation.  "

## 2024-04-21 NOTE — ED PROVIDER NOTES
EMERGENCY DEPARTMENT ENCOUNTER    Pt Name: Tasha Yarbrough  MRN: [id number]  Pt :   1955  Room Number:  10/10  Date of encounter:  2024  PCP: Terrence Baldwin MD  ED Provider: Juan Luis Martins MD    Historian: Patient      HPI:  Chief Complaint   Patient presents with    Abdominal Pain          Context: Tasha Yarbrough is a 69 y.o. female who presents to the ED c/o abdominal pain, located epigastric area, similar to multiple prior episodes of pancreatitis, the patient was admitted about 2 weeks ago for pancreatitis, previously admitted to [facility] and found to have pseudocyst.  She has not yet been able to follow-up with her gastroenterologist at the [facility].  She is unable to keep down pain medications at home due to vomiting.  Denies fevers, reports some difficulty urinating throughout the day today      PAST MEDICAL HISTORY  Past Medical History:   Diagnosis Date    Hypertension     Impaired mobility     Injury of back     Pancreatitis          PAST SURGICAL HISTORY  Past Surgical History:   Procedure Laterality Date    ABDOMINAL SURGERY      COLON SURGERY      COLONOSCOPY      TUBAL ABDOMINAL LIGATION           FAMILY HISTORY  History reviewed. No pertinent family history.      SOCIAL HISTORY  Social History     Socioeconomic History    Marital status:    Tobacco Use    Smoking status: Every Day     Current packs/day: 1.00     Types: Cigarettes    Smokeless tobacco: Never   Vaping Use    Vaping status: Never Used   Substance and Sexual Activity    Alcohol use: Never    Drug use: Never    Sexual activity: Defer         ALLERGIES  Rocephin [ceftriaxone], Ciprofloxacin, Codeine, and Pineapple        REVIEW OF SYSTEMS  Review of Systems   Constitutional:  Negative for chills and fever.   HENT:  Negative for sore throat and trouble swallowing.    Eyes:  Negative for pain and redness.   Respiratory:  Negative for cough and shortness of breath.    Cardiovascular:   Negative for chest pain and leg swelling.   Gastrointestinal:  Positive for abdominal pain, nausea and vomiting.   Genitourinary:  Negative for dysuria and urgency.   Musculoskeletal:  Negative for back pain and neck pain.   Skin:  Negative for rash and wound.   Neurological:  Negative for dizziness and weakness.        All systems reviewed and negative except for those discussed in HPI.       PHYSICAL EXAM    I have reviewed the triage vital signs and nursing notes.    ED Triage Vitals   Temp Heart Rate Resp BP SpO2   04/20/24 2201 04/20/24 2201 04/20/24 2201 04/20/24 2201 04/20/24 2201   97.5 °F (36.4 °C) 73 20 (!) 154/104 99 %      Temp src Heart Rate Source Patient Position BP Location FiO2 (%)   04/20/24 2201 04/20/24 2342 04/20/24 2201 04/20/24 2201 --   Oral Monitor Sitting Left arm        Physical Exam  Constitutional:       Appearance: Normal appearance. She is not ill-appearing.   HENT:      Head: Normocephalic and atraumatic.      Right Ear: External ear normal.      Left Ear: External ear normal.      Nose: Nose normal.      Mouth/Throat:      Mouth: Mucous membranes are moist.      Pharynx: Oropharynx is clear.   Eyes:      Extraocular Movements: Extraocular movements intact.      Conjunctiva/sclera: Conjunctivae normal.      Pupils: Pupils are equal, round, and reactive to light.   Cardiovascular:      Rate and Rhythm: Normal rate and regular rhythm.      Pulses:           Radial pulses are 2+ on the right side and 2+ on the left side.   Pulmonary:      Effort: Pulmonary effort is normal.      Breath sounds: Normal breath sounds.   Abdominal:      General: There is no distension.      Palpations: Abdomen is soft.      Tenderness: There is abdominal tenderness in the epigastric area.   Musculoskeletal:         General: No tenderness or deformity. Normal range of motion.      Cervical back: Normal range of motion and neck supple.      Right lower leg: No edema.      Left lower leg: No edema.   Skin:      General: Skin is warm and dry.      Capillary Refill: Capillary refill takes less than 2 seconds.   Neurological:      General: No focal deficit present.      Mental Status: She is alert and oriented to person, place, and time.            LAB RESULTS  Recent Results (from the past 24 hour(s))   Comprehensive Metabolic Panel    Collection Time: 04/20/24 11:19 PM    Specimen: Blood   Result Value Ref Range    Glucose 81 65 - 99 mg/dL    BUN 25 (H) 8 - 23 mg/dL    Creatinine 1.65 (H) 0.57 - 1.00 mg/dL    Sodium 139 136 - 145 mmol/L    Potassium 4.9 3.5 - 5.2 mmol/L    Chloride 116 (H) 98 - 107 mmol/L    CO2 8.9 (C) 22.0 - 29.0 mmol/L    Calcium 9.6 8.6 - 10.5 mg/dL    Total Protein 7.0 6.0 - 8.5 g/dL    Albumin 4.0 3.5 - 5.2 g/dL    ALT (SGPT) 7 1 - 33 U/L    AST (SGOT) 11 1 - 32 U/L    Alkaline Phosphatase 131 (H) 39 - 117 U/L    Total Bilirubin <0.2 0.0 - 1.2 mg/dL    Globulin 3.0 gm/dL    A/G Ratio 1.3 g/dL    BUN/Creatinine Ratio 15.2 7.0 - 25.0    Anion Gap 14.1 5.0 - 15.0 mmol/L    eGFR 33.5 (L) >60.0 mL/min/1.73   Lipase    Collection Time: 04/20/24 11:19 PM    Specimen: Blood   Result Value Ref Range    Lipase 722 (H) 13 - 60 U/L   Magnesium    Collection Time: 04/20/24 11:19 PM    Specimen: Blood   Result Value Ref Range    Magnesium 2.5 (H) 1.6 - 2.4 mg/dL   CBC Auto Differential    Collection Time: 04/20/24 11:19 PM    Specimen: Blood   Result Value Ref Range    WBC 9.43 3.40 - 10.80 10*3/mm3    RBC 3.13 (L) 3.77 - 5.28 10*6/mm3    Hemoglobin 11.3 (L) 12.0 - 15.9 g/dL    Hematocrit 35.3 34.0 - 46.6 %    .8 (H) 79.0 - 97.0 fL    MCH 36.1 (H) 26.6 - 33.0 pg    MCHC 32.0 31.5 - 35.7 g/dL    RDW 16.2 (H) 12.3 - 15.4 %    RDW-SD 67.7 (H) 37.0 - 54.0 fl    MPV 10.0 6.0 - 12.0 fL    Platelets 286 140 - 450 10*3/mm3    Neutrophil % 72.2 42.7 - 76.0 %    Lymphocyte % 18.5 (L) 19.6 - 45.3 %    Monocyte % 6.4 5.0 - 12.0 %    Eosinophil % 1.3 0.3 - 6.2 %    Basophil % 0.4 0.0 - 1.5 %    Immature Grans % 1.2 (H)  0.0 - 0.5 %    Neutrophils, Absolute 6.82 1.70 - 7.00 10*3/mm3    Lymphocytes, Absolute 1.74 0.70 - 3.10 10*3/mm3    Monocytes, Absolute 0.60 0.10 - 0.90 10*3/mm3    Eosinophils, Absolute 0.12 0.00 - 0.40 10*3/mm3    Basophils, Absolute 0.04 0.00 - 0.20 10*3/mm3    Immature Grans, Absolute 0.11 (H) 0.00 - 0.05 10*3/mm3    nRBC 0.0 0.0 - 0.2 /100 WBC   Lactic Acid, Plasma    Collection Time: 04/20/24 11:19 PM    Specimen: Blood   Result Value Ref Range    Lactate 0.4 (L) 0.5 - 2.0 mmol/L   Scan Slide    Collection Time: 04/20/24 11:19 PM    Specimen: Blood   Result Value Ref Range    Hypochromia Slight/1+ None Seen    Macrocytes Slight/1+ None Seen    WBC Morphology Normal Normal    Platelet Estimate Adequate Normal   Blood Gas, Arterial With Co-Ox    Collection Time: 04/21/24 12:15 AM    Specimen: Arterial Blood   Result Value Ref Range    Site Left Radial     Rafy's Test Positive     pH, Arterial 7.158 (C) 7.350 - 7.450 pH units    pCO2, Arterial 26.4 (C) 35.0 - 45.0 mm Hg    pO2, Arterial 104.0 (H) 75.0 - 100.0 mm Hg    HCO3, Arterial 9.4 (L) 22.0 - 28.0 mmol/L    Base Excess, Arterial -17.9 (L) 0.0 - 2.0 mmol/L    O2 Saturation, Arterial 98.5 94.0 - 100.0 %    Hematocrit, Blood Gas 32.5 %    Oxyhemoglobin 95.9 94 - 99 %    Methemoglobin 0.50 0.00 - 1.50 %    Carboxyhemoglobin 2.2 (H) 0 - 2 %    A-a DO2 10.7 mmHg    Barometric Pressure for Blood Gas 736 mmHg    Modality Room Air     FIO2 21 %    Ventilator Mode NA     Notified Who MD     Notified By [id number]     Notified Time 04/21/2024 00:30     Collected by [id number]     pH, Temp Corrected      pCO2, Temperature Corrected      pO2, Temperature Corrected     Urinalysis With Microscopic If Indicated (No Culture) - Urine, Clean Catch    Collection Time: 04/21/24 12:22 AM    Specimen: Urine, Clean Catch   Result Value Ref Range    Color, UA Yellow Yellow, Straw    Appearance, UA Clear Clear    pH, UA 5.5 5.0 - 8.0    Specific Gravity, UA 1.022 1.005 - 1.030    Glucose,  UA Negative Negative    Ketones, UA Negative Negative    Bilirubin, UA Negative Negative    Blood, UA Negative Negative    Protein, UA 30 mg/dL (1+) (A) Negative    Leuk Esterase, UA Small (1+) (A) Negative    Nitrite, UA Negative Negative    Urobilinogen, UA 0.2 E.U./dL 0.2 - 1.0 E.U./dL   Urinalysis, Microscopic Only - Urine, Clean Catch    Collection Time: 04/21/24 12:22 AM    Specimen: Urine, Clean Catch   Result Value Ref Range    RBC, UA None Seen None Seen, 0-2 /HPF    WBC, UA 11-20 (A) None Seen, 0-2 /HPF    Bacteria, UA Trace (A) None Seen /HPF    Squamous Epithelial Cells, UA 3-6 (A) None Seen, 0-2 /HPF    Hyaline Casts, UA None Seen None Seen /LPF    Methodology Manual Light Microscopy        If labs were ordered, I independently reviewed the results and considered them in treating the patient.        RADIOLOGY  No Radiology Exams Resulted Within Past 24 Hours        PROCEDURES    Critical Care    Performed by: Juan Luis Martins MD  Authorized by: Juan Luis Martins MD    Critical care provider statement:     Critical care time (minutes):  39    Critical care time was exclusive of:  Separately billable procedures and treating other patients    Critical care was necessary to treat or prevent imminent or life-threatening deterioration of the following conditions:  Metabolic crisis and endocrine crisis    Critical care was time spent personally by me on the following activities:  Ordering and performing treatments and interventions, ordering and review of laboratory studies, ordering and review of radiographic studies, pulse oximetry, re-evaluation of patient's condition, blood draw for specimens, discussions with consultants, evaluation of patient's response to treatment and examination of patient    I assumed direction of critical care for this patient from another provider in my specialty: no      Care discussed with: admitting provider        Interpretations    O2 Sat: The patients oxygen  saturation was 99% on Room Air.  This was independently interpreted by me as Normal    Cardiac Monitoring: I reviewed and independently interpreted the Rhythm Strip as Normal Sinus rhythm rate of 76      MEDICATIONS GIVEN IN ER    Medications   sodium chloride 0.9 % flush 10 mL (has no administration in time range)   lactated ringers infusion (100 mL/hr Intravenous Rate/Dose Change 4/21/24 0110)   sodium chloride 0.9 % flush 10 mL ( Intravenous Canceled Entry 4/21/24 0236)   sodium chloride 0.9 % flush 10 mL (has no administration in time range)   sodium chloride 0.9 % infusion 40 mL (has no administration in time range)   acetaminophen (TYLENOL) tablet 650 mg (has no administration in time range)     Or   acetaminophen (TYLENOL) 160 MG/5ML oral solution 650 mg (has no administration in time range)     Or   acetaminophen (TYLENOL) suppository 650 mg (has no administration in time range)   ondansetron (ZOFRAN) injection 4 mg (has no administration in time range)   Pharmacy to Dose enoxaparin (LOVENOX) (has no administration in time range)   HYDROmorphone (DILAUDID) injection 0.5 mg (has no administration in time range)     And   naloxone (NARCAN) injection 0.4 mg (has no administration in time range)   sennosides-docusate (PERICOLACE) 8.6-50 MG per tablet 2 tablet (has no administration in time range)     And   polyethylene glycol (MIRALAX) packet 17 g (has no administration in time range)     And   bisacodyl (DULCOLAX) EC tablet 5 mg (has no administration in time range)     And   bisacodyl (DULCOLAX) suppository 10 mg (has no administration in time range)   cefTRIAXone (ROCEPHIN) 1,000 mg in sodium chloride 0.9 % 100 mL IVPB-VTB (1,000 mg Intravenous New Bag 4/21/24 0156)   lactobacillus acidophilus (RISAQUAD) capsule 1 capsule (1 capsule Oral Given 4/21/24 0157)   Enoxaparin Sodium (LOVENOX) syringe 30 mg (30 mg Subcutaneous Given 4/21/24 0158)   morphine injection 4 mg (4 mg Intravenous Given 4/20/24 8782)    ondansetron (ZOFRAN) injection 4 mg (4 mg Intravenous Given 4/20/24 4556)   sodium chloride 0.9 % bolus 1,000 mL (0 mL Intravenous Stopped 4/20/24 0007)   HYDROmorphone (DILAUDID) injection 0.5 mg (0.5 mg Intravenous Given 4/21/24 0034)   diphenhydrAMINE (BENADRYL) injection 25 mg (25 mg Intravenous Given 4/21/24 0235)   methylPREDNISolone sodium succinate (SOLU-Medrol) injection 125 mg (125 mg Intravenous Given 4/21/24 0235)   EPINEPHrine (ADRENALIN) injection 0.3 mg (0.3 mg Intramuscular Given 4/21/24 0234)         MEDICAL DECISION MAKING, PROGRESS, and CONSULTS    All labs, if obtained, have been independently reviewed by me.  All radiology studies, if obtained, have been reviewed by me and the radiologist dictating the report.  All EKG's, if obtained, have been independently viewed and interpreted by me      Discussion below represents my analysis of pertinent findings related to patient's condition, differential diagnosis, treatment plan and final disposition.      Differential diagnosis:    69-year-old female with known pancreatic pseudocyst and history of recurrent pancreatitis presents to the ED with complaint of abdominal pain, nausea and vomiting.  She is quite tender in the epigastric region, concerned about recurrent pancreatitis, as well as significant electrolyte derangement given all of the vomiting.  Will obtain laboratory workup including lipase, liver function test, CT scan of the abdomen pelvis was considered however the patient's had 15 scans recently, will try and avoid this.  Will provide IV morphine, IV Zofran, IV fluids    Additional Sources:  External (non-ED) record review:  Discharge summary from recent admission April 2024       Orders placed during this visit:  Orders Placed This Encounter   Procedures    Critical Care    Comprehensive Metabolic Panel    Lipase    Magnesium    CBC Auto Differential    Lactic Acid, Plasma    Scan Slide    Blood Gas, Arterial -With Co-Ox Panel: Yes     Urinalysis With Microscopic If Indicated (No Culture) - Urine, Clean Catch    Blood Gas, Arterial With Co-Ox    Urinalysis, Microscopic Only - Urine, Clean Catch    CBC (No Diff)    Comprehensive Metabolic Panel    Lipase    NPO Diet NPO Type: Ice Chips, Sips with Meds    Vital Signs    Up With Assistance    Intake & Output    Weigh patient    Oral Care    Saline Lock & Maintain IV Access    Continuous Pulse Oximetry    Code Status and Medical Interventions:    PT Consult: Eval & Treat As Tolerated; Functional Mobility Below Baseline    Incentive Spirometry    Insert Peripheral IV    Insert Peripheral IV    Initiate Observation Status    CBC & Differential         Additional orders considered but not ordered:  Imaging: CT scan, will hold off given the sheer number of CT scans    ED Course:    Consultants:  Hospitalist    ED Course as of 04/21/24 0244   Sun Apr 21, 2024   0001 Lipase(!): 722  Lipase elevated, higher than it was during the last admission [CS]   0002 Creatinine(!): 1.65  Creatinine significantly elevated over her baseline [CS]   0019 Blood Gas, Arterial With Co-Ox(!!)  ABG with significant nonanion gap metabolic acidosis, lactic is negative, unclear etiology. [CS]   0040 Given the renal injury, as well as a significant non-anion gap metabolic acidosis will initiate a bicarb drip [CS]   0051 Spoke to Dr ellison, he will evaluate the patient for admission [CS]   0243 While waiting to be transported upstairs I was informed by nursing the patient became somnolent sonorous respirations, bright red with diffuse rash, she had just started ceftriaxone ordered for her urinary tract infection, appears that she was experiencing anaphylactic reaction.  Antibiotics were stopped, patient was given Benadryl and Solu-Medrol however her symptoms were not improving, her blood pressure began to drop so she was given IM epinephrine with good response. [CS]      ED Course User Index  [CS] Juan Luis Martins MD            After my consideration of clinical presentation and any laboratory/radiology studies obtained, I discussed the findings with the patient/patient representative who is in agreement with the treatment plan and the final disposition. Risks and benefits of admission was discussed     AS OF 02:44 EDT VITALS:    BP - 162/96  HR - 86  TEMP - 97.5 °F (36.4 °C) (Oral)  O2 SATS - 99%    I reviewed the patients prescription monitoring report if available prior to discharge    DIAGNOSIS  Final diagnoses:   Acute pancreatitis without infection or necrosis, unspecified pancreatitis type   Metabolic acidosis   Acute kidney injury         DISPOSITION  ED Disposition       ED Disposition   Decision to Admit    Condition   --    Comment   Level of Care: Telemetry [5]   Diagnosis: Metabolic acidosis [759215]   Admitting Physician: TIAN FITZGERALD [[id number]]   Attending Physician: TIAN FITZGERALD [[id number]]                     Please note that portions of this document were completed with voice recognition software.        Juan Luis Martins MD  04/21/24 0056       Juan Luis Martins MD  04/21/24 0244

## 2024-04-21 NOTE — CONSULTS
[facility] Consult Note      Patient Name: Tasha Yarbrough  : 1955  MRN: [id number]  Primary Care Physician:  Terrence Baldwin MD  Referring Physician: Juan Luis Martins*  Date of admission: 2024    Subjective     Reason for Consult:  VIVIENNE CKD3    HPI:   Tasha Yarbrough is a 69 y.o. female with CKD stage 3, BL Cr 1.1 - 1.3 range, HTN who presented last night with epigastric abd pain, n/v and poor oral intake.  Hx recurrent pancreatitis, with recent hospitalization for this just 2 weeks ago  to 24.  Found to have VIVIENNE and severe metabolic acidosis: BUN/Cr 25/1.65 with bicarb 9, AG 14, and normal K.  AB.15//104.  UA: small LE, 30 mg/dL protein.  Lipase elevated 470.  Though Cr and bicarb have improved some with IVF, to 1.4 and 13 respectively, K is up to 6.0.  Glucose normal 111.  D50/insulin ordered.  Bicarb drip started this AM.  Initial BP high 150/100 but then transiently low to 80s & 90s / 50s soon after assuming accurate.  CT abd/pelvis shows mild peripancreatic stranding and multi air-fluid levels in small bowel loops; bilat simple cysts & non-obs right stone, no hydronephrosis.  She's had loose stool lately. Sees GI and reports plan is for MRI pancreas and colonoscopy.    Review of Systems:   14 point review of systems is otherwise negative except for mentioned above on HPI    Personal History     Past Medical History:   Diagnosis Date    Hypertension     Impaired mobility     Injury of back     Pancreatitis        Past Surgical History:   Procedure Laterality Date    ABDOMINAL SURGERY      COLON SURGERY      COLONOSCOPY      TUBAL ABDOMINAL LIGATION         Family History: family history is not on file.    Social History:  reports that she has been smoking cigarettes. She has never used smokeless tobacco. She reports that she does not drink alcohol and does not use drugs.    Home Medications:  Prior to Admission medications    Medication Sig Start Date End  Date Taking? Authorizing Provider   glucosamine-chondroitin 500-400 MG capsule capsule Take 1 capsule by mouth 2 (Two) Times a Day With Meals. Indications: Joint Damage causing Pain and Loss of Function    ProviderTaz MD   methocarbamol (ROBAXIN) 750 MG tablet Take 1 tablet by mouth 4 (Four) Times a Day As Needed for Muscle Spasms.    ProviderTaz MD   ondansetron ODT (ZOFRAN-ODT) 4 MG disintegrating tablet Place 1 tablet on the tongue Every 8 (Eight) Hours As Needed for Nausea or Vomiting. 4/6/24   Shawn Good PA-C   vitamin D3 125 MCG (5000 UT) capsule capsule Take 1 capsule by mouth Daily. Indications: Vitamin D Deficiency    Provider, MD Taz   metoprolol succinate XL (TOPROL-XL) 50 MG 24 hr tablet Take 50 mg by mouth Daily.  Patient not taking: Reported on 7/27/2022  8/3/22  ProviderTaz MD       Allergies:  Allergies   Allergen Reactions    Rocephin [Ceftriaxone] Anaphylaxis    Ciprofloxacin Dizziness    Codeine Itching    Pineapple Unknown - Low Severity       Objective     Vitals:   Temp:  [97.5 °F (36.4 °C)] 97.5 °F (36.4 °C)  Heart Rate:  [] 80  Resp:  [18-20] 18  BP: ()/() 122/77    Intake/Output Summary (Last 24 hours) at 4/21/2024 1444  Last data filed at 4/21/2024 0946  Gross per 24 hour   Intake 1600 ml   Output --   Net 1600 ml       Physical Exam:    General Appearance: frail pleasant WF in no distress, alert  Skin: warm and dry  HEENT: oral mucosa normal, nonicteric sclera  Neck: supple, no JVD  Lungs: CTA bilat no rales  Heart: RRR, normal S1 and S2  Abdomen: soft, epigastric TTP present   : no palpable bladder  Extremities: no edema, cyanosis or clubbing  Neuro: normal speech and mental status     Scheduled Meds:     dextrose, 25 g, Intravenous, Once  diphenhydrAMINE, 25 mg, Oral, TID  enoxaparin, 30 mg, Subcutaneous, Q24H  insulin regular, 10 Units, Intravenous, Once  lactobacillus acidophilus, 1 capsule, Oral, BID  predniSONE, 40 mg,  Oral, Daily With Breakfast  sodium chloride, 10 mL, Intravenous, Q12H      IV Meds:   Pharmacy to Dose enoxaparin (LOVENOX),   sodium bicarbonate 8.4 % 150 mEq in dextrose (D5W) 5 % 1,000 mL infusion (greater than 100 mEq), 150 mEq, Last Rate: 150 mEq (04/21/24 0943)        Results Reviewed:   I have personally reviewed the results from the time of this admission to 4/21/2024 14:44 EDT     Lab Results   Component Value Date    GLUCOSE 111 (H) 04/21/2024    CALCIUM 9.5 04/21/2024     04/21/2024    K 6.0 (H) 04/21/2024    CO2 13.3 (L) 04/21/2024     (H) 04/21/2024    BUN 24 (H) 04/21/2024    CREATININE 1.43 (H) 04/21/2024    EGFRIFNONA 40 (L) 10/04/2020    BCR 16.8 04/21/2024    ANIONGAP 9.7 04/21/2024      Lab Results   Component Value Date    MG 2.5 (H) 04/20/2024    PHOS 2.7 02/29/2024    ALBUMIN 3.6 04/21/2024           Assessment / Plan     ASSESSMENT:  VIVIENNE, non olig - prerenal azotemia due to vol depletion in assoc with n/v, diarrhea, and recurrent acute pancreatitis.  Cr improved some 1.6 to 1.4 with IVF.  CT: no hydronephrosis.  UA bland.    CKD stage 3 - BL Cr 1.1 to 1.3 range  Hyperkalemia - K up to 6 despite early improvement in renal fcn, presumably due to #4  Metabolic acidosis, acute on chronic picture, with normal AG currently and bicarb up 9 to 13 (AG 10); having lot of loose stool that is likely driving NAGMA  Recurrent acute pancreatitis - sees GI; hospitalized for this 2 weeks ago; plan was for MRI pancreas coming up  Poss ileus by CT - NPO anyhow due to #5    PLAN:  Agree with bicarb drip, inc rate of this 100 mL/hr  D50/insulin + calcium for hyperkalemia; no lokelma as NPO  Recheck BMP 6PM  D/W RN    Thank you for involving us in the care of Tasha Yarbrough.  Please feel free to call with any questions.    Rj Ch MD  04/21/24  14:44 EDT    [facility]  [phone]

## 2024-04-21 NOTE — PAYOR COMM NOTE
"To:  Coretta  From: Mia Stallworth RN  Phone: [phone]  Fax: [phone]  NPI: [id number]  TIN: [id number]  Member ID: [id number]   MRN: [id number]    Jaymie Yarbrough (69 y.o. Female)       Date of Birth   1955    Social Security Number       Address   [address] [place] [address]    Home Phone   [phone]    MRN   [id number]       Episcopalian   None    Marital Status                               Admission Date   24    Admission Type   Emergency    Admitting Provider   Edwin Aguilar MD    Attending Provider   Janak Damon DO    Department, Room/Bed   [facility] EMERGENCY DEPARTMENT, 10/10       Discharge Date       Discharge Disposition       Discharge Destination                                 Attending Provider: Janak Damon DO    Allergies: Rocephin [Ceftriaxone], Ciprofloxacin, Codeine, Pineapple    Isolation: None   Infection: Other (24)   Code Status: CPR    Ht: 157.5 cm (62\")   Wt: 65.8 kg (145 lb)    Admission Cmt: None   Principal Problem: Metabolic acidosis [E87.20]                   Active Insurance as of 2024       Primary Coverage       Payor Plan Insurance Group Employer/Plan Group    ANTH MEDICARE REPLACEMENT ANTH MEDICARE ADVANTAGE [id number]       Payor Plan Address Payor Plan Phone Number Payor Plan Fax Number Effective Dates    [address] [phone]  2024 - None Entered    [facility] [id number]         Subscriber Name Subscriber Birth Date Member ID       JAYMIE YRABROUGH 1955 [id number]                     Emergency Contacts        (Rel.) Home Phone Work Phone Mobile Phone    ANTONIO YARBROUGH ANDER (Son) [phone] -- --    MONIE YARBROUGH (Son) [phone] -- --                 History & Physical        Edwin Aguilar MD at 24 0049            [facility]OSPITALIST   HISTORY AND PHYSICAL      Name:  Jaymie Yarbrough   Age:  69 y.o.  Sex:  female  :  1955  MRN:  [id number]   Visit " Number:  [id number]  Admission Date:  4/20/2024  Date Of Service:  04/21/24  Primary Care Physician:  Terrence Baldwin MD    Chief Complaint:     Abdominal pain.    History Of Presenting Illness:      Tasha Yarbrough is a 69-year-old female with a history of hypertension, recent history of recurrent pancreatitis discharged from this facility on 4/11/2024 was brought to the emergency room by her son with symptoms of epigastric abdominal pain since the last couple of days.  Patient has been vomiting for the last couple of days and has not been eating much.  She also gives history of feeling cold and chills.  Started having some dysuria today.  Her son lives with her.  According to the son, patient has been admitted multiple times to  and here for similar problems in the last 1 month.  No exact reasoning was given to her pancreatitis.  She was told that she has a couple of cysts in her pancreas.  She denies any chest pain or shortness of breath at this time.    In the emergency room, she was afebrile and hemodynamically stable saturating at 99% on room air.  CMP was remarkable for a CO2 of 9, BUN 25, creatinine 1.65 (baseline 1), alkaline phosphatase 131.  LFT was otherwise unremarkable.  Lipase was 722.  Lactic acid was 0.4.  Hemoglobin 11 .  Urine analysis showed 11-20 WBCs with 3-6 squamous epithelial cells and negative nitrite.  Due to multiple recent CT scans, abdominal CT scan was not obtained.  ABG showed a pH of 7.16, pCO2 26, pO2 104 and bicarb of 9.  Patient was given a liter of normal saline bolus, IV morphine and Zofran in the emergency room.  She was subsequently placed on lactated Ringer's at 75 mill per hour and was subsequently admitted to the medical floor with telemetry for pancreatitis, VIVIENNE and metabolic acidosis of uncertain etiology.    Review Of Systems:    All systems were reviewed and negative except as mentioned in history of presenting illness, assessment and plan.    Past Medical  History: Patient  has a past medical history of Hypertension, Impaired mobility, Injury of back, and Pancreatitis.    Past Surgical History: Patient  has a past surgical history that includes Tubal ligation; Colonoscopy; Colon surgery; and Abdominal surgery.    Social History: Patient  reports that she has been smoking cigarettes. She has never used smokeless tobacco. She reports that she does not drink alcohol and does not use drugs.    Family History:  Patient   Denies any family history of pancreatitis.    Allergies:      Ciprofloxacin, Codeine, and Pineapple    Home Medications:    Prior to Admission Medications       Prescriptions Last Dose Informant Patient Reported? Taking?    glucosamine-chondroitin 500-400 MG capsule capsule   Yes No    Take 1 capsule by mouth 2 (Two) Times a Day With Meals. Indications: Joint Damage causing Pain and Loss of Function    methocarbamol (ROBAXIN) 750 MG tablet   Yes No    Take 1 tablet by mouth 4 (Four) Times a Day As Needed for Muscle Spasms.    ondansetron ODT (ZOFRAN-ODT) 4 MG disintegrating tablet   No No    Place 1 tablet on the tongue Every 8 (Eight) Hours As Needed for Nausea or Vomiting.    vitamin D3 125 MCG (5000 UT) capsule capsule   Yes No    Take 1 capsule by mouth Daily. Indications: Vitamin D Deficiency     ED Medications:    Medications   sodium chloride 0.9 % flush 10 mL (has no administration in time range)   morphine injection 4 mg (4 mg Intravenous Given 4/20/24 2257)   ondansetron (ZOFRAN) injection 4 mg (4 mg Intravenous Given 4/20/24 2257)   sodium chloride 0.9 % bolus 1,000 mL (1,000 mL Intravenous New Bag 4/20/24 2257)   HYDROmorphone (DILAUDID) injection 0.5 mg (0.5 mg Intravenous Given 4/21/24 0034)     Vital Signs:  Temp:  [97.5 °F (36.4 °C)] 97.5 °F (36.4 °C)  Heart Rate:  [73-76] 76  Resp:  [18-20] 18  BP: (148-154)/() 148/95        04/20/24 2201   Weight: 65.8 kg (145 lb)     Body mass index is 26.52 kg/m².    Physical Exam:     Most  "recent vital Signs: /95 (BP Location: Left arm, Patient Position: Sitting)   Pulse 76   Temp 97.5 °F (36.4 °C) (Oral)   Resp 18   Ht 157.5 cm (62\")   Wt 65.8 kg (145 lb)   SpO2 99%   BMI 26.52 kg/m²     Physical Exam  Constitutional:       General: She is not in acute distress.     Appearance: She is ill-appearing.   HENT:      Head: Normocephalic and atraumatic.      Right Ear: External ear normal.      Left Ear: External ear normal.      Nose: Nose normal.      Mouth/Throat:      Mouth: Mucous membranes are moist.   Eyes:      Extraocular Movements: Extraocular movements intact.      Conjunctiva/sclera: Conjunctivae normal.   Cardiovascular:      Rate and Rhythm: Normal rate and regular rhythm.      Pulses: Normal pulses.      Heart sounds: Normal heart sounds. No murmur heard.  Pulmonary:      Effort: Pulmonary effort is normal.      Breath sounds: Normal breath sounds. No wheezing or rales.   Abdominal:      General: Bowel sounds are normal.      Palpations: Abdomen is soft.      Tenderness: There is abdominal tenderness. There is no guarding or rebound.      Comments: Boggy abdomen.  Diffuse tenderness noted on minimal palpation.  No rebound tenderness.  Long midline surgical scar noted from previous colectomy.   Musculoskeletal:         General: Normal range of motion.      Cervical back: Neck supple.      Right lower leg: No edema.      Left lower leg: No edema.   Skin:     General: Skin is warm.      Findings: No erythema or rash.   Neurological:      General: No focal deficit present.      Mental Status: She is alert and oriented to person, place, and time. Mental status is at baseline.      Comments: Moves all 4 limbs but does have generalized weakness.   Psychiatric:         Mood and Affect: Mood normal.         Behavior: Behavior normal.       Laboratory data:    I have reviewed the labs done in the emergency room.    Results from last 7 days   Lab Units 04/20/24  2319   SODIUM mmol/L 139 "   POTASSIUM mmol/L 4.9   CHLORIDE mmol/L 116*   CO2 mmol/L 8.9*   BUN mg/dL 25*   CREATININE mg/dL 1.65*   CALCIUM mg/dL 9.6   BILIRUBIN mg/dL <0.2   ALK PHOS U/L 131*   ALT (SGPT) U/L 7   AST (SGOT) U/L 11   GLUCOSE mg/dL 81     Results from last 7 days   Lab Units 04/20/24  2319   WBC 10*3/mm3 9.43   HEMOGLOBIN g/dL 11.3*   HEMATOCRIT % 35.3   PLATELETS 10*3/mm3 286       Results from last 7 days   Lab Units 04/20/24  2319   LIPASE U/L 722*     Results from last 7 days   Lab Units 04/21/24  0015   PH, ARTERIAL pH units 7.158*   PO2 ART mm Hg 104.0*   PCO2, ARTERIAL mm Hg 26.4*   HCO3 ART mmol/L 9.4*     Results from last 7 days   Lab Units 04/21/24  0022   COLOR UA  Yellow   GLUCOSE UA  Negative   KETONES UA  Negative   BLOOD UA  Negative   LEUKOCYTES UA  Small (1+)*   PH, URINE  5.5   BILIRUBIN UA  Negative   UROBILINOGEN UA  0.2 E.U./dL   RBC UA /HPF None Seen   WBC UA /HPF 11-20*     Radiology:    No radiology results for the last 3 days    Assessment:    Acute recurrent pancreatitis, POA.  Acute kidney injury, POA.  Acute urinary tract infection.  Acute metabolic acidosis of uncertain etiology, POA.  Essential hypertension.    Plan:    Acute recurrent pancreatitis.  - Patient has history of pancreatitis of uncertain etiology.  - Keep her n.p.o. with ice chips and IV fluids.  - Dilaudid and Zofran for symptomatic control.    Acute kidney injury/metabolic acidosis.  - Patient does have a bump in creatinine likely secondary to dehydration.  - Continue IV fluids and repeat basic metabolic panel in the morning.  - Exact etiology of her acidosis is uncertain but could be related to the renal failure.  She does not have any evidence of elevated lactic acid.  - I will hold off on bicarb drip at this time.    Acute urinary tract infection.  - We will treat her with ceftriaxone for 3 days.    I have discussed the patient's condition and treatment plan with her son Thai who is at the bedside.      Risk Assessment:  Moderate  DVT Prophylaxis: Enoxaparin  Code Status: Full  Diet: N.p.o.      Edwin Aguilar MD  24  00:49 EDT    Dictated utilizing Dragon dictation.    Electronically signed by Edwin Aguilar MD at 24 0108          Emergency Department Notes        Tanika Morgan RN at 24 1123          Call received from son to give update on patient. Family member updated.     Electronically signed by Tanika Morgan RN at 24 1124       Susan Aguero RN at 24 0315          Dr. Aguilar notified of anaphylactic reaction at this time.     Electronically signed by Susan Aguero RN at 24 0319       Susan Aguero RN at 24 0255          Pt tolerated med admin well, pt back to baseline at this time. Rocephin added to patients allergy list.     Electronically signed by Susan Aguero RN at 24 0312       Susan Aguero RN at 24 0224          Meds ordered per ED provider and administered per protocol, See MAR.     Electronically signed by Susan Aguero RN at 24 0311       Susan Aguero RN at 24 0215          Upon arrival to room, pt disoriented and flushed. Pt ED provider called to bedside. Pt oxygen 88% on RA. Pt placed on 2L NC without improvement. Pt titrated to 6L simple mask with oxygen saturation improving to mid to high 90's.     Electronically signed by Susan Aguero RN at 24 0311       Juan Luis Martins MD at 24 0036        Procedure Orders    1. Critical Care [[id number]] ordered by Juan Luis Martins MD                  EMERGENCY DEPARTMENT ENCOUNTER    Pt Name: Tasha Yarbrough  MRN: [id number]  Pt :   1955  Room Number:  10/10  Date of encounter:  2024  PCP: Terrence Baldwin MD  ED Provider: Juan Luis Martins MD    Historian: Patient      HPI:  Chief Complaint   Patient presents with    Abdominal Pain          Context: Tasha Yarbrough is a 69 y.o. female who presents to the ED c/o abdominal pain, located epigastric area,  similar to multiple prior episodes of pancreatitis, the patient was admitted about 2 weeks ago for pancreatitis, previously admitted to [facility] and found to have pseudocyst.  She has not yet been able to [facility]  She is unable to keep down pain medications at home due to vomiting.  Denies fevers, reports some difficulty urinating throughout the day today      PAST MEDICAL HISTORY  Past Medical History:   Diagnosis Date    Hypertension     Impaired mobility     Injury of back     Pancreatitis          PAST SURGICAL HISTORY  Past Surgical History:   Procedure Laterality Date    ABDOMINAL SURGERY      COLON SURGERY      COLONOSCOPY      TUBAL ABDOMINAL LIGATION           FAMILY HISTORY  History reviewed. No pertinent family history.      SOCIAL HISTORY  Social History     Socioeconomic History    Marital status:    Tobacco Use    Smoking status: Every Day     Current packs/day: 1.00     Types: Cigarettes    Smokeless tobacco: Never   Vaping Use    Vaping status: Never Used   Substance and Sexual Activity    Alcohol use: Never    Drug use: Never    Sexual activity: Defer         ALLERGIES  Rocephin [ceftriaxone], Ciprofloxacin, Codeine, and Pineapple        REVIEW OF SYSTEMS  Review of Systems   Constitutional:  Negative for chills and fever.   HENT:  Negative for sore throat and trouble swallowing.    Eyes:  Negative for pain and redness.   Respiratory:  Negative for cough and shortness of breath.    Cardiovascular:  Negative for chest pain and leg swelling.   Gastrointestinal:  Positive for abdominal pain, nausea and vomiting.   Genitourinary:  Negative for dysuria and urgency.   Musculoskeletal:  Negative for back pain and neck pain.   Skin:  Negative for rash and wound.   Neurological:  Negative for dizziness and weakness.        All systems reviewed and negative except for those discussed in HPI.       PHYSICAL EXAM    I have reviewed  the triage vital signs and nursing notes.    ED Triage Vitals   Temp Heart Rate Resp BP SpO2   04/20/24 2201 04/20/24 2201 04/20/24 2201 04/20/24 2201 04/20/24 2201   97.5 °F (36.4 °C) 73 20 (!) 154/104 99 %      Temp src Heart Rate Source Patient Position BP Location FiO2 (%)   04/20/24 2201 04/20/24 2342 04/20/24 2201 04/20/24 2201 --   Oral Monitor Sitting Left arm        Physical Exam  Constitutional:       Appearance: Normal appearance. She is not ill-appearing.   HENT:      Head: Normocephalic and atraumatic.      Right Ear: External ear normal.      Left Ear: External ear normal.      Nose: Nose normal.      Mouth/Throat:      Mouth: Mucous membranes are moist.      Pharynx: Oropharynx is clear.   Eyes:      Extraocular Movements: Extraocular movements intact.      Conjunctiva/sclera: Conjunctivae normal.      Pupils: Pupils are equal, round, and reactive to light.   Cardiovascular:      Rate and Rhythm: Normal rate and regular rhythm.      Pulses:           Radial pulses are 2+ on the right side and 2+ on the left side.   Pulmonary:      Effort: Pulmonary effort is normal.      Breath sounds: Normal breath sounds.   Abdominal:      General: There is no distension.      Palpations: Abdomen is soft.      Tenderness: There is abdominal tenderness in the epigastric area.   Musculoskeletal:         General: No tenderness or deformity. Normal range of motion.      Cervical back: Normal range of motion and neck supple.      Right lower leg: No edema.      Left lower leg: No edema.   Skin:     General: Skin is warm and dry.      Capillary Refill: Capillary refill takes less than 2 seconds.   Neurological:      General: No focal deficit present.      Mental Status: She is alert and oriented to person, place, and time.            LAB RESULTS  Recent Results (from the past 24 hour(s))   Comprehensive Metabolic Panel    Collection Time: 04/20/24 11:19 PM    Specimen: Blood   Result Value Ref Range    Glucose 81 65 - 99  mg/dL    BUN 25 (H) 8 - 23 mg/dL    Creatinine 1.65 (H) 0.57 - 1.00 mg/dL    Sodium 139 136 - 145 mmol/L    Potassium 4.9 3.5 - 5.2 mmol/L    Chloride 116 (H) 98 - 107 mmol/L    CO2 8.9 (C) 22.0 - 29.0 mmol/L    Calcium 9.6 8.6 - 10.5 mg/dL    Total Protein 7.0 6.0 - 8.5 g/dL    Albumin 4.0 3.5 - 5.2 g/dL    ALT (SGPT) 7 1 - 33 U/L    AST (SGOT) 11 1 - 32 U/L    Alkaline Phosphatase 131 (H) 39 - 117 U/L    Total Bilirubin <0.2 0.0 - 1.2 mg/dL    Globulin 3.0 gm/dL    A/G Ratio 1.3 g/dL    BUN/Creatinine Ratio 15.2 7.0 - 25.0    Anion Gap 14.1 5.0 - 15.0 mmol/L    eGFR 33.5 (L) >60.0 mL/min/1.73   Lipase    Collection Time: 04/20/24 11:19 PM    Specimen: Blood   Result Value Ref Range    Lipase 722 (H) 13 - 60 U/L   Magnesium    Collection Time: 04/20/24 11:19 PM    Specimen: Blood   Result Value Ref Range    Magnesium 2.5 (H) 1.6 - 2.4 mg/dL   CBC Auto Differential    Collection Time: 04/20/24 11:19 PM    Specimen: Blood   Result Value Ref Range    WBC 9.43 3.40 - 10.80 10*3/mm3    RBC 3.13 (L) 3.77 - 5.28 10*6/mm3    Hemoglobin 11.3 (L) 12.0 - 15.9 g/dL    Hematocrit 35.3 34.0 - 46.6 %    .8 (H) 79.0 - 97.0 fL    MCH 36.1 (H) 26.6 - 33.0 pg    MCHC 32.0 31.5 - 35.7 g/dL    RDW 16.2 (H) 12.3 - 15.4 %    RDW-SD 67.7 (H) 37.0 - 54.0 fl    MPV 10.0 6.0 - 12.0 fL    Platelets 286 140 - 450 10*3/mm3    Neutrophil % 72.2 42.7 - 76.0 %    Lymphocyte % 18.5 (L) 19.6 - 45.3 %    Monocyte % 6.4 5.0 - 12.0 %    Eosinophil % 1.3 0.3 - 6.2 %    Basophil % 0.4 0.0 - 1.5 %    Immature Grans % 1.2 (H) 0.0 - 0.5 %    Neutrophils, Absolute 6.82 1.70 - 7.00 10*3/mm3    Lymphocytes, Absolute 1.74 0.70 - 3.10 10*3/mm3    Monocytes, Absolute 0.60 0.10 - 0.90 10*3/mm3    Eosinophils, Absolute 0.12 0.00 - 0.40 10*3/mm3    Basophils, Absolute 0.04 0.00 - 0.20 10*3/mm3    Immature Grans, Absolute 0.11 (H) 0.00 - 0.05 10*3/mm3    nRBC 0.0 0.0 - 0.2 /100 WBC   Lactic Acid, Plasma    Collection Time: 04/20/24 11:19 PM    Specimen: Blood    Result Value Ref Range    Lactate 0.4 (L) 0.5 - 2.0 mmol/L   Scan Slide    Collection Time: 04/20/24 11:19 PM    Specimen: Blood   Result Value Ref Range    Hypochromia Slight/1+ None Seen    Macrocytes Slight/1+ None Seen    WBC Morphology Normal Normal    Platelet Estimate Adequate Normal   Blood Gas, Arterial With Co-Ox    Collection Time: 04/21/24 12:15 AM    Specimen: Arterial Blood   Result Value Ref Range    Site Left Radial     Rafy's Test Positive     pH, Arterial 7.158 (C) 7.350 - 7.450 pH units    pCO2, Arterial 26.4 (C) 35.0 - 45.0 mm Hg    pO2, Arterial 104.0 (H) 75.0 - 100.0 mm Hg    HCO3, Arterial 9.4 (L) 22.0 - 28.0 mmol/L    Base Excess, Arterial -17.9 (L) 0.0 - 2.0 mmol/L    O2 Saturation, Arterial 98.5 94.0 - 100.0 %    Hematocrit, Blood Gas 32.5 %    Oxyhemoglobin 95.9 94 - 99 %    Methemoglobin 0.50 0.00 - 1.50 %    Carboxyhemoglobin 2.2 (H) 0 - 2 %    A-a DO2 10.7 mmHg    Barometric Pressure for Blood Gas 736 mmHg    Modality Room Air     FIO2 21 %    Ventilator Mode NA     Notified Who MD     Notified By [id number]     Notified Time 04/21/2024 00:30     Collected by [id number]     pH, Temp Corrected      pCO2, Temperature Corrected      pO2, Temperature Corrected     Urinalysis With Microscopic If Indicated (No Culture) - Urine, Clean Catch    Collection Time: 04/21/24 12:22 AM    Specimen: Urine, Clean Catch   Result Value Ref Range    Color, UA Yellow Yellow, Straw    Appearance, UA Clear Clear    pH, UA 5.5 5.0 - 8.0    Specific Gravity, UA 1.022 1.005 - 1.030    Glucose, UA Negative Negative    Ketones, UA Negative Negative    Bilirubin, UA Negative Negative    Blood, UA Negative Negative    Protein, UA 30 mg/dL (1+) (A) Negative    Leuk Esterase, UA Small (1+) (A) Negative    Nitrite, UA Negative Negative    Urobilinogen, UA 0.2 E.U./dL 0.2 - 1.0 E.U./dL   Urinalysis, Microscopic Only - Urine, Clean Catch    Collection Time: 04/21/24 12:22 AM    Specimen: Urine, Clean Catch   Result Value Ref  Range    RBC, UA None Seen None Seen, 0-2 /HPF    WBC, UA 11-20 (A) None Seen, 0-2 /HPF    Bacteria, UA Trace (A) None Seen /HPF    Squamous Epithelial Cells, UA 3-6 (A) None Seen, 0-2 /HPF    Hyaline Casts, UA None Seen None Seen /LPF    Methodology Manual Light Microscopy        If labs were ordered, I independently reviewed the results and considered them in treating the patient.        RADIOLOGY  No Radiology Exams Resulted Within Past 24 Hours        PROCEDURES    Critical Care    Performed by: Juan Luis Martins MD  Authorized by: Juan Luis Martins MD    Critical care provider statement:     Critical care time (minutes):  39    Critical care time was exclusive of:  Separately billable procedures and treating other patients    Critical care was necessary to treat or prevent imminent or life-threatening deterioration of the following conditions:  Metabolic crisis and endocrine crisis    Critical care was time spent personally by me on the following activities:  Ordering and performing treatments and interventions, ordering and review of laboratory studies, ordering and review of radiographic studies, pulse oximetry, re-evaluation of patient's condition, blood draw for specimens, discussions with consultants, evaluation of patient's response to treatment and examination of patient    I assumed direction of critical care for this patient from another provider in my specialty: no      Care discussed with: admitting provider        Interpretations    O2 Sat: The patients oxygen saturation was 99% on Room Air.  This was independently interpreted by me as Normal    Cardiac Monitoring: I reviewed and independently interpreted the Rhythm Strip as Normal Sinus rhythm rate of 76      MEDICATIONS GIVEN IN ER    Medications   sodium chloride 0.9 % flush 10 mL (has no administration in time range)   lactated ringers infusion (100 mL/hr Intravenous Rate/Dose Change 4/21/24 0110)   sodium chloride 0.9 % flush 10  mL ( Intravenous Canceled Entry 4/21/24 0236)   sodium chloride 0.9 % flush 10 mL (has no administration in time range)   sodium chloride 0.9 % infusion 40 mL (has no administration in time range)   acetaminophen (TYLENOL) tablet 650 mg (has no administration in time range)     Or   acetaminophen (TYLENOL) 160 MG/5ML oral solution 650 mg (has no administration in time range)     Or   acetaminophen (TYLENOL) suppository 650 mg (has no administration in time range)   ondansetron (ZOFRAN) injection 4 mg (has no administration in time range)   Pharmacy to Dose enoxaparin (LOVENOX) (has no administration in time range)   HYDROmorphone (DILAUDID) injection 0.5 mg (has no administration in time range)     And   naloxone (NARCAN) injection 0.4 mg (has no administration in time range)   sennosides-docusate (PERICOLACE) 8.6-50 MG per tablet 2 tablet (has no administration in time range)     And   polyethylene glycol (MIRALAX) packet 17 g (has no administration in time range)     And   bisacodyl (DULCOLAX) EC tablet 5 mg (has no administration in time range)     And   bisacodyl (DULCOLAX) suppository 10 mg (has no administration in time range)   cefTRIAXone (ROCEPHIN) 1,000 mg in sodium chloride 0.9 % 100 mL IVPB-VTB (1,000 mg Intravenous New Bag 4/21/24 0156)   lactobacillus acidophilus (RISAQUAD) capsule 1 capsule (1 capsule Oral Given 4/21/24 0157)   Enoxaparin Sodium (LOVENOX) syringe 30 mg (30 mg Subcutaneous Given 4/21/24 0158)   morphine injection 4 mg (4 mg Intravenous Given 4/20/24 2257)   ondansetron (ZOFRAN) injection 4 mg (4 mg Intravenous Given 4/20/24 2257)   sodium chloride 0.9 % bolus 1,000 mL (0 mL Intravenous Stopped 4/20/24 2327)   HYDROmorphone (DILAUDID) injection 0.5 mg (0.5 mg Intravenous Given 4/21/24 0034)   diphenhydrAMINE (BENADRYL) injection 25 mg (25 mg Intravenous Given 4/21/24 0235)   methylPREDNISolone sodium succinate (SOLU-Medrol) injection 125 mg (125 mg Intravenous Given 4/21/24 3746)    EPINEPHrine (ADRENALIN) injection 0.3 mg (0.3 mg Intramuscular Given 4/21/24 0234)         MEDICAL DECISION MAKING, PROGRESS, and CONSULTS    All labs, if obtained, have been independently reviewed by me.  All radiology studies, if obtained, have been reviewed by me and the radiologist dictating the report.  All EKG's, if obtained, have been independently viewed and interpreted by me      Discussion below represents my analysis of pertinent findings related to patient's condition, differential diagnosis, treatment plan and final disposition.      Differential diagnosis:    69-year-old female with known pancreatic pseudocyst and history of recurrent pancreatitis presents to the ED with complaint of abdominal pain, nausea and vomiting.  She is quite tender in the epigastric region, concerned about recurrent pancreatitis, as well as significant electrolyte derangement given all of the vomiting.  Will obtain laboratory workup including lipase, liver function test, CT scan of the abdomen pelvis was considered however the patient's had 15 scans recently, will try and avoid this.  Will provide IV morphine, IV Zofran, IV fluids    Additional Sources:  External (non-ED) record review:  Discharge summary from recent admission April 2024       Orders placed during this visit:  Orders Placed This Encounter   Procedures    Critical Care    Comprehensive Metabolic Panel    Lipase    Magnesium    CBC Auto Differential    Lactic Acid, Plasma    Scan Slide    Blood Gas, Arterial -With Co-Ox Panel: Yes    Urinalysis With Microscopic If Indicated (No Culture) - Urine, Clean Catch    Blood Gas, Arterial With Co-Ox    Urinalysis, Microscopic Only - Urine, Clean Catch    CBC (No Diff)    Comprehensive Metabolic Panel    Lipase    NPO Diet NPO Type: Ice Chips, Sips with Meds    Vital Signs    Up With Assistance    Intake & Output    Weigh patient    Oral Care    Saline Lock & Maintain IV Access    Continuous Pulse Oximetry    Code  Status and Medical Interventions:    PT Consult: Eval & Treat As Tolerated; Functional Mobility Below Baseline    Incentive Spirometry    Insert Peripheral IV    Insert Peripheral IV    Initiate Observation Status    CBC & Differential         Additional orders considered but not ordered:  Imaging: CT scan, will hold off given the sheer number of CT scans    ED Course:    Consultants:  Hospitalist    ED Course as of 04/21/24 0244   Sun Apr 21, 2024   0001 Lipase(!): 722  Lipase elevated, higher than it was during the last admission [CS]   0002 Creatinine(!): 1.65  Creatinine significantly elevated over her baseline [CS]   0019 Blood Gas, Arterial With Co-Ox(!!)  ABG with significant nonanion gap metabolic acidosis, lactic is negative, unclear etiology. [CS]   0040 Given the renal injury, as well as a significant non-anion gap metabolic acidosis will initiate a bicarb drip [CS]   0051 Spoke to Dr ellison, he will evaluate the patient for admission [CS]   0243 While waiting to be transported upstairs I was informed by nursing the patient became somnolent sonorous respirations, bright red with diffuse rash, she had just started ceftriaxone ordered for her urinary tract infection, appears that she was experiencing anaphylactic reaction.  Antibiotics were stopped, patient was given Benadryl and Solu-Medrol however her symptoms were not improving, her blood pressure began to drop so she was given IM epinephrine with good response. [CS]      ED Course User Index  [CS] Juan Luis Martins MD           After my consideration of clinical presentation and any laboratory/radiology studies obtained, I discussed the findings with the patient/patient representative who is in agreement with the treatment plan and the final disposition. Risks and benefits of admission was discussed     AS OF 02:44 EDT VITALS:    BP - 162/96  HR - 86  TEMP - 97.5 °F (36.4 °C) (Oral)  O2 SATS - 99%    I reviewed the patients prescription monitoring  report if available prior to discharge    DIAGNOSIS  Final diagnoses:   Acute pancreatitis without infection or necrosis, unspecified pancreatitis type   Metabolic acidosis   Acute kidney injury         DISPOSITION  ED Disposition       ED Disposition   Decision to Admit    Condition   --    Comment   Level of Care: Telemetry [5]   Diagnosis: Metabolic acidosis [977631]   Admitting Physician: TIAN FITZGERALD [[id number]]   Attending Physician: TIAN FITZGERALD [[id number]]                     Please note that portions of this document were completed with voice recognition software.        Juan Luis Martins MD  04/21/24 0056       Juan Luis Martins MD  04/21/24 0244      Electronically signed by Juan Luis Martins MD at 04/21/24 0244       Vital Signs (last day)       Date/Time Temp Temp src Pulse Resp BP Patient Position SpO2    04/21/24 1230 -- -- 73 -- 112/73 -- 97    04/21/24 1200 -- -- 73 -- 118/72 -- 97    04/21/24 1130 -- -- 77 -- 99/66 -- 97    04/21/24 1128 -- -- 73 -- -- -- 97    04/21/24 1123 -- -- 71 -- -- -- 98    04/21/24 1100 -- -- 75 -- 113/77 -- 98    04/21/24 1048 -- -- -- -- 121/89 -- --    04/21/24 1030 -- -- -- -- 95/60 -- 98    04/21/24 1000 -- -- -- -- 119/73 -- 98    04/21/24 0925 -- -- 77 -- -- -- 97    04/21/24 0901 -- -- 84 -- 106/71 -- 97    04/21/24 0831 -- -- 76 -- 108/69 -- 98    04/21/24 0800 -- -- 78 -- 112/69 -- 98    04/21/24 0729 -- -- -- -- 129/78 -- --    04/21/24 0700 -- -- 82 -- 132/64 -- 97    04/21/24 0630 -- -- 76 -- 129/65 -- 100    04/21/24 0600 -- -- 82 -- 118/75 -- 98    04/21/24 0530 -- -- 82 -- 116/66 -- 97    04/21/24 0500 -- -- 75 -- 107/64 -- 97    04/21/24 0440 -- -- 78 -- 122/79 -- 99    04/21/24 0428 -- -- 89 18 119/75 -- 97    04/21/24 0418 -- -- 82 -- 114/73 -- 98    04/21/24 0408 -- -- 80 -- 103/70 -- 98    04/21/24 0348 -- -- 86 -- 114/57 -- 98    04/21/24 0338 -- -- 83 -- 107/78 -- 99    04/21/24 0330 -- -- 86 -- 106/81 -- 100    04/21/24 0310 -- -- 93  -- 124/86 -- 100    04/21/24 0250 -- -- 106 -- 133/90 -- 99    04/21/24 0244 -- -- 113 -- 113/87 -- 97    04/21/24 0234 -- -- 121 -- 94/59 -- 93    04/21/24 0230 -- -- 123 -- 80/53 -- 92    04/21/24 0207 -- -- 86 18 162/96 Sitting 99    04/20/24 2342 -- -- 76 18 148/95 Sitting 99    04/20/24 2201 97.5 (36.4) Oral 73 20 154/104 Sitting 99          Current Facility-Administered Medications   Medication Dose Route Frequency Provider Last Rate Last Admin    acetaminophen (TYLENOL) tablet 650 mg  650 mg Oral Q4H PRN Edwin Aguilar MD        Or    acetaminophen (TYLENOL) 160 MG/5ML oral solution 650 mg  650 mg Oral Q4H PRN Edwin Aguilar MD        Or    acetaminophen (TYLENOL) suppository 650 mg  650 mg Rectal Q4H PRN Edwin Aguilar MD        sennosides-docusate (PERICOLACE) 8.6-50 MG per tablet 2 tablet  2 tablet Oral BID PRN Edwin Aguilar MD        And    polyethylene glycol (MIRALAX) packet 17 g  17 g Oral Daily PRN Edwin Aguilar MD        And    bisacodyl (DULCOLAX) EC tablet 5 mg  5 mg Oral Daily PRN Edwin Aguilar MD        And    bisacodyl (DULCOLAX) suppository 10 mg  10 mg Rectal Daily PRN Edwin Aguilar MD        diphenhydrAMINE (BENADRYL) capsule 25 mg  25 mg Oral TID Edwin Aguilar MD   25 mg at 04/21/24 0946    Enoxaparin Sodium (LOVENOX) syringe 30 mg  30 mg Subcutaneous Q24H Juan Luis Martins MD   30 mg at 04/21/24 0158    HYDROmorphone (DILAUDID) injection 0.5 mg  0.5 mg Intravenous Q2H PRN Edwin Aguilar MD   0.5 mg at 04/21/24 1232    And    naloxone (NARCAN) injection 0.4 mg  0.4 mg Intravenous Q5 Min PRN Edwin Aguilar MD        lactobacillus acidophilus (RISAQUAD) capsule 1 capsule  1 capsule Oral BID Edwin Aguilar MD   1 capsule at 04/21/24 0904    ondansetron (ZOFRAN) injection 4 mg  4 mg Intravenous Q6H PRN Edwin Aguilar MD   4 mg at 04/21/24 1105    Pharmacy to Dose enoxaparin (LOVENOX)   Does not apply Continuous PRN Edwin Aguilar MD        predniSONE (DELTASONE) tablet 40 mg  40 mg Oral Daily  With Breakfast Edwin Aguilar MD   40 mg at 04/21/24 0730    sodium bicarbonate 8.4 % 150 mEq in dextrose (D5W) 5 % 1,000 mL infusion (greater than 100 mEq)  150 mEq Intravenous Continuous Janak Damon DO 50 mL/hr at 04/21/24 0943 150 mEq at 04/21/24 0943    sodium chloride 0.9 % flush 10 mL  10 mL Intravenous PRN Juan Luis Martins MD        sodium chloride 0.9 % flush 10 mL  10 mL Intravenous Q12H Edwin Aguilar MD   10 mL at 04/21/24 0947    sodium chloride 0.9 % flush 10 mL  10 mL Intravenous PRN Edwin Aguilar MD        sodium chloride 0.9 % infusion 40 mL  40 mL Intravenous PRN Edwin Aguilar MD         Current Outpatient Medications   Medication Sig Dispense Refill    glucosamine-chondroitin 500-400 MG capsule capsule Take 1 capsule by mouth 2 (Two) Times a Day With Meals. Indications: Joint Damage causing Pain and Loss of Function      methocarbamol (ROBAXIN) 750 MG tablet Take 1 tablet by mouth 4 (Four) Times a Day As Needed for Muscle Spasms.      ondansetron ODT (ZOFRAN-ODT) 4 MG disintegrating tablet Place 1 tablet on the tongue Every 8 (Eight) Hours As Needed for Nausea or Vomiting. 12 tablet 0    vitamin D3 125 MCG (5000 UT) capsule capsule Take 1 capsule by mouth Daily. Indications: Vitamin D Deficiency       Lab Results (last 24 hours)       Procedure Component Value Units Date/Time    Lipase [[id number]]  (Abnormal) Collected: 04/21/24 1215    Specimen: Blood Updated: 04/21/24 1253     Lipase 470 U/L     Comprehensive Metabolic Panel [[id number]]  (Abnormal) Collected: 04/21/24 1215    Specimen: Blood Updated: 04/21/24 1247     Glucose 111 mg/dL      BUN 24 mg/dL      Creatinine 1.43 mg/dL      Sodium 136 mmol/L      Potassium 6.0 mmol/L      Chloride 113 mmol/L      CO2 13.3 mmol/L      Calcium 9.5 mg/dL      Total Protein 6.4 g/dL      Albumin 3.6 g/dL      ALT (SGPT) 8 U/L      AST (SGOT) 13 U/L      Alkaline Phosphatase 104 U/L      Total Bilirubin 0.2 mg/dL      Globulin 2.8 gm/dL      A/G  Ratio 1.3 g/dL      BUN/Creatinine Ratio 16.8     Anion Gap 9.7 mmol/L      eGFR 39.8 mL/min/1.73     Narrative:      GFR Normal >60  Chronic Kidney Disease <60  Kidney Failure <15      CBC (No Diff) [[id number]]  (Abnormal) Collected: 04/21/24 1215    Specimen: Blood Updated: 04/21/24 1232     WBC 10.64 10*3/mm3      RBC 3.10 10*6/mm3      Hemoglobin 11.1 g/dL      Hematocrit 35.4 %      .2 fL      MCH 35.8 pg      MCHC 31.4 g/dL      RDW 16.4 %      RDW-SD 69.9 fl      MPV 9.9 fL      Platelets 272 10*3/mm3     Blood Gas, Arterial With Co-Ox [[id number]]  (Abnormal) Collected: 04/21/24 0919    Specimen: Arterial Blood Updated: 04/21/24 0919     Site Left Radial     Rafy's Test N/A     pH, Arterial 7.199 pH units      Comment: 85 Value below critical limit        pCO2, Arterial 25.5 mm Hg      Comment: 84 Value below reference range        pO2, Arterial 97.9 mm Hg      HCO3, Arterial 9.9 mmol/L      Comment: 84 Value below reference range        Base Excess, Arterial -16.6 mmol/L      Comment: 84 Value below reference range        O2 Saturation, Arterial 98.6 %      Hematocrit, Blood Gas 33.8 %      Comment: 84 Value below reference range        Oxyhemoglobin 96.3 %      Methemoglobin 0.60 %      Carboxyhemoglobin 1.7 %      A-a DO2 18.2 mmHg      Barometric Pressure for Blood Gas 737 mmHg      Modality Room Air     Ventilator Mode NA     Notified [facility] EVE BOTELLO DO     Notified By [id number]     Notified Time 04/21/2024 09:33     Collected by JAVID RRT     Comment: Meter: [id number]     :  [id number]        pH, Temp Corrected --     pCO2, Temperature Corrected --     pO2, Temperature Corrected --    Urinalysis, Microscopic Only - Urine, Clean Catch [[id number]]  (Abnormal) Collected: 04/21/24 0022    Specimen: Urine, Clean Catch Updated: 04/21/24 0043     RBC, UA None Seen /HPF      WBC, UA 11-20 /HPF      Bacteria, UA Trace /HPF      Squamous Epithelial Cells, UA 3-6 /HPF      Hyaline Casts, UA None Seen  /LPF      Methodology Manual Light Microscopy    Urinalysis With Microscopic If Indicated (No Culture) - Urine, Clean Catch [[id number]]  (Abnormal) Collected: 04/21/24 0022    Specimen: Urine, Clean Catch Updated: 04/21/24 0032     Color, UA Yellow     Appearance, UA Clear     pH, UA 5.5     Specific Gravity, UA 1.022     Glucose, UA Negative     Ketones, UA Negative     Bilirubin, UA Negative     Blood, UA Negative     Protein, UA 30 mg/dL (1+)     Leuk Esterase, UA Small (1+)     Nitrite, UA Negative     Urobilinogen, UA 0.2 E.U./dL    Blood Gas, Arterial With Co-Ox [[id number]]  (Abnormal) Collected: 04/21/24 0015    Specimen: Arterial Blood Updated: 04/21/24 0015     Site Left Radial     Rafy's Test Positive     pH, Arterial 7.158 pH units      Comment: 85 Value below critical limit        pCO2, Arterial 26.4 mm Hg      Comment: 84 Value below reference range        pO2, Arterial 104.0 mm Hg      HCO3, Arterial 9.4 mmol/L      Comment: 84 Value below reference range        Base Excess, Arterial -17.9 mmol/L      Comment: 84 Value below reference range        O2 Saturation, Arterial 98.5 %      Hematocrit, Blood Gas 32.5 %      Comment: 84 Value below reference range        Oxyhemoglobin 95.9 %      Methemoglobin 0.50 %      Carboxyhemoglobin 2.2 %      A-a DO2 10.7 mmHg      Barometric Pressure for Blood Gas 736 mmHg      Modality Room Air     FIO2 21 %      Ventilator Mode NA     Notified Who MD     Notified By [id number]     Notified Time 04/21/2024 00:30     Collected by [id number]     Comment: Meter: [id number]     :  [id number]        pH, Temp Corrected --     pCO2, Temperature Corrected --     pO2, Temperature Corrected --    CBC & Differential [[id number]]  (Abnormal) Collected: 04/20/24 2319    Specimen: Blood Updated: 04/21/24 0014    Narrative:      The following orders were created for panel order CBC & Differential.  Procedure                               Abnormality         Status                      ---------                               -----------         ------                     CBC Auto Differential[[id number]]        Abnormal            Final result               Scan Slide[[id number]]                                       Final result                 Please view results for these tests on the individual orders.    Scan Slide [[id number]] Collected: 04/20/24 2319    Specimen: Blood Updated: 04/21/24 0014     Hypochromia Slight/1+     Macrocytes Slight/1+     WBC Morphology Normal     Platelet Estimate Adequate    Comprehensive Metabolic Panel [[id number]]  (Abnormal) Collected: 04/20/24 2319    Specimen: Blood Updated: 04/21/24 0000     Glucose 81 mg/dL      BUN 25 mg/dL      Creatinine 1.65 mg/dL      Sodium 139 mmol/L      Potassium 4.9 mmol/L      Chloride 116 mmol/L      CO2 8.9 mmol/L      Calcium 9.6 mg/dL      Total Protein 7.0 g/dL      Albumin 4.0 g/dL      ALT (SGPT) 7 U/L      AST (SGOT) 11 U/L      Alkaline Phosphatase 131 U/L      Total Bilirubin <0.2 mg/dL      Globulin 3.0 gm/dL      A/G Ratio 1.3 g/dL      BUN/Creatinine Ratio 15.2     Anion Gap 14.1 mmol/L      eGFR 33.5 mL/min/1.73     Narrative:      GFR Normal >60  Chronic Kidney Disease <60  Kidney Failure <15      Magnesium [[id number]]  (Abnormal) Collected: 04/20/24 2319    Specimen: Blood Updated: 04/20/24 2358     Magnesium 2.5 mg/dL     Lipase [[id number]]  (Abnormal) Collected: 04/20/24 2319    Specimen: Blood Updated: 04/20/24 2352     Lipase 722 U/L     Lactic Acid, Plasma [[id number]]  (Abnormal) Collected: 04/20/24 2319    Specimen: Blood Updated: 04/20/24 2342     Lactate 0.4 mmol/L     CBC Auto Differential [[id number]]  (Abnormal) Collected: 04/20/24 2319    Specimen: Blood Updated: 04/20/24 2339     WBC 9.43 10*3/mm3      RBC 3.13 10*6/mm3      Hemoglobin 11.3 g/dL      Hematocrit 35.3 %      .8 fL      MCH 36.1 pg      MCHC 32.0 g/dL      RDW 16.2 %      RDW-SD 67.7 fl      MPV 10.0 fL      Platelets 286 10*3/mm3       Neutrophil % 72.2 %      Lymphocyte % 18.5 %      Monocyte % 6.4 %      Eosinophil % 1.3 %      Basophil % 0.4 %      Immature Grans % 1.2 %      Neutrophils, Absolute 6.82 10*3/mm3      Lymphocytes, Absolute 1.74 10*3/mm3      Monocytes, Absolute 0.60 10*3/mm3      Eosinophils, Absolute 0.12 10*3/mm3      Basophils, Absolute 0.04 10*3/mm3      Immature Grans, Absolute 0.11 10*3/mm3      nRBC 0.0 /100 WBC           Imaging Results (Last 24 Hours)       Procedure Component Value Units Date/Time    CT Abdomen Pelvis Without Contrast [[id number]] Collected: 04/21/24 0942     Updated: 04/21/24 0950    Narrative:      CT of the abdomen and pelvis without contrast     INDICATION: Acute abdominal pain     COMPARISON: April 8, 2024     TECHNIQUE: Helically acquired axial multidetector CT images were  obtained from the lung bases through the pelvis without IV contrast.  Dose reduction techniques to achieve ALARA were employed.     Findings:     Lung bases: [No focal pneumonia].     Large sliding-type hiatal hernia     Liver: [Grossly unremarkable]     Spleen: [Unremarkable]     Gallbladder/biliary tree: [ No biliary ductal dilatation].  Cholecystectomy.     Pancreas: Peripancreatic and central abdomen stranding.     Adrenals: [Unremarkable]     Kidneys: Bilateral simple renal cysts. Small nonobstructing right renal  calculi. No hydronephrosis.        GI: Air-fluid levels in multiple proximal small bowel loops and  gas-filled distal small bowel loops which are otherwise not  significantly dilated. This may reflect small bowel ileus. Moderate  colonic stool and gas.     Bladder: [Unremarkable].     Reproductive structures: [Unremarkable]     Bones: Moderate dextroscoliosis of the thoracolumbar spine with severe  multilevel degenerative changes.     Soft Tissues: Multiple ventral wall hernias containing fat.          Impression:         1. Mild peripancreatic stranding. Correlate with serum lipase to exclude  acute  pancreatitis.     2. Multiple air-fluid levels in small bowel loops which otherwise not  significantly dilated may reflect underlying ileus.        CTDI: 5.65 mGy  DLP:241.19 mGy.cm     This report was signed and finalized on 4/21/2024 9:48 AM by Shree Jackman MD.             Orders (last 24 hrs)        Start     Ordered    04/21/24 1400  Basic Metabolic Panel  Timed,   Status:  Canceled         04/21/24 1126    04/21/24 1155  Comprehensive Metabolic Panel  Morning Draw         04/21/24 0110    04/21/24 1155  Lipase  Morning Draw         04/21/24 0110    04/21/24 1154  CBC (No Diff)  Morning Draw         04/21/24 0110    04/21/24 0956  Nephrology consult noted  Nursing Communication  Once        Comments: Nephrology consult noted    04/21/24 0955    04/21/24 0920  Blood Gas, Arterial With Co-Ox  PROCEDURE ONCE         04/21/24 0919    04/21/24 0913  sodium bicarbonate 8.4 % 150 mEq in dextrose (D5W) 5 % 1,000 mL infusion (greater than 100 mEq)  Continuous         04/21/24 0857    04/21/24 0900  diphenhydrAMINE (BENADRYL) capsule 25 mg  3 times daily         04/21/24 0318    04/21/24 0900  CT Abdomen Pelvis Without Contrast  1 Time Imaging         04/21/24 0859    04/21/24 0858  Blood Gas, Arterial -With Co-Ox Panel: Yes  Once         04/21/24 0857    04/21/24 0858  Inpatient Nephrology Consult  Once        Specialty:  Nephrology  Provider:  Nito Fernandez MD, FASN    04/21/24 0857    04/21/24 0800  Oral Care  2 Times Daily       04/21/24 0110    04/21/24 0800  predniSONE (DELTASONE) tablet 40 mg  Daily With Breakfast         04/21/24 0318    04/21/24 0600  Incentive Spirometry  Every 4 Hours While Awake       04/21/24 0110    04/21/24 0400  Vital Signs  Every 4 Hours       04/21/24 0110    04/21/24 0333  lactated ringers bolus 500 mL  Once         04/21/24 0317    04/21/24 0243  EPINEPHrine (ADRENALIN) injection 0.3 mg  Once         04/21/24 0227    04/21/24 0240  diphenhydrAMINE (BENADRYL) injection 25 mg   "Once         04/21/24 0224    04/21/24 0240  methylPREDNISolone sodium succinate (SOLU-Medrol) injection 125 mg  Once         04/21/24 0224    04/21/24 0129  Enoxaparin Sodium (LOVENOX) syringe 30 mg  Every 24 Hours         04/21/24 0113    04/21/24 0126  sodium chloride 0.9 % flush 10 mL  Every 12 Hours Scheduled         04/21/24 0110 04/21/24 0126  cefTRIAXone (ROCEPHIN) 1,000 mg in sodium chloride 0.9 % 100 mL IVPB-VTB  Every 24 Hours,   Status:  Discontinued         04/21/24 0110 04/21/24 0126  lactobacillus acidophilus (RISAQUAD) capsule 1 capsule  2 Times Daily         04/21/24 0111 04/21/24 0112  PT Consult: Eval & Treat As Tolerated; Functional Mobility Below Baseline  Once         04/21/24 0111 04/21/24 0110  Continuous Pulse Oximetry  Continuous         04/21/24 0110 04/21/24 0110  NPO Diet NPO Type: Ice Chips, Sips with Meds  Diet Effective Now         04/21/24 0110 04/21/24 0109  sennosides-docusate (PERICOLACE) 8.6-50 MG per tablet 2 tablet  2 Times Daily PRN        Placed in \"And\" Linked Group    04/21/24 0110 04/21/24 0109  polyethylene glycol (MIRALAX) packet 17 g  Daily PRN        Placed in \"And\" Linked Group    04/21/24 0110 04/21/24 0109  bisacodyl (DULCOLAX) EC tablet 5 mg  Daily PRN        Placed in \"And\" Linked Group    04/21/24 0110 04/21/24 0109  bisacodyl (DULCOLAX) suppository 10 mg  Daily PRN        Placed in \"And\" Linked Group    04/21/24 0110 04/21/24 0109  HYDROmorphone (DILAUDID) injection 0.5 mg  Every 2 Hours PRN        Placed in \"And\" Linked Group    04/21/24 0110 04/21/24 0109  naloxone (NARCAN) injection 0.4 mg  Every 5 Minutes PRN        Placed in \"And\" Linked Group    04/21/24 0110 04/21/24 0109  Pharmacy to Dose enoxaparin (LOVENOX)  Continuous PRN         04/21/24 0110    04/21/24 0109  Intake & Output  Every Shift       04/21/24 0110    04/21/24 0109  Weigh patient  Once         04/21/24 0110    04/21/24 0109  Insert Peripheral IV  Once     " "    04/21/24 0110    04/21/24 0109  Saline Lock & Maintain IV Access  Continuous         04/21/24 0110    04/21/24 0109  Code Status and Medical Interventions:  Continuous         04/21/24 0110    04/21/24 0108  sodium chloride 0.9 % infusion 40 mL  As Needed         04/21/24 0110    04/21/24 0108  acetaminophen (TYLENOL) tablet 650 mg  Every 4 Hours PRN        Placed in \"Or\" Linked Group    04/21/24 0110    04/21/24 0108  acetaminophen (TYLENOL) 160 MG/5ML oral solution 650 mg  Every 4 Hours PRN        Placed in \"Or\" Linked Group    04/21/24 0110    04/21/24 0108  acetaminophen (TYLENOL) suppository 650 mg  Every 4 Hours PRN        Placed in \"Or\" Linked Group    04/21/24 0110    04/21/24 0108  ondansetron (ZOFRAN) injection 4 mg  Every 6 Hours PRN         04/21/24 0110    04/21/24 0108  sodium chloride 0.9 % flush 10 mL  As Needed         04/21/24 0110    04/21/24 0105  lactated ringers infusion  Continuous,   Status:  Discontinued         04/21/24 0049    04/21/24 0051  Initiate Observation Status  Once         04/21/24 0050    04/21/24 0051  Cardiac Monitoring  Continuous        Comments: Follow Standing Orders As Outlined in Process Instructions (Open Order Report to View Full Instructions)    04/21/24 0050    04/21/24 0045  sodium bicarbonate 8.4 % 150 mEq in dextrose (D5W) 5 % 1,000 mL infusion (greater than 100 mEq)  Continuous,   Status:  Discontinued         04/21/24 0029    04/21/24 0041  Critical Care  Once        Comments: This order was created via procedure documentation    04/21/24 0040    04/21/24 0039  sodium bicarbonate injection 8.4% 50 mEq  Once,   Status:  Discontinued         04/21/24 0023    04/21/24 0032  Urinalysis, Microscopic Only - Urine, Clean Catch  Once         04/21/24 0031    04/21/24 0026  HYDROmorphone (DILAUDID) injection 0.5 mg  Once         04/21/24 0010    04/21/24 0020  dextrose 5 % and sodium chloride 0.9 % infusion  Continuous,   Status:  Discontinued         04/21/24 0004 " "   24 0016  Blood Gas, Arterial With Co-Ox  PROCEDURE ONCE         24 0015    24 0006  Urinalysis With Microscopic If Indicated (No Culture) - Urine, Clean Catch  Once         24 0005    24 0002  Blood Gas, Arterial -With Co-Ox Panel: Yes  STAT         24 0001    24 2329  Scan Slide  Once         24 2328    24 2246  morphine injection 4 mg  Once         24 22324 2246  ondansetron (ZOFRAN) injection 4 mg  Once         24 22324 2246  sodium chloride 0.9 % bolus 1,000 mL  Once         24 22324 223  CT Abdomen Pelvis With Contrast  1 Time Imaging,   Status:  Canceled        Comments: IV CONTRAST ONLY      24 2230    24 2228  CBC & Differential  Once         24 2227    24 2228  Comprehensive Metabolic Panel  Once         24 2227    24 2228  Lipase  Once         24 2227    24 2228  Magnesium  Once         24 2227    24 2228  Insert Peripheral IV  Once        Placed in \"And\" Linked Group    24 2227    24 2228  CBC Auto Differential  PROCEDURE ONCE         24 2227    24 2228  Lactic Acid, Plasma  Once         24 2227    24 2227  sodium chloride 0.9 % flush 10 mL  As Needed        Placed in \"And\" Linked Group    24 2227    Unscheduled  Up With Assistance  As Needed       24 0110                     Physician Progress Notes (last 24 hours)        Janak Damon DO at 24 1127              [facility]    Name:  Tasha Yarbrough   Age:  69 y.o.  Sex:  female  :  1955  MRN:  [id number]   Visit Number:  [id number]  Admission Date:  2024  Date Of Service:  24  Primary Care Physician:  Terrence Baldwin MD    Patient was seen and examined. Pertinent laboratory and radiology data were reviewed.    Vital signs:    Vital Signs (last 24 hours)          " 0700   0659  07 112   Most Recent      Temp (°F)   97.5       97.5 (36.4) 2201    Heart Rate 73 -  123    71 -  84     71  1123    Resp  -         18  0428    BP 80/53 -  162/96    95/60 -  132/64     113/77  1100    SpO2 (%) 92 -  100    97 -  98     98  1123            -Patient with worsening metabolic acidosis, nonanion gap.  Suspect secondary to GI losses.  -Initiate bicarbonate drip, repeat BMP this afternoon  -Nephrology consultation  -Risk versus benefit weighed, repeat CT abdomen pelvis obtained due to progressively worsening metabolic acidemia and concern for GI process.  Shows mild peripancreatic stranding.  There are also multiple air-fluid levels in small bowel loops which otherwise not significantly dilated may reflect underlying ileus.  -Keep n.p.o.    Janak Damon DO  24  11:27 EDT    Dictated utilizing Dragon dictation.      Electronically signed by Janak Damon DO at 24 112Edwin Carvalho MD at 249              [facility]    Name:  Tasha Yarbrough   Age:  69 y.o.  Sex:  female  :  1955  MRN:  [id number]   Visit Number:  [id number]  Admission Date:  2024  Date Of Service:  24  Primary Care Physician:  Terrence Baldwin MD    Patient developed anaphylaxis following Rocephin infusion causing shortness of breath, tachycardia and drop in blood pressures.  Patient was given 0.3 mg of epinephrine intramuscularly, IV Solu-Medrol and IV Benadryl was given in the emergency room.  Rocephin was added to the patient's allergy list.  Rocephin order was discontinued.    Vital signs:    Vital Signs (last 24 hours)          0700   0659    Most Recent      Temp (°F)     97.5     97.5 (36.4) 2201    Heart Rate   73 -  123     93  0310    Resp    0207    BP   80/53 -  162/96     124/86  0310    SpO2 (%)   92 -   "100     100 04/21 0310     Due to low blood pressures, she received a bolus of lactated Ringer at 500 mL with improvement in her pressures.    Patient is currently feeling better but patient state that she \"I was going to die\" but now \"I am feeling better\".  Systolic blood pressures have improved into the 110s.  We will hold off on further antibiotics therapy at this time and check Blood work in the morning.  Discussed with nursing staff.    Edwin Aguilar MD  04/21/24  03:19 EDT    Dictated utilizing Dragon dictation.    Electronically signed by Edwin Aguilar MD at 04/21/24 0408       Consult Notes (last 24 hours)  Notes from 04/20/24 1317 through 04/21/24 1317   No notes of this type exist for this encounter.       "

## 2024-04-21 NOTE — ED NOTES
Upon arrival to room, pt disoriented and flushed. Pt ED provider called to bedside. Pt oxygen 88% on RA. Pt placed on 2L NC without improvement. Pt titrated to 6L simple mask with oxygen saturation improving to mid to high 90's.

## 2024-04-21 NOTE — PLAN OF CARE
Problem: Adult Inpatient Plan of Care  Goal: Plan of Care Review  Outcome: Ongoing, Progressing  Flowsheets (Taken 4/21/2024 1936)  Progress: improving  Plan of Care Reviewed With: patient  Goal: Patient-Specific Goal (Individualized)  Outcome: Ongoing, Progressing  Goal: Absence of Hospital-Acquired Illness or Injury  Outcome: Ongoing, Progressing  Intervention: Identify and Manage Fall Risk  Recent Flowsheet Documentation  Taken 4/21/2024 1823 by Itz Shirley RN  Safety Promotion/Fall Prevention:   activity supervised   assistive device/personal items within reach   clutter free environment maintained   fall prevention program maintained   nonskid shoes/slippers when out of bed   safety round/check completed  Taken 4/21/2024 1730 by Itz Shirley RN  Safety Promotion/Fall Prevention:   activity supervised   assistive device/personal items within reach   clutter free environment maintained   fall prevention program maintained   nonskid shoes/slippers when out of bed   safety round/check completed  Intervention: Prevent Skin Injury  Recent Flowsheet Documentation  Taken 4/21/2024 1823 by Itz Shirley RN  Body Position: sitting up in bed  Taken 4/21/2024 1730 by Itz Shirley RN  Body Position: sitting up in bed  Taken 4/21/2024 1535 by Itz Shirley RN  Skin Protection:   adhesive use limited   protective footwear used   tubing/devices free from skin contact  Intervention: Prevent and Manage VTE (Venous Thromboembolism) Risk  Recent Flowsheet Documentation  Taken 4/21/2024 1823 by Itz Shirley RN  Activity Management: activity encouraged  Taken 4/21/2024 1730 by Itz Shirley RN  Activity Management: back to bed  Taken 4/21/2024 1727 by Itz Shirley RN  Activity Management: ambulated to bathroom  Taken 4/21/2024 1535 by Itz Shirley RN  Range of Motion: ROM (range of motion) performed  Intervention: Prevent Infection  Recent Flowsheet Documentation  Taken 4/21/2024 1823 by Itz Shirley RN  Infection  Prevention:   environmental surveillance performed   hand hygiene promoted   single patient room provided  Taken 4/21/2024 1730 by Itz Shirley RN  Infection Prevention:   environmental surveillance performed   hand hygiene promoted   single patient room provided  Goal: Optimal Comfort and Wellbeing  Outcome: Ongoing, Progressing  Intervention: Monitor Pain and Promote Comfort  Recent Flowsheet Documentation  Taken 4/21/2024 1823 by Itz Shirley RN  Pain Management Interventions: see MAR  Taken 4/21/2024 1535 by Itz Shirley RN  Pain Management Interventions: see MAR  Intervention: Provide Person-Centered Care  Recent Flowsheet Documentation  Taken 4/21/2024 1823 by Itz Shirley RN  Trust Relationship/Rapport:   choices provided   care explained   emotional support provided   empathic listening provided   reassurance provided  Taken 4/21/2024 1535 by Itz Shirley RN  Trust Relationship/Rapport:   care explained   choices provided   emotional support provided   empathic listening provided   reassurance provided  Goal: Readiness for Transition of Care  Outcome: Ongoing, Progressing  Intervention: Mutually Develop Transition Plan  Recent Flowsheet Documentation  Taken 4/21/2024 1542 by Itz Shirley RN  Equipment Currently Used at Home:   commode   cane, straight   walker, rolling  Transportation Anticipated: family or friend will provide  Patient/Family Anticipated Services at Transition:   Patient/Family Anticipates Transition to: home with family     Problem: Fluid Imbalance (Pancreatitis)  Goal: Fluid Balance  Outcome: Ongoing, Progressing  Intervention: Monitor and Manage Fluid Balance  Recent Flowsheet Documentation  Taken 4/21/2024 1535 by Itz Shirley RN  Fluid/Electrolyte Management: intravenous fluids adjusted     Problem: Infection (Pancreatitis)  Goal: Infection Symptom Resolution  Outcome: Ongoing, Progressing  Intervention: Prevent or Manage Infection  Recent Flowsheet  Documentation  Taken 4/21/2024 1823 by Itz Shirley RN  Infection Management: aseptic technique maintained  Taken 4/21/2024 1730 by Itz Shirley RN  Infection Management: aseptic technique maintained  Taken 4/21/2024 1535 by Itz Shirley RN  Infection Management: aseptic technique maintained     Problem: Nutrition Impaired (Pancreatitis)  Goal: Optimal Nutrition Intake  Outcome: Ongoing, Progressing     Problem: Pain (Pancreatitis)  Goal: Acceptable Pain Control  Outcome: Ongoing, Progressing  Intervention: Monitor and Manage Pain  Recent Flowsheet Documentation  Taken 4/21/2024 1823 by Itz Shirley RN  Pain Management Interventions: see MAR  Taken 4/21/2024 1535 by Itz Shirley RN  Pain Management Interventions: see MAR  Diversional Activities: television  Sleep/Rest Enhancement:   natural light exposure provided   noise level reduced     Problem: Respiratory Compromise (Pancreatitis)  Goal: Effective Oxygenation and Ventilation  Outcome: Ongoing, Progressing  Intervention: Optimize Oxygenation and Ventilation  Recent Flowsheet Documentation  Taken 4/21/2024 1823 by Itz Shirley RN  Activity Management: activity encouraged  Head of Bed (HOB) Positioning: HOB at 45 degrees  Taken 4/21/2024 1730 by Itz Shirley RN  Activity Management: back to bed  Head of Bed (HOB) Positioning: HOB at 45 degrees  Taken 4/21/2024 1727 by Itz Shirley RN  Activity Management: ambulated to bathroom     Problem: Electrolyte Imbalance  Goal: Electrolyte Balance  Outcome: Ongoing, Progressing  Intervention: Monitor and Manage Electrolyte Imbalance  Recent Flowsheet Documentation  Taken 4/21/2024 1535 by Itz Shirley RN  Fluid/Electrolyte Management: intravenous fluids adjusted     Problem: Pain Acute  Goal: Acceptable Pain Control and Functional Ability  Outcome: Ongoing, Progressing  Intervention: Prevent or Manage Pain  Recent Flowsheet Documentation  Taken 4/21/2024 1823 by Itz Shirley RN  Medication Review/Management:  medications reviewed  Taken 4/21/2024 1535 by Itz Shirley RN  Bowel Elimination Promotion: commode/bedpan at bedside  Sleep/Rest Enhancement:   natural light exposure provided   noise level reduced  Medication Review/Management: medications reviewed  Intervention: Develop Pain Management Plan  Recent Flowsheet Documentation  Taken 4/21/2024 1823 by Itz Shirley RN  Pain Management Interventions: see MAR  Taken 4/21/2024 1535 by Itz Shirley RN  Pain Management Interventions: see MAR  Intervention: Optimize Psychosocial Wellbeing  Recent Flowsheet Documentation  Taken 4/21/2024 1535 by Itz Shirley RN  Supportive Measures:   active listening utilized   verbalization of feelings encouraged  Diversional Activities: television     Problem: Hypertension Comorbidity  Goal: Blood Pressure in Desired Range  Outcome: Ongoing, Progressing  Intervention: Maintain Blood Pressure Management  Recent Flowsheet Documentation  Taken 4/21/2024 1823 by Itz Shirley RN  Medication Review/Management: medications reviewed  Taken 4/21/2024 1535 by Itz Shirley RN  Medication Review/Management: medications reviewed   Goal Outcome Evaluation:  Plan of Care Reviewed With: patient        Progress: improving

## 2024-04-21 NOTE — ED NOTES
Pt tolerated med admin well, pt back to baseline at this time. Rocephin added to patients allergy list.

## 2024-04-21 NOTE — ED NOTES
Attempted to return call to give report on pt, RN off the floor at this time. Stated that they will call back for report.

## 2024-04-22 PROBLEM — R11.2 NAUSEA AND VOMITING IN ADULT: Status: ACTIVE | Noted: 2024-04-22

## 2024-04-22 PROBLEM — R10.13 EPIGASTRIC PAIN: Status: ACTIVE | Noted: 2024-04-22

## 2024-04-22 LAB
ALBUMIN SERPL-MCNC: 3.4 G/DL (ref 3.5–5.2)
ANION GAP SERPL CALCULATED.3IONS-SCNC: 10.7 MMOL/L (ref 5–15)
ANISOCYTOSIS BLD QL: NORMAL
BASOPHILS # BLD AUTO: 0.04 10*3/MM3 (ref 0–0.2)
BASOPHILS NFR BLD AUTO: 0.5 % (ref 0–1.5)
BUN SERPL-MCNC: 20 MG/DL (ref 8–23)
BUN/CREAT SERPL: 17.7 (ref 7–25)
CALCIUM SPEC-SCNC: 9.6 MG/DL (ref 8.6–10.5)
CHLORIDE SERPL-SCNC: 110 MMOL/L (ref 98–107)
CO2 SERPL-SCNC: 18.3 MMOL/L (ref 22–29)
CREAT SERPL-MCNC: 1.13 MG/DL (ref 0.57–1)
DEPRECATED RDW RBC AUTO: 67.7 FL (ref 37–54)
EGFRCR SERPLBLD CKD-EPI 2021: 52.8 ML/MIN/1.73
EOSINOPHIL # BLD AUTO: 0.05 10*3/MM3 (ref 0–0.4)
EOSINOPHIL NFR BLD AUTO: 0.6 % (ref 0.3–6.2)
ERYTHROCYTE [DISTWIDTH] IN BLOOD BY AUTOMATED COUNT: 16.2 % (ref 12.3–15.4)
GLUCOSE SERPL-MCNC: 80 MG/DL (ref 65–99)
HCT VFR BLD AUTO: 31.5 % (ref 34–46.6)
HGB BLD-MCNC: 10 G/DL (ref 12–15.9)
IMM GRANULOCYTES # BLD AUTO: 0.08 10*3/MM3 (ref 0–0.05)
IMM GRANULOCYTES NFR BLD AUTO: 0.9 % (ref 0–0.5)
LYMPHOCYTES # BLD AUTO: 1.37 10*3/MM3 (ref 0.7–3.1)
LYMPHOCYTES NFR BLD AUTO: 15.7 % (ref 19.6–45.3)
MACROCYTES BLD QL SMEAR: NORMAL
MCH RBC QN AUTO: 35.7 PG (ref 26.6–33)
MCHC RBC AUTO-ENTMCNC: 31.7 G/DL (ref 31.5–35.7)
MCV RBC AUTO: 112.5 FL (ref 79–97)
MONOCYTES # BLD AUTO: 0.73 10*3/MM3 (ref 0.1–0.9)
MONOCYTES NFR BLD AUTO: 8.3 % (ref 5–12)
NEUTROPHILS NFR BLD AUTO: 6.48 10*3/MM3 (ref 1.7–7)
NEUTROPHILS NFR BLD AUTO: 74 % (ref 42.7–76)
NRBC BLD AUTO-RTO: 0 /100 WBC (ref 0–0.2)
PHOSPHATE SERPL-MCNC: 2.8 MG/DL (ref 2.5–4.5)
PLAT MORPH BLD: NORMAL
PLATELET # BLD AUTO: 233 10*3/MM3 (ref 140–450)
PMV BLD AUTO: 9.6 FL (ref 6–12)
POTASSIUM SERPL-SCNC: 3.7 MMOL/L (ref 3.5–5.2)
RBC # BLD AUTO: 2.8 10*6/MM3 (ref 3.77–5.28)
SODIUM SERPL-SCNC: 139 MMOL/L (ref 136–145)
WBC MORPH BLD: NORMAL
WBC NRBC COR # BLD AUTO: 8.75 10*3/MM3 (ref 3.4–10.8)

## 2024-04-22 PROCEDURE — 99222 1ST HOSP IP/OBS MODERATE 55: CPT | Performed by: INTERNAL MEDICINE

## 2024-04-22 PROCEDURE — 25010000002 HYDROMORPHONE 1 MG/ML SOLUTION: Performed by: INTERNAL MEDICINE

## 2024-04-22 PROCEDURE — 80069 RENAL FUNCTION PANEL: CPT | Performed by: INTERNAL MEDICINE

## 2024-04-22 PROCEDURE — 85007 BL SMEAR W/DIFF WBC COUNT: CPT | Performed by: INTERNAL MEDICINE

## 2024-04-22 PROCEDURE — 85025 COMPLETE CBC W/AUTO DIFF WBC: CPT | Performed by: INTERNAL MEDICINE

## 2024-04-22 PROCEDURE — 0 DEXTROSE 5 % SOLUTION 1,000 ML FLEX CONT: Performed by: INTERNAL MEDICINE

## 2024-04-22 PROCEDURE — 63710000001 PREDNISONE PER 1 MG: Performed by: INTERNAL MEDICINE

## 2024-04-22 PROCEDURE — 97162 PT EVAL MOD COMPLEX 30 MIN: CPT

## 2024-04-22 PROCEDURE — 99232 SBSQ HOSP IP/OBS MODERATE 35: CPT | Performed by: INTERNAL MEDICINE

## 2024-04-22 PROCEDURE — 25810000003 LACTATED RINGERS PER 1000 ML: Performed by: INTERNAL MEDICINE

## 2024-04-22 PROCEDURE — 63710000001 DIPHENHYDRAMINE PER 50 MG: Performed by: INTERNAL MEDICINE

## 2024-04-22 PROCEDURE — 25010000002 ENOXAPARIN PER 10 MG: Performed by: INTERNAL MEDICINE

## 2024-04-22 PROCEDURE — 25010000002 ONDANSETRON PER 1 MG: Performed by: INTERNAL MEDICINE

## 2024-04-22 RX ORDER — CEPHALEXIN 250 MG/1
500 CAPSULE ORAL EVERY 12 HOURS SCHEDULED
Status: DISPENSED | OUTPATIENT
Start: 2024-04-22 | End: 2024-04-24

## 2024-04-22 RX ADMIN — ONDANSETRON 4 MG: 2 INJECTION INTRAMUSCULAR; INTRAVENOUS at 20:37

## 2024-04-22 RX ADMIN — HYDROMORPHONE HYDROCHLORIDE 0.5 MG: 1 INJECTION, SOLUTION INTRAMUSCULAR; INTRAVENOUS; SUBCUTANEOUS at 18:47

## 2024-04-22 RX ADMIN — SODIUM CHLORIDE, POTASSIUM CHLORIDE, SODIUM LACTATE AND CALCIUM CHLORIDE 100 ML/HR: 600; 310; 30; 20 INJECTION, SOLUTION INTRAVENOUS at 11:29

## 2024-04-22 RX ADMIN — ACETAMINOPHEN 650 MG: 650 SOLUTION ORAL at 08:41

## 2024-04-22 RX ADMIN — PREDNISONE 40 MG: 20 TABLET ORAL at 08:08

## 2024-04-22 RX ADMIN — HYDROMORPHONE HYDROCHLORIDE 0.5 MG: 1 INJECTION, SOLUTION INTRAMUSCULAR; INTRAVENOUS; SUBCUTANEOUS at 04:36

## 2024-04-22 RX ADMIN — HYDROMORPHONE HYDROCHLORIDE 0.5 MG: 1 INJECTION, SOLUTION INTRAMUSCULAR; INTRAVENOUS; SUBCUTANEOUS at 20:37

## 2024-04-22 RX ADMIN — Medication 1 CAPSULE: at 20:37

## 2024-04-22 RX ADMIN — Medication 1 CAPSULE: at 08:08

## 2024-04-22 RX ADMIN — Medication 10 ML: at 20:37

## 2024-04-22 RX ADMIN — HYDROMORPHONE HYDROCHLORIDE 0.5 MG: 1 INJECTION, SOLUTION INTRAMUSCULAR; INTRAVENOUS; SUBCUTANEOUS at 14:20

## 2024-04-22 RX ADMIN — HYDROMORPHONE HYDROCHLORIDE 0.5 MG: 1 INJECTION, SOLUTION INTRAMUSCULAR; INTRAVENOUS; SUBCUTANEOUS at 22:44

## 2024-04-22 RX ADMIN — SODIUM CHLORIDE, POTASSIUM CHLORIDE, SODIUM LACTATE AND CALCIUM CHLORIDE 100 ML/HR: 600; 310; 30; 20 INJECTION, SOLUTION INTRAVENOUS at 02:22

## 2024-04-22 RX ADMIN — DIPHENHYDRAMINE HYDROCHLORIDE 25 MG: 25 CAPSULE ORAL at 08:08

## 2024-04-22 RX ADMIN — HYDROMORPHONE HYDROCHLORIDE 0.5 MG: 1 INJECTION, SOLUTION INTRAMUSCULAR; INTRAVENOUS; SUBCUTANEOUS at 16:23

## 2024-04-22 RX ADMIN — CEPHALEXIN 500 MG: 250 CAPSULE ORAL at 14:20

## 2024-04-22 RX ADMIN — HYDROMORPHONE HYDROCHLORIDE 0.5 MG: 1 INJECTION, SOLUTION INTRAMUSCULAR; INTRAVENOUS; SUBCUTANEOUS at 08:09

## 2024-04-22 RX ADMIN — HYDROMORPHONE HYDROCHLORIDE 0.5 MG: 1 INJECTION, SOLUTION INTRAMUSCULAR; INTRAVENOUS; SUBCUTANEOUS at 02:21

## 2024-04-22 RX ADMIN — HYDROMORPHONE HYDROCHLORIDE 0.5 MG: 1 INJECTION, SOLUTION INTRAMUSCULAR; INTRAVENOUS; SUBCUTANEOUS at 11:29

## 2024-04-22 RX ADMIN — Medication 150 MEQ: at 02:22

## 2024-04-22 RX ADMIN — ENOXAPARIN SODIUM 40 MG: 100 INJECTION SUBCUTANEOUS at 08:08

## 2024-04-22 RX ADMIN — Medication 10 ML: at 08:09

## 2024-04-22 NOTE — PROGRESS NOTES
Pt w/ moderate malnutrition based on 6.7% wt loss and NFPE findings. Pt w/ NPO diet regimen - will order supplement once diet advances. RD to follow-up.   Malnutrition Severity Assessment      Patient meets criteria for : Moderate (non-severe) Malnutrition  Malnutrition Type (Last 8 Hours)       Malnutrition Severity Assessment       Row Name 04/22/24 1344       Malnutrition Severity Assessment    Malnutrition Type Chronic Disease - Related Malnutrition      Row Name 04/22/24 1344       Unintentional Weight Loss     Unintentional Weight Loss  Weight loss greater than 5% in one month  6.7%      Row Name 04/22/24 1344       Muscle Loss    Temple Region Moderate - slight depression    Clavicle Bone Region Moderate - some protrusion in females, visible in males    Acromion Bone Region Moderate - acromion may slightly protrude    Scapular Bone Region Moderate - mild depression, bones may show slightly    Dorsal Hand Region None    Patellar Region None    Anterior Thigh Region None    Posterior Calf Region None      Row Name 04/22/24 1344       Fat Loss    Orbital Region  Moderate -  somewhat hollowness, slightly dark circles    Upper Arm Region Moderate - some fat tissue, not ample      Row Name 04/22/24 1344       Criteria Met (Must meet criteria for severity in at least 2 of these categories: M Wasting, Fat Loss, Fluid, Secondary Signs, Wt. Status, Intake)    Patient meets criteria for  Moderate (non-severe) Malnutrition

## 2024-04-22 NOTE — OUTREACH NOTE
Medical Week 1 Survey      Flowsheet Row Responses   [facility] patient discharged from? [facility]   Does the patient have one of the following disease processes/diagnoses(primary or secondary)? Other   Week 1 attempt successful? No   Unsuccessful attempts Attempt 2   Revoke Readmitted            ALDEN GRADY - Registered Nurse

## 2024-04-22 NOTE — THERAPY EVALUATION
Patient Name: Tasha Yarbrough  : 1955    MRN: [id number]                              Today's Date: 2024       Admit Date: 2024    Visit Dx:     ICD-10-CM ICD-9-CM   1. Acute pancreatitis without infection or necrosis, unspecified pancreatitis type  K85.90 577.0   2. Metabolic acidosis  E87.20 276.2   3. Acute kidney injury  N17.9 584.9     Patient Active Problem List   Diagnosis    Colon cancer screening    Gastroesophageal reflux disease with esophagitis    Left lower quadrant abdominal pain    Irritable bowel syndrome with constipation    Encounter for screening for malignant neoplasm of colon    Periumbilical abdominal pain    Diarrhea of presumed infectious origin    Acute kidney injury    Bacterial colitis    VIVIENNE (acute kidney injury)    C. difficile colitis    Generalized abdominal pain    Diarrhea of infectious origin    Abnormal finding on GI tract imaging    Anemia, chronic disease    Colitis due to Clostridium difficile    Pancreatitis, acute    Chronic kidney disease, stage III (moderate)    Acute pancreatitis    Metabolic acidosis    Acute UTI (urinary tract infection)     Past Medical History:   Diagnosis Date    Hypertension     Impaired mobility     Injury of back     Pancreatitis      Past Surgical History:   Procedure Laterality Date    ABDOMINAL SURGERY      COLON SURGERY      COLONOSCOPY      TUBAL ABDOMINAL LIGATION        General Information       Row Name 24 1517          Physical Therapy Time and Intention    Document Type evaluation  -MS     Mode of Treatment physical therapy  -MS       Row Name 24 1517          General Information    Patient Profile Reviewed yes  -MS     Prior Level of Function independent:;all household mobility;community mobility  -MS     Existing Precautions/Restrictions fall;oxygen therapy device and L/min  -MS     Barriers to Rehab medically complex;previous functional deficit  -MS       Row Name 24 1517          Living Environment     People in Home child(debra), adult  -MS       Row Name 04/22/24 1517          Cognition    Orientation Status (Cognition) oriented x 4  -MS       Row Name 04/22/24 1517          Safety Issues, Functional Mobility    Safety Issues Affecting Function (Mobility) insight into deficits/self-awareness;positioning of assistive device;safety precaution awareness;impulsivity  -MS     Impairments Affecting Function (Mobility) balance;endurance/activity tolerance;strength;pain  -MS               User Key  (r) = Recorded By, (t) = Taken By, (c) = Cosigned By      Initials Name Provider Type    Sebastien Brownlee, PT Physical Therapist                   Mobility       Row Name 04/22/24 1519          Bed Mobility    Bed Mobility supine-sit  -MS     Supine-Sit [place] (Bed Mobility) standby assist  -MS     Assistive Device (Bed Mobility) bed rails;head of bed elevated  -MS       Row Name 04/22/24 1519          Sit-Stand Transfer    Sit-Stand [place] (Transfers) contact guard  -MS     Assistive Device (Sit-Stand Transfers) cane, straight  -MS       Row Name 04/22/24 1519          Gait/Stairs (Locomotion)    [place] Level (Gait) contact guard  -MS     Assistive Device (Gait) cane, straight  -MS     Patient was able to Ambulate yes  -MS     Distance in Feet (Gait) 42  -MS     Deviations/Abnormal Patterns (Gait) gait speed decreased;base of support, narrow  -MS               User Key  (r) = Recorded By, (t) = Taken By, (c) = Cosigned By      Initials Name Provider Type    Sebastien Brownlee, PT Physical Therapist                   Obj/Interventions       Row Name 04/22/24 1520          Range of Motion Comprehensive    General Range of Motion bilateral lower extremity ROM WFL  -MS       Row Name 04/22/24 1520          Strength Comprehensive (MMT)    General Manual Muscle Testing (MMT) Assessment lower extremity strength deficits identified  -MS     Comment, General Manual Muscle Testing (MMT) Assessment B LE 4-/5  -MS                User Key  (r) = Recorded By, (t) = Taken By, (c) = Cosigned By      Initials Name Provider Type    Sebastien Brownlee, PT Physical Therapist                   Goals/Plan       Row Name 04/22/24 1523          Transfer Goal 1 (PT)    Activity/Assistive Device (Transfer Goal 1, PT) transfers, all;sit-to-stand/stand-to-sit  -MS     [place] Level/Cues Needed (Transfer Goal 1, PT) modified independence  -MS     Time Frame (Transfer Goal 1, PT) long term goal (LTG);2 weeks  -MS       Row Name 04/22/24 1523          Gait Training Goal 1 (PT)    Activity/Assistive Device (Gait Training Goal 1, PT) gait (walking locomotion);assistive device use  -MS     [place] Level (Gait Training Goal 1, PT) standby assist  -MS     Distance (Gait Training Goal 1, PT) 150ft  -MS     Time Frame (Gait Training Goal 1, PT) long term goal (LTG);2 weeks  -MS       Row Name 04/22/24 1523          Patient Education Goal (PT)    Activity (Patient Education Goal, PT) ther exer x15 reps ea  -MS     [place]/Cues/Accuracy (Memory Goal 2, PT) demonstrates adequately  -MS     Time Frame (Patient Education Goal, PT) short term goal (STG);1 week  -MS       Row Name 04/22/24 1523          Therapy Assessment/Plan (PT)    Planned Therapy Interventions (PT) balance training;bed mobility training;gait training;home exercise program;stair training;strengthening;ROM (range of motion);neuromuscular re-education;transfer training  -MS               User Key  (r) = Recorded By, (t) = Taken By, (c) = Cosigned By      Initials Name Provider Type    Sebastien Brownlee, PT Physical Therapist                   Clinical Impression       Row Name 04/22/24 1521          Pain    Pretreatment Pain Rating 7/10  -MS     Posttreatment Pain Rating 7/10  -MS     Pain Location - abdomen  -MS     Pain Intervention(s) Repositioned  -MS       Row Name 04/22/24 1521          Plan of Care Review    Plan of Care Reviewed With patient;son  -MS     Progress  improving  -MS     Outcome Evaluation Pt participated in PT eval this date. Pt received in supine, agreeable to get OOB. Pt transferred to EOB with SBA. Pt then ambulated 42ft with SPC and CGA. Pt reports abdominal pain 7/10. Pt is expected to benefit from skilled PT tx during this inpatient stay.  -MS       Row Name 04/22/24 1521          Therapy Assessment/Plan (PT)    Patient/Family Therapy Goals Statement (PT) to go home  -MS     Rehab Potential (PT) good, to achieve stated therapy goals  -MS     Criteria for Skilled Interventions Met (PT) yes;meets criteria  -MS     Therapy Frequency (PT) 5 times/wk  -MS       Row Name 04/22/24 1521          Vital Signs    O2 Delivery Pre Treatment room air  -MS     O2 Delivery Intra Treatment room air  -MS     O2 Delivery Post Treatment room air  -MS     Pre Patient Position Supine  -MS     Intra Patient Position Standing  -MS     Post Patient Position Sitting  -MS       Row Name 04/22/24 1521          Positioning and Restraints    Pre-Treatment Position in bed  -MS     Post Treatment Position chair  -MS     In Chair reclined;call light within reach;encouraged to call for assist;with family/caregiver  -MS               User Key  (r) = Recorded By, (t) = Taken By, (c) = Cosigned By      Initials Name Provider Type    MS Sebastien Simon, PT Physical Therapist                   Outcome Measures       Row Name 04/22/24 1524 04/22/24 0800       How much help from another person do you currently need...    Turning from your back to your side while in flat bed without using bedrails? 4  -MS 4  -TS    Moving from lying on back to sitting on the side of a flat bed without bedrails? 4  -MS 4  -TS    Moving to and from a bed to a chair (including a wheelchair)? 3  -MS 3  -TS    Standing up from a chair using your arms (e.g., wheelchair, bedside chair)? 3  -MS 3  -TS    Climbing 3-5 steps with a railing? 3  -MS 3  -TS    To walk in hospital room? 3  -MS 3  -TS    AM-PAC 6 Clicks Score  (PT) 20  -MS 20  -TS    Highest Level of Mobility Goal 6 --> Walk 10 steps or more  -MS 6 --> Walk 10 steps or more  -TS              User Key  (r) = Recorded By, (t) = Taken By, (c) = Cosigned By      Initials Name Provider Type    MS Sebastien Simon, MARCIAL Physical Therapist    Marion Dockery, RN Registered Nurse                                 Physical Therapy Education       Title: PT OT SLP Therapies (In Progress)       Topic: Physical Therapy (In Progress)       Point: Mobility training (Done)       Learning Progress Summary             Patient Acceptance, E, VU by MS at 4/22/2024 1524    Comment: importance of mobility                         Point: Home exercise program (Done)       Learning Progress Summary             Patient Acceptance, E, VU by MS at 4/22/2024 1524    Comment: importance of mobility                         Point: Body mechanics (Not Started)       Learner Progress:  Not documented in this visit.              Point: Precautions (Not Started)       Learner Progress:  Not documented in this visit.                              User Key       Initials Effective Dates Name Provider Type Discipline    MS 08/22/23 -  Sebastien Simon PT Physical Therapist PT                  PT Recommendation and Plan  Planned Therapy Interventions (PT): balance training, bed mobility training, gait training, home exercise program, stair training, strengthening, ROM (range of motion), neuromuscular re-education, transfer training  Plan of Care Reviewed With: patient, son  Progress: improving  Outcome Evaluation: Pt participated in PT eval this date. Pt received in supine, agreeable to get OOB. Pt transferred to EOB with SBA. Pt then ambulated 42ft with SPC and CGA. Pt reports abdominal pain 7/10. Pt is expected to benefit from skilled PT tx during this inpatient stay.     Time Calculation:   PT Evaluation Complexity  History, PT Evaluation Complexity: 1-2 personal factors and/or  comorbidities  Examination of Body Systems (PT Eval Complexity): total of 3 or more elements  Clinical Presentation (PT Evaluation Complexity): evolving  Clinical Decision Making (PT Evaluation Complexity): moderate complexity  Overall Complexity (PT Evaluation Complexity): moderate complexity     PT Charges       Row Name 04/22/24 1525             Time Calculation    Start Time 1428  -MS      PT Received On 04/22/24  -MS      PT Goal Re-Cert Due Date 05/02/24  -MS         Untimed Charges    PT Eval/Re-eval Minutes 56  -MS         Total Minutes    Untimed Charges Total Minutes 56  -MS       Total Minutes 56  -MS                User Key  (r) = Recorded By, (t) = Taken By, (c) = Cosigned By      Initials Name Provider Type    MS Sebastien Simon, PT Physical Therapist                  Therapy Charges for Today       Code Description Service Date Service Provider Modifiers Qty    18290586175 HC PT EVAL MOD COMPLEXITY 4 4/22/2024 Sebastien Simon, PT GP 1            PT G-Codes  AM-PAC 6 Clicks Score (PT): 20  PT Discharge Summary  Anticipated Discharge Disposition (PT): home with assist, home with home health    Sebastien Simon PT  4/22/2024

## 2024-04-22 NOTE — PROGRESS NOTES
"Dietitian Assessment    Patient Name: Tasha Yarbrough  YOB: 1955  MRN: [id number]  Admission date: 4/20/2024    Comment:    Pt with significant wt loss. Will add ONS once diet is advanced.     Clinical Nutrition Assessment      Reason for Assessment MST   H&P  Past Medical History:   Diagnosis Date    Hypertension     Impaired mobility     Injury of back     Pancreatitis        Past Surgical History:   Procedure Laterality Date    ABDOMINAL SURGERY      COLON SURGERY      COLONOSCOPY      TUBAL ABDOMINAL LIGATION              Current Problems        Encounter Information        Trending Narrative     4/22: Pt admitted with metabolic acidosis. Pt noted for recent recurrent pancreatitis. EMR shows wt loss of 9# (6.7%) within 1 month. Pt is currently NPO. Will add ONS once diet advances.      Anthropometrics        Current Height, Weight Height: 157.5 cm (62.01\")  Weight: 57 kg (125 lb 10.6 oz) (04/22/24 0356)   Trending Weight Hx     This admission:              PTA:     Wt Readings from Last 30 Encounters:   04/22/24 0356 57 kg (125 lb 10.6 oz)   04/21/24 1456 58.8 kg (129 lb 10.1 oz)   04/20/24 2201 65.8 kg (145 lb)   04/10/24 0346 60 kg (132 lb 4.4 oz)   04/09/24 1300 60.8 kg (134 lb 0.6 oz)   04/08/24 1755 65.8 kg (145 lb)   04/06/24 1945 65.8 kg (145 lb)   03/28/24 0900 66.2 kg (146 lb)   03/23/24 1651 66.2 kg (145 lb 15.1 oz)   03/16/24 1353 66.2 kg (146 lb)   03/09/24 0243 66.2 kg (146 lb)   03/04/24 0415 67.8 kg (149 lb 7.6 oz)   03/03/24 0430 62.6 kg (138 lb 0.1 oz)   03/02/24 0057 62.6 kg (138 lb)   02/29/24 0339 62.7 kg (138 lb 3.7 oz)   02/27/24 0326 63.9 kg (140 lb 14 oz)   02/26/24 0302 62.4 kg (137 lb 9.1 oz)   02/24/24 2333 65.8 kg (145 lb)   09/25/23 0323 68.9 kg (151 lb 14.4 oz)   09/24/23 0336 68.5 kg (151 lb 0.2 oz)   09/23/23 0500 67.4 kg (148 lb 9.4 oz)   09/22/23 2345 66.8 kg (147 lb 4.3 oz)   09/22/23 1932 61.2 kg (135 lb)   09/02/23 1847 60.8 kg (134 lb)   08/18/22 2132 78.9 kg " "(174 lb)   08/08/22 0500 79.7 kg (175 lb 11.3 oz)   08/07/22 0536 77.4 kg (170 lb 10.2 oz)   08/06/22 2340 77 kg (169 lb 12.1 oz)   08/06/22 2019 76.7 kg (169 lb)   07/30/22 0441 79 kg (174 lb 2.6 oz)   07/29/22 0417 78.7 kg (173 lb 8 oz)   07/27/22 0353 78.9 kg (173 lb 15.1 oz)   07/26/22 1552 76.7 kg (169 lb)   05/06/21 1626 92.1 kg (203 lb)   10/04/20 0002 90.7 kg (200 lb)   08/15/20 1940 97.5 kg (215 lb)   05/23/20 1814 95.3 kg (210 lb)      BMI kg/m2 Body mass index is 22.98 kg/m².     Labs        Pertinent Labs     Results from last 7 days   Lab Units 04/22/24  0545 04/21/24  1800 04/21/24 1215 04/20/24  2319   SODIUM mmol/L 139 135* 136 139   POTASSIUM mmol/L 3.7 4.8 6.0* 4.9   CHLORIDE mmol/L 110* 111* 113* 116*   CO2 mmol/L 18.3* 11.2* 13.3* 8.9*   BUN mg/dL 20 24* 24* 25*   CREATININE mg/dL 1.13* 1.37* 1.43* 1.65*   CALCIUM mg/dL 9.6 10.1 9.5 9.6   BILIRUBIN mg/dL  --   --  0.2 <0.2   ALK PHOS U/L  --   --  104 131*   ALT (SGPT) U/L  --   --  8 7   AST (SGOT) U/L  --   --  13 11   GLUCOSE mg/dL 80 86 111* 81       Results from last 7 days   Lab Units 04/22/24  0545 04/21/24  1215 04/20/24  2319   MAGNESIUM mg/dL  --   --  2.5*   PHOSPHORUS mg/dL 2.8  --   --    HEMOGLOBIN g/dL 10.0*   < > 11.3*   HEMATOCRIT % 31.5*   < > 35.3    < > = values in this interval not displayed.       No results found for: \"HGBA1C\"         Medications       Scheduled Medications diphenhydrAMINE, 25 mg, Oral, TID  enoxaparin, 40 mg, Subcutaneous, Daily  lactobacillus acidophilus, 1 capsule, Oral, BID  predniSONE, 40 mg, Oral, Daily With Breakfast  sodium chloride, 10 mL, Intravenous, Q12H        Infusions lactated ringers, 100 mL/hr, Last Rate: 100 mL/hr (04/22/24 0222)  Pharmacy to Dose enoxaparin (LOVENOX),          PRN Medications   acetaminophen **OR** acetaminophen **OR** acetaminophen    senna-docusate sodium **AND** polyethylene glycol **AND** bisacodyl **AND** bisacodyl    HYDROmorphone **AND** naloxone    ondansetron    " Pharmacy to Dose enoxaparin (LOVENOX)    [COMPLETED] Insert Peripheral IV **AND** sodium chloride    sodium chloride    sodium chloride     Physical Findings        Trending Physical   Appearance, NFPE    --  Edema  None noted     Bowel Function LBM: 4/19     Tubes Peripheral IV     Chewing/Swallowing NPO     Skin WNL       Estimated/Assessed Needs       Energy Requirements    EST Needs, Method, Wt used 6105-1260 kcal (30-35 kcal/kg CBW)       Protein Requirements    EST Needs, Method, Wt used 68-79 g pro (1.2-1.4 g pro/kg CBW)       Fluid Requirements     Estimated Needs (mL/day) 1917-0335 mL       Current Nutrition Orders & Evaluation of Intake       Oral Nutrition     Food Allergies Pineapple   Current PO Diet NPO Diet NPO Type: Ice Chips, Sips with Meds   Supplement    PO Evaluation     Trending % PO Intake      Nutrition Diagnosis         Nutrition Dx Problem 1 Unintentional wt loss r/t pancreatitis AEB EMR shows wt loss of 9# (6.7%) within 1 month.      Nutrition Dx Problem 2        Intervention Goal         Intervention Goal(s) Diet advancement per MD  No significant wt loss     Nutrition Intervention        RD Action No action at this time     Nutrition Prescription          Diet Prescription NPO   Supplement Prescription      Enteral Prescription        TPN Prescription      Monitor/Evaluation        Monitor Per protocol, I&O, PO intake, Pertinent labs, Weight, Skin status, GI status, Symptoms, POC/GOC, Swallow function, Hemodynamic stability     RD to f/up    Electronically signed by:  Monica Montano RD  04/22/24 08:18 EDT

## 2024-04-22 NOTE — CONSULTS
In Patient Consult      Date of Consultation: 2024  Patient Name: Tasha Yarbrough  MRN: [id number]  : 1955     Referring provider: Janak Damon DO    Primary care provider:  Terrence Baldwin MD    Reason for consultation: Abdominal pain, recurrent acute pancreatitis.    History of Present Illness: 69-year-old female, seen as an inpatient consultation for abdominal pain, in the setting of recurrent acute pancreatitis.  She was just discharged 2024, and was brought to the emergency room for abdominal pain, vomiting.    She has had multiple recent hospital admissions, including here at [facility], and .    She has significant metabolic acidosis, suspect secondary to GI losses.    Renal consulted.  Appreciate recommendations.    GI consulted given persistent abdominal symptoms.  Visually    Established patient of Dr. Kamilla Valentino , and was just recently seen during her last admission here in early April    She complains of abdominal pain, which is located in the epigastrium, and mid abdomen.  It is severe.  It is tender to touch.  It is associated with vomiting.  There is radiation to the back as well.    She mentions that she has a history of ulcers.      Subjective     Past Medical History:   Diagnosis Date    Hypertension     Impaired mobility     Injury of back     Pancreatitis        Past Surgical History:   Procedure Laterality Date    ABDOMINAL SURGERY      COLON SURGERY      COLONOSCOPY      TUBAL ABDOMINAL LIGATION         History reviewed. No pertinent family history.    Social History     Socioeconomic History    Marital status:    Tobacco Use    Smoking status: Every Day     Current packs/day: 1.00     Types: Cigarettes    Smokeless tobacco: Never   Vaping Use    Vaping status: Never Used   Substance and Sexual Activity    Alcohol use: Never    Drug use: Never    Sexual activity: Defer         Current Facility-Administered Medications:     acetaminophen (TYLENOL)  tablet 650 mg, 650 mg, Oral, Q4H PRN **OR** acetaminophen (TYLENOL) 160 MG/5ML oral solution 650 mg, 650 mg, Oral, Q4H PRN, 650 mg at 04/22/24 0841 **OR** acetaminophen (TYLENOL) suppository 650 mg, 650 mg, Rectal, Q4H PRN, Edwin Aguilar MD    sennosides-docusate (PERICOLACE) 8.6-50 MG per tablet 2 tablet, 2 tablet, Oral, BID PRN **AND** polyethylene glycol (MIRALAX) packet 17 g, 17 g, Oral, Daily PRN **AND** bisacodyl (DULCOLAX) EC tablet 5 mg, 5 mg, Oral, Daily PRN **AND** bisacodyl (DULCOLAX) suppository 10 mg, 10 mg, Rectal, Daily PRN, Edwin Aguilar MD    cephalexin (KEFLEX) capsule 500 mg, 500 mg, Oral, Q12H, Janak Damon DO, 500 mg at 04/22/24 1420    Enoxaparin Sodium (LOVENOX) syringe 40 mg, 40 mg, Subcutaneous, Daily, Edwin Aguilar MD, 40 mg at 04/22/24 0808    HYDROmorphone (DILAUDID) injection 0.5 mg, 0.5 mg, Intravenous, Q2H PRN, 0.5 mg at 04/22/24 1623 **AND** naloxone (NARCAN) injection 0.4 mg, 0.4 mg, Intravenous, Q5 Min PRN, Edwin Aguilar MD    lactated ringers infusion, 75 mL/hr, Intravenous, Continuous, Rj Ch MD, Last Rate: 75 mL/hr at 04/22/24 1453, 75 mL/hr at 04/22/24 1453    lactobacillus acidophilus (RISAQUAD) capsule 1 capsule, 1 capsule, Oral, BID, Edwin Aguilar MD, 1 capsule at 04/22/24 0808    ondansetron (ZOFRAN) injection 4 mg, 4 mg, Intravenous, Q6H PRN, Edwin Aguilar MD, 4 mg at 04/21/24 1105    Pharmacy to Dose enoxaparin (LOVENOX), , Does not apply, Continuous PRN, Edwin Aguilar MD    predniSONE (DELTASONE) tablet 40 mg, 40 mg, Oral, Daily With Breakfast, Edwin Aguilar MD, 40 mg at 04/22/24 0808    [COMPLETED] Insert Peripheral IV, , , Once **AND** sodium chloride 0.9 % flush 10 mL, 10 mL, Intravenous, PRN, Juan Luis Martins MD    sodium chloride 0.9 % flush 10 mL, 10 mL, Intravenous, Q12H, Edwin Aguilar MD, 10 mL at 04/22/24 0809    sodium chloride 0.9 % flush 10 mL, 10 mL, Intravenous, PRN, Edwin Aguilar MD    sodium chloride 0.9 % infusion 40 mL, 40  mL, Intravenous, PRN, Edwin Aguilar MD    Allergies   Allergen Reactions    Rocephin [Ceftriaxone] Anaphylaxis    Ciprofloxacin Dizziness    Codeine Itching    Pineapple Unknown - Low Severity       Review of Systems  Abdominal pain, N/V     The following portions of the patient's history were reviewed and updated as appropriate: allergies, current medications, past family history, past medical history, past social history, past surgical history and problem list.    Objective     Vitals:    04/22/24 0357 04/22/24 0700 04/22/24 1110 04/22/24 1507   BP:  120/85 142/88 154/89   BP Location:  Left arm Left arm Right arm   Patient Position:  Sitting Lying Sitting   Pulse:  88  81   Resp:  18 18 18   Temp:  98.2 °F (36.8 °C) 98.6 °F (37 °C) 98.2 °F (36.8 °C)   TempSrc:  Oral Oral Oral   SpO2: 99% 98% 97% 97%   Weight:       Height:           Physical Exam  Constitutional:       General: She is not in acute distress.     Appearance: Normal appearance.   HENT:      Head: Normocephalic and atraumatic.      Nose: Nose normal.      Mouth/Throat:      Mouth: Mucous membranes are moist.   Eyes:      General: No scleral icterus.     Conjunctiva/sclera: Conjunctivae normal.   Cardiovascular:      Rate and Rhythm: Normal rate.   Pulmonary:      Effort: Pulmonary effort is normal. No respiratory distress.   Abdominal:      General: There is no distension.      Tenderness: There is no abdominal tenderness. There is no guarding.   Musculoskeletal:         General: No deformity or signs of injury.      Cervical back: Neck supple. No rigidity.   Skin:     Capillary Refill: Capillary refill takes less than 2 seconds.      Coloration: Skin is not jaundiced or pale.   Neurological:      General: No focal deficit present.      Mental Status: She is alert and oriented to person, place, and time.   Psychiatric:         Mood and Affect: Mood normal.         Behavior: Behavior normal.         Results from last 7 days   Lab Units 04/22/24  7504  04/21/24  1800 04/21/24  1215 04/20/24  2319   SODIUM mmol/L 139 135* 136 139   POTASSIUM mmol/L 3.7 4.8 6.0* 4.9   CHLORIDE mmol/L 110* 111* 113* 116*   CO2 mmol/L 18.3* 11.2* 13.3* 8.9*   BUN mg/dL 20 24* 24* 25*   CREATININE mg/dL 1.13* 1.37* 1.43* 1.65*   CALCIUM mg/dL 9.6 10.1 9.5 9.6   ALBUMIN g/dL 3.4*  --  3.6 4.0   BILIRUBIN mg/dL  --   --  0.2 <0.2   ALK PHOS U/L  --   --  104 131*   ALT (SGPT) U/L  --   --  8 7   AST (SGOT) U/L  --   --  13 11   GLUCOSE mg/dL 80 86 111* 81   WBC 10*3/mm3 8.75  --  10.64 9.43   HEMOGLOBIN g/dL 10.0*  --  11.1* 11.3*   PLATELETS 10*3/mm3 233  --  272 286       Imaging Results (Last 24 Hours)       ** No results found for the last 24 hours. **            Assessment / Plan      Assessment/Recommendations:   Principal Problem:    Metabolic acidosis  Active Problems:    VIVIENNE (acute kidney injury)    Abnormal finding on GI tract imaging    Acute pancreatitis    Acute UTI (urinary tract infection)    Epigastric pain    Nausea and vomiting in adult      69-year-old female, with recurrent acute pancreatitis, just recently seen by Dr. Kamilla Valentino.    She presents again with abdominal pain, and a GI consultation is requested.    Continue medical management for pancreatitis.    IV fluids, pain management.    Needs to follow-up with Dr. Kamilla Valentino as an outpatient soon after discharge for further workup.    Will order IgG4 and antimitochondrial antibody for possible autoimmune etiologies.    Otherwise, as an outpatient I suggest an MRCP to rule out biliary and pancreatic lithiasis.    Plan for inpatient EGD tomorrow.    Need to rule out other etiologies for abdominal pain, including but not limited to peptic ulcer disease, which she has a history of.    Procedure note and further recommendations to follow.    N.p.o. midnight.    Thank you very much for letting me participate in the care of this patient.  Please do not hesitate to call me if you have any questions.    Jairo Negro,  MD  [facility]  4/22/2024  17:18 EDT    Part of this note may be an electronic transcription/translation of spoken language to printed text using the Dragon Dictation System.

## 2024-04-22 NOTE — H&P (VIEW-ONLY)
In Patient Consult      Date of Consultation: 2024  Patient Name: Tasha Yarbrough  MRN: [id number]  : 1955     Referring provider: Janak Damon DO    Primary care provider:  Terrence Baldwin MD    Reason for consultation: Abdominal pain, recurrent acute pancreatitis.    History of Present Illness: 69-year-old female, seen as an inpatient consultation for abdominal pain, in the setting of recurrent acute pancreatitis.  She was just discharged 2024, and was brought to the emergency room for abdominal pain, vomiting.    She has had multiple recent hospital admissions, including here at [facility], and .    She has significant metabolic acidosis, suspect secondary to GI losses.    Renal consulted.  Appreciate recommendations.    GI consulted given persistent abdominal symptoms.  Visually    Established patient of Dr. Kamilla Valentino , and was just recently seen during her last admission here in early April    She complains of abdominal pain, which is located in the epigastrium, and mid abdomen.  It is severe.  It is tender to touch.  It is associated with vomiting.  There is radiation to the back as well.    She mentions that she has a history of ulcers.      Subjective     Past Medical History:   Diagnosis Date    Hypertension     Impaired mobility     Injury of back     Pancreatitis        Past Surgical History:   Procedure Laterality Date    ABDOMINAL SURGERY      COLON SURGERY      COLONOSCOPY      TUBAL ABDOMINAL LIGATION         History reviewed. No pertinent family history.    Social History     Socioeconomic History    Marital status:    Tobacco Use    Smoking status: Every Day     Current packs/day: 1.00     Types: Cigarettes    Smokeless tobacco: Never   Vaping Use    Vaping status: Never Used   Substance and Sexual Activity    Alcohol use: Never    Drug use: Never    Sexual activity: Defer         Current Facility-Administered Medications:     acetaminophen (TYLENOL)  tablet 650 mg, 650 mg, Oral, Q4H PRN **OR** acetaminophen (TYLENOL) 160 MG/5ML oral solution 650 mg, 650 mg, Oral, Q4H PRN, 650 mg at 04/22/24 0841 **OR** acetaminophen (TYLENOL) suppository 650 mg, 650 mg, Rectal, Q4H PRN, Edwin Aguilar MD    sennosides-docusate (PERICOLACE) 8.6-50 MG per tablet 2 tablet, 2 tablet, Oral, BID PRN **AND** polyethylene glycol (MIRALAX) packet 17 g, 17 g, Oral, Daily PRN **AND** bisacodyl (DULCOLAX) EC tablet 5 mg, 5 mg, Oral, Daily PRN **AND** bisacodyl (DULCOLAX) suppository 10 mg, 10 mg, Rectal, Daily PRN, Edwin Aguilar MD    cephalexin (KEFLEX) capsule 500 mg, 500 mg, Oral, Q12H, Janak Damon DO, 500 mg at 04/22/24 1420    Enoxaparin Sodium (LOVENOX) syringe 40 mg, 40 mg, Subcutaneous, Daily, Ewdin Aguilar MD, 40 mg at 04/22/24 0808    HYDROmorphone (DILAUDID) injection 0.5 mg, 0.5 mg, Intravenous, Q2H PRN, 0.5 mg at 04/22/24 1623 **AND** naloxone (NARCAN) injection 0.4 mg, 0.4 mg, Intravenous, Q5 Min PRN, Edwin Aguilar MD    lactated ringers infusion, 75 mL/hr, Intravenous, Continuous, Rj Ch MD, Last Rate: 75 mL/hr at 04/22/24 1453, 75 mL/hr at 04/22/24 1453    lactobacillus acidophilus (RISAQUAD) capsule 1 capsule, 1 capsule, Oral, BID, Edwin Aguilar MD, 1 capsule at 04/22/24 0808    ondansetron (ZOFRAN) injection 4 mg, 4 mg, Intravenous, Q6H PRN, Edwin Aguilar MD, 4 mg at 04/21/24 1105    Pharmacy to Dose enoxaparin (LOVENOX), , Does not apply, Continuous PRN, Edwin Aguilar MD    predniSONE (DELTASONE) tablet 40 mg, 40 mg, Oral, Daily With Breakfast, Edwin Aguilar MD, 40 mg at 04/22/24 0808    [COMPLETED] Insert Peripheral IV, , , Once **AND** sodium chloride 0.9 % flush 10 mL, 10 mL, Intravenous, PRN, Juan Luis Martins MD    sodium chloride 0.9 % flush 10 mL, 10 mL, Intravenous, Q12H, Edwin Aguilar MD, 10 mL at 04/22/24 0809    sodium chloride 0.9 % flush 10 mL, 10 mL, Intravenous, PRN, Edwin Aguilar MD    sodium chloride 0.9 % infusion 40 mL, 40  mL, Intravenous, PRN, Edwin Aguilar MD    Allergies   Allergen Reactions    Rocephin [Ceftriaxone] Anaphylaxis    Ciprofloxacin Dizziness    Codeine Itching    Pineapple Unknown - Low Severity       Review of Systems  Abdominal pain, N/V     The following portions of the patient's history were reviewed and updated as appropriate: allergies, current medications, past family history, past medical history, past social history, past surgical history and problem list.    Objective     Vitals:    04/22/24 0357 04/22/24 0700 04/22/24 1110 04/22/24 1507   BP:  120/85 142/88 154/89   BP Location:  Left arm Left arm Right arm   Patient Position:  Sitting Lying Sitting   Pulse:  88  81   Resp:  18 18 18   Temp:  98.2 °F (36.8 °C) 98.6 °F (37 °C) 98.2 °F (36.8 °C)   TempSrc:  Oral Oral Oral   SpO2: 99% 98% 97% 97%   Weight:       Height:           Physical Exam  Constitutional:       General: She is not in acute distress.     Appearance: Normal appearance.   HENT:      Head: Normocephalic and atraumatic.      Nose: Nose normal.      Mouth/Throat:      Mouth: Mucous membranes are moist.   Eyes:      General: No scleral icterus.     Conjunctiva/sclera: Conjunctivae normal.   Cardiovascular:      Rate and Rhythm: Normal rate.   Pulmonary:      Effort: Pulmonary effort is normal. No respiratory distress.   Abdominal:      General: There is no distension.      Tenderness: There is no abdominal tenderness. There is no guarding.   Musculoskeletal:         General: No deformity or signs of injury.      Cervical back: Neck supple. No rigidity.   Skin:     Capillary Refill: Capillary refill takes less than 2 seconds.      Coloration: Skin is not jaundiced or pale.   Neurological:      General: No focal deficit present.      Mental Status: She is alert and oriented to person, place, and time.   Psychiatric:         Mood and Affect: Mood normal.         Behavior: Behavior normal.         Results from last 7 days   Lab Units 04/22/24  5949  04/21/24  1800 04/21/24  1215 04/20/24  2319   SODIUM mmol/L 139 135* 136 139   POTASSIUM mmol/L 3.7 4.8 6.0* 4.9   CHLORIDE mmol/L 110* 111* 113* 116*   CO2 mmol/L 18.3* 11.2* 13.3* 8.9*   BUN mg/dL 20 24* 24* 25*   CREATININE mg/dL 1.13* 1.37* 1.43* 1.65*   CALCIUM mg/dL 9.6 10.1 9.5 9.6   ALBUMIN g/dL 3.4*  --  3.6 4.0   BILIRUBIN mg/dL  --   --  0.2 <0.2   ALK PHOS U/L  --   --  104 131*   ALT (SGPT) U/L  --   --  8 7   AST (SGOT) U/L  --   --  13 11   GLUCOSE mg/dL 80 86 111* 81   WBC 10*3/mm3 8.75  --  10.64 9.43   HEMOGLOBIN g/dL 10.0*  --  11.1* 11.3*   PLATELETS 10*3/mm3 233  --  272 286       Imaging Results (Last 24 Hours)       ** No results found for the last 24 hours. **            Assessment / Plan      Assessment/Recommendations:   Principal Problem:    Metabolic acidosis  Active Problems:    VIVIENNE (acute kidney injury)    Abnormal finding on GI tract imaging    Acute pancreatitis    Acute UTI (urinary tract infection)    Epigastric pain    Nausea and vomiting in adult      69-year-old female, with recurrent acute pancreatitis, just recently seen by Dr. Kamilla Valentino.    She presents again with abdominal pain, and a GI consultation is requested.    Continue medical management for pancreatitis.    IV fluids, pain management.    Needs to follow-up with Dr. Kamilla Valentino as an outpatient soon after discharge for further workup.    Will order IgG4 and antimitochondrial antibody for possible autoimmune etiologies.    Otherwise, as an outpatient I suggest an MRCP to rule out biliary and pancreatic lithiasis.    Plan for inpatient EGD tomorrow.    Need to rule out other etiologies for abdominal pain, including but not limited to peptic ulcer disease, which she has a history of.    Procedure note and further recommendations to follow.    N.p.o. midnight.    Thank you very much for letting me participate in the care of this patient.  Please do not hesitate to call me if you have any questions.    Jairo Negro,  MD  [facility]  4/22/2024  17:18 EDT    Part of this note may be an electronic transcription/translation of spoken language to printed text using the Dragon Dictation System.

## 2024-04-22 NOTE — CASE MANAGEMENT/SOCIAL WORK
"Discharge Planning Assessment  [facility]     Patient Name: Tasha Yarbrough  MRN: [id number]  Today's Date: 4/22/2024    Admit Date: 4/20/2024    Plan: DCP; Home with 2 adult son's. Spoke with pt at bedside. Permission given to discuss DCP with sons Thai \"ANDER\" and Rj if needed. Pt confirmed demographics. States No to Living Will or POA. PCP; Dr. Baldwin, pharmacy [facility]. Agreed to meds to bed. Currently has straight cane, walker, bedside commode and shower chair at home. No new DME needs anticipated at this time. Son will take her home via pvt car when medically stable. Food bag and Pathways book given for additional financial resources.   Discharge Needs Assessment       Row Name 04/22/24 1204       Living Environment    People in Home child(debra), adult    Name(s) of People in Home Thai Yarbrough and Rj Yarbrough    Current Living Arrangements home    Duration at Residence 14 years    Potentially Unsafe Housing Conditions none    In the past 12 months has the electric, gas, oil, or water company threatened to shut off services in your home? No    Primary Care Provided by self;child(debra)    Provides Primary Care For no one, unable/limited ability to care for self    Family Caregiver if Needed child(debra), adult    Family Caregiver Names Thai ANDER, and  Rj    Quality of Family Relationships helpful;involved;supportive    Able to Return to Prior Arrangements yes       Resource/Environmental Concerns    Resource/Environmental Concerns none       Transportation Needs    In the past 12 months, has lack of transportation kept you from medical appointments or from getting medications? yes  recently bought a car    In the past 12 months, has lack of transportation kept you from meetings, work, or from getting things needed for daily living? Yes  recently bought a car       Food Insecurity    Within the past 12 months, you worried that your food would run out before you got the money to buy more. Sometimes    Within the past 12 " "months, the food you bought just didn't last and you didn't have money to get more. Sometimes       Transition Planning    Patient/Family Anticipates Transition to home with family    Transportation Anticipated family or friend will provide       Discharge Needs Assessment    Readmission Within the Last 30 Days other (see comments)  pt had to change appointment and was admitted again prior to new appointment    Equipment Currently Used at Home shower chair;cane, straight;walker, rolling    Concerns to be Addressed basic needs;substance/tobacco abuse/use    Concerns Comments Pt is primary provider for the household. Admitts to some financial difficulty but states \"Like everyone else we make due.\" Food bag given as well as Pathways book for further resources. Requested information on smoking cessation, will be given at discharge.    Anticipated Changes Related to Illness none    Equipment Needed After Discharge none  no new equiptment needs                   Discharge Plan       Row Name 04/22/24 1218       Plan    Plan DCP; Home with 2 adult son's. Spoke with pt at bedside. Permission given to discuss DCP with sons Thai \"J\" and Rj if needed. Pt confirmed demographics. States No to Living Will or POA. PCP; Dr. Baldwin, pharmacy [facility]. Agreed to meds to bed. Currently has straight cane, walker, bedside commode and shower chair at home. No new DME needs anticipated at this time. Son will take her home via pvt car when medically stable. Food bag and Pathways book given for additional financial resources.    Patient/Family in Agreement with Plan yes                  Continued Care and Services - Admitted Since 4/20/2024    No active coordination exists for this encounter.          Demographic Summary       Row Name 04/22/24 1124       General Information    Admission Type inpatient    Arrived From emergency department    Required Notices Provided Important Message from Medicare    Referral Source admission list    Reason " for Consult discharge planning    Preferred Language English       Contact Information    Permission Granted to Share Info With ;family/designee                   Functional Status       Row Name 04/22/24 1129       Functional Status    Usual Activity Tolerance moderate    Current Activity Tolerance moderate       Physical Activity    On average, how many days per week do you engage in moderate to strenuous exercise (like a brisk walk)? 0 days    On average, how many minutes do you engage in exercise at this level? 0 min    Number of minutes of exercise per week 0       Assessment of Health Literacy    How often do you have someone help you read hospital materials? Never    How often do you have problems learning about your medical condition because of difficulty understanding written information? Never    How often do you have a problem understanding what is told to you about your medical condition? Never    How confident are you filling out medical forms by yourself? Extremely    Health Literacy Excellent       Functional Status, IADL    Medications assistive person;assistive equipment    Meal Preparation assistive person    Housekeeping assistive person    Laundry assistive person    Shopping assistive person    IADL Comments Able to prepare some meals, lives with 2 adult son's who provide assistace for ADL's       Mental Status    General Appearance WDL WDL       Mental Status Summary    Recent Changes in Mental Status/Cognitive Functioning no changes       Employment/    Employment Status disabled                   Psychosocial    No documentation.                  Abuse/Neglect    No documentation.                  Legal    No documentation.                  Substance Abuse    No documentation.                  Patient Forms    No documentation.                     Jackelyn Alford

## 2024-04-22 NOTE — PLAN OF CARE
Goal Outcome Evaluation:  Plan of Care Reviewed With: patient, son        Progress: improving  Outcome Evaluation: Pt participated in PT eval this date. Pt received in supine, agreeable to get OOB. Pt transferred to EOB with SBA. Pt then ambulated 42ft with SPC and CGA. Pt reports abdominal pain 7/10. Pt is expected to benefit from skilled PT tx during this inpatient stay.      Anticipated Discharge Disposition (PT): home with assist, home with home health

## 2024-04-22 NOTE — PROGRESS NOTES
[facility] Progress Note      Patient Name: Tasha Yarbrough  : 1955  MRN: [id number]  Primary Care Physician:  Terrence Baldwin MD  Date of admission: 2024    Subjective     Interval History:   F/u VIVIENNE    Review of Systems:   Feels better ie less nausea; no dyspnea; diet consists of ice chips currently    Objective     Vitals:   Temp:  [96.6 °F (35.9 °C)-98.6 °F (37 °C)] 98.6 °F (37 °C)  Heart Rate:  [74-88] 88  Resp:  [16-18] 18  BP: (120-144)/(73-94) 142/88    Intake/Output Summary (Last 24 hours) at 2024 1449  Last data filed at 2024 1300  Gross per 24 hour   Intake 3357 ml   Output 2750 ml   Net 607 ml       Physical Exam:    General Appearance: frail WF comfortable alert  Neck: supple, no JVD  Lungs: CTA bilat no rales  Heart: RRR, normal S1 and S2  Abdomen: soft, nontender, nondistended  : no palpable bladder  Extremities: no edema, cyanosis or clubbing  Neuro: normal speech and mental status     Scheduled Meds:     cephalexin, 500 mg, Oral, Q12H  diphenhydrAMINE, 25 mg, Oral, TID  enoxaparin, 40 mg, Subcutaneous, Daily  lactobacillus acidophilus, 1 capsule, Oral, BID  predniSONE, 40 mg, Oral, Daily With Breakfast  sodium chloride, 10 mL, Intravenous, Q12H      IV Meds:   lactated ringers, 100 mL/hr, Last Rate: 100 mL/hr (24 1129)  Pharmacy to Dose enoxaparin (LOVENOX),         Results Reviewed:   I have personally reviewed the results from the time of this admission to 2024 14:49 EDT     Results from last 7 days   Lab Units 24  0545 24  1800 24  1215 24  2319   SODIUM mmol/L 139 135* 136 139   POTASSIUM mmol/L 3.7 4.8 6.0* 4.9   CHLORIDE mmol/L 110* 111* 113* 116*   CO2 mmol/L 18.3* 11.2* 13.3* 8.9*   BUN mg/dL 20 24* 24* 25*   CREATININE mg/dL 1.13* 1.37* 1.43* 1.65*   CALCIUM mg/dL 9.6 10.1 9.5 9.6   BILIRUBIN mg/dL  --   --  0.2 <0.2   ALK PHOS U/L  --   --  104 131*   ALT (SGPT) U/L  --   --  8 7   AST (SGOT) U/L  --    --  13 11   GLUCOSE mg/dL 80 86 111* 81     Estimated Creatinine Clearance: 42.3 mL/min (A) (by C-G formula based on SCr of 1.13 mg/dL (H)).  Results from last 7 days   Lab Units 04/22/24  0545 04/20/24  2319   MAGNESIUM mg/dL  --  2.5*   PHOSPHORUS mg/dL 2.8  --          Results from last 7 days   Lab Units 04/22/24  0545 04/21/24  1215 04/20/24  2319   WBC 10*3/mm3 8.75 10.64 9.43   HEMOGLOBIN g/dL 10.0* 11.1* 11.3*   PLATELETS 10*3/mm3 233 272 286           Assessment / Plan     ASSESSMENT:  VIVIENNE, non olig - prerenal azotemia due to vol depletion in assoc with n/v, diarrhea, and recurrent acute pancreatitis.  Resolved with IVF, Cr down to 1.1, back to BL, peak 1.6.  CT: no hydronephrosis.  UA bland.  Remains NPO essentially (ice chips)   CKD stage 3 - BL Cr 1.1 to 1.3 range  Hyperkalemia - due largely to met acidosis; resolved; K 3.7  Metabolic acidosis, acute on chronic picture, with NAGMA component related to loose stool.  Bicarb up to 18 with bicarb drip.  Can transition to LR IVF  Recurrent acute pancreatitis - sees GI; hospitalized for this couple weeks ago; GI eval noted, plan EGD tomorrow, outpatient MRCP.  Ruling out auto-immune etiol  Poss ileus by CT     PLAN:  DC bicarb drip and continue LR at lower rate 75 cc/hr  Note plan for EGD tomorrow  May add oral bicarb tabs tomorrow depending on trend in level      Rj Ch MD  04/22/24  14:49 EDT    [facility]  [phone]

## 2024-04-22 NOTE — PROGRESS NOTES
[facility]    Name:  Tasha Yarbrough   Age:  69 y.o.  Sex:  female  :  1955  MRN:  [id number]   Visit Number:  [id number]  Admission Date:  2024  Date Of Service:  24  Primary Care Physician:  Terrence Baldwin MD     LOS: 1 day :    Chief Complaint:      Abdominal pain    Subjective:    Patient examined this morning.  Resting comfortably in bed.  States that she is still having significant pain but does appear very comfortable.  Denies nausea or vomiting.  No acute events overnight.    Hospital Course:    Tasha Yarbrough is a 69-year-old female with a history of hypertension, recent history of recurrent pancreatitis discharged from this facility on 2024 was brought to the emergency room by her son with symptoms of epigastric abdominal pain since the last couple of days.  Patient has been vomiting for the last couple of days and has not been eating much.  She also gives history of feeling cold and chills.  Started having some dysuria today.  Her son lives with her.  According to the son, patient has been admitted multiple times to  and here for similar problems in the last 1 month.  No exact reasoning was given to her pancreatitis.  She was told that she has a couple of cysts in her pancreas.  She denies any chest pain or shortness of breath at this time.     In the emergency room, she was afebrile and hemodynamically stable saturating at 99% on room air.  CMP was remarkable for a CO2 of 9, BUN 25, creatinine 1.65 (baseline 1), alkaline phosphatase 131.  LFT was otherwise unremarkable.  Lipase was 722.  Lactic acid was 0.4.  Hemoglobin 11 .  Urine analysis showed 11-20 WBCs with 3-6 squamous epithelial cells and negative nitrite.  Due to multiple recent CT scans, abdominal CT scan was not obtained.  ABG showed a pH of 7.16, pCO2 26, pO2 104 and bicarb of 9.  Patient was given a liter of normal saline bolus, IV morphine and Zofran in the  emergency room.  She was subsequently placed on lactated Ringer's at 75 mill per hour and was subsequently admitted to the medical floor with telemetry for pancreatitis, VIVIENNE and metabolic acidosis of uncertain etiology.    Patient did have worsening metabolic acidemia, nonanion gap suspect secondary to GI losses.  Bicarbonate drip initiated.  Bicarb improved.  Nephrology consulted.  Patient also follows with local GI.  Consult placed.  Continue bicarbonate drip and IV fluids for recurrent acute pancreatitis.  Pain control and antiemetics as needed.    Review of Systems:     All systems were reviewed and negative except as mentioned in subjective, assessment and plan.    Vital Signs:    Temp:  [96.6 °F (35.9 °C)-98.2 °F (36.8 °C)] 98.2 °F (36.8 °C)  Heart Rate:  [71-88] 88  Resp:  [16-18] 18  BP: ()/(60-94) 120/85    Intake and output:    I/O last 3 completed shifts:  In: 4957 [I.V.:3407; IV Piggyback:1550]  Out: 2150 [Urine:2150]  No intake/output data recorded.    Physical Examination:    General Appearance:  Alert and cooperative.    Head:  Atraumatic and normocephalic.   Eyes: Conjunctivae and sclerae normal, no icterus. No pallor.   Throat: No oral lesions, no thrush, oral mucosa moist.   Neck: Supple, trachea midline, no thyromegaly.   Lungs:   Breath sounds heard bilaterally equally.  No wheezing or crackles.    Heart:  Normal S1 and S2, no murmur, No JVD.   Abdomen:   Soft, +tender, nondistended,    Extremities: Supple, no edema, no cyanosis, no clubbing.   Skin: No bleeding or rash.   Neurologic: Alert and oriented x 3. No facial asymmetry. Moves all four limbs. No tremors.      Laboratory results:    Results from last 7 days   Lab Units 04/22/24  0545 04/21/24  1800 04/21/24  1215 04/20/24  2319   SODIUM mmol/L 139 135* 136 139   POTASSIUM mmol/L 3.7 4.8 6.0* 4.9   CHLORIDE mmol/L 110* 111* 113* 116*   CO2 mmol/L 18.3* 11.2* 13.3* 8.9*   BUN mg/dL 20 24* 24* 25*   CREATININE mg/dL 1.13* 1.37* 1.43*  1.65*   CALCIUM mg/dL 9.6 10.1 9.5 9.6   BILIRUBIN mg/dL  --   --  0.2 <0.2   ALK PHOS U/L  --   --  104 131*   ALT (SGPT) U/L  --   --  8 7   AST (SGOT) U/L  --   --  13 11   GLUCOSE mg/dL 80 86 111* 81     Results from last 7 days   Lab Units 04/22/24  0545 04/21/24  1215 04/20/24  2319   WBC 10*3/mm3 8.75 10.64 9.43   HEMOGLOBIN g/dL 10.0* 11.1* 11.3*   HEMATOCRIT % 31.5* 35.4 35.3   PLATELETS 10*3/mm3 233 272 286                 Recent Labs     02/27/24  0522 04/21/24  0015 04/21/24  0919   PHART 7.305* 7.158* 7.199*   HTG8RTM 30.1* 26.4* 25.5*   PO2ART 90.6 104.0* 97.9   SGG9VZH 15.0* 9.4* 9.9*   BASEEXCESS -10.3* -17.9* -16.6*      I have reviewed the patient's laboratory results.    Radiology results:    CT Abdomen Pelvis Without Contrast    Result Date: 4/21/2024  CT of the abdomen and pelvis without contrast  INDICATION: Acute abdominal pain  COMPARISON: April 8, 2024  TECHNIQUE: Helically acquired axial multidetector CT images were obtained from the lung bases through the pelvis without IV contrast. Dose reduction techniques to achieve ALARA were employed.  Findings:  Lung bases: [No focal pneumonia].  Large sliding-type hiatal hernia  Liver: [Grossly unremarkable]  Spleen: [Unremarkable]  Gallbladder/biliary tree: [ No biliary ductal dilatation]. Cholecystectomy.  Pancreas: Peripancreatic and central abdomen stranding.  Adrenals: [Unremarkable]  Kidneys: Bilateral simple renal cysts. Small nonobstructing right renal calculi. No hydronephrosis.   GI: Air-fluid levels in multiple proximal small bowel loops and gas-filled distal small bowel loops which are otherwise not significantly dilated. This may reflect small bowel ileus. Moderate colonic stool and gas.  Bladder: [Unremarkable].  Reproductive structures: [Unremarkable]  Bones: Moderate dextroscoliosis of the thoracolumbar spine with severe multilevel degenerative changes.  Soft Tissues: Multiple ventral wall hernias containing fat.       Impression:   1. Mild peripancreatic stranding. Correlate with serum lipase to exclude acute pancreatitis.  2. Multiple air-fluid levels in small bowel loops which otherwise not significantly dilated may reflect underlying ileus.   CTDI: 5.65 mGy DLP:241.19 mGy.cm  This report was signed and finalized on 4/21/2024 9:48 AM by Shree Jackman MD.     I have reviewed the patient's radiology reports.    Medication Review:     I have reviewed the patient's active and prn medications.     Problem List:      Metabolic acidosis    VIVIENNE (acute kidney injury)    Acute pancreatitis    Acute UTI (urinary tract infection)      Assessment:    Acute recurrent pancreatitis, POA.  Acute kidney injury, POA.  Acute urinary tract infection.  Acute metabolic acidosis of uncertain etiology, POA.  Essential hypertension.     Plan:    Acute recurrent pancreatitis.  - Patient has history of pancreatitis of uncertain etiology.  - Keep her n.p.o. with ice chips and IV fluids.  - Dilaudid and Zofran for symptomatic control.  -Follows with local GI service.  Will consult Dr. Negro.  Appreciate recommendations.  -Risk versus benefit weighed, repeat CT abdomen pelvis obtained due to progressively worsening metabolic acidemia and concern for GI process.  Shows mild peripancreatic stranding.  There are also multiple air-fluid levels in small bowel loops which otherwise not significantly dilated may reflect underlying ileus.      Acute kidney injury  Nonanion gap metabolic acidosis  - Patient does have a bump in creatinine likely secondary to dehydration.  - Continue IV fluids   -Also started bicarbonate drip due to worsening metabolic acidemia  - Nephrology following     Acute urinary tract infection.  - We will treat her with ceftriaxone for 3 days.    Further orders as clinical course dictates.    DVT Prophylaxis: Enoxaparin  Code Status: Full  Diet: N.p.o.  Discharge Plan: Pending    Janak Damon DO  04/22/24  09:23 EDT    Dictated utilizing Suzan  dictation.

## 2024-04-22 NOTE — PLAN OF CARE
Goal Outcome Evaluation:              Problem: Adult Inpatient Plan of Care  Goal: Plan of Care Review  Outcome: Ongoing, Progressing  Goal: Patient-Specific Goal (Individualized)  Outcome: Ongoing, Progressing  Goal: Absence of Hospital-Acquired Illness or Injury  Outcome: Ongoing, Progressing  Intervention: Identify and Manage Fall Risk  Description: Perform standard risk assessment on admission using a validated tool or comprehensive approach appropriate to the patient; reassess fall risk frequently, with change in status or transfer to another level of care.  Communicate fall injury risk to interprofessional healthcare team.  Determine need for increased observation, equipment and environmental modification, such as low bed, signage and supportive, nonskid footwear.  Adjust safety measures to individual developmental age, stage and identified risk factors.  Reinforce the importance of safety and physical activity with patient and family.  Perform regular intentional rounding to assess need for position change, pain assessment and personal needs, including assistance with toileting.  Recent Flowsheet Documentation  Taken 4/22/2024 0400 by Chel Stewart, RN  Safety Promotion/Fall Prevention:   safety round/check completed   room organization consistent   nonskid shoes/slippers when out of bed   fall prevention program maintained   clutter free environment maintained   assistive device/personal items within reach   activity supervised  Taken 4/22/2024 0200 by Chel Stewart, RN  Safety Promotion/Fall Prevention:   safety round/check completed   room organization consistent   nonskid shoes/slippers when out of bed   fall prevention program maintained   clutter free environment maintained   assistive device/personal items within reach   activity supervised  Taken 4/22/2024 0000 by Chel Stewart, RN  Safety Promotion/Fall Prevention:   safety round/check completed   room organization consistent    nonskid shoes/slippers when out of bed   fall prevention program maintained   clutter free environment maintained   assistive device/personal items within reach   activity supervised  Taken 4/21/2024 2200 by Chel Stewart RN  Safety Promotion/Fall Prevention:   safety round/check completed   room organization consistent   nonskid shoes/slippers when out of bed   fall prevention program maintained   clutter free environment maintained   assistive device/personal items within reach   activity supervised  Taken 4/21/2024 2000 by Terry, Chel, RN  Safety Promotion/Fall Prevention:   safety round/check completed   room organization consistent   nonskid shoes/slippers when out of bed   fall prevention program maintained   clutter free environment maintained   assistive device/personal items within reach   activity supervised  Intervention: Prevent Skin Injury  Description: Perform a screening for skin injury risk, such as pressure or moisture associated skin damage on admission and at regular intervals throughout hospital stay.  Keep all areas of skin (especially folds) clean and dry.  Maintain adequate skin hydration.  Relieve and redistribute pressure and protect bony prominences; implement measures based on patient-specific risk factors.  Match turning and repositioning schedule to clinical condition.  Encourage weight shift frequently; assist with reposition if unable to complete independently.  Float heels off bed; avoid pressure on the Achilles tendon.  Keep skin free from extended contact with medical devices.  Encourage functional activity and mobility, as early as tolerated.  Use aids (e.g., slide boards, mechanical lift) during transfer.  Recent Flowsheet Documentation  Taken 4/22/2024 0400 by Chel Stewart, RN  Body Position:   supine, legs elevated   tilted   right  Taken 4/22/2024 0200 by Chel Stewart RN  Body Position:   supine, legs elevated   tilted   left  Taken 4/22/2024 0000  by Chel Stewart RN  Body Position:   supine, legs elevated   tilted  Taken 4/21/2024 2200 by Chel Stewart RN  Body Position:   supine, legs elevated   tilted   left  Taken 4/21/2024 2000 by Chel Stewart RN  Body Position:   supine, legs elevated   tilted   right  Intervention: Prevent and Manage VTE (Venous Thromboembolism) Risk  Description: Assess for VTE (venous thromboembolism) risk.  Encourage and assist with early ambulation.  Initiate and maintain compression or other therapy, as indicated, based on identified risk in accordance with organizational protocol and provider order.  Encourage both active and passive leg exercises while in bed, if unable to ambulate.  Recent Flowsheet Documentation  Taken 4/22/2024 0400 by Chel Stewart RN  Activity Management: activity encouraged  Taken 4/22/2024 0200 by Chel Stewart RN  Activity Management: activity encouraged  Taken 4/22/2024 0000 by Chel Stewart RN  Activity Management: activity encouraged  Taken 4/21/2024 2200 by Chel Stewart RN  Activity Management: activity encouraged  Taken 4/21/2024 2000 by Chel Stewart RN  Activity Management: activity encouraged  Intervention: Prevent Infection  Description: Maintain skin and mucous membrane integrity; promote hand, oral and pulmonary hygiene.  Optimize fluid balance, nutrition, sleep and glycemic control to maximize infection resistance.  Identify potential sources of infection early to prevent or mitigate progression of infection (e.g., wound, lines, devices).  Evaluate ongoing need for invasive devices; remove promptly when no longer indicated.  Recent Flowsheet Documentation  Taken 4/22/2024 0400 by Chel Stewart RN  Infection Prevention:   environmental surveillance performed   equipment surfaces disinfected   hand hygiene promoted   personal protective equipment utilized   rest/sleep promoted   single patient room provided   visitors  restricted/screened  Taken 4/22/2024 0200 by Chel Stewart RN  Infection Prevention:   environmental surveillance performed   equipment surfaces disinfected   hand hygiene promoted   personal protective equipment utilized   rest/sleep promoted   single patient room provided   visitors restricted/screened  Taken 4/22/2024 0000 by Chel Stewart RN  Infection Prevention:   environmental surveillance performed   equipment surfaces disinfected   hand hygiene promoted   personal protective equipment utilized   rest/sleep promoted   single patient room provided   visitors restricted/screened  Taken 4/21/2024 2200 by Chel Stewart RN  Infection Prevention:   environmental surveillance performed   equipment surfaces disinfected   hand hygiene promoted   personal protective equipment utilized   rest/sleep promoted   single patient room provided   visitors restricted/screened  Taken 4/21/2024 2000 by Chel Stewart RN  Infection Prevention:   environmental surveillance performed   equipment surfaces disinfected   hand hygiene promoted   personal protective equipment utilized   rest/sleep promoted   single patient room provided   visitors restricted/screened  Goal: Optimal Comfort and Wellbeing  Outcome: Ongoing, Progressing  Intervention: Monitor Pain and Promote Comfort  Description: Assess pain level, treatment efficacy and patient response at regular intervals using a consistent pain scale.  Consider the presence and impact of preexisting chronic pain.  Encourage patient and caregiver involvement in pain assessment, interventions and safety measures.  Recent Flowsheet Documentation  Taken 4/22/2024 0000 by Chel Stewart RN  Pain Management Interventions: pain management plan reviewed with patient/caregiver  Taken 4/21/2024 2000 by Chel Stewart RN  Pain Management Interventions: see MAR  Intervention: Provide Person-Centered Care  Description: Use a family-focused approach to  care.  Develop trust and rapport by proactively providing information, encouraging questions, addressing concerns and offering reassurance.  Acknowledge emotional response to hospitalization.  Recognize and utilize personal coping strategies.  Honor spiritual and cultural preferences.  Recent Flowsheet Documentation  Taken 4/21/2024 2000 by Chel Stewart RN  Trust Relationship/Rapport:   care explained   choices provided   questions answered   questions encouraged   thoughts/feelings acknowledged  Goal: Readiness for Transition of Care  Outcome: Ongoing, Progressing     Problem: Fluid Imbalance (Pancreatitis)  Goal: Fluid Balance  Outcome: Ongoing, Progressing     Problem: Infection (Pancreatitis)  Goal: Infection Symptom Resolution  Outcome: Ongoing, Progressing     Problem: Nutrition Impaired (Pancreatitis)  Goal: Optimal Nutrition Intake  Outcome: Ongoing, Progressing     Problem: Pain (Pancreatitis)  Goal: Acceptable Pain Control  Outcome: Ongoing, Progressing  Intervention: Monitor and Manage Pain  Description: Set pain management goals; mutually determine pain management plan and review plan regularly.  Use a consistent, validated tool for pain assessment, including function and quality of life; evaluate pain level, effect of treatment and patient’s response at regular intervals.##  Consider the presence and impact of pre-existing chronic pain.  Encourage patient and family/caregiver involvement in pain assessment, interventions and safety measures.  Match pharmacologic analgesia to severity and type of pain mechanism; evaluate risk for opioid use and dependence; consider multimodal approach and titrate to patient response.  Anticipate use of PCA (patient-controlled analgesia) or epidural analgesia for severe or uncontrolled pain.  Manage medication-induced effects, such as constipation, nausea, urinary retention, somnolence and dizziness.  Modify pain perception using techniques, such as distraction,  mindfulness, guided imagery, meditation or music.  Consider and address emotional response to pain.  Recent Flowsheet Documentation  Taken 4/22/2024 0000 by Chel Stewart RN  Pain Management Interventions: pain management plan reviewed with patient/caregiver  Taken 4/21/2024 2000 by Chel Stewart RN  Pain Management Interventions: see MAR  Diversional Activities: television     Problem: Respiratory Compromise (Pancreatitis)  Goal: Effective Oxygenation and Ventilation  Outcome: Ongoing, Progressing  Intervention: Optimize Oxygenation and Ventilation  Description: Assess and monitor airway, breathing and circulation; maintain close surveillance for deterioration.  Maintain patent airway; position to minimize risk of obstruction, aspiration and ventilation/perfusion mismatch.  Promote early mobility or ambulation; match activity to ability and tolerance.  Encourage pulmonary hygiene and lung expansion therapy, such as deep breathing, cough, suction, positive airway pressure to minimize respiratory complication risk  Provide oxygen therapy judiciously to avoid hyperoxemia; adjust to achieve oxygenation goal.  Monitor fluid balance closely to minimize the risk of fluid overload.  Anticipate the need for intubation and mechanical ventilation for airway protection and respiratory support.  Recent Flowsheet Documentation  Taken 4/22/2024 0400 by Chel Stewart RN  Activity Management: activity encouraged  Head of Bed (HOB) Positioning: HOB at 20-30 degrees  Taken 4/22/2024 0200 by Chel Stewart RN  Activity Management: activity encouraged  Head of Bed (HOB) Positioning: HOB at 20-30 degrees  Taken 4/22/2024 0000 by Chel Stewart RN  Activity Management: activity encouraged  Head of Bed (HOB) Positioning: HOB at 20-30 degrees  Taken 4/21/2024 2200 by Chel Stewart RN  Activity Management: activity encouraged  Head of Bed (HOB) Positioning: HOB at 20-30 degrees  Taken 4/21/2024 2000  by Chel Stewart, RN  Activity Management: activity encouraged  Head of Bed (HOB) Positioning: HOB at 20-30 degrees     Problem: Electrolyte Imbalance  Goal: Electrolyte Balance  Outcome: Ongoing, Progressing     Problem: Pain Acute  Goal: Acceptable Pain Control and Functional Ability  Outcome: Ongoing, Progressing  Intervention: Prevent or Manage Pain  Description: Evaluate pain level, effect of treatment and patient response at regular intervals.  Minimize painful stimuli; coordinate care and adjust environment (e.g., light, noise, unnecessary movement); promote sleep/rest.  Match pharmacologic analgesia to severity and type of pain mechanism (e.g., neuropathic, muscle, inflammatory); consider multimodal approach (e.g., nonopioid, opioid, adjuvant).  Provide medication at regular intervals; titrate to patient response; premedicate for painful procedures.  Manage breakthrough pain with additional doses; consider rotation or switching medication.  Monitor for signs of substance tolerance (increased dose to reach desired effect, decreased effect with same dose).  Manage medication-induced effects, such as constipation, nausea, pruritus, urinary retention, somnolence and dizziness.  Provide multimodal interventions, such as as physical activity, therapeutic exercise, yoga, TENS (transcutaneous electrical nerve stimulation) and manual therapy.  Train in functional activity modifications, such as body mechanics, posture, ergonomics, energy conservation and activity pacing.  Consider addition of complementary or alternative therapy, such as acupuncture, hypnosis or therapeutic touch.  Recent Flowsheet Documentation  Taken 4/22/2024 0400 by Chel Stewart, RN  Medication Review/Management: medications reviewed  Taken 4/22/2024 0200 by Chel Stewart, RN  Medication Review/Management: medications reviewed  Taken 4/22/2024 0000 by Chel Stewart, RN  Medication Review/Management: medications  reviewed  Taken 4/21/2024 2200 by Chel Stewart RN  Medication Review/Management: medications reviewed  Taken 4/21/2024 2000 by Chel Stewart RN  Medication Review/Management: medications reviewed  Intervention: Develop Pain Management Plan  Description: Acknowledge patient as the expert in pain self-management.  Use a consistent, validated tool for pain assessment; include function and quality of life.  Evaluate risk for opioid use and dependence.  Set pain management goals; determine acceptable level of discomfort to allow for maximal functioning.  Determine [id number] pain management plan, including both pharmacologic and nonpharmacologic measures; integrate management of chronic (persistent) pain.  Identify and integrate past successful treatment measures, if able.  Encourage patient and caregiver involvement in pain assessment, interventions and safety measures.  Re-evaluate plan regularly.  Recent Flowsheet Documentation  Taken 4/22/2024 0000 by Chel Stewart RN  Pain Management Interventions: pain management plan reviewed with patient/caregiver  Taken 4/21/2024 2000 by Chel Stewart RN  Pain Management Interventions: see MAR  Intervention: Optimize Psychosocial Wellbeing  Description: Facilitate patient’s self-control over pain by providing pain information and allowing choices in treatment.  Consider and address emotional response to pain.  Explore and promote use of coping strategies; address barriers to successful coping.  Evaluate and assist with psychosocial, cultural and spiritual factors impacting pain.  Modify pain perception using techniques, such as distraction, mindfulness, guided imagery, meditation or music.  Assess for risk factors for developing chronic pain, such as depression, fear, pain avoidance and pain catastrophizing.  Consider referral for ongoing coping support, such as education, relaxation training and role of thoughts.  Recent Flowsheet  Documentation  Taken 4/21/2024 2000 by Chel Stewart RN  Supportive Measures:   active listening utilized   verbalization of feelings encouraged  Diversional Activities: television     Problem: Hypertension Comorbidity  Goal: Blood Pressure in Desired Range  Outcome: Ongoing, Progressing  Intervention: Maintain Blood Pressure Management  Description: Evaluate adherence to home antihypertensive regimen (e.g., exercise and activity, diet modification, medication).  Provide scheduled antihypertensive medication; consider administration time and effects (e.g., avoid giving diuretic prior to bedtime).  Monitor response to antihypertensive medication therapy (e.g., blood pressure, electrolyte levels, medication effects).  Minimize risk of orthostatic hypotension; encourage caution with position changes, particularly if elderly.  Recent Flowsheet Documentation  Taken 4/22/2024 0400 by Chel Stewart RN  Medication Review/Management: medications reviewed  Taken 4/22/2024 0200 by Chel Stewart RN  Medication Review/Management: medications reviewed  Taken 4/22/2024 0000 by Chel Stewart RN  Medication Review/Management: medications reviewed  Taken 4/21/2024 2200 by Chel Stewart RN  Medication Review/Management: medications reviewed  Taken 4/21/2024 2000 by Chel Stewart RN  Medication Review/Management: medications reviewed     Problem: Fall Injury Risk  Goal: Absence of Fall and Fall-Related Injury  Outcome: Ongoing, Progressing  Intervention: Identify and Manage Contributors  Description: Develop a fall prevention plan with the patient and caregiver/family.  Provide reorientation, appropriate sensory stimulation and routines with changes in mental status to decrease risk of fall.  Promote use of personal vision and auditory aids.  Assess assistance level required for safe and effective self-care; provide support as needed, such as toileting, mobilization. For age 65 and older,  implement timed toileting with assistance.  Encourage physical activity, such as performance of mobility and self-care at highest level of patient ability, multicomponent exercise program and provision of appropriate assistive devices.  If fall occurs, assess the severity of injury; implement fall injury protocol. Determine the cause and revise fall injury prevention plan.  Regularly review medication contribution to fall risk; adjust medication administration times to minimize risk of falling.  Consider risk related to polypharmacy and age.  Balance adequate pain management with potential for oversedation.  Recent Flowsheet Documentation  Taken 4/22/2024 0400 by Chel Stewart RN  Medication Review/Management: medications reviewed  Taken 4/22/2024 0200 by Chel Stewart, RN  Medication Review/Management: medications reviewed  Taken 4/22/2024 0000 by Chel Stewart RN  Medication Review/Management: medications reviewed  Taken 4/21/2024 2200 by Chel Stewart, RN  Medication Review/Management: medications reviewed  Taken 4/21/2024 2000 by Chel Stewart RN  Medication Review/Management: medications reviewed  Intervention: Promote Injury-Free Environment  Description: Provide a safe, barrier-free environment that encourages independent activity.  Keep care area uncluttered and well-lighted.  Determine need for increased observation or monitoring.  Avoid use of devices that minimize mobility, such as restraints or indwelling urinary catheter.  Recent Flowsheet Documentation  Taken 4/22/2024 0400 by Chel Stewart, RN  Safety Promotion/Fall Prevention:   safety round/check completed   room organization consistent   nonskid shoes/slippers when out of bed   fall prevention program maintained   clutter free environment maintained   assistive device/personal items within reach   activity supervised  Taken 4/22/2024 0200 by Chel Stewart, RN  Safety Promotion/Fall Prevention:   safety  round/check completed   room organization consistent   nonskid shoes/slippers when out of bed   fall prevention program maintained   clutter free environment maintained   assistive device/personal items within reach   activity supervised  Taken 4/22/2024 0000 by Chel Stewart, RN  Safety Promotion/Fall Prevention:   safety round/check completed   room organization consistent   nonskid shoes/slippers when out of bed   fall prevention program maintained   clutter free environment maintained   assistive device/personal items within reach   activity supervised  Taken 4/21/2024 2200 by Chel Stewart, RN  Safety Promotion/Fall Prevention:   safety round/check completed   room organization consistent   nonskid shoes/slippers when out of bed   fall prevention program maintained   clutter free environment maintained   assistive device/personal items within reach   activity supervised  Taken 4/21/2024 2000 by Chel Stewart, RN  Safety Promotion/Fall Prevention:   safety round/check completed   room organization consistent   nonskid shoes/slippers when out of bed   fall prevention program maintained   clutter free environment maintained   assistive device/personal items within reach   activity supervised

## 2024-04-23 ENCOUNTER — ANESTHESIA (OUTPATIENT)
Dept: GASTROENTEROLOGY | Facility: HOSPITAL | Age: 69
End: 2024-04-23
Payer: MEDICARE

## 2024-04-23 ENCOUNTER — ANESTHESIA EVENT (OUTPATIENT)
Dept: GASTROENTEROLOGY | Facility: HOSPITAL | Age: 69
End: 2024-04-23
Payer: MEDICARE

## 2024-04-23 PROBLEM — E44.0 MODERATE MALNUTRITION: Status: ACTIVE | Noted: 2024-04-23

## 2024-04-23 LAB
ANION GAP SERPL CALCULATED.3IONS-SCNC: 6.7 MMOL/L (ref 5–15)
ANISOCYTOSIS BLD QL: NORMAL
BASOPHILS # BLD AUTO: 0.02 10*3/MM3 (ref 0–0.2)
BASOPHILS NFR BLD AUTO: 0.3 % (ref 0–1.5)
BUN SERPL-MCNC: 16 MG/DL (ref 8–23)
BUN/CREAT SERPL: 17.2 (ref 7–25)
CALCIUM SPEC-SCNC: 9.3 MG/DL (ref 8.6–10.5)
CHLORIDE SERPL-SCNC: 110 MMOL/L (ref 98–107)
CO2 SERPL-SCNC: 24.3 MMOL/L (ref 22–29)
CREAT SERPL-MCNC: 0.93 MG/DL (ref 0.57–1)
DEPRECATED RDW RBC AUTO: 65.4 FL (ref 37–54)
EGFRCR SERPLBLD CKD-EPI 2021: 66.7 ML/MIN/1.73
EOSINOPHIL # BLD AUTO: 0.06 10*3/MM3 (ref 0–0.4)
EOSINOPHIL NFR BLD AUTO: 0.9 % (ref 0.3–6.2)
ERYTHROCYTE [DISTWIDTH] IN BLOOD BY AUTOMATED COUNT: 16 % (ref 12.3–15.4)
GLUCOSE SERPL-MCNC: 78 MG/DL (ref 65–99)
HCT VFR BLD AUTO: 28.3 % (ref 34–46.6)
HGB BLD-MCNC: 9.2 G/DL (ref 12–15.9)
IMM GRANULOCYTES # BLD AUTO: 0.08 10*3/MM3 (ref 0–0.05)
IMM GRANULOCYTES NFR BLD AUTO: 1.2 % (ref 0–0.5)
LYMPHOCYTES # BLD AUTO: 1.45 10*3/MM3 (ref 0.7–3.1)
LYMPHOCYTES NFR BLD AUTO: 21 % (ref 19.6–45.3)
MACROCYTES BLD QL SMEAR: NORMAL
MCH RBC QN AUTO: 35.7 PG (ref 26.6–33)
MCHC RBC AUTO-ENTMCNC: 32.5 G/DL (ref 31.5–35.7)
MCV RBC AUTO: 109.7 FL (ref 79–97)
MONOCYTES # BLD AUTO: 0.48 10*3/MM3 (ref 0.1–0.9)
MONOCYTES NFR BLD AUTO: 7 % (ref 5–12)
NEUTROPHILS NFR BLD AUTO: 4.8 10*3/MM3 (ref 1.7–7)
NEUTROPHILS NFR BLD AUTO: 69.6 % (ref 42.7–76)
NRBC BLD AUTO-RTO: 0 /100 WBC (ref 0–0.2)
PLATELET # BLD AUTO: 214 10*3/MM3 (ref 140–450)
PMV BLD AUTO: 9.8 FL (ref 6–12)
POIKILOCYTOSIS BLD QL SMEAR: NORMAL
POTASSIUM SERPL-SCNC: 3.4 MMOL/L (ref 3.5–5.2)
RBC # BLD AUTO: 2.58 10*6/MM3 (ref 3.77–5.28)
SMALL PLATELETS BLD QL SMEAR: ADEQUATE
SODIUM SERPL-SCNC: 141 MMOL/L (ref 136–145)
WBC MORPH BLD: NORMAL
WBC NRBC COR # BLD AUTO: 6.89 10*3/MM3 (ref 3.4–10.8)

## 2024-04-23 PROCEDURE — 25010000002 PROPOFOL 10 MG/ML EMULSION: Performed by: NURSE ANESTHETIST, CERTIFIED REGISTERED

## 2024-04-23 PROCEDURE — 85025 COMPLETE CBC W/AUTO DIFF WBC: CPT | Performed by: INTERNAL MEDICINE

## 2024-04-23 PROCEDURE — 0DB98ZX EXCISION OF DUODENUM, VIA NATURAL OR ARTIFICIAL OPENING ENDOSCOPIC, DIAGNOSTIC: ICD-10-PCS | Performed by: INTERNAL MEDICINE

## 2024-04-23 PROCEDURE — 80048 BASIC METABOLIC PNL TOTAL CA: CPT | Performed by: INTERNAL MEDICINE

## 2024-04-23 PROCEDURE — 25010000002 ONDANSETRON PER 1 MG: Performed by: INTERNAL MEDICINE

## 2024-04-23 PROCEDURE — 86381 MITOCHONDRIAL ANTIBODY EACH: CPT | Performed by: INTERNAL MEDICINE

## 2024-04-23 PROCEDURE — 43239 EGD BIOPSY SINGLE/MULTIPLE: CPT | Performed by: INTERNAL MEDICINE

## 2024-04-23 PROCEDURE — 99232 SBSQ HOSP IP/OBS MODERATE 35: CPT | Performed by: INTERNAL MEDICINE

## 2024-04-23 PROCEDURE — 25010000002 HYDROMORPHONE 1 MG/ML SOLUTION: Performed by: INTERNAL MEDICINE

## 2024-04-23 PROCEDURE — 0DB68ZX EXCISION OF STOMACH, VIA NATURAL OR ARTIFICIAL OPENING ENDOSCOPIC, DIAGNOSTIC: ICD-10-PCS | Performed by: INTERNAL MEDICINE

## 2024-04-23 PROCEDURE — 82787 IGG 1 2 3 OR 4 EACH: CPT | Performed by: INTERNAL MEDICINE

## 2024-04-23 PROCEDURE — 88305 TISSUE EXAM BY PATHOLOGIST: CPT

## 2024-04-23 PROCEDURE — 85007 BL SMEAR W/DIFF WBC COUNT: CPT | Performed by: INTERNAL MEDICINE

## 2024-04-23 RX ORDER — SODIUM BICARBONATE 650 MG/1
650 TABLET ORAL 2 TIMES DAILY
COMMUNITY

## 2024-04-23 RX ORDER — ONDANSETRON 2 MG/ML
4 INJECTION INTRAMUSCULAR; INTRAVENOUS ONCE AS NEEDED
Status: DISCONTINUED | OUTPATIENT
Start: 2024-04-23 | End: 2024-04-23 | Stop reason: HOSPADM

## 2024-04-23 RX ORDER — POTASSIUM CHLORIDE 20 MEQ/1
20 TABLET, EXTENDED RELEASE ORAL ONCE
Status: DISCONTINUED | OUTPATIENT
Start: 2024-04-23 | End: 2024-04-23

## 2024-04-23 RX ORDER — HYDROCODONE BITARTRATE AND ACETAMINOPHEN 7.5; 325 MG/1; MG/1
1 TABLET ORAL EVERY 8 HOURS PRN
COMMUNITY
End: 2024-04-24 | Stop reason: HOSPADM

## 2024-04-23 RX ORDER — PROPOFOL 10 MG/ML
VIAL (ML) INTRAVENOUS AS NEEDED
Status: DISCONTINUED | OUTPATIENT
Start: 2024-04-23 | End: 2024-04-23 | Stop reason: SURG

## 2024-04-23 RX ORDER — PANTOPRAZOLE SODIUM 40 MG/1
40 TABLET, DELAYED RELEASE ORAL
Status: DISCONTINUED | OUTPATIENT
Start: 2024-04-23 | End: 2024-04-24 | Stop reason: HOSPADM

## 2024-04-23 RX ORDER — LIDOCAINE HCL/PF 100 MG/5ML
SYRINGE (ML) INJECTION AS NEEDED
Status: DISCONTINUED | OUTPATIENT
Start: 2024-04-23 | End: 2024-04-23 | Stop reason: SURG

## 2024-04-23 RX ORDER — POTASSIUM CHLORIDE 20 MEQ/1
20 TABLET, EXTENDED RELEASE ORAL ONCE
Status: COMPLETED | OUTPATIENT
Start: 2024-04-23 | End: 2024-04-23

## 2024-04-23 RX ADMIN — HYDROMORPHONE HYDROCHLORIDE 0.5 MG: 1 INJECTION, SOLUTION INTRAMUSCULAR; INTRAVENOUS; SUBCUTANEOUS at 22:58

## 2024-04-23 RX ADMIN — CEPHALEXIN 500 MG: 250 CAPSULE ORAL at 20:43

## 2024-04-23 RX ADMIN — Medication 10 ML: at 08:44

## 2024-04-23 RX ADMIN — HYDROMORPHONE HYDROCHLORIDE 0.5 MG: 1 INJECTION, SOLUTION INTRAMUSCULAR; INTRAVENOUS; SUBCUTANEOUS at 20:43

## 2024-04-23 RX ADMIN — PANTOPRAZOLE SODIUM 40 MG: 40 TABLET, DELAYED RELEASE ORAL at 18:50

## 2024-04-23 RX ADMIN — HYDROMORPHONE HYDROCHLORIDE 0.5 MG: 1 INJECTION, SOLUTION INTRAMUSCULAR; INTRAVENOUS; SUBCUTANEOUS at 05:16

## 2024-04-23 RX ADMIN — HYDROMORPHONE HYDROCHLORIDE 0.5 MG: 1 INJECTION, SOLUTION INTRAMUSCULAR; INTRAVENOUS; SUBCUTANEOUS at 08:44

## 2024-04-23 RX ADMIN — ONDANSETRON 4 MG: 2 INJECTION INTRAMUSCULAR; INTRAVENOUS at 08:47

## 2024-04-23 RX ADMIN — HYDROMORPHONE HYDROCHLORIDE 0.5 MG: 1 INJECTION, SOLUTION INTRAMUSCULAR; INTRAVENOUS; SUBCUTANEOUS at 18:11

## 2024-04-23 RX ADMIN — ONDANSETRON 4 MG: 2 INJECTION INTRAMUSCULAR; INTRAVENOUS at 18:50

## 2024-04-23 RX ADMIN — HYDROMORPHONE HYDROCHLORIDE 0.5 MG: 1 INJECTION, SOLUTION INTRAMUSCULAR; INTRAVENOUS; SUBCUTANEOUS at 13:02

## 2024-04-23 RX ADMIN — PROPOFOL 150 MG: 10 INJECTION, EMULSION INTRAVENOUS at 11:28

## 2024-04-23 RX ADMIN — HYDROMORPHONE HYDROCHLORIDE 0.5 MG: 1 INJECTION, SOLUTION INTRAMUSCULAR; INTRAVENOUS; SUBCUTANEOUS at 15:40

## 2024-04-23 RX ADMIN — Medication 10 ML: at 20:43

## 2024-04-23 RX ADMIN — HYDROMORPHONE HYDROCHLORIDE 0.5 MG: 1 INJECTION, SOLUTION INTRAMUSCULAR; INTRAVENOUS; SUBCUTANEOUS at 00:52

## 2024-04-23 RX ADMIN — POTASSIUM CHLORIDE 20 MEQ: 1500 TABLET, EXTENDED RELEASE ORAL at 18:50

## 2024-04-23 RX ADMIN — Medication 1 CAPSULE: at 20:43

## 2024-04-23 RX ADMIN — Medication 60 MG: at 11:28

## 2024-04-23 NOTE — CASE MANAGEMENT/SOCIAL WORK
DCP to return home where she lives with her 2 sons. No DME needs anticipated at this time. CM continues to follow.

## 2024-04-23 NOTE — ANESTHESIA PREPROCEDURE EVALUATION
Anesthesia Evaluation     Patient summary reviewed and Nursing notes reviewed   no history of anesthetic complications:   NPO Solid Status: > 8 hours  NPO Liquid Status: > 8 hours           Airway   Mallampati: II  TM distance: >3 FB  Neck ROM: full  no difficulty expected and Possible difficult intubation  Dental - normal exam     Pulmonary - negative pulmonary ROS and normal exam   Cardiovascular - normal exam    (+) hypertension      Neuro/Psych- negative ROS  GI/Hepatic/Renal/Endo    (+) renal disease- CRI    Musculoskeletal (-) negative ROS    Abdominal    Substance History - negative use     OB/GYN negative ob/gyn ROS         Other - negative ROS                   Anesthesia Plan    ASA 3     MAC     (Pt told that intravenous sedation will be used as the primary anesthetic along with local anesthesia if necessary. Every effort will be made to make sure the patient is comfortable.     The patient was told they may or may not have recall for the procedure. It was further explained that if the MAC was not adequate that a general anesthetic with either an LMA or endotracheal tube would be required.     Will proceed with the plan of care.)  intravenous induction     Anesthetic plan, risks, benefits, and alternatives have been provided, discussed and informed consent has been obtained with: patient.    CODE STATUS:    Code Status (Patient has no pulse and is not breathing): CPR (Attempt to Resuscitate)  Medical Interventions (Patient has pulse or is breathing): Full Support

## 2024-04-23 NOTE — PROGRESS NOTES
"Dietitian Follow-up    Patient Name: Tasha Yarbrough  YOB: 1955  MRN: [id number]  Admission date: 4/20/2024    Comment:      Clinical Nutrition Follow-up   Encounter Information        Trending Narrative     4/23: Pt NPO for EGD. Pt met malnutrition criteria. Once diet advances, RD will add appropriate ONS.     4/22: Pt admitted with metabolic acidosis. Pt noted for recent recurrent pancreatitis. EMR shows wt loss of 9# (6.7%) within 1 month. Pt is currently NPO. Will add ONS once diet advances.      Anthropometrics        Current Height, Weight Height: 157.5 cm (62.01\")  Weight: 58.2 kg (128 lb 4.9 oz) (04/23/24 0257)       Trending Weight Hx     This admission:              PTA:     Wt Readings from Last 30 Encounters:   04/23/24 0257 58.2 kg (128 lb 4.9 oz)   04/22/24 0356 57 kg (125 lb 10.6 oz)   04/21/24 1456 58.8 kg (129 lb 10.1 oz)   04/20/24 2201 65.8 kg (145 lb)   04/10/24 0346 60 kg (132 lb 4.4 oz)   04/09/24 1300 60.8 kg (134 lb 0.6 oz)   04/08/24 1755 65.8 kg (145 lb)   04/06/24 1945 65.8 kg (145 lb)   03/28/24 0900 66.2 kg (146 lb)   03/23/24 1651 66.2 kg (145 lb 15.1 oz)   03/16/24 1353 66.2 kg (146 lb)   03/09/24 0243 66.2 kg (146 lb)   03/04/24 0415 67.8 kg (149 lb 7.6 oz)   03/03/24 0430 62.6 kg (138 lb 0.1 oz)   03/02/24 0057 62.6 kg (138 lb)   02/29/24 0339 62.7 kg (138 lb 3.7 oz)   02/27/24 0326 63.9 kg (140 lb 14 oz)   02/26/24 0302 62.4 kg (137 lb 9.1 oz)   02/24/24 2333 65.8 kg (145 lb)   09/25/23 0323 68.9 kg (151 lb 14.4 oz)   09/24/23 0336 68.5 kg (151 lb 0.2 oz)   09/23/23 0500 67.4 kg (148 lb 9.4 oz)   09/22/23 2345 66.8 kg (147 lb 4.3 oz)   09/22/23 1932 61.2 kg (135 lb)   09/02/23 1847 60.8 kg (134 lb)   08/18/22 2132 78.9 kg (174 lb)   08/08/22 0500 79.7 kg (175 lb 11.3 oz)   08/07/22 0536 77.4 kg (170 lb 10.2 oz)   08/06/22 2340 77 kg (169 lb 12.1 oz)   08/06/22 2019 76.7 kg (169 lb)   07/30/22 0441 79 kg (174 lb 2.6 oz)   07/29/22 0417 78.7 kg (173 lb 8 oz)   07/27/22 " 0353 78.9 kg (173 lb 15.1 oz)   07/26/22 1552 76.7 kg (169 lb)   05/06/21 1626 92.1 kg (203 lb)   10/04/20 0002 90.7 kg (200 lb)   08/15/20 1940 97.5 kg (215 lb)   05/23/20 1814 95.3 kg (210 lb)      BMI kg/m2 Body mass index is 23.46 kg/m².     Labs        Pertinent Labs Results from last 7 days   Lab Units 04/23/24  0649 04/22/24  0545 04/21/24  1800 04/21/24  1215 04/20/24  2319   SODIUM mmol/L 141 139 135* 136 139   POTASSIUM mmol/L 3.4* 3.7 4.8 6.0* 4.9   CHLORIDE mmol/L 110* 110* 111* 113* 116*   CO2 mmol/L 24.3 18.3* 11.2* 13.3* 8.9*   BUN mg/dL 16 20 24* 24* 25*   CREATININE mg/dL 0.93 1.13* 1.37* 1.43* 1.65*   CALCIUM mg/dL 9.3 9.6 10.1 9.5 9.6   BILIRUBIN mg/dL  --   --   --  0.2 <0.2   ALK PHOS U/L  --   --   --  104 131*   ALT (SGPT) U/L  --   --   --  8 7   AST (SGOT) U/L  --   --   --  13 11   GLUCOSE mg/dL 78 80 86 111* 81     Results from last 7 days   Lab Units 04/23/24  0649 04/22/24  0545 04/21/24  1215 04/20/24  2319   MAGNESIUM mg/dL  --   --   --  2.5*   PHOSPHORUS mg/dL  --  2.8  --   --    HEMOGLOBIN g/dL 9.2* 10.0*   < > 11.3*   HEMATOCRIT % 28.3* 31.5*   < > 35.3    < > = values in this interval not displayed.         Medications    Scheduled Medications cephalexin, 500 mg, Oral, Q12H  enoxaparin, 40 mg, Subcutaneous, Daily  lactobacillus acidophilus, 1 capsule, Oral, BID  potassium chloride, 20 mEq, Oral, Once  predniSONE, 40 mg, Oral, Daily With Breakfast  sodium chloride, 10 mL, Intravenous, Q12H        Infusions lactated ringers, 75 mL/hr, Last Rate: 75 mL/hr (04/23/24 1302)  Pharmacy to Dose enoxaparin (LOVENOX),         PRN Medications   acetaminophen **OR** acetaminophen **OR** acetaminophen    senna-docusate sodium **AND** polyethylene glycol **AND** bisacodyl **AND** bisacodyl    HYDROmorphone **AND** naloxone    ondansetron    Pharmacy to Dose enoxaparin (LOVENOX)    [COMPLETED] Insert Peripheral IV **AND** sodium chloride    sodium chloride    sodium chloride     Physical  Findings        Trending Physical   Appearance, NFPE    --  Edema  None noted     Bowel Function LBM: 4/19/2024     Tubes Peripheral IV     Chewing/Swallowing NPO     Skin WNL     --  Current Nutrition Orders & Evaluation of Intake       Oral Nutrition     Food Allergies Pineapple    Current PO Diet NPO Diet NPO Type: Strict NPO   Supplement    PO Evaluation     Trending % PO Intake NPO ~ 3 days     Nutrition Diagnosis         Nutrition Dx Problem 1 Chronic disease related malnutrition r/t pancreatitis AEB significant wt loss, moderate muscle loss, and moderate subcutaneous fat loss.       Nutrition Dx Problem 2        Intervention Goal         Intervention Goal(s) Diet advancement per MD  No significant wt loss     Nutrition Intervention        RD Action No action at this time     Nutrition Prescription          Diet Prescription NPO   Supplement Prescription    Enteral Nutrition Prescription     TPN Prescription       Monitor/Evaluation        Monitor Per protocol, I&O, PO intake, Pertinent labs, Weight, Skin status, GI status, Symptoms, POC/GOC, Swallow function, Hemodynamic stability     RD to f/up     Electronically signed by:  Monica Montano RD  04/23/24 13:41 EDT

## 2024-04-23 NOTE — PROGRESS NOTES
[facility] Progress Note      Patient Name: Tasha Yarbrough  : 1955  MRN: [id number]  Primary Care Physician:  Terrence Baldwin MD  Date of admission: 2024    Subjective     Interval History:   F/u VIVIENNE / CKD    Review of Systems:   +  Nausea this AM but no emesis  NPO with plan for EGD  Denies dyspnea  Less abd pain  Wants to eat    Objective     Vitals:   Temp:  [97.7 °F (36.5 °C)-98.6 °F (37 °C)] 97.7 °F (36.5 °C)  Heart Rate:  [71-81] 71  Resp:  [18-22] 22  BP: (137-166)/(88-98) 137/90    Intake/Output Summary (Last 24 hours) at 2024 0937  Last data filed at 2024 1300  Gross per 24 hour   Intake --   Output 600 ml   Net -600 ml       Physical Exam:    General Appearance: comfortable alert on RA  Neck: supple, no JVD  Lungs: CTA bilat no rales  Heart: RRR, normal S1 and S2  Abdomen: soft, mild epigastric TTP  : no palpable bladder  Extremities: no edema, cyanosis or clubbing       Scheduled Meds:     cephalexin, 500 mg, Oral, Q12H  enoxaparin, 40 mg, Subcutaneous, Daily  lactobacillus acidophilus, 1 capsule, Oral, BID  predniSONE, 40 mg, Oral, Daily With Breakfast  sodium chloride, 10 mL, Intravenous, Q12H      IV Meds:   lactated ringers, 75 mL/hr, Last Rate: 75 mL/hr (24 1453)  Pharmacy to Dose enoxaparin (LOVENOX),         Results Reviewed:   I have personally reviewed the results from the time of this admission to 2024 09:37 EDT     Results from last 7 days   Lab Units 24  0649 24  0545 24  1800 24  1215 24  2319   SODIUM mmol/L 141 139 135* 136 139   POTASSIUM mmol/L 3.4* 3.7 4.8 6.0* 4.9   CHLORIDE mmol/L 110* 110* 111* 113* 116*   CO2 mmol/L 24.3 18.3* 11.2* 13.3* 8.9*   BUN mg/dL 16 20 24* 24* 25*   CREATININE mg/dL 0.93 1.13* 1.37* 1.43* 1.65*   CALCIUM mg/dL 9.3 9.6 10.1 9.5 9.6   BILIRUBIN mg/dL  --   --   --  0.2 <0.2   ALK PHOS U/L  --   --   --  104 131*   ALT (SGPT) U/L  --   --   --  8 7   AST (SGOT)  U/L  --   --   --  13 11   GLUCOSE mg/dL 78 80 86 111* 81     Estimated Creatinine Clearance: 52.5 mL/min (by C-G formula based on SCr of 0.93 mg/dL).  Results from last 7 days   Lab Units 04/22/24  0545 04/20/24  2319   MAGNESIUM mg/dL  --  2.5*   PHOSPHORUS mg/dL 2.8  --          Results from last 7 days   Lab Units 04/23/24  0649 04/22/24  0545 04/21/24  1215 04/20/24  2319   WBC 10*3/mm3 6.89 8.75 10.64 9.43   HEMOGLOBIN g/dL 9.2* 10.0* 11.1* 11.3*   PLATELETS 10*3/mm3 214 233 272 286           Assessment / Plan     ASSESSMENT:  VIVIENNE, non olig - prerenal azotemia due to vol depletion in assoc with n/v, diarrhea, and recurrent acute pancreatitis.  Resolved with IVF, Cr down to 0.9, under BL, peak 1.6.  CT: no hydronephrosis.  UA bland.  K low 3.4  CKD stage 3 - BL Cr 1.1 to 1.3 range  Hypokalemia  Metabolic acidosis, acute on chronic picture, with NAGMA component related to loose stool.  Bicarb up to 24, off bicarb drip, on LR IVF  Recurrent acute pancreatitis - GI following; hospitalized for this few weeks ago; plan EGD today, outpatient MRCP.  Ruling out auto-immune etiol  Poss ileus by CT     PLAN:  EGD today per GI  DC IVF when diet resumed   Replace K      Rj Ch MD  04/23/24  09:37 EDT    [facility]  [phone]

## 2024-04-23 NOTE — PROGRESS NOTES
[facility]    Name:  Tasha Yarbrough   Age:  69 y.o.  Sex:  female  :  1955  MRN:  [id number]   Visit Number:  [id number]  Admission Date:  2024  Date Of Service:  24  Primary Care Physician:  Terrence Baldwin MD     LOS: 2 days :    Chief Complaint:      Abdominal pain    Subjective:    2024: Patient with persistent but improved abdominal pain.  Went for EGD with Dr. Negro.  This showed severe gastritis recommended twice daily Protonix for 8 weeks.  Resume diet.    Hospital Course:     Tasha Yarbrough is a 69-year-old female with a history of hypertension, recent history of recurrent pancreatitis discharged from this facility on 2024 was brought to the emergency room by her son with symptoms of epigastric abdominal pain since the last couple of days.  Patient has been vomiting for the last couple of days and has not been eating much.  She also gives history of feeling cold and chills.  Started having some dysuria today.  Her son lives with her.  According to the son, patient has been admitted multiple times to  and here for similar problems in the last 1 month.  No exact reasoning was given to her pancreatitis.  She was told that she has a couple of cysts in her pancreas.  She denies any chest pain or shortness of breath at this time.     In the emergency room, she was afebrile and hemodynamically stable saturating at 99% on room air.  CMP was remarkable for a CO2 of 9, BUN 25, creatinine 1.65 (baseline 1), alkaline phosphatase 131.  LFT was otherwise unremarkable.  Lipase was 722.  Lactic acid was 0.4.  Hemoglobin 11 .  Urine analysis showed 11-20 WBCs with 3-6 squamous epithelial cells and negative nitrite.  Due to multiple recent CT scans, abdominal CT scan was not obtained.  ABG showed a pH of 7.16, pCO2 26, pO2 104 and bicarb of 9.  Patient was given a liter of normal saline bolus, IV morphine and Zofran in the emergency room.   She was subsequently placed on lactated Ringer's at 75 mill per hour and was subsequently admitted to the medical floor with telemetry for pancreatitis, VIVIENNE and metabolic acidosis of uncertain etiology.     Patient did have worsening metabolic acidemia, nonanion gap suspect secondary to GI losses.  Bicarbonate drip initiated.  Bicarb improved.  Nephrology consulted.  Patient also follows with local GI.  Consult placed.  Continue bicarbonate drip and IV fluids for recurrent acute pancreatitis.  Pain control and antiemetics as needed.  Edited by: Mateo Storm DO at 4/23/2024 4332     Review of Systems:     All systems were reviewed and negative except as mentioned in subjective, assessment and plan.    Vital Signs:    Temp:  [97.3 °F (36.3 °C)-98.4 °F (36.9 °C)] 98.2 °F (36.8 °C)  Heart Rate:  [63-96] 71  Resp:  [11-22] 16  BP: (122-166)/() 164/100    Intake and output:    I/O last 3 completed shifts:  In: 3307 [I.V.:3307]  Out: 2750 [Urine:2750]  I/O this shift:  In: 560 [P.O.:360; I.V.:200]  Out: -     Physical Examination:    Constitutional: No acute distress, awake, alert  HENT: NCAT, mucous membranes moist  Respiratory: Clear to auscultation bilaterally, respiratory effort normal   Cardiovascular: RRR, no murmurs, rubs, or gallops  Gastrointestinal: Positive bowel sounds, soft, moderate mid abdominal tenderness, mildly distended  Musculoskeletal: No bilateral ankle edema  Psychiatric: Appropriate affect, cooperative  Neurologic: Oriented x 3, speech clear  Skin: No rashes  Edited by: Mateo Storm DO at 4/23/2024 6235     Laboratory results:    Results from last 7 days   Lab Units 04/23/24  0649 04/22/24  0545 04/21/24  1800 04/21/24  1215 04/20/24  2319   SODIUM mmol/L 141 139 135* 136 139   POTASSIUM mmol/L 3.4* 3.7 4.8 6.0* 4.9   CHLORIDE mmol/L 110* 110* 111* 113* 116*   CO2 mmol/L 24.3 18.3* 11.2* 13.3* 8.9*   BUN mg/dL 16 20 24* 24* 25*   CREATININE mg/dL 0.93 1.13* 1.37* 1.43* 1.65*    CALCIUM mg/dL 9.3 9.6 10.1 9.5 9.6   BILIRUBIN mg/dL  --   --   --  0.2 <0.2   ALK PHOS U/L  --   --   --  104 131*   ALT (SGPT) U/L  --   --   --  8 7   AST (SGOT) U/L  --   --   --  13 11   GLUCOSE mg/dL 78 80 86 111* 81     Results from last 7 days   Lab Units 04/23/24  0649 04/22/24  0545 04/21/24  1215   WBC 10*3/mm3 6.89 8.75 10.64   HEMOGLOBIN g/dL 9.2* 10.0* 11.1*   HEMATOCRIT % 28.3* 31.5* 35.4   PLATELETS 10*3/mm3 214 233 272                 Recent Labs     02/27/24  0522 04/21/24  0015 04/21/24  0919   PHART 7.305* 7.158* 7.199*   CGS3VFR 30.1* 26.4* 25.5*   PO2ART 90.6 104.0* 97.9   MUL4JDY 15.0* 9.4* 9.9*   BASEEXCESS -10.3* -17.9* -16.6*      I have reviewed the patient's laboratory results.    Radiology results:    No radiology results from the last 24 hrs  I have reviewed the patient's radiology reports.    Medication Review:     I have reviewed the patient's active and prn medications.     Problem List:      Metabolic acidosis    VIVIENNE (acute kidney injury)    Abnormal finding on GI tract imaging    Acute pancreatitis    Acute UTI (urinary tract infection)    Epigastric pain    Nausea and vomiting in adult    Moderate malnutrition      Assessment/Plan:    Acute recurrent pancreatitis  Severe gastritis    -Advancing diet low-fat.  PPI twice daily for 8 weeks.  Continuing LR at 75 probably DC in the morning if tolerates diet.    VIVIENNE resolved  NAG Acidosis resolved  UTI status posttreatment        Prophylaxis: Lovenox    Edited by: Mateo Storm, DO at 4/23/2024 9917     DVT Prophylaxis: Lovenox  Code Status:   Code Status and Medical Interventions:   Ordered at: 04/21/24 0110     Code Status (Patient has no pulse and is not breathing):    CPR (Attempt to Resuscitate)     Medical Interventions (Patient has pulse or is breathing):    Full Support      Diet:   Dietary Orders (From admission, onward)       Start     Ordered    04/23/24 2094  Diet: Liquid, Gastrointestinal; Full Liquid;  Fat-Restricted; Fluid Consistency: Thin (IDDSI 0)  Diet Effective Now        References:    Diet Order [facility]   Question Answer Comment   Diets: Liquid    Diets: Gastrointestinal    Liquid Diet: Full Liquid    Gastrointestinal Diet: Fat-Restricted    Fluid Consistency: Thin (IDDSI 0)        04/23/24 1453                   Discharge Plan: Disposition: Possibly home with home health tomorrow if tolerates diet this evening.    Mateo Storm DO  04/23/24  18:02 EDT    Dictated utilizing Dragon dictation.

## 2024-04-23 NOTE — ANESTHESIA POSTPROCEDURE EVALUATION
Patient: Tasha Yarbrough    Procedure Summary       Date: 04/23/24 Room / Location: [facility] ENDOSCOPY 1 / [facility] ENDOSCOPY    Anesthesia Start: 1123 Anesthesia Stop: 1133    Procedure: ESOPHAGOGASTRODUODENOSCOPY WITH BIOPSY (Esophagus) Diagnosis:       Nausea and vomiting in adult      Epigastric pain      (Nausea and vomiting in adult [R11.2])      (Epigastric pain [R10.13])    Surgeons: Jairo Negro MD Provider: Mode Askew CRNA    Anesthesia Type: MAC ASA Status: 3            Anesthesia Type: MAC    Vitals  Vitals Value Taken Time   /80 04/23/24 1150   Temp 98 °F (36.7 °C) 04/23/24 1137   Pulse 72 04/23/24 1151   Resp 12 04/23/24 1145   SpO2 95 % 04/23/24 1151   Vitals shown include unfiled device data.          Post Anesthesia Care and Evaluation    Patient location during evaluation: PACU  Patient participation: complete - patient participated  Level of consciousness: awake  Pain score: 1  Pain management: adequate    Airway patency: patent  Anesthetic complications: No anesthetic complications  PONV Status: controlled  Cardiovascular status: acceptable and stable  Respiratory status: acceptable  Hydration status: acceptable    Comments: See Nursing record for post procedural Vital Signs as per protocol

## 2024-04-23 NOTE — PLAN OF CARE
Goal Outcome Evaluation:           Progress: no change  Outcome Evaluation: VSS. RA. PRN pain medication used to control pain. no acute overnight events

## 2024-04-24 ENCOUNTER — READMISSION MANAGEMENT (OUTPATIENT)
Dept: CALL CENTER | Facility: HOSPITAL | Age: 69
End: 2024-04-24
Payer: MEDICARE

## 2024-04-24 VITALS
WEIGHT: 128.75 LBS | DIASTOLIC BLOOD PRESSURE: 97 MMHG | RESPIRATION RATE: 20 BRPM | SYSTOLIC BLOOD PRESSURE: 155 MMHG | TEMPERATURE: 97.8 F | OXYGEN SATURATION: 95 % | HEIGHT: 62 IN | BODY MASS INDEX: 23.69 KG/M2 | HEART RATE: 89 BPM

## 2024-04-24 LAB
ALBUMIN SERPL-MCNC: 3.4 G/DL (ref 3.5–5.2)
ANION GAP SERPL CALCULATED.3IONS-SCNC: 9.3 MMOL/L (ref 5–15)
ANISOCYTOSIS BLD QL: NORMAL
BASOPHILS # BLD AUTO: 0.04 10*3/MM3 (ref 0–0.2)
BASOPHILS NFR BLD AUTO: 0.7 % (ref 0–1.5)
BUN SERPL-MCNC: 12 MG/DL (ref 8–23)
BUN/CREAT SERPL: 12.4 (ref 7–25)
CALCIUM SPEC-SCNC: 8.8 MG/DL (ref 8.6–10.5)
CHLORIDE SERPL-SCNC: 109 MMOL/L (ref 98–107)
CO2 SERPL-SCNC: 24.7 MMOL/L (ref 22–29)
CREAT SERPL-MCNC: 0.97 MG/DL (ref 0.57–1)
DEPRECATED RDW RBC AUTO: 68.7 FL (ref 37–54)
EGFRCR SERPLBLD CKD-EPI 2021: 63.4 ML/MIN/1.73
EOSINOPHIL # BLD AUTO: 0.09 10*3/MM3 (ref 0–0.4)
EOSINOPHIL NFR BLD AUTO: 1.6 % (ref 0.3–6.2)
ERYTHROCYTE [DISTWIDTH] IN BLOOD BY AUTOMATED COUNT: 16.1 % (ref 12.3–15.4)
GLUCOSE SERPL-MCNC: 74 MG/DL (ref 65–99)
HCT VFR BLD AUTO: 31.5 % (ref 34–46.6)
HGB BLD-MCNC: 9.8 G/DL (ref 12–15.9)
IGG4 SER-MCNC: 8 MG/DL (ref 2–96)
IMM GRANULOCYTES # BLD AUTO: 0.04 10*3/MM3 (ref 0–0.05)
IMM GRANULOCYTES NFR BLD AUTO: 0.7 % (ref 0–0.5)
LYMPHOCYTES # BLD AUTO: 1.57 10*3/MM3 (ref 0.7–3.1)
LYMPHOCYTES NFR BLD AUTO: 27.3 % (ref 19.6–45.3)
MACROCYTES BLD QL SMEAR: NORMAL
MAGNESIUM SERPL-MCNC: 2.2 MG/DL (ref 1.6–2.4)
MCH RBC QN AUTO: 35.3 PG (ref 26.6–33)
MCHC RBC AUTO-ENTMCNC: 31.1 G/DL (ref 31.5–35.7)
MCV RBC AUTO: 113.3 FL (ref 79–97)
MITOCHONDRIA M2 IGG SER-ACNC: <20 UNITS (ref 0–20)
MONOCYTES # BLD AUTO: 0.35 10*3/MM3 (ref 0.1–0.9)
MONOCYTES NFR BLD AUTO: 6.1 % (ref 5–12)
NEUTROPHILS NFR BLD AUTO: 3.67 10*3/MM3 (ref 1.7–7)
NEUTROPHILS NFR BLD AUTO: 63.6 % (ref 42.7–76)
NRBC BLD AUTO-RTO: 0 /100 WBC (ref 0–0.2)
PHOSPHATE SERPL-MCNC: 3.1 MG/DL (ref 2.5–4.5)
PLATELET # BLD AUTO: 216 10*3/MM3 (ref 140–450)
PMV BLD AUTO: 9.8 FL (ref 6–12)
POTASSIUM SERPL-SCNC: 3.6 MMOL/L (ref 3.5–5.2)
RBC # BLD AUTO: 2.78 10*6/MM3 (ref 3.77–5.28)
REF LAB TEST METHOD: NORMAL
SMALL PLATELETS BLD QL SMEAR: ADEQUATE
SODIUM SERPL-SCNC: 143 MMOL/L (ref 136–145)
WBC MORPH BLD: NORMAL
WBC NRBC COR # BLD AUTO: 5.76 10*3/MM3 (ref 3.4–10.8)

## 2024-04-24 PROCEDURE — 83735 ASSAY OF MAGNESIUM: CPT | Performed by: INTERNAL MEDICINE

## 2024-04-24 PROCEDURE — 85025 COMPLETE CBC W/AUTO DIFF WBC: CPT | Performed by: INTERNAL MEDICINE

## 2024-04-24 PROCEDURE — 25010000002 ONDANSETRON PER 1 MG: Performed by: INTERNAL MEDICINE

## 2024-04-24 PROCEDURE — 99239 HOSP IP/OBS DSCHRG MGMT >30: CPT | Performed by: INTERNAL MEDICINE

## 2024-04-24 PROCEDURE — 25010000002 HYDROMORPHONE 1 MG/ML SOLUTION: Performed by: INTERNAL MEDICINE

## 2024-04-24 PROCEDURE — 25010000002 ENOXAPARIN PER 10 MG: Performed by: INTERNAL MEDICINE

## 2024-04-24 PROCEDURE — 85007 BL SMEAR W/DIFF WBC COUNT: CPT | Performed by: INTERNAL MEDICINE

## 2024-04-24 PROCEDURE — 80069 RENAL FUNCTION PANEL: CPT | Performed by: INTERNAL MEDICINE

## 2024-04-24 RX ORDER — PANTOPRAZOLE SODIUM 40 MG/1
40 TABLET, DELAYED RELEASE ORAL
Qty: 60 TABLET | Refills: 1 | Status: SHIPPED | OUTPATIENT
Start: 2024-04-24 | End: 2024-06-23

## 2024-04-24 RX ORDER — NALOXONE HYDROCHLORIDE 4 MG/.1ML
SPRAY NASAL
Qty: 2 EACH | Refills: 0 | Status: SHIPPED | OUTPATIENT
Start: 2024-04-24

## 2024-04-24 RX ORDER — OXYCODONE HYDROCHLORIDE 5 MG/1
5 TABLET ORAL EVERY 4 HOURS PRN
Status: DISCONTINUED | OUTPATIENT
Start: 2024-04-24 | End: 2024-04-24 | Stop reason: HOSPADM

## 2024-04-24 RX ORDER — OXYCODONE HYDROCHLORIDE AND ACETAMINOPHEN 5; 325 MG/1; MG/1
1 TABLET ORAL EVERY 6 HOURS PRN
Qty: 10 TABLET | Refills: 0 | Status: SHIPPED | OUTPATIENT
Start: 2024-04-24

## 2024-04-24 RX ADMIN — ONDANSETRON 4 MG: 2 INJECTION INTRAMUSCULAR; INTRAVENOUS at 02:23

## 2024-04-24 RX ADMIN — HYDROMORPHONE HYDROCHLORIDE 0.5 MG: 1 INJECTION, SOLUTION INTRAMUSCULAR; INTRAVENOUS; SUBCUTANEOUS at 02:23

## 2024-04-24 RX ADMIN — Medication 1 CAPSULE: at 09:03

## 2024-04-24 RX ADMIN — HYDROMORPHONE HYDROCHLORIDE 0.5 MG: 1 INJECTION, SOLUTION INTRAMUSCULAR; INTRAVENOUS; SUBCUTANEOUS at 06:17

## 2024-04-24 RX ADMIN — HYDROMORPHONE HYDROCHLORIDE 0.5 MG: 1 INJECTION, SOLUTION INTRAMUSCULAR; INTRAVENOUS; SUBCUTANEOUS at 04:26

## 2024-04-24 RX ADMIN — Medication 10 ML: at 09:03

## 2024-04-24 RX ADMIN — OXYCODONE HYDROCHLORIDE 5 MG: 5 TABLET ORAL at 09:03

## 2024-04-24 RX ADMIN — PANTOPRAZOLE SODIUM 40 MG: 40 TABLET, DELAYED RELEASE ORAL at 09:04

## 2024-04-24 RX ADMIN — ENOXAPARIN SODIUM 40 MG: 100 INJECTION SUBCUTANEOUS at 09:02

## 2024-04-24 NOTE — CASE MANAGEMENT/SOCIAL WORK
"Home PT/OT ordered, pt declined services stating \"I don't need it, my son's take care of everything for me.\" Pt educated on benefits of home therapy and she again declined services. Dr. Storm notified.  "

## 2024-04-24 NOTE — PLAN OF CARE
Goal Outcome Evaluation:  Plan of Care Reviewed With: patient        Progress: improving       Pt went down for EGD today.

## 2024-04-24 NOTE — CASE MANAGEMENT/SOCIAL WORK
"Case Management Discharge Note      Final Note: PT discharged to home with son's via private car. No DME needs. PT/OT ordered, pt declined services stating \"My son's take care of everything for me.\" Despite education on benefits of home therapy she continued to decline services. Dr. Storm notified. Pt to follow up with PCP, appointment scheduled for 5/2/24.         Selected Continued Care - Discharged on 4/24/2024 Admission date: 4/20/2024 - Discharge disposition: Home-Health Care c      Destination    No services have been selected for the patient.                Durable Medical Equipment    No services have been selected for the patient.                Dialysis/Infusion    No services have been selected for the patient.                Home Medical Care    No services have been selected for the patient.                Therapy    No services have been selected for the patient.                Community Resources    No services have been selected for the patient.                Community & DME    No services have been selected for the patient.                    Transportation Services  Private: Car    Final Discharge Disposition Code: 01 - home or self-care  "

## 2024-04-24 NOTE — PAYOR COMM NOTE
"To:  Coretta  From: Mia Stallworth RN  Phone: [phone]  Fax: [phone]  NPI: [id number]  TIN: [id number]  Member ID: [id number]   MRN: [id number]    Jaymie Knutson (69 y.o. Female)       Date of Birth   1955    Social Security Number       Address   [address] [place] [address]    Home Phone   [phone]    MRN   [id number]       Faith   None    Marital Status                               Admission Date   4/20/24    Admission Type   Emergency    Admitting Provider   Mateo Storm DO    Attending Provider   Mateo Storm DO    Department, Room/Bed   [facility] TELEMETRY 3, 308/1       Discharge Date       Discharge Disposition       Discharge Destination                                 Attending Provider: Mateo Storm DO    Allergies: Rocephin [Ceftriaxone], Ciprofloxacin, Codeine, Pineapple    Isolation: None   Infection: Other (02/26/24)   Code Status: CPR    Ht: 157.5 cm (62.01\")   Wt: 58.4 kg (128 lb 12 oz)    Admission Cmt: None   Principal Problem: Metabolic acidosis [E87.20]                   Active Insurance as of 4/20/2024       Primary Coverage       Payor Plan Insurance Group Employer/Plan Group    ANTHEM MEDICARE REPLACEMENT [facility] MEDICARE ADVANTAGE [id number]       Payor Plan Address Payor Plan Phone Number Payor Plan Fax Number Effective Dates    [address] [phone]  1/1/2024 - None Entered    [facility] [id number]         Subscriber Name Subscriber Birth Date Member ID       JAYMIE KNUTSON 1955 [id number]                     Emergency Contacts        (Rel.) Home Phone Work Phone Mobile Phone    ANTONIO KNUTSON (Son) [phone] -- --    MONIE KNUTSON (Son) [phone] -- --              Vital Signs (last day)       Date/Time Temp Temp src Pulse Resp BP Patient Position SpO2    04/24/24 0812 97.8 (36.6) Temporal -- 20 155/97 Lying --    04/23/24 1920 99 (37.2) Oral 89 18 137/81 Lying 95    04/23/24 1453 " 98.2 (36.8) Temporal -- 16 164/100 Lying --    04/23/24 1205 -- -- 71 14 122/71 Lying 95    04/23/24 1200 97.8 (36.6) Temporal 68 12 134/82 Lying 97    04/23/24 1155 -- -- 63 20 128/79 Lying 96    04/23/24 1150 -- -- 65 18 135/80 Lying 95    04/23/24 1145 -- -- 71 12 127/80 Lying 97    04/23/24 1140 -- -- 78 11 125/75 Lying 97    04/23/24 1137 98 (36.7) Temporal 75 19 124/75 Lying 97    04/23/24 1114 97.3 (36.3) Temporal 96 18 162/91 Sitting 94    04/23/24 0725 97.7 (36.5) Oral 71 22 137/90 Lying 98    04/23/24 0220 97.9 (36.6) Temporal 79 18 148/93 Sitting --          Current Facility-Administered Medications   Medication Dose Route Frequency Provider Last Rate Last Admin    acetaminophen (TYLENOL) tablet 650 mg  650 mg Oral Q4H PRN Jairo Negro MD        Or    acetaminophen (TYLENOL) 160 MG/5ML oral solution 650 mg  650 mg Oral Q4H PRN Jairo Negro MD   650 mg at 04/22/24 0841    Or    acetaminophen (TYLENOL) suppository 650 mg  650 mg Rectal Q4H PRN Jairo Negro MD        sennosides-docusate (PERICOLACE) 8.6-50 MG per tablet 2 tablet  2 tablet Oral BID PRN Jairo Negro MD        And    polyethylene glycol (MIRALAX) packet 17 g  17 g Oral Daily PRN Jairo Negro MD        And    bisacodyl (DULCOLAX) EC tablet 5 mg  5 mg Oral Daily PRN Jairo Negro MD        And    bisacodyl (DULCOLAX) suppository 10 mg  10 mg Rectal Daily PRN Jairo Negro MD        cephalexin (KEFLEX) capsule 500 mg  500 mg Oral Q12H Jairo Negro MD   500 mg at 04/23/24 2043    Enoxaparin Sodium (LOVENOX) syringe 40 mg  40 mg Subcutaneous Daily Jairo Negro MD   40 mg at 04/22/24 0808    HYDROmorphone (DILAUDID) injection 0.5 mg  0.5 mg Intravenous Q2H PRN Jairo Negro MD   0.5 mg at 04/24/24 0617    And    naloxone (NARCAN) injection 0.4 mg  0.4 mg Intravenous Q5 Min PRN Jairo Negro MD        lactobacillus acidophilus (RISAQUAD) capsule 1 capsule  1 capsule Oral BID Jairo Negro MD   1 capsule at 04/23/24 2048     ondansetron (ZOFRAN) injection 4 mg  4 mg Intravenous Q6H PRN Jairo Negro MD   4 mg at 04/24/24 0223    pantoprazole (PROTONIX) EC tablet 40 mg  40 mg Oral BID Mateo Shah, DO   40 mg at 04/23/24 1850    Pharmacy to Dose enoxaparin (LOVENOX)   Does not apply Continuous PRN Jairo Negro MD        sodium chloride 0.9 % flush 10 mL  10 mL Intravenous PRN Jairo Negro MD        sodium chloride 0.9 % flush 10 mL  10 mL Intravenous Q12H Jairo Negro MD   10 mL at 04/23/24 2043    sodium chloride 0.9 % flush 10 mL  10 mL Intravenous PRN Jairo Negro MD        sodium chloride 0.9 % infusion 40 mL  40 mL Intravenous PRN Jairo Negro MD         Lab Results (last 24 hours)       Procedure Component Value Units Date/Time    Renal Function Panel [[id number]]  (Abnormal) Collected: 04/24/24 0651    Specimen: Blood Updated: 04/24/24 0743     Glucose 74 mg/dL      BUN 12 mg/dL      Creatinine 0.97 mg/dL      Sodium 143 mmol/L      Potassium 3.6 mmol/L      Chloride 109 mmol/L      CO2 24.7 mmol/L      Calcium 8.8 mg/dL      Albumin 3.4 g/dL      Phosphorus 3.1 mg/dL      Anion Gap 9.3 mmol/L      BUN/Creatinine Ratio 12.4     eGFR 63.4 mL/min/1.73     Narrative:      GFR Normal >60  Chronic Kidney Disease <60  Kidney Failure <15      Magnesium [[id number]]  (Normal) Collected: 04/24/24 0651    Specimen: Blood Updated: 04/24/24 0743     Magnesium 2.2 mg/dL     CBC & Differential [[id number]]  (Abnormal) Collected: 04/24/24 0651    Specimen: Blood Updated: 04/24/24 0736    Narrative:      The following orders were created for panel order CBC & Differential.  Procedure                               Abnormality         Status                     ---------                               -----------         ------                     CBC Auto Differential[[id number]]        Abnormal            Final result               Scan Slide[[id number]]                                       Final result                 Please view  results for these tests on the individual orders.    Scan Slide [[id number]] Collected: 04/24/24 0651    Specimen: Blood Updated: 04/24/24 0736     Anisocytosis Slight/1+     Macrocytes Slight/1+     WBC Morphology Normal     Platelet Estimate Adequate    CBC Auto Differential [[id number]]  (Abnormal) Collected: 04/24/24 0651    Specimen: Blood Updated: 04/24/24 0715     WBC 5.76 10*3/mm3      RBC 2.78 10*6/mm3      Hemoglobin 9.8 g/dL      Hematocrit 31.5 %      .3 fL      MCH 35.3 pg      MCHC 31.1 g/dL      RDW 16.1 %      RDW-SD 68.7 fl      MPV 9.8 fL      Platelets 216 10*3/mm3      Neutrophil % 63.6 %      Lymphocyte % 27.3 %      Monocyte % 6.1 %      Eosinophil % 1.6 %      Basophil % 0.7 %      Immature Grans % 0.7 %      Neutrophils, Absolute 3.67 10*3/mm3      Lymphocytes, Absolute 1.57 10*3/mm3      Monocytes, Absolute 0.35 10*3/mm3      Eosinophils, Absolute 0.09 10*3/mm3      Basophils, Absolute 0.04 10*3/mm3      Immature Grans, Absolute 0.04 10*3/mm3      nRBC 0.0 /100 WBC     TISSUE EXAM, [facility] ([facility]) [[id number]] Collected: 04/23/24 1129    Specimen: Tissue from Small Intestine, Duodenum; Tissue from Stomach Updated: 04/23/24 1320          Imaging Results (Last 24 Hours)       ** No results found for the last 24 hours. **          Orders (last 24 hrs)        Start     Ordered    04/24/24 0731  Diet: Gastrointestinal; Fat-Restricted; Fluid Consistency: Thin (IDDSI 0)  Diet Effective Now         04/24/24 0730    04/24/24 0731  Discontinue Cardiac Monitoring  Once         04/24/24 0730    04/24/24 0713  Scan Slide  Once         04/24/24 0712    04/24/24 0600  CBC & Differential  Morning Draw         04/23/24 0945    04/24/24 0600  Renal Function Panel  Morning Draw         04/23/24 0945    04/24/24 0600  Magnesium  Morning Draw         04/23/24 0945    04/24/24 0600  CBC Auto Differential  PROCEDURE ONCE         04/23/24 2202    04/23/24 1900  pantoprazole (PROTONIX) EC tablet 40 mg  2  Times Daily Before Meals         04/23/24 1800    04/23/24 1600  Vital Signs  Every 8 Hours         04/23/24 0920    04/23/24 1500  potassium chloride (KLOR-CON M20) CR tablet 20 mEq  Once,   Status:  Discontinued         04/23/24 0945    04/23/24 1454  Diet: Liquid, Gastrointestinal; Full Liquid; Fat-Restricted; Fluid Consistency: Thin (IDDSI 0)  Diet Effective Now,   Status:  Canceled         04/23/24 1453    04/23/24 1300  potassium chloride (KLOR-CON M20) CR tablet 20 mEq  Once         04/23/24 0945    04/23/24 1233  Anesthesia Follow-Up  Once        Provider:  (Not yet assigned)    04/23/24 1232    04/23/24 1143  Continuous Pulse Oximetry  Continuous         04/23/24 1142    04/23/24 1143  Vital signs every 5 minutes for 15 minutes, every 15 minutes thereafter.  Once,   Status:  Canceled         04/23/24 1142    04/23/24 1143  Notify Anesthesia of Any Acute Changes in Patient Condition  Continuous,   Status:  Canceled        Comments: Open Order Report to View Parameters Requiring Provider Notification    04/23/24 1142    04/23/24 1143  Notify Anesthesia for Unrelieved Pain  Continuous,   Status:  Canceled        Comments: Open Order Report to View Parameters Requiring Provider Notification    04/23/24 1142    04/23/24 1143  Once DC criteria to floor met, follow surgeon's orders.  Continuous,   Status:  Canceled         04/23/24 1142    04/23/24 1143  Discharge patient from PACU when discharge criteria is met.  Continuous,   Status:  Canceled         04/23/24 1142    04/23/24 1142  ondansetron (ZOFRAN) injection 4 mg  Once As Needed,   Status:  Discontinued         04/23/24 1142    04/23/24 1130  TISSUE EXAM, [facility] ([facility])  RELEASE UPON ORDERING         04/23/24 1130    04/23/24 1124  Upper GI Endoscopy  Once         04/23/24 1124    04/23/24 0945  Please DC IVF if placed on diet (even liquid) after EGD.  I timed KCL replacement for afternoon assuming they'll let her have meds after procedure  Nursing  "Communication  Once        Comments: Please DC IVF if placed on diet (even liquid) after EGD.  I timed KCL replacement for afternoon assuming they'll let her have meds after procedure    04/23/24 0945    04/23/24 0851  Follow Anesthesia Guidelines / Protocol  Once         04/23/24 0850    04/23/24 0851  Verify NPO  Once         04/23/24 0850    04/23/24 0851  Obtain Informed Consent  Once         04/23/24 0850    04/23/24 0000  Ambulatory Referral to Home Health ([facility])         04/23/24 1802    04/22/24 1445  cephalexin (KEFLEX) capsule 500 mg  Every 12 Hours Scheduled         04/22/24 1347    04/22/24 0900  Enoxaparin Sodium (LOVENOX) syringe 40 mg  Daily         04/21/24 1842    04/21/24 1745  lactated ringers infusion  Continuous,   Status:  Discontinued         04/21/24 1647    04/21/24 0800  Oral Care  2 Times Daily       04/21/24 0110    04/21/24 0800  predniSONE (DELTASONE) tablet 40 mg  Daily With Breakfast         04/21/24 0318    04/21/24 0600  Incentive Spirometry  Every 4 Hours While Awake       04/21/24 0110    04/21/24 0400  Vital Signs  Every 4 Hours,   Status:  Canceled       04/21/24 0110    04/21/24 0126  sodium chloride 0.9 % flush 10 mL  Every 12 Hours Scheduled         04/21/24 0110    04/21/24 0126  lactobacillus acidophilus (RISAQUAD) capsule 1 capsule  2 Times Daily         04/21/24 0111    04/21/24 0109  sennosides-docusate (PERICOLACE) 8.6-50 MG per tablet 2 tablet  2 Times Daily PRN        Placed in \"And\" Linked Group    04/21/24 0110    04/21/24 0109  polyethylene glycol (MIRALAX) packet 17 g  Daily PRN        Placed in \"And\" Linked Group    04/21/24 0110    04/21/24 0109  bisacodyl (DULCOLAX) EC tablet 5 mg  Daily PRN        Placed in \"And\" Linked Group    04/21/24 0110    04/21/24 0109  bisacodyl (DULCOLAX) suppository 10 mg  Daily PRN        Placed in \"And\" Linked Group    04/21/24 0110    04/21/24 0109  HYDROmorphone (DILAUDID) injection 0.5 mg  Every 2 Hours PRN        Placed in " "\"And\" Linked Group    24 0110    24 0109  naloxone (NARCAN) injection 0.4 mg  Every 5 Minutes PRN        Placed in \"And\" Linked Group    24 0110    24 0109  Pharmacy to Dose enoxaparin (LOVENOX)  Continuous PRN         24 0110    24 0109  Intake & Output  Every Shift       24 0110    24 0108  sodium chloride 0.9 % infusion 40 mL  As Needed         24 0110    24 0108  acetaminophen (TYLENOL) tablet 650 mg  Every 4 Hours PRN        Placed in \"Or\" Linked Group    24 0110    24 0108  acetaminophen (TYLENOL) 160 MG/5ML oral solution 650 mg  Every 4 Hours PRN        Placed in \"Or\" Linked Group    24 0110    24 0108  acetaminophen (TYLENOL) suppository 650 mg  Every 4 Hours PRN        Placed in \"Or\" Linked Group    24 0110    24 0108  ondansetron (ZOFRAN) injection 4 mg  Every 6 Hours PRN         24 0110    24 0108  sodium chloride 0.9 % flush 10 mL  As Needed         24 0110    24 2227  sodium chloride 0.9 % flush 10 mL  As Needed        Placed in \"And\" Linked Group    24 2227    Unscheduled  Up With Assistance  As Needed       24 0110    Unscheduled  Oxygen Therapy- Nasal Cannula; 2 LPM  Continuous PRN       24 1232    --  HYDROcodone-acetaminophen (NORCO) 7.5-325 MG per tablet  Every 8 Hours PRN         24 1052    --  sodium bicarbonate 650 MG tablet  2 Times Daily         24 1052                     Physician Progress Notes (most recent note)        Mateo Storm DO at 24 1802              [facility]    Name:  Tasha Yarbrough   Age:  69 y.o.  Sex:  female  :  1955  MRN:  [id number]   Visit Number:  [id number]  Admission Date:  2024  Date Of Service:  24  Primary Care Physician:  Terrence Baldwin MD     LOS: 2 days :    Chief Complaint:      Abdominal pain    Subjective:    2024: Patient with " persistent but improved abdominal pain.  Went for EGD with Dr. Negro.  This showed severe gastritis recommended twice daily Protonix for 8 weeks.  Resume diet.    Hospital Course:     Tasha Yarbrough is a 69-year-old female with a history of hypertension, recent history of recurrent pancreatitis discharged from this facility on 4/11/2024 was brought to the emergency room by her son with symptoms of epigastric abdominal pain since the last couple of days.  Patient has been vomiting for the last couple of days and has not been eating much.  She also gives history of feeling cold and chills.  Started having some dysuria today.  Her son lives with her.  According to the son, patient has been admitted multiple times to  and here for similar problems in the last 1 month.  No exact reasoning was given to her pancreatitis.  She was told that she has a couple of cysts in her pancreas.  She denies any chest pain or shortness of breath at this time.     In the emergency room, she was afebrile and hemodynamically stable saturating at 99% on room air.  CMP was remarkable for a CO2 of 9, BUN 25, creatinine 1.65 (baseline 1), alkaline phosphatase 131.  LFT was otherwise unremarkable.  Lipase was 722.  Lactic acid was 0.4.  Hemoglobin 11 .  Urine analysis showed 11-20 WBCs with 3-6 squamous epithelial cells and negative nitrite.  Due to multiple recent CT scans, abdominal CT scan was not obtained.  ABG showed a pH of 7.16, pCO2 26, pO2 104 and bicarb of 9.  Patient was given a liter of normal saline bolus, IV morphine and Zofran in the emergency room.  She was subsequently placed on lactated Ringer's at 75 mill per hour and was subsequently admitted to the medical floor with telemetry for pancreatitis, VIVIENNE and metabolic acidosis of uncertain etiology.     Patient did have worsening metabolic acidemia, nonanion gap suspect secondary to GI losses.  Bicarbonate drip initiated.  Bicarb improved.  Nephrology consulted.  Patient  also follows with local GI.  Consult placed.  Continue bicarbonate drip and IV fluids for recurrent acute pancreatitis.  Pain control and antiemetics as needed.  Edited by: Mateo Storm DO at 4/23/2024 0740     Review of Systems:     All systems were reviewed and negative except as mentioned in subjective, assessment and plan.    Vital Signs:    Temp:  [97.3 °F (36.3 °C)-98.4 °F (36.9 °C)] 98.2 °F (36.8 °C)  Heart Rate:  [63-96] 71  Resp:  [11-22] 16  BP: (122-166)/() 164/100    Intake and output:    I/O last 3 completed shifts:  In: 3307 [I.V.:3307]  Out: 2750 [Urine:2750]  I/O this shift:  In: 560 [P.O.:360; I.V.:200]  Out: -     Physical Examination:    Constitutional: No acute distress, awake, alert  HENT: NCAT, mucous membranes moist  Respiratory: Clear to auscultation bilaterally, respiratory effort normal   Cardiovascular: RRR, no murmurs, rubs, or gallops  Gastrointestinal: Positive bowel sounds, soft, moderate mid abdominal tenderness, mildly distended  Musculoskeletal: No bilateral ankle edema  Psychiatric: Appropriate affect, cooperative  Neurologic: Oriented x 3, speech clear  Skin: No rashes  Edited by: Mateo Storm DO at 4/23/2024 7039     Laboratory results:    Results from last 7 days   Lab Units 04/23/24  0649 04/22/24  0545 04/21/24  1800 04/21/24  1215 04/20/24  2319   SODIUM mmol/L 141 139 135* 136 139   POTASSIUM mmol/L 3.4* 3.7 4.8 6.0* 4.9   CHLORIDE mmol/L 110* 110* 111* 113* 116*   CO2 mmol/L 24.3 18.3* 11.2* 13.3* 8.9*   BUN mg/dL 16 20 24* 24* 25*   CREATININE mg/dL 0.93 1.13* 1.37* 1.43* 1.65*   CALCIUM mg/dL 9.3 9.6 10.1 9.5 9.6   BILIRUBIN mg/dL  --   --   --  0.2 <0.2   ALK PHOS U/L  --   --   --  104 131*   ALT (SGPT) U/L  --   --   --  8 7   AST (SGOT) U/L  --   --   --  13 11   GLUCOSE mg/dL 78 80 86 111* 81     Results from last 7 days   Lab Units 04/23/24  0649 04/22/24  0545 04/21/24  1215   WBC 10*3/mm3 6.89 8.75 10.64   HEMOGLOBIN g/dL 9.2* 10.0* 11.1*    HEMATOCRIT % 28.3* 31.5* 35.4   PLATELETS 10*3/mm3 214 233 272                 Recent Labs     02/27/24  0522 04/21/24  0015 04/21/24  0919   PHART 7.305* 7.158* 7.199*   DSD1QHJ 30.1* 26.4* 25.5*   PO2ART 90.6 104.0* 97.9   MDM6KFN 15.0* 9.4* 9.9*   BASEEXCESS -10.3* -17.9* -16.6*      I have reviewed the patient's laboratory results.    Radiology results:    No radiology results from the last 24 hrs  I have reviewed the patient's radiology reports.    Medication Review:     I have reviewed the patient's active and prn medications.     Problem List:      Metabolic acidosis    VIVIENNE (acute kidney injury)    Abnormal finding on GI tract imaging    Acute pancreatitis    Acute UTI (urinary tract infection)    Epigastric pain    Nausea and vomiting in adult    Moderate malnutrition      Assessment/Plan:    Acute recurrent pancreatitis  Severe gastritis    -Advancing diet low-fat.  PPI twice daily for 8 weeks.  Continuing LR at 75 probably DC in the morning if tolerates diet.    VIVIENNE resolved  NAG Acidosis resolved  UTI status posttreatment        Prophylaxis: Lovenox    Edited by: Mateo Storm,  at 4/23/2024 1801     DVT Prophylaxis: Lovenox  Code Status:   Code Status and Medical Interventions:   Ordered at: 04/21/24 0110     Code Status (Patient has no pulse and is not breathing):    CPR (Attempt to Resuscitate)     Medical Interventions (Patient has pulse or is breathing):    Full Support      Diet:   Dietary Orders (From admission, onward)       Start     Ordered    04/23/24 1454  Diet: Liquid, Gastrointestinal; Full Liquid; Fat-Restricted; Fluid Consistency: Thin (IDDSI 0)  Diet Effective Now        References:    Diet Order [facility]   Question Answer Comment   Diets: Liquid    Diets: Gastrointestinal    Liquid Diet: Full Liquid    Gastrointestinal Diet: Fat-Restricted    Fluid Consistency: Thin (IDDSI 0)        04/23/24 1453                   Discharge Plan: Disposition: Possibly home with home health  tomorrow if tolerates diet this evening.    Mateo Storm DO  24  18:02 EDT    Dictated utilizing Dragon dictation.        Electronically signed by Mateo Storm DO at 24 1803          Consult Notes (most recent note)        Jairo Negro MD at 24 1258        Consult Orders    1. Inpatient Gastroenterology Consult [[id number]] ordered by Janak Damon DO at 24 0951                      In Patient Consult      Date of Consultation: 2024  Patient Name: Tasha Yarbrough  MRN: [id number]  : 1955     Referring provider: Janak Damon DO    Primary care provider:  Terrence Baldwin MD    Reason for consultation: Abdominal pain, recurrent acute pancreatitis.    History of Present Illness: 69-year-old female, seen as an inpatient consultation for abdominal pain, in the setting of recurrent acute pancreatitis.  She was just discharged 2024, and was brought to the emergency room for abdominal pain, vomiting.    She has had multiple recent hospital admissions, including here at [facility], and .    She has significant metabolic acidosis, suspect secondary to GI losses.    Renal consulted.  Appreciate recommendations.    GI consulted given persistent abdominal symptoms.  Visually    Established patient of Dr. Kamilla Valentino , and was just recently seen during her last admission here in early April    She complains of abdominal pain, which is located in the epigastrium, and mid abdomen.  It is severe.  It is tender to touch.  It is associated with vomiting.  There is radiation to the back as well.    She mentions that she has a history of ulcers.      Subjective     Past Medical History:   Diagnosis Date    Hypertension     Impaired mobility     Injury of back     Pancreatitis        Past Surgical History:   Procedure Laterality Date    ABDOMINAL SURGERY      COLON SURGERY      COLONOSCOPY      TUBAL ABDOMINAL LIGATION         History reviewed. No pertinent family  history.    Social History     Socioeconomic History    Marital status:    Tobacco Use    Smoking status: Every Day     Current packs/day: 1.00     Types: Cigarettes    Smokeless tobacco: Never   Vaping Use    Vaping status: Never Used   Substance and Sexual Activity    Alcohol use: Never    Drug use: Never    Sexual activity: Defer         Current Facility-Administered Medications:     acetaminophen (TYLENOL) tablet 650 mg, 650 mg, Oral, Q4H PRN **OR** acetaminophen (TYLENOL) 160 MG/5ML oral solution 650 mg, 650 mg, Oral, Q4H PRN, 650 mg at 04/22/24 0841 **OR** acetaminophen (TYLENOL) suppository 650 mg, 650 mg, Rectal, Q4H PRN, Edwin Aguilar MD    sennosides-docusate (PERICOLACE) 8.6-50 MG per tablet 2 tablet, 2 tablet, Oral, BID PRN **AND** polyethylene glycol (MIRALAX) packet 17 g, 17 g, Oral, Daily PRN **AND** bisacodyl (DULCOLAX) EC tablet 5 mg, 5 mg, Oral, Daily PRN **AND** bisacodyl (DULCOLAX) suppository 10 mg, 10 mg, Rectal, Daily PRN, Edwin Aguilar MD    cephalexin (KEFLEX) capsule 500 mg, 500 mg, Oral, Q12H, Janak Damon, , 500 mg at 04/22/24 1420    Enoxaparin Sodium (LOVENOX) syringe 40 mg, 40 mg, Subcutaneous, Daily, Edwin Aguilar MD, 40 mg at 04/22/24 0808    HYDROmorphone (DILAUDID) injection 0.5 mg, 0.5 mg, Intravenous, Q2H PRN, 0.5 mg at 04/22/24 1623 **AND** naloxone (NARCAN) injection 0.4 mg, 0.4 mg, Intravenous, Q5 Min PRN, Edwin Aguilar MD    lactated ringers infusion, 75 mL/hr, Intravenous, Continuous, Rj Ch MD, Last Rate: 75 mL/hr at 04/22/24 1453, 75 mL/hr at 04/22/24 1453    lactobacillus acidophilus (RISAQUAD) capsule 1 capsule, 1 capsule, Oral, BID, Edwin Aguilar MD, 1 capsule at 04/22/24 0808    ondansetron (ZOFRAN) injection 4 mg, 4 mg, Intravenous, Q6H PRN, Edwin Aguilar MD, 4 mg at 04/21/24 1105    Pharmacy to Dose enoxaparin (LOVENOX), , Does not apply, Continuous PRN, Edwin Aguilar MD    predniSONE (DELTASONE) tablet 40 mg, 40 mg, Oral, Daily With  Breakfast, Edwin Aguilar MD, 40 mg at 04/22/24 0808    [COMPLETED] Insert Peripheral IV, , , Once **AND** sodium chloride 0.9 % flush 10 mL, 10 mL, Intravenous, PRN, Juan Luis Martins MD    sodium chloride 0.9 % flush 10 mL, 10 mL, Intravenous, Q12H, Edwin Aguilar MD, 10 mL at 04/22/24 0809    sodium chloride 0.9 % flush 10 mL, 10 mL, Intravenous, PRN, Edwin Aguilar MD    sodium chloride 0.9 % infusion 40 mL, 40 mL, Intravenous, PRN, Edwin Aguilar MD    Allergies   Allergen Reactions    Rocephin [Ceftriaxone] Anaphylaxis    Ciprofloxacin Dizziness    Codeine Itching    Pineapple Unknown - Low Severity       Review of Systems  Abdominal pain, N/V     The following portions of the patient's history were reviewed and updated as appropriate: allergies, current medications, past family history, past medical history, past social history, past surgical history and problem list.    Objective     Vitals:    04/22/24 0357 04/22/24 0700 04/22/24 1110 04/22/24 1507   BP:  120/85 142/88 154/89   BP Location:  Left arm Left arm Right arm   Patient Position:  Sitting Lying Sitting   Pulse:  88  81   Resp:  18 18 18   Temp:  98.2 °F (36.8 °C) 98.6 °F (37 °C) 98.2 °F (36.8 °C)   TempSrc:  Oral Oral Oral   SpO2: 99% 98% 97% 97%   Weight:       Height:           Physical Exam  Constitutional:       General: She is not in acute distress.     Appearance: Normal appearance.   HENT:      Head: Normocephalic and atraumatic.      Nose: Nose normal.      Mouth/Throat:      Mouth: Mucous membranes are moist.   Eyes:      General: No scleral icterus.     Conjunctiva/sclera: Conjunctivae normal.   Cardiovascular:      Rate and Rhythm: Normal rate.   Pulmonary:      Effort: Pulmonary effort is normal. No respiratory distress.   Abdominal:      General: There is no distension.      Tenderness: There is no abdominal tenderness. There is no guarding.   Musculoskeletal:         General: No deformity or signs of injury.      Cervical back:  Neck supple. No rigidity.   Skin:     Capillary Refill: Capillary refill takes less than 2 seconds.      Coloration: Skin is not jaundiced or pale.   Neurological:      General: No focal deficit present.      Mental Status: She is alert and oriented to person, place, and time.   Psychiatric:         Mood and Affect: Mood normal.         Behavior: Behavior normal.         Results from last 7 days   Lab Units 04/22/24  0545 04/21/24  1800 04/21/24  1215 04/20/24  2319   SODIUM mmol/L 139 135* 136 139   POTASSIUM mmol/L 3.7 4.8 6.0* 4.9   CHLORIDE mmol/L 110* 111* 113* 116*   CO2 mmol/L 18.3* 11.2* 13.3* 8.9*   BUN mg/dL 20 24* 24* 25*   CREATININE mg/dL 1.13* 1.37* 1.43* 1.65*   CALCIUM mg/dL 9.6 10.1 9.5 9.6   ALBUMIN g/dL 3.4*  --  3.6 4.0   BILIRUBIN mg/dL  --   --  0.2 <0.2   ALK PHOS U/L  --   --  104 131*   ALT (SGPT) U/L  --   --  8 7   AST (SGOT) U/L  --   --  13 11   GLUCOSE mg/dL 80 86 111* 81   WBC 10*3/mm3 8.75  --  10.64 9.43   HEMOGLOBIN g/dL 10.0*  --  11.1* 11.3*   PLATELETS 10*3/mm3 233  --  272 286       Imaging Results (Last 24 Hours)       ** No results found for the last 24 hours. **            Assessment / Plan      Assessment/Recommendations:   Principal Problem:    Metabolic acidosis  Active Problems:    VIVIENNE (acute kidney injury)    Abnormal finding on GI tract imaging    Acute pancreatitis    Acute UTI (urinary tract infection)    Epigastric pain    Nausea and vomiting in adult      69-year-old female, with recurrent acute pancreatitis, just recently seen by Dr. Kamilla Valentino.    She presents again with abdominal pain, and a GI consultation is requested.    Continue medical management for pancreatitis.    IV fluids, pain management.    Needs to follow-up with Dr. Kamilla Valentino as an outpatient soon after discharge for further workup.    Will order IgG4 and antimitochondrial antibody for possible autoimmune etiologies.    Otherwise, as an outpatient I suggest an MRCP to rule out biliary and  pancreatic lithiasis.    Plan for inpatient EGD tomorrow.    Need to rule out other etiologies for abdominal pain, including but not limited to peptic ulcer disease, which she has a history of.    Procedure note and further recommendations to follow.    N.p.o. midnight.    Thank you very much for letting me participate in the care of this patient.  Please do not hesitate to call me if you have any questions.    Jairo Negro MD  [facility] [place]  4/22/2024  17:18 EDT    Part of this note may be an electronic transcription/translation of spoken language to printed text using the Dragon Dictation System.      Electronically signed by Jairo Negro MD at 04/22/24 0787

## 2024-04-24 NOTE — DISCHARGE SUMMARY
[facility]   DISCHARGE SUMMARY      Name:  Tasha Yarbrough   Age:  69 y.o.  Sex:  female  :  1955  MRN:  [id number]   Visit Number:  [id number]    Admission Date:  2024  Date of Discharge:  2024  Primary Care Physician:  Terrence Baldwin MD    Important issues to note:    Start: Naloxone, Percocet, Protonix  Stop: Nothing  Follow up: PCP and gastroenterology  Brief Summary: Presented with abdominal pain due to gastritis and pancreatitis. Initially had undergone EGD which showed severe gastritis.  Also was given limited oral intake and gradually returned to eating low-fat diet which was tolerated well.  Was given PPI and pain medicine for home.      Discharge Diagnoses:       Metabolic acidosis    VIVIENNE (acute kidney injury)    Abnormal finding on GI tract imaging    Acute pancreatitis    Acute UTI (urinary tract infection)    Epigastric pain    Nausea and vomiting in adult    Moderate malnutrition        Problem List:     Active Hospital Problems    Diagnosis  POA    **Metabolic acidosis [E87.20]  Yes    Moderate malnutrition [E44.0]  Yes    Epigastric pain [R10.13]  Unknown    Nausea and vomiting in adult [R11.2]  Unknown    Acute UTI (urinary tract infection) [N39.0]  Yes    Acute pancreatitis [K85.90]  Yes    Abnormal finding on GI tract imaging [R93.3]  Yes    VIVIENNE (acute kidney injury) [N17.9]  Yes      Resolved Hospital Problems   No resolved problems to display.     Presenting Problem:    Chief Complaint   Patient presents with    Abdominal Pain      Consults:     Consulting Physician(s)         Provider   Role Specialty     Nito Fernandez MD, WINNIE      Consulting Physician Nephrology     Jairo Negro MD      Consulting Physician Gastroenterology          Procedures Performed:    Procedure(s):  ESOPHAGOGASTRODUODENOSCOPY WITH BIOPSY          History of presenting illness:     Tasha Yarbrough is a 69-year-old female with a history of hypertension, recent history of  recurrent pancreatitis discharged from this facility on 4/11/2024 was brought to the emergency room by her son with symptoms of epigastric abdominal pain since the last couple of days.  Patient has been vomiting for the last couple of days and has not been eating much.  She also gives history of feeling cold and chills.  Started having some dysuria today.  Her son lives with her.  According to the son, patient has been admitted multiple times to  and here for similar problems in the last 1 month.  No exact reasoning was given to her pancreatitis.  She was told that she has a couple of cysts in her pancreas.  She denies any chest pain or shortness of breath at this time.     In the emergency room, she was afebrile and hemodynamically stable saturating at 99% on room air.  CMP was remarkable for a CO2 of 9, BUN 25, creatinine 1.65 (baseline 1), alkaline phosphatase 131.  LFT was otherwise unremarkable.  Lipase was 722.  Lactic acid was 0.4.  Hemoglobin 11 .  Urine analysis showed 11-20 WBCs with 3-6 squamous epithelial cells and negative nitrite.  Due to multiple recent CT scans, abdominal CT scan was not obtained.  ABG showed a pH of 7.16, pCO2 26, pO2 104 and bicarb of 9.  Patient was given a liter of normal saline bolus, IV morphine and Zofran in the emergency room.  She was subsequently placed on lactated Ringer's at 75 mill per hour and was subsequently admitted to the medical floor with telemetry for pancreatitis, VIVIENNE and metabolic acidosis of uncertain etiology.     Patient did have worsening metabolic acidemia, nonanion gap suspect secondary to GI losses.  Bicarbonate drip initiated.  Bicarb improved.  Nephrology consulted.  Patient also follows with local GI.  Consult placed.  Continue bicarbonate drip and IV fluids for recurrent acute pancreatitis.  Pain control and antiemetics as needed.  Edited by: Mateo Storm DO at 4/23/2024 9087      Hospital Course:    Acute recurrent  pancreatitis  Severe gastritis on EGD 4/23/2024    -Tolerating diet low-fat.  PPI twice daily for 8 weeks.  Was continued on IV fluids while inpatient.    VIVIENNE resolved likely secondary to pancreatitis and gastritis  NAG Acidosis resolved likely secondary to pancreatitis and gastritis  UTI status posttreatment      Vital Signs:    Temp:  [97.3 °F (36.3 °C)-99 °F (37.2 °C)] 97.8 °F (36.6 °C)  Heart Rate:  [63-96] 89  Resp:  [11-20] 20  BP: (122-164)/() 155/97    Physical Exam:    Constitutional: No acute distress, awake, alert  HENT: NCAT, mucous membranes moist  Respiratory: Clear to auscultation bilaterally, respiratory effort normal   Cardiovascular: RRR, no murmurs, rubs, or gallops  Gastrointestinal: Positive bowel sounds, soft, moderate mid abdominal tenderness, mildly distended  Musculoskeletal: No bilateral ankle edema  Psychiatric: Appropriate affect, cooperative  Neurologic: Oriented x 3, speech clear  Skin: No rashes  Exam stable 4/24/2024    Pertinent Lab Results:     Results from last 7 days   Lab Units 04/24/24  0651 04/23/24  0649 04/22/24  0545 04/21/24  1800 04/21/24  1215 04/20/24  2319   SODIUM mmol/L 143 141 139   < > 136 139   POTASSIUM mmol/L 3.6 3.4* 3.7   < > 6.0* 4.9   CHLORIDE mmol/L 109* 110* 110*   < > 113* 116*   CO2 mmol/L 24.7 24.3 18.3*   < > 13.3* 8.9*   BUN mg/dL 12 16 20   < > 24* 25*   CREATININE mg/dL 0.97 0.93 1.13*   < > 1.43* 1.65*   CALCIUM mg/dL 8.8 9.3 9.6   < > 9.5 9.6   BILIRUBIN mg/dL  --   --   --   --  0.2 <0.2   ALK PHOS U/L  --   --   --   --  104 131*   ALT (SGPT) U/L  --   --   --   --  8 7   AST (SGOT) U/L  --   --   --   --  13 11   GLUCOSE mg/dL 74 78 80   < > 111* 81    < > = values in this interval not displayed.     Results from last 7 days   Lab Units 04/24/24  0651 04/23/24  0649 04/22/24  0545   WBC 10*3/mm3 5.76 6.89 8.75   HEMOGLOBIN g/dL 9.8* 9.2* 10.0*   HEMATOCRIT % 31.5* 28.3* 31.5*   PLATELETS 10*3/mm3 216 214 233                     Results  from last 7 days   Lab Units 04/21/24  1215   LIPASE U/L 470*     Results from last 7 days   Lab Units 04/21/24  0919   PH, ARTERIAL pH units 7.199*   PO2 ART mm Hg 97.9   PCO2, ARTERIAL mm Hg 25.5*   HCO3 ART mmol/L 9.9*           Pertinent Radiology Results:    Imaging Results (All)       Procedure Component Value Units Date/Time    CT Abdomen Pelvis Without Contrast [[id number]] Collected: 04/21/24 0942     Updated: 04/21/24 0950    Narrative:      CT of the abdomen and pelvis without contrast     INDICATION: Acute abdominal pain     COMPARISON: April 8, 2024     TECHNIQUE: Helically acquired axial multidetector CT images were  obtained from the lung bases through the pelvis without IV contrast.  Dose reduction techniques to achieve ALARA were employed.     Findings:     Lung bases: [No focal pneumonia].     Large sliding-type hiatal hernia     Liver: [Grossly unremarkable]     Spleen: [Unremarkable]     Gallbladder/biliary tree: [ No biliary ductal dilatation].  Cholecystectomy.     Pancreas: Peripancreatic and central abdomen stranding.     Adrenals: [Unremarkable]     Kidneys: Bilateral simple renal cysts. Small nonobstructing right renal  calculi. No hydronephrosis.        GI: Air-fluid levels in multiple proximal small bowel loops and  gas-filled distal small bowel loops which are otherwise not  significantly dilated. This may reflect small bowel ileus. Moderate  colonic stool and gas.     Bladder: [Unremarkable].     Reproductive structures: [Unremarkable]     Bones: Moderate dextroscoliosis of the thoracolumbar spine with severe  multilevel degenerative changes.     Soft Tissues: Multiple ventral wall hernias containing fat.          Impression:         1. Mild peripancreatic stranding. Correlate with serum lipase to exclude  acute pancreatitis.     2. Multiple air-fluid levels in small bowel loops which otherwise not  significantly dilated may reflect underlying ileus.        CTDI: 5.65 mGy  DLP:241.19  [email or web address]     This report was signed and finalized on 4/21/2024 9:48 AM by Shree Jackman MD.               Echo:      Condition on Discharge:      Stable.    Code status during the hospital stay:    Code Status and Medical Interventions:   Ordered at: 04/21/24 0110     Code Status (Patient has no pulse and is not breathing):    CPR (Attempt to Resuscitate)     Medical Interventions (Patient has pulse or is breathing):    Full Support     Discharge Disposition:    Home-Health Care [facility] [place]    Discharge Medications:       Discharge Medications        New Medications        Instructions Start Date   naloxone 4 MG/0.1ML nasal spray  Commonly known as: NARCAN   Call 911. Don't prime. [place] in 1 nostril for overdose. Repeat in 2-3 minutes in other nostril if no or minimal breathing/responsiveness.      oxyCODONE-acetaminophen 5-325 MG per tablet  Commonly known as: Percocet   1 tablet, Oral, Every 6 Hours PRN      pantoprazole 40 MG EC tablet  Commonly known as: PROTONIX   40 mg, Oral, 2 Times Daily Before Meals             Continue These Medications        Instructions Start Date   glucosamine-chondroitin 500-400 MG capsule capsule   1 capsule, Oral, 2 Times Daily With Meals      methocarbamol 750 MG tablet  Commonly known as: ROBAXIN   750 mg, Oral, 4 Times Daily PRN      ondansetron ODT 4 MG disintegrating tablet  Commonly known as: ZOFRAN-ODT   4 mg, Translingual, Every 8 Hours PRN      sodium bicarbonate 650 MG tablet   650 mg, Oral, 2 Times Daily      vitamin D3 125 MCG (5000 UT) capsule capsule   1 capsule, Oral, Daily             Stop These Medications      HYDROcodone-acetaminophen 7.5-325 MG per tablet  Commonly known as: NORCO            Discharge Diet:     Diet Instructions       Advance Diet As Tolerated -Target Diet: Low fat      Target Diet: Low fat          Activity at Discharge:       Follow-up Appointments:    Additional Instructions for the Follow-ups that You Need to Schedule       Ambulatory Referral to  Home Health (Hospital)   As directed      Face to Face Visit Date: 4/23/2024   Follow-up provider for Plan of Care?: I treated the patient in an acute care facility and will not continue treatment after discharge.   Follow-up provider: TERRENCE DANIELS [[id number]]   Reason/Clinical Findings: weakness, deconditioning   Describe mobility limitations that make leaving home difficult: she doesn't go out   Nursing/Therapeutic Services Requested: Physical Therapy Occupational Therapy   PT orders: Home safety assessment Strengthening   Occupational orders: Strengthening Home safety assessment   Frequency: 1 Week 1        Discharge Follow-up with PCP   As directed       Currently Documented PCP:    Terrence Daniels MD    PCP Phone Number:    [phone]     Follow Up Details: 1 week        Discharge Follow-up with Specified Provider: Dr. Negro; 2 Months   As directed      To: Dr. Negro   Follow Up: 2 Months               Follow-up Information       Terrence Daniels MD .    Specialty: Family Medicine  Why: 1 week  Contact information:  [address] [place] [address] [address] [place] [address]  [phone]                           Future Appointments   Date Time Provider Department Center   5/22/2024  9:30 AM Michelle Wright PA-C [facility]     Test Results Pending at Discharge:    Pending Labs       Order Current Status    IgG 4 In process    Mitochondrial Antibodies, M2 In process    TISSUE EXAM, [facility]) In process               Mateo Storm DO  04/24/24  09:31 EDT    Time: I spent 45 minutes on this discharge activity which included: face-to-face encounter with the patient, reviewing the data in the system, coordination of the care with the nursing staff as well as consultants, documentation, and entering orders.     Dictated utilizing Dragon dictation.

## 2024-04-24 NOTE — PLAN OF CARE
Goal Outcome Evaluation:  Plan of Care Reviewed With: patient        Progress: no change  Outcome Evaluation: vss

## 2024-04-25 NOTE — OUTREACH NOTE
Prep Survey      Flowsheet Row Responses   [facility] facility patient discharged from? [facility]   Is LACE score < 7 ? No   Eligibility Readm Mgmt   Discharge diagnosis Metabolic acidosis   Does the patient have one of the following disease processes/diagnoses(primary or secondary)? Other   Does the patient have Home health ordered? No   Is there a DME ordered? No   Prep survey completed? Yes            ALDEN GRADY - Registered Nurse

## 2024-04-25 NOTE — PAYOR COMM NOTE
"  D/C SUMMARY  UR MANAGER; HELEN PROCTOR, [phone] AND [phone]      Jaymie Knutson (69 y.o. Female)       Date of Birth   1955    Social Security Number       Address   [address] [id number]    Home Phone   [phone]    MRN   [id number]       Mandaen   None    Marital Status                               Admission Date   4/20/24    Admission Type   Emergency    Admitting Provider   Mateo Storm DO    Attending Provider       Department, Room/Bed   [facility] TELEMETRY 3, 308/1       Discharge Date   4/24/2024    Discharge Disposition   Home-Health Care Sv    Discharge Destination                                 Attending Provider: (none)   Allergies: Rocephin [Ceftriaxone], Ciprofloxacin, Codeine, Pineapple    Isolation: None   Infection: Other (02/26/24)   Code Status: Prior    Ht: 157.5 cm (62.01\")   Wt: 58.4 kg (128 lb 12 oz)    Admission Cmt: None   Principal Problem: Metabolic acidosis [E87.20]                   Active Insurance as of 4/20/2024       Primary Coverage       Payor Plan Insurance Group Employer/Plan Group    ANTHEM MEDICARE REPLACEMENT ANTHEM MEDICARE ADVANTAGE [id number]       Payor Plan Address Payor Plan Phone Number Payor Plan Fax Number Effective Dates    [address] [phone]  1/1/2024 - None Entered    [facility] [id number]         Subscriber Name Subscriber Birth Date Member ID       JAYMIE KNUTSON 1955 [id number]                     Emergency Contacts        (Rel.) Home Phone Work Phone Mobile Phone    ANTONIO KNUTSON (Son) [phone] -- --    MONIE KNUTSON (Son) [phone] -- --              Physician Progress Notes (last 24 hours)  Notes from 04/24/24 0802 through 04/25/24 0802   No notes of this type exist for this encounter.          Discharge Summary        Mateo Storm DO at 04/24/24 0931              [facility] HOSPITALIST   DISCHARGE SUMMARY      Name:  " Tasha Yarbrough   Age:  69 y.o.  Sex:  female  :  1955  MRN:  [id number]   Visit Number:  [id number]    Admission Date:  2024  Date of Discharge:  2024  Primary Care Physician:  Terrence Baldwin MD    Important issues to note:    Start: Naloxone, Percocet, Protonix  Stop: Nothing  Follow up: PCP and gastroenterology  Brief Summary: Presented with abdominal pain due to gastritis and pancreatitis. Initially had undergone EGD which showed severe gastritis.  Also was given limited oral intake and gradually returned to eating low-fat diet which was tolerated well.  Was given PPI and pain medicine for home.      Discharge Diagnoses:       Metabolic acidosis    VIVIENNE (acute kidney injury)    Abnormal finding on GI tract imaging    Acute pancreatitis    Acute UTI (urinary tract infection)    Epigastric pain    Nausea and vomiting in adult    Moderate malnutrition        Problem List:     Active Hospital Problems    Diagnosis  POA    **Metabolic acidosis [E87.20]  Yes    Moderate malnutrition [E44.0]  Yes    Epigastric pain [R10.13]  Unknown    Nausea and vomiting in adult [R11.2]  Unknown    Acute UTI (urinary tract infection) [N39.0]  Yes    Acute pancreatitis [K85.90]  Yes    Abnormal finding on GI tract imaging [R93.3]  Yes    VIVIENNE (acute kidney injury) [N17.9]  Yes      Resolved Hospital Problems   No resolved problems to display.     Presenting Problem:    Chief Complaint   Patient presents with    Abdominal Pain      Consults:     Consulting Physician(s)         Provider   Role Specialty     Nito Fernandez MD, WINNIE      Consulting Physician Nephrology     Jairo Negro MD      Consulting Physician Gastroenterology          Procedures Performed:    Procedure(s):  ESOPHAGOGASTRODUODENOSCOPY WITH BIOPSY          History of presenting illness:     Tasha Yarbrough is a 69-year-old female with a history of hypertension, recent history of recurrent pancreatitis discharged from this facility on 2024 was  brought to the emergency room by her son with symptoms of epigastric abdominal pain since the last couple of days.  Patient has been vomiting for the last couple of days and has not been eating much.  She also gives history of feeling cold and chills.  Started having some dysuria today.  Her son lives with her.  According to the son, patient has been admitted multiple times to  and here for similar problems in the last 1 month.  No exact reasoning was given to her pancreatitis.  She was told that she has a couple of cysts in her pancreas.  She denies any chest pain or shortness of breath at this time.     In the emergency room, she was afebrile and hemodynamically stable saturating at 99% on room air.  CMP was remarkable for a CO2 of 9, BUN 25, creatinine 1.65 (baseline 1), alkaline phosphatase 131.  LFT was otherwise unremarkable.  Lipase was 722.  Lactic acid was 0.4.  Hemoglobin 11 .  Urine analysis showed 11-20 WBCs with 3-6 squamous epithelial cells and negative nitrite.  Due to multiple recent CT scans, abdominal CT scan was not obtained.  ABG showed a pH of 7.16, pCO2 26, pO2 104 and bicarb of 9.  Patient was given a liter of normal saline bolus, IV morphine and Zofran in the emergency room.  She was subsequently placed on lactated Ringer's at 75 mill per hour and was subsequently admitted to the medical floor with telemetry for pancreatitis, VIVIENNE and metabolic acidosis of uncertain etiology.     Patient did have worsening metabolic acidemia, nonanion gap suspect secondary to GI losses.  Bicarbonate drip initiated.  Bicarb improved.  Nephrology consulted.  Patient also follows with local GI.  Consult placed.  Continue bicarbonate drip and IV fluids for recurrent acute pancreatitis.  Pain control and antiemetics as needed.  Edited by: Mateo Storm DO at 4/23/2024 0885      Hospital Course:    Acute recurrent pancreatitis  Severe gastritis on EGD 4/23/2024    -Tolerating diet low-fat.  PPI twice  daily for 8 weeks.  Was continued on IV fluids while inpatient.    VIVIENNE resolved likely secondary to pancreatitis and gastritis  NAG Acidosis resolved likely secondary to pancreatitis and gastritis  UTI status posttreatment      Vital Signs:    Temp:  [97.3 °F (36.3 °C)-99 °F (37.2 °C)] 97.8 °F (36.6 °C)  Heart Rate:  [63-96] 89  Resp:  [11-20] 20  BP: (122-164)/() 155/97    Physical Exam:    Constitutional: No acute distress, awake, alert  HENT: NCAT, mucous membranes moist  Respiratory: Clear to auscultation bilaterally, respiratory effort normal   Cardiovascular: RRR, no murmurs, rubs, or gallops  Gastrointestinal: Positive bowel sounds, soft, moderate mid abdominal tenderness, mildly distended  Musculoskeletal: No bilateral ankle edema  Psychiatric: Appropriate affect, cooperative  Neurologic: Oriented x 3, speech clear  Skin: No rashes  Exam stable 4/24/2024    Pertinent Lab Results:     Results from last 7 days   Lab Units 04/24/24  0651 04/23/24  0649 04/22/24  0545 04/21/24  1800 04/21/24  1215 04/20/24  2319   SODIUM mmol/L 143 141 139   < > 136 139   POTASSIUM mmol/L 3.6 3.4* 3.7   < > 6.0* 4.9   CHLORIDE mmol/L 109* 110* 110*   < > 113* 116*   CO2 mmol/L 24.7 24.3 18.3*   < > 13.3* 8.9*   BUN mg/dL 12 16 20   < > 24* 25*   CREATININE mg/dL 0.97 0.93 1.13*   < > 1.43* 1.65*   CALCIUM mg/dL 8.8 9.3 9.6   < > 9.5 9.6   BILIRUBIN mg/dL  --   --   --   --  0.2 <0.2   ALK PHOS U/L  --   --   --   --  104 131*   ALT (SGPT) U/L  --   --   --   --  8 7   AST (SGOT) U/L  --   --   --   --  13 11   GLUCOSE mg/dL 74 78 80   < > 111* 81    < > = values in this interval not displayed.     Results from last 7 days   Lab Units 04/24/24  0651 04/23/24  0649 04/22/24  0545   WBC 10*3/mm3 5.76 6.89 8.75   HEMOGLOBIN g/dL 9.8* 9.2* 10.0*   HEMATOCRIT % 31.5* 28.3* 31.5*   PLATELETS 10*3/mm3 216 214 233                     Results from last 7 days   Lab Units 04/21/24  1215   LIPASE U/L 470*     Results from last 7 days    Lab Units 04/21/24 0919   PH, ARTERIAL pH units 7.199*   PO2 ART mm Hg 97.9   PCO2, ARTERIAL mm Hg 25.5*   HCO3 ART mmol/L 9.9*           Pertinent Radiology Results:    Imaging Results (All)       Procedure Component Value Units Date/Time    CT Abdomen Pelvis Without Contrast [[id number]] Collected: 04/21/24 0942     Updated: 04/21/24 0950    Narrative:      CT of the abdomen and pelvis without contrast     INDICATION: Acute abdominal pain     COMPARISON: April 8, 2024     TECHNIQUE: Helically acquired axial multidetector CT images were  obtained from the lung bases through the pelvis without IV contrast.  Dose reduction techniques to achieve ALARA were employed.     Findings:     Lung bases: [No focal pneumonia].     Large sliding-type hiatal hernia     Liver: [Grossly unremarkable]     Spleen: [Unremarkable]     Gallbladder/biliary tree: [ No biliary ductal dilatation].  Cholecystectomy.     Pancreas: Peripancreatic and central abdomen stranding.     Adrenals: [Unremarkable]     Kidneys: Bilateral simple renal cysts. Small nonobstructing right renal  calculi. No hydronephrosis.        GI: Air-fluid levels in multiple proximal small bowel loops and  gas-filled distal small bowel loops which are otherwise not  significantly dilated. This may reflect small bowel ileus. Moderate  colonic stool and gas.     Bladder: [Unremarkable].     Reproductive structures: [Unremarkable]     Bones: Moderate dextroscoliosis of the thoracolumbar spine with severe  multilevel degenerative changes.     Soft Tissues: Multiple ventral wall hernias containing fat.          Impression:         1. Mild peripancreatic stranding. Correlate with serum lipase to exclude  acute pancreatitis.     2. Multiple air-fluid levels in small bowel loops which otherwise not  significantly dilated may reflect underlying ileus.        CTDI: 5.65 mGy  DLP:241.19 mGy.cm     This report was signed and finalized on 4/21/2024 9:48 AM by Shree Jackman  MD.               Echo:      Condition on Discharge:      Stable.    Code status during the hospital stay:    Code Status and Medical Interventions:   Ordered at: 04/21/24 0110     Code Status (Patient has no pulse and is not breathing):    CPR (Attempt to Resuscitate)     Medical Interventions (Patient has pulse or is breathing):    Full Support     Discharge Disposition:    Home-Health Care [facility]    Discharge Medications:       Discharge Medications        New Medications        Instructions Start Date   naloxone 4 MG/0.1ML nasal spray  Commonly known as: NARCAN   Call 911. Don't prime. [place] in 1 nostril for overdose. Repeat in 2-3 minutes in other nostril if no or minimal breathing/responsiveness.      oxyCODONE-acetaminophen 5-325 MG per tablet  Commonly known as: Percocet   1 tablet, Oral, Every 6 Hours PRN      pantoprazole 40 MG EC tablet  Commonly known as: PROTONIX   40 mg, Oral, 2 Times Daily Before Meals             Continue These Medications        Instructions Start Date   glucosamine-chondroitin 500-400 MG capsule capsule   1 capsule, Oral, 2 Times Daily With Meals      methocarbamol 750 MG tablet  Commonly known as: ROBAXIN   750 mg, Oral, 4 Times Daily PRN      ondansetron ODT 4 MG disintegrating tablet  Commonly known as: ZOFRAN-ODT   4 mg, Translingual, Every 8 Hours PRN      sodium bicarbonate 650 MG tablet   650 mg, Oral, 2 Times Daily      vitamin D3 125 MCG (5000 UT) capsule capsule   1 capsule, Oral, Daily             Stop These Medications      HYDROcodone-acetaminophen 7.5-325 MG per tablet  Commonly known as: NORCO            Discharge Diet:     Diet Instructions       Advance Diet As Tolerated -Target Diet: Low fat      Target Diet: Low fat          Activity at Discharge:       Follow-up Appointments:    Additional Instructions for the Follow-ups that You Need to Schedule       Ambulatory Referral to Home Health (Hospital)   As directed      Face to Face Visit Date: 4/23/2024   Follow-up  provider for Plan of Care?: I treated the patient in an acute care facility and will not continue treatment after discharge.   Follow-up provider: TERRENCE DANIELS [[id number]]   Reason/Clinical Findings: weakness, deconditioning   Describe mobility limitations that make leaving home difficult: she doesn't go out   Nursing/Therapeutic Services Requested: Physical Therapy Occupational Therapy   PT orders: Home safety assessment Strengthening   Occupational orders: Strengthening Home safety assessment   Frequency: 1 Week 1        Discharge Follow-up with PCP   As directed       Currently Documented PCP:    Terrence Daniels MD    PCP Phone Number:    [phone]     Follow Up Details: 1 week        Discharge Follow-up with Specified Provider: Dr. Negro; 2 Months   As directed      To: Dr. Negro   Follow Up: 2 Months               Follow-up Information       Terrence Daniels MD .    Specialty: Family Medicine  Why: 1 week  Contact information:  [facility] [place] [address]  [phone]                           Future Appointments   Date Time Provider Department Center   5/22/2024  9:30 AM Michelle Wright PA-C [facility]     Test Results Pending at Discharge:    Pending Labs       Order Current Status    IgG 4 In process    Mitochondrial Antibodies, M2 In process    TISSUE EXAM, [facility] ([facility]) In process               Mateo Storm DO  04/24/24  09:31 EDT    Time: I spent 45 minutes on this discharge activity which included: face-to-face encounter with the patient, reviewing the data in the system, coordination of the care with the nursing staff as well as consultants, documentation, and entering orders.     Dictated utilizing Dragon dictation.        Electronically signed by Mateo Storm DO at 04/24/24 3508

## 2024-04-26 NOTE — PROGRESS NOTES
Please give the patient the following message:  ----- Results -----  Antimitochondrial antibodies are negative.  Duodenal biopsies are unremarkable.  Gastric biopsies show gastritis, but negative for Helicobacter pylori.

## 2024-04-28 ENCOUNTER — HOSPITAL ENCOUNTER (EMERGENCY)
Facility: HOSPITAL | Age: 69
Discharge: HOME | End: 2024-04-28
Attending: STUDENT IN AN ORGANIZED HEALTH CARE EDUCATION/TRAINING PROGRAM | Admitting: STUDENT IN AN ORGANIZED HEALTH CARE EDUCATION/TRAINING PROGRAM
Payer: MEDICARE

## 2024-04-28 ENCOUNTER — APPOINTMENT (OUTPATIENT)
Dept: CT IMAGING | Facility: HOSPITAL | Age: 69
End: 2024-04-28
Payer: MEDICARE

## 2024-04-28 VITALS
BODY MASS INDEX: 23.55 KG/M2 | TEMPERATURE: 98 F | DIASTOLIC BLOOD PRESSURE: 109 MMHG | RESPIRATION RATE: 18 BRPM | SYSTOLIC BLOOD PRESSURE: 175 MMHG | HEART RATE: 75 BPM | HEIGHT: 62 IN | WEIGHT: 128 LBS | OXYGEN SATURATION: 98 %

## 2024-04-28 DIAGNOSIS — K86.1 CHRONIC PANCREATITIS, UNSPECIFIED PANCREATITIS TYPE: Primary | ICD-10-CM

## 2024-04-28 LAB
ALBUMIN SERPL-MCNC: 3.6 G/DL (ref 3.5–5.2)
ALBUMIN/GLOB SERPL: 1.2 G/DL
ALP SERPL-CCNC: 103 U/L (ref 39–117)
ALT SERPL W P-5'-P-CCNC: 12 U/L (ref 1–33)
ANION GAP SERPL CALCULATED.3IONS-SCNC: 14 MMOL/L (ref 5–15)
AST SERPL-CCNC: 18 U/L (ref 1–32)
BASOPHILS # BLD AUTO: 0.06 10*3/MM3 (ref 0–0.2)
BASOPHILS NFR BLD AUTO: 0.5 % (ref 0–1.5)
BILIRUB SERPL-MCNC: <0.2 MG/DL (ref 0–1.2)
BUN SERPL-MCNC: 23 MG/DL (ref 8–23)
BUN/CREAT SERPL: 16.1 (ref 7–25)
CALCIUM SPEC-SCNC: 9.8 MG/DL (ref 8.6–10.5)
CHLORIDE SERPL-SCNC: 109 MMOL/L (ref 98–107)
CO2 SERPL-SCNC: 14 MMOL/L (ref 22–29)
CREAT SERPL-MCNC: 1.43 MG/DL (ref 0.57–1)
DEPRECATED RDW RBC AUTO: 64.4 FL (ref 37–54)
EGFRCR SERPLBLD CKD-EPI 2021: 39.8 ML/MIN/1.73
EOSINOPHIL # BLD AUTO: 0.1 10*3/MM3 (ref 0–0.4)
EOSINOPHIL NFR BLD AUTO: 0.8 % (ref 0.3–6.2)
ERYTHROCYTE [DISTWIDTH] IN BLOOD BY AUTOMATED COUNT: 15.4 % (ref 12.3–15.4)
GLOBULIN UR ELPH-MCNC: 2.9 GM/DL
GLUCOSE SERPL-MCNC: 79 MG/DL (ref 65–99)
HCT VFR BLD AUTO: 36.4 % (ref 34–46.6)
HGB BLD-MCNC: 11.3 G/DL (ref 12–15.9)
HOLD SPECIMEN: NORMAL
IMM GRANULOCYTES # BLD AUTO: 0.08 10*3/MM3 (ref 0–0.05)
IMM GRANULOCYTES NFR BLD AUTO: 0.6 % (ref 0–0.5)
LIPASE SERPL-CCNC: 414 U/L (ref 13–60)
LYMPHOCYTES # BLD AUTO: 1.16 10*3/MM3 (ref 0.7–3.1)
LYMPHOCYTES NFR BLD AUTO: 9.3 % (ref 19.6–45.3)
MACROCYTES BLD QL SMEAR: NORMAL
MCH RBC QN AUTO: 35.2 PG (ref 26.6–33)
MCHC RBC AUTO-ENTMCNC: 31 G/DL (ref 31.5–35.7)
MCV RBC AUTO: 113.4 FL (ref 79–97)
MONOCYTES # BLD AUTO: 0.64 10*3/MM3 (ref 0.1–0.9)
MONOCYTES NFR BLD AUTO: 5.1 % (ref 5–12)
NEUTROPHILS NFR BLD AUTO: 10.44 10*3/MM3 (ref 1.7–7)
NEUTROPHILS NFR BLD AUTO: 83.7 % (ref 42.7–76)
NRBC BLD AUTO-RTO: 0 /100 WBC (ref 0–0.2)
POTASSIUM SERPL-SCNC: 4.4 MMOL/L (ref 3.5–5.2)
PROT SERPL-MCNC: 6.5 G/DL (ref 6–8.5)
RBC # BLD AUTO: 3.21 10*6/MM3 (ref 3.77–5.28)
SMALL PLATELETS BLD QL SMEAR: ADEQUATE
SODIUM SERPL-SCNC: 137 MMOL/L (ref 136–145)
TROPONIN T SERPL HS-MCNC: 16 NG/L
WBC MORPH BLD: NORMAL
WBC NRBC COR # BLD AUTO: 12.48 10*3/MM3 (ref 3.4–10.8)
WHOLE BLOOD HOLD SPECIMEN: NORMAL

## 2024-04-28 PROCEDURE — 93005 ELECTROCARDIOGRAM TRACING: CPT

## 2024-04-28 PROCEDURE — 80053 COMPREHEN METABOLIC PANEL: CPT

## 2024-04-28 PROCEDURE — 74177 CT ABD & PELVIS W/CONTRAST: CPT

## 2024-04-28 PROCEDURE — 99285 EMERGENCY DEPT VISIT HI MDM: CPT

## 2024-04-28 PROCEDURE — 96375 TX/PRO/DX INJ NEW DRUG ADDON: CPT

## 2024-04-28 PROCEDURE — 85025 COMPLETE CBC W/AUTO DIFF WBC: CPT

## 2024-04-28 PROCEDURE — 25010000002 ONDANSETRON PER 1 MG: Performed by: STUDENT IN AN ORGANIZED HEALTH CARE EDUCATION/TRAINING PROGRAM

## 2024-04-28 PROCEDURE — 96374 THER/PROPH/DIAG INJ IV PUSH: CPT

## 2024-04-28 PROCEDURE — 36415 COLL VENOUS BLD VENIPUNCTURE: CPT

## 2024-04-28 PROCEDURE — 84484 ASSAY OF TROPONIN QUANT: CPT

## 2024-04-28 PROCEDURE — 96376 TX/PRO/DX INJ SAME DRUG ADON: CPT

## 2024-04-28 PROCEDURE — 25510000001 IOPAMIDOL 61 % SOLUTION: Performed by: STUDENT IN AN ORGANIZED HEALTH CARE EDUCATION/TRAINING PROGRAM

## 2024-04-28 PROCEDURE — 25010000002 HYDROMORPHONE 1 MG/ML SOLUTION: Performed by: STUDENT IN AN ORGANIZED HEALTH CARE EDUCATION/TRAINING PROGRAM

## 2024-04-28 PROCEDURE — 85007 BL SMEAR W/DIFF WBC COUNT: CPT

## 2024-04-28 PROCEDURE — 96361 HYDRATE IV INFUSION ADD-ON: CPT

## 2024-04-28 PROCEDURE — 25810000003 SODIUM CHLORIDE 0.9 % SOLUTION: Performed by: STUDENT IN AN ORGANIZED HEALTH CARE EDUCATION/TRAINING PROGRAM

## 2024-04-28 PROCEDURE — 83690 ASSAY OF LIPASE: CPT

## 2024-04-28 RX ORDER — KETOROLAC TROMETHAMINE 30 MG/ML
15 INJECTION, SOLUTION INTRAMUSCULAR; INTRAVENOUS ONCE
Status: DISCONTINUED | OUTPATIENT
Start: 2024-04-28 | End: 2024-04-28

## 2024-04-28 RX ORDER — SUCRALFATE 1 G/1
1 TABLET ORAL ONCE
Status: COMPLETED | OUTPATIENT
Start: 2024-04-28 | End: 2024-04-28

## 2024-04-28 RX ORDER — FAMOTIDINE 10 MG/ML
20 INJECTION, SOLUTION INTRAVENOUS ONCE
Status: COMPLETED | OUTPATIENT
Start: 2024-04-28 | End: 2024-04-28

## 2024-04-28 RX ORDER — SODIUM CHLORIDE 0.9 % (FLUSH) 0.9 %
10 SYRINGE (ML) INJECTION AS NEEDED
Status: DISCONTINUED | OUTPATIENT
Start: 2024-04-28 | End: 2024-04-28 | Stop reason: HOSPADM

## 2024-04-28 RX ORDER — ONDANSETRON 2 MG/ML
4 INJECTION INTRAMUSCULAR; INTRAVENOUS ONCE
Status: COMPLETED | OUTPATIENT
Start: 2024-04-28 | End: 2024-04-28

## 2024-04-28 RX ADMIN — HYDROMORPHONE HYDROCHLORIDE 1 MG: 1 INJECTION, SOLUTION INTRAMUSCULAR; INTRAVENOUS; SUBCUTANEOUS at 18:00

## 2024-04-28 RX ADMIN — HYDROMORPHONE HYDROCHLORIDE 1 MG: 1 INJECTION, SOLUTION INTRAMUSCULAR; INTRAVENOUS; SUBCUTANEOUS at 18:59

## 2024-04-28 RX ADMIN — ONDANSETRON 4 MG: 2 INJECTION INTRAMUSCULAR; INTRAVENOUS at 17:52

## 2024-04-28 RX ADMIN — SODIUM CHLORIDE 1000 ML: 9 INJECTION, SOLUTION INTRAVENOUS at 18:00

## 2024-04-28 RX ADMIN — IOPAMIDOL 100 ML: 612 INJECTION, SOLUTION INTRAVENOUS at 18:25

## 2024-04-28 RX ADMIN — FAMOTIDINE 20 MG: 10 INJECTION, SOLUTION INTRAVENOUS at 17:57

## 2024-04-28 RX ADMIN — SUCRALFATE 1 G: 1 TABLET ORAL at 17:10

## 2024-04-28 NOTE — ED PROVIDER NOTES
EMERGENCY DEPARTMENT ENCOUNTER    Pt Name: Tasha Yarbrough  MRN: [id number]  Pt :   1955  Room Number:  10/10  Date of encounter:  2024  PCP: Terrence Baldwin MD  ED Physician: Darron Austin DO      HPI:  Chief Complaint: Abdominal pain    Context: Tasha Yarbrough is a 69 y.o. female with past medical history of chronic abdominal pain, recurrent pancreatitis, gastritis who presents to the ED with epigastric abdominal pain.  Patient was discharged 4 days ago with similar symptoms.  Underwent EGD with gastroenterology.  Showed gastritis.  Patient reports continued and worsening symptoms over the past 24 hours which prompted ED reevaluation.  Pain is located in the epigastric region does radiate to the back.  This is similar to previous pain.  Reports associated nausea without vomiting.  No fever, chills, chest pain or shortness of breath, problems urination or bowel movements.  Duration constant.  Pain rated severe.    PAST MEDICAL HISTORY  Past Medical History:   Diagnosis Date    Hypertension     Impaired mobility     Injury of back     Pancreatitis      Current Outpatient Medications   Medication Instructions    glucosamine-chondroitin 500-400 MG capsule capsule 1 capsule, Oral, 2 Times Daily With Meals    methocarbamol (ROBAXIN) 750 mg, Oral, 4 Times Daily PRN    naloxone (NARCAN) 4 MG/0.1ML nasal spray Call 911. Don't prime. [place] in 1 nostril for overdose. Repeat in 2-3 minutes in other nostril if no or minimal breathing/responsiveness.    ondansetron ODT (ZOFRAN-ODT) 4 mg, Translingual, Every 8 Hours PRN    oxyCODONE-acetaminophen (Percocet) 5-325 MG per tablet 1 tablet, Oral, Every 6 Hours PRN    pantoprazole (PROTONIX) 40 mg, Oral, 2 Times Daily Before Meals    sodium bicarbonate 650 mg, Oral, 2 Times Daily    vitamin D3 125 MCG (5000 UT) capsule capsule 1 capsule, Oral, Daily        PAST SURGICAL HISTORY  Past Surgical History:   Procedure Laterality Date    ABDOMINAL SURGERY      COLON SURGERY       COLONOSCOPY      ENDOSCOPY N/A 4/23/2024    Procedure: ESOPHAGOGASTRODUODENOSCOPY WITH BIOPSY;  Surgeon: Jairo Negro MD;  Location: [facility] ENDOSCOPY;  Service: Gastroenterology;  Laterality: N/A;    TUBAL ABDOMINAL LIGATION         FAMILY HISTORY  History reviewed. No pertinent family history.    SOCIAL HISTORY  Social History     Socioeconomic History    Marital status:    Tobacco Use    Smoking status: Every Day     Current packs/day: 1.00     Types: Cigarettes    Smokeless tobacco: Never   Vaping Use    Vaping status: Never Used   Substance and Sexual Activity    Alcohol use: Never    Drug use: Never    Sexual activity: Defer       ALLERGIES  Rocephin [ceftriaxone], Ciprofloxacin, Codeine, and Pineapple    REVIEW OF SYSTEMS  All systems reviewed and negative except for those discussed in HPI.     PHYSICAL EXAM  ED Triage Vitals [04/28/24 1518]   Temp Heart Rate Resp BP SpO2   98 °F (36.7 °C) 83 18 (!) 167/109 97 %      Temp src Heart Rate Source Patient Position BP Location FiO2 (%)   -- -- -- -- --     I have reviewed the triage vital signs and nursing notes.    General: Alert.  Appears chronically, but nontoxic.  No acute distress.  Head: Normocephalic.  Atraumatic.  Eyes: No scleral icterus.  ENT: Moist mucous membranes.  Cardiovascular: Regular rate and rhythm.  No murmurs.  No rubs.  2+ distal pulses bilaterally.  Respiratory: Equal breath sounds bilaterally.  No rales.  No rhonchi.  No wheezing.  GI: Abdomen is soft.  Nondistended.  + Tenderness epigastric region.  No rebound.  No guarding.  No CVA tenderness.  MSK: Moves all 4 extremities.  Neurologic: Oriented x 3.  No focal deficits.  Skin: No erythema.  No edema. No pallor. No cyanosis.  Psych: Normal mood and affect.    LAB RESULTS  Recent Results (from the past 24 hour(s))   Comprehensive Metabolic Panel    Collection Time: 04/28/24  4:49 PM    Specimen: Blood   Result Value Ref Range    Glucose 79 65 - 99 mg/dL    BUN 23 8 - 23 mg/dL     Creatinine 1.43 (H) 0.57 - 1.00 mg/dL    Sodium 137 136 - 145 mmol/L    Potassium 4.4 3.5 - 5.2 mmol/L    Chloride 109 (H) 98 - 107 mmol/L    CO2 14.0 (L) 22.0 - 29.0 mmol/L    Calcium 9.8 8.6 - 10.5 mg/dL    Total Protein 6.5 6.0 - 8.5 g/dL    Albumin 3.6 3.5 - 5.2 g/dL    ALT (SGPT) 12 1 - 33 U/L    AST (SGOT) 18 1 - 32 U/L    Alkaline Phosphatase 103 39 - 117 U/L    Total Bilirubin <0.2 0.0 - 1.2 mg/dL    Globulin 2.9 gm/dL    A/G Ratio 1.2 g/dL    BUN/Creatinine Ratio 16.1 7.0 - 25.0    Anion Gap 14.0 5.0 - 15.0 mmol/L    eGFR 39.8 (L) >60.0 mL/min/1.73   Lipase    Collection Time: 04/28/24  4:49 PM    Specimen: Blood   Result Value Ref Range    Lipase 414 (H) 13 - 60 U/L   Single High Sensitivity Troponin T    Collection Time: 04/28/24  4:49 PM    Specimen: Blood   Result Value Ref Range    HS Troponin T 16 (H) <14 ng/L   Green Top (Gel)    Collection Time: 04/28/24  4:49 PM   Result Value Ref Range    Extra Tube Hold for add-ons.    Lavender Top    Collection Time: 04/28/24  4:49 PM   Result Value Ref Range    Extra Tube hold for add-on    CBC Auto Differential    Collection Time: 04/28/24  4:49 PM    Specimen: Blood   Result Value Ref Range    WBC 12.48 (H) 3.40 - 10.80 10*3/mm3    RBC 3.21 (L) 3.77 - 5.28 10*6/mm3    Hemoglobin 11.3 (L) 12.0 - 15.9 g/dL    Hematocrit 36.4 34.0 - 46.6 %    .4 (H) 79.0 - 97.0 fL    MCH 35.2 (H) 26.6 - 33.0 pg    MCHC 31.0 (L) 31.5 - 35.7 g/dL    RDW 15.4 12.3 - 15.4 %    RDW-SD 64.4 (H) 37.0 - 54.0 fl    Neutrophil % 83.7 (H) 42.7 - 76.0 %    Lymphocyte % 9.3 (L) 19.6 - 45.3 %    Monocyte % 5.1 5.0 - 12.0 %    Eosinophil % 0.8 0.3 - 6.2 %    Basophil % 0.5 0.0 - 1.5 %    Immature Grans % 0.6 (H) 0.0 - 0.5 %    Neutrophils, Absolute 10.44 (H) 1.70 - 7.00 10*3/mm3    Lymphocytes, Absolute 1.16 0.70 - 3.10 10*3/mm3    Monocytes, Absolute 0.64 0.10 - 0.90 10*3/mm3    Eosinophils, Absolute 0.10 0.00 - 0.40 10*3/mm3    Basophils, Absolute 0.06 0.00 - 0.20 10*3/mm3    Immature  Grans, Absolute 0.08 (H) 0.00 - 0.05 10*3/mm3    nRBC 0.0 0.0 - 0.2 /100 WBC   Scan Slide    Collection Time: 04/28/24  4:49 PM    Specimen: Blood   Result Value Ref Range    Macrocytes Slight/1+ None Seen    WBC Morphology Normal Normal    Platelet Estimate Adequate Normal       RADIOLOGY  CT Abdomen Pelvis With Contrast    Result Date: 4/28/2024  FINAL REPORT TECHNIQUE: Axial CT images were performed from the lung bases through the symphysis pubis after the administration of intravenous contrast.  This study was performed with techniques to keep radiation doses as low as reasonably achievable (ALARA). Individualized dose reduction techniques using automated exposure control or adjustment of mA and/or kV according to the patient's size were employed. CLINICAL HISTORY: Epigastric pain, hx of pancreatitis, leukocytosis COMPARISON: 4/8/2024, 4/6/2024 FINDINGS: LOWER CHEST: The heart is normal size.  The lung bases are clear.  There is a moderate hiatal hernia.  ABDOMEN/PELVIS: Liver, gallbladder and bile ducts: The liver enhances homogeneously without suspicious focal hepatic lesion.  There has been a prior cholecystectomy and there is post cholecystectomy biliary ectasia.  There is no definite biliary duct dilatation.  Adrenal glands: The adrenal glands are morphologically unremarkable without suspicious lesion. Kidneys, ureter and urinary bladder: No suspicious renal lesion. No hydronephrosis.  Urinary bladder is unremarkable.  Spleen: The spleen is normal size.  Pancreas: There is diffuse peripancreatic inflammation.  GI systems and mesentery: There has been a partial colectomy.  No evidence of bowel obstruction. The appendix is not visualized but there are no secondary signs of appendicitis.    No significant mesenteric inflammation. Lymph nodes: No definite pathologically enlarged abdominal or pelvic lymph nodes present.  Vessels: The aorta and abdominal arteries are grossly patent.  The IVC and portal vein are  patent and grossly unremarkable.  Peritoneum: No free intraperitoneal fluid or pneumoperitoneum.  Pelvic viscera: No acute findings. Body wall: No body wall contusion. No significant body wall hernias.  Bones: No acute fracture.     Acute pancreatitis. Authenticated and Electronically Signed by Jens Karimi MD on 04/28/2024 07:31:52 PM     PROCEDURES  Procedures    MEDICATIONS GIVEN IN ER  Medications   sodium chloride 0.9 % flush 10 mL (has no administration in time range)   sodium chloride 0.9 % bolus 1,000 mL (1,000 mL Intravenous New Bag 4/28/24 1800)   ondansetron (ZOFRAN) injection 4 mg (4 mg Intravenous Given 4/28/24 1752)   famotidine (PEPCID) injection 20 mg (20 mg Intravenous Given 4/28/24 1757)   HYDROmorphone (DILAUDID) injection 1 mg (1 mg Intravenous Given 4/28/24 1800)   sucralfate (CARAFATE) tablet 1 g (1 g Oral Given 4/28/24 1710)   iopamidol (ISOVUE-300) 61 % injection 100 mL (100 mL Intravenous Given 4/28/24 1825)   HYDROmorphone (DILAUDID) injection 1 mg (1 mg Intravenous Given 4/28/24 1859)       MEDICAL DECISION MAKING  69 y.o. female with past medical history listed above who presents with acute on chronic epigastric abdominal pain. Vital signs remarkable for hypertension, otherwise within normal limits. Based on clinical presentation and physical exam, differential diagnosis includes, but is not limited to, gastritis, pancreatitis, esophagitis.  Abdominal exam nonsurgical in nature.    I have discussed the indication, risk, and alternatives of the following high risk medications: IV Dilaudid    External notes reviewed.  Hospitalist discharge summary from 4/24/2024 reviewed.  Patient admitted with acute on chronic epigastric abdominal pain.  Found to have acute recurrent pancreatitis and severe gastritis on EGD 4/23/2024.    At least 3 different tests have been ordered on this patient.    Please see ED course below for my interpretation of the ED workup.  ED Course as of 04/28/24 2025   Sun  Apr 28, 2024   1655 ECG 12 Lead ED Triage Standing Order; Abdominal Pain (Upper)  EKG per my interpretation sinus rhythm, rate 79, left axis deviation, no STEMI. [JS]   1941 I reviewed the labs listed above. Notable abnormalities are highlighted below.    Old laboratory data was reviewed from the medical records and compared to today's results.   [JS]   1941 WBC(!): 12.48 [JS]   1941 Hemoglobin(!): 11.3 [JS]   1941 Lipase(!): 414 [JS]   1941 Creatinine(!): 1.43 [JS]   1941 HS Troponin T(!): 16 [JS]   1942 CT Abdomen Pelvis With Contrast  I have independently reviewed and interpreted the CT on pelvis.  My interpretation is negative for small bowel obstruction.  Radiologist notes acute pancreatitis with peripancreatic inflammation.   [JS]      ED Course User Index  [JS] Darron Austin DO      On re-evaluation, patient resting comfortably.  States symptoms have improved following therapy. Repeat abdominal exam performed.  Abdomen remains soft, nondistended, nonsurgical in nature.  Vital signs remained stable on room air.  Patient was ambulatory in the ED with steady gait.  Able to tolerate oral intake appropriately.    I discussed the findings of the ED workup with the patient at bedside.  No clinical indication for admission.  I recommended outpatient follow-up with PCP/gastroenterology.  Patient was deemed medically stable for discharge with close outpatient follow-up and strict ED return precautions. Patient agreeable with plan and disposition.    Chronic conditions affecting care: Pancreatitis    Social determinants of health impacting treatment or disposition: None    REPEAT VITAL SIGNS  AS OF 20:25 EDT VITALS:  BP - 172/98  HR - 68  TEMP - 98 °F (36.7 °C)  O2 SATS - 98%    DIAGNOSIS  Final diagnoses:   Chronic pancreatitis, unspecified pancreatitis type       DISPOSITION  ED Disposition       ED Disposition   Discharge    Condition   Stable    Comment   --                     Please note that portions of this  document were completed with voice recognition software.        Darron Austin DO  04/29/24 3904

## 2024-04-30 ENCOUNTER — READMISSION MANAGEMENT (OUTPATIENT)
Dept: CALL CENTER | Facility: HOSPITAL | Age: 69
End: 2024-04-30
Payer: MEDICARE

## 2024-04-30 NOTE — OUTREACH NOTE
Medical Week 1 Survey      Flowsheet Row Responses   [facility] patient discharged from? [facility]   Does the patient have one of the following disease processes/diagnoses(primary or secondary)? Other   Week 1 attempt successful? No   Unsuccessful attempts Attempt 3            Graciela CRABTREE - Registered Nurse

## 2024-05-03 ENCOUNTER — TELEPHONE (OUTPATIENT)
Dept: GASTROENTEROLOGY | Facility: CLINIC | Age: 69
End: 2024-05-03
Payer: MEDICARE

## 2024-05-03 NOTE — TELEPHONE ENCOUNTER
----- Message from Susan VIEYRA sent at 4/26/2024  3:23 PM EDT -----  Called patient, no vm.  ----- Message -----  From: Kiana Smyth MA  Sent: 4/26/2024  10:56 AM EDT  To: Susan Rios MA      ----- Message -----  From: Jairo Negro MD  Sent: 4/26/2024  10:55 AM EDT  To: [facility]    Please give the patient the following message:  ----- Results -----  Antimitochondrial antibodies are negative.  Duodenal biopsies are unremarkable.  Gastric biopsies show gastritis, but negative for Helicobacter pylori.

## 2024-05-11 ENCOUNTER — HOSPITAL ENCOUNTER (INPATIENT)
Facility: HOSPITAL | Age: 69
LOS: 3 days | Discharge: HOME | End: 2024-05-15
Attending: STUDENT IN AN ORGANIZED HEALTH CARE EDUCATION/TRAINING PROGRAM | Admitting: STUDENT IN AN ORGANIZED HEALTH CARE EDUCATION/TRAINING PROGRAM
Payer: MEDICARE

## 2024-05-11 ENCOUNTER — APPOINTMENT (OUTPATIENT)
Dept: CT IMAGING | Facility: HOSPITAL | Age: 69
End: 2024-05-11
Payer: MEDICARE

## 2024-05-11 DIAGNOSIS — E87.5 HYPERKALEMIA: Primary | ICD-10-CM

## 2024-05-11 DIAGNOSIS — K85.00 IDIOPATHIC ACUTE PANCREATITIS, UNSPECIFIED COMPLICATION STATUS: ICD-10-CM

## 2024-05-11 DIAGNOSIS — K86.1 CHRONIC PANCREATITIS, UNSPECIFIED PANCREATITIS TYPE: ICD-10-CM

## 2024-05-11 LAB
ALBUMIN SERPL-MCNC: 4.2 G/DL (ref 3.5–5.2)
ALBUMIN/GLOB SERPL: 1.2 G/DL
ALP SERPL-CCNC: 165 U/L (ref 39–117)
ALT SERPL W P-5'-P-CCNC: 8 U/L (ref 1–33)
ANION GAP SERPL CALCULATED.3IONS-SCNC: 22.9 MMOL/L (ref 5–15)
AST SERPL-CCNC: 11 U/L (ref 1–32)
BACTERIA UR QL AUTO: ABNORMAL /HPF
BASOPHILS # BLD AUTO: 0.08 10*3/MM3 (ref 0–0.2)
BASOPHILS NFR BLD AUTO: 0.5 % (ref 0–1.5)
BILIRUB SERPL-MCNC: <0.2 MG/DL (ref 0–1.2)
BILIRUB UR QL STRIP: NEGATIVE
BUN SERPL-MCNC: 43 MG/DL (ref 8–23)
BUN/CREAT SERPL: 19.4 (ref 7–25)
CALCIUM SPEC-SCNC: 10.3 MG/DL (ref 8.6–10.5)
CHLORIDE SERPL-SCNC: 109 MMOL/L (ref 98–107)
CLARITY UR: ABNORMAL
CO2 SERPL-SCNC: 4.1 MMOL/L (ref 22–29)
COLOR UR: YELLOW
CREAT SERPL-MCNC: 2.22 MG/DL (ref 0.57–1)
DEPRECATED RDW RBC AUTO: 63.7 FL (ref 37–54)
EGFRCR SERPLBLD CKD-EPI 2021: 23.5 ML/MIN/1.73
EOSINOPHIL # BLD AUTO: 0.02 10*3/MM3 (ref 0–0.4)
EOSINOPHIL NFR BLD AUTO: 0.1 % (ref 0.3–6.2)
ERYTHROCYTE [DISTWIDTH] IN BLOOD BY AUTOMATED COUNT: 15.2 % (ref 12.3–15.4)
GLOBULIN UR ELPH-MCNC: 3.5 GM/DL
GLUCOSE BLDC GLUCOMTR-MCNC: 103 MG/DL (ref 70–130)
GLUCOSE BLDC GLUCOMTR-MCNC: 111 MG/DL (ref 70–130)
GLUCOSE SERPL-MCNC: 91 MG/DL (ref 65–99)
GLUCOSE UR STRIP-MCNC: NEGATIVE MG/DL
GRAN CASTS URNS QL MICRO: ABNORMAL /LPF
HCT VFR BLD AUTO: 43.4 % (ref 34–46.6)
HGB BLD-MCNC: 13.8 G/DL (ref 12–15.9)
HGB UR QL STRIP.AUTO: ABNORMAL
HYALINE CASTS UR QL AUTO: ABNORMAL /LPF
IMM GRANULOCYTES # BLD AUTO: 0.3 10*3/MM3 (ref 0–0.05)
IMM GRANULOCYTES NFR BLD AUTO: 1.9 % (ref 0–0.5)
KETONES UR QL STRIP: NEGATIVE
LEUKOCYTE ESTERASE UR QL STRIP.AUTO: ABNORMAL
LIPASE SERPL-CCNC: >3000 U/L (ref 13–60)
LYMPHOCYTES # BLD AUTO: 0.72 10*3/MM3 (ref 0.7–3.1)
LYMPHOCYTES NFR BLD AUTO: 4.5 % (ref 19.6–45.3)
MACROCYTES BLD QL SMEAR: NORMAL
MCH RBC QN AUTO: 36 PG (ref 26.6–33)
MCHC RBC AUTO-ENTMCNC: 31.8 G/DL (ref 31.5–35.7)
MCV RBC AUTO: 113.3 FL (ref 79–97)
MONOCYTES # BLD AUTO: 0.95 10*3/MM3 (ref 0.1–0.9)
MONOCYTES NFR BLD AUTO: 6 % (ref 5–12)
NEUTROPHILS NFR BLD AUTO: 13.79 10*3/MM3 (ref 1.7–7)
NEUTROPHILS NFR BLD AUTO: 87 % (ref 42.7–76)
NITRITE UR QL STRIP: NEGATIVE
NRBC BLD AUTO-RTO: 0 /100 WBC (ref 0–0.2)
PH UR STRIP.AUTO: <=5 [PH] (ref 5–8)
PLATELET # BLD AUTO: 430 10*3/MM3 (ref 140–450)
PMV BLD AUTO: 10.4 FL (ref 6–12)
POTASSIUM SERPL-SCNC: 6.3 MMOL/L (ref 3.5–5.2)
PROT SERPL-MCNC: 7.7 G/DL (ref 6–8.5)
PROT UR QL STRIP: ABNORMAL
RBC # BLD AUTO: 3.83 10*6/MM3 (ref 3.77–5.28)
RBC # UR STRIP: ABNORMAL /HPF
REF LAB TEST METHOD: ABNORMAL
SMALL PLATELETS BLD QL SMEAR: ADEQUATE
SODIUM SERPL-SCNC: 136 MMOL/L (ref 136–145)
SP GR UR STRIP: 1.03 (ref 1–1.03)
SQUAMOUS #/AREA URNS HPF: ABNORMAL /HPF
UROBILINOGEN UR QL STRIP: ABNORMAL
WBC # UR STRIP: ABNORMAL /HPF
WBC MORPH BLD: NORMAL
WBC NRBC COR # BLD AUTO: 15.86 10*3/MM3 (ref 3.4–10.8)

## 2024-05-11 PROCEDURE — 99291 CRITICAL CARE FIRST HOUR: CPT

## 2024-05-11 PROCEDURE — 85007 BL SMEAR W/DIFF WBC COUNT: CPT | Performed by: STUDENT IN AN ORGANIZED HEALTH CARE EDUCATION/TRAINING PROGRAM

## 2024-05-11 PROCEDURE — 74176 CT ABD & PELVIS W/O CONTRAST: CPT

## 2024-05-11 PROCEDURE — 83690 ASSAY OF LIPASE: CPT | Performed by: STUDENT IN AN ORGANIZED HEALTH CARE EDUCATION/TRAINING PROGRAM

## 2024-05-11 PROCEDURE — 63710000001 INSULIN REGULAR HUMAN PER 5 UNITS

## 2024-05-11 PROCEDURE — 82948 REAGENT STRIP/BLOOD GLUCOSE: CPT

## 2024-05-11 PROCEDURE — 80053 COMPREHEN METABOLIC PANEL: CPT | Performed by: STUDENT IN AN ORGANIZED HEALTH CARE EDUCATION/TRAINING PROGRAM

## 2024-05-11 PROCEDURE — 25010000002 ONDANSETRON PER 1 MG: Performed by: STUDENT IN AN ORGANIZED HEALTH CARE EDUCATION/TRAINING PROGRAM

## 2024-05-11 PROCEDURE — 25010000002 MORPHINE PER 10 MG: Performed by: STUDENT IN AN ORGANIZED HEALTH CARE EDUCATION/TRAINING PROGRAM

## 2024-05-11 PROCEDURE — 25810000003 SODIUM CHLORIDE 0.9 % SOLUTION: Performed by: STUDENT IN AN ORGANIZED HEALTH CARE EDUCATION/TRAINING PROGRAM

## 2024-05-11 PROCEDURE — 25010000002 HYDROMORPHONE 1 MG/ML SOLUTION: Performed by: STUDENT IN AN ORGANIZED HEALTH CARE EDUCATION/TRAINING PROGRAM

## 2024-05-11 PROCEDURE — 94640 AIRWAY INHALATION TREATMENT: CPT

## 2024-05-11 PROCEDURE — 85025 COMPLETE CBC W/AUTO DIFF WBC: CPT | Performed by: STUDENT IN AN ORGANIZED HEALTH CARE EDUCATION/TRAINING PROGRAM

## 2024-05-11 PROCEDURE — 81001 URINALYSIS AUTO W/SCOPE: CPT | Performed by: STUDENT IN AN ORGANIZED HEALTH CARE EDUCATION/TRAINING PROGRAM

## 2024-05-11 PROCEDURE — 94799 UNLISTED PULMONARY SVC/PX: CPT

## 2024-05-11 PROCEDURE — 25010000002 CALCIUM GLUCONATE-NACL 1-0.675 GM/50ML-% SOLUTION

## 2024-05-11 RX ORDER — PROMETHAZINE HYDROCHLORIDE 12.5 MG/1
12.5 TABLET ORAL ONCE
Status: DISCONTINUED | OUTPATIENT
Start: 2024-05-11 | End: 2024-05-11

## 2024-05-11 RX ORDER — DEXTROSE MONOHYDRATE 25 G/50ML
25 INJECTION, SOLUTION INTRAVENOUS ONCE
Status: COMPLETED | OUTPATIENT
Start: 2024-05-11 | End: 2024-05-11

## 2024-05-11 RX ORDER — ALBUTEROL SULFATE 2.5 MG/3ML
10 SOLUTION RESPIRATORY (INHALATION) ONCE
Status: COMPLETED | OUTPATIENT
Start: 2024-05-11 | End: 2024-05-11

## 2024-05-11 RX ORDER — CALCIUM GLUCONATE 20 MG/ML
1000 INJECTION, SOLUTION INTRAVENOUS ONCE
Status: COMPLETED | OUTPATIENT
Start: 2024-05-11 | End: 2024-05-11

## 2024-05-11 RX ORDER — ONDANSETRON 2 MG/ML
4 INJECTION INTRAMUSCULAR; INTRAVENOUS ONCE
Status: COMPLETED | OUTPATIENT
Start: 2024-05-11 | End: 2024-05-11

## 2024-05-11 RX ADMIN — HYDROMORPHONE HYDROCHLORIDE 1 MG: 1 INJECTION, SOLUTION INTRAMUSCULAR; INTRAVENOUS; SUBCUTANEOUS at 21:20

## 2024-05-11 RX ADMIN — MORPHINE SULFATE 4 MG: 4 INJECTION, SOLUTION INTRAMUSCULAR; INTRAVENOUS at 18:40

## 2024-05-11 RX ADMIN — DEXTROSE MONOHYDRATE 25 G: 25 INJECTION, SOLUTION INTRAVENOUS at 22:03

## 2024-05-11 RX ADMIN — SODIUM CHLORIDE 1000 ML: 9 INJECTION, SOLUTION INTRAVENOUS at 18:41

## 2024-05-11 RX ADMIN — ONDANSETRON 4 MG: 2 INJECTION INTRAMUSCULAR; INTRAVENOUS at 18:39

## 2024-05-11 RX ADMIN — CALCIUM GLUCONATE 1000 MG: 20 INJECTION, SOLUTION INTRAVENOUS at 22:03

## 2024-05-11 RX ADMIN — INSULIN HUMAN 5 UNITS: 100 INJECTION, SOLUTION PARENTERAL at 22:03

## 2024-05-11 RX ADMIN — ALBUTEROL SULFATE 10 MG: 2.5 SOLUTION RESPIRATORY (INHALATION) at 22:25

## 2024-05-11 NOTE — ED PROVIDER NOTES
EMERGENCY DEPARTMENT ENCOUNTER    Pt Name: Tasha Yarbrough  MRN: [id number]  Pt :   1955  Room Number:  I06/1  Date of encounter:  2024  PCP: Terrence Baldwin MD  ED Provider: Prasad Hull PA-C    Historian: Patient, son at bedside, nursing notes      HPI:  Chief Complaint: Abdominal pain, nausea and vomiting        Context: Tasha Yarbrough is a 69 y.o. female who presents to the ED c/o nausea vomiting and abdominal pain for the past 24 hours.  Patient also reports associated symptoms of diarrhea.  Patient states she has a pancreatic pseudocyst which has caused her continuous similar symptoms over the last several months.  Patient denies any chest pain or shortness of breath, syncope, dizziness, or any other complaint today.      PAST MEDICAL HISTORY  Past Medical History:   Diagnosis Date    Hypertension     Impaired mobility     Injury of back     Pancreatitis          PAST SURGICAL HISTORY  Past Surgical History:   Procedure Laterality Date    ABDOMINAL SURGERY      COLON SURGERY      COLONOSCOPY      ENDOSCOPY N/A 2024    Procedure: ESOPHAGOGASTRODUODENOSCOPY WITH BIOPSY;  Surgeon: Jairo Negro MD;  Location: [facility] ENDOSCOPY;  Service: Gastroenterology;  Laterality: N/A;    TUBAL ABDOMINAL LIGATION           FAMILY HISTORY  History reviewed. No pertinent family history.      SOCIAL HISTORY  Social History     Socioeconomic History    Marital status:    Tobacco Use    Smoking status: Every Day     Current packs/day: 1.00     Types: Cigarettes    Smokeless tobacco: Never   Vaping Use    Vaping status: Never Used   Substance and Sexual Activity    Alcohol use: Never    Drug use: Never    Sexual activity: Defer         ALLERGIES  Rocephin [ceftriaxone], Ciprofloxacin, Codeine, and Pineapple        REVIEW OF SYSTEMS  Review of Systems   Gastrointestinal:  Positive for abdominal pain, diarrhea, nausea and vomiting.          All systems reviewed and negative except for those discussed in HPI.        PHYSICAL EXAM    I have reviewed the triage vital signs and nursing notes.    ED Triage Vitals [05/11/24 1727]   Temp Heart Rate Resp BP SpO2   97.9 °F (36.6 °C) 101 20 141/99 97 %      Temp src Heart Rate Source Patient Position BP Location FiO2 (%)   -- -- -- -- --       Physical Exam  Vitals and nursing note reviewed.   Constitutional:       General: She is not in acute distress.     Appearance: She is not ill-appearing, toxic-appearing or diaphoretic.   HENT:      Head: Normocephalic and atraumatic.      Mouth/Throat:      Mouth: Mucous membranes are moist.      Pharynx: Oropharynx is clear.   Eyes:      Extraocular Movements: Extraocular movements intact.   Cardiovascular:      Rate and Rhythm: Normal rate.      Heart sounds: Normal heart sounds.   Pulmonary:      Effort: Pulmonary effort is normal. No respiratory distress.      Breath sounds: Normal breath sounds.   Abdominal:      Tenderness: There is generalized abdominal tenderness. There is guarding. There is no right CVA tenderness, left CVA tenderness or rebound.   Skin:     General: Skin is warm and dry.      Findings: No rash.   Neurological:      Mental Status: She is alert.             LAB RESULTS  Recent Results (from the past 24 hour(s))   CBC Auto Differential    Collection Time: 05/11/24  6:43 PM    Specimen: Blood   Result Value Ref Range    WBC 15.86 (H) 3.40 - 10.80 10*3/mm3    RBC 3.83 3.77 - 5.28 10*6/mm3    Hemoglobin 13.8 12.0 - 15.9 g/dL    Hematocrit 43.4 34.0 - 46.6 %    .3 (H) 79.0 - 97.0 fL    MCH 36.0 (H) 26.6 - 33.0 pg    MCHC 31.8 31.5 - 35.7 g/dL    RDW 15.2 12.3 - 15.4 %    RDW-SD 63.7 (H) 37.0 - 54.0 fl    MPV 10.4 6.0 - 12.0 fL    Platelets 430 140 - 450 10*3/mm3    Neutrophil % 87.0 (H) 42.7 - 76.0 %    Lymphocyte % 4.5 (L) 19.6 - 45.3 %    Monocyte % 6.0 5.0 - 12.0 %    Eosinophil % 0.1 (L) 0.3 - 6.2 %    Basophil % 0.5 0.0 - 1.5 %    Immature Grans % 1.9 (H) 0.0 - 0.5 %    Neutrophils, Absolute 13.79 (H) 1.70 -  7.00 10*3/mm3    Lymphocytes, Absolute 0.72 0.70 - 3.10 10*3/mm3    Monocytes, Absolute 0.95 (H) 0.10 - 0.90 10*3/mm3    Eosinophils, Absolute 0.02 0.00 - 0.40 10*3/mm3    Basophils, Absolute 0.08 0.00 - 0.20 10*3/mm3    Immature Grans, Absolute 0.30 (H) 0.00 - 0.05 10*3/mm3    nRBC 0.0 0.0 - 0.2 /100 WBC   Scan Slide    Collection Time: 05/11/24  6:43 PM    Specimen: Blood   Result Value Ref Range    Macrocytes Slight/1+ None Seen    WBC Morphology Normal Normal    Platelet Estimate Adequate Normal   Urinalysis With Microscopic If Indicated (No Culture) - Urine, Clean Catch    Collection Time: 05/11/24  7:30 PM    Specimen: Urine, Clean Catch   Result Value Ref Range    Color, UA Yellow Yellow, Straw    Appearance, UA Cloudy (A) Clear    pH, UA <=5.0 5.0 - 8.0    Specific Gravity, UA 1.029 1.005 - 1.030    Glucose, UA Negative Negative    Ketones, UA Negative Negative    Bilirubin, UA Negative Negative    Blood, UA Trace (A) Negative    Protein,  mg/dL (2+) (A) Negative    Leuk Esterase, UA Trace (A) Negative    Nitrite, UA Negative Negative    Urobilinogen, UA 0.2 E.U./dL 0.2 - 1.0 E.U./dL   Urinalysis, Microscopic Only - Urine, Clean Catch    Collection Time: 05/11/24  7:30 PM    Specimen: Urine, Clean Catch   Result Value Ref Range    RBC, UA 3-5 (A) None Seen, 0-2 /HPF    WBC, UA 0-2 None Seen, 0-2 /HPF    Bacteria, UA Trace (A) None Seen /HPF    Squamous Epithelial Cells, UA 7-12 (A) None Seen, 0-2 /HPF    Hyaline Casts, UA None Seen None Seen /LPF    Granular Casts, UA 3-6 None Seen /LPF    Methodology Manual Light Microscopy    Comprehensive Metabolic Panel    Collection Time: 05/11/24  8:39 PM    Specimen: Blood   Result Value Ref Range    Glucose 91 65 - 99 mg/dL    BUN 43 (H) 8 - 23 mg/dL    Creatinine 2.22 (H) 0.57 - 1.00 mg/dL    Sodium 136 136 - 145 mmol/L    Potassium 6.3 (C) 3.5 - 5.2 mmol/L    Chloride 109 (H) 98 - 107 mmol/L    CO2 4.1 (C) 22.0 - 29.0 mmol/L    Calcium 10.3 8.6 - 10.5 mg/dL     Total Protein 7.7 6.0 - 8.5 g/dL    Albumin 4.2 3.5 - 5.2 g/dL    ALT (SGPT) 8 1 - 33 U/L    AST (SGOT) 11 1 - 32 U/L    Alkaline Phosphatase 165 (H) 39 - 117 U/L    Total Bilirubin <0.2 0.0 - 1.2 mg/dL    Globulin 3.5 gm/dL    A/G Ratio 1.2 g/dL    BUN/Creatinine Ratio 19.4 7.0 - 25.0    Anion Gap 22.9 (H) 5.0 - 15.0 mmol/L    eGFR 23.5 (L) >60.0 mL/min/1.73   Lipase    Collection Time: 05/11/24  8:39 PM    Specimen: Blood   Result Value Ref Range    Lipase >3,000 (H) 13 - 60 U/L   POC Glucose Once    Collection Time: 05/11/24  9:58 PM    Specimen: Blood   Result Value Ref Range    Glucose 111 70 - 130 mg/dL   POC Glucose Q1H    Collection Time: 05/11/24 11:56 PM    Specimen: Blood   Result Value Ref Range    Glucose 103 70 - 130 mg/dL   Basic Metabolic Panel    Collection Time: 05/12/24 12:45 AM    Specimen: Blood   Result Value Ref Range    Glucose 95 65 - 99 mg/dL    BUN 47 (H) 8 - 23 mg/dL    Creatinine 2.30 (H) 0.57 - 1.00 mg/dL    Sodium 134 (L) 136 - 145 mmol/L    Potassium 5.4 (H) 3.5 - 5.2 mmol/L    Chloride 106 98 - 107 mmol/L    CO2 6.2 (C) 22.0 - 29.0 mmol/L    Calcium 10.8 (H) 8.6 - 10.5 mg/dL    BUN/Creatinine Ratio 20.4 7.0 - 25.0    Anion Gap 21.8 (H) 5.0 - 15.0 mmol/L    eGFR 22.5 (L) >60.0 mL/min/1.73   POC Glucose Once    Collection Time: 05/12/24  2:45 AM    Specimen: Blood   Result Value Ref Range    Glucose 82 70 - 130 mg/dL   POC Glucose Once    Collection Time: 05/12/24  4:07 AM    Specimen: Blood   Result Value Ref Range    Glucose 112 70 - 130 mg/dL   Lipid Panel    Collection Time: 05/12/24  6:19 AM    Specimen: Blood   Result Value Ref Range    Total Cholesterol 106 0 - 200 mg/dL    Triglycerides 186 (H) 0 - 150 mg/dL    HDL Cholesterol 30 (L) 40 - 60 mg/dL    LDL Cholesterol  45 0 - 100 mg/dL    VLDL Cholesterol 31 5 - 40 mg/dL    LDL/HDL Ratio 1.29    Lipase    Collection Time: 05/12/24  6:19 AM    Specimen: Blood   Result Value Ref Range    Lipase >3,000 (H) 13 - 60 U/L   CBC Auto  Differential    Collection Time: 05/12/24  6:19 AM    Specimen: Blood   Result Value Ref Range    WBC 25.54 (H) 3.40 - 10.80 10*3/mm3    RBC 3.79 3.77 - 5.28 10*6/mm3    Hemoglobin 13.7 12.0 - 15.9 g/dL    Hematocrit 44.3 34.0 - 46.6 %    .9 (H) 79.0 - 97.0 fL    MCH 36.1 (H) 26.6 - 33.0 pg    MCHC 30.9 (L) 31.5 - 35.7 g/dL    RDW 14.7 12.3 - 15.4 %    RDW-SD 64.2 (H) 37.0 - 54.0 fl    MPV 9.8 6.0 - 12.0 fL    Platelets 382 140 - 450 10*3/mm3    Neutrophil % 83.8 (H) 42.7 - 76.0 %    Lymphocyte % 5.8 (L) 19.6 - 45.3 %    Monocyte % 7.8 5.0 - 12.0 %    Eosinophil % 0.0 (L) 0.3 - 6.2 %    Basophil % 0.4 0.0 - 1.5 %    Immature Grans % 2.2 (H) 0.0 - 0.5 %    Neutrophils, Absolute 21.41 (H) 1.70 - 7.00 10*3/mm3    Lymphocytes, Absolute 1.47 0.70 - 3.10 10*3/mm3    Monocytes, Absolute 2.00 (H) 0.10 - 0.90 10*3/mm3    Eosinophils, Absolute 0.00 0.00 - 0.40 10*3/mm3    Basophils, Absolute 0.10 0.00 - 0.20 10*3/mm3    Immature Grans, Absolute 0.56 (H) 0.00 - 0.05 10*3/mm3    nRBC 0.1 0.0 - 0.2 /100 WBC   Scan Slide    Collection Time: 05/12/24  6:19 AM    Specimen: Blood   Result Value Ref Range    Hypochromia Slight/1+ None Seen    Macrocytes Large/3+ None Seen    WBC Morphology Normal Normal    Platelet Morphology Normal Normal       If labs were ordered, I independently reviewed the results and considered them in treating the patient.        RADIOLOGY  CT Abdomen Pelvis Without Contrast    Result Date: 5/11/2024  FINAL REPORT TECHNIQUE: Axial CT images were performed from the lung bases through the symphysis pubis without IV contrast.  This study was performed with techniques to keep radiation doses as low as reasonably achievable (ALARA). Individualized dose reduction techniques using automated exposure control or adjustment of mA and/or kV according to the patient's size were employed. CLINICAL HISTORY: diffuse abdominal pain and vomiting, patient has known pancreatic pseudocyst COMPARISON: 4/28/2024 FINDINGS:  Lack of intravenous contrast limits evaluation of solid abdominal and pelvic organs.  LOWER CHEST: The heart is normal size.  The lung bases are clear. There is a moderate hiatal hernia.  ABDOMEN/PELVIS:  Liver, gallbladder and bile ducts: The unenhanced liver is grossly unremarkable without focal abnormality.  There has been a prior cholecystectomy.  There is no definite biliary duct dilatation.  Adrenal glands: The adrenal glands are morphologically unremarkable without suspicious lesion.  Kidneys, ureter and urinary bladder: There are nonobstructing right kidney stones.  No hydronephrosis. Urinary bladder is unremarkable.  Spleen: The spleen is normal size.  Pancreas: There is persistent diffuse peripancreatic inflammation.  There is a small fluid collection adjacent to the tail of the pancreas.  GI systems and mesentery: No evidence of bowel obstruction.  The appendix is not visualized but there are no secondary signs of appendicitis.  No significant mesenteric inflammation.  Lymph nodes: No definite pathologically enlarged abdominal or pelvic lymph nodes present within the limits of this noncontrast enhanced exam.  Vessels: The abdominal aorta is normal in caliber.  The inferior vena cava is unremarkable. Peritoneum: No free intraperitoneal fluid or pneumoperitoneum. Pelvic viscera: No acute findings.  Body wall: No body wall contusion.  Bones: No acute fracture.     Findings remain compatible with pancreatitis. Authenticated and Electronically Signed by Jens Karimi MD on 05/11/2024 11:36:09 PM     I ordered and independently reviewed the above noted radiographic studies.      I viewed images of CT abdomen and pelvis which showed findings consistent with pancreatitis per my independent interpretation.    See radiologist's dictation for official interpretation.        PROCEDURES    Critical Care    Performed by: Prasad Hull PA-C  Authorized by: Mateo Storm DO    Critical care provider statement:      Critical care time (minutes):  45    Critical care time was exclusive of:  Separately billable procedures and treating other patients and teaching time    Critical care was necessary to treat or prevent imminent or life-threatening deterioration of the following conditions:  Renal failure (Hyperkalemia requiring IV therapy and admission)    Critical care was time spent personally by me on the following activities:  Review of old charts, vascular access procedures, ordering and performing treatments and interventions, ordering and review of laboratory studies, ordering and review of radiographic studies, pulse oximetry, re-evaluation of patient's condition, development of treatment plan with patient or surrogate, blood draw for specimens, discussions with consultants, evaluation of patient's response to treatment, examination of patient, interpretation of cardiac output measurements and obtaining history from patient or surrogate    Care discussed with: admitting provider        ECG 12 Lead Rhythm Change   Final Result      ECG 12 Lead Electrolyte Imbalance   Final Result          MEDICATIONS GIVEN IN ER    Medications   promethazine (PHENERGAN) 12.5 mg in sodium chloride 0.9 % 50 mL (has no administration in time range)   sodium bicarbonate tablet 650 mg (650 mg Oral Given 5/12/24 0859)   pantoprazole (PROTONIX) EC tablet 40 mg (40 mg Oral Given 5/12/24 0859)   oxyCODONE-acetaminophen (PERCOCET) 5-325 MG per tablet 1 tablet (1 tablet Oral Given 5/12/24 0904)   sodium chloride 0.9 % flush 10 mL (10 mL Intravenous Given 5/12/24 0902)   sodium chloride 0.9 % flush 10 mL (has no administration in time range)   sodium chloride 0.9 % infusion 40 mL (has no administration in time range)   acetaminophen (TYLENOL) tablet 650 mg (has no administration in time range)     Or   acetaminophen (TYLENOL) 160 MG/5ML oral solution 650 mg (has no administration in time range)     Or   acetaminophen (TYLENOL) suppository 650 mg  (has no administration in time range)   heparin (porcine) 5000 UNIT/ML injection 5,000 Units (5,000 Units Subcutaneous Given 5/12/24 0901)   nitroglycerin (NITROSTAT) SL tablet 0.4 mg (has no administration in time range)   Potassium Replacement - Follow Nurse / BPA Driven Protocol (has no administration in time range)   Magnesium Standard Dose Replacement - Follow Nurse / BPA Driven Protocol (has no administration in time range)   Phosphorus Replacement - Follow Nurse / BPA Driven Protocol (has no administration in time range)   Calcium Replacement - Follow Nurse / BPA Driven Protocol (has no administration in time range)   sennosides-docusate (PERICOLACE) 8.6-50 MG per tablet 2 tablet (has no administration in time range)     And   polyethylene glycol (MIRALAX) packet 17 g (has no administration in time range)     And   bisacodyl (DULCOLAX) EC tablet 5 mg (has no administration in time range)     And   bisacodyl (DULCOLAX) suppository 10 mg (has no administration in time range)   HYDROmorphone (DILAUDID) injection 1 mg (1 mg Intravenous Given 5/12/24 1539)   dextrose (D50W) (25 g/50 mL) IV injection 50 mL (50 mL Intravenous Given 5/12/24 0248)   sodium bicarbonate 8.4 % 150 mEq in dextrose (D5W) 5 % 1,000 mL infusion (greater than 100 mEq) (150 mEq Intravenous New Bag 5/12/24 1227)   nicotine (NICODERM CQ) 21 MG/24HR patch 1 patch (1 patch Transdermal Medication Applied 5/12/24 1227)   ondansetron (ZOFRAN) injection 4 mg (has no administration in time range)   sodium chloride 0.9 % bolus 1,000 mL (0 mL Intravenous Stopped 5/12/24 0230)   morphine injection 4 mg (4 mg Intravenous Given 5/11/24 1840)   ondansetron (ZOFRAN) injection 4 mg (4 mg Intravenous Given 5/11/24 1839)   HYDROmorphone (DILAUDID) injection 1 mg (1 mg Intravenous Given 5/11/24 2120)   calcium gluconate 1000 Mg/50ml 0.675% NaCl IV SOLN (0 mg Intravenous Stopped 5/11/24 2245)   albuterol (PROVENTIL) nebulizer solution 0.083% 2.5 mg/3mL (10 mg  Nebulization Given 5/11/24 2225)   insulin regular (humuLIN R,novoLIN R) injection 5 Units (5 Units Intravenous Given 5/11/24 2203)   dextrose (D50W) (25 g/50 mL) IV injection 25 g (25 g Intravenous Given 5/11/24 2203)   insulin regular (humuLIN R,novoLIN R) injection 10 Units (10 Units Intravenous Given 5/12/24 0249)         MEDICAL DECISION MAKING, PROGRESS, and CONSULTS    All labs, if obtained, have been independently reviewed by me.  All radiology studies, if obtained, have been reviewed by me and the radiologist dictating the report.  All EKG's, if obtained, have been independently viewed and interpreted by me/my attending physician.      Discussion below represents my analysis of pertinent findings related to patient's condition, differential diagnosis, treatment plan and final disposition.    69-year-old female presenting to the emergency department for evaluation of nausea vomiting abdominal pain she has an extensive history of pseudocyst with chronic pancreatitis and multiple hospital admissions due to this.  Her exam shows vital signs are stable as interpreted by me.  She is upright alert oriented communicating normal sentences with notable upper bilateral abdominal tenderness to palpation.  Extensive workup was initiated in the ED.  Patient given morphine Zofran and Dilaudid for analgesia.  Laboratory workup showed patient had a leukocytosis with CBC showing white count of 25.  CMP notable for a potassium of 6.3 indicating hyperkalemia and a CO2 of 4.1.  Vascular access remained a problem ultrasound-guided IV was necessary.  Hyperkalemia treatment was initiated per protocol with calcium, albuterol, insulin.  CT abdomen and pelvis showed findings consistent with pancreatitis.  Patient's lipase was greater than 3000.  Due to concern for her pancreatitis as well as hyperkalemia, I contacted hospital medicine Dr. Kerley who accepted the patient to [facility].                      Differential diagnosis:    Differential diagnosis included but was not limited to acute on chronic pancreatitis, gastritis, GERD, nausea and vomiting, small bowel obstruction, among others      Additional sources:    - Discussed/ obtained information from independent historians: Son at bedside in addition to patient    - External (non-ED) record review: Extensive previous gastroenterology record review and previous hospital admission records    - Chronic or social conditions impacting care: Chronic pancreatitis    Orders placed during this visit:  Orders Placed This Encounter   Procedures    Critical Care    Clostridioides difficile Toxin - Stool, Per Rectum    Clostridioides difficile EIA - Stool, Per Rectum    CT Abdomen Pelvis Without Contrast    Comprehensive Metabolic Panel    Lipase    Urinalysis With Microscopic If Indicated (No Culture) - Urine, Clean Catch    CBC Auto Differential    Scan Slide    Urinalysis, Microscopic Only - Urine, Clean Catch    Basic Metabolic Panel    CBC Auto Differential    Lipid Panel    Lipase    Scan Slide    Comprehensive Metabolic Panel    Diet: Liquid; Clear Liquid; Fluid Consistency: Thin (IDDSI 0)    Vital Signs    Up With Assistance    Intake & Output    Weigh Patient    Oral Care    Maintain IV Access    Telemetry - Place Orders & Notify Provider of Results When Patient Experiences Acute Chest Pain, Dysrhythmia or Respiratory Distress    May Be Off Telemetry for Tests    Notify Provider (With Default Parameters)    Opioid Administration - Document EtCO2 and / or SpO2 With Each Set of Vitals & Any Change in Patient Status    Opioid Administration - Notify Provider Hypercapnic Monitoring    Opioid Administration - Continuous Pulse Oximetry (SpO2)    Code Status and Medical Interventions:    Inpatient Case Management  Consult    Inpatient Gastroenterology Consult    OT Consult: Eval & Treat ADL Performance Below Baseline, Discharge Placement Assessment     PT Consult: Eval & Treat Discharge Placement Assessment, Functional Mobility Below Baseline    POC Glucose Q1H    POC Glucose Once    POC Glucose Once    POC Glucose Once    ECG 12 Lead Electrolyte Imbalance    ECG 12 Lead Rhythm Change    Insert Peripheral IV    Inpatient Admission    Transfer Patient    CBC & Differential         Additional orders considered but not ordered: None      ED Course:    Consultants: Hospital medicine Dr. Kerley                Shared Decision Making:  After my consideration of clinical presentation and any laboratory/radiology studies obtained, I discussed the findings with the patient/patient representative who is in agreement with the treatment plan and the final disposition.   Risks and benefits of discharge and/or observation/admission were discussed.       AS OF 17:30 EDT VITALS:    BP - 168/95  HR - 97  TEMP - 97.7 °F (36.5 °C) (Oral)  O2 SATS - 100%                  DIAGNOSIS  Final diagnoses:   Hyperkalemia   Chronic pancreatitis, unspecified pancreatitis type         DISPOSITION  Admit      Please note that portions of this document were completed with voice recognition software.      Prasad Hull PA-C  05/12/24 1435

## 2024-05-12 PROBLEM — E87.5 HYPERKALEMIA: Status: ACTIVE | Noted: 2024-05-12

## 2024-05-12 LAB
ANION GAP SERPL CALCULATED.3IONS-SCNC: 21.8 MMOL/L (ref 5–15)
BASOPHILS # BLD AUTO: 0.1 10*3/MM3 (ref 0–0.2)
BASOPHILS NFR BLD AUTO: 0.4 % (ref 0–1.5)
BUN SERPL-MCNC: 47 MG/DL (ref 8–23)
BUN/CREAT SERPL: 20.4 (ref 7–25)
CALCIUM SPEC-SCNC: 10.8 MG/DL (ref 8.6–10.5)
CHLORIDE SERPL-SCNC: 106 MMOL/L (ref 98–107)
CHOLEST SERPL-MCNC: 106 MG/DL (ref 0–200)
CO2 SERPL-SCNC: 6.2 MMOL/L (ref 22–29)
CREAT SERPL-MCNC: 2.3 MG/DL (ref 0.57–1)
DEPRECATED RDW RBC AUTO: 64.2 FL (ref 37–54)
EGFRCR SERPLBLD CKD-EPI 2021: 22.5 ML/MIN/1.73
EOSINOPHIL # BLD AUTO: 0 10*3/MM3 (ref 0–0.4)
EOSINOPHIL NFR BLD AUTO: 0 % (ref 0.3–6.2)
ERYTHROCYTE [DISTWIDTH] IN BLOOD BY AUTOMATED COUNT: 14.7 % (ref 12.3–15.4)
GLUCOSE BLDC GLUCOMTR-MCNC: 112 MG/DL (ref 70–130)
GLUCOSE BLDC GLUCOMTR-MCNC: 82 MG/DL (ref 70–130)
GLUCOSE SERPL-MCNC: 95 MG/DL (ref 65–99)
HCT VFR BLD AUTO: 44.3 % (ref 34–46.6)
HDLC SERPL-MCNC: 30 MG/DL (ref 40–60)
HGB BLD-MCNC: 13.7 G/DL (ref 12–15.9)
HYPOCHROMIA BLD QL: NORMAL
IMM GRANULOCYTES # BLD AUTO: 0.56 10*3/MM3 (ref 0–0.05)
IMM GRANULOCYTES NFR BLD AUTO: 2.2 % (ref 0–0.5)
LDLC SERPL CALC-MCNC: 45 MG/DL (ref 0–100)
LDLC/HDLC SERPL: 1.29 {RATIO}
LIPASE SERPL-CCNC: >3000 U/L (ref 13–60)
LYMPHOCYTES # BLD AUTO: 1.47 10*3/MM3 (ref 0.7–3.1)
LYMPHOCYTES NFR BLD AUTO: 5.8 % (ref 19.6–45.3)
MACROCYTES BLD QL SMEAR: NORMAL
MCH RBC QN AUTO: 36.1 PG (ref 26.6–33)
MCHC RBC AUTO-ENTMCNC: 30.9 G/DL (ref 31.5–35.7)
MCV RBC AUTO: 116.9 FL (ref 79–97)
MONOCYTES # BLD AUTO: 2 10*3/MM3 (ref 0.1–0.9)
MONOCYTES NFR BLD AUTO: 7.8 % (ref 5–12)
NEUTROPHILS NFR BLD AUTO: 21.41 10*3/MM3 (ref 1.7–7)
NEUTROPHILS NFR BLD AUTO: 83.8 % (ref 42.7–76)
NRBC BLD AUTO-RTO: 0.1 /100 WBC (ref 0–0.2)
PLAT MORPH BLD: NORMAL
PLATELET # BLD AUTO: 382 10*3/MM3 (ref 140–450)
PMV BLD AUTO: 9.8 FL (ref 6–12)
POTASSIUM SERPL-SCNC: 5.4 MMOL/L (ref 3.5–5.2)
RBC # BLD AUTO: 3.79 10*6/MM3 (ref 3.77–5.28)
SODIUM SERPL-SCNC: 134 MMOL/L (ref 136–145)
TRIGL SERPL-MCNC: 186 MG/DL (ref 0–150)
VLDLC SERPL-MCNC: 31 MG/DL (ref 5–40)
WBC MORPH BLD: NORMAL
WBC NRBC COR # BLD AUTO: 25.54 10*3/MM3 (ref 3.4–10.8)

## 2024-05-12 PROCEDURE — 25010000002 ONDANSETRON PER 1 MG: Performed by: INTERNAL MEDICINE

## 2024-05-12 PROCEDURE — 82948 REAGENT STRIP/BLOOD GLUCOSE: CPT

## 2024-05-12 PROCEDURE — 93005 ELECTROCARDIOGRAM TRACING: CPT | Performed by: STUDENT IN AN ORGANIZED HEALTH CARE EDUCATION/TRAINING PROGRAM

## 2024-05-12 PROCEDURE — 85025 COMPLETE CBC W/AUTO DIFF WBC: CPT | Performed by: STUDENT IN AN ORGANIZED HEALTH CARE EDUCATION/TRAINING PROGRAM

## 2024-05-12 PROCEDURE — 99222 1ST HOSP IP/OBS MODERATE 55: CPT | Performed by: INTERNAL MEDICINE

## 2024-05-12 PROCEDURE — 80048 BASIC METABOLIC PNL TOTAL CA: CPT

## 2024-05-12 PROCEDURE — 25010000002 ONDANSETRON PER 1 MG: Performed by: STUDENT IN AN ORGANIZED HEALTH CARE EDUCATION/TRAINING PROGRAM

## 2024-05-12 PROCEDURE — 25010000002 HEPARIN (PORCINE) PER 1000 UNITS: Performed by: STUDENT IN AN ORGANIZED HEALTH CARE EDUCATION/TRAINING PROGRAM

## 2024-05-12 PROCEDURE — 83690 ASSAY OF LIPASE: CPT | Performed by: STUDENT IN AN ORGANIZED HEALTH CARE EDUCATION/TRAINING PROGRAM

## 2024-05-12 PROCEDURE — 0 DEXTROSE 5 % SOLUTION 1,000 ML FLEX CONT: Performed by: INTERNAL MEDICINE

## 2024-05-12 PROCEDURE — 25810000003 SODIUM CHLORIDE 0.9 % SOLUTION: Performed by: STUDENT IN AN ORGANIZED HEALTH CARE EDUCATION/TRAINING PROGRAM

## 2024-05-12 PROCEDURE — 85007 BL SMEAR W/DIFF WBC COUNT: CPT | Performed by: STUDENT IN AN ORGANIZED HEALTH CARE EDUCATION/TRAINING PROGRAM

## 2024-05-12 PROCEDURE — 80061 LIPID PANEL: CPT | Performed by: STUDENT IN AN ORGANIZED HEALTH CARE EDUCATION/TRAINING PROGRAM

## 2024-05-12 PROCEDURE — 25010000002 HYDROMORPHONE 1 MG/ML SOLUTION: Performed by: STUDENT IN AN ORGANIZED HEALTH CARE EDUCATION/TRAINING PROGRAM

## 2024-05-12 PROCEDURE — 63710000001 INSULIN REGULAR HUMAN PER 5 UNITS: Performed by: STUDENT IN AN ORGANIZED HEALTH CARE EDUCATION/TRAINING PROGRAM

## 2024-05-12 PROCEDURE — 99223 1ST HOSP IP/OBS HIGH 75: CPT | Performed by: STUDENT IN AN ORGANIZED HEALTH CARE EDUCATION/TRAINING PROGRAM

## 2024-05-12 RX ORDER — PANTOPRAZOLE SODIUM 40 MG/1
40 TABLET, DELAYED RELEASE ORAL
Status: DISCONTINUED | OUTPATIENT
Start: 2024-05-12 | End: 2024-05-15 | Stop reason: HOSPADM

## 2024-05-12 RX ORDER — AMOXICILLIN 250 MG
2 CAPSULE ORAL 2 TIMES DAILY PRN
Status: DISCONTINUED | OUTPATIENT
Start: 2024-05-12 | End: 2024-05-15 | Stop reason: HOSPADM

## 2024-05-12 RX ORDER — BISACODYL 5 MG/1
5 TABLET, DELAYED RELEASE ORAL DAILY PRN
Status: DISCONTINUED | OUTPATIENT
Start: 2024-05-12 | End: 2024-05-15 | Stop reason: HOSPADM

## 2024-05-12 RX ORDER — BISACODYL 10 MG
10 SUPPOSITORY, RECTAL RECTAL DAILY PRN
Status: DISCONTINUED | OUTPATIENT
Start: 2024-05-12 | End: 2024-05-15 | Stop reason: HOSPADM

## 2024-05-12 RX ORDER — ONDANSETRON 2 MG/ML
4 INJECTION INTRAMUSCULAR; INTRAVENOUS EVERY 6 HOURS PRN
Status: DISCONTINUED | OUTPATIENT
Start: 2024-05-12 | End: 2024-05-12

## 2024-05-12 RX ORDER — NICOTINE 21 MG/24HR
1 PATCH, TRANSDERMAL 24 HOURS TRANSDERMAL
Status: DISCONTINUED | OUTPATIENT
Start: 2024-05-12 | End: 2024-05-15 | Stop reason: HOSPADM

## 2024-05-12 RX ORDER — ONDANSETRON 2 MG/ML
4 INJECTION INTRAMUSCULAR; INTRAVENOUS EVERY 8 HOURS
Status: DISCONTINUED | OUTPATIENT
Start: 2024-05-12 | End: 2024-05-12

## 2024-05-12 RX ORDER — ONDANSETRON 2 MG/ML
4 INJECTION INTRAMUSCULAR; INTRAVENOUS EVERY 8 HOURS
Status: COMPLETED | OUTPATIENT
Start: 2024-05-12 | End: 2024-05-14

## 2024-05-12 RX ORDER — SERTRALINE HYDROCHLORIDE 25 MG/1
25 TABLET, FILM COATED ORAL DAILY
COMMUNITY
End: 2024-05-22

## 2024-05-12 RX ORDER — DEXTROSE MONOHYDRATE 25 G/50ML
50 INJECTION, SOLUTION INTRAVENOUS
Status: DISCONTINUED | OUTPATIENT
Start: 2024-05-12 | End: 2024-05-15 | Stop reason: HOSPADM

## 2024-05-12 RX ORDER — NITROGLYCERIN 0.4 MG/1
0.4 TABLET SUBLINGUAL
Status: DISCONTINUED | OUTPATIENT
Start: 2024-05-12 | End: 2024-05-15 | Stop reason: HOSPADM

## 2024-05-12 RX ORDER — ACETAMINOPHEN 160 MG/5ML
650 SOLUTION ORAL EVERY 4 HOURS PRN
Status: DISCONTINUED | OUTPATIENT
Start: 2024-05-12 | End: 2024-05-15 | Stop reason: HOSPADM

## 2024-05-12 RX ORDER — ACETAMINOPHEN 650 MG/1
650 SUPPOSITORY RECTAL EVERY 4 HOURS PRN
Status: DISCONTINUED | OUTPATIENT
Start: 2024-05-12 | End: 2024-05-15 | Stop reason: HOSPADM

## 2024-05-12 RX ORDER — SODIUM CHLORIDE 9 MG/ML
40 INJECTION, SOLUTION INTRAVENOUS AS NEEDED
Status: DISCONTINUED | OUTPATIENT
Start: 2024-05-12 | End: 2024-05-15 | Stop reason: HOSPADM

## 2024-05-12 RX ORDER — SODIUM CHLORIDE 0.9 % (FLUSH) 0.9 %
10 SYRINGE (ML) INJECTION AS NEEDED
Status: DISCONTINUED | OUTPATIENT
Start: 2024-05-12 | End: 2024-05-15 | Stop reason: HOSPADM

## 2024-05-12 RX ORDER — SODIUM CHLORIDE 9 MG/ML
150 INJECTION, SOLUTION INTRAVENOUS CONTINUOUS
Status: DISCONTINUED | OUTPATIENT
Start: 2024-05-12 | End: 2024-05-12

## 2024-05-12 RX ORDER — HEPARIN SODIUM 5000 [USP'U]/ML
5000 INJECTION, SOLUTION INTRAVENOUS; SUBCUTANEOUS EVERY 12 HOURS SCHEDULED
Status: DISCONTINUED | OUTPATIENT
Start: 2024-05-12 | End: 2024-05-15 | Stop reason: HOSPADM

## 2024-05-12 RX ORDER — OXYCODONE HYDROCHLORIDE AND ACETAMINOPHEN 5; 325 MG/1; MG/1
1 TABLET ORAL EVERY 6 HOURS PRN
Status: DISCONTINUED | OUTPATIENT
Start: 2024-05-12 | End: 2024-05-14

## 2024-05-12 RX ORDER — SODIUM BICARBONATE 650 MG/1
650 TABLET ORAL 2 TIMES DAILY
Status: DISCONTINUED | OUTPATIENT
Start: 2024-05-12 | End: 2024-05-15 | Stop reason: HOSPADM

## 2024-05-12 RX ORDER — SODIUM CHLORIDE 0.9 % (FLUSH) 0.9 %
10 SYRINGE (ML) INJECTION EVERY 12 HOURS SCHEDULED
Status: DISCONTINUED | OUTPATIENT
Start: 2024-05-12 | End: 2024-05-15 | Stop reason: HOSPADM

## 2024-05-12 RX ORDER — ACETAMINOPHEN 325 MG/1
650 TABLET ORAL EVERY 4 HOURS PRN
Status: DISCONTINUED | OUTPATIENT
Start: 2024-05-12 | End: 2024-05-15 | Stop reason: HOSPADM

## 2024-05-12 RX ORDER — POLYETHYLENE GLYCOL 3350 17 G/17G
17 POWDER, FOR SOLUTION ORAL DAILY PRN
Status: DISCONTINUED | OUTPATIENT
Start: 2024-05-12 | End: 2024-05-15 | Stop reason: HOSPADM

## 2024-05-12 RX ADMIN — OXYCODONE HYDROCHLORIDE AND ACETAMINOPHEN 1 TABLET: 5; 325 TABLET ORAL at 23:54

## 2024-05-12 RX ADMIN — HEPARIN SODIUM 5000 UNITS: 5000 INJECTION INTRAVENOUS; SUBCUTANEOUS at 02:48

## 2024-05-12 RX ADMIN — SODIUM BICARBONATE 650 MG TABLET 650 MG: at 23:54

## 2024-05-12 RX ADMIN — SODIUM CHLORIDE 150 ML/HR: 9 INJECTION, SOLUTION INTRAVENOUS at 04:13

## 2024-05-12 RX ADMIN — ONDANSETRON 4 MG: 2 INJECTION INTRAMUSCULAR; INTRAVENOUS at 11:19

## 2024-05-12 RX ADMIN — Medication 1 PATCH: at 12:27

## 2024-05-12 RX ADMIN — HYDROMORPHONE HYDROCHLORIDE 1 MG: 1 INJECTION, SOLUTION INTRAMUSCULAR; INTRAVENOUS; SUBCUTANEOUS at 18:40

## 2024-05-12 RX ADMIN — PANTOPRAZOLE SODIUM 40 MG: 40 TABLET, DELAYED RELEASE ORAL at 08:59

## 2024-05-12 RX ADMIN — Medication 10 ML: at 02:49

## 2024-05-12 RX ADMIN — OXYCODONE HYDROCHLORIDE AND ACETAMINOPHEN 1 TABLET: 5; 325 TABLET ORAL at 02:49

## 2024-05-12 RX ADMIN — OXYCODONE HYDROCHLORIDE AND ACETAMINOPHEN 1 TABLET: 5; 325 TABLET ORAL at 09:04

## 2024-05-12 RX ADMIN — SODIUM BICARBONATE 150 MEQ: 84 INJECTION, SOLUTION INTRAVENOUS at 19:26

## 2024-05-12 RX ADMIN — HYDROMORPHONE HYDROCHLORIDE 1 MG: 1 INJECTION, SOLUTION INTRAMUSCULAR; INTRAVENOUS; SUBCUTANEOUS at 05:58

## 2024-05-12 RX ADMIN — ONDANSETRON 4 MG: 2 INJECTION INTRAMUSCULAR; INTRAVENOUS at 18:40

## 2024-05-12 RX ADMIN — HEPARIN SODIUM 5000 UNITS: 5000 INJECTION INTRAVENOUS; SUBCUTANEOUS at 23:54

## 2024-05-12 RX ADMIN — HEPARIN SODIUM 5000 UNITS: 5000 INJECTION INTRAVENOUS; SUBCUTANEOUS at 09:01

## 2024-05-12 RX ADMIN — HYDROMORPHONE HYDROCHLORIDE 1 MG: 1 INJECTION, SOLUTION INTRAMUSCULAR; INTRAVENOUS; SUBCUTANEOUS at 15:39

## 2024-05-12 RX ADMIN — SODIUM BICARBONATE 150 MEQ: 84 INJECTION, SOLUTION INTRAVENOUS at 12:27

## 2024-05-12 RX ADMIN — HYDROMORPHONE HYDROCHLORIDE 1 MG: 1 INJECTION, SOLUTION INTRAMUSCULAR; INTRAVENOUS; SUBCUTANEOUS at 11:19

## 2024-05-12 RX ADMIN — Medication 10 ML: at 09:02

## 2024-05-12 RX ADMIN — SODIUM BICARBONATE 650 MG TABLET 650 MG: at 08:59

## 2024-05-12 RX ADMIN — DEXTROSE MONOHYDRATE 50 ML: 25 INJECTION, SOLUTION INTRAVENOUS at 02:48

## 2024-05-12 RX ADMIN — INSULIN HUMAN 10 UNITS: 100 INJECTION, SOLUTION PARENTERAL at 02:49

## 2024-05-12 RX ADMIN — PANTOPRAZOLE SODIUM 40 MG: 40 TABLET, DELAYED RELEASE ORAL at 18:40

## 2024-05-12 RX ADMIN — SODIUM CHLORIDE 150 ML/HR: 9 INJECTION, SOLUTION INTRAVENOUS at 10:48

## 2024-05-12 NOTE — CONSULTS
In Patient Consult      Date of Consultation: May 12, 2024  Patient Name: Tasha Yarbrough  MRN: [id number]  : 1955     Referring provider: Mateo Storm DO    Primary care provider:  Terrence Baldwin MD    Reason for consultation: Recurrent pancreatitis    History of Present Illness: This is a 68-year-old female patient with a prior history of hypertension hyperlipidemia, history of C. difficile colitis, chronic constipation, CKD stage III, history of recurrent pancreatitis, history of pancreatic pseudocyst, tobacco abuse was brought in emergency room on 2024 with complaints of acute mid abdominal pain with nausea vomiting.    She states that she was doing okay until yesterday morning when she started developing nausea followed by pain mainly in the mid and upper abdomen.  Later on had a multiple vomiting and also had few loose stools without any blood.  As symptoms did not improve came to emergency room for evaluation.  She has been in and out of the hospital with recurrent pancreatitis since 2024.  All of symptoms started in 2023 when she was admitted at [facility] with acute pancreatitis.  There was a concern for choledocholithiasis with a CT scan on 2023 for which she underwent EGD and ERCP by Dr. Uriostegui which did not show any obvious stone however there was some sludge in the bile duct.  Her cholangiogram was normal.  Later on she developed C. difficile colitis which was treated with vancomycin followed by fidaxomicin.  Subsequently in February she was admitted with a pancreatitis with retroperitoneal effusion.  She developed pancreatic pseudocyst.  Her follow-up CT scan done on 3/16/2024 revealed worsening acute pancreatitis.  CT abdomen pelvis  on 3/23/2024 revealed 2 enhancing fluid collections abutting the pancreas reflecting pseudocyst.  CT abdomen pelvis on 2024 revealed a pseudocyst but size improved compared to prior CT.  CT pelvis done with  contrast on 4/8/2024 revealed acute pancreatitis with possible pancreatic pseudocyst size improved compared to prior CT .      She had a recent admission in April again with abdominal pain nausea vomiting.  EGD done by Dr. Gamble revealed only mild gastritis otherwise unremarkable.  This admission she was noted to have a severe acidosis with CO2 of 4.1 potassium 6.3 increasing BUN 43 pregnant 2.2 .  Lipase was 3000.  Her WBC where 15,000 with hemoglobin 13.8.  CT abdomen pelvis done again revealed signs of recurrent pancreatitis without any significant pseudocyst.  GI was consulted.    Subjective     Past Medical History:   Diagnosis Date    Hypertension     Impaired mobility     Injury of back     Pancreatitis        Past Surgical History:   Procedure Laterality Date    ABDOMINAL SURGERY      COLON SURGERY      COLONOSCOPY      ENDOSCOPY N/A 4/23/2024    Procedure: ESOPHAGOGASTRODUODENOSCOPY WITH BIOPSY;  Surgeon: Jairo Negro MD;  Location: [facility] ENDOSCOPY;  Service: Gastroenterology;  Laterality: N/A;    TUBAL ABDOMINAL LIGATION         History reviewed. No pertinent family history.    Social History     Socioeconomic History    Marital status:    Tobacco Use    Smoking status: Every Day     Current packs/day: 1.00     Types: Cigarettes    Smokeless tobacco: Never   Vaping Use    Vaping status: Never Used   Substance and Sexual Activity    Alcohol use: Never    Drug use: Never    Sexual activity: Defer         Current Facility-Administered Medications:     acetaminophen (TYLENOL) tablet 650 mg, 650 mg, Oral, Q4H PRN **OR** acetaminophen (TYLENOL) 160 MG/5ML oral solution 650 mg, 650 mg, Oral, Q4H PRN **OR** acetaminophen (TYLENOL) suppository 650 mg, 650 mg, Rectal, Q4H PRN, Kerley, Brian Joseph, DO    sennosides-docusate (PERICOLACE) 8.6-50 MG per tablet 2 tablet, 2 tablet, Oral, BID PRN **AND** polyethylene glycol (MIRALAX) packet 17 g, 17 g, Oral, Daily PRN **AND** bisacodyl (DULCOLAX) EC tablet 5 mg,  5 mg, Oral, Daily PRN **AND** bisacodyl (DULCOLAX) suppository 10 mg, 10 mg, Rectal, Daily PRN, Kerley, Brian Joseph, DO    Calcium Replacement - Follow Nurse / BPA Driven Protocol, , Does not apply, PRN, Kerley, Brian Joseph, DO    dextrose (D50W) (25 g/50 mL) IV injection 50 mL, 50 mL, Intravenous, Q1H PRN, Kerley, Brian Joseph, DO, 50 mL at 05/12/24 0248    heparin (porcine) 5000 UNIT/ML injection 5,000 Units, 5,000 Units, Subcutaneous, Q12H, Kerley, Brian Joseph, DO, 5,000 Units at 05/12/24 0901    HYDROmorphone (DILAUDID) injection 1 mg, 1 mg, Intravenous, Q3H PRN, Kerley, Brian Joseph, DO, 1 mg at 05/12/24 1119    Magnesium Standard Dose Replacement - Follow Nurse / BPA Driven Protocol, , Does not apply, PRN, Kerley, Brian Joseph, DO    nicotine (NICODERM CQ) 21 MG/24HR patch 1 patch, 1 patch, Transdermal, Q24H, Tyree Fleming MD, 1 patch at 05/12/24 1227    nitroglycerin (NITROSTAT) SL tablet 0.4 mg, 0.4 mg, Sublingual, Q5 Min PRN, Kerley, Brian Joseph, DO    ondansetron (ZOFRAN) injection 4 mg, 4 mg, Intravenous, Q8H, Mateo Storm DO    oxyCODONE-acetaminophen (PERCOCET) 5-325 MG per tablet 1 tablet, 1 tablet, Oral, Q6H PRN, Kerley, Brian Joseph, DO, 1 tablet at 05/12/24 0904    pantoprazole (PROTONIX) EC tablet 40 mg, 40 mg, Oral, BID AC, Kerley, Brian Joseph, DO, 40 mg at 05/12/24 0859    Phosphorus Replacement - Follow Nurse / BPA Driven Protocol, , Does not apply, PRN, Kerley, Brian Joseph, DO    Potassium Replacement - Follow Nurse / BPA Driven Protocol, , Does not apply, PRN, Kerley, Brian Joseph, DO    promethazine (PHENERGAN) 12.5 mg in sodium chloride 0.9 % 50 mL, 12.5 mg, Intravenous, Q6H PRN, Kerley, Brian Joseph,     sodium bicarbonate 8.4 % 150 mEq in dextrose (D5W) 5 % 1,000 mL infusion (greater than 100 mEq), 150 mEq, Intravenous, Continuous, Mateo Storm DO, Last Rate: 125 mL/hr at 05/12/24 1227, 150 mEq at 05/12/24 1227    sodium bicarbonate tablet 650 mg, 650 mg,  Oral, BID, Kerley, Brian Joseph, DO, 650 mg at 05/12/24 0859    sodium chloride 0.9 % flush 10 mL, 10 mL, Intravenous, Q12H, Kerley, Brian Joseph, DO, 10 mL at 05/12/24 0902    sodium chloride 0.9 % flush 10 mL, 10 mL, Intravenous, PRN, Kerley, Brian Joseph, DO    sodium chloride 0.9 % infusion 40 mL, 40 mL, Intravenous, PRN, Kerley, Brian Joseph, DO    Allergies   Allergen Reactions    Rocephin [Ceftriaxone] Anaphylaxis    Ciprofloxacin Dizziness    Codeine Itching    Pineapple Unknown - Low Severity       Review of Systems   Constitutional:  Negative for appetite change, fatigue, fever and unexpected weight loss.   HENT:  Negative for trouble swallowing.    Gastrointestinal:  Positive for abdominal pain, diarrhea, nausea and vomiting. Negative for abdominal distention, anal bleeding, blood in stool, constipation, rectal pain, GERD and indigestion.        The following portions of the patient's history were reviewed and updated as appropriate: allergies, current medications, past family history, past medical history, past social history, past surgical history and problem list.    Objective     Vitals:    05/12/24 0359 05/12/24 0537 05/12/24 0725 05/12/24 1110   BP: 140/84      BP Location:       Patient Position:       Pulse: 106      Resp: 16      Temp:   97.8 °F (36.6 °C) 98.3 °F (36.8 °C)   TempSrc:   Oral Oral   SpO2: 99%      Weight:  58.3 kg (128 lb 8.5 oz)     Height:           Physical Exam  Constitutional:       Appearance: Normal appearance.   HENT:      Head: Normocephalic and atraumatic.   Eyes:      Conjunctiva/sclera: Conjunctivae normal.   Abdominal:      General: Abdomen is flat. There is no distension.      Palpations: There is no mass.      Tenderness: There is abdominal tenderness (Minimal tenderness diffuse mid and upper abdomen). There is no guarding or rebound.      Hernia: No hernia is present.   Musculoskeletal:      Cervical back: Normal range of motion and neck supple.   Neurological:       Mental Status: She is alert.         Results from last 7 days   Lab Units 05/12/24  0619 05/12/24  0045 05/11/24 2039 05/11/24  1843   SODIUM mmol/L  --  134* 136  --    POTASSIUM mmol/L  --  5.4* 6.3*  --    CHLORIDE mmol/L  --  106 109*  --    CO2 mmol/L  --  6.2* 4.1*  --    BUN mg/dL  --  47* 43*  --    CREATININE mg/dL  --  2.30* 2.22*  --    CALCIUM mg/dL  --  10.8* 10.3  --    ALBUMIN g/dL  --   --  4.2  --    BILIRUBIN mg/dL  --   --  <0.2  --    ALK PHOS U/L  --   --  165*  --    ALT (SGPT) U/L  --   --  8  --    AST (SGOT) U/L  --   --  11  --    GLUCOSE mg/dL  --  95 91  --    WBC 10*3/mm3 25.54*  --   --  15.86*   HEMOGLOBIN g/dL 13.7  --   --  13.8   PLATELETS 10*3/mm3 382  --   --  430       Imaging Results (Last 24 Hours)       Procedure Component Value Units Date/Time    CT Abdomen Pelvis Without Contrast [[id number]] Collected: 05/11/24 2321     Updated: 05/11/24 2337    Narrative:      FINAL REPORT    TECHNIQUE:  Axial CT images were performed from the lung bases through the  symphysis pubis without IV contrast.  This study was performed  with techniques to keep radiation doses as low as reasonably  achievable (ALARA). Individualized dose reduction techniques  using automated exposure control or adjustment of mA and/or kV  according to the patient's size were employed.    CLINICAL HISTORY:  diffuse abdominal pain and vomiting, patient has known  pancreatic pseudocyst    COMPARISON:  4/28/2024    FINDINGS:  Lack of intravenous contrast limits evaluation of solid  abdominal and pelvic organs.  LOWER CHEST: The heart is normal  size.  The lung bases are clear. There is a moderate hiatal  hernia.  ABDOMEN/PELVIS:  Liver, gallbladder and bile ducts: The  unenhanced liver is grossly unremarkable without focal  abnormality.  There has been a prior cholecystectomy.  There is  no definite biliary duct dilatation.  Adrenal glands: The  adrenal glands are morphologically unremarkable without  suspicious  lesion.  Kidneys, ureter and urinary bladder: There  are nonobstructing right kidney stones.  No hydronephrosis.  Urinary bladder is unremarkable.  Spleen: The spleen is normal  size.  Pancreas: There is persistent diffuse peripancreatic  inflammation.  There is a small fluid collection adjacent to the  tail of the pancreas.  GI systems and mesentery: No evidence of  bowel obstruction.  The appendix is not visualized but there are  no secondary signs of appendicitis.  No significant mesenteric  inflammation.  Lymph nodes: No definite pathologically enlarged  abdominal or pelvic lymph nodes present within the limits of  this noncontrast enhanced exam.  Vessels: The abdominal aorta is  normal in caliber.  The inferior vena cava is unremarkable.  Peritoneum: No free intraperitoneal fluid or pneumoperitoneum.  Pelvic viscera: No acute findings.  Body wall: No body wall  contusion.  Bones: No acute fracture.      Impression:      Findings remain compatible with pancreatitis.    Authenticated and Electronically Signed by Jens Karimi MD on  05/11/2024 11:36:09 PM            EGD: 4/23/2024; Dr. Montano  No obvious etiology noted on today's exam to explain the severity of her abdominal pain. Biopsies were taken with a cold forceps for evaluation of celiac disease. Biopsies were taken with a cold forceps for Helicobacter pylori testing.    Assessment / Plan      Assessment/Recommendations:     Recurrent pancreatitis-presumed initial episode gallstones pancreatitis  Resolving pancreatic pseudocyst  Tobacco abuse  History of C. difficile colitis  Diarrhea with suspected recurrent C. difficile colitis  VIVIENNE on CKD with hyperkalemia  Metabolic acidosis    Initially acute pancreatitis was in September 2023 was presumed gallstone pancreatitis.  Initial CT scan in September 2023 showing possible CBD stone.  She had an ERCP showing biliary sludge, cholangiogram was normal in September 2023.  Subsequently had a lap catalino.  She is  nonalcoholic.  Not on any medication that could cause pancreatitis.  Triglycerides borderline.  IgG4 was normal.  She had multiple episodes of acute pancreatitis since then.  She had a prior pancreatic pseudocyst that appears to be resolved now.    This admission patient appears to be having again acute episode of pancreatitis.  No significant signs of pseudocyst.  Findings looks much better than previous CT scans.  Her ongoing smoking possibly contributing to her acute pancreatitis episodes. Given her leukocytosis and diarrhea C. difficile need to be ruled out    Conservative therapy  IV fluid hydration with Ringer lactate 150 mill per hour for 24 hours and reassess  Correct hyperkalemia and acidosis  Zofran 4 mg IV Q8 for 48 hours followed by as needed  Analgesics as appropriate  Stool for C. difficile colitis  Patient needs an MRI scan pancreas with and without with MRCP for further evaluation of any intrinsic pancreatic pathology however due to her CKD good recommend EUS as OP (unable to give contrast due to CKD for MRI scan)  Patient has an appointment at  GI on the 22nd of this month to keep the appointment  Patient did not keep her follow-up appointment now in our office and would recommend to follow-up with GI at   She needs a OP colonoscopy  Abstinence from smoking which may contribute to her recurrent pancreatitis episode      8. History of chronic idiopathic constipation versus IBS with constipation  9. Status post partial colectomy  ??  History of diverticular perforation 17 years ago and had a partial colectomy details unknown.  No prior colonoscopy.  Patient did not keep up the appointment for colonoscopy from the office.  Recommend screening colonoscopy as OP at upper GI at      Thank you very much for letting me participate in the care of this patient.  Please do not hesitate to call me if you have any questions.      Tyree Fleming MD  [facility] [place]  5/12/2024  12:36  EDT    Please note that portions of this note may have been completed with a voice recognition program.

## 2024-05-12 NOTE — PLAN OF CARE
Goal Outcome Evaluation:            No falls.  Pt just transferred from ER at approx 0530 to icu

## 2024-05-12 NOTE — PROGRESS NOTES
[facility]    Name:  Tasha Yarbrough   Age:  69 y.o.  Sex:  female  :  1955  MRN:  [id number]   Visit Number:  [id number]  Admission Date:  2024  Date Of Service:  24  Primary Care Physician:  Terrence Baldwin MD     LOS: 0 days :    Chief Complaint:      Abdominal pain    Subjective:    24: Improving abdominal pain. Tolerating clears a little. Change fluids to bicarb.    History Of Presenting Illness:       Tasha Yarbrough is a 69-year-old woman with past medical history of hypertension, recurrent pancreatitis, pancreatic pseudocyst, gastritis, CKD 3(?).  Presented to [facility] ED on 2024 with concern for nausea, vomiting, abdominal pain onset 24 hours.  Also with diarrhea.  She reports that she has a pancreatic pseudocyst which is caused her continue with similar symptoms over the last several months.  She follows with  gastroenterology.  Denies fevers or chills, chest pain, shortness of air, syncope.     ED summary: Afebrile, vital signs stable on room air.  Potassium 6.3, CO2 4.1, anion gap 22, creatinine 2.22, blood glucose 103, lipase over 3000, leukocytosis 15.  Pattern of infection on urinalysis, may be contaminant.  CT ab/pelvis findings remain compatible with pancreatitis.  He was provided albuterol, calcium gluconate, D50 W, Dilaudid, insulin, morphine, Zofran, Phenergan, 1 L NS bolus.  Edited by: Mateo Storm DO at 2024 1102     Review of Systems:     All systems were reviewed and negative except as mentioned in subjective, assessment and plan.    Vital Signs:    Temp:  [97.8 °F (36.6 °C)-98.2 °F (36.8 °C)] 97.8 °F (36.6 °C)  Heart Rate:  [] 106  Resp:  [16-20] 16  BP: (140-167)/(81-99) 140/84    Intake and output:    I/O last 3 completed shifts:  In: 1268 [I.V.:218; IV Piggyback:1050]  Out: 100 [Urine:100]  No intake/output data recorded.    Physical Examination:    Constitutional:       General: She is not in acute  distress.     Appearance: She is ill-appearing. She is not toxic-appearing.   HENT:      Mouth/Throat:      Mouth: Mucous membranes are moist.   Eyes:      Extraocular Movements: Extraocular movements intact.   Cardiovascular:      Rate and Rhythm: Normal rate and regular rhythm.      Pulses: Normal pulses.      Heart sounds: Normal heart sounds.   Pulmonary:      Effort: Pulmonary effort is normal.      Breath sounds: Normal breath sounds.   Abdominal:      Palpations: Abdomen is soft.      Tenderness: There is abdominal tenderness.   Musculoskeletal:      Right lower leg: No edema.      Left lower leg: No edema.   Skin:     General: Skin is warm.      Capillary Refill: Capillary refill takes less than 2 seconds.   Neurological:      General: No focal deficit present.      Mental Status: She is alert and oriented to person, place, and time.   Psychiatric:         Mood and Affect: Mood normal.         Thought Content: Thought content normal.   Edited by: Mateo Storm DO at 5/12/2024 1056     Laboratory results:    Results from last 7 days   Lab Units 05/12/24  0045 05/11/24  2039   SODIUM mmol/L 134* 136   POTASSIUM mmol/L 5.4* 6.3*   CHLORIDE mmol/L 106 109*   CO2 mmol/L 6.2* 4.1*   BUN mg/dL 47* 43*   CREATININE mg/dL 2.30* 2.22*   CALCIUM mg/dL 10.8* 10.3   BILIRUBIN mg/dL  --  <0.2   ALK PHOS U/L  --  165*   ALT (SGPT) U/L  --  8   AST (SGOT) U/L  --  11   GLUCOSE mg/dL 95 91     Results from last 7 days   Lab Units 05/12/24  0619 05/11/24  1843   WBC 10*3/mm3 25.54* 15.86*   HEMOGLOBIN g/dL 13.7 13.8   HEMATOCRIT % 44.3 43.4   PLATELETS 10*3/mm3 382 430                 Recent Labs     02/27/24  0522 04/21/24  0015 04/21/24  0919   PHART 7.305* 7.158* 7.199*   DNR7HBU 30.1* 26.4* 25.5*   PO2ART 90.6 104.0* 97.9   IMX3TGI 15.0* 9.4* 9.9*   BASEEXCESS -10.3* -17.9* -16.6*      I have reviewed the patient's laboratory results.    Radiology results:    CT Abdomen Pelvis Without Contrast    Result Date:  5/11/2024  FINAL REPORT TECHNIQUE: Axial CT images were performed from the lung bases through the symphysis pubis without IV contrast.  This study was performed with techniques to keep radiation doses as low as reasonably achievable (ALARA). Individualized dose reduction techniques using automated exposure control or adjustment of mA and/or kV according to the patient's size were employed. CLINICAL HISTORY: diffuse abdominal pain and vomiting, patient has known pancreatic pseudocyst COMPARISON: 4/28/2024 FINDINGS: Lack of intravenous contrast limits evaluation of solid abdominal and pelvic organs.  LOWER CHEST: The heart is normal size.  The lung bases are clear. There is a moderate hiatal hernia.  ABDOMEN/PELVIS:  Liver, gallbladder and bile ducts: The unenhanced liver is grossly unremarkable without focal abnormality.  There has been a prior cholecystectomy.  There is no definite biliary duct dilatation.  Adrenal glands: The adrenal glands are morphologically unremarkable without suspicious lesion.  Kidneys, ureter and urinary bladder: There are nonobstructing right kidney stones.  No hydronephrosis. Urinary bladder is unremarkable.  Spleen: The spleen is normal size.  Pancreas: There is persistent diffuse peripancreatic inflammation.  There is a small fluid collection adjacent to the tail of the pancreas.  GI systems and mesentery: No evidence of bowel obstruction.  The appendix is not visualized but there are no secondary signs of appendicitis.  No significant mesenteric inflammation.  Lymph nodes: No definite pathologically enlarged abdominal or pelvic lymph nodes present within the limits of this noncontrast enhanced exam.  Vessels: The abdominal aorta is normal in caliber.  The inferior vena cava is unremarkable. Peritoneum: No free intraperitoneal fluid or pneumoperitoneum. Pelvic viscera: No acute findings.  Body wall: No body wall contusion.  Bones: No acute fracture.     Impression: Findings remain  compatible with pancreatitis. Authenticated and Electronically Signed by Jens Karimi MD on 05/11/2024 11:36:09 PM   I have reviewed the patient's radiology reports.    Medication Review:     I have reviewed the patient's active and prn medications.     Problem List:      Hyperkalemia      Assessment/Plan:    Pancreatitis, acute on chronic  Hyperkalemia  VIVIENNE/CKD  CT and lipase confirm pancreatitis. VIVIENNE, AG acidosis, and hyperkalemia are secondary to this. Clear liquids.  D5 bicarb.  Monitor and temporize potassium as needed.  Pain and nausea control.  Lipid panel ok. GI consulted recommendations appreciated.    Disposition: under evaluation    Prophylaxis: heparin    Edited by: Mateo Storm DO at 5/12/2024 1104     DVT Prophylaxis: heparin  Code Status:   Code Status and Medical Interventions:   Ordered at: 05/12/24 0159     Code Status (Patient has no pulse and is not breathing):    CPR (Attempt to Resuscitate)     Medical Interventions (Patient has pulse or is breathing):    Full Support      Diet:   Dietary Orders (From admission, onward)       Start     Ordered    05/12/24 0202  Diet: Liquid; Clear Liquid; Fluid Consistency: Thin (IDDSI 0)  Diet Effective Now        References:    Diet Order [facility]   Question Answer Comment   Diets: Liquid    Liquid Diet: Clear Liquid    Fluid Consistency: Thin (IDDSI 0)        05/12/24 0201                   Discharge Plan: anticipate home in 2 to 3 days    Mateo Storm DO  05/12/24  11:04 EDT    Dictated utilizing Dragon dictation.

## 2024-05-12 NOTE — CASE MANAGEMENT/SOCIAL WORK
Discharge Planning Assessment  [facility]     Patient Name: Tasha Yarbrough  MRN: [id number]  Today's Date: 5/12/2024    Admit Date: 5/11/2024    Plan: Home with family   Discharge Needs Assessment       Row Name 05/12/24 0834       Living Environment    People in Home child(debra), adult    Current Living Arrangements home    Potentially Unsafe Housing Conditions none    In the past 12 months has the electric, gas, oil, or water company threatened to shut off services in your home? No    Primary Care Provided by self    Provides Primary Care For no one    Able to Return to Prior Arrangements yes       Resource/Environmental Concerns    Resource/Environmental Concerns none    Transportation Concerns none       Transportation Needs    In the past 12 months, has lack of transportation kept you from medical appointments or from getting medications? no    In the past 12 months, has lack of transportation kept you from meetings, work, or from getting things needed for daily living? No       Food Insecurity    Within the past 12 months, you worried that your food would run out before you got the money to buy more. Sometimes    Within the past 12 months, the food you bought just didn't last and you didn't have money to get more. Sometimes       Transition Planning    Patient/Family Anticipates Transition to home with family    Patient/Family Anticipated Services at Transition none    Transportation Anticipated family or friend will provide       Discharge Needs Assessment    Readmission Within the Last 30 Days no previous admission in last 30 days    Equipment Currently Used at Home cane, straight;wheelchair;bath bench;commode    Concerns to be Addressed no discharge needs identified    Anticipated Changes Related to Illness none    Equipment Needed After Discharge none                   Discharge Plan       Row Name 05/12/24 0835       Plan    Plan Home with family    Patient/Family in Agreement with Plan yes    Plan Comments  Met with patient at bedside.Verified patient's address, phone number, contacts, physician and pharmacy. Patient has adequate transportation. Reports no financial or food insecurity. Patient lives with her 2 sons. She uses [facility] pharmacy, agreeable to meds to bed. Patient has a walker, shower chair, BSC and wheelchair. She plans to discharge home with her sons once medically ready. States no additional needs at this time.    Final Discharge Disposition Code 01 - home or self-care                  Continued Care and Services - Admitted Since 5/11/2024    No active coordination exists for this encounter.          Demographic Summary       Row Name 05/12/24 0833       General Information    Admission Type inpatient    Arrived From emergency department    Required Notices Provided Important Message from Medicare    Referral Source admission list    Reason for Consult discharge planning    Preferred Language English       Contact Information    Permission Granted to Share Info With                    Functional Status       Row Name 05/12/24 0833       Functional Status    Usual Activity Tolerance moderate    Current Activity Tolerance moderate       Physical Activity    On average, how many days per week do you engage in moderate to strenuous exercise (like a brisk walk)? 0 days    On average, how many minutes do you engage in exercise at this level? 0 min    Number of minutes of exercise per week 0       Assessment of Health Literacy    How often do you have someone help you read hospital materials? Never    How often do you have problems learning about your medical condition because of difficulty understanding written information? Never    How often do you have a problem understanding what is told to you about your medical condition? Never    How confident are you filling out medical forms by yourself? Extremely    Health Literacy Excellent       Functional Status, IADL    Medications assistive equipment     Meal Preparation assistive equipment    Housekeeping assistive equipment    Laundry assistive equipment    Shopping assistive equipment                   Psychosocial       Row Name 05/12/24 0834       Values/Beliefs    Spiritual, Cultural Beliefs, Rastafari Practices, Values that Affect Care no       Mental Health    Little Interest or Pleasure in Doing Things 0-->not at all    Feeling Down, Depressed or Hopeless 1-->several days       Stress    Do you feel stress - tense, restless, nervous, or anxious, or unable to sleep at night because your mind is troubled all the time - these days? Only a littl       Coping/Stress    Major Change/Loss/Stressor illness    Patient Personal Strengths able to adapt;resilient    Techniques to [place] with Loss/Stress/Change diversional activities    Reaction to Health Status accepting    Understanding of Condition and Treatment adequate understanding of medical condition;adequate understanding of treatment       Developmental Stage (Eriksson's)    Developmental Stage Stage 8 (65 years-death/Late Adulthood) Integrity vs. Despair       C-SSRS (Recent)    Q1 Wished to be Dead (Past Month) no    Q2 Suicidal Thoughts (Past Month) no    Q6 Suicide Behavior (Lifetime) no       Violence Risk    Feels Like Hurting Others no    Previous Attempt to Harm Others no                   Abuse/Neglect    No documentation.                  Legal    No documentation.                  Substance Abuse    No documentation.                  Patient Forms    No documentation.                     Korey Leon RN

## 2024-05-12 NOTE — PAYOR COMM NOTE
"To:  Coretta  From: Mia Stallworth RN  Phone: [phone]  Fax: [phone]  NPI: [id number]  TIN: [id number]  Member ID: [id number]   MRN: [id number]    Jaymie Yarbrough (69 y.o. Female)       Date of Birth   1955    Social Security Number       Address   [address] [place] [address]    Home Phone   [phone]    MRN   [id number]       Anabaptist   None    Marital Status                               Admission Date   24    Admission Type   Emergency    Admitting Provider   Kerley, Brian Joseph, DO    Attending Provider   Mateo Storm DO    Department, Room/Bed   [facility] INTENSIVE CARE, I06/       Discharge Date       Discharge Disposition       Discharge Destination                                 Attending Provider: Mateo Storm DO    Allergies: Rocephin [Ceftriaxone], Ciprofloxacin, Codeine, Pineapple    Isolation: None   Infection: Other (24)   Code Status: CPR    Ht: 157.5 cm (62\")   Wt: 58.3 kg (128 lb 8.5 oz)    Admission Cmt: None   Principal Problem: Hyperkalemia [E87.5]                   Active Insurance as of 2024       Primary Coverage       Payor Plan Insurance Group Employer/Plan Group    [facility] MEDICARE REPLACEMENT ANTH MEDICARE ADVANTAGE [id number]       Payor Plan Address Payor Plan Phone Number Payor Plan Fax Number Effective Dates    [address] [phone]  2024 - None Entered    [facility] [address]         Subscriber Name Subscriber Birth Date Member ID       JAYMIE YARBROUGH 1955 [id number]                     Emergency Contacts        (Rel.) Home Phone Work Phone Mobile Phone    ANTONIO YARBROUGH (Son) [phone] -- --    MONIE YARBROUGH (Son) [phone] -- --                 History & Physical        Kerley, Brian Joseph, DO at 24 0147            [facility] HOSPITALIST   HISTORY AND PHYSICAL      Name:  Jaymie Yarbrough   Age:  69 y.o.  Sex:  female  :  1955  MRN:  " [id number]   Visit Number:  [id number]  Admission Date:  5/11/2024  Date Of Service:  05/12/24  Primary Care Physician:  Terrence Baldwin MD    Chief Complaint:     N/V/D, abdominal pain    History Of Presenting Illness:      Tasha Yarbrough is a 69-year-old woman with past medical history of hypertension, recurrent pancreatitis, pancreatic pseudocyst, gastritis, CKD 3(?).  Presented to [facility] ED on 5/11/2024 with concern for nausea, vomiting, abdominal pain onset 24 hours.  Also with diarrhea.  She reports that she has a pancreatic pseudocyst which is caused her continue with similar symptoms over the last several months.  She follows with  gastroenterology.  Denies fevers or chills, chest pain, shortness of air, syncope.    ED summary: Afebrile, vital signs stable on room air.  Potassium 6.3, CO2 4.1, anion gap 22, creatinine 2.22, blood glucose 103, lipase over 3000, leukocytosis 15.  Pattern of infection on urinalysis, may be contaminant.  CT ab/pelvis findings remain compatible with pancreatitis.  He was provided albuterol, calcium gluconate, D50 W, Dilaudid, insulin, morphine, Zofran, Phenergan, 1 L NS bolus.    Review Of Systems:    All systems were reviewed and negative except as mentioned in history of presenting illness, assessment and plan.    Past Medical History: Patient  has a past medical history of Hypertension, Impaired mobility, Injury of back, and Pancreatitis.    Past Surgical History: Patient  has a past surgical history that includes Tubal ligation; Colonoscopy; Colon surgery; Abdominal surgery; and Esophagogastroduodenoscopy (N/A, 4/23/2024).    Social History: Patient  reports that she has been smoking cigarettes. She has never used smokeless tobacco. She reports that she does not drink alcohol and does not use drugs.    Family History:  Patient's family history has been reviewed and found to be noncontributory.     Allergies:      Rocephin [ceftriaxone], Ciprofloxacin, Codeine, and  [facility]    Home Medications:    Prior to Admission Medications       Prescriptions Last Dose Informant Patient Reported? Taking?    glucosamine-chondroitin 500-400 MG capsule capsule   Yes No    Take 1 capsule by mouth 2 (Two) Times a Day With Meals. Indications: Joint Damage causing Pain and Loss of Function    methocarbamol (ROBAXIN) 750 MG tablet   Yes No    Take 1 tablet by mouth 4 (Four) Times a Day As Needed for Muscle Spasms.    naloxone (NARCAN) 4 MG/0.1ML nasal spray   No No    Call 911. Don't prime. [place] in 1 nostril for overdose. Repeat in 2-3 minutes in other nostril if no or minimal breathing/responsiveness.    ondansetron ODT (ZOFRAN-ODT) 4 MG disintegrating tablet   No No    Place 1 tablet on the tongue Every 8 (Eight) Hours As Needed for Nausea or Vomiting.    Patient not taking:  Reported on 4/22/2024    oxyCODONE-acetaminophen (Percocet) 5-325 MG per tablet   No No    Take 1 tablet by mouth Every 6 (Six) Hours As Needed for Severe Pain or Moderate Pain.    pantoprazole (PROTONIX) 40 MG EC tablet   No No    Take 1 tablet by mouth 2 (Two) Times a Day Before Meals for 60 days.    sodium bicarbonate 650 MG tablet   Yes No    Take 1 tablet by mouth 2 (Two) Times a Day.    vitamin D3 125 MCG (5000 UT) capsule capsule   Yes No    Take 1 capsule by mouth Daily. Indications: Vitamin D Deficiency          ED Medications:    Medications   promethazine (PHENERGAN) 12.5 mg in sodium chloride 0.9 % 50 mL (has no administration in time range)   sodium chloride 0.9 % bolus 1,000 mL (1,000 mL Intravenous New Bag 5/11/24 1841)   morphine injection 4 mg (4 mg Intravenous Given 5/11/24 1840)   ondansetron (ZOFRAN) injection 4 mg (4 mg Intravenous Given 5/11/24 1839)   HYDROmorphone (DILAUDID) injection 1 mg (1 mg Intravenous Given 5/11/24 2120)   calcium gluconate 1000 Mg/50ml 0.675% NaCl IV SOLN (0 mg Intravenous Stopped 5/11/24 2245)   albuterol (PROVENTIL) nebulizer solution 0.083% 2.5 mg/3mL (10 mg Nebulization  "Given 5/11/24 2225)   insulin regular (humuLIN R,novoLIN R) injection 5 Units (5 Units Intravenous Given 5/11/24 2203)   dextrose (D50W) (25 g/50 mL) IV injection 25 g (25 g Intravenous Given 5/11/24 2203)     Vital Signs:  Temp:  [97.9 °F (36.6 °C)-98.2 °F (36.8 °C)] 98.2 °F (36.8 °C)  Heart Rate:  [] 100  Resp:  [16-20] 16  BP: (140-167)/(81-99) 157/90        05/11/24  1727   Weight: 58.1 kg (128 lb 1.4 oz)     Body mass index is 23.43 kg/m².    Physical Exam:     Most recent vital Signs: /90   Pulse 100   Temp 98.2 °F (36.8 °C) (Oral)   Resp 16   Ht 157.5 cm (62\")   Wt 58.1 kg (128 lb 1.4 oz)   SpO2 100%   BMI 23.43 kg/m²     Physical Exam  Constitutional:       General: She is not in acute distress.     Appearance: She is ill-appearing. She is not toxic-appearing.   HENT:      Mouth/Throat:      Mouth: Mucous membranes are moist.   Eyes:      Extraocular Movements: Extraocular movements intact.   Cardiovascular:      Rate and Rhythm: Normal rate and regular rhythm.      Pulses: Normal pulses.      Heart sounds: Normal heart sounds.   Pulmonary:      Effort: Pulmonary effort is normal.      Breath sounds: Normal breath sounds.   Abdominal:      Palpations: Abdomen is soft.      Tenderness: There is abdominal tenderness.   Musculoskeletal:      Right lower leg: No edema.      Left lower leg: No edema.   Skin:     General: Skin is warm.      Capillary Refill: Capillary refill takes less than 2 seconds.   Neurological:      General: No focal deficit present.      Mental Status: She is alert and oriented to person, place, and time.   Psychiatric:         Mood and Affect: Mood normal.         Thought Content: Thought content normal.         Laboratory data:    I have reviewed the labs done in the emergency room.    Results from last 7 days   Lab Units 05/11/24 2039   SODIUM mmol/L 136   POTASSIUM mmol/L 6.3*   CHLORIDE mmol/L 109*   CO2 mmol/L 4.1*   BUN mg/dL 43*   CREATININE mg/dL 2.22*   CALCIUM " mg/dL 10.3   BILIRUBIN mg/dL <0.2   ALK PHOS U/L 165*   ALT (SGPT) U/L 8   AST (SGOT) U/L 11   GLUCOSE mg/dL 91     Results from last 7 days   Lab Units 05/11/24  1843   WBC 10*3/mm3 15.86*   HEMOGLOBIN g/dL 13.8   HEMATOCRIT % 43.4   PLATELETS 10*3/mm3 430                     Results from last 7 days   Lab Units 05/11/24  2039   LIPASE U/L >3,000*         Results from last 7 days   Lab Units 05/11/24  1930   COLOR UA  Yellow   GLUCOSE UA  Negative   KETONES UA  Negative   BLOOD UA  Trace*   LEUKOCYTES UA  Trace*   PH, URINE  <=5.0   BILIRUBIN UA  Negative   UROBILINOGEN UA  0.2 E.U./dL   RBC UA /HPF 3-5*   WBC UA /HPF 0-2       Pain Management Panel          Latest Ref Rng & Units 8/6/2022   Pain Management Panel   Amphetamine, Urine Qual Negative Negative    Barbiturates Screen, Urine Negative Negative    Benzodiazepine Screen, Urine Negative Negative    Buprenorphine, Screen, Urine Negative Negative    Cocaine Screen, Urine Negative Negative    Methadone Screen , Urine Negative Negative    Methamphetamine, Ur Negative Negative        EKG:      None scanned    Radiology:    CT Abdomen Pelvis Without Contrast    Result Date: 5/11/2024  FINAL REPORT TECHNIQUE: Axial CT images were performed from the lung bases through the symphysis pubis without IV contrast.  This study was performed with techniques to keep radiation doses as low as reasonably achievable (ALARA). Individualized dose reduction techniques using automated exposure control or adjustment of mA and/or kV according to the patient's size were employed. CLINICAL HISTORY: diffuse abdominal pain and vomiting, patient has known pancreatic pseudocyst COMPARISON: 4/28/2024 FINDINGS: Lack of intravenous contrast limits evaluation of solid abdominal and pelvic organs.  LOWER CHEST: The heart is normal size.  The lung bases are clear. There is a moderate hiatal hernia.  ABDOMEN/PELVIS:  Liver, gallbladder and bile ducts: The unenhanced liver is grossly unremarkable  without focal abnormality.  There has been a prior cholecystectomy.  There is no definite biliary duct dilatation.  Adrenal glands: The adrenal glands are morphologically unremarkable without suspicious lesion.  Kidneys, ureter and urinary bladder: There are nonobstructing right kidney stones.  No hydronephrosis. Urinary bladder is unremarkable.  Spleen: The spleen is normal size.  Pancreas: There is persistent diffuse peripancreatic inflammation.  There is a small fluid collection adjacent to the tail of the pancreas.  GI systems and mesentery: No evidence of bowel obstruction.  The appendix is not visualized but there are no secondary signs of appendicitis.  No significant mesenteric inflammation.  Lymph nodes: No definite pathologically enlarged abdominal or pelvic lymph nodes present within the limits of this noncontrast enhanced exam.  Vessels: The abdominal aorta is normal in caliber.  The inferior vena cava is unremarkable. Peritoneum: No free intraperitoneal fluid or pneumoperitoneum. Pelvic viscera: No acute findings.  Body wall: No body wall contusion.  Bones: No acute fracture.     Findings remain compatible with pancreatitis. Authenticated and Electronically Signed by Jens Karimi MD on 05/11/2024 11:36:09 PM     Assessment/Plan:    Inpatient general floor admission 5/11/2024 with pancreatitis, hyperkalemia, VIVIENNE.    At time of admission resting in bed, appears comfortably ill.  No vomiting.    Pancreatitis, acute on chronic  Hyperkalemia  VIVIENNE?   Clear liquids.  IVF.  Monitor and temporize potassium as needed.  Pain and nausea control.  Lipid panel.  Unclear renal baseline.    Chronic:  hypertension, recurrent pancreatitis, pancreatic pseudocyst, gastritis, CKD 3?    Continue home medications.    Risk Assessment: High  DVT Prophylaxis: Heparin  Code Status: Full code  Diet: Clear liquid diet    Advance Care Planning  ACP discussion was held with the patient during this visit. Patient does not have an  advance directive, information provided.           Brian Joseph Kerley, DO  05/12/24  01:47 EDT    Dictated utilizing Dragon dictation.    Electronically signed by Kerley, Brian Joseph, DO at 05/12/24 0200          Emergency Department Notes        Cameron Robert, RN at 05/12/24 0512          Called and gave report to Tu, after report given no further questions at this time.    Electronically signed by Cameron Robert, RN at 05/12/24 0519       Cameron Robert, RN at 05/12/24 0315          Notified Dr Kerley of pt having rhythm change, provider seen EKG, no new orders at this time.    Electronically signed by Cameron Robert RN at 05/12/24 0321       Cameron Robert RN at 05/12/24 0155          Critical CO2 6.2 called value improved will notify Dr Kerley    Electronically signed by Cameron Robert RN at 05/12/24 0158       Cameron Robert RN at 05/11/24 2135          Notified Miguel of critical K 6.3, CO2 4.1, lipase >3000, notified will order something    Electronically signed by Cameron Robert RN at 05/11/24 2142       Shawn Good PA-C at 05/11/24 2036          Procedures       Ultrasound-guided IV:    Indication: Multiple failed attempts at peripheral access with peripheral IVs    ultrasound-guided IV performed and inserted by myself.  Area cleansed with alcohol, tourniquet applied to upper bicep.  Visualized compressible vein in the right AC without pulsating.  2 attempts made.  First attempt, patient jerked arm, removed catheter.  Revisualized on ultrasound, again noted same vein with compressibility and no pulsatility.  New 20-gauge IV catheter was threaded into this vein, no pulsatile flow, dark venous appearing blood.  Flushed without difficulty and drawl back of blood without difficulty.  Confirmed with ultrasound.  secured by nursing staff.  No pain with flushing saline.  Tolerated without difficulty.    Shawn Elizabeth PA-C, PA-C  05/11/24 2040      Electronically signed by Shawn Good  MONIQUE at 05/11/24 2040       Vital Signs (last day)       Date/Time Temp Temp src Pulse Resp BP Patient Position SpO2    05/12/24 0725 97.8 (36.6) Oral -- -- -- -- --    05/12/24 0359 -- -- 106 16 140/84 -- 99    05/12/24 0129 98.2 (36.8) Oral 100 16 -- -- 100    05/12/24 0100 -- -- 96 -- 157/90 -- 99    05/11/24 2310 98 (36.7) Oral 87 18 140/81 Lying 97    05/11/24 2225 -- -- -- 20 -- -- --    05/11/24 2118 -- -- 90 20 152/87 Sitting 99    05/11/24 1840 -- -- 81 -- -- -- 99    05/11/24 1838 -- -- 85 -- 167/94 -- 99    05/11/24 1800 -- -- -- -- 156/94 -- 97    05/11/24 1758 -- -- -- -- 165/95 -- --    05/11/24 1727 97.9 (36.6) -- 101 20 141/99 -- 97          Current Facility-Administered Medications   Medication Dose Route Frequency Provider Last Rate Last Admin    acetaminophen (TYLENOL) tablet 650 mg  650 mg Oral Q4H PRN Kerley, Brian Joseph, DO        Or    acetaminophen (TYLENOL) 160 MG/5ML oral solution 650 mg  650 mg Oral Q4H PRN Kerley, Brian Joseph, DO        Or    acetaminophen (TYLENOL) suppository 650 mg  650 mg Rectal Q4H PRN Kerley, Brian Joseph, DO        sennosides-docusate (PERICOLACE) 8.6-50 MG per tablet 2 tablet  2 tablet Oral BID PRN Kerley, Brian Joseph, DO        And    polyethylene glycol (MIRALAX) packet 17 g  17 g Oral Daily PRN Kerley, Brian Joseph, DO        And    bisacodyl (DULCOLAX) EC tablet 5 mg  5 mg Oral Daily PRN Kerley, Brian Joseph, DO        And    bisacodyl (DULCOLAX) suppository 10 mg  10 mg Rectal Daily PRN Kerley, Chino Randolph, DO        Calcium Replacement - Follow Nurse / BPA Driven Protocol   Does not apply PRN Kerley, Brian Joseph,         dextrose (D50W) (25 g/50 mL) IV injection 50 mL  50 mL Intravenous Q1H PRN Kerley, Brian Joseph, DO   50 mL at 05/12/24 0248    heparin (porcine) 5000 UNIT/ML injection 5,000 Units  5,000 Units Subcutaneous Q12H Kerley, Brian Joseph, DO   5,000 Units at 05/12/24 0901    HYDROmorphone (DILAUDID) injection 1 mg  1 mg Intravenous Q3H PRN  Kerley, Brian Joseph,    1 mg at 05/12/24 0558    Magnesium Standard Dose Replacement - Follow Nurse / BPA Driven Protocol   Does not apply PRN Kerley, Brian Joseph, DO        nitroglycerin (NITROSTAT) SL tablet 0.4 mg  0.4 mg Sublingual Q5 Min PRN Kerley, Brian Joseph, DO        ondansetron (ZOFRAN) injection 4 mg  4 mg Intravenous Q6H PRN Kerley, Brian Joseph, DO        oxyCODONE-acetaminophen (PERCOCET) 5-325 MG per tablet 1 tablet  1 tablet Oral Q6H PRN Kerley, Brian Joseph, DO   1 tablet at 05/12/24 0904    pantoprazole (PROTONIX) EC tablet 40 mg  40 mg Oral BID AC Kerley, Brian Joseph,    40 mg at 05/12/24 0859    Phosphorus Replacement - Follow Nurse / BPA Driven Protocol   Does not apply PRN Kerley, Brian Joseph, DO        Potassium Replacement - Follow Nurse / BPA Driven Protocol   Does not apply PRN Kerley, Brian Joseph, DO        promethazine (PHENERGAN) 12.5 mg in sodium chloride 0.9 % 50 mL  12.5 mg Intravenous Q6H PRN Kerley, Brian Joseph, DO        sodium bicarbonate tablet 650 mg  650 mg Oral BID Kerley, Brian Joseph, DO   650 mg at 05/12/24 0859    sodium chloride 0.9 % flush 10 mL  10 mL Intravenous Q12H Kerley, Brian Joseph, DO   10 mL at 05/12/24 0902    sodium chloride 0.9 % flush 10 mL  10 mL Intravenous PRN Kerley, Brian Joseph, DO        sodium chloride 0.9 % infusion 40 mL  40 mL Intravenous PRN Kerley, Brian Joseph, DO        sodium chloride 0.9 % infusion  150 mL/hr Intravenous Continuous Kerley, Brian Joseph,  mL/hr at 05/12/24 0413 150 mL/hr at 05/12/24 0413     Lab Results (last 24 hours)       Procedure Component Value Units Date/Time    Lipase [[id number]]  (Abnormal) Collected: 05/12/24 0619    Specimen: Blood Updated: 05/12/24 0720     Lipase >3,000 U/L     Lipid Panel [[id number]]  (Abnormal) Collected: 05/12/24 0619    Specimen: Blood Updated: 05/12/24 0708     Total Cholesterol 106 mg/dL      Triglycerides 186 mg/dL      HDL Cholesterol 30 mg/dL      LDL Cholesterol  45  mg/dL      VLDL Cholesterol 31 mg/dL      LDL/HDL Ratio 1.29    Narrative:      Cholesterol Reference Ranges  (U.S. Department of Health and Human Services ATP III Classifications)    Desirable          <200 mg/dL  Borderline High    200-239 mg/dL  High Risk          >240 mg/dL      Triglyceride Reference Ranges  (U.S. Department of Health and Human Services ATP III Classifications)    Normal           <150 mg/dL  Borderline High  150-199 mg/dL  High             200-499 mg/dL  Very High        >500 mg/dL    HDL Reference Ranges  (U.S. Department of Health and Human Services ATP III Classifications)    Low     <40 mg/dl (major risk factor for CHD)  High    >60 mg/dl ('negative' risk factor for CHD)        LDL Reference Ranges  (U.S. Department of Health and Human Services ATP III Classifications)    Optimal          <100 mg/dL  Near Optimal     100-129 mg/dL  Borderline High  130-159 mg/dL  High             160-189 mg/dL  Very High        >189 mg/dL    Scan Slide [[id number]] Collected: 05/12/24 0619    Specimen: Blood Updated: 05/12/24 0659     Hypochromia Slight/1+     Macrocytes Large/3+     WBC Morphology Normal     Platelet Morphology Normal    CBC Auto Differential [[id number]]  (Abnormal) Collected: 05/12/24 0619    Specimen: Blood Updated: 05/12/24 0638     WBC 25.54 10*3/mm3      RBC 3.79 10*6/mm3      Hemoglobin 13.7 g/dL      Hematocrit 44.3 %      .9 fL      MCH 36.1 pg      MCHC 30.9 g/dL      RDW 14.7 %      RDW-SD 64.2 fl      MPV 9.8 fL      Platelets 382 10*3/mm3      Neutrophil % 83.8 %      Lymphocyte % 5.8 %      Monocyte % 7.8 %      Eosinophil % 0.0 %      Basophil % 0.4 %      Immature Grans % 2.2 %      Neutrophils, Absolute 21.41 10*3/mm3      Lymphocytes, Absolute 1.47 10*3/mm3      Monocytes, Absolute 2.00 10*3/mm3      Eosinophils, Absolute 0.00 10*3/mm3      Basophils, Absolute 0.10 10*3/mm3      Immature Grans, Absolute 0.56 10*3/mm3      nRBC 0.1 /100 WBC     POC Glucose Once  [[id number]]  (Normal) Collected: 05/12/24 0407    Specimen: Blood Updated: 05/12/24 0412     Glucose 112 mg/dL      Comment: Serial Number: [id number]:  [id number]       POC Glucose Once [[id number]]  (Normal) Collected: 05/12/24 0245    Specimen: Blood Updated: 05/12/24 0247     Glucose 82 mg/dL      Comment: Serial Number: [id number]:  [id number]       Basic Metabolic Panel [[id number]]  (Abnormal) Collected: 05/12/24 0045    Specimen: Blood Updated: 05/12/24 0204     Glucose 95 mg/dL      BUN 47 mg/dL      Creatinine 2.30 mg/dL      Sodium 134 mmol/L      Potassium 5.4 mmol/L      Chloride 106 mmol/L      CO2 6.2 mmol/L      Calcium 10.8 mg/dL      BUN/Creatinine Ratio 20.4     Anion Gap 21.8 mmol/L      eGFR 22.5 mL/min/1.73     Narrative:      GFR Normal >60  Chronic Kidney Disease <60  Kidney Failure <15      POC Glucose Q1H [[id number]]  (Normal) Collected: 05/11/24 2356    Specimen: Blood Updated: 05/11/24 2359     Glucose 103 mg/dL      Comment: Serial Number: [id number]:  [id number]       POC Glucose Once [[id number]]  (Normal) Collected: 05/11/24 2158    Specimen: Blood Updated: 05/11/24 2200     Glucose 111 mg/dL      Comment: Serial Number: [id number]:  [id number]       Comprehensive Metabolic Panel [[id number]]  (Abnormal) Collected: 05/11/24 2039    Specimen: Blood Updated: 05/11/24 2119     Glucose 91 mg/dL      BUN 43 mg/dL      Creatinine 2.22 mg/dL      Sodium 136 mmol/L      Potassium 6.3 mmol/L      Chloride 109 mmol/L      CO2 4.1 mmol/L      Calcium 10.3 mg/dL      Total Protein 7.7 g/dL      Albumin 4.2 g/dL      ALT (SGPT) 8 U/L      AST (SGOT) 11 U/L      Alkaline Phosphatase 165 U/L      Total Bilirubin <0.2 mg/dL      Globulin 3.5 gm/dL      A/G Ratio 1.2 g/dL      BUN/Creatinine Ratio 19.4     Anion Gap 22.9 mmol/L      eGFR 23.5 mL/min/1.73     Narrative:      GFR Normal >60  Chronic Kidney Disease <60  Kidney Failure <15      Lipase [[id number]]  (Abnormal) Collected:  05/11/24 2039    Specimen: Blood Updated: 05/11/24 2119     Lipase >3,000 U/L     Urinalysis, Microscopic Only - Urine, Clean Catch [[id number]]  (Abnormal) Collected: 05/11/24 1930    Specimen: Urine, Clean Catch Updated: 05/11/24 1943     RBC, UA 3-5 /HPF      WBC, UA 0-2 /HPF      Bacteria, UA Trace /HPF      Squamous Epithelial Cells, UA 7-12 /HPF      Hyaline Casts, UA None Seen /LPF      Granular Casts, UA 3-6 /LPF      Methodology Manual Light Microscopy    Urinalysis With Microscopic If Indicated (No Culture) - Urine, Clean Catch [[id number]]  (Abnormal) Collected: 05/11/24 1930    Specimen: Urine, Clean Catch Updated: 05/11/24 1936     Color, UA Yellow     Appearance, UA Cloudy     pH, UA <=5.0     Specific Gravity, UA 1.029     Glucose, UA Negative     Ketones, UA Negative     Bilirubin, UA Negative     Blood, UA Trace     Protein,  mg/dL (2+)     Leuk Esterase, UA Trace     Nitrite, UA Negative     Urobilinogen, UA 0.2 E.U./dL    CBC & Differential [[id number]]  (Abnormal) Collected: 05/11/24 1843    Specimen: Blood Updated: 05/11/24 1906    Narrative:      The following orders were created for panel order CBC & Differential.  Procedure                               Abnormality         Status                     ---------                               -----------         ------                     CBC Auto Differential[[id number]]        Abnormal            Final result               Scan Slide[[id number]]                                       Final result                 Please view results for these tests on the individual orders.    Scan Slide [[id number]] Collected: 05/11/24 1843    Specimen: Blood Updated: 05/11/24 1906     Macrocytes Slight/1+     WBC Morphology Normal     Platelet Estimate Adequate    CBC Auto Differential [[id number]]  (Abnormal) Collected: 05/11/24 1843    Specimen: Blood Updated: 05/11/24 1851     WBC 15.86 10*3/mm3      RBC 3.83 10*6/mm3      Hemoglobin 13.8 g/dL      Hematocrit  43.4 %      .3 fL      MCH 36.0 pg      MCHC 31.8 g/dL      RDW 15.2 %      RDW-SD 63.7 fl      MPV 10.4 fL      Platelets 430 10*3/mm3      Neutrophil % 87.0 %      Lymphocyte % 4.5 %      Monocyte % 6.0 %      Eosinophil % 0.1 %      Basophil % 0.5 %      Immature Grans % 1.9 %      Neutrophils, Absolute 13.79 10*3/mm3      Lymphocytes, Absolute 0.72 10*3/mm3      Monocytes, Absolute 0.95 10*3/mm3      Eosinophils, Absolute 0.02 10*3/mm3      Basophils, Absolute 0.08 10*3/mm3      Immature Grans, Absolute 0.30 10*3/mm3      nRBC 0.0 /100 WBC           Imaging Results (Last 24 Hours)       Procedure Component Value Units Date/Time    CT Abdomen Pelvis Without Contrast [[id number]] Collected: 05/11/24 2321     Updated: 05/11/24 2337    Narrative:      FINAL REPORT    TECHNIQUE:  Axial CT images were performed from the lung bases through the  symphysis pubis without IV contrast.  This study was performed  with techniques to keep radiation doses as low as reasonably  achievable (ALARA). Individualized dose reduction techniques  using automated exposure control or adjustment of mA and/or kV  according to the patient's size were employed.    CLINICAL HISTORY:  diffuse abdominal pain and vomiting, patient has known  pancreatic pseudocyst    COMPARISON:  4/28/2024    FINDINGS:  Lack of intravenous contrast limits evaluation of solid  abdominal and pelvic organs.  LOWER CHEST: The heart is normal  size.  The lung bases are clear. There is a moderate hiatal  hernia.  ABDOMEN/PELVIS:  Liver, gallbladder and bile ducts: The  unenhanced liver is grossly unremarkable without focal  abnormality.  There has been a prior cholecystectomy.  There is  no definite biliary duct dilatation.  Adrenal glands: The  adrenal glands are morphologically unremarkable without  suspicious lesion.  Kidneys, ureter and urinary bladder: There  are nonobstructing right kidney stones.  No hydronephrosis.  Urinary bladder is unremarkable.   Spleen: The spleen is normal  size.  Pancreas: There is persistent diffuse peripancreatic  inflammation.  There is a small fluid collection adjacent to the  tail of the pancreas.  GI systems and mesentery: No evidence of  bowel obstruction.  The appendix is not visualized but there are  no secondary signs of appendicitis.  No significant mesenteric  inflammation.  Lymph nodes: No definite pathologically enlarged  abdominal or pelvic lymph nodes present within the limits of  this noncontrast enhanced exam.  Vessels: The abdominal aorta is  normal in caliber.  The inferior vena cava is unremarkable.  Peritoneum: No free intraperitoneal fluid or pneumoperitoneum.  Pelvic viscera: No acute findings.  Body wall: No body wall  contusion.  Bones: No acute fracture.      Impression:      Findings remain compatible with pancreatitis.    Authenticated and Electronically Signed by Jens Karimi MD on  05/11/2024 11:36:09 PM          Orders (last 24 hrs)        Start     Ordered    05/12/24 0900  sodium bicarbonate tablet 650 mg  2 Times Daily         05/12/24 0201    05/12/24 0800  Oral Care  2 Times Daily       05/12/24 0201    05/12/24 0730  pantoprazole (PROTONIX) EC tablet 40 mg  2 Times Daily Before Meals         05/12/24 0201    05/12/24 0635  Scan Slide  Once         05/12/24 0634    05/12/24 0618  Initiate & Follow Hypercapnic Monitoring Guideline for Opioid Administration via EtCO2 and / or SpO2  Continuous        Comments: Follow Hypercapnic Monitoring Guideline As Outlined in Process Instructions (Open Order Report to View Full Instructions)    05/12/24 0617    05/12/24 0618  Opioid Administration - Document EtCO2 and / or SpO2 With Each Set of Vitals & Any Change in Patient Status  Per Order Details        Comments: With Each Set of Vitals & Any Change in Patient Status    05/12/24 0617    05/12/24 0618  Opioid Administration - Notify Provider Hypercapnic Monitoring  Continuous        Comments: Open Order Report to  View Parameters Requiring Provider Notification    05/12/24 0617    05/12/24 0618  Opioid Administration - Continuous Pulse Oximetry (SpO2)  Continuous         05/12/24 0617    05/12/24 0600  CBC Auto Differential  Daily       05/12/24 0201    05/12/24 0600  Lipid Panel  Morning Draw         05/12/24 0201    05/12/24 0600  Lipase  Daily       05/12/24 0201    05/12/24 0413  POC Glucose Once  PROCEDURE ONCE        Comments: Complete no more than 45 minutes prior to patient eating      05/12/24 0407    05/12/24 0400  Vital Signs  Every 4 Hours       05/12/24 0201 05/12/24 0317  ECG 12 Lead Rhythm Change  Once        Comments: Pt throwing multiple PVC's    05/12/24 0317    05/12/24 0248  POC Glucose Once  PROCEDURE ONCE        Comments: Complete no more than 45 minutes prior to patient eating      05/12/24 0245    05/12/24 0230  insulin regular (humuLIN R,novoLIN R) injection 10 Units  Once         05/12/24 0214    05/12/24 0217  sodium chloride 0.9 % flush 10 mL  Every 12 Hours Scheduled         05/12/24 0201    05/12/24 0217  heparin (porcine) 5000 UNIT/ML injection 5,000 Units  Every 12 Hours Scheduled         05/12/24 0201    05/12/24 0217  sodium chloride 0.9 % infusion  Continuous         05/12/24 0201    05/12/24 0215  ECG 12 Lead Electrolyte Imbalance  Once         05/12/24 0214    05/12/24 0214  dextrose (D50W) (25 g/50 mL) IV injection 50 mL  Every 1 Hour PRN         05/12/24 0214    05/12/24 0202  Intake & Output  Every Shift       05/12/24 0201    05/12/24 0202  Weigh Patient  Once         05/12/24 0201    05/12/24 0202  Insert Peripheral IV  Once         05/12/24 0201 05/12/24 0202  Saline Lock & Maintain IV Access  Continuous,   Status:  Canceled         05/12/24 0201 05/12/24 0202  Continuous Cardiac Monitoring  Continuous        Comments: Follow Standing Orders As Outlined in Process Instructions (Open Order Report to View Full Instructions)    05/12/24 0201 05/12/24 0202  Maintain IV Access  " Continuous         05/12/24 0201 05/12/24 0202  Telemetry - Place Orders & Notify Provider of Results When Patient Experiences Acute Chest Pain, Dysrhythmia or Respiratory Distress  Continuous        Comments: Open Order Report to View Parameters Requiring Provider Notification    05/12/24 0201 05/12/24 0202  May Be Off Telemetry for Tests  Continuous         05/12/24 0201 05/12/24 0202  Notify Provider (With Default Parameters)  Continuous        Comments: Open Order Report to View Parameters Requiring Provider Notification    05/12/24 0201 05/12/24 0202  Diet: Liquid; Clear Liquid; Fluid Consistency: Thin (IDDSI 0)  Diet Effective Now         05/12/24 0201 05/12/24 0202  Inpatient Case Management  Consult  Once        Provider:  (Not yet assigned)    05/12/24 0201 05/12/24 0201  oxyCODONE-acetaminophen (PERCOCET) 5-325 MG per tablet 1 tablet  Every 6 Hours PRN         05/12/24 0201    05/12/24 0201  sodium chloride 0.9 % flush 10 mL  As Needed         05/12/24 0201 05/12/24 0201  sodium chloride 0.9 % infusion 40 mL  As Needed         05/12/24 0201 05/12/24 0201  acetaminophen (TYLENOL) tablet 650 mg  Every 4 Hours PRN        Placed in \"Or\" Linked Group    05/12/24 0201 05/12/24 0201  acetaminophen (TYLENOL) 160 MG/5ML oral solution 650 mg  Every 4 Hours PRN        Placed in \"Or\" Linked Group    05/12/24 0201 05/12/24 0201  acetaminophen (TYLENOL) suppository 650 mg  Every 4 Hours PRN        Placed in \"Or\" Linked Group    05/12/24 0201    05/12/24 0201  ondansetron (ZOFRAN) injection 4 mg  Every 6 Hours PRN         05/12/24 0201 05/12/24 0201  nitroglycerin (NITROSTAT) SL tablet 0.4 mg  Every 5 Minutes PRN         05/12/24 0201 05/12/24 0201  Potassium Replacement - Follow Nurse / BPA Driven Protocol  As Needed         05/12/24 0201 05/12/24 0201  Magnesium Standard Dose Replacement - Follow Nurse / BPA Driven Protocol  As Needed         05/12/24 0201    " "05/12/24 0201  Phosphorus Replacement - Follow Nurse / BPA Driven Protocol  As Needed         05/12/24 0201    05/12/24 0201  Calcium Replacement - Follow Nurse / BPA Driven Protocol  As Needed         05/12/24 0201 05/12/24 0201  sennosides-docusate (PERICOLACE) 8.6-50 MG per tablet 2 tablet  2 Times Daily PRN        Placed in \"And\" Linked Group    05/12/24 0201    05/12/24 0201  polyethylene glycol (MIRALAX) packet 17 g  Daily PRN        Placed in \"And\" Linked Group    05/12/24 0201    05/12/24 0201  bisacodyl (DULCOLAX) EC tablet 5 mg  Daily PRN        Placed in \"And\" Linked Group    05/12/24 0201    05/12/24 0201  bisacodyl (DULCOLAX) suppository 10 mg  Daily PRN        Placed in \"And\" Linked Group    05/12/24 0201    05/12/24 0201  HYDROmorphone (DILAUDID) injection 1 mg  Every 3 Hours PRN         05/12/24 0201 05/12/24 0159  Code Status and Medical Interventions:  Continuous         05/12/24 0159    05/12/24 0005  Inpatient Admission  Once         05/12/24 0006    05/12/24 0005  Cardiac Monitoring  Continuous,   Status:  Canceled        Comments: Follow Standing Orders As Outlined in Process Instructions (Open Order Report to View Full Instructions)    05/12/24 0006 05/11/24 2336  Basic Metabolic Panel  Once in 2 hours        Comments: Collect 2 hours after hyperkalemia meds administered.      05/11/24 2135 05/11/24 2201  POC Glucose Once  PROCEDURE ONCE        Comments: Complete no more than 45 minutes prior to patient eating      05/11/24 2158 05/11/24 2201  CT Abdomen Pelvis Without Contrast  1 Time Imaging        Comments: IV CONTRAST ONLY      05/11/24 1751    05/11/24 2200  POC Glucose Q1H  Every Hour      Comments: POC Glucose - 30 Minutes After Insulin Administration & Then Q1H x3      05/11/24 2135 05/11/24 2151  calcium gluconate 1000 Mg/50ml 0.675% NaCl IV SOLN  Once         05/11/24 2135 05/11/24 2151  albuterol (PROVENTIL) nebulizer solution 0.083% 2.5 mg/3mL  Once         " 05/11/24 2135 05/11/24 2151  insulin regular (humuLIN R,novoLIN R) injection 5 Units  Once         05/11/24 2135 05/11/24 2151  dextrose (D50W) (25 g/50 mL) IV injection 25 g  Once         05/11/24 2135 05/11/24 2048  HYDROmorphone (DILAUDID) injection 1 mg  Once         05/11/24 2032 05/11/24 2032  promethazine (PHENERGAN) 12.5 mg in sodium chloride 0.9 % 50 mL  Every 6 Hours PRN         05/11/24 2032 05/11/24 2024  HYDROmorphone (DILAUDID) injection 1 mg  Once,   Status:  Discontinued         05/11/24 2008 05/11/24 2024  promethazine (PHENERGAN) tablet 12.5 mg  Once,   Status:  Discontinued         05/11/24 2008 05/11/24 2005  Comprehensive Metabolic Panel  Once         05/11/24 1751    05/11/24 2005  Lipase  Once         05/11/24 1751    05/11/24 1937  Urinalysis, Microscopic Only - Urine, Clean Catch  Once         05/11/24 1936    05/11/24 1851  Scan Slide  Once         05/11/24 1850    05/11/24 1807  sodium chloride 0.9 % bolus 1,000 mL  Once         05/11/24 1751    05/11/24 1807  morphine injection 4 mg  Once         05/11/24 1751    05/11/24 1807  ondansetron (ZOFRAN) injection 4 mg  Once         05/11/24 1751    05/11/24 1751  CBC & Differential  Once         05/11/24 1751    05/11/24 1751  Urinalysis With Microscopic If Indicated (No Culture) - Urine, Clean Catch  Once         05/11/24 1751    05/11/24 1751  CT Abdomen Pelvis With Contrast  1 Time Imaging,   Status:  Canceled        Comments: IV CONTRAST ONLY      05/11/24 1751    05/11/24 1751  CBC Auto Differential  PROCEDURE ONCE         05/11/24 1751    Unscheduled  Up With Assistance  As Needed       05/12/24 0201    --  sertraline (ZOLOFT) 25 MG tablet  Daily         05/12/24 0607                  Physician Progress Notes (most recent note)    No notes of this type exist for this encounter.       Consult Notes (most recent note)    No notes of this type exist for this encounter.

## 2024-05-12 NOTE — H&P
[facility]   HISTORY AND PHYSICAL      Name:  Tasha Yarbrough   Age:  69 y.o.  Sex:  female  :  1955  MRN:  [id number]   Visit Number:  [id number]  Admission Date:  2024  Date Of Service:  24  Primary Care Physician:  Terrence Baldwin MD    Chief Complaint:     N/V/D, abdominal pain    History Of Presenting Illness:      Tasha Yarbrough is a 69-year-old woman with past medical history of hypertension, recurrent pancreatitis, pancreatic pseudocyst, gastritis, CKD 3(?).  Presented to [facility] ED on 2024 with concern for nausea, vomiting, abdominal pain onset 24 hours.  Also with diarrhea.  She reports that she has a pancreatic pseudocyst which is caused her continue with similar symptoms over the last several months.  She follows with  gastroenterology.  Denies fevers or chills, chest pain, shortness of air, syncope.    ED summary: Afebrile, vital signs stable on room air.  Potassium 6.3, CO2 4.1, anion gap 22, creatinine 2.22, blood glucose 103, lipase over 3000, leukocytosis 15.  Pattern of infection on urinalysis, may be contaminant.  CT ab/pelvis findings remain compatible with pancreatitis.  He was provided albuterol, calcium gluconate, D50 W, Dilaudid, insulin, morphine, Zofran, Phenergan, 1 L NS bolus.    Review Of Systems:    All systems were reviewed and negative except as mentioned in history of presenting illness, assessment and plan.    Past Medical History: Patient  has a past medical history of Hypertension, Impaired mobility, Injury of back, and Pancreatitis.    Past Surgical History: Patient  has a past surgical history that includes Tubal ligation; Colonoscopy; Colon surgery; Abdominal surgery; and Esophagogastroduodenoscopy (N/A, 2024).    Social History: Patient  reports that she has been smoking cigarettes. She has never used smokeless tobacco. She reports that she does not drink alcohol and does not use drugs.    Family History:  Patient's family  history has been reviewed and found to be noncontributory.     Allergies:      Rocephin [ceftriaxone], Ciprofloxacin, Codeine, and Pineapple    Home Medications:    Prior to Admission Medications       Prescriptions Last Dose Informant Patient Reported? Taking?    glucosamine-chondroitin 500-400 MG capsule capsule   Yes No    Take 1 capsule by mouth 2 (Two) Times a Day With Meals. Indications: Joint Damage causing Pain and Loss of Function    methocarbamol (ROBAXIN) 750 MG tablet   Yes No    Take 1 tablet by mouth 4 (Four) Times a Day As Needed for Muscle Spasms.    naloxone (NARCAN) 4 MG/0.1ML nasal spray   No No    Call 911. Don't prime. Mobile in 1 nostril for overdose. Repeat in 2-3 minutes in other nostril if no or minimal breathing/responsiveness.    ondansetron ODT (ZOFRAN-ODT) 4 MG disintegrating tablet   No No    Place 1 tablet on the tongue Every 8 (Eight) Hours As Needed for Nausea or Vomiting.    Patient not taking:  Reported on 4/22/2024    oxyCODONE-acetaminophen (Percocet) 5-325 MG per tablet   No No    Take 1 tablet by mouth Every 6 (Six) Hours As Needed for Severe Pain or Moderate Pain.    pantoprazole (PROTONIX) 40 MG EC tablet   No No    Take 1 tablet by mouth 2 (Two) Times a Day Before Meals for 60 days.    sodium bicarbonate 650 MG tablet   Yes No    Take 1 tablet by mouth 2 (Two) Times a Day.    vitamin D3 125 MCG (5000 UT) capsule capsule   Yes No    Take 1 capsule by mouth Daily. Indications: Vitamin D Deficiency          ED Medications:    Medications   promethazine (PHENERGAN) 12.5 mg in sodium chloride 0.9 % 50 mL (has no administration in time range)   sodium chloride 0.9 % bolus 1,000 mL (1,000 mL Intravenous New Bag 5/11/24 1841)   morphine injection 4 mg (4 mg Intravenous Given 5/11/24 1840)   ondansetron (ZOFRAN) injection 4 mg (4 mg Intravenous Given 5/11/24 1839)   HYDROmorphone (DILAUDID) injection 1 mg (1 mg Intravenous Given 5/11/24 2120)   calcium gluconate 1000 Mg/50ml 0.675%  "NaCl IV SOLN (0 mg Intravenous Stopped 5/11/24 2245)   albuterol (PROVENTIL) nebulizer solution 0.083% 2.5 mg/3mL (10 mg Nebulization Given 5/11/24 2225)   insulin regular (humuLIN R,novoLIN R) injection 5 Units (5 Units Intravenous Given 5/11/24 2203)   dextrose (D50W) (25 g/50 mL) IV injection 25 g (25 g Intravenous Given 5/11/24 2203)     Vital Signs:  Temp:  [97.9 °F (36.6 °C)-98.2 °F (36.8 °C)] 98.2 °F (36.8 °C)  Heart Rate:  [] 100  Resp:  [16-20] 16  BP: (140-167)/(81-99) 157/90        05/11/24 1727   Weight: 58.1 kg (128 lb 1.4 oz)     Body mass index is 23.43 kg/m².    Physical Exam:     Most recent vital Signs: /90   Pulse 100   Temp 98.2 °F (36.8 °C) (Oral)   Resp 16   Ht 157.5 cm (62\")   Wt 58.1 kg (128 lb 1.4 oz)   SpO2 100%   BMI 23.43 kg/m²     Physical Exam  Constitutional:       General: She is not in acute distress.     Appearance: She is ill-appearing. She is not toxic-appearing.   HENT:      Mouth/Throat:      Mouth: Mucous membranes are moist.   Eyes:      Extraocular Movements: Extraocular movements intact.   Cardiovascular:      Rate and Rhythm: Normal rate and regular rhythm.      Pulses: Normal pulses.      Heart sounds: Normal heart sounds.   Pulmonary:      Effort: Pulmonary effort is normal.      Breath sounds: Normal breath sounds.   Abdominal:      Palpations: Abdomen is soft.      Tenderness: There is abdominal tenderness.   Musculoskeletal:      Right lower leg: No edema.      Left lower leg: No edema.   Skin:     General: Skin is warm.      Capillary Refill: Capillary refill takes less than 2 seconds.   Neurological:      General: No focal deficit present.      Mental Status: She is alert and oriented to person, place, and time.   Psychiatric:         Mood and Affect: Mood normal.         Thought Content: Thought content normal.         Laboratory data:    I have reviewed the labs done in the emergency room.    Results from last 7 days   Lab Units 05/11/24 2039 "   SODIUM mmol/L 136   POTASSIUM mmol/L 6.3*   CHLORIDE mmol/L 109*   CO2 mmol/L 4.1*   BUN mg/dL 43*   CREATININE mg/dL 2.22*   CALCIUM mg/dL 10.3   BILIRUBIN mg/dL <0.2   ALK PHOS U/L 165*   ALT (SGPT) U/L 8   AST (SGOT) U/L 11   GLUCOSE mg/dL 91     Results from last 7 days   Lab Units 05/11/24  1843   WBC 10*3/mm3 15.86*   HEMOGLOBIN g/dL 13.8   HEMATOCRIT % 43.4   PLATELETS 10*3/mm3 430                     Results from last 7 days   Lab Units 05/11/24  2039   LIPASE U/L >3,000*         Results from last 7 days   Lab Units 05/11/24  1930   COLOR UA  Yellow   GLUCOSE UA  Negative   KETONES UA  Negative   BLOOD UA  Trace*   LEUKOCYTES UA  Trace*   PH, URINE  <=5.0   BILIRUBIN UA  Negative   UROBILINOGEN UA  0.2 E.U./dL   RBC UA /HPF 3-5*   WBC UA /HPF 0-2       Pain Management Panel          Latest Ref Rng & Units 8/6/2022   Pain Management Panel   Amphetamine, Urine Qual Negative Negative    Barbiturates Screen, Urine Negative Negative    Benzodiazepine Screen, Urine Negative Negative    Buprenorphine, Screen, Urine Negative Negative    Cocaine Screen, Urine Negative Negative    Methadone Screen , Urine Negative Negative    Methamphetamine, Ur Negative Negative        EKG:      None scanned    Radiology:    CT Abdomen Pelvis Without Contrast    Result Date: 5/11/2024  FINAL REPORT TECHNIQUE: Axial CT images were performed from the lung bases through the symphysis pubis without IV contrast.  This study was performed with techniques to keep radiation doses as low as reasonably achievable (ALARA). Individualized dose reduction techniques using automated exposure control or adjustment of mA and/or kV according to the patient's size were employed. CLINICAL HISTORY: diffuse abdominal pain and vomiting, patient has known pancreatic pseudocyst COMPARISON: 4/28/2024 FINDINGS: Lack of intravenous contrast limits evaluation of solid abdominal and pelvic organs.  LOWER CHEST: The heart is normal size.  The lung bases are  clear. There is a moderate hiatal hernia.  ABDOMEN/PELVIS:  Liver, gallbladder and bile ducts: The unenhanced liver is grossly unremarkable without focal abnormality.  There has been a prior cholecystectomy.  There is no definite biliary duct dilatation.  Adrenal glands: The adrenal glands are morphologically unremarkable without suspicious lesion.  Kidneys, ureter and urinary bladder: There are nonobstructing right kidney stones.  No hydronephrosis. Urinary bladder is unremarkable.  Spleen: The spleen is normal size.  Pancreas: There is persistent diffuse peripancreatic inflammation.  There is a small fluid collection adjacent to the tail of the pancreas.  GI systems and mesentery: No evidence of bowel obstruction.  The appendix is not visualized but there are no secondary signs of appendicitis.  No significant mesenteric inflammation.  Lymph nodes: No definite pathologically enlarged abdominal or pelvic lymph nodes present within the limits of this noncontrast enhanced exam.  Vessels: The abdominal aorta is normal in caliber.  The inferior vena cava is unremarkable. Peritoneum: No free intraperitoneal fluid or pneumoperitoneum. Pelvic viscera: No acute findings.  Body wall: No body wall contusion.  Bones: No acute fracture.     Findings remain compatible with pancreatitis. Authenticated and Electronically Signed by Jens Karimi MD on 05/11/2024 11:36:09 PM     Assessment/Plan:    Inpatient general floor admission 5/11/2024 with pancreatitis, hyperkalemia, VIVIENNE.    At time of admission resting in bed, appears comfortably ill.  No vomiting.    Pancreatitis, acute on chronic  Hyperkalemia  VIVIENNE?   Clear liquids.  IVF.  Monitor and temporize potassium as needed.  Pain and nausea control.  Lipid panel.  Unclear renal baseline.    Chronic:  hypertension, recurrent pancreatitis, pancreatic pseudocyst, gastritis, CKD 3?    Continue home medications.    Risk Assessment: High  DVT Prophylaxis: Heparin  Code Status: Full  code  Diet: Clear liquid diet    Advance Care Planning   ACP discussion was held with the patient during this visit. Patient does not have an advance directive, information provided.           Brian Joseph Kerley, DO  05/12/24  01:47 EDT    Dictated utilizing Dragon dictation.

## 2024-05-12 NOTE — ED PROVIDER NOTES
Procedures       Ultrasound-guided IV:    Indication: Multiple failed attempts at peripheral access with peripheral IVs    ultrasound-guided IV performed and inserted by myself.  Area cleansed with alcohol, tourniquet applied to upper bicep.  Visualized compressible vein in the right AC without pulsating.  2 attempts made.  First attempt, patient jerked arm, removed catheter.  Revisualized on ultrasound, again noted same vein with compressibility and no pulsatility.  New 20-gauge IV catheter was threaded into this vein, no pulsatile flow, dark venous appearing blood.  Flushed without difficulty and drawl back of blood without difficulty.  Confirmed with ultrasound.  secured by nursing staff.  No pain with flushing saline.  Tolerated without difficulty.    Shawn Elizabeth PA-C, PA-C  05/11/24 2040

## 2024-05-13 LAB
ALBUMIN SERPL-MCNC: 3.1 G/DL (ref 3.5–5.2)
ALBUMIN/GLOB SERPL: 1.3 G/DL
ALP SERPL-CCNC: 116 U/L (ref 39–117)
ALT SERPL W P-5'-P-CCNC: <5 U/L (ref 1–33)
ANION GAP SERPL CALCULATED.3IONS-SCNC: 17.5 MMOL/L (ref 5–15)
AST SERPL-CCNC: 9 U/L (ref 1–32)
BASOPHILS # BLD AUTO: 0.02 10*3/MM3 (ref 0–0.2)
BASOPHILS NFR BLD AUTO: 0.3 % (ref 0–1.5)
BILIRUB SERPL-MCNC: 0.2 MG/DL (ref 0–1.2)
BUN SERPL-MCNC: 51 MG/DL (ref 8–23)
BUN/CREAT SERPL: 19.7 (ref 7–25)
CALCIUM SPEC-SCNC: 8.8 MG/DL (ref 8.6–10.5)
CHLORIDE SERPL-SCNC: 110 MMOL/L (ref 98–107)
CO2 SERPL-SCNC: 14.5 MMOL/L (ref 22–29)
CREAT SERPL-MCNC: 2.59 MG/DL (ref 0.57–1)
DEPRECATED RDW RBC AUTO: 57.9 FL (ref 37–54)
EGFRCR SERPLBLD CKD-EPI 2021: 19.5 ML/MIN/1.73
EOSINOPHIL # BLD AUTO: 0.11 10*3/MM3 (ref 0–0.4)
EOSINOPHIL NFR BLD AUTO: 1.4 % (ref 0.3–6.2)
ERYTHROCYTE [DISTWIDTH] IN BLOOD BY AUTOMATED COUNT: 14.8 % (ref 12.3–15.4)
GLOBULIN UR ELPH-MCNC: 2.3 GM/DL
GLUCOSE SERPL-MCNC: 76 MG/DL (ref 65–99)
HCT VFR BLD AUTO: 30.6 % (ref 34–46.6)
HGB BLD-MCNC: 10.2 G/DL (ref 12–15.9)
HYPOCHROMIA BLD QL: NORMAL
IMM GRANULOCYTES # BLD AUTO: 0.07 10*3/MM3 (ref 0–0.05)
IMM GRANULOCYTES NFR BLD AUTO: 0.9 % (ref 0–0.5)
LIPASE SERPL-CCNC: 595 U/L (ref 13–60)
LYMPHOCYTES # BLD AUTO: 0.81 10*3/MM3 (ref 0.7–3.1)
LYMPHOCYTES NFR BLD AUTO: 10.4 % (ref 19.6–45.3)
MACROCYTES BLD QL SMEAR: NORMAL
MAGNESIUM SERPL-MCNC: 2 MG/DL (ref 1.6–2.4)
MCH RBC QN AUTO: 35.5 PG (ref 26.6–33)
MCHC RBC AUTO-ENTMCNC: 33.3 G/DL (ref 31.5–35.7)
MCV RBC AUTO: 106.6 FL (ref 79–97)
MONOCYTES # BLD AUTO: 0.71 10*3/MM3 (ref 0.1–0.9)
MONOCYTES NFR BLD AUTO: 9.1 % (ref 5–12)
NEUTROPHILS NFR BLD AUTO: 6.08 10*3/MM3 (ref 1.7–7)
NEUTROPHILS NFR BLD AUTO: 77.9 % (ref 42.7–76)
NRBC BLD AUTO-RTO: 0 /100 WBC (ref 0–0.2)
OVALOCYTES BLD QL SMEAR: NORMAL
PLATELET # BLD AUTO: 206 10*3/MM3 (ref 140–450)
PMV BLD AUTO: 10.2 FL (ref 6–12)
POTASSIUM SERPL-SCNC: 3.2 MMOL/L (ref 3.5–5.2)
PROT SERPL-MCNC: 5.4 G/DL (ref 6–8.5)
RBC # BLD AUTO: 2.87 10*6/MM3 (ref 3.77–5.28)
SMALL PLATELETS BLD QL SMEAR: ADEQUATE
SODIUM SERPL-SCNC: 142 MMOL/L (ref 136–145)
SODIUM UR-SCNC: 48 MMOL/L
WBC MORPH BLD: NORMAL
WBC NRBC COR # BLD AUTO: 7.8 10*3/MM3 (ref 3.4–10.8)

## 2024-05-13 PROCEDURE — 80053 COMPREHEN METABOLIC PANEL: CPT | Performed by: INTERNAL MEDICINE

## 2024-05-13 PROCEDURE — 25010000002 HEPARIN (PORCINE) PER 1000 UNITS: Performed by: STUDENT IN AN ORGANIZED HEALTH CARE EDUCATION/TRAINING PROGRAM

## 2024-05-13 PROCEDURE — 0 DEXTROSE 5 % SOLUTION 1,000 ML FLEX CONT: Performed by: INTERNAL MEDICINE

## 2024-05-13 PROCEDURE — 25010000002 HYDROMORPHONE 1 MG/ML SOLUTION: Performed by: STUDENT IN AN ORGANIZED HEALTH CARE EDUCATION/TRAINING PROGRAM

## 2024-05-13 PROCEDURE — 99232 SBSQ HOSP IP/OBS MODERATE 35: CPT | Performed by: INTERNAL MEDICINE

## 2024-05-13 PROCEDURE — 25010000002 POTASSIUM CHLORIDE PER 2 MEQ OF POTASSIUM: Performed by: INTERNAL MEDICINE

## 2024-05-13 PROCEDURE — 84300 ASSAY OF URINE SODIUM: CPT | Performed by: INTERNAL MEDICINE

## 2024-05-13 PROCEDURE — 83735 ASSAY OF MAGNESIUM: CPT | Performed by: INTERNAL MEDICINE

## 2024-05-13 PROCEDURE — 97166 OT EVAL MOD COMPLEX 45 MIN: CPT

## 2024-05-13 PROCEDURE — 25010000002 PROMETHAZINE PER 50 MG: Performed by: STUDENT IN AN ORGANIZED HEALTH CARE EDUCATION/TRAINING PROGRAM

## 2024-05-13 PROCEDURE — 85025 COMPLETE CBC W/AUTO DIFF WBC: CPT | Performed by: STUDENT IN AN ORGANIZED HEALTH CARE EDUCATION/TRAINING PROGRAM

## 2024-05-13 PROCEDURE — 85007 BL SMEAR W/DIFF WBC COUNT: CPT | Performed by: STUDENT IN AN ORGANIZED HEALTH CARE EDUCATION/TRAINING PROGRAM

## 2024-05-13 PROCEDURE — 25010000002 ONDANSETRON PER 1 MG: Performed by: INTERNAL MEDICINE

## 2024-05-13 PROCEDURE — 97162 PT EVAL MOD COMPLEX 30 MIN: CPT

## 2024-05-13 PROCEDURE — 83690 ASSAY OF LIPASE: CPT | Performed by: STUDENT IN AN ORGANIZED HEALTH CARE EDUCATION/TRAINING PROGRAM

## 2024-05-13 RX ORDER — SODIUM CHLORIDE 450 MG/100ML
50 INJECTION, SOLUTION INTRAVENOUS CONTINUOUS
Status: DISCONTINUED | OUTPATIENT
Start: 2024-05-13 | End: 2024-05-13

## 2024-05-13 RX ADMIN — HYDROMORPHONE HYDROCHLORIDE 1 MG: 1 INJECTION, SOLUTION INTRAMUSCULAR; INTRAVENOUS; SUBCUTANEOUS at 14:29

## 2024-05-13 RX ADMIN — HEPARIN SODIUM 5000 UNITS: 5000 INJECTION INTRAVENOUS; SUBCUTANEOUS at 20:40

## 2024-05-13 RX ADMIN — Medication 10 ML: at 20:40

## 2024-05-13 RX ADMIN — Medication 10 ML: at 17:32

## 2024-05-13 RX ADMIN — ONDANSETRON 4 MG: 2 INJECTION INTRAMUSCULAR; INTRAVENOUS at 11:08

## 2024-05-13 RX ADMIN — SODIUM BICARBONATE 150 MEQ: 84 INJECTION, SOLUTION INTRAVENOUS at 11:13

## 2024-05-13 RX ADMIN — HYDROMORPHONE HYDROCHLORIDE 1 MG: 1 INJECTION, SOLUTION INTRAMUSCULAR; INTRAVENOUS; SUBCUTANEOUS at 08:12

## 2024-05-13 RX ADMIN — HYDROMORPHONE HYDROCHLORIDE 1 MG: 1 INJECTION, SOLUTION INTRAMUSCULAR; INTRAVENOUS; SUBCUTANEOUS at 20:40

## 2024-05-13 RX ADMIN — HYDROMORPHONE HYDROCHLORIDE 1 MG: 1 INJECTION, SOLUTION INTRAMUSCULAR; INTRAVENOUS; SUBCUTANEOUS at 17:33

## 2024-05-13 RX ADMIN — HYDROMORPHONE HYDROCHLORIDE 1 MG: 1 INJECTION, SOLUTION INTRAMUSCULAR; INTRAVENOUS; SUBCUTANEOUS at 04:57

## 2024-05-13 RX ADMIN — HEPARIN SODIUM 5000 UNITS: 5000 INJECTION INTRAVENOUS; SUBCUTANEOUS at 08:12

## 2024-05-13 RX ADMIN — POTASSIUM CHLORIDE 50 ML/HR: 149 INJECTION, SOLUTION, CONCENTRATE INTRAVENOUS at 10:00

## 2024-05-13 RX ADMIN — PANTOPRAZOLE SODIUM 40 MG: 40 TABLET, DELAYED RELEASE ORAL at 06:30

## 2024-05-13 RX ADMIN — SODIUM BICARBONATE 150 MEQ: 84 INJECTION, SOLUTION INTRAVENOUS at 20:14

## 2024-05-13 RX ADMIN — SODIUM BICARBONATE 650 MG TABLET 650 MG: at 08:13

## 2024-05-13 RX ADMIN — SODIUM BICARBONATE 650 MG TABLET 650 MG: at 20:40

## 2024-05-13 RX ADMIN — Medication 10 ML: at 09:55

## 2024-05-13 RX ADMIN — Medication 1 PATCH: at 08:13

## 2024-05-13 RX ADMIN — ONDANSETRON 4 MG: 2 INJECTION INTRAMUSCULAR; INTRAVENOUS at 04:57

## 2024-05-13 RX ADMIN — HYDROMORPHONE HYDROCHLORIDE 1 MG: 1 INJECTION, SOLUTION INTRAMUSCULAR; INTRAVENOUS; SUBCUTANEOUS at 11:08

## 2024-05-13 RX ADMIN — SODIUM BICARBONATE 150 MEQ: 84 INJECTION, SOLUTION INTRAVENOUS at 04:57

## 2024-05-13 RX ADMIN — ONDANSETRON 4 MG: 2 INJECTION INTRAMUSCULAR; INTRAVENOUS at 18:06

## 2024-05-13 RX ADMIN — HYDROMORPHONE HYDROCHLORIDE 1 MG: 1 INJECTION, SOLUTION INTRAMUSCULAR; INTRAVENOUS; SUBCUTANEOUS at 23:32

## 2024-05-13 RX ADMIN — PROMETHAZINE HYDROCHLORIDE 12.5 MG: 25 INJECTION INTRAMUSCULAR; INTRAVENOUS at 21:40

## 2024-05-13 RX ADMIN — PANTOPRAZOLE SODIUM 40 MG: 40 TABLET, DELAYED RELEASE ORAL at 16:34

## 2024-05-13 NOTE — THERAPY EVALUATION
Patient Name: Tasha Yarbrough  : 1955    MRN: [id number]                              Today's Date: 2024       Admit Date: 2024    Visit Dx:     ICD-10-CM ICD-9-CM   1. Hyperkalemia  E87.5 276.7   2. Chronic pancreatitis, unspecified pancreatitis type  K86.1 577.1     Patient Active Problem List   Diagnosis    Colon cancer screening    Gastroesophageal reflux disease with esophagitis    Left lower quadrant abdominal pain    Irritable bowel syndrome with constipation    Encounter for screening for malignant neoplasm of colon    Periumbilical abdominal pain    Diarrhea of presumed infectious origin    Acute kidney injury    Bacterial colitis    VIVIENNE (acute kidney injury)    C. difficile colitis    Generalized abdominal pain    Diarrhea of infectious origin    Abnormal finding on GI tract imaging    Anemia, chronic disease    Colitis due to Clostridium difficile    Pancreatitis, acute    Chronic kidney disease, stage III (moderate)    Acute pancreatitis    Metabolic acidosis    Acute UTI (urinary tract infection)    Epigastric pain    Nausea and vomiting in adult    Moderate malnutrition    Hyperkalemia     Past Medical History:   Diagnosis Date    Hypertension     Impaired mobility     Injury of back     Pancreatitis      Past Surgical History:   Procedure Laterality Date    ABDOMINAL SURGERY      COLON SURGERY      COLONOSCOPY      ENDOSCOPY N/A 2024    Procedure: ESOPHAGOGASTRODUODENOSCOPY WITH BIOPSY;  Surgeon: Jairo Negro MD;  Location: [facility] ENDOSCOPY;  Service: Gastroenterology;  Laterality: N/A;    TUBAL ABDOMINAL LIGATION        General Information       Row Name 24 1513          OT Time and Intention    Document Type evaluation  -SD     Mode of Treatment occupational therapy  -SD       Row Name 24 1513          General Information    Patient Profile Reviewed yes  -SD     Prior Level of Function independent:;all household mobility;ADL's  Lives with sons, has a cane, RW, SC  and BSC  -SD     Existing Precautions/Restrictions fall  -SD     Barriers to Rehab medically complex;previous functional deficit  -SD       Row Name 05/13/24 1513          Living Environment    People in Home child(debra), adult  -SD       [facility] Name 05/13/24 1513          Home Main Entrance    Number of Stairs, Main Entrance one  -SD     Stair Railings, Main Entrance none  -SD       [facility] Name 05/13/24 1513          Stairs Within Home, Primary    Number of Stairs, Within Home, Primary none  -SD       [facility] Name 05/13/24 1513          Cognition    Orientation Status (Cognition) oriented x 4  -SD       [facility] Name 05/13/24 1513          Safety Issues, Functional Mobility    Safety Issues Affecting Function (Mobility) safety precautions follow-through/compliance;safety precaution awareness  -SD     Impairments Affecting Function (Mobility) balance;endurance/activity tolerance;strength;pain;postural/trunk control  -SD               User Key  (r) = Recorded By, (t) = Taken By, (c) = Cosigned By      Initials Name Provider Type    SD Yuliana Ham OT Occupational Therapist                     Mobility/ADL's       [facility] Name 05/13/24 1514          Bed Mobility    Comment, (Bed Mobility) standing in room with PT  -SD       [facility] Name 05/13/24 1514          Transfers    Transfers sit-stand transfer  -[facility] Name 05/13/24 1514          Sit-Stand Transfer    Sit-Stand Ward (Transfers) contact guard  -SD     Assistive Device (Sit-Stand Transfers) cane, straight  -[facility] Name 05/13/24 1514          Functional Mobility    Functional Mobility- Ind. Level contact guard assist  -SD     Functional Mobility- Device walker, front-wheeled  -SD     Functional Mobility-Distance (Feet) 58  -SD     Functional Mobility- Safety Issues balance decreased during turns;sequencing ability decreased;step length decreased  -SD       [facility] Name 05/13/24 1514          Activities of Daily Living    BADL Assessment/Intervention bathing;upper  body dressing;lower body dressing;grooming;feeding;toileting  -SD       Row Name 05/13/24 1514          Bathing Assessment/Intervention    [place] Level (Bathing) minimum assist (75% patient effort)  -SD       Row Name 05/13/24 1514          Upper Body Dressing Assessment/Training    [place] Level (Upper Body Dressing) standby assist  -SD       Row Name 05/13/24 1514          Lower Body Dressing Assessment/Training    [place] Level (Lower Body Dressing) minimum assist (75% patient effort)  -SD       Row Name 05/13/24 1514          Grooming Assessment/Training    [place] Level (Grooming) standby assist  -SD       Row Name 05/13/24 1514          Self-Feeding Assessment/Training    [place] Level (Feeding) supervision  -SD       Row Name 05/13/24 1514          Toileting Assessment/Training    [place] Level (Toileting) minimum assist (75% patient effort)  -SD     Comment, (Toileting) patient reports occassional incontinence of bowel and bladder  -SD               User Key  (r) = Recorded By, (t) = Taken By, (c) = Cosigned By      Initials Name Provider Type    Yuliana Kimbrough OT Occupational Therapist                   Obj/Interventions       Row Name 05/13/24 1515          Range of Motion Comprehensive    General Range of Motion bilateral upper extremity ROM WFL  -SD       Row Name 05/13/24 1515          Strength Comprehensive (MMT)    General Manual Muscle Testing (MMT) Assessment upper extremity strength deficits identified  -SD     Comment, General Manual Muscle Testing (MMT) Assessment UB 3+/5  -SD               User Key  (r) = Recorded By, (t) = Taken By, (c) = Cosigned By      Initials Name Provider Type    Yuliana Kimbrough OT Occupational Therapist                   Goals/Plan       Row Name 05/13/24 1519          Transfer Goal 1 (OT)    Activity/Assistive Device (Transfer Goal 1, OT) sit-to-stand/stand-to-sit;walker, rolling  -SD     [place] Level/Cues Needed (Transfer  Goal 1, OT) modified independence  -SD     Time Frame (Transfer Goal 1, OT) long term goal (LTG)  -SD     Progress/Outcome (Transfer Goal 1, OT) goal ongoing  -SD       [facility] Name 05/13/24 1519          Bathing Goal 1 (OT)    Activity/Device (Bathing Goal 1, OT) upper body bathing  -SD     [place] Level/Cues Needed (Bathing Goal 1, OT) standby assist  -SD     Time Frame (Bathing Goal 1, OT) 2 weeks  -SD     Progress/Outcomes (Bathing Goal 1, OT) goal ongoing  -SD       [facility] Name 05/13/24 1519          Dressing Goal 1 (OT)    Activity/Device (Dressing Goal 1, OT) lower body dressing  -SD     [place]/Cues Needed (Dressing Goal 1, OT) standby assist  -SD     Time Frame (Dressing Goal 1, OT) 2 weeks  -SD     Progress/Outcome (Dressing Goal 1, OT) goal ongoing  -SD       Row Name 05/13/24 1519          Toileting Goal 1 (OT)    Activity/Device (Toileting Goal 1, OT) toileting skills, all;raised toilet seat  -SD     [place] Level/Cues Needed (Toileting Goal 1, OT) standby assist  -SD     Time Frame (Toileting Goal 1, OT) 2 weeks  -SD     Progress/Outcome (Toileting Goal 1, OT) goal ongoing  -SD       Row Name 05/13/24 1519          Strength Goal 1 (OT)    Strength Goal 1 (OT) Patient to perform UB ther ex as tolerated  -SD     Time Frame (Strength Goal 1, OT) long term goal (LTG)  -SD     Progress/Outcome (Strength Goal 1, OT) goal ongoing  -SD       Row Name 05/13/24 1519          Therapy Assessment/Plan (OT)    Planned Therapy Interventions (OT) activity tolerance training;adaptive equipment training;BADL retraining;patient/caregiver education/training;ROM/therapeutic exercise;strengthening exercise;transfer/mobility retraining  -SD               User Key  (r) = Recorded By, (t) = Taken By, (c) = Cosigned By      Initials Name Provider Type    Yuliana Kimbrough OT Occupational Therapist                   Clinical Impression       Row Name 05/13/24 1516          Pain Assessment    Pretreatment Pain Rating  6/10  -SD     Posttreatment Pain Rating 6/10  -SD     Pain Location - abdomen  -SD       Row Name 05/13/24 1516          Plan of Care Review    Plan of Care Reviewed With patient;son  -SD     Progress no change  -SD     Outcome Evaluation OT eval completed. Patient is walking in room with PT. Patient is Ox4, lives with her sons, reports she is normally ind with ADLs and HH mobility. Has a cane, RW, BSC and SC. Patient reports occassional incontinence of bowel and bladder. Family assists with IADLs and driving. Patient walked 58' using RW with CGA. Patient able to perform ADLs with min A overall. Patient is expected to benefit from continued OT services prior to DC and would benefit from HH services.  -SD       Row Name 05/13/24 1516          Therapy Assessment/Plan (OT)    Patient/Family Therapy Goal Statement (OT) home  -SD     Rehab Potential (OT) good, to achieve stated therapy goals  -SD     Criteria for Skilled Therapeutic Interventions Met (OT) skilled treatment is necessary  -SD     Therapy Frequency (OT) 3 times/wk  5 times if indicated  -SD       Row Name 05/13/24 1516          Therapy Plan Review/Discharge Plan (OT)    Anticipated Discharge Disposition (OT) home with home health;home with assist  -SD       Row Name 05/13/24 1516          Vital Signs    O2 Delivery Pre Treatment room air  -SD       Row Name 05/13/24 1516          Positioning and Restraints    Pre-Treatment Position standing in room  -SD     Post Treatment Position bed  -SD     In Bed supine;call light within reach;encouraged to call for assist;with family/caregiver  -SD               User Key  (r) = Recorded By, (t) = Taken By, (c) = Cosigned By      Initials Name Provider Type    Yuliana Kimbrough OT Occupational Therapist                   Outcome Measures       Row Name 05/13/24 6116          How much help from another is currently needed...    Putting on and taking off regular lower body clothing? 3  -SD     Bathing (including  washing, rinsing, and drying) 3  -SD     Toileting (which includes using toilet bed pan or urinal) 3  -SD     Putting on and taking off regular upper body clothing 3  -SD     Taking care of personal grooming (such as brushing teeth) 3  -SD     Eating meals 4  -SD     AM-PAC 6 Clicks Score (OT) 19  -SD       Row Name 05/13/24 1446          How much help from another person do you currently need...    Turning from your back to your side while in flat bed without using bedrails? 4  -HW     Moving from lying on back to sitting on the side of a flat bed without bedrails? 4  -HW     Moving to and from a bed to a chair (including a wheelchair)? 3  -HW     Standing up from a chair using your arms (e.g., wheelchair, bedside chair)? 3  -HW     Climbing 3-5 steps with a railing? 3  -HW     To walk in hospital room? 3  -HW     AM-PAC 6 Clicks Score (PT) 20  -HW     Highest Level of Mobility Goal 6 --> Walk 10 steps or more  -       Row Name 05/13/24 1520 05/13/24 1446       Functional Assessment    Outcome Measure Options AM-PAC 6 Clicks Daily Activity (OT)  -SD AM-PAC 6 Clicks Basic Mobility (PT)  -              User Key  (r) = Recorded By, (t) = Taken By, (c) = Cosigned By      Initials Name Provider Type    Yuliana Kimbrough OT Occupational Therapist     Rylee Mejia, PT Physical Therapist                    Occupational Therapy Education       Title: PT OT SLP Therapies (In Progress)       Topic: Occupational Therapy (In Progress)       Point: ADL training (Done)       Description:   Instruct learner(s) on proper safety adaptation and remediation techniques during self care or transfers.   Instruct in proper use of assistive devices.                  Learning Progress Summary             Patient Acceptance, E,TB, VU by SD at 5/13/2024 1521    Comment: OT POC                         Point: Home exercise program (Not Started)       Description:   Instruct learner(s) on appropriate technique for monitoring, assisting  and/or progressing therapeutic exercises/activities.                  Learner Progress:  Not documented in this visit.              Point: Precautions (Not Started)       Description:   Instruct learner(s) on prescribed precautions during self-care and functional transfers.                  Learner Progress:  Not documented in this visit.              Point: Body mechanics (Not Started)       Description:   Instruct learner(s) on proper positioning and spine alignment during self-care, functional mobility activities and/or exercises.                  Learner Progress:  Not documented in this visit.                              User Key       Initials Effective Dates Name Provider Type Discipline    SD 06/16/21 -  Yuliana Ham OT Occupational Therapist OT                  OT Recommendation and Plan  Planned Therapy Interventions (OT): activity tolerance training, adaptive equipment training, BADL retraining, patient/caregiver education/training, ROM/therapeutic exercise, strengthening exercise, transfer/mobility retraining  Therapy Frequency (OT): 3 times/wk (5 times if indicated)  Plan of Care Review  Plan of Care Reviewed With: patient, son  Progress: no change  Outcome Evaluation: OT eval completed. Patient is walking in room with PT. Patient is Ox4, lives with her sons, reports she is normally ind with ADLs and HH mobility. Has a cane, RW, BSC and SC. Patient reports occassional incontinence of bowel and bladder. Family assists with IADLs and driving. Patient walked 58' using RW with CGA. Patient able to perform ADLs with min A overall. Patient is expected to benefit from continued OT services prior to DC and would benefit from HH services.     Time Calculation:   Evaluation Complexity (OT)  Review Occupational Profile/Medical/Therapy History Complexity: expanded/moderate complexity  Assessment, Occupational Performance/Identification of Deficit Complexity: 3-5 performance deficits  Clinical Decision Making  Complexity (OT): detailed assessment/moderate complexity  Overall Complexity of Evaluation (OT): moderate complexity     Time Calculation- OT       Row Name 05/13/24 1521             Time Calculation- OT    OT Start Time 1400  -SD      OT Received On 05/13/24  -SD      OT Goal Re-Cert Due Date 05/23/24  -SD         Untimed Charges    OT Eval/Re-eval Minutes 30  -SD         Total Minutes    Untimed Charges Total Minutes 30  -SD       Total Minutes 30  -SD                User Key  (r) = Recorded By, (t) = Taken By, (c) = Cosigned By      Initials Name Provider Type    SD Yuliana Ham, OT Occupational Therapist                  Therapy Charges for Today       Code Description Service Date Service Provider Modifiers Qty    [id number] HC OT EVAL MOD COMPLEXITY 2 5/13/2024 Yuliaan Ham OT GO 1                 Yuliana Ham OT  5/13/2024

## 2024-05-13 NOTE — PLAN OF CARE
Goal Outcome Evaluation:    No acute events overnight. Pain controlled with PRN pain medication. VSS.

## 2024-05-13 NOTE — PLAN OF CARE
Goal Outcome Evaluation:  Plan of Care Reviewed With: patient, son        Progress: no change  Outcome Evaluation: Pt participated in PT initial evaluation this date. Pt presents supine in bed with son at side, pleasant and agreeable to PT evaluation. Pt reports 6/10 stomach pain at rest. Pt reports at baseline, she lives at home with her children in a  H with 1 EULALIO. Pt reports she normally ambulates hosuehold distances with a SPC. Pt states she sometimes uses a RW d/t back and (B) knee pain. Pt denies reports of falls at home. Pt performed supine to sit on EOB with mod (I). Pt performed sit to stand transfer with CGA and use of a SPC for safety. Pt began reaching for objects with LUE during stance, pt was provided with a RW which improved standing balance. Pt ambulated x58' with the use of a RW and CGA for safety. Pt required increased time and effort for perform ambulation d/t LBP. Following evaluation, pt left supine in bed with call light, all needs within reach and son at side. Recommend skilled PT intervention during IP admission to address identified deficits, reduce risk of falls and prevent functional decline. Once medically stable, recommend pt to d/c home with support from family and HHPT.      Anticipated Discharge Disposition (PT): home with assist, home with home health

## 2024-05-13 NOTE — CASE MANAGEMENT/SOCIAL WORK
DCP; return home when medically stable. Met with pt at bedside, permission to discuss DCP with sons. CM continues to follow for any needs.

## 2024-05-13 NOTE — THERAPY EVALUATION
Patient Name: Tasha Yarbrough  : 1955    MRN: [id number]                              Today's Date: 2024       Admit Date: 2024    Visit Dx:     ICD-10-CM ICD-9-CM   1. Hyperkalemia  E87.5 276.7   2. Chronic pancreatitis, unspecified pancreatitis type  K86.1 577.1     Patient Active Problem List   Diagnosis    Colon cancer screening    Gastroesophageal reflux disease with esophagitis    Left lower quadrant abdominal pain    Irritable bowel syndrome with constipation    Encounter for screening for malignant neoplasm of colon    Periumbilical abdominal pain    Diarrhea of presumed infectious origin    Acute kidney injury    Bacterial colitis    VIVIENNE (acute kidney injury)    C. difficile colitis    Generalized abdominal pain    Diarrhea of infectious origin    Abnormal finding on GI tract imaging    Anemia, chronic disease    Colitis due to Clostridium difficile    Pancreatitis, acute    Chronic kidney disease, stage III (moderate)    Acute pancreatitis    Metabolic acidosis    Acute UTI (urinary tract infection)    Epigastric pain    Nausea and vomiting in adult    Moderate malnutrition    Hyperkalemia     Past Medical History:   Diagnosis Date    Hypertension     Impaired mobility     Injury of back     Pancreatitis      Past Surgical History:   Procedure Laterality Date    ABDOMINAL SURGERY      COLON SURGERY      COLONOSCOPY      ENDOSCOPY N/A 2024    Procedure: ESOPHAGOGASTRODUODENOSCOPY WITH BIOPSY;  Surgeon: Jairo Negro MD;  Location: [facility] ENDOSCOPY;  Service: Gastroenterology;  Laterality: N/A;    TUBAL ABDOMINAL LIGATION        General Information       Row Name 24 1436          Physical Therapy Time and Intention    Document Type evaluation  -HW     Mode of Treatment physical therapy  -       Row Name 24 1436          General Information    Patient Profile Reviewed yes  -HW     Prior Level of Function independent:;all household mobility  -HW     Existing  Precautions/Restrictions fall  -     Barriers to Rehab previous functional deficit;medically complex  -       Row Name 05/13/24 1436          Living Environment    People in Home child(debra), adult  -       Row Name 05/13/24 1436          Home Main Entrance    Number of Stairs, Main Entrance one  -     Stair Railings, Main Entrance none  -[facility] Name 05/13/24 1436          Stairs Within Home, Primary    Number of Stairs, Within Home, Primary none  -[facility] Name 05/13/24 1436          Cognition    Orientation Status (Cognition) oriented x 4  -[facility] Name 05/13/24 1436          Safety Issues, Functional Mobility    Safety Issues Affecting Function (Mobility) insight into deficits/self-awareness;safety precaution awareness;safety precautions follow-through/compliance;positioning of assistive device  -     Impairments Affecting Function (Mobility) pain;strength;endurance/activity tolerance;postural/trunk control;balance  -               User Key  (r) = Recorded By, (t) = Taken By, (c) = Cosigned By      Initials Name Provider Type     Rylee Mejia PT Physical Therapist                   Mobility       Row Name 05/13/24 1437          Bed Mobility    Bed Mobility supine-sit;sit-supine  -     Supine-Sit [place] (Bed Mobility) verbal cues;nonverbal cues (demo/gesture)  -     Sit-Supine [place] (Bed Mobility) modified independence;nonverbal cues (demo/gesture);verbal cues  -     Assistive Device (Bed Mobility) head of bed elevated;bed rails  -[facility] Name 05/13/24 1437          Sit-Stand Transfer    Sit-Stand [place] (Transfers) contact guard  -     Assistive Device (Sit-Stand Transfers) cane, straight  -[facility] Name 05/13/24 1437          Gait/Stairs (Locomotion)    [place] Level (Gait) contact guard  -     Assistive Device (Gait) walker, front-wheeled  -     Patient was able to Ambulate yes  -     Distance in Feet (Gait) 58  -      Deviations/Abnormal Patterns (Gait) bilateral deviations;gait speed decreased;festinating/shuffling;alfonzo decreased  -     Bilateral Gait Deviations forward flexed posture  -               User Key  (r) = Recorded By, (t) = Taken By, (c) = Cosigned By      Initials Name Provider Type    Rylee Yeh PT Physical Therapist                   Obj/Interventions       Row Name 05/13/24 1438          Range of Motion Comprehensive    General Range of Motion bilateral lower extremity ROM WFL  -       Row Name 05/13/24 1438          Strength Comprehensive (MMT)    General Manual Muscle Testing (MMT) Assessment lower extremity strength deficits identified  -     Comment, General Manual Muscle Testing (MMT) Assessment BLE MMT grossly 3+/5  -       Row Name 05/13/24 1438          Balance    Balance Assessment sitting static balance;sit to stand dynamic balance;sitting dynamic balance;standing static balance;standing dynamic balance  -     Static Sitting Balance modified independence  -     Dynamic Sitting Balance modified independence  -     Position, Sitting Balance unsupported;sitting edge of bed  -     Sit to Stand Dynamic Balance contact guard  -     Static Standing Balance contact guard  -     Dynamic Standing Balance contact guard  -     Position/Device Used, Standing Balance supported;walker, front-wheeled  -               User Key  (r) = Recorded By, (t) = Taken By, (c) = Cosigned By      Initials Name Provider Type    Rylee Yeh PT Physical Therapist                   Goals/Plan       Row Name 05/13/24 4512          Bed Mobility Goal 1 (PT)    Activity/Assistive Device (Bed Mobility Goal 1, PT) bed mobility activities, all  -     [place] Level/Cues Needed (Bed Mobility Goal 1, PT) independent  -     Time Frame (Bed Mobility Goal 1, PT) long term goal (LTG);10 days  -     Progress/Outcomes (Bed Mobility Goal 1, PT) new goal  -       Row Name 05/13/24 4937           Transfer Goal 1 (PT)    Activity/Assistive Device (Transfer Goal 1, PT) transfers, all;other (see comments)  LRAD  -HW     [place] Level/Cues Needed (Transfer Goal 1, PT) modified independence  -HW     Time Frame (Transfer Goal 1, PT) long term goal (LTG)  -HW     Progress/Outcome (Transfer Goal 1, PT) new goal  -       Row Name 05/13/24 1443          Gait Training Goal 1 (PT)    Activity/Assistive Device (Gait Training Goal 1, PT) gait (walking locomotion);other (see comments)  LRAD  -HW     [place] Level (Gait Training Goal 1, PT) modified independence  -HW     Distance (Gait Training Goal 1, PT) 150  -HW     Time Frame (Gait Training Goal 1, PT) long term goal (LTG);10 days  -HW     Progress/Outcome (Gait Training Goal 1, PT) new goal  -       Row Name 05/13/24 9369          Patient Education Goal (PT)    Activity (Patient Education Goal, PT) Pt will demonstrate the ability to perform 3x10 BLE ther-ex with mod (I) to allow for improved BLE strength and endurance  -HW     [place]/Cues/Accuracy (Memory Goal 2, PT) demonstrates adequately  -HW     Time Frame (Patient Education Goal, PT) short term goal (STG);5 days  -HW     Progress/Outcome (Patient Education Goal, PT) new goal  -       Row Name 05/13/24 1167          Therapy Assessment/Plan (PT)    Planned Therapy Interventions (PT) balance training;bed mobility training;gait training;home exercise program;neuromuscular re-education;motor coordination training;lumbar stabilization;patient/family education;postural re-education;transfer training;stretching;strengthening;ROM (range of motion)  -               User Key  (r) = Recorded By, (t) = Taken By, (c) = Cosigned By      Initials Name Provider Type    HW Rylee Mejia, PT Physical Therapist                   Clinical Impression       Row Name 05/13/24 1433          Pain    Pretreatment Pain Rating 6/10  -HW     Pain Location generalized  -HW     Pain Location - abdomen  -HW     Pain  Intervention(s) Ambulation/increased activity;Repositioned  -       Row Name 05/13/24 1438          Plan of Care Review    Plan of Care Reviewed With patient;son  -     Progress no change  -     Outcome Evaluation Pt participated in PT initial evaluation this date. Pt presents supine in bed with son at side, pleasant and agreeable to PT evaluation. Pt reports 6/10 stomach pain at rest. Pt reports at baseline, she lives at home with her children in a  H with 1 EULALIO. Pt reports she normally ambulates hosuehold distances with a SPC. Pt states she sometimes uses a RW d/t back and (B) knee pain. Pt denies reports of falls at home. Pt performed supine to sit on EOB with mod (I). Pt performed sit to stand transfer with CGA and use of a SPC for safety. Pt began reaching for objects with LUE during stance, pt was provided with a RW which improved standing balance. Pt ambulated x58' with the use of a RW and CGA for safety. Pt required increased time and effort for perform ambulation d/t LBP. Following evaluation, pt left supine in bed with call light, all needs within reach and son at side. Recommend skilled PT intervention during IP admission to address identified deficits, reduce risk of falls and prevent functional decline. Once medically stable, recommend pt to d/c home with support from family and HHPT.  -       Row Name 05/13/24 1438          Therapy Assessment/Plan (PT)    Patient/Family Therapy Goals Statement (PT) Pt reports she would like to return home at time of d/c  -     Rehab Potential (PT) good, to achieve stated therapy goals  -     Criteria for Skilled Interventions Met (PT) yes  -     Therapy Frequency (PT) 5 times/wk  -     Predicted Duration of Therapy Intervention (PT) 10 days  -       Row Name 05/13/24 1438          Vital Signs    Pretreatment Heart Rate (beats/min) 89  -     Pre SpO2 (%) 98  -HW     O2 Delivery Pre Treatment room air  -     O2 Delivery Intra Treatment room air   -     O2 Delivery Post Treatment room air  -HW     Pre Patient Position Supine  -HW     Intra Patient Position Standing  -HW     Post Patient Position Supine  -HW       Row Name 05/13/24 1438          Positioning and Restraints    Pre-Treatment Position in bed  -HW     Post Treatment Position bed  -HW     In Bed supine;call light within reach;encouraged to call for assist;with family/caregiver;patient within staff view  -               User Key  (r) = Recorded By, (t) = Taken By, (c) = Cosigned By      Initials Name Provider Type     Rylee Mejia PT Physical Therapist                   Outcome Measures       Row Name 05/13/24 1446          How much help from another person do you currently need...    Turning from your back to your side while in flat bed without using bedrails? 4  -HW     Moving from lying on back to sitting on the side of a flat bed without bedrails? 4  -HW     Moving to and from a bed to a chair (including a wheelchair)? 3  -HW     Standing up from a chair using your arms (e.g., wheelchair, bedside chair)? 3  -HW     Climbing 3-5 steps with a railing? 3  -HW     To walk in hospital room? 3  -HW     AM-PAC 6 Clicks Score (PT) 20  -HW     Highest Level of Mobility Goal 6 --> Walk 10 steps or more  -       Row Name 05/13/24 1446          Functional Assessment    Outcome Measure Options AM-PAC 6 Clicks Basic Mobility (PT)  -               User Key  (r) = Recorded By, (t) = Taken By, (c) = Cosigned By      Initials Name Provider Type     Rylee Mejia PT Physical Therapist                                 Physical Therapy Education       Title: PT OT SLP Therapies (In Progress)       Topic: Physical Therapy (In Progress)       Point: Mobility training (Done)       Learning Progress Summary             Patient Acceptance, E,TB, VU by  at 5/13/2024 1447    Comment: Educated pt on importance of OOB mobility during IP admission                         Point: Home exercise program (Not Started)        Learner Progress:  Not documented in this visit.              Point: Body mechanics (Not Started)       Learner Progress:  Not documented in this visit.              Point: Precautions (Not Started)       Learner Progress:  Not documented in this visit.                              User Key       Initials Effective Dates Name Provider Type Discipline     11/29/23 -  Rylee Mejia, MARCIAL Physical Therapist PT                  PT Recommendation and Plan  Planned Therapy Interventions (PT): balance training, bed mobility training, gait training, home exercise program, neuromuscular re-education, motor coordination training, lumbar stabilization, patient/family education, postural re-education, transfer training, stretching, strengthening, ROM (range of motion)  Plan of Care Reviewed With: patient, son  Progress: no change  Outcome Evaluation: Pt participated in PT initial evaluation this date. Pt presents supine in bed with son at side, pleasant and agreeable to PT evaluation. Pt reports 6/10 stomach pain at rest. Pt reports at baseline, she lives at home with her children in a  [facility] with 1 EULALIO. Pt reports she normally ambulates hosuehold distances with a SPC. Pt states she sometimes uses a RW d/t back and (B) knee pain. Pt denies reports of falls at home. Pt performed supine to sit on EOB with mod (I). Pt performed sit to stand transfer with CGA and use of a SPC for safety. Pt began reaching for objects with LUE during stance, pt was provided with a RW which improved standing balance. Pt ambulated x58' with the use of a RW and CGA for safety. Pt required increased time and effort for perform ambulation d/t LBP. Following evaluation, pt left supine in bed with call light, all needs within reach and son at side. Recommend skilled PT intervention during IP admission to address identified deficits, reduce risk of falls and prevent functional decline. Once medically stable, recommend pt to d/c home with support from  family and HHPT.     Time Calculation:   PT Evaluation Complexity  History, PT Evaluation Complexity: 1-2 personal factors and/or comorbidities  Examination of Body Systems (PT Eval Complexity): total of 3 or more elements  Clinical Presentation (PT Evaluation Complexity): evolving  Clinical Decision Making (PT Evaluation Complexity): moderate complexity  Overall Complexity (PT Evaluation Complexity): moderate complexity     PT Charges       Row Name 05/13/24 1447             Time Calculation    Start Time 1335  -HW      PT Received On 05/13/24  -      PT Goal Re-Cert Due Date 05/23/24  -         Untimed Charges    PT Eval/Re-eval Minutes 43  -HW         Total Minutes    Untimed Charges Total Minutes 43  -HW       Total Minutes 43  -HW                User Key  (r) = Recorded By, (t) = Taken By, (c) = Cosigned By      Initials Name Provider Type     Rylee Mejia, PT Physical Therapist                  Therapy Charges for Today       Code Description Service Date Service Provider Modifiers Qty    75896094904 HC PT EVAL MOD COMPLEXITY 3 5/13/2024 Rylee Mejia, MARCIAL GP 1            PT G-Codes  Outcome Measure Options: AM-PAC 6 Clicks Basic Mobility (PT)  AM-PAC 6 Clicks Score (PT): 20  PT Discharge Summary  Anticipated Discharge Disposition (PT): home with assist, home with home health    Rylee Mejia PT  5/13/2024

## 2024-05-13 NOTE — PLAN OF CARE
Goal Outcome Evaluation:  Plan of Care Reviewed With: patient, son        Progress: no change  Outcome Evaluation: OT eval completed. Patient is walking in room with PT. Patient is Ox4, lives with her sons, reports she is normally ind with ADLs and HH mobility. Has a cane, RW, BSC and SC. Patient reports occassional incontinence of bowel and bladder. Family assists with IADLs and driving. Patient walked 58' using RW with CGA. Patient able to perform ADLs with min A overall. Patient is expected to benefit from continued OT services prior to DC and would benefit from HH services.      Anticipated Discharge Disposition (OT): home with home health, home with assist

## 2024-05-13 NOTE — PROGRESS NOTES
[facility]    Name:  Tasha Yarbrough   Age:  69 y.o.  Sex:  female  :  1955  MRN:  [id number]   Visit Number:  [id number]  Admission Date:  2024  Date Of Service:  24  Primary Care Physician:  Terrence Baldwin MD     LOS: 1 day :    Chief Complaint:      Abdominal discomfort    Subjective:    24: Istable with pain medication abdominal pain. Tolerating some broth. Has had some appetite.. Continuing bicarb. Worsening Cr. Consult to nephrology.    History Of Presenting Illness:       Tasha Yarbrough is a 69-year-old woman with past medical history of hypertension, recurrent pancreatitis, pancreatic pseudocyst, gastritis, CKD 3(?).  Presented to [facility] ED on 2024 with concern for nausea, vomiting, abdominal pain onset 24 hours.  Also with diarrhea.  She reports that she has a pancreatic pseudocyst which is caused her continue with similar symptoms over the last several months.  She follows with  gastroenterology.  Denies fevers or chills, chest pain, shortness of air, syncope.     ED summary: Afebrile, vital signs stable on room air.  Potassium 6.3, CO2 4.1, anion gap 22, creatinine 2.22, blood glucose 103, lipase over 3000, leukocytosis 15.  Pattern of infection on urinalysis, may be contaminant.  CT ab/pelvis findings remain compatible with pancreatitis.  He was provided albuterol, calcium gluconate, D50 W, Dilaudid, insulin, morphine, Zofran, Phenergan, 1 L NS bolus.  Edited by: Mateo Storm DO at 2024 0923     Review of Systems:     All systems were reviewed and negative except as mentioned in subjective, assessment and plan.    Vital Signs:    Temp:  [97.4 °F (36.3 °C)-99.7 °F (37.6 °C)] 97.7 °F (36.5 °C)  Heart Rate:  [] 71  Resp:  [14-18] 14  BP: (103-168)/() 111/66    Intake and output:    I/O last 3 completed shifts:  In: 4596 [P.O.:120; I.V.:3426; IV Piggyback:1050]  Out: 1300 [Urine:1300]  No intake/output data  recorded.    Physical Examination:    Constitutional:       General: She is not in acute distress.     Appearance: She is ill-appearing. She is not toxic-appearing.   HENT:      Mouth/Throat:      Mouth: Mucous membranes are moist.   Eyes:      Extraocular Movements: Extraocular movements intact.   Cardiovascular:      Rate and Rhythm: Normal rate and regular rhythm.      Pulses: Normal pulses.      Heart sounds: Normal heart sounds.   Pulmonary:      Effort: Pulmonary effort is normal.      Breath sounds: Normal breath sounds.   Abdominal:      Palpations: Abdomen is soft.      Tenderness: There is abdominal tenderness.   Musculoskeletal:      Right lower leg: No edema.      Left lower leg: No edema.   Skin:     General: Skin is warm.      Capillary Refill: Capillary refill takes less than 2 seconds.   Neurological:      General: No focal deficit present.      Mental Status: She is alert and oriented to person, place, and time.   Psychiatric:         Mood and Affect: Mood normal.         Thought Content: Thought content normal.   Exam stable 5/13/24  Edited by: Mateo Storm, DO at 5/13/2024 0923     Laboratory results:    Results from last 7 days   Lab Units 05/13/24  0437 05/12/24  0045 05/11/24 2039   SODIUM mmol/L 142 134* 136   POTASSIUM mmol/L 3.2* 5.4* 6.3*   CHLORIDE mmol/L 110* 106 109*   CO2 mmol/L 14.5* 6.2* 4.1*   BUN mg/dL 51* 47* 43*   CREATININE mg/dL 2.59* 2.30* 2.22*   CALCIUM mg/dL 8.8 10.8* 10.3   BILIRUBIN mg/dL 0.2  --  <0.2   ALK PHOS U/L 116  --  165*   ALT (SGPT) U/L <5  --  8   AST (SGOT) U/L 9  --  11   GLUCOSE mg/dL 76 95 91     Results from last 7 days   Lab Units 05/13/24  0437 05/12/24  0619 05/11/24  1843   WBC 10*3/mm3 7.80 25.54* 15.86*   HEMOGLOBIN g/dL 10.2* 13.7 13.8   HEMATOCRIT % 30.6* 44.3 43.4   PLATELETS 10*3/mm3 206 382 430                 Recent Labs     02/27/24  0522 04/21/24  0015 04/21/24  0919   PHART 7.305* 7.158* 7.199*   XTC5IHC 30.1* 26.4* 25.5*   PO2ART  90.6 104.0* 97.9   ZLV4QZO 15.0* 9.4* 9.9*   BASEEXCESS -10.3* -17.9* -16.6*      I have reviewed the patient's laboratory results.    Radiology results:    CT Abdomen Pelvis Without Contrast    Result Date: 5/11/2024  FINAL REPORT TECHNIQUE: Axial CT images were performed from the lung bases through the symphysis pubis without IV contrast.  This study was performed with techniques to keep radiation doses as low as reasonably achievable (ALARA). Individualized dose reduction techniques using automated exposure control or adjustment of mA and/or kV according to the patient's size were employed. CLINICAL HISTORY: diffuse abdominal pain and vomiting, patient has known pancreatic pseudocyst COMPARISON: 4/28/2024 FINDINGS: Lack of intravenous contrast limits evaluation of solid abdominal and pelvic organs.  LOWER CHEST: The heart is normal size.  The lung bases are clear. There is a moderate hiatal hernia.  ABDOMEN/PELVIS:  Liver, gallbladder and bile ducts: The unenhanced liver is grossly unremarkable without focal abnormality.  There has been a prior cholecystectomy.  There is no definite biliary duct dilatation.  Adrenal glands: The adrenal glands are morphologically unremarkable without suspicious lesion.  Kidneys, ureter and urinary bladder: There are nonobstructing right kidney stones.  No hydronephrosis. Urinary bladder is unremarkable.  Spleen: The spleen is normal size.  Pancreas: There is persistent diffuse peripancreatic inflammation.  There is a small fluid collection adjacent to the tail of the pancreas.  GI systems and mesentery: No evidence of bowel obstruction.  The appendix is not visualized but there are no secondary signs of appendicitis.  No significant mesenteric inflammation.  Lymph nodes: No definite pathologically enlarged abdominal or pelvic lymph nodes present within the limits of this noncontrast enhanced exam.  Vessels: The abdominal aorta is normal in caliber.  The inferior vena cava is  unremarkable. Peritoneum: No free intraperitoneal fluid or pneumoperitoneum. Pelvic viscera: No acute findings.  Body wall: No body wall contusion.  Bones: No acute fracture.     Impression: Findings remain compatible with pancreatitis. Authenticated and Electronically Signed by Jens Karimi MD on 05/11/2024 11:36:09 PM   I have reviewed the patient's radiology reports.    Medication Review:     I have reviewed the patient's active and prn medications.     Problem List:      Hyperkalemia      Assessment/Plan:    Pancreatitis, acute on chronic  Hyperkalemia  VIVIENNE/CKD  CT and lipase confirm pancreatitis. VIVIENNE, AG acidosis, and hyperkalemia are secondary to this. Clear liquids.  D5 bicarb.  Monitor and temporize potassium as needed.  Pain and nausea control.  Lipid panel ok. Will consult nephrology.    Disposition: under evaluation    Prophylaxis: heparin    Edited by: Mateo Storm DO at 5/13/2024 0923     DVT Prophylaxis: heparin  Code Status:   Code Status and Medical Interventions:   Ordered at: 05/12/24 0159     Code Status (Patient has no pulse and is not breathing):    CPR (Attempt to Resuscitate)     Medical Interventions (Patient has pulse or is breathing):    Full Support      Diet:   Dietary Orders (From admission, onward)       Start     Ordered    05/12/24 0202  Diet: Liquid; Clear Liquid; Fluid Consistency: Thin (IDDSI 0)  Diet Effective Now        References:    Diet Order [place]   Question Answer Comment   Diets: Liquid    Liquid Diet: Clear Liquid    Fluid Consistency: Thin (IDDSI 0)        05/12/24 0201                   Discharge Plan: under evaluation    Mateo Storm DO  05/13/24  09:23 EDT    Dictated utilizing Dragon dictation.

## 2024-05-13 NOTE — CONSULTS
[facility]      Nephrology Consultation      Referring Provider:   Darron Austin DO    Reason for Consultation:  Acute kidney injury associated problems.    Subjective:  Chief complaint   Chief Complaint   Patient presents with    Abdominal Pain    Vomiting     History of present illness:    Patient is 69-year-old female with multimedical problems including baseline chronic kidney disease stage III what appears to be mild.  History of recurrent pancreatitis pancreatic pseudocyst and gastritis.  She presented to the emergency room on May 11 after having persistent nausea vomiting and abdominal pain she also complained of some loose bowel movements.  She follows up with  gastroenterology, since admission she has been getting IV fluids she was noted to be severely acidotic with significantly low bicarb, she was started on bicarb drip and has been continuously getting it with a still worsening of renal function the last 3 days.  Was finally consulted for worsening renal function.  Beside having abdominal pain at this point she denies having any fever or chills, has been able to take clear liquids without any problem.  I have reviewed labs/imaging/records from this hospitalization, including ER staff and admitting/attending physicians H/P's and progress notes to establish a comprehensive understanding of this patient's clinical hospital course, as well as to establish plan of care appropriately.   Past Medical History:   Diagnosis Date    Hypertension     Impaired mobility     Injury of back     Pancreatitis        Past Surgical History:   Procedure Laterality Date    ABDOMINAL SURGERY      COLON SURGERY      COLONOSCOPY      ENDOSCOPY N/A 4/23/2024    Procedure: ESOPHAGOGASTRODUODENOSCOPY WITH BIOPSY;  Surgeon: Jairo Negro MD;  Location: [facility] ENDOSCOPY;  Service: Gastroenterology;  Laterality: N/A;    TUBAL ABDOMINAL LIGATION       History reviewed. No pertinent family history.  negative  h/o ESRD     Social History     Tobacco Use    Smoking status: Every Day     Current packs/day: 1.00     Types: Cigarettes    Smokeless tobacco: Never   Vaping Use    Vaping status: Never Used   Substance Use Topics    Alcohol use: Never    Drug use: Never     Home medications:   Prior to Admission Medications       Prescriptions Last Dose Informant Patient Reported? Taking?    glucosamine-chondroitin 500-400 MG capsule capsule 5/12/2024  Yes Yes    Take 1 capsule by mouth 2 (Two) Times a Day With Meals. Indications: Joint Damage causing Pain and Loss of Function    methocarbamol (ROBAXIN) 750 MG tablet 5/11/2024  Yes Yes    Take 1 tablet by mouth 4 (Four) Times a Day As Needed for Muscle Spasms.    ondansetron ODT (ZOFRAN-ODT) 4 MG disintegrating tablet 5/11/2024  No Yes    Place 1 tablet on the tongue Every 8 (Eight) Hours As Needed for Nausea or Vomiting.    oxyCODONE-acetaminophen (Percocet) 5-325 MG per tablet 5/11/2024  No Yes    Take 1 tablet by mouth Every 6 (Six) Hours As Needed for Severe Pain or Moderate Pain.    pantoprazole (PROTONIX) 40 MG EC tablet 5/11/2024  No Yes    Take 1 tablet by mouth 2 (Two) Times a Day Before Meals for 60 days.    sertraline (ZOLOFT) 25 MG tablet 5/11/2024  Yes Yes    Take 1 tablet by mouth Daily.    sodium bicarbonate 650 MG tablet 5/11/2024  Yes Yes    Take 1 tablet by mouth 2 (Two) Times a Day.    vitamin D3 125 MCG (5000 UT) capsule capsule 5/11/2024  Yes Yes    Take 1 capsule by mouth Daily. Indications: Vitamin D Deficiency          Emergency department medications:   Medications   promethazine (PHENERGAN) 12.5 mg in sodium chloride 0.9 % 50 mL (has no administration in time range)   sodium bicarbonate tablet 650 mg (650 mg Oral Given 5/12/24 2354)   pantoprazole (PROTONIX) EC tablet 40 mg (40 mg Oral Given 5/13/24 0630)   oxyCODONE-acetaminophen (PERCOCET) 5-325 MG per tablet 1 tablet (1 tablet Oral Given 5/12/24 2354)   sodium chloride 0.9 % flush 10 mL (10 mL  Intravenous Not Given 5/12/24 3206)   sodium chloride 0.9 % flush 10 mL (has no administration in time range)   sodium chloride 0.9 % infusion 40 mL (has no administration in time range)   acetaminophen (TYLENOL) tablet 650 mg (has no administration in time range)     Or   acetaminophen (TYLENOL) 160 MG/5ML oral solution 650 mg (has no administration in time range)     Or   acetaminophen (TYLENOL) suppository 650 mg (has no administration in time range)   heparin (porcine) 5000 UNIT/ML injection 5,000 Units (5,000 Units Subcutaneous Given 5/12/24 1972)   nitroglycerin (NITROSTAT) SL tablet 0.4 mg (has no administration in time range)   Potassium Replacement - Follow Nurse / BPA Driven Protocol (has no administration in time range)   Magnesium Standard Dose Replacement - Follow Nurse / BPA Driven Protocol (has no administration in time range)   Phosphorus Replacement - Follow Nurse / BPA Driven Protocol (has no administration in time range)   Calcium Replacement - Follow Nurse / BPA Driven Protocol (has no administration in time range)   sennosides-docusate (PERICOLACE) 8.6-50 MG per tablet 2 tablet (has no administration in time range)     And   polyethylene glycol (MIRALAX) packet 17 g (has no administration in time range)     And   bisacodyl (DULCOLAX) EC tablet 5 mg (has no administration in time range)     And   bisacodyl (DULCOLAX) suppository 10 mg (has no administration in time range)   HYDROmorphone (DILAUDID) injection 1 mg (1 mg Intravenous Given 5/13/24 0457)   dextrose (D50W) (25 g/50 mL) IV injection 50 mL (50 mL Intravenous Given 5/12/24 0248)   sodium bicarbonate 8.4 % 150 mEq in dextrose (D5W) 5 % 1,000 mL infusion (greater than 100 mEq) (150 mEq Intravenous New Bag 5/13/24 0457)   nicotine (NICODERM CQ) 21 MG/24HR patch 1 patch (1 patch Transdermal Medication Applied 5/12/24 3277)   ondansetron (ZOFRAN) injection 4 mg (4 mg Intravenous Given 5/13/24 0457)   sodium chloride 0.9 % bolus 1,000 mL (0 mL  "Intravenous Stopped 5/12/24 0230)   morphine injection 4 mg (4 mg Intravenous Given 5/11/24 1840)   ondansetron (ZOFRAN) injection 4 mg (4 mg Intravenous Given 5/11/24 1839)   HYDROmorphone (DILAUDID) injection 1 mg (1 mg Intravenous Given 5/11/24 2120)   calcium gluconate 1000 Mg/50ml 0.675% NaCl IV SOLN (0 mg Intravenous Stopped 5/11/24 2245)   albuterol (PROVENTIL) nebulizer solution 0.083% 2.5 mg/3mL (10 mg Nebulization Given 5/11/24 2225)   insulin regular (humuLIN R,novoLIN R) injection 5 Units (5 Units Intravenous Given 5/11/24 2203)   dextrose (D50W) (25 g/50 mL) IV injection 25 g (25 g Intravenous Given 5/11/24 2203)   insulin regular (humuLIN R,novoLIN R) injection 10 Units (10 Units Intravenous Given 5/12/24 0249)       Allergies:  Rocephin [ceftriaxone], Ciprofloxacin, Codeine, and Pineapple    Review of Systems  14 point review of system were done and are negative except as mentioned in the history of present illness and assessment and plan.      Physical Exam:  Objective:  Vital Signs  /66   Pulse 71   Temp 97.7 °F (36.5 °C) (Oral)   Resp 14   Ht 157.5 cm (62\")   Wt 60.5 kg (133 lb 6.1 oz)   SpO2 98%   BMI 24.40 kg/m²   Objective    No intake/output data recorded.    Intake/Output Summary (Last 24 hours) at 5/13/2024 0758  Last data filed at 5/13/2024 0647  Gross per 24 hour   Intake 3328 ml   Output 1200 ml   Net 2128 ml        Physical Exam     Constitutional: Awake, alert  Eyes: sclerae anicteric, no conjunctival injection  HEENT: mucous membranes dry  Neck: Supple, no thyromegaly, no lymphadenopathy, trachea midline, No JVD  Respiratory: Clear to auscultation bilaterally, nonlabored respirations   Cardiovascular: RRR, no murmurs, rubs, or gallops.  Gastrointestinal: Positive bowel sounds, obese, soft, tenderness noted.    Musculoskeletal: No edema, no clubbing or cyanosis  Psychiatric: Appropriate affect, cooperative  Neurologic: Oriented x 3, moving all extremities, Cranial Nerves " "grossly intact, speech clear  Skin: warm and dry, no rashes            Results Review:   Results from last 7 days   Lab Units 05/13/24  0437 05/12/24  0045 05/11/24  2039   SODIUM mmol/L 142 134* 136   POTASSIUM mmol/L 3.2* 5.4* 6.3*   CHLORIDE mmol/L 110* 106 109*   CO2 mmol/L 14.5* 6.2* 4.1*   BUN mg/dL 51* 47* 43*   CREATININE mg/dL 2.59* 2.30* 2.22*   CALCIUM mg/dL 8.8 10.8* 10.3   ALBUMIN g/dL 3.1*  --  4.2   BILIRUBIN mg/dL 0.2  --  <0.2   ALK PHOS U/L 116  --  165*   ALT (SGPT) U/L <5  --  8   AST (SGOT) U/L 9  --  11   GLUCOSE mg/dL 76 95 91     Estimated Creatinine Clearance: 17.6 mL/min (A) (by C-G formula based on SCr of 2.59 mg/dL (H)).          Results from last 7 days   Lab Units 05/13/24  0437 05/12/24  0619 05/11/24  1843   WBC 10*3/mm3 7.80 25.54* 15.86*   HEMOGLOBIN g/dL 10.2* 13.7 13.8   PLATELETS 10*3/mm3 206 382 430         Brief Urine Lab Results  (Last result in the past 365 days)        Color   Clarity   Blood   Leuk Est   Nitrite   Protein   CREAT   Urine HCG        05/11/24 1930 Yellow   Cloudy   Trace   Trace   Negative   100 mg/dL (2+)                 No results found for: \"UTPCR\"  Imaging Results (Last 24 Hours)       ** No results found for the last 24 hours. **          heparin (porcine), 5,000 Units, Subcutaneous, Q12H  nicotine, 1 patch, Transdermal, Q24H  ondansetron, 4 mg, Intravenous, Q8H  pantoprazole, 40 mg, Oral, BID AC  sodium bicarbonate, 650 mg, Oral, BID  sodium chloride, 10 mL, Intravenous, Q12H      sodium bicarbonate 8.4 % 150 mEq in dextrose (D5W) 5 % 1,000 mL infusion (greater than 100 mEq), 150 mEq, Last Rate: 150 mEq (05/13/24 0457)        Assessment/Plan:      Hyperkalemia    Acute kidney injury: Patient appears to have significant worsening of her baseline chronic kidney disease, baseline GFR around 52 mL per minute.  It has been gradually worsening, most likely secondary to hemodynamic issues and low volume.  Check urine sodium and aggressively hydrate her.  " Significant metabolic acidosis along with hyperkalemia noted which has been improving.  Hyperkalemia: Resolved.  Chronic kidney disease stage IIIa: Patient is not aware of having abnormal renal function but has been noted in the previous blood work.  Acute pancreatitis: Likely will need significant amount of fluid she is already getting bicarb at 150 cc an hour we will go ahead and add half-normal saline with 40 of potassium at 50 cc an hour to make it 200 mL/h for the next 24 hours.  She has been able to tolerate some clear liquids, still has persistent abdominal pain.  Chronic pancreatitis: Known to have chronic pancreatitis with pseudocyst.  Hypertension: Watch closely will consider starting medications if there is persistent blood pressure greater than 140 systolic.      Risk and complexity: High      Plan:  Increase IV fluids to total of 200 mL/h.  Check urine sodium.  She will need to be assessed for her volume status on daily basis.  Serum potassium has improved as acidosis is improving.  Continue with rest of the current treatment plan and surveillance labs.  Details were discussed with the patient no family in the room.    Details were also discussed with the hospitalist service.   Further recommendations will depend on clinical course of the patient during the current hospitalization.    I also discussed the details with the nursing staff.  Rest as ordered.    In closing, I sincerely appreciate opportunity to participate in care of this patient. If I can be of any further assistance with the management of this patient, please don’t hesitate to contact me.    Nito Fernandez MD, WINNIE    05/13/24  07:58 EDT    Dictated using Dragon.

## 2024-05-14 LAB
ALBUMIN SERPL-MCNC: 2.9 G/DL (ref 3.5–5.2)
ALBUMIN/GLOB SERPL: 1.2 G/DL
ALP SERPL-CCNC: 109 U/L (ref 39–117)
ALT SERPL W P-5'-P-CCNC: <5 U/L (ref 1–33)
ANION GAP SERPL CALCULATED.3IONS-SCNC: 10.6 MMOL/L (ref 5–15)
AST SERPL-CCNC: 10 U/L (ref 1–32)
BASOPHILS # BLD AUTO: 0.03 10*3/MM3 (ref 0–0.2)
BASOPHILS NFR BLD AUTO: 0.6 % (ref 0–1.5)
BILIRUB SERPL-MCNC: 0.2 MG/DL (ref 0–1.2)
BUN SERPL-MCNC: 32 MG/DL (ref 8–23)
BUN/CREAT SERPL: 21.2 (ref 7–25)
CALCIUM SPEC-SCNC: 8.1 MG/DL (ref 8.6–10.5)
CHLORIDE SERPL-SCNC: 106 MMOL/L (ref 98–107)
CO2 SERPL-SCNC: 26.4 MMOL/L (ref 22–29)
CREAT SERPL-MCNC: 1.51 MG/DL (ref 0.57–1)
DEPRECATED RDW RBC AUTO: 58.3 FL (ref 37–54)
EGFRCR SERPLBLD CKD-EPI 2021: 37.3 ML/MIN/1.73
EOSINOPHIL # BLD AUTO: 0.11 10*3/MM3 (ref 0–0.4)
EOSINOPHIL NFR BLD AUTO: 2.1 % (ref 0.3–6.2)
ERYTHROCYTE [DISTWIDTH] IN BLOOD BY AUTOMATED COUNT: 14.9 % (ref 12.3–15.4)
GLOBULIN UR ELPH-MCNC: 2.5 GM/DL
GLUCOSE SERPL-MCNC: 79 MG/DL (ref 65–99)
HCT VFR BLD AUTO: 27.8 % (ref 34–46.6)
HGB BLD-MCNC: 9.1 G/DL (ref 12–15.9)
IMM GRANULOCYTES # BLD AUTO: 0.07 10*3/MM3 (ref 0–0.05)
IMM GRANULOCYTES NFR BLD AUTO: 1.3 % (ref 0–0.5)
LIPASE SERPL-CCNC: 203 U/L (ref 13–60)
LYMPHOCYTES # BLD AUTO: 1.16 10*3/MM3 (ref 0.7–3.1)
LYMPHOCYTES NFR BLD AUTO: 22.4 % (ref 19.6–45.3)
MCH RBC QN AUTO: 34.3 PG (ref 26.6–33)
MCHC RBC AUTO-ENTMCNC: 32.7 G/DL (ref 31.5–35.7)
MCV RBC AUTO: 104.9 FL (ref 79–97)
MONOCYTES # BLD AUTO: 0.5 10*3/MM3 (ref 0.1–0.9)
MONOCYTES NFR BLD AUTO: 9.6 % (ref 5–12)
NEUTROPHILS NFR BLD AUTO: 3.32 10*3/MM3 (ref 1.7–7)
NEUTROPHILS NFR BLD AUTO: 64 % (ref 42.7–76)
NRBC BLD AUTO-RTO: 0 /100 WBC (ref 0–0.2)
PLATELET # BLD AUTO: 172 10*3/MM3 (ref 140–450)
PMV BLD AUTO: 10.2 FL (ref 6–12)
POTASSIUM SERPL-SCNC: 3.1 MMOL/L (ref 3.5–5.2)
PROT SERPL-MCNC: 5.4 G/DL (ref 6–8.5)
RBC # BLD AUTO: 2.65 10*6/MM3 (ref 3.77–5.28)
SODIUM SERPL-SCNC: 143 MMOL/L (ref 136–145)
WBC NRBC COR # BLD AUTO: 5.19 10*3/MM3 (ref 3.4–10.8)

## 2024-05-14 PROCEDURE — 83690 ASSAY OF LIPASE: CPT | Performed by: STUDENT IN AN ORGANIZED HEALTH CARE EDUCATION/TRAINING PROGRAM

## 2024-05-14 PROCEDURE — 99232 SBSQ HOSP IP/OBS MODERATE 35: CPT | Performed by: FAMILY MEDICINE

## 2024-05-14 PROCEDURE — 97530 THERAPEUTIC ACTIVITIES: CPT

## 2024-05-14 PROCEDURE — 25010000002 POTASSIUM CHLORIDE PER 2 MEQ OF POTASSIUM: Performed by: INTERNAL MEDICINE

## 2024-05-14 PROCEDURE — 85025 COMPLETE CBC W/AUTO DIFF WBC: CPT | Performed by: STUDENT IN AN ORGANIZED HEALTH CARE EDUCATION/TRAINING PROGRAM

## 2024-05-14 PROCEDURE — 25010000002 ONDANSETRON PER 1 MG: Performed by: INTERNAL MEDICINE

## 2024-05-14 PROCEDURE — 0 DEXTROSE 5 % SOLUTION 1,000 ML FLEX CONT: Performed by: INTERNAL MEDICINE

## 2024-05-14 PROCEDURE — 97116 GAIT TRAINING THERAPY: CPT

## 2024-05-14 PROCEDURE — 80053 COMPREHEN METABOLIC PANEL: CPT | Performed by: INTERNAL MEDICINE

## 2024-05-14 PROCEDURE — 25010000002 HYDROMORPHONE 1 MG/ML SOLUTION: Performed by: FAMILY MEDICINE

## 2024-05-14 PROCEDURE — 25010000002 HEPARIN (PORCINE) PER 1000 UNITS: Performed by: STUDENT IN AN ORGANIZED HEALTH CARE EDUCATION/TRAINING PROGRAM

## 2024-05-14 PROCEDURE — 25010000002 HYDROMORPHONE 1 MG/ML SOLUTION: Performed by: STUDENT IN AN ORGANIZED HEALTH CARE EDUCATION/TRAINING PROGRAM

## 2024-05-14 PROCEDURE — 97110 THERAPEUTIC EXERCISES: CPT

## 2024-05-14 RX ORDER — HYDROCODONE BITARTRATE AND ACETAMINOPHEN 7.5; 325 MG/1; MG/1
1 TABLET ORAL 3 TIMES DAILY
Status: DISCONTINUED | OUTPATIENT
Start: 2024-05-14 | End: 2024-05-15 | Stop reason: HOSPADM

## 2024-05-14 RX ORDER — OXYCODONE HYDROCHLORIDE 5 MG/1
10 TABLET ORAL EVERY 12 HOURS PRN
Status: DISCONTINUED | OUTPATIENT
Start: 2024-05-14 | End: 2024-05-15 | Stop reason: HOSPADM

## 2024-05-14 RX ORDER — POTASSIUM CHLORIDE 750 MG/1
40 CAPSULE, EXTENDED RELEASE ORAL ONCE
Status: COMPLETED | OUTPATIENT
Start: 2024-05-14 | End: 2024-05-14

## 2024-05-14 RX ADMIN — PANTOPRAZOLE SODIUM 40 MG: 40 TABLET, DELAYED RELEASE ORAL at 08:33

## 2024-05-14 RX ADMIN — ACETAMINOPHEN 650 MG: 325 TABLET ORAL at 04:08

## 2024-05-14 RX ADMIN — HYDROMORPHONE HYDROCHLORIDE 0.5 MG: 1 INJECTION, SOLUTION INTRAMUSCULAR; INTRAVENOUS; SUBCUTANEOUS at 12:29

## 2024-05-14 RX ADMIN — HYDROMORPHONE HYDROCHLORIDE 1 MG: 1 INJECTION, SOLUTION INTRAMUSCULAR; INTRAVENOUS; SUBCUTANEOUS at 08:33

## 2024-05-14 RX ADMIN — Medication 10 ML: at 09:47

## 2024-05-14 RX ADMIN — PANTOPRAZOLE SODIUM 40 MG: 40 TABLET, DELAYED RELEASE ORAL at 06:44

## 2024-05-14 RX ADMIN — HYDROMORPHONE HYDROCHLORIDE 1 MG: 1 INJECTION, SOLUTION INTRAMUSCULAR; INTRAVENOUS; SUBCUTANEOUS at 05:29

## 2024-05-14 RX ADMIN — OXYCODONE HYDROCHLORIDE 10 MG: 5 TABLET ORAL at 14:45

## 2024-05-14 RX ADMIN — HYDROCODONE BITARTRATE AND ACETAMINOPHEN 1 TABLET: 7.5; 325 TABLET ORAL at 21:51

## 2024-05-14 RX ADMIN — POTASSIUM CHLORIDE 50 ML/HR: 149 INJECTION, SOLUTION, CONCENTRATE INTRAVENOUS at 04:57

## 2024-05-14 RX ADMIN — POTASSIUM CHLORIDE 40 MEQ: 750 CAPSULE, EXTENDED RELEASE ORAL at 06:44

## 2024-05-14 RX ADMIN — Medication 1 PATCH: at 09:46

## 2024-05-14 RX ADMIN — ONDANSETRON 4 MG: 2 INJECTION INTRAMUSCULAR; INTRAVENOUS at 09:46

## 2024-05-14 RX ADMIN — HEPARIN SODIUM 5000 UNITS: 5000 INJECTION INTRAVENOUS; SUBCUTANEOUS at 21:51

## 2024-05-14 RX ADMIN — HEPARIN SODIUM 5000 UNITS: 5000 INJECTION INTRAVENOUS; SUBCUTANEOUS at 09:46

## 2024-05-14 RX ADMIN — SODIUM BICARBONATE 650 MG TABLET 650 MG: at 21:51

## 2024-05-14 RX ADMIN — ACETAMINOPHEN 650 MG: 325 TABLET ORAL at 16:00

## 2024-05-14 RX ADMIN — SODIUM BICARBONATE 650 MG TABLET 650 MG: at 09:46

## 2024-05-14 RX ADMIN — PANTOPRAZOLE SODIUM 40 MG: 40 TABLET, DELAYED RELEASE ORAL at 17:22

## 2024-05-14 RX ADMIN — SERTRALINE HYDROCHLORIDE 25 MG: 50 TABLET ORAL at 09:46

## 2024-05-14 RX ADMIN — HYDROCODONE BITARTRATE AND ACETAMINOPHEN 1 TABLET: 7.5; 325 TABLET ORAL at 16:08

## 2024-05-14 RX ADMIN — HYDROMORPHONE HYDROCHLORIDE 1 MG: 1 INJECTION, SOLUTION INTRAMUSCULAR; INTRAVENOUS; SUBCUTANEOUS at 02:36

## 2024-05-14 RX ADMIN — SODIUM BICARBONATE 150 MEQ: 84 INJECTION, SOLUTION INTRAVENOUS at 04:57

## 2024-05-14 RX ADMIN — Medication 10 ML: at 21:52

## 2024-05-14 RX ADMIN — HYDROCODONE BITARTRATE AND ACETAMINOPHEN 1 TABLET: 7.5; 325 TABLET ORAL at 09:46

## 2024-05-14 RX ADMIN — ONDANSETRON 4 MG: 2 INJECTION INTRAMUSCULAR; INTRAVENOUS at 02:36

## 2024-05-14 NOTE — PLAN OF CARE
Goal Outcome Evaluation:  Plan of Care Reviewed With: patient        Progress: improving  Outcome Evaluation: Patient VSS, adequate UOP, still with RUQ pain and nausea, up with stand by assist, family updated

## 2024-05-14 NOTE — PROGRESS NOTES
[facility] HOSPITALIST    PROGRESS NOTE    Name:  Tasha Yarbrough   Age:  69 y.o.  Sex:  female  :  1955  MRN:  [id number]   Visit Number:  [id number]  Admission Date:  2024  Date Of Service:  24  Primary Care Physician:  Terrence Baldwin MD     LOS: 2 days :    Chief Complaint:      Follow-up pancreatitis    Subjective:    Feeling better, requesting advancing diet.    Hospital Course:    69-year-old female with history of hypertension, chronic tobacco abuse, CKD 3, recurrent idiopathic pancreatitis, who presented from home due to complaints of abdominal pain.    Patient's workup emergency room was concerning for an acute kidney injury, pancreatitis, with CT scan demonstrating pancreatitis findings as well.  Lipase was greater than 3000.  She received initial IV fluid resuscitation and treatment for hyperkalemia and was admitted to the hospitalist service.    Patient is slowly improving.  Diet advanced to a GI low-fat diet on 2024.  Kidney functions also improving and nephrology is following.  Patient has not followed up with GI on outpatient basis despite recommendations.    Review of Systems:     All systems were reviewed and negative except as mentioned in subjective, assessment and plan.    Vital Signs:    Temp:  [98.1 °F (36.7 °C)-99.4 °F (37.4 °C)] 99.4 °F (37.4 °C)  Heart Rate:  [71-95] 80  Resp:  [16-20] 18  BP: (114-144)/() 133/82    Intake and output:    I/O last 3 completed shifts:  In: 52689.1 [P.O.:920; I.V.:9308.1; IV Piggyback:50]  Out: 3275 [Urine:3275]  I/O this shift:  In: -   Out: 200 [Urine:200]    Physical Examination:    General Appearance:  Alert and cooperative.  NAD   Head:  Atraumatic and normocephalic.   Eyes: Conjunctivae and sclerae normal, no icterus. No pallor.   Throat: No oral lesions, no thrush, oral mucosa moist.   Neck: Supple, trachea midline, no thyromegaly.   Lungs:   Breath sounds heard bilaterally equally.  No wheezing or crackles.  No Pleural rub or bronchial breathing.   Heart:  Normal S1 and S2, no murmur, no gallop, no rub. No JVD.   Abdomen:   Normal bowel sounds, no masses, no organomegaly. Soft, mildly tender, no rebound tenderness.   Extremities: Supple, no edema, no cyanosis, no clubbing.   Skin: No bleeding or rash.   Neurologic: Alert and oriented x 3. No facial asymmetry. Moves all four limbs. No tremors.      Laboratory results:    Results from last 7 days   Lab Units 05/14/24  0432 05/13/24  0437 05/12/24  0045 05/11/24  2039   SODIUM mmol/L 143 142 134* 136   POTASSIUM mmol/L 3.1* 3.2* 5.4* 6.3*   CHLORIDE mmol/L 106 110* 106 109*   CO2 mmol/L 26.4 14.5* 6.2* 4.1*   BUN mg/dL 32* 51* 47* 43*   CREATININE mg/dL 1.51* 2.59* 2.30* 2.22*   CALCIUM mg/dL 8.1* 8.8 10.8* 10.3   BILIRUBIN mg/dL 0.2 0.2  --  <0.2   ALK PHOS U/L 109 116  --  165*   ALT (SGPT) U/L <5 <5  --  8   AST (SGOT) U/L 10 9  --  11   GLUCOSE mg/dL 79 76 95 91     Results from last 7 days   Lab Units 05/14/24  0432 05/13/24  0437 05/12/24  0619   WBC 10*3/mm3 5.19 7.80 25.54*   HEMOGLOBIN g/dL 9.1* 10.2* 13.7   HEMATOCRIT % 27.8* 30.6* 44.3   PLATELETS 10*3/mm3 172 206 382                 Recent Labs     02/27/24  0522 04/21/24  0015 04/21/24  0919   PHART 7.305* 7.158* 7.199*   EHR6MVC 30.1* 26.4* 25.5*   PO2ART 90.6 104.0* 97.9   MII1JNG 15.0* 9.4* 9.9*   BASEEXCESS -10.3* -17.9* -16.6*      I have reviewed the patient's laboratory results.    Radiology results:    No radiology results from the last 24 hrs  I have reviewed the patient's radiology reports.    Medication Review:     I have reviewed the patient's active and prn medications.     Problem List:      Hyperkalemia      Assessment:    Recurrent pancreatitis, idiopathic, POA  Metabolic acidosis  VIVIENNE on CKD    Plan:    Pancreatitis:  Abdominal pain improving.  Lipase trending down.  Kidney function improving.  Patient is wanting to eat and drink today will advance to a GI soft diet.  Wean off of pain  medication as tolerated.    Metabolic acidosis/kidney injury:  Is improving with underlying treatment and IV fluid resuscitation.  Nephrology following.    Anticipate patient may be stable for discharge in the next 24 to 48 hours with further improvement.  She needs outpatient follow-up for GI services.  She wants to follow-up in [place] if possible.  She has not had much compliance with following up with GI unfortunately    DVT Prophylaxis: Heparin  Code Status: Full  Diet: GI low-fat  Discharge Plan: Home in 1 to 2 days    Lacey Ballesteros DO  05/14/24  13:01 EDT    Dictated utilizing Dragon dictation.

## 2024-05-14 NOTE — THERAPY TREATMENT NOTE
Patient Name: Tasha Yarbrough  : 1955    MRN: [id number]                              Today's Date: 2024       Admit Date: 2024    Visit Dx:     ICD-10-CM ICD-9-CM   1. Hyperkalemia  E87.5 276.7   2. Chronic pancreatitis, unspecified pancreatitis type  K86.1 577.1     Patient Active Problem List   Diagnosis    Colon cancer screening    Gastroesophageal reflux disease with esophagitis    Left lower quadrant abdominal pain    Irritable bowel syndrome with constipation    Encounter for screening for malignant neoplasm of colon    Periumbilical abdominal pain    Diarrhea of presumed infectious origin    Acute kidney injury    Bacterial colitis    VIVIENNE (acute kidney injury)    C. difficile colitis    Generalized abdominal pain    Diarrhea of infectious origin    Abnormal finding on GI tract imaging    Anemia, chronic disease    Colitis due to Clostridium difficile    Pancreatitis, acute    Chronic kidney disease, stage III (moderate)    Acute pancreatitis    Metabolic acidosis    Acute UTI (urinary tract infection)    Epigastric pain    Nausea and vomiting in adult    Moderate malnutrition    Hyperkalemia     Past Medical History:   Diagnosis Date    Hypertension     Impaired mobility     Injury of back     Pancreatitis      Past Surgical History:   Procedure Laterality Date    ABDOMINAL SURGERY      COLON SURGERY      COLONOSCOPY      ENDOSCOPY N/A 2024    Procedure: ESOPHAGOGASTRODUODENOSCOPY WITH BIOPSY;  Surgeon: Jairo Negro MD;  Location: [facility];  Service: Gastroenterology;  Laterality: N/A;    TUBAL ABDOMINAL LIGATION        General Information       Row Name 24 1506          OT Time and Intention    Document Type therapy note (daily note) (P)   -CM     Mode of Treatment occupational therapy (P)   -CM       Row Name 24 1506          General Information    Patient Profile Reviewed yes (P)   -CM               User Key  (r) = Recorded By, (t) = Taken By, (c) = Cosigned By       Initials Name Provider Type    Ewelina Jimenez, OT Student OT Student                     Mobility/ADL's       Row Name 05/14/24 1506          Transfers    Transfers sit-stand transfer (P)   -CM       Row Name 05/14/24 1506          Sit-Stand Transfer    Sit-Stand [place] (Transfers) contact guard (P)   -CM     Assistive Device (Sit-Stand Transfers) walker, front-wheeled (P)   -CM     Comment, (Sit-Stand Transfer) gait belt (P)   -CM       Row Name 05/14/24 1506          Functional Mobility    Functional Mobility- Ind. Level contact guard assist (P)   -CM     Functional Mobility- Device walker, front-wheeled (P)   -CM     Functional Mobility-Distance (Feet) 80 (P)   -CM     Patient was able to Ambulate yes (P)   -CM       [facility] Name 05/14/24 1506          Activities of Daily Living    BADL Assessment/Intervention lower body dressing (P)   -CM       Row Name 05/14/24 1506          Lower Body Dressing Assessment/Training    [place] Level (Lower Body Dressing) minimum assist (75% patient effort);pants/bottoms;doff;don (P)   -CM               User Key  (r) = Recorded By, (t) = Taken By, (c) = Cosigned By      Initials Name Provider Type    CM Ewelina Martinez, OT Student OT Student                   Obj/Interventions    No documentation.                  Goals/Plan    No documentation.                  Clinical Impression       Row Name 05/14/24 1507          Pain Assessment    Pretreatment Pain Rating 6/10 (P)   -CM     Posttreatment Pain Rating 6/10 (P)   -CM     Pain Location generalized (P)   -CM     Pain Location - abdomen (P)   -CM     Pain Intervention(s) Repositioned;Ambulation/increased activity (P)   -CM       Row Name 05/14/24 1507          Plan of Care Review    Plan of Care Reviewed With patient (P)   -CM     Progress improving (P)   -CM     Outcome Evaluation Pt seen for OT treatment this date. Pt was sitting EOB and changed brief with min assist. Pt attempted to walk with straight cane but  complained of dizziness and agreed to use a RW. Pt walked 80' with RW with CGA. Pt declined to complete UB ex, reporting that she was having pain in her stomach. Cont OT per POC. (P)   -CM       Row Name 05/14/24 1507          Vital Signs    Pre Patient Position Supine (P)   -CM     Intra Patient Position Standing (P)   -CM     Post Patient Position Sitting (P)   -CM       Row Name 05/14/24 1507          Positioning and Restraints    Pre-Treatment Position bedside commode (P)   -CM     Post Treatment Position chair (P)   -CM     In Chair sitting;call light within reach;encouraged to call for assist;with other staff;patient within staff view (P)   -CM               User Key  (r) = Recorded By, (t) = Taken By, (c) = Cosigned By      Initials Name Provider Type    Ewelina Jimenez, OT Student OT Student                   Outcome Measures       Row Name 05/14/24 1524          How much help from another is currently needed...    Putting on and taking off regular lower body clothing? 3 (P)   -CM     Bathing (including washing, rinsing, and drying) 3 (P)   -CM     Toileting (which includes using toilet bed pan or urinal) 3 (P)   -CM     Putting on and taking off regular upper body clothing 3 (P)   -CM     Taking care of personal grooming (such as brushing teeth) 3 (P)   -CM     Eating meals 4 (P)   -CM     AM-PAC 6 Clicks Score (OT) 19 (P)   -CM       Row Name 05/14/24 0730          How much help from another person do you currently need...    Turning from your back to your side while in flat bed without using bedrails? 4  -AS     Moving from lying on back to sitting on the side of a flat bed without bedrails? 3  -AS     Moving to and from a bed to a chair (including a wheelchair)? 3  -AS     Standing up from a chair using your arms (e.g., wheelchair, bedside chair)? 3  -AS     Climbing 3-5 steps with a railing? 3  -AS     To walk in hospital room? 3  -AS     AM-PAC 6 Clicks Score (PT) 19  -AS     Highest Level of  Mobility Goal 6 --> Walk 10 steps or more  -AS               User Key  (r) = Recorded By, (t) = Taken By, (c) = Cosigned By      Initials Name Provider Type    AS Daphne Corrigan, RN Registered Nurse    Ewelina Jimenez, OT Student OT Student                    Occupational Therapy Education       Title: PT OT SLP Therapies (In Progress)       Topic: Occupational Therapy (In Progress)       Point: ADL training (Done)       Description:   Instruct learner(s) on proper safety adaptation and remediation techniques during self care or transfers.   Instruct in proper use of assistive devices.                  Learning Progress Summary             Patient Acceptance, E,TB, VU by CM at 5/14/2024 1525    Comment: Benefit of therapy    Acceptance, E,TB, VU by SD at 5/13/2024 1521    Comment: OT POC                         Point: Home exercise program (Not Started)       Description:   Instruct learner(s) on appropriate technique for monitoring, assisting and/or progressing therapeutic exercises/activities.                  Learner Progress:  Not documented in this visit.              Point: Precautions (Not Started)       Description:   Instruct learner(s) on prescribed precautions during self-care and functional transfers.                  Learner Progress:  Not documented in this visit.              Point: Body mechanics (Not Started)       Description:   Instruct learner(s) on proper positioning and spine alignment during self-care, functional mobility activities and/or exercises.                  Learner Progress:  Not documented in this visit.                              User Key       Initials Effective Dates Name Provider Type Discipline    SD 06/16/21 -  Yuliana Ham OT Occupational Therapist OT    SERGEI 03/12/24 -  Ewelina Martinez OT Student OT Student OT                  OT Recommendation and Plan     Plan of Care Review  Plan of Care Reviewed With: (P) patient  Progress: (P) improving  Outcome Evaluation:  (P) Pt seen for OT treatment this date. Pt was sitting EOB and changed brief with min assist. Pt attempted to walk with straight cane but complained of dizziness and agreed to use a RW. Pt walked 80' with RW with CGA. Pt declined to complete UB ex, reporting that she was having pain in her stomach. Cont OT per POC.     Time Calculation:         Time Calculation- OT       Row Name 05/14/24 1526             Time Calculation- OT    OT Start Time 1444 (P)   -CM      OT Stop Time 1507 (P)   -CM      OT Time Calculation (min) 23 min (P)   -CM      OT Received On 05/14/24 (P)   -CM      OT Goal Re-Cert Due Date 05/23/24 (P)   -CM         Timed Charges    14177 - OT Therapeutic Exercise Minutes 9 (P)   -CM      63725 - OT Therapeutic Activity Minutes 14 (P)   -CM         Total Minutes    Timed Charges Total Minutes 23 (P)   -CM       Total Minutes 23 (P)   -CM                User Key  (r) = Recorded By, (t) = Taken By, (c) = Cosigned By      Initials Name Provider Type    Ewelina Jimenez OT Student OT Student                  Therapy Charges for Today       Code Description Service Date Service Provider Modifiers Qty    [id number]  OT THER PROC EA 15 MIN 5/14/2024 Ewelina Martinez OT Student GO 1    [id number]  OT THERAPEUTIC ACT EA 15 MIN 5/14/2024 Ewelina Martinez OT Student GO 1                 NICHOL Lora  5/14/2024

## 2024-05-14 NOTE — PROGRESS NOTES
[facility] Progress Note  [facility]. [place]        Patient Name: Tasha Yarbrough  : 1955  MRN: [id number]   LOS: 2 days    Patient Care Team:  Terrence Baldwin MD as PCP - General (Family Medicine)    Chief Complaint:    Chief Complaint   Patient presents with    Abdominal Pain    Vomiting     Primary Care Physician:  Terrence Baldwin MD  Date of admission: 2024    Subjective     Interval History:   Follow-up acute on chronic kidney disease stage III.  Events noted from last 24 hours.  I reviewed the chart and other providers notes, labs and procedures done since my last note.  Patient is awake alert and interactive, she thinks her abdominal pain is better.  Denies any chest pain or shortness of breath.    Review of Systems:   As noted above.    Objective     Vitals:   Temp:  [97.7 °F (36.5 °C)-98.8 °F (37.1 °C)] 98.8 °F (37.1 °C)  Heart Rate:  [71-95] 76  Resp:  [16-22] 20  BP: (105-133)/(66-80) 125/69    Intake/Output Summary (Last 24 hours) at 2024 0551  Last data filed at 2024 0242  Gross per 24 hour   Intake 9578.12 ml   Output 2375 ml   Net 7203.12 ml       Physical Exam:    General Appearance: alert, oriented x 3, no acute distress   Skin: warm and dry  HEENT: oral mucosa normal, nonicteric sclera  Neck: supple, no JVD  Lungs: CTA  Heart: RRR, normal S1 and S2  Abdomen: obese, soft, minimal tenderness, non distended and positive bowel sounds.  : no palpable bladder  Extremities: Trace edema, no cyanosis or clubbing  Neuro: normal speech and mental status     Scheduled Meds:     Current Facility-Administered Medications   Medication Dose Route Frequency Provider Last Rate Last Admin    acetaminophen (TYLENOL) tablet 650 mg  650 mg Oral Q4H PRN Kerley, Brian Joseph, DO   650 mg at 24 0408    Or    acetaminophen (TYLENOL) 160 MG/5ML oral solution 650 mg  650 mg Oral Q4H PRN Kerley, Brian Joseph, DO        Or    acetaminophen (TYLENOL)  suppository 650 mg  650 mg Rectal Q4H PRN Kerley, Brian Joseph, DO        sennosides-docusate (PERICOLACE) 8.6-50 MG per tablet 2 tablet  2 tablet Oral BID PRN Kerley, Brian Joseph, DO        And    polyethylene glycol (MIRALAX) packet 17 g  17 g Oral Daily PRN Kerley, Brian Joseph, DO        And    bisacodyl (DULCOLAX) EC tablet 5 mg  5 mg Oral Daily PRN Kerley, Brian Joseph, DO        And    bisacodyl (DULCOLAX) suppository 10 mg  10 mg Rectal Daily PRN Kerley, Brian Joseph, DO        Calcium Replacement - Follow Nurse / BPA Driven Protocol   Does not apply PRN Kerley, Brian Joseph, DO        dextrose (D50W) (25 g/50 mL) IV injection 50 mL  50 mL Intravenous Q1H PRN Kerley, Brian Joseph, DO   50 mL at 05/12/24 0248    heparin (porcine) 5000 UNIT/ML injection 5,000 Units  5,000 Units Subcutaneous Q12H Kerley, Brian Joseph, DO   5,000 Units at 05/13/24 2040    HYDROmorphone (DILAUDID) injection 1 mg  1 mg Intravenous Q3H PRN Kerley, Brian Joseph, DO   1 mg at 05/14/24 0529    Magnesium Standard Dose Replacement - Follow Nurse / BPA Driven Protocol   Does not apply PRN Kerley, Brian Joseph, DO        nicotine (NICODERM CQ) 21 MG/24HR patch 1 patch  1 patch Transdermal Q24H Tyree Fleming MD   1 patch at 05/13/24 0813    nitroglycerin (NITROSTAT) SL tablet 0.4 mg  0.4 mg Sublingual Q5 Min PRN Kerley, Brian Joseph, DO        ondansetron (ZOFRAN) injection 4 mg  4 mg Intravenous Q8H Mateo Storm DO   4 mg at 05/14/24 0236    oxyCODONE-acetaminophen (PERCOCET) 5-325 MG per tablet 1 tablet  1 tablet Oral Q6H PRN Kerley, Brian Joseph, DO   1 tablet at 05/12/24 2354    pantoprazole (PROTONIX) EC tablet 40 mg  40 mg Oral BID AC Kerley, Brian Joseph, DO   40 mg at 05/13/24 1634    Phosphorus Replacement - Follow Nurse / BPA Driven Protocol   Does not apply PRN Kerley, Brian Joseph,         Potassium Replacement - Follow Nurse / BPA Driven Protocol   Does not apply PRN Kerley, Brian Joseph, DO         promethazine (PHENERGAN) 12.5 mg in sodium chloride 0.9 % 50 mL  12.5 mg Intravenous Q6H PRN Kerley, Brian Joseph, DO   12.5 mg at 05/13/24 2140    sodium bicarbonate 8.4 % 150 mEq in dextrose (D5W) 5 % 1,000 mL infusion (greater than 100 mEq)  150 mEq Intravenous Continuous Mateo Storm,  mL/hr at 05/14/24 0457 150 mEq at 05/14/24 0457    sodium bicarbonate tablet 650 mg  650 mg Oral BID Kerley, Brian Joseph, DO   650 mg at 05/13/24 2040    sodium chloride 0.45 % 1,000 mL with potassium chloride 40 mEq infusion  50 mL/hr Intravenous Continuous Nito Fernandez MD, FASN 50 mL/hr at 05/14/24 0457 50 mL/hr at 05/14/24 0457    sodium chloride 0.9 % flush 10 mL  10 mL Intravenous Q12H Kerley, Brian Joseph, DO   10 mL at 05/13/24 2040    sodium chloride 0.9 % flush 10 mL  10 mL Intravenous PRN Kerley, Brian Joseph, DO   10 mL at 05/13/24 1732    sodium chloride 0.9 % infusion 40 mL  40 mL Intravenous PRN Kerley, Brian Joseph, DO           heparin (porcine), 5,000 Units, Subcutaneous, Q12H  nicotine, 1 patch, Transdermal, Q24H  ondansetron, 4 mg, Intravenous, Q8H  pantoprazole, 40 mg, Oral, BID AC  sodium bicarbonate, 650 mg, Oral, BID  sodium chloride, 10 mL, Intravenous, Q12H        IV Meds:   sodium bicarbonate 8.4 % 150 mEq in dextrose (D5W) 5 % 1,000 mL infusion (greater than 100 mEq), 150 mEq, Last Rate: 150 mEq (05/14/24 0457)  sodium chloride 0.45 % 1,000 mL with potassium chloride 40 mEq infusion, 50 mL/hr, Last Rate: 50 mL/hr (05/14/24 0457)        Results Reviewed:   I have personally reviewed the results from the time of this admission to 5/14/2024 05:51 EDT     Results from last 7 days   Lab Units 05/14/24  0432 05/13/24  0437 05/12/24  0045 05/11/24 2039   SODIUM mmol/L 143 142 134* 136   POTASSIUM mmol/L 3.1* 3.2* 5.4* 6.3*   CHLORIDE mmol/L 106 110* 106 109*   CO2 mmol/L 26.4 14.5* 6.2* 4.1*   BUN mg/dL 32* 51* 47* 43*   CREATININE mg/dL 1.51* 2.59* 2.30* 2.22*   CALCIUM mg/dL 8.1* 8.8 10.8*  "10.3   BILIRUBIN mg/dL 0.2 0.2  --  <0.2   ALK PHOS U/L 109 116  --  165*   ALT (SGPT) U/L <5 <5  --  8   AST (SGOT) U/L 10 9  --  11   GLUCOSE mg/dL 79 76 95 91       Estimated Creatinine Clearance: 30.1 mL/min (A) (by C-G formula based on SCr of 1.51 mg/dL (H)).    Results from last 7 days   Lab Units 05/13/24  0437   MAGNESIUM mg/dL 2.0             Results from last 7 days   Lab Units 05/14/24  0432 05/13/24  0437 05/12/24  0619 05/11/24  1843   WBC 10*3/mm3 5.19 7.80 25.54* 15.86*   HEMOGLOBIN g/dL 9.1* 10.2* 13.7 13.8   PLATELETS 10*3/mm3 172 206 382 430             Brief Urine Lab Results  (Last result in the past 365 days)        Color   Clarity   Blood   Leuk Est   Nitrite   Protein   CREAT   Urine HCG        05/11/24 1930 Yellow   Cloudy   Trace   Trace   Negative   100 mg/dL (2+)                   No results found for: \"UTPCR\"    Imaging Results (Last 24 Hours)       ** No results found for the last 24 hours. **                Assessment / Plan     ASSESSMENT:    Hyperkalemia    Acute kidney injury: Patient appears to have significant worsening of her baseline chronic kidney disease, baseline GFR around 52 mL per minute.  It has been gradually worsening, most likely secondary to hemodynamic issues and low volume.  Check urine sodium and aggressively hydrate her.  Significant metabolic acidosis along with hyperkalemia noted which has been improving.  Hyperkalemia: Resolved.  Chronic kidney disease stage IIIa: Patient is not aware of having abnormal renal function but has been noted in the previous blood work.  Acute pancreatitis: Likely will need significant amount of fluid she is already getting bicarb at 150 cc an hour we will go ahead and add half-normal saline with 40 of potassium at 50 cc an hour to make it 200 mL/h for the next 24 hours.  She has been able to tolerate some clear liquids, still has persistent abdominal pain.  Chronic pancreatitis: Known to have chronic pancreatitis with " pseudocyst.  Hypertension: Watch closely will consider starting medications if there is persistent blood pressure greater than 140 systolic.      PLAN:  Significant improvement in her acidosis noted potassium is on the low side.  Continue with potassium supplements as ordered.  Significantly increased urine output noted overnight with improving renal function.  IV fluids adjusted as ordered.  Clinically significant improvement may be able to get out of the ICU.  Details were discussed with the patient no family in the room.    Details were also discussed with the hospitalist service and or other providers as needed.   Continue with rest of the current treatment plan, and monitor with surveillance labs.  Further recommendations will depend on clinical course of the patient during the current hospitalization.   I have reviewed the copied text to this note, it was edited and the changes made as needed.  It is accurate to the point, when the note was signed today.     Thank you for involving us in the care of Tasha Yarbrough.  Please feel free to call with any questions.    Nito Fernandez MD, FASN  05/14/24  05:51 EDT    [facility]  [phone]      Part of this note may be an electronic transcription/translation of spoken language to printed text using the Dragon Dictation System.

## 2024-05-14 NOTE — PLAN OF CARE
Goal Outcome Evaluation:  Plan of Care Reviewed With: patient        Progress: improving  Outcome Evaluation: Pt participated in PT tx this date. Pt transferred to standing  with CGA. Pt then attempted ambulation with her cane, Pt noted to be unsteady. Pt used Rwx for 80ft with CGA, slight dizziness reported. Pt declined further mobility. Pt progressing, cont per POC      Anticipated Discharge Disposition (PT): home with assist, home with home health

## 2024-05-14 NOTE — PLAN OF CARE
Goal Outcome Evaluation:      Pt transferred from ICU. Pain controlled with medications. Vitals stable.

## 2024-05-14 NOTE — THERAPY TREATMENT NOTE
Patient Name: Tasha Yarbrough  : 1955    MRN: [id number]                              Today's Date: 2024       Admit Date: 2024    Visit Dx:     ICD-10-CM ICD-9-CM   1. Hyperkalemia  E87.5 276.7   2. Chronic pancreatitis, unspecified pancreatitis type  K86.1 577.1     Patient Active Problem List   Diagnosis    Colon cancer screening    Gastroesophageal reflux disease with esophagitis    Left lower quadrant abdominal pain    Irritable bowel syndrome with constipation    Encounter for screening for malignant neoplasm of colon    Periumbilical abdominal pain    Diarrhea of presumed infectious origin    Acute kidney injury    Bacterial colitis    VIVEINNE (acute kidney injury)    C. difficile colitis    Generalized abdominal pain    Diarrhea of infectious origin    Abnormal finding on GI tract imaging    Anemia, chronic disease    Colitis due to Clostridium difficile    Pancreatitis, acute    Chronic kidney disease, stage III (moderate)    Acute pancreatitis    Metabolic acidosis    Acute UTI (urinary tract infection)    Epigastric pain    Nausea and vomiting in adult    Moderate malnutrition    Hyperkalemia     Past Medical History:   Diagnosis Date    Hypertension     Impaired mobility     Injury of back     Pancreatitis      Past Surgical History:   Procedure Laterality Date    ABDOMINAL SURGERY      COLON SURGERY      COLONOSCOPY      ENDOSCOPY N/A 2024    Procedure: ESOPHAGOGASTRODUODENOSCOPY WITH BIOPSY;  Surgeon: Jairo Negro MD;  Location: [facility];  Service: Gastroenterology;  Laterality: N/A;    TUBAL ABDOMINAL LIGATION        General Information       Row Name 24 1538          Physical Therapy Time and Intention    Document Type therapy note (daily note)  -MS     Mode of Treatment physical therapy  -MS       Row Name 24 1538          General Information    Patient Profile Reviewed yes  -MS               User Key  (r) = Recorded By, (t) = Taken By, (c) = Cosigned By       Thanks for coming today.  [facility]  [address] [place] [address]    To schedule future appointments in Ortho Clinic, you may call [phone].    To schedule ordered imaging by your Provider: Call [facility] at [phone]    [employer] available online at:   [email or web address]    Please call if any further questions or concerns [phone] and ask for the Orthopedic Department. Clinic hours 8 am to 5 pm.    Return to clinic if symptoms worsen.     Initials Name Provider Type    Sebastien Brownlee, MARCIAL Physical Therapist                   Mobility       Row Name 05/14/24 1538          Sit-Stand Transfer    Sit-Stand Foley (Transfers) contact guard  -MS       Row Name 05/14/24 1538          Gait/Stairs (Locomotion)    Foley Level (Gait) contact guard  -MS     Assistive Device (Gait) walker, front-wheeled  -MS     Patient was able to Ambulate yes  -MS     Distance in Feet (Gait) 80  -MS     Deviations/Abnormal Patterns (Gait) bilateral deviations;gait speed decreased;festinating/shuffling;alfonzo decreased  -MS     Bilateral Gait Deviations forward flexed posture  -MS               User Key  (r) = Recorded By, (t) = Taken By, (c) = Cosigned By      Initials Name Provider Type    Sebastien Brownlee, MARCIAL Physical Therapist                   Obj/Interventions       Row Name 05/14/24 1542          Range of Motion Comprehensive    General Range of Motion bilateral lower extremity ROM WFL  -MS       Row Name 05/14/24 1542          Strength Comprehensive (MMT)    General Manual Muscle Testing (MMT) Assessment lower extremity strength deficits identified  -MS     Comment, General Manual Muscle Testing (MMT) Assessment B LE 4-/5  -MS               User Key  (r) = Recorded By, (t) = Taken By, (c) = Cosigned By      Initials Name Provider Type    Sebastien Brownlee, PT Physical Therapist                   Goals/Plan    No documentation.                  Clinical Impression       Row Name 05/14/24 1546          Pain    Pretreatment Pain Rating 6/10  -MS     Posttreatment Pain Rating 6/10  -MS     Pain Location - abdomen  -MS     Pain Intervention(s) Ambulation/increased activity  -MS       Row Name 05/14/24 1546          Plan of Care Review    Plan of Care Reviewed With patient  -MS     Progress improving  -MS     Outcome Evaluation Pt participated in PT tx this date. Pt transferred to standing  with CGA. Pt then attempted ambulation with her cane, Pt noted  to be unsteady. Pt used Rwx for 80ft with CGA, slight dizziness reported. Pt declined further mobility. Pt progressing, cont per POC  -MS       Row Name 05/14/24 1546          Vital Signs    O2 Delivery Pre Treatment room air  -MS     O2 Delivery Intra Treatment room air  -MS     O2 Delivery Post Treatment room air  -MS     Pre Patient Position Supine  -MS     Intra Patient Position Standing  -MS     Post Patient Position Supine  -MS       Row Name 05/14/24 1546          Positioning and Restraints    Pre-Treatment Position standing in room  -MS     Post Treatment Position chair  -MS     In Chair call light within reach;encouraged to call for assist;reclined  -MS               User Key  (r) = Recorded By, (t) = Taken By, (c) = Cosigned By      Initials Name Provider Type    Sebastien Brownlee, MARCIAL Physical Therapist                   Outcome Measures       Row Name 05/14/24 1558 05/14/24 0730       How much help from another person do you currently need...    Turning from your back to your side while in flat bed without using bedrails? 4  -MS 4  -AS    Moving from lying on back to sitting on the side of a flat bed without bedrails? 4  -MS 3  -AS    Moving to and from a bed to a chair (including a wheelchair)? 4  -MS 3  -AS    Standing up from a chair using your arms (e.g., wheelchair, bedside chair)? 3  -MS 3  -AS    Climbing 3-5 steps with a railing? 3  -MS 3  -AS    To walk in hospital room? 3  -MS 3  -AS    AM-PAC 6 Clicks Score (PT) 21  -MS 19  -AS    Highest Level of Mobility Goal 6 --> Walk 10 steps or more  -MS 6 --> Walk 10 steps or more  -AS              User Key  (r) = Recorded By, (t) = Taken By, (c) = Cosigned By      Initials Name Provider Type    AS Daphne Corrigan RN Registered Nurse    Sebastien Brownlee, PT Physical Therapist                                 Physical Therapy Education       Title: PT OT SLP Therapies (In Progress)       Topic: Physical Therapy (In Progress)       Point: Mobility  training (Done)       Learning Progress Summary             Patient Acceptance, E, VU by MS at 5/14/2024 1559    Comment: importance of mobility    Acceptance, E,TB, VU by  at 5/13/2024 1447    Comment: Educated pt on importance of OOB mobility during IP admission                         Point: Home exercise program (Done)       Learning Progress Summary             Patient Acceptance, E, VU by MS at 5/14/2024 1559    Comment: importance of mobility                         Point: Body mechanics (Not Started)       Learner Progress:  Not documented in this visit.              Point: Precautions (Not Started)       Learner Progress:  Not documented in this visit.                              User Key       Initials Effective Dates Name Provider Type Discipline    MS 08/22/23 -  Sebastien Simon, PT Physical Therapist PT     11/29/23 -  Rylee Mejia PT Physical Therapist PT                  PT Recommendation and Plan     Plan of Care Reviewed With: patient  Progress: improving  Outcome Evaluation: Pt participated in PT tx this date. Pt transferred to standing  with CGA. Pt then attempted ambulation with her cane, Pt noted to be unsteady. Pt used Rwx for 80ft with CGA, slight dizziness reported. Pt declined further mobility. Pt progressing, cont per POC     Time Calculation:         PT Charges       Row Name 05/14/24 1559             Time Calculation    Start Time 1443  -MS      Stop Time 1506  -MS      Time Calculation (min) 23 min  -MS      PT Received On 05/14/24  -MS         Timed Charges    18619 - Gait Training Minutes  23  -MS         Total Minutes    Timed Charges Total Minutes 23  -MS       Total Minutes 23  -MS                User Key  (r) = Recorded By, (t) = Taken By, (c) = Cosigned By      Initials Name Provider Type    MS Sebastien Simon, PT Physical Therapist                  Therapy Charges for Today       Code Description Service Date Service Provider Modifiers Qty    [id number] HC GAIT TRAINING  EA 15 MIN 5/14/2024 Sebastien Simon, PT GP 2            PT G-Codes  Outcome Measure Options: AM-PAC 6 Clicks Daily Activity (OT)  AM-PAC 6 Clicks Score (PT): 21  AM-PAC 6 Clicks Score (OT): 19  PT Discharge Summary  Anticipated Discharge Disposition (PT): home with assist, home with home health    Sebastien Simon, PT  5/14/2024

## 2024-05-14 NOTE — PLAN OF CARE
Goal Outcome Evaluation:  Plan of Care Reviewed With: (P) patient        Progress: (P) improving  Outcome Evaluation: (P) Pt seen for OT treatment this date. Pt was sitting EOB and changed brief with min assist. Pt attempted to walk with straight cane but complained of dizziness and agreed to use a RW. Pt walked 80' with RW with CGA. Pt declined to complete UB ex, reporting that she was having pain in her stomach. Cont OT per POC.

## 2024-05-15 ENCOUNTER — READMISSION MANAGEMENT (OUTPATIENT)
Dept: CALL CENTER | Facility: HOSPITAL | Age: 69
End: 2024-05-15
Payer: MEDICARE

## 2024-05-15 VITALS
TEMPERATURE: 98.2 F | HEART RATE: 71 BPM | OXYGEN SATURATION: 95 % | HEIGHT: 62 IN | RESPIRATION RATE: 16 BRPM | BODY MASS INDEX: 25.03 KG/M2 | DIASTOLIC BLOOD PRESSURE: 82 MMHG | SYSTOLIC BLOOD PRESSURE: 135 MMHG | WEIGHT: 136.02 LBS

## 2024-05-15 LAB
ANION GAP SERPL CALCULATED.3IONS-SCNC: 9.2 MMOL/L (ref 5–15)
ANISOCYTOSIS BLD QL: NORMAL
BASOPHILS # BLD AUTO: 0.03 10*3/MM3 (ref 0–0.2)
BASOPHILS NFR BLD AUTO: 0.7 % (ref 0–1.5)
BUN SERPL-MCNC: 21 MG/DL (ref 8–23)
BUN/CREAT SERPL: 16.8 (ref 7–25)
CALCIUM SPEC-SCNC: 8.7 MG/DL (ref 8.6–10.5)
CHLORIDE SERPL-SCNC: 110 MMOL/L (ref 98–107)
CO2 SERPL-SCNC: 24.8 MMOL/L (ref 22–29)
CREAT SERPL-MCNC: 1.25 MG/DL (ref 0.57–1)
DEPRECATED RDW RBC AUTO: 63 FL (ref 37–54)
EGFRCR SERPLBLD CKD-EPI 2021: 46.8 ML/MIN/1.73
EOSINOPHIL # BLD AUTO: 0.11 10*3/MM3 (ref 0–0.4)
EOSINOPHIL NFR BLD AUTO: 2.7 % (ref 0.3–6.2)
ERYTHROCYTE [DISTWIDTH] IN BLOOD BY AUTOMATED COUNT: 15.5 % (ref 12.3–15.4)
GLUCOSE SERPL-MCNC: 87 MG/DL (ref 65–99)
HCT VFR BLD AUTO: 27.3 % (ref 34–46.6)
HGB BLD-MCNC: 8.7 G/DL (ref 12–15.9)
HYPOCHROMIA BLD QL: NORMAL
IMM GRANULOCYTES # BLD AUTO: 0.06 10*3/MM3 (ref 0–0.05)
IMM GRANULOCYTES NFR BLD AUTO: 1.5 % (ref 0–0.5)
LYMPHOCYTES # BLD AUTO: 0.77 10*3/MM3 (ref 0.7–3.1)
LYMPHOCYTES NFR BLD AUTO: 19.1 % (ref 19.6–45.3)
MACROCYTES BLD QL SMEAR: NORMAL
MCH RBC QN AUTO: 35.5 PG (ref 26.6–33)
MCHC RBC AUTO-ENTMCNC: 31.9 G/DL (ref 31.5–35.7)
MCV RBC AUTO: 111.4 FL (ref 79–97)
MONOCYTES # BLD AUTO: 0.34 10*3/MM3 (ref 0.1–0.9)
MONOCYTES NFR BLD AUTO: 8.4 % (ref 5–12)
NEUTROPHILS NFR BLD AUTO: 2.73 10*3/MM3 (ref 1.7–7)
NEUTROPHILS NFR BLD AUTO: 67.6 % (ref 42.7–76)
NRBC BLD AUTO-RTO: 0 /100 WBC (ref 0–0.2)
OVALOCYTES BLD QL SMEAR: NORMAL
PLATELET # BLD AUTO: 142 10*3/MM3 (ref 140–450)
PMV BLD AUTO: 10.2 FL (ref 6–12)
POIKILOCYTOSIS BLD QL SMEAR: NORMAL
POTASSIUM SERPL-SCNC: 4.1 MMOL/L (ref 3.5–5.2)
RBC # BLD AUTO: 2.45 10*6/MM3 (ref 3.77–5.28)
SMALL PLATELETS BLD QL SMEAR: ADEQUATE
SODIUM SERPL-SCNC: 144 MMOL/L (ref 136–145)
WBC MORPH BLD: NORMAL
WBC NRBC COR # BLD AUTO: 4.04 10*3/MM3 (ref 3.4–10.8)

## 2024-05-15 PROCEDURE — 80048 BASIC METABOLIC PNL TOTAL CA: CPT | Performed by: FAMILY MEDICINE

## 2024-05-15 PROCEDURE — 85007 BL SMEAR W/DIFF WBC COUNT: CPT | Performed by: STUDENT IN AN ORGANIZED HEALTH CARE EDUCATION/TRAINING PROGRAM

## 2024-05-15 PROCEDURE — 25010000002 HEPARIN (PORCINE) PER 1000 UNITS: Performed by: STUDENT IN AN ORGANIZED HEALTH CARE EDUCATION/TRAINING PROGRAM

## 2024-05-15 PROCEDURE — 85025 COMPLETE CBC W/AUTO DIFF WBC: CPT | Performed by: STUDENT IN AN ORGANIZED HEALTH CARE EDUCATION/TRAINING PROGRAM

## 2024-05-15 PROCEDURE — 99238 HOSP IP/OBS DSCHRG MGMT 30/<: CPT | Performed by: FAMILY MEDICINE

## 2024-05-15 RX ORDER — OXYCODONE HYDROCHLORIDE AND ACETAMINOPHEN 5; 325 MG/1; MG/1
1 TABLET ORAL EVERY 6 HOURS PRN
Qty: 20 TABLET | Refills: 0 | Status: SHIPPED | OUTPATIENT
Start: 2024-05-15 | End: 2024-05-20

## 2024-05-15 RX ADMIN — HYDROCODONE BITARTRATE AND ACETAMINOPHEN 1 TABLET: 7.5; 325 TABLET ORAL at 08:53

## 2024-05-15 RX ADMIN — Medication 1 PATCH: at 08:52

## 2024-05-15 RX ADMIN — Medication 10 ML: at 08:54

## 2024-05-15 RX ADMIN — SODIUM BICARBONATE 650 MG TABLET 650 MG: at 08:53

## 2024-05-15 RX ADMIN — SERTRALINE HYDROCHLORIDE 25 MG: 50 TABLET ORAL at 08:53

## 2024-05-15 RX ADMIN — HEPARIN SODIUM 5000 UNITS: 5000 INJECTION INTRAVENOUS; SUBCUTANEOUS at 08:53

## 2024-05-15 RX ADMIN — PANTOPRAZOLE SODIUM 40 MG: 40 TABLET, DELAYED RELEASE ORAL at 06:39

## 2024-05-15 NOTE — PROGRESS NOTES
[facility]\"" Progress Note  [facility]. [place]        Patient Name: Tasha Yarbrough  : 1955  MRN: [id number]   LOS: 3 days    Patient Care Team:  Terrence Baldwin MD as PCP - General (Family Medicine)    Chief Complaint:    Chief Complaint   Patient presents with    Abdominal Pain    Vomiting     Primary Care Physician:  Terrence Baldwin MD  Date of admission: 2024    Subjective     Interval History:   Follow-up acute on chronic kidney disease stage III.  Events noted from last 24 hours.  I reviewed the chart and other providers notes, labs and procedures done since my last note.  Patient is awake alert and interactive, she thinks her abdominal pain is better.  Denies any chest pain or shortness of breath.  She said she feels a lot better.    Review of Systems:   As noted above.    Objective     Vitals:   Temp:  [98.1 °F (36.7 °C)-99.4 °F (37.4 °C)] 98.2 °F (36.8 °C)  Heart Rate:  [71-92] 71  Resp:  [16-18] 16  BP: (130-150)/() 135/82  Flow (L/min):  [2] 2    Intake/Output Summary (Last 24 hours) at 5/15/2024 0951  Last data filed at 5/15/2024 0825  Gross per 24 hour   Intake 900 ml   Output 200 ml   Net 700 ml       Physical Exam:    General Appearance: alert, oriented x 3, no acute distress   Skin: warm and dry  HEENT: oral mucosa normal, nonicteric sclera  Neck: supple, no JVD  Lungs: CTA  Heart: RRR, normal S1 and S2  Abdomen: obese, soft, minimal tenderness, non distended and positive bowel sounds.  : no palpable bladder  Extremities: Trace edema, no cyanosis or clubbing  Neuro: normal speech and mental status     Scheduled Meds:     Current Facility-Administered Medications   Medication Dose Route Frequency Provider Last Rate Last Admin    acetaminophen (TYLENOL) tablet 650 mg  650 mg Oral Q4H PRN Kerley, Brian Joseph, DO   650 mg at 24 1600    Or    acetaminophen (TYLENOL) 160 MG/5ML oral solution 650 mg  650 mg Oral Q4H PRN Kerley, Brian Joseph,  DO        Or    acetaminophen (TYLENOL) suppository 650 mg  650 mg Rectal Q4H PRN Kerley, Brian Joseph, DO        sennosides-docusate (PERICOLACE) 8.6-50 MG per tablet 2 tablet  2 tablet Oral BID PRN Kerley, Brian Joseph, DO        And    polyethylene glycol (MIRALAX) packet 17 g  17 g Oral Daily PRN Kerley, Brian Joseph, DO        And    bisacodyl (DULCOLAX) EC tablet 5 mg  5 mg Oral Daily PRN Kerley, Brian Joseph, DO        And    bisacodyl (DULCOLAX) suppository 10 mg  10 mg Rectal Daily PRN Kerley, Brian Joseph, DO        Calcium Replacement - Follow Nurse / BPA Driven Protocol   Does not apply PRN Kerley, Brian Joseph, DO        dextrose (D50W) (25 g/50 mL) IV injection 50 mL  50 mL Intravenous Q1H PRN Kerley, Brian Joseph, DO   50 mL at 05/12/24 0248    heparin (porcine) 5000 UNIT/ML injection 5,000 Units  5,000 Units Subcutaneous Q12H Kerley, Brian Joseph, DO   5,000 Units at 05/15/24 0853    HYDROcodone-acetaminophen (NORCO) 7.5-325 MG per tablet 1 tablet  1 tablet Oral TID Lacey Ballesteros DO   1 tablet at 05/15/24 0853    Magnesium Standard Dose Replacement - Follow Nurse / BPA Driven Protocol   Does not apply PRN Kerley, Brian Joseph, DO        nicotine (NICODERM CQ) 21 MG/24HR patch 1 patch  1 patch Transdermal Q24H Tyree Fleming MD   1 patch at 05/15/24 0852    nitroglycerin (NITROSTAT) SL tablet 0.4 mg  0.4 mg Sublingual Q5 Min PRN Kerley, Brian Joseph, DO        oxyCODONE (ROXICODONE) immediate release tablet 10 mg  10 mg Oral Q12H PRN Lacey Ballesteros DO   10 mg at 05/14/24 1445    pantoprazole (PROTONIX) EC tablet 40 mg  40 mg Oral BID AC Kerley, Brian Joseph, DO   40 mg at 05/15/24 0639    Phosphorus Replacement - Follow Nurse / BPA Driven Protocol   Does not apply PRN Kerley, Brian Joseph, DO        Potassium Replacement - Follow Nurse / BPA Driven Protocol   Does not apply PRN Kerley, Brian Joseph,         promethazine (PHENERGAN) 12.5 mg in sodium chloride 0.9 % 50 mL   12.5 mg Intravenous Q6H PRN Kerley, Brian Joseph, DO   12.5 mg at 05/13/24 2140    sertraline (ZOLOFT) tablet 25 mg  25 mg Oral Daily Lacey Ballesteros, DO   25 mg at 05/15/24 0853    sodium bicarbonate tablet 650 mg  650 mg Oral BID Kerley, Brian Joseph, DO   650 mg at 05/15/24 0853    sodium chloride 0.9 % flush 10 mL  10 mL Intravenous Q12H Kerley, Brian Joseph, DO   10 mL at 05/15/24 0854    sodium chloride 0.9 % flush 10 mL  10 mL Intravenous PRN Kerley, Brian Joseph, DO   10 mL at 05/13/24 1732    sodium chloride 0.9 % infusion 40 mL  40 mL Intravenous PRN Kerley, Brian Joseph,            heparin (porcine), 5,000 Units, Subcutaneous, Q12H  HYDROcodone-acetaminophen, 1 tablet, Oral, TID  nicotine, 1 patch, Transdermal, Q24H  pantoprazole, 40 mg, Oral, BID AC  sertraline, 25 mg, Oral, Daily  sodium bicarbonate, 650 mg, Oral, BID  sodium chloride, 10 mL, Intravenous, Q12H        IV Meds:          Results Reviewed:   I have personally reviewed the results from the time of this admission to 5/15/2024 09:51 EDT     Results from last 7 days   Lab Units 05/15/24  0728 05/14/24  0432 05/13/24  0437 05/12/24  0045 05/11/24  2039   SODIUM mmol/L 144 143 142   < > 136   POTASSIUM mmol/L 4.1 3.1* 3.2*   < > 6.3*   CHLORIDE mmol/L 110* 106 110*   < > 109*   CO2 mmol/L 24.8 26.4 14.5*   < > 4.1*   BUN mg/dL 21 32* 51*   < > 43*   CREATININE mg/dL 1.25* 1.51* 2.59*   < > 2.22*   CALCIUM mg/dL 8.7 8.1* 8.8   < > 10.3   BILIRUBIN mg/dL  --  0.2 0.2  --  <0.2   ALK PHOS U/L  --  109 116  --  165*   ALT (SGPT) U/L  --  <5 <5  --  8   AST (SGOT) U/L  --  10 9  --  11   GLUCOSE mg/dL 87 79 76   < > 91    < > = values in this interval not displayed.       Estimated Creatinine Clearance: 36.7 mL/min (A) (by C-G formula based on SCr of 1.25 mg/dL (H)).    Results from last 7 days   Lab Units 05/13/24  0437   MAGNESIUM mg/dL 2.0             Results from last 7 days   Lab Units 05/15/24  0728 05/14/24  0432 05/13/24  0437  "05/12/24  0619 05/11/24  1843   WBC 10*3/mm3 4.04 5.19 7.80 25.54* 15.86*   HEMOGLOBIN g/dL 8.7* 9.1* 10.2* 13.7 13.8   PLATELETS 10*3/mm3 142 172 206 382 430             Brief Urine Lab Results  (Last result in the past 365 days)        Color   Clarity   Blood   Leuk Est   Nitrite   Protein   CREAT   Urine HCG        05/11/24 1930 Yellow   Cloudy   Trace   Trace   Negative   100 mg/dL (2+)                   No results found for: \"UTPCR\"    Imaging Results (Last 24 Hours)       ** No results found for the last 24 hours. **                Assessment / Plan     ASSESSMENT:    Hyperkalemia    Acute kidney injury: Patient appears to have significant worsening of her baseline chronic kidney disease, baseline GFR around 52 mL per minute.  It has been gradually worsening, most likely secondary to hemodynamic issues and low volume.  Check urine sodium and aggressively hydrate her.  Significant metabolic acidosis along with hyperkalemia noted which has been improving.  Hyperkalemia: Resolved.  Chronic kidney disease stage IIIa: Patient is not aware of having abnormal renal function but has been noted in the previous blood work.  Acute pancreatitis: Likely will need significant amount of fluid she is already getting bicarb at 150 cc an hour we will go ahead and add half-normal saline with 40 of potassium at 50 cc an hour to make it 200 mL/h for the next 24 hours.  She has been able to tolerate some clear liquids, still has persistent abdominal pain.  Chronic pancreatitis: Known to have chronic pancreatitis with pseudocyst.  Hypertension: Watch closely will consider starting medications if there is persistent blood pressure greater than 140 systolic.      PLAN:  Renal functions almost back to baseline.  During my discussion with the patient she feels that she will be able to follow-up in the office, will make follow-up appointment in 1 month.  Details were discussed with the patient no family in the room.    Details were also " discussed with the hospitalist service and or other providers as needed.  Clinically appears to be stable may be able to go home or rehab from renal standpoint.  Continue with rest of the current treatment plan, and monitor with surveillance labs.  Further recommendations will depend on clinical course of the patient during the current hospitalization.   I have reviewed the copied text to this note, it was edited and the changes made as needed.  It is accurate to the point, when the note was signed today.     Thank you for involving us in the care of Tasha Yarbrough.  Please feel free to call with any questions.    Nito Fernandez MD, FASN  05/15/24  09:51 EDT    [facility]  [phone]      Part of this note may be an electronic transcription/translation of spoken language to printed text using the Dragon Dictation System.

## 2024-05-15 NOTE — PLAN OF CARE
Goal Outcome Evaluation:           Progress: improving  Outcome Evaluation: Oriented x4.  VSS on room air while awake and PRN 2L O2 via nasal cannula while sleeping.  SR noted on telemetry.  Intake and output monitored. No complaints or acute events noted during shift.  Continue with plan of care.

## 2024-05-15 NOTE — SIGNIFICANT NOTE
Dr. Damon notified patient's SpO2 was dropping to 80% - 85% while sleeping.  Per Dr. Damon order for Oxygen therapy - nasal cannula; Titrate 1-6 LPM per SpO2 90-95% to be placed.  See order.

## 2024-05-15 NOTE — CASE MANAGEMENT/SOCIAL WORK
"Case Management Discharge Note      Final Note: Pt discharging home with sons via private car. She denied any DME needs, she refused home health services stating \"My son's do everything I need, I take my medicine as the doctors order. I just don't see the need.\" Due to recent re-admits I again encouraged home health but she again declined.         Selected Continued Care - Discharged on 5/15/2024 Admission date: 5/11/2024 - Discharge disposition: Home or Self Care      Destination    No services have been selected for the patient.                Durable Medical Equipment    No services have been selected for the patient.                Dialysis/Infusion    No services have been selected for the patient.                Home Medical Care    No services have been selected for the patient.                Therapy    No services have been selected for the patient.                Community Resources    No services have been selected for the patient.                Community & DME    No services have been selected for the patient.                    Transportation Services  Private: Car    Final Discharge Disposition Code: 01 - home or self-care  "

## 2024-05-15 NOTE — DISCHARGE SUMMARY
[facility] HOSPITALIST   DISCHARGE SUMMARY      Name:  Tasha Yarbrough   Age:  69 y.o.  Sex:  female  :  1955  MRN:  [id number]   Visit Number:  [id number]    Admission Date:  2024  Date of Discharge:  5/15/2024  Primary Care Physician:  Terrence Baldwin MD    Important issues to note:    Continue with GI low-fat diet.  Instructed patient that tobacco cessation would help with recurrent pancreatitis.    Patient will follow-up with Dr. Fleming's office on the  of next week.  She would benefit from MRI of pancreas.  She may need referral back to     Discharge Diagnoses:     Recurrent pancreatitis, idiopathic, POA  Metabolic acidosis  VIVIENNE on CKD    Problem List:     Active Hospital Problems    Diagnosis  POA    **Hyperkalemia [E87.5]  Yes      Resolved Hospital Problems   No resolved problems to display.     Presenting Problem:    Chief Complaint   Patient presents with    Abdominal Pain    Vomiting      Consults:     Consulting Physician(s)         Provider   Role Specialty     Nito Fernandez MD, WINNIE      Consulting Physician Nephrology     Tyree Fleming MD      Consulting Physician Gastroenterology          Procedures Performed:        History of presenting illness/Hospital Course:    69-year-old female with history of hypertension, chronic tobacco abuse, CKD 3, recurrent idiopathic pancreatitis, who presented from home due to complaints of abdominal pain.     Patient's workup emergency room was concerning for an acute kidney injury, pancreatitis, with CT scan demonstrating pancreatitis findings as well.  Lipase was greater than 3000.  She received initial IV fluid resuscitation and treatment for hyperkalemia and was admitted to the hospitalist service.     Patient is slowly improving.  Diet advanced to a GI low-fat diet on 2024.  Kidney functions also improving and nephrology is following.  Patient has been able to tolerate oral intake with minimal pain at this  point.  She is voiding and passing flatus without issue.  She does wish to go home today which I think is reasonable.  She has an appointment with Dr. Fleming on the 22nd next week.  Patient does not wish to go to [facility] if at all possible and wants to follow-up with GI locally.  I discussed she would likely need an MRI of her pancreas upon follow-up, she may end up needing EUS which could only be done down in [place].  All questions were answered prior to discharge today    Vital Signs:    Temp:  [98.1 °F (36.7 °C)-98.7 °F (37.1 °C)] 98.2 °F (36.8 °C)  Heart Rate:  [71-90] 71  Resp:  [16-18] 16  BP: (130-150)/(78-87) 135/82    Physical Exam:    General Appearance:  Alert and cooperative. NAD   Head:  Atraumatic and normocephalic.   Eyes: Conjunctivae and sclerae normal, no icterus. No pallor.   Ears:  Ears with no abnormalities noted.   Throat: No oral lesions, no thrush, oral mucosa moist.   Neck: Supple, trachea midline, no thyromegaly.   Back:   No kyphoscoliosis present. No tenderness to palpation.   Lungs:   Breath sounds heard bilaterally equally.  No crackles or wheezing. No Pleural rub or bronchial breathing.   Heart:  Normal S1 and S2, no murmur, no gallop, no rub. No JVD.   Abdomen:   Normal bowel sounds, no masses, no organomegaly. Soft, nontender, nondistended, no rebound tenderness.   Extremities: Supple, no edema, no cyanosis, no clubbing.   Pulses: Pulses palpable bilaterally.   Skin: No bleeding or rash.   Neurologic: Alert and oriented x 3. No facial asymmetry. Moves all four limbs. No tremors.     Pertinent Lab Results:     Results from last 7 days   Lab Units 05/15/24  0728 05/14/24  0432 05/13/24  0437 05/12/24  0045 05/11/24 2039   SODIUM mmol/L 144 143 142   < > 136   POTASSIUM mmol/L 4.1 3.1* 3.2*   < > 6.3*   CHLORIDE mmol/L 110* 106 110*   < > 109*   CO2 mmol/L 24.8 26.4 14.5*   < > 4.1*   BUN mg/dL 21 32* 51*   < > 43*   CREATININE mg/dL 1.25* 1.51* 2.59*   < > 2.22*   CALCIUM mg/dL 8.7  8.1* 8.8   < > 10.3   BILIRUBIN mg/dL  --  0.2 0.2  --  <0.2   ALK PHOS U/L  --  109 116  --  165*   ALT (SGPT) U/L  --  <5 <5  --  8   AST (SGOT) U/L  --  10 9  --  11   GLUCOSE mg/dL 87 79 76   < > 91    < > = values in this interval not displayed.     Results from last 7 days   Lab Units 05/15/24  0728 05/14/24  0432 05/13/24  0437   WBC 10*3/mm3 4.04 5.19 7.80   HEMOGLOBIN g/dL 8.7* 9.1* 10.2*   HEMATOCRIT % 27.3* 27.8* 30.6*   PLATELETS 10*3/mm3 142 172 206                     Results from last 7 days   Lab Units 05/14/24  0432   LIPASE U/L 203*               Pertinent Radiology Results:    Imaging Results (All)       Procedure Component Value Units Date/Time    CT Abdomen Pelvis Without Contrast [[id number]] Collected: 05/11/24 2321     Updated: 05/11/24 2337    Narrative:      FINAL REPORT    TECHNIQUE:  Axial CT images were performed from the lung bases through the  symphysis pubis without IV contrast.  This study was performed  with techniques to keep radiation doses as low as reasonably  achievable (ALARA). Individualized dose reduction techniques  using automated exposure control or adjustment of mA and/or kV  according to the patient's size were employed.    CLINICAL HISTORY:  diffuse abdominal pain and vomiting, patient has known  pancreatic pseudocyst    COMPARISON:  4/28/2024    FINDINGS:  Lack of intravenous contrast limits evaluation of solid  abdominal and pelvic organs.  LOWER CHEST: The heart is normal  size.  The lung bases are clear. There is a moderate hiatal  hernia.  ABDOMEN/PELVIS:  Liver, gallbladder and bile ducts: The  unenhanced liver is grossly unremarkable without focal  abnormality.  There has been a prior cholecystectomy.  There is  no definite biliary duct dilatation.  Adrenal glands: The  adrenal glands are morphologically unremarkable without  suspicious lesion.  Kidneys, ureter and urinary bladder: There  are nonobstructing right kidney stones.  No hydronephrosis.  Urinary  bladder is unremarkable.  Spleen: The spleen is normal  size.  Pancreas: There is persistent diffuse peripancreatic  inflammation.  There is a small fluid collection adjacent to the  tail of the pancreas.  GI systems and mesentery: No evidence of  bowel obstruction.  The appendix is not visualized but there are  no secondary signs of appendicitis.  No significant mesenteric  inflammation.  Lymph nodes: No definite pathologically enlarged  abdominal or pelvic lymph nodes present within the limits of  this noncontrast enhanced exam.  Vessels: The abdominal aorta is  normal in caliber.  The inferior vena cava is unremarkable.  Peritoneum: No free intraperitoneal fluid or pneumoperitoneum.  Pelvic viscera: No acute findings.  Body wall: No body wall  contusion.  Bones: No acute fracture.      Impression:      Findings remain compatible with pancreatitis.    Authenticated and Electronically Signed by Jens Karimi MD on  05/11/2024 11:36:09 PM            Echo:      Condition on Discharge:      Stable.    Code status during the hospital stay:    Code Status and Medical Interventions:   Ordered at: 05/12/24 0159     Code Status (Patient has no pulse and is not breathing):    CPR (Attempt to Resuscitate)     Medical Interventions (Patient has pulse or is breathing):    Full Support     Discharge Disposition:    Home or Self Care    Discharge Medications:       Discharge Medications        Continue These Medications        Instructions Start Date   glucosamine-chondroitin 500-400 MG capsule capsule   1 capsule, Oral, 2 Times Daily With Meals      methocarbamol 750 MG tablet  Commonly known as: ROBAXIN   750 mg, Oral, 4 Times Daily PRN      ondansetron ODT 4 MG disintegrating tablet  Commonly known as: ZOFRAN-ODT   4 mg, Translingual, Every 8 Hours PRN      oxyCODONE-acetaminophen 5-325 MG per tablet  Commonly known as: Percocet   1 tablet, Oral, Every 6 Hours PRN      pantoprazole 40 MG EC tablet  Commonly known as:  PROTONIX   40 mg, Oral, 2 Times Daily Before Meals      sertraline 25 MG tablet  Commonly known as: ZOLOFT   25 mg, Oral, Daily      sodium bicarbonate 650 MG tablet   650 mg, Oral, 2 Times Daily      vitamin D3 125 MCG (5000 UT) capsule capsule   1 capsule, Oral, Daily             Discharge Diet:   GI low-fat    Activity at Discharge:   As tolerated    Follow-up Appointments:     Follow-up Information       Nito Fernandez MD, FASN Follow up on 6/17/2024.    Specialty: Nephrology  Why: 11:00AM  Contact information:  [address] [place] [address]  [phone]               Terrence Baldwin MD Follow up on 5/29/2024.    Specialty: Family Medicine  Why: 09:15AM  Contact information:  [address] [place] [address]  [phone]                           Future Appointments   Date Time Provider Department Center   5/22/2024  9:30 AM Michelle Wright PA-C [facility]     Test Results Pending at Discharge:           Lacey Ballesteros DO  05/15/24  16:06 EDT    Time: I spent 14 minutes on this discharge activity which included: face-to-face encounter with the patient, reviewing the data in the system, coordination of the care with the nursing staff as well as consultants, documentation, and entering orders.     Dictated utilizing Dragon dictation.

## 2024-05-16 NOTE — OUTREACH NOTE
Prep Survey      Flowsheet Row Responses   [facility] facility patient discharged from? [facility]   Is LACE score < 7 ? No   Eligibility Readm Mgmt   Discharge diagnosis Hyperkalemia   Does the patient have one of the following disease processes/diagnoses(primary or secondary)? Other   Does the patient have Home health ordered? No   Is there a DME ordered? No   Prep survey completed? Yes            JOHN CLEARY - Registered Nurse

## 2024-05-16 NOTE — PAYOR COMM NOTE
"To:  Coretta  From: Mia Stallworth RN  Phone: [phone]  Fax: [phone]  NPI: [id number]  TIN: [id number]  Member ID: [id number]  MRN: [id number]    Jaymie Yarbrough (69 y.o. Female)       Date of Birth   1955    Social Security Number       Address   [address] [address]    Home Phone   [phone]    MRN   [id number]       Religious   None    Marital Status                               Admission Date   24    Admission Type   Emergency    Admitting Provider   Kerley, Brian Joseph, DO    Attending Provider       Department, Room/Bed   [facility] TELEMETRY 3/       Discharge Date   5/15/2024    Discharge Disposition   Home or Self Care    Discharge Destination   Home                              Attending Provider: (none)   Allergies: Rocephin [Ceftriaxone], Ciprofloxacin, Codeine, Pineapple    Isolation: None   Infection: Other (24)   Code Status: Prior    Ht: 157.5 cm (62\")   Wt: 61.7 kg (136 lb 0.4 oz)    Admission Cmt: None   Principal Problem: Hyperkalemia [E87.5]                   Active Insurance as of 2024       Primary Coverage       Payor Plan Insurance Group Employer/Plan Group    ANTHEM MEDICARE REPLACEMENT [facility] MEDICARE ADVANTAGE [id number]       Payor Plan Address Payor Plan Phone Number Payor Plan Fax Number Effective Dates    [address] [phone]  2024 - None Entered    [facility] [address]         Subscriber Name Subscriber Birth Date Member ID       JYAMIE YARBROUGH 1955 [id number]                     Emergency Contacts        (Rel.) Home Phone Work Phone Mobile Phone    EAMONANTONIO WORTHINGTON (Son) [phone] -- --    MONIE YABRROUGH (Son) [phone] -- --                 Discharge Summary        Lacey Ballesteros DO at 05/15/24 1245              [facility] HOSPITALIST   DISCHARGE SUMMARY      Name:  Jaymie Yarbrough   Age:  69 y.o.  Sex:  female  :  1955  MRN:  [id number]   Visit " Number:  [id number]    Admission Date:  5/11/2024  Date of Discharge:  5/15/2024  Primary Care Physician:  Terrence Baldwin MD    Important issues to note:    Continue with GI low-fat diet.  Instructed patient that tobacco cessation would help with recurrent pancreatitis.    Patient will follow-up with Dr. Fleming's office on the 22nd of next week.  She would benefit from MRI of pancreas.  She may need referral back to     Discharge Diagnoses:     Recurrent pancreatitis, idiopathic, POA  Metabolic acidosis  VIVIENNE on CKD    Problem List:     Active Hospital Problems    Diagnosis  POA    **Hyperkalemia [E87.5]  Yes      Resolved Hospital Problems   No resolved problems to display.     Presenting Problem:    Chief Complaint   Patient presents with    Abdominal Pain    Vomiting      Consults:     Consulting Physician(s)         Provider   Role Specialty     Nito Fernandez MD, WINNIE      Consulting Physician Nephrology     Tyree Fleming MD      Consulting Physician Gastroenterology          Procedures Performed:        History of presenting illness/Hospital Course:    69-year-old female with history of hypertension, chronic tobacco abuse, CKD 3, recurrent idiopathic pancreatitis, who presented from home due to complaints of abdominal pain.     Patient's workup emergency room was concerning for an acute kidney injury, pancreatitis, with CT scan demonstrating pancreatitis findings as well.  Lipase was greater than 3000.  She received initial IV fluid resuscitation and treatment for hyperkalemia and was admitted to the hospitalist service.     Patient is slowly improving.  Diet advanced to a GI low-fat diet on 5/14/2024.  Kidney functions also improving and nephrology is following.  Patient has been able to tolerate oral intake with minimal pain at this point.  She is voiding and passing flatus without issue.  She does wish to go home today which I think is reasonable.  She has an appointment with Dr. Fleming on  the 22nd next week.  Patient does not wish to go to [facility] if at all possible and wants to follow-up with GI locally.  I discussed she would likely need an MRI of her pancreas upon follow-up, she may end up needing EUS which could only be done down in [place].  All questions were answered prior to discharge today    Vital Signs:    Temp:  [98.1 °F (36.7 °C)-98.7 °F (37.1 °C)] 98.2 °F (36.8 °C)  Heart Rate:  [71-90] 71  Resp:  [16-18] 16  BP: (130-150)/(78-87) 135/82    Physical Exam:    General Appearance:  Alert and cooperative. NAD   Head:  Atraumatic and normocephalic.   Eyes: Conjunctivae and sclerae normal, no icterus. No pallor.   Ears:  Ears with no abnormalities noted.   Throat: No oral lesions, no thrush, oral mucosa moist.   Neck: Supple, trachea midline, no thyromegaly.   Back:   No kyphoscoliosis present. No tenderness to palpation.   Lungs:   Breath sounds heard bilaterally equally.  No crackles or wheezing. No Pleural rub or bronchial breathing.   Heart:  Normal S1 and S2, no murmur, no gallop, no rub. No JVD.   Abdomen:   Normal bowel sounds, no masses, no organomegaly. Soft, nontender, nondistended, no rebound tenderness.   Extremities: Supple, no edema, no cyanosis, no clubbing.   Pulses: Pulses palpable bilaterally.   Skin: No bleeding or rash.   Neurologic: Alert and oriented x 3. No facial asymmetry. Moves all four limbs. No tremors.     Pertinent Lab Results:     Results from last 7 days   Lab Units 05/15/24  0728 05/14/24  0432 05/13/24  0437 05/12/24  0045 05/11/24  2039   SODIUM mmol/L 144 143 142   < > 136   POTASSIUM mmol/L 4.1 3.1* 3.2*   < > 6.3*   CHLORIDE mmol/L 110* 106 110*   < > 109*   CO2 mmol/L 24.8 26.4 14.5*   < > 4.1*   BUN mg/dL 21 32* 51*   < > 43*   CREATININE mg/dL 1.25* 1.51* 2.59*   < > 2.22*   CALCIUM mg/dL 8.7 8.1* 8.8   < > 10.3   BILIRUBIN mg/dL  --  0.2 0.2  --  <0.2   ALK PHOS U/L  --  109 116  --  165*   ALT (SGPT) U/L  --  <5 <5  --  8   AST (SGOT) U/L  --  10 9  --   11   GLUCOSE mg/dL 87 79 76   < > 91    < > = values in this interval not displayed.     Results from last 7 days   Lab Units 05/15/24  0728 05/14/24  0432 05/13/24 0437   WBC 10*3/mm3 4.04 5.19 7.80   HEMOGLOBIN g/dL 8.7* 9.1* 10.2*   HEMATOCRIT % 27.3* 27.8* 30.6*   PLATELETS 10*3/mm3 142 172 206                     Results from last 7 days   Lab Units 05/14/24  0432   LIPASE U/L 203*               Pertinent Radiology Results:    Imaging Results (All)       Procedure Component Value Units Date/Time    CT Abdomen Pelvis Without Contrast [[id number]] Collected: 05/11/24 2321     Updated: 05/11/24 2337    Narrative:      FINAL REPORT    TECHNIQUE:  Axial CT images were performed from the lung bases through the  symphysis pubis without IV contrast.  This study was performed  with techniques to keep radiation doses as low as reasonably  achievable (ALARA). Individualized dose reduction techniques  using automated exposure control or adjustment of mA and/or kV  according to the patient's size were employed.    CLINICAL HISTORY:  diffuse abdominal pain and vomiting, patient has known  pancreatic pseudocyst    COMPARISON:  4/28/2024    FINDINGS:  Lack of intravenous contrast limits evaluation of solid  abdominal and pelvic organs.  LOWER CHEST: The heart is normal  size.  The lung bases are clear. There is a moderate hiatal  hernia.  ABDOMEN/PELVIS:  Liver, gallbladder and bile ducts: The  unenhanced liver is grossly unremarkable without focal  abnormality.  There has been a prior cholecystectomy.  There is  no definite biliary duct dilatation.  Adrenal glands: The  adrenal glands are morphologically unremarkable without  suspicious lesion.  Kidneys, ureter and urinary bladder: There  are nonobstructing right kidney stones.  No hydronephrosis.  Urinary bladder is unremarkable.  Spleen: The spleen is normal  size.  Pancreas: There is persistent diffuse peripancreatic  inflammation.  There is a small fluid collection  adjacent to the  tail of the pancreas.  GI systems and mesentery: No evidence of  bowel obstruction.  The appendix is not visualized but there are  no secondary signs of appendicitis.  No significant mesenteric  inflammation.  Lymph nodes: No definite pathologically enlarged  abdominal or pelvic lymph nodes present within the limits of  this noncontrast enhanced exam.  Vessels: The abdominal aorta is  normal in caliber.  The inferior vena cava is unremarkable.  Peritoneum: No free intraperitoneal fluid or pneumoperitoneum.  Pelvic viscera: No acute findings.  Body wall: No body wall  contusion.  Bones: No acute fracture.      Impression:      Findings remain compatible with pancreatitis.    Authenticated and Electronically Signed by Jens Karimi MD on  05/11/2024 11:36:09 PM            Echo:      Condition on Discharge:      Stable.    Code status during the hospital stay:    Code Status and Medical Interventions:   Ordered at: 05/12/24 0159     Code Status (Patient has no pulse and is not breathing):    CPR (Attempt to Resuscitate)     Medical Interventions (Patient has pulse or is breathing):    Full Support     Discharge Disposition:    Home or Self Care    Discharge Medications:       Discharge Medications        Continue These Medications        Instructions Start Date   glucosamine-chondroitin 500-400 MG capsule capsule   1 capsule, Oral, 2 Times Daily With Meals      methocarbamol 750 MG tablet  Commonly known as: ROBAXIN   750 mg, Oral, 4 Times Daily PRN      ondansetron ODT 4 MG disintegrating tablet  Commonly known as: ZOFRAN-ODT   4 mg, Translingual, Every 8 Hours PRN      oxyCODONE-acetaminophen 5-325 MG per tablet  Commonly known as: Percocet   1 tablet, Oral, Every 6 Hours PRN      pantoprazole 40 MG EC tablet  Commonly known as: PROTONIX   40 mg, Oral, 2 Times Daily Before Meals      sertraline 25 MG tablet  Commonly known as: ZOLOFT   25 mg, Oral, Daily      sodium bicarbonate 650 MG tablet   650  mg, Oral, 2 Times Daily      vitamin D3 125 MCG (5000 UT) capsule capsule   1 capsule, Oral, Daily             Discharge Diet:   GI low-fat    Activity at Discharge:   As tolerated    Follow-up Appointments:     Follow-up Information       Nito Fernandez MD, WINNIE Follow up on 6/17/2024.    Specialty: Nephrology  Why: 11:00AM  Contact information:  [address] [place] [address]  [phone]               Terrence Baldwin MD Follow up on 5/29/2024.    Specialty: Family Medicine  Why: 09:15AM  Contact information:  [address] [place] [address]  [phone]                           Future Appointments   Date Time Provider Department Center   5/22/2024  9:30 AM Michelle Wright PA-C [facility]     Test Results Pending at Discharge:           Lacey Ballesteros DO  05/15/24  16:06 EDT    Time: I spent 14 minutes on this discharge activity which included: face-to-face encounter with the patient, reviewing the data in the system, coordination of the care with the nursing staff as well as consultants, documentation, and entering orders.     Dictated utilizing Dragon dictation.      Electronically signed by Lacey Ballesteros DO at 05/15/24 7864

## 2024-05-22 ENCOUNTER — OFFICE VISIT (OUTPATIENT)
Dept: GASTROENTEROLOGY | Facility: CLINIC | Age: 69
End: 2024-05-22
Payer: MEDICARE

## 2024-05-22 VITALS
DIASTOLIC BLOOD PRESSURE: 88 MMHG | SYSTOLIC BLOOD PRESSURE: 130 MMHG | HEART RATE: 71 BPM | OXYGEN SATURATION: 99 % | WEIGHT: 135 LBS | BODY MASS INDEX: 24.69 KG/M2

## 2024-05-22 DIAGNOSIS — D53.9 MACROCYTIC ANEMIA: ICD-10-CM

## 2024-05-22 DIAGNOSIS — K85.90 RECURRENT ACUTE PANCREATITIS: Primary | ICD-10-CM

## 2024-05-22 DIAGNOSIS — K86.3 PANCREATIC PSEUDOCYST: ICD-10-CM

## 2024-05-22 DIAGNOSIS — R19.7 DIARRHEA, UNSPECIFIED TYPE: ICD-10-CM

## 2024-05-22 PROCEDURE — 99214 OFFICE O/P EST MOD 30 MIN: CPT | Performed by: PHYSICIAN ASSISTANT

## 2024-05-22 PROCEDURE — 1159F MED LIST DOCD IN RCRD: CPT | Performed by: PHYSICIAN ASSISTANT

## 2024-05-22 PROCEDURE — 1160F RVW MEDS BY RX/DR IN RCRD: CPT | Performed by: PHYSICIAN ASSISTANT

## 2024-05-22 RX ORDER — PROMETHAZINE HYDROCHLORIDE 25 MG/1
25 TABLET ORAL
COMMUNITY
Start: 2024-04-30 | End: 2024-10-27

## 2024-05-22 RX ORDER — HYDROCODONE BITARTRATE AND ACETAMINOPHEN 7.5; 325 MG/1; MG/1
1 TABLET ORAL
COMMUNITY
Start: 2024-04-30 | End: 2024-05-30

## 2024-05-22 NOTE — PROGRESS NOTES
Follow Up Note     Date: 2024   Patient Name: Tasha Yarbrough  MRN: [id number]  : 1955     Primary Care Provider: Terrence Baldwin MD     Chief Complaint   Patient presents with    Pancreatitis    Abnormal Lab    Nausea    Abdominal Pain    Vomiting     History of present illness:   2024  Tasha Yarbrough is a 69 y.o. female who is here today for follow up regarding Pancreatitis, Abnormal Lab, Nausea, Abdominal Pain, and Vomiting.    She continues to have same symptoms as while hospitalized. Has epigastric pain radiating into her mid back. She does take opioids for pain relief but still reports pain. Has been trying to cut back on smoking but has not stopped yet. She tried changing her diet initially but because she kept having episodes, she felt that this was not helpful, no dietary changes now. No current appt with  GI as discussed at recent GI consult inpatient. Denies any obvious GI bleeding. No rectal bleeding, no black stools.     Interval History:  2024 Inpatient GI consult  This is a 68-year-old female patient with a prior history of hypertension hyperlipidemia, history of C. difficile colitis, chronic constipation, CKD stage III, history of recurrent pancreatitis, history of pancreatic pseudocyst, tobacco abuse was brought in emergency room on 2024 with complaints of acute mid abdominal pain with nausea vomiting.     She states that she was doing okay until yesterday morning when she started developing nausea followed by pain mainly in the mid and upper abdomen.  Later on had a multiple vomiting and also had few loose stools without any blood.  As symptoms did not improve came to emergency room for evaluation.  She has been in and out of the hospital with recurrent pancreatitis since 2024.  All of symptoms started in 2023 when she was admitted at [facility] with acute pancreatitis.  There was a concern for choledocholithiasis with a CT scan on 2023  for which she underwent EGD and ERCP by Dr. Uriostegui which did not show any obvious stone however there was some sludge in the bile duct.  Her cholangiogram was normal.  Later on she developed C. difficile colitis which was treated with vancomycin followed by fidaxomicin.  Subsequently in February she was admitted with a pancreatitis with retroperitoneal effusion.  She developed pancreatic pseudocyst.  Her follow-up CT scan done on 3/16/2024 revealed worsening acute pancreatitis.  CT abdomen pelvis  on 3/23/2024 revealed 2 enhancing fluid collections abutting the pancreas reflecting pseudocyst.  CT abdomen pelvis on 4/6/2024 revealed a pseudocyst but size improved compared to prior CT.  CT pelvis done with contrast on 4/8/2024 revealed acute pancreatitis with possible pancreatic pseudocyst size improved compared to prior CT .      She had a recent admission in April again with abdominal pain nausea vomiting.  EGD done by Dr. Gamble revealed only mild gastritis otherwise unremarkable.  This admission she was noted to have a severe acidosis with CO2 of 4.1 potassium 6.3 increasing BUN 43 pregnant 2.2 .  Lipase was 3000.  Her WBC where 15,000 with hemoglobin 13.8.  CT abdomen pelvis done again revealed signs of recurrent pancreatitis without any significant pseudocyst.  GI was consulted.    Subjective     Past Medical History:   Diagnosis Date    Hypertension     Impaired mobility     Injury of back     Pancreatitis      Past Surgical History:   Procedure Laterality Date    ABDOMINAL SURGERY      COLON SURGERY      COLONOSCOPY      ENDOSCOPY N/A 4/23/2024    Procedure: ESOPHAGOGASTRODUODENOSCOPY WITH BIOPSY;  Surgeon: Jairo Negro MD;  Location: [facility] ENDOSCOPY;  Service: Gastroenterology;  Laterality: N/A;    TUBAL ABDOMINAL LIGATION       Family History   Problem Relation Age of Onset    Kidney disease Mother     Emphysema Mother     Heart disease Father     Lung cancer Sister     Heart disease Paternal Uncle       Social History     Socioeconomic History    Marital status:    Tobacco Use    Smoking status: Every Day     Current packs/day: 1.00     Types: Cigarettes    Smokeless tobacco: Never   Vaping Use    Vaping status: Never Used   Substance and Sexual Activity    Alcohol use: Never    Drug use: Never    Sexual activity: Defer     Current Outpatient Medications:     glucosamine-chondroitin 500-400 MG capsule capsule, Take 1 capsule by mouth 2 (Two) Times a Day With Meals. Indications: Joint Damage causing Pain and Loss of Function, Disp: , Rfl:     HYDROcodone-acetaminophen (NORCO) 7.5-325 MG per tablet, Take 1 tablet by mouth., Disp: , Rfl:     methocarbamol (ROBAXIN) 750 MG tablet, Take 1 tablet by mouth 4 (Four) Times a Day As Needed for Muscle Spasms., Disp: , Rfl:     ondansetron ODT (ZOFRAN-ODT) 4 MG disintegrating tablet, Place 1 tablet on the tongue Every 8 (Eight) Hours As Needed for Nausea or Vomiting., Disp: 12 tablet, Rfl: 0    pantoprazole (PROTONIX) 40 MG EC tablet, Take 1 tablet by mouth 2 (Two) Times a Day Before Meals for 60 days., Disp: 60 tablet, Rfl: 1    promethazine (PHENERGAN) 25 MG tablet, Take 1 tablet by mouth., Disp: , Rfl:     sertraline (ZOLOFT) 50 MG tablet, Take 1 tablet by mouth Daily., Disp: , Rfl:     sodium bicarbonate 650 MG tablet, Take 1 tablet by mouth 2 (Two) Times a Day., Disp: , Rfl:     vitamin D3 125 MCG (5000 UT) capsule capsule, Take 1 capsule by mouth Daily. Indications: Vitamin D Deficiency, Disp: , Rfl:     Allergies   Allergen Reactions    Rocephin [Ceftriaxone] Anaphylaxis    Ciprofloxacin Dizziness    Codeine Itching    Pineapple Unknown - Low Severity     The following portions of the patient's history were reviewed and updated as appropriate: allergies, current medications, past family history, past medical history, past social history, past surgical history and problem list.    Objective     Physical Exam  Constitutional:       General: She is not in acute  distress.     Appearance: She is well-developed. She is ill-appearing. She is not diaphoretic.   HENT:      Head: Normocephalic and atraumatic.      Right Ear: External ear normal.      Left Ear: External ear normal.      Nose: Nose normal.   Eyes:      General: No scleral icterus.        Right eye: No discharge.         Left eye: No discharge.      Conjunctiva/sclera: Conjunctivae normal.   Neck:      Trachea: No tracheal deviation.   Pulmonary:      Effort: Pulmonary effort is normal. No respiratory distress.   Musculoskeletal:      Cervical back: Normal range of motion.      Comments: Mobility via wheelchair   Skin:     Coloration: Skin is pale.      Findings: No erythema or rash.   Neurological:      Mental Status: She is alert and oriented to person, place, and time.      Coordination: Coordination normal.   Psychiatric:         Mood and Affect: Mood normal.         Behavior: Behavior normal.         Thought Content: Thought content normal.         Judgment: Judgment normal.       Vitals:    05/22/24 0925   BP: 130/88   Pulse: 71   SpO2: 99%   Weight: 61.2 kg (135 lb)     Results Review:   I reviewed the patient's new clinical results.    Admission on 04/28/2024, Discharged on 04/28/2024   Component Date Value Ref Range Status    Glucose 04/28/2024 79  65 - 99 mg/dL Final    BUN 04/28/2024 23  8 - 23 mg/dL Final    Creatinine 04/28/2024 1.43 (H)  0.57 - 1.00 mg/dL Final    Sodium 04/28/2024 137  136 - 145 mmol/L Final    Potassium 04/28/2024 4.4  3.5 - 5.2 mmol/L Final    Slight hemolysis detected by analyzer. Result may be falsely elevated.    Chloride 04/28/2024 109 (H)  98 - 107 mmol/L Final    CO2 04/28/2024 14.0 (L)  22.0 - 29.0 mmol/L Final    Calcium 04/28/2024 9.8  8.6 - 10.5 mg/dL Final    Total Protein 04/28/2024 6.5  6.0 - 8.5 g/dL Final    Albumin 04/28/2024 3.6  3.5 - 5.2 g/dL Final    ALT (SGPT) 04/28/2024 12  1 - 33 U/L Final    AST (SGOT) 04/28/2024 18  1 - 32 U/L Final    Slight hemolysis  detected by analyzer. Result may be falsely elevated.    Alkaline Phosphatase 04/28/2024 103  39 - 117 U/L Final    Total Bilirubin 04/28/2024 <0.2  0.0 - 1.2 mg/dL Final    Globulin 04/28/2024 2.9  gm/dL Final    A/G Ratio 04/28/2024 1.2  g/dL Final    BUN/Creatinine Ratio 04/28/2024 16.1  7.0 - 25.0 Final    Anion Gap 04/28/2024 14.0  5.0 - 15.0 mmol/L Final    eGFR 04/28/2024 39.8 (L)  >60.0 mL/min/1.73 Final    Lipase 04/28/2024 414 (H)  13 - 60 U/L Final    HS Troponin T 04/28/2024 16 (H)  <14 ng/L Final    WBC 04/28/2024 12.48 (H)  3.40 - 10.80 10*3/mm3 Final    RBC 04/28/2024 3.21 (L)  3.77 - 5.28 10*6/mm3 Final    Hemoglobin 04/28/2024 11.3 (L)  12.0 - 15.9 g/dL Final    Hematocrit 04/28/2024 36.4  34.0 - 46.6 % Final    MCV 04/28/2024 113.4 (H)  79.0 - 97.0 fL Final    MCH 04/28/2024 35.2 (H)  26.6 - 33.0 pg Final    MCHC 04/28/2024 31.0 (L)  31.5 - 35.7 g/dL Final    RDW 04/28/2024 15.4  12.3 - 15.4 % Final    RDW-SD 04/28/2024 64.4 (H)  37.0 - 54.0 fl Final      CT Abdomen Pelvis Without Contrast  Result Date: 5/11/2024  Findings remain compatible with pancreatitis.     CT Abdomen Pelvis With Contrast  Result Date: 4/28/2024  Acute pancreatitis.     CT Abdomen Pelvis Without Contrast  Result Date: 4/21/2024   1. Mild peripancreatic stranding. Correlate with serum lipase to exclude acute pancreatitis.  2. Multiple air-fluid levels in small bowel loops which otherwise not significantly dilated may reflect underlying ileus.      CT Abdomen Pelvis With Contrast  Result Date: 4/8/2024  Impression: Acute pancreatitis with probable pancreatic pseudocysts No significant change from previous study     CT Abdomen Pelvis With Contrast  Result Date: 4/6/2024  Impression: There are 2 pancreatic pseudocysts, decreased in size.     CT Abdomen Pelvis With Contrast  Result Date: 3/28/2024  Evidence of pancreatitis associated with 2 fluid collections; findings are similar except for mild interval decrease in one of the 2  fluid collections.  Stable bilateral renal cysts.  Mildly worsened small bilateral pleural effusions.  Stable large hiatal hernia.         CT Abdomen Pelvis With Contrast  Result Date: 3/23/2024  1.  2 enhancing fluid collections abutting the pancreas which may reflect pseudocysts or abscesses.  2.  Large hiatal hernia. 3.  Right nephrolithiasis, nonobstructing.  4.  Severe constipation without obstruction.  5.  Small pleural effusions with minimal ascites     CT Abdomen Pelvis With Contrast  Result Date: 3/16/2024  Worsening acute pancreatitis with increased size of the peripancreatic fluid collection.     CT Abdomen Pelvis With Contrast  Result Date: 3/9/2024  IMPRESSION: Stable mild pancreatitis changes at the tail. Slight improvement in peripancreatic fluid collection may reflect pseudocyst. Dense colonic fecal retention without obstruction. Small bilateral pleural effusions without significant change. Stable moderate hiatal hernia.     CT Abdomen Pelvis With Contrast  Result Date: 3/2/2024  IMPRESSION: 6.1 x 3.0 cm fluid collection along the ventral margin of the pancreatic tail extending to and compressing the greater gastric curvature proximally most consistent with acute fluid collection related to acute pancreatitis. This is visible only in retrospect on exam of 02/25/24 when measuring 2.3 x 1.6 cm. Persistent stranding about the pancreatic tail consistent with acute pancreatitis improved versus the exam of 02/25/2024.     CT Abdomen Pelvis Without Contrast  Result Date: 3/2/2024  IMPRESSION: Oval 6.1 x 3.0 cm fluid collection along the ventral margin of the pancreatic tail may reflect pancreatitis related acute fluid collection, new vs prior, and suboptimally visualized without contrast. Stranding about the pancreatic tail again noted consistent with pancreatitis. Dense colonic fecal retention without obstruction.] Stable hepatomegaly. Stable hiatal hernia. Small bilateral pleural effusions, new.     CT  Abdomen Pelvis Without Contrast  Result Date: 2/25/2024  IMPRESSION: 1. Mild acute pancreatitis with retroperitoneal effusion. No abscess. 2. Nonobstructing bilateral renal stones. 3. Moderate-large hiatal hernia.     EGD 4/23/2024 Dr. INDER Negro  No obvious etiology noted on today's exam to explain the severity of her abdominal pain. Biopsies were taken with a cold forceps for evaluation of celiac disease. Biopsies were taken with a cold forceps for Helicobacter pylori testing.    Assessment / Plan      1. Recurrent acute pancreatitis  2. Pancreatic pseudocyst  3. Macrocytic anemia  4. Diarrhea, unspecified type  She was seen at most recent hospitalization by GI service on 5/12/2024. Initially acute pancreatitis was in September 2023 and was presumed gallstone pancreatitis.  Initial CT scan in September 2023 showing possible CBD stone.  She had an ERCP showing biliary sludge, cholangiogram was normal in September 2023.  Subsequently had a lap catalino.  Still with recurrent pancreatitis episodes and continued symptoms. She is nonalcoholic.  Not on any medication that could cause pancreatitis.  Triglycerides borderline.  IgG4 was normal.  She had pancreatic pseudocyst, had many recent CTs, last 5/11 which showed small fluid collection adjacent to the  tail of the pancreas. Most recent scan findings look much better than previous CT scans.  Her ongoing smoking possibly contributing to her acute pancreatitis episodes.     Given her leukocytosis and continued diarrhea, C. difficile needs to be ruled out  Stool testing  Labs in 1 week  Conservative therapy  Anti-emetics PRN nausea  Analgesics as appropriate, prescribed by PCP  Patient needs an MRI scan pancreas with and without with MRCP for further evaluation of any intrinsic pancreatic pathology however due to her CKD, would recommend EUS as OP (unable to give contrast due to CKD for MRI scan)  She needs a OP colonoscopy  Abstinence from smoking was discussed in detail  which may be contributing to her recurrent pancreatitis episode  Referral to [facility] for possible EUS and further management    - Ambulatory Referral to Gastroenterology    - CBC (No Diff); Future  - Iron Profile; Future  - Ferritin; Future  - Folate; Future  - Vitamin B12; Future    - Clostridioides difficile Toxin, PCR - Stool, Per Rectum; Future      Prior history  History of chronic idiopathic constipation versus IBS with constipation  Status post partial colectomy  History of diverticular perforation 17 years ago and had a partial colectomy details unknown.  No prior colonoscopy.  Patient did not keep up the appointment for colonoscopy from the office.  Recommend screening colonoscopy as OP at upper GI at      Follow Up:   She will have further visits with  GI.    Michelle Wright PA-C  [facility] [place]  5/22/2024  16:18 EDT    Dictated Utilizing Dragon Dictation: Part of this note may be an electronic transcription/translation of spoken language to printed text using the Dragon Dictation System.

## 2024-05-24 ENCOUNTER — READMISSION MANAGEMENT (OUTPATIENT)
Dept: CALL CENTER | Facility: HOSPITAL | Age: 69
End: 2024-05-24
Payer: MEDICARE

## 2024-05-24 NOTE — OUTREACH NOTE
Medical Week 2 Survey      Flowsheet Row Responses   [facility] facility patient discharged from? [facility]   Does the patient have one of the following disease processes/diagnoses(primary or secondary)? Other   Week 2 attempt successful? No   Unsuccessful attempts Attempt 1            Anderson VIEYRA - Registered Nurse

## 2024-05-29 ENCOUNTER — READMISSION MANAGEMENT (OUTPATIENT)
Dept: CALL CENTER | Facility: HOSPITAL | Age: 69
End: 2024-05-29
Payer: MEDICARE

## 2024-05-29 NOTE — OUTREACH NOTE
Medical Week 2 Survey      Flowsheet Row Responses   [facility] facility patient discharged from? [facility]   Does the patient have one of the following disease processes/diagnoses(primary or secondary)? Other   Week 2 attempt successful? No   Unsuccessful attempts Attempt 2            MADAI PRADO - Registered Nurse

## 2024-05-30 ENCOUNTER — READMISSION MANAGEMENT (OUTPATIENT)
Dept: CALL CENTER | Facility: HOSPITAL | Age: 69
End: 2024-05-30
Payer: MEDICARE

## 2024-05-30 ENCOUNTER — HOSPITAL ENCOUNTER (INPATIENT)
Facility: HOSPITAL | Age: 69
LOS: 3 days | Discharge: HOME | DRG: 438 | End: 2024-06-02
Attending: EMERGENCY MEDICINE | Admitting: STUDENT IN AN ORGANIZED HEALTH CARE EDUCATION/TRAINING PROGRAM
Payer: MEDICARE

## 2024-05-30 ENCOUNTER — APPOINTMENT (OUTPATIENT)
Dept: CT IMAGING | Facility: HOSPITAL | Age: 69
DRG: 438 | End: 2024-05-30
Payer: MEDICARE

## 2024-05-30 ENCOUNTER — APPOINTMENT (OUTPATIENT)
Dept: GENERAL RADIOLOGY | Facility: HOSPITAL | Age: 69
DRG: 438 | End: 2024-05-30
Payer: MEDICARE

## 2024-05-30 DIAGNOSIS — R10.9 CHRONIC ABDOMINAL PAIN: ICD-10-CM

## 2024-05-30 DIAGNOSIS — G89.29 CHRONIC ABDOMINAL PAIN: ICD-10-CM

## 2024-05-30 DIAGNOSIS — K86.1 CHRONIC PANCREATITIS, UNSPECIFIED PANCREATITIS TYPE: Chronic | ICD-10-CM

## 2024-05-30 DIAGNOSIS — E87.20 METABOLIC ACIDOSIS: Primary | ICD-10-CM

## 2024-05-30 LAB
ALBUMIN SERPL-MCNC: 4.6 G/DL (ref 3.5–5.2)
ALBUMIN/GLOB SERPL: 1.4 G/DL
ALP SERPL-CCNC: 160 U/L (ref 39–117)
ALT SERPL W P-5'-P-CCNC: 10 U/L (ref 1–33)
ANION GAP SERPL CALCULATED.3IONS-SCNC: 19.5 MMOL/L (ref 5–15)
AST SERPL-CCNC: 12 U/L (ref 1–32)
ATMOSPHERIC PRESS: 737 MMHG
BASE EXCESS BLDV CALC-SCNC: -24.3 MMOL/L (ref 0–2)
BASOPHILS # BLD AUTO: 0.08 10*3/MM3 (ref 0–0.2)
BASOPHILS NFR BLD AUTO: 0.7 % (ref 0–1.5)
BDY SITE: ABNORMAL
BILIRUB SERPL-MCNC: 0.2 MG/DL (ref 0–1.2)
BUN SERPL-MCNC: 40 MG/DL (ref 8–23)
BUN/CREAT SERPL: 19.7 (ref 7–25)
CALCIUM SPEC-SCNC: 10.8 MG/DL (ref 8.6–10.5)
CHLORIDE SERPL-SCNC: 114 MMOL/L (ref 98–107)
CO2 SERPL-SCNC: 4.5 MMOL/L (ref 22–29)
COHGB MFR BLD: 1.6 % (ref 0–5)
CREAT SERPL-MCNC: 2.03 MG/DL (ref 0.57–1)
D-LACTATE SERPL-SCNC: 0.7 MMOL/L (ref 0.5–2)
DEPRECATED RDW RBC AUTO: 62.9 FL (ref 37–54)
EGFRCR SERPLBLD CKD-EPI 2021: 26.1 ML/MIN/1.73
EOSINOPHIL # BLD AUTO: 0.02 10*3/MM3 (ref 0–0.4)
EOSINOPHIL NFR BLD AUTO: 0.2 % (ref 0.3–6.2)
ERYTHROCYTE [DISTWIDTH] IN BLOOD BY AUTOMATED COUNT: 15.3 % (ref 12.3–15.4)
GLOBULIN UR ELPH-MCNC: 3.4 GM/DL
GLUCOSE SERPL-MCNC: 83 MG/DL (ref 65–99)
HCO3 BLDV-SCNC: 5.5 MMOL/L (ref 22–28)
HCT VFR BLD AUTO: 41.7 % (ref 34–46.6)
HGB BLD-MCNC: 13.6 G/DL (ref 12–15.9)
HOLD SPECIMEN: NORMAL
IMM GRANULOCYTES # BLD AUTO: 0.09 10*3/MM3 (ref 0–0.05)
IMM GRANULOCYTES NFR BLD AUTO: 0.8 % (ref 0–0.5)
INHALED O2 CONCENTRATION: 21 %
LIPASE SERPL-CCNC: 271 U/L (ref 13–60)
LYMPHOCYTES # BLD AUTO: 1.05 10*3/MM3 (ref 0.7–3.1)
LYMPHOCYTES NFR BLD AUTO: 9.3 % (ref 19.6–45.3)
Lab: 33
Lab: ABNORMAL
MACROCYTES BLD QL SMEAR: NORMAL
MCH RBC QN AUTO: 35.8 PG (ref 26.6–33)
MCHC RBC AUTO-ENTMCNC: 32.6 G/DL (ref 31.5–35.7)
MCV RBC AUTO: 109.7 FL (ref 79–97)
METHGB BLD QL: 0.5 % (ref 0–3)
MODALITY: ABNORMAL
MONOCYTES # BLD AUTO: 0.55 10*3/MM3 (ref 0.1–0.9)
MONOCYTES NFR BLD AUTO: 4.9 % (ref 5–12)
NEUTROPHILS NFR BLD AUTO: 84.1 % (ref 42.7–76)
NEUTROPHILS NFR BLD AUTO: 9.46 10*3/MM3 (ref 1.7–7)
NOTIFIED BY: ABNORMAL
NOTIFIED WHO: ABNORMAL
NRBC BLD AUTO-RTO: 0 /100 WBC (ref 0–0.2)
NT-PROBNP SERPL-MCNC: 982.4 PG/ML (ref 0–900)
OXYHGB MFR BLDV: 84.1 % (ref 40–70)
PCO2 BLDV: 22.3 MM HG (ref 40–50)
PH BLDV: 7 PH UNITS (ref 7.32–7.42)
PLAT MORPH BLD: NORMAL
PLATELET # BLD AUTO: 352 10*3/MM3 (ref 140–450)
PMV BLD AUTO: 10 FL (ref 6–12)
PO2 BLDV: 51.5 MM HG (ref 30–50)
POTASSIUM SERPL-SCNC: 4.7 MMOL/L (ref 3.5–5.2)
PROT SERPL-MCNC: 8 G/DL (ref 6–8.5)
RBC # BLD AUTO: 3.8 10*6/MM3 (ref 3.77–5.28)
SAO2 % BLDCOV: 85.9 % (ref 45–75)
SODIUM SERPL-SCNC: 138 MMOL/L (ref 136–145)
TROPONIN T SERPL HS-MCNC: 20 NG/L
VENTILATOR MODE: ABNORMAL
WBC MORPH BLD: NORMAL
WBC NRBC COR # BLD AUTO: 11.25 10*3/MM3 (ref 3.4–10.8)
WHOLE BLOOD HOLD COAG: NORMAL
WHOLE BLOOD HOLD SPECIMEN: NORMAL

## 2024-05-30 PROCEDURE — 93005 ELECTROCARDIOGRAM TRACING: CPT | Performed by: EMERGENCY MEDICINE

## 2024-05-30 PROCEDURE — 25810000003 SODIUM CHLORIDE 0.9 % SOLUTION: Performed by: EMERGENCY MEDICINE

## 2024-05-30 PROCEDURE — 99285 EMERGENCY DEPT VISIT HI MDM: CPT

## 2024-05-30 PROCEDURE — 83605 ASSAY OF LACTIC ACID: CPT | Performed by: EMERGENCY MEDICINE

## 2024-05-30 PROCEDURE — 84484 ASSAY OF TROPONIN QUANT: CPT | Performed by: EMERGENCY MEDICINE

## 2024-05-30 PROCEDURE — 71045 X-RAY EXAM CHEST 1 VIEW: CPT

## 2024-05-30 PROCEDURE — 82820 HEMOGLOBIN-OXYGEN AFFINITY: CPT

## 2024-05-30 PROCEDURE — 74176 CT ABD & PELVIS W/O CONTRAST: CPT

## 2024-05-30 PROCEDURE — 25010000002 ONDANSETRON PER 1 MG: Performed by: EMERGENCY MEDICINE

## 2024-05-30 PROCEDURE — 0 DEXTROSE 5 % SOLUTION 1,000 ML FLEX CONT: Performed by: STUDENT IN AN ORGANIZED HEALTH CARE EDUCATION/TRAINING PROGRAM

## 2024-05-30 PROCEDURE — 83880 ASSAY OF NATRIURETIC PEPTIDE: CPT | Performed by: EMERGENCY MEDICINE

## 2024-05-30 PROCEDURE — 99223 1ST HOSP IP/OBS HIGH 75: CPT | Performed by: FAMILY MEDICINE

## 2024-05-30 PROCEDURE — 80053 COMPREHEN METABOLIC PANEL: CPT | Performed by: EMERGENCY MEDICINE

## 2024-05-30 PROCEDURE — 82805 BLOOD GASES W/O2 SATURATION: CPT

## 2024-05-30 PROCEDURE — 85007 BL SMEAR W/DIFF WBC COUNT: CPT | Performed by: EMERGENCY MEDICINE

## 2024-05-30 PROCEDURE — 25010000002 HYDROMORPHONE 1 MG/ML SOLUTION: Performed by: STUDENT IN AN ORGANIZED HEALTH CARE EDUCATION/TRAINING PROGRAM

## 2024-05-30 PROCEDURE — 83690 ASSAY OF LIPASE: CPT | Performed by: EMERGENCY MEDICINE

## 2024-05-30 PROCEDURE — 85025 COMPLETE CBC W/AUTO DIFF WBC: CPT | Performed by: EMERGENCY MEDICINE

## 2024-05-30 PROCEDURE — 25010000002 MORPHINE PER 10 MG: Performed by: EMERGENCY MEDICINE

## 2024-05-30 RX ORDER — NITROGLYCERIN 0.4 MG/1
0.4 TABLET SUBLINGUAL
Status: DISCONTINUED | OUTPATIENT
Start: 2024-05-30 | End: 2024-06-02 | Stop reason: HOSPADM

## 2024-05-30 RX ORDER — ACETAMINOPHEN 325 MG/1
650 TABLET ORAL EVERY 4 HOURS PRN
Status: DISCONTINUED | OUTPATIENT
Start: 2024-05-30 | End: 2024-05-31

## 2024-05-30 RX ORDER — SODIUM CHLORIDE 0.9 % (FLUSH) 0.9 %
10 SYRINGE (ML) INJECTION AS NEEDED
Status: DISCONTINUED | OUTPATIENT
Start: 2024-05-30 | End: 2024-06-02 | Stop reason: HOSPADM

## 2024-05-30 RX ORDER — POTASSIUM CHLORIDE 20 MEQ/1
40 TABLET, EXTENDED RELEASE ORAL ONCE
Status: COMPLETED | OUTPATIENT
Start: 2024-05-30 | End: 2024-05-30

## 2024-05-30 RX ORDER — SODIUM CHLORIDE 0.9 % (FLUSH) 0.9 %
10 SYRINGE (ML) INJECTION EVERY 12 HOURS SCHEDULED
Status: DISCONTINUED | OUTPATIENT
Start: 2024-05-30 | End: 2024-06-02 | Stop reason: HOSPADM

## 2024-05-30 RX ORDER — ONDANSETRON 2 MG/ML
4 INJECTION INTRAMUSCULAR; INTRAVENOUS EVERY 6 HOURS PRN
Status: DISCONTINUED | OUTPATIENT
Start: 2024-05-30 | End: 2024-06-02 | Stop reason: HOSPADM

## 2024-05-30 RX ORDER — SODIUM CHLORIDE 9 MG/ML
40 INJECTION, SOLUTION INTRAVENOUS AS NEEDED
Status: DISCONTINUED | OUTPATIENT
Start: 2024-05-30 | End: 2024-06-02 | Stop reason: HOSPADM

## 2024-05-30 RX ORDER — NALOXONE HCL 0.4 MG/ML
0.4 VIAL (ML) INJECTION
Status: DISCONTINUED | OUTPATIENT
Start: 2024-05-30 | End: 2024-06-02 | Stop reason: HOSPADM

## 2024-05-30 RX ORDER — ONDANSETRON 2 MG/ML
4 INJECTION INTRAMUSCULAR; INTRAVENOUS ONCE
Status: COMPLETED | OUTPATIENT
Start: 2024-05-30 | End: 2024-05-30

## 2024-05-30 RX ORDER — ACETAMINOPHEN 160 MG/5ML
650 SOLUTION ORAL EVERY 4 HOURS PRN
Status: DISCONTINUED | OUTPATIENT
Start: 2024-05-30 | End: 2024-05-31

## 2024-05-30 RX ORDER — ONDANSETRON 4 MG/1
4 TABLET, ORALLY DISINTEGRATING ORAL EVERY 6 HOURS PRN
Status: DISCONTINUED | OUTPATIENT
Start: 2024-05-30 | End: 2024-06-02 | Stop reason: HOSPADM

## 2024-05-30 RX ORDER — ACETAMINOPHEN 650 MG/1
650 SUPPOSITORY RECTAL EVERY 4 HOURS PRN
Status: DISCONTINUED | OUTPATIENT
Start: 2024-05-30 | End: 2024-05-31

## 2024-05-30 RX ADMIN — MORPHINE SULFATE 4 MG: 4 INJECTION, SOLUTION INTRAMUSCULAR; INTRAVENOUS at 15:37

## 2024-05-30 RX ADMIN — HYDROMORPHONE HYDROCHLORIDE 1 MG: 1 INJECTION, SOLUTION INTRAMUSCULAR; INTRAVENOUS; SUBCUTANEOUS at 20:47

## 2024-05-30 RX ADMIN — ONDANSETRON 4 MG: 2 INJECTION INTRAMUSCULAR; INTRAVENOUS at 15:36

## 2024-05-30 RX ADMIN — MORPHINE SULFATE 4 MG: 4 INJECTION, SOLUTION INTRAMUSCULAR; INTRAVENOUS at 17:25

## 2024-05-30 RX ADMIN — SODIUM CHLORIDE 1000 ML: 9 INJECTION, SOLUTION INTRAVENOUS at 17:40

## 2024-05-30 RX ADMIN — SODIUM CHLORIDE 500 ML: 9 INJECTION, SOLUTION INTRAVENOUS at 16:48

## 2024-05-30 RX ADMIN — POTASSIUM CHLORIDE 40 MEQ: 1500 TABLET, EXTENDED RELEASE ORAL at 20:47

## 2024-05-30 RX ADMIN — Medication 150 MEQ: at 21:07

## 2024-05-30 NOTE — ED PROVIDER NOTES
ED Course as of 05/30/24 2123   u May 30, 2024   1546 Patient was seen morphine in the past, do feel patient tolerates. [CR]   1613 Lipase(!): 271  Lipase noted be minimally elevated for this patient, this is 3 times the upper limit of normal. [CR]   1700 I received signout on this patient from Dr. Chan.  Disposition pending labs, CT abdomen pelvis and clinical re-evaluation.  Please see note above for complete details.   [JS]   2020 I reviewed the labs listed above. Notable abnormalities are highlighted below.    Old laboratory data was reviewed from the medical records and compared to today's results.   [JS]   2020 CBC & Differential(!) [JS]   2020 WBC(!): 11.25 [JS]   2020 Lipase(!) [JS]   2020 Lipase(!): 271 [JS]   2020 Blood Gas, Venous With Co-Ox(!!) [JS]   2020 pH, Venous(!!): 7.004 [JS]   2020 pCO2, Venous(!): 22.3 [JS]   2020 HCO3, Venous(!): 5.5 [JS]   2020 Creatinine(!): 2.03 [JS]   2020 CO2(!!): 4.5 [JS]   2020 proBNP(!): 982.4 [JS]   2020 HS Troponin T(!): 20 [JS]   2026 I have independently reviewed and interpreted the imaging listed above.  My interpretations are listed below:    -Chest x-ray negative for pneumonia.    -CT abdomen pelvis negative for small bowel obstruction.    Per radiology report negative for any signs of pancreatitis. [JS]   2033 Case discussed with Dr. Fernandez (nephrology).  He recommended starting patient on bicarb drip at 100 mL/h as well as oral potassium repletion already mill equivalents.  Will see patient as consult. [JS]   2121 On re-evaluation, patient resting comfortably.  Vital signs remained stable on room air.  Patient will require medical admission for further workup and management.  I discussed the findings of the ED workup with the patient including my recommendation for admission.  Patient agreeable with plan and disposition.    Case discussed with Dr. Ballesteros (hospitalist) who agrees to evaluate and admit the patient.  We discussed the HPI, pertinent PMHx,  ED course and workup. [JS]      ED Course User Index  [CR] Juan Luis Chan DO  [JS] Darron Austin DO          [facility] EMERGENCY DEPARTMENT  Emergency Department Encounter  Emergency Medicine Physician Note     Pt Name:Tasha Yarbrough  MRN: [id number]  Birthdate 1955  Date of evaluation: 5/30/2024  PCP:  Terrence Baldwin MD  Note Started: 1:46 PM EDT      CHIEF COMPLAINT       Chief Complaint   Patient presents with    Abdominal Pain    Nausea    Shortness of Breath     Pt states SOA, weakness. Pt states she can't hardly stand to walk due to soa. Pt is having Nausea and back pain. SOA for 3 days, getting worse.     Weakness - Generalized    Back Pain       HISTORY OF PRESENT ILLNESS  (Location/Symptom, Timing/Onset, Context/Setting, Quality, Duration, Modifying Factors, Severity.)      Tasha Yarbrough is a 69 y.o. female who presents with abdominal pain, nausea, dyspnea on exertion, generalized weakness and back pain.  Patient is well-known to this emergency department, describes the symptoms as her typical abdominal pain secondary to pancreatitis.  Patient describes the shortness of breath is relatively new, describes it more with dyspnea on exertion.  Patient denies any lightheadedness or dizziness.  Patient describes generalized back pain.    PAST MEDICAL / SURGICAL / SOCIAL / FAMILY HISTORY     Past Medical History:   Diagnosis Date    Hypertension     Impaired mobility     Injury of back     Pancreatitis      No additional pertinent       Past Surgical History:   Procedure Laterality Date    ABDOMINAL SURGERY      COLON SURGERY      COLONOSCOPY      ENDOSCOPY N/A 4/23/2024    Procedure: ESOPHAGOGASTRODUODENOSCOPY WITH BIOPSY;  Surgeon: Jairo Negro MD;  Location: [facility] ENDOSCOPY;  Service: Gastroenterology;  Laterality: N/A;    TUBAL ABDOMINAL LIGATION       No additional pertinent       Social History     Socioeconomic History    Marital status:    Tobacco Use    Smoking  status: Every Day     Current packs/day: 1.00     Types: Cigarettes    Smokeless tobacco: Never   Vaping Use    Vaping status: Never Used   Substance and Sexual Activity    Alcohol use: Never    Drug use: Never    Sexual activity: Defer       Family History   Problem Relation Age of Onset    Kidney disease Mother     Emphysema Mother     Heart disease Father     Lung cancer Sister     Heart disease Paternal Uncle        Allergies:  Rocephin [ceftriaxone], Ciprofloxacin, Codeine, and Pineapple    Home Medications:  Prior to Admission medications    Medication Sig Start Date End Date Taking? Authorizing Provider   glucosamine-chondroitin 500-400 MG capsule capsule Take 1 capsule by mouth 2 (Two) Times a Day With Meals. Indications: Joint Damage causing Pain and Loss of Function    Taz Keller MD   HYDROcodone-acetaminophen (NORCO) 7.5-325 MG per tablet Take 1 tablet by mouth. 4/30/24 5/30/24  Taz Keller MD   methocarbamol (ROBAXIN) 750 MG tablet Take 1 tablet by mouth 4 (Four) Times a Day As Needed for Muscle Spasms.    Taz Keller MD   ondansetron ODT (ZOFRAN-ODT) 4 MG disintegrating tablet Place 1 tablet on the tongue Every 8 (Eight) Hours As Needed for Nausea or Vomiting. 4/6/24   Shawn Good PA-C   pantoprazole (PROTONIX) 40 MG EC tablet Take 1 tablet by mouth 2 (Two) Times a Day Before Meals for 60 days. 4/24/24 6/23/24  Mateo Storm DO   promethazine (PHENERGAN) 25 MG tablet Take 1 tablet by mouth. 4/30/24 10/27/24  Taz Keller MD   sertraline (ZOLOFT) 50 MG tablet Take 1 tablet by mouth Daily. 5/1/24 10/28/24  Taz Keller MD   sodium bicarbonate 650 MG tablet Take 1 tablet by mouth 2 (Two) Times a Day.    Taz Keller MD   vitamin D3 125 MCG (5000 UT) capsule capsule Take 1 capsule by mouth Daily. Indications: Vitamin D Deficiency    Taz Keller MD   metoprolol succinate XL (TOPROL-XL) 50 MG 24 hr tablet Take 50 mg by mouth  "Daily.  Patient not taking: Reported on 7/27/2022  8/3/22  Provider, Taz, MD         REVIEW OF SYSTEMS       Review of Systems   Constitutional:  Negative for diaphoresis and fever.   Respiratory:  Positive for shortness of breath. Negative for chest tightness.    Cardiovascular:  Negative for chest pain.   Gastrointestinal:  Positive for abdominal pain and nausea. Negative for vomiting.   Endocrine: Negative for polyuria.   Genitourinary:  Negative for difficulty urinating and frequency.   Musculoskeletal:  Positive for back pain.       PHYSICAL EXAM      INITIAL VITALS:   /84 (BP Location: Left arm, Patient Position: Sitting)   Pulse 90   Temp 97.2 °F (36.2 °C) (Oral)   Resp 18   Ht 157.5 cm (62\")   Wt 60.3 kg (133 lb)   SpO2 100%   BMI 24.33 kg/m²     Physical Exam  Constitutional:       Appearance: Normal appearance.   HENT:      Head: Normocephalic and atraumatic.      Mouth/Throat:      Mouth: Mucous membranes are moist.      Pharynx: Oropharynx is clear.   Cardiovascular:      Rate and Rhythm: Normal rate and regular rhythm.      Pulses: Normal pulses.   Pulmonary:      Effort: Pulmonary effort is normal.      Breath sounds: Normal breath sounds.   Abdominal:      General: Abdomen is flat. There is no distension.      Palpations: Abdomen is soft.      Tenderness: There is no abdominal tenderness in the epigastric area. There is no guarding.   Musculoskeletal:         General: Normal range of motion.   Skin:     General: Skin is warm and dry.   Neurological:      General: No focal deficit present.      Mental Status: She is alert and oriented to person, place, and time.   Psychiatric:         Mood and Affect: Mood normal.         Behavior: Behavior normal.           DDX/DIAGNOSTIC RESULTS / EMERGENCY DEPARTMENT COURSE / MDM     Differential Diagnosis included but not limited: Pancreatitis, Gastrosoft reflux disease, CHF, gastroenteritis, ACS, pneumonia    Diagnoses Considered but Do Not " Suspect: Low concern for mesenteric ischemia or pulmonary embolism.    Decision Rules/Scores utilized: N/A     Tests considered but not ordered and why:  N/A     MIPS: N/A     Code Status Discussion:  Not Discussed    Additional Patient Education Provided: None     Medical Decision Making    Medical Decision Making  Patient presenting with abdominal pain, patient here frequently for this abdominal pain.  Patient has a history of pancreatitis.  Plan to obtain lipase, CBC, CMP, also evaluate for ACS and cardiac cause patient describes shortness of breath and dyspnea on exertion.  Plan to obtain chest x-ray.  Patient has had multiple CT scans in the last month.  Plan to obtain repeat CT scan due to patient's complaints.  Do feel the benefits outweigh the risk.    Amount and/or Complexity of Data Reviewed  Labs: ordered. Decision-making details documented in ED Course.  Radiology: ordered.  ECG/medicine tests: ordered.    Risk  Prescription drug management.        See ED COURSE for additional MDM statements    EKG    EKG Interpretation    Evaluated and interpreted by emergency department physician    Rhythm: Normal Sinus Rhythm  Rate: Normal  Axis: Ghada  Ectopy: none  Conduction: Normal  ST Segments: Normal  T Waves: Nonspecific T wave flattening lateral leads  Q Waves: None    Clinical Impression: Normal Sinus Rhythm    Juan Luis Chan DO      All EKG's are interpreted by the Emergency Department Physician who either signs or Co-signs this chart in the absence of a cardiologist.    Additional Scores                   PROCEDURES:  None Performed   Critical Care    Performed by: Darron Austin DO  Authorized by: Darron Austin DO    Comments:      CRITICAL CARE PROCEDURE NOTE  Authorized and performed by: Dr. Austin  Total critical care time: Approximately 35 minutes.  Patient was critically ill due to: Severe metabolic acidosis  Interventions: IV bicarbonate, coordination of care with nephrology, close  airway/mental status/hemodynamic monitoring    Due to a high probability of clinically significant, life threatening deterioration, the patient required my highest level of preparedness to intervene emergently and I personally spent this critical care time directly and personally managing the patient.  This critical care time included obtaining a history; examining the patient; pulse oximetry; ordering and review of studies; arranging urgent treatment with development of a management plan; evaluation of patient's response to treatment; frequent reassessment; and, discussions with other providers.    This critical care time was performed to assess and manage the high probability of imminent, life-threatening deterioration that could result in multiorgan failure.  It was exclusive of separately billable procedures and treating other patients and teaching time.    Please see MDM section and the rest of the note for further information on patient assessment and interventions.        DATA FOR LAB AND RADIOLOGY TESTS ORDERED BELOW ARE REVIEWED BY THE ED CLINICIAN:    RADIOLOGY: All x-rays, CT, MRI, and formal ultrasound images (except ED bedside ultrasound) are read by the radiologist, see reports below, unless otherwise noted in MDM or here.  Reports below are reviewed by myself.  CT Abdomen Pelvis Without Contrast   Final Result   Hiatal hernia.  No acute disease, specifically no evidence of   pancreatitis or abscess.      Authenticated and Electronically Signed by Sergio Silverio M.D. on   05/30/2024 07:59:01 PM      XR Chest 1 View   Final Result   No significant interval change.                               Images were reviewed, interpreted, and dictated by Dr. Lucía Bethea MD   Transcribed by Kenia Aviles PA-C.       This report was signed and finalized on 5/30/2024 3:31 PM by Lucía Bethea MD.              LABS: Lab orders shown below, the results are reviewed by myself, and all abnormals are listed below.  Labs  Reviewed   COMPREHENSIVE METABOLIC PANEL - Abnormal; Notable for the following components:       Result Value    BUN 40 (*)     Creatinine 2.03 (*)     Chloride 114 (*)     CO2 4.5 (*)     Calcium 10.8 (*)     Alkaline Phosphatase 160 (*)     Anion Gap 19.5 (*)     eGFR 26.1 (*)     All other components within normal limits    Narrative:     GFR Normal >60  Chronic Kidney Disease <60  Kidney Failure <15     BNP (IN-HOUSE) - Abnormal; Notable for the following components:    proBNP 982.4 (*)     All other components within normal limits    Narrative:     This assay is used as an aid in the diagnosis of individuals suspected of having heart failure. It can be used as an aid in the diagnosis of acute decompensated heart failure (ADHF) in patients presenting with signs and symptoms of ADHF to the emergency department (ED). In addition, NT-proBNP of <300 pg/mL indicates ADHF is not likely.    Age Range Result Interpretation  NT-proBNP Concentration (pg/mL:      <50             Positive            >450                   Gray                 300-450                    Negative             <300    50-75           Positive            >900                  Gray                300-900                  Negative            <300      >75             Positive            >1800                  Gray                300-1800                  Negative            <300   SINGLE HS TROPONIN T - Abnormal; Notable for the following components:    HS Troponin T 20 (*)     All other components within normal limits    Narrative:     High Sensitive Troponin T Reference Range:  <14.0 ng/L- Negative Female for AMI  <22.0 ng/L- Negative Male for AMI  >=14 - Abnormal Female indicating possible myocardial injury.  >=22 - Abnormal Male indicating possible myocardial injury.   Clinicians would have to utilize clinical acumen, EKG, Troponin, and serial changes to determine if it is an Acute Myocardial Infarction or myocardial injury due to an  underlying chronic condition.        CBC WITH AUTO DIFFERENTIAL - Abnormal; Notable for the following components:    WBC 11.25 (*)     .7 (*)     MCH 35.8 (*)     RDW-SD 62.9 (*)     Neutrophil % 84.1 (*)     Lymphocyte % 9.3 (*)     Monocyte % 4.9 (*)     Eosinophil % 0.2 (*)     Immature Grans % 0.8 (*)     Neutrophils, Absolute 9.46 (*)     Immature Grans, Absolute 0.09 (*)     All other components within normal limits   LIPASE - Abnormal; Notable for the following components:    Lipase 271 (*)     All other components within normal limits   BLOOD GAS, VENOUS W/CO-OXIMETRY - Abnormal; Notable for the following components:    pH, Venous 7.004 (*)     pCO2, Venous 22.3 (*)     pO2, Venous 51.5 (*)     HCO3, Venous 5.5 (*)     Base Excess, Venous -24.3 (*)     O2 Saturation, Venous 85.9 (*)     Oxyhemoglobin Venous 84.1 (*)     All other components within normal limits   LACTIC ACID, PLASMA - Normal   SCAN SLIDE   BLOOD GAS, VENOUS   RAINBOW DRAW    Narrative:     The following orders were created for panel order [facility] Draw.  Procedure                               Abnormality         Status                     ---------                               -----------         ------                     Green Top (Gel)[[id number]]                                  In process                 Lavender Top[[id number]]                                     Final result               Gold Top - SST[[id number]]                                   Final result               Light Blue Top[[id number]]                                   Final result                 Please view results for these tests on the individual orders.   CBC AND DIFFERENTIAL    Narrative:     The following orders were created for panel order CBC & Differential.  Procedure                               Abnormality         Status                     ---------                               -----------         ------                     CBC Auto  Differential[[id number]]        Abnormal            Final result               Scan Slide[[id number]]                                       Final result                 Please view results for these tests on the individual orders.   LAVENDER TOP   GOLD TOP - SST   LIGHT BLUE TOP   GREEN TOP       Vitals Reviewed:    Vitals:    05/30/24 1852 05/30/24 1859 05/30/24 2030 05/30/24 2053   BP: 139/77 153/92 131/88 118/84   BP Location:    Left arm   Patient Position:    Sitting   Pulse: 94 90 93 90   Resp:    18   Temp:       TempSrc:       SpO2:    100%   Weight:       Height:           MEDICATIONS GIVEN TO PATIENT THIS ENCOUNTER:  Medications   sodium chloride 0.9 % flush 10 mL (has no administration in time range)   iopamidol (ISOVUE-300) 61 % injection 100 mL (has no administration in time range)   sodium bicarbonate 8.4 % 150 mEq in dextrose (D5W) 5 % 1,000 mL infusion (greater than 100 mEq) (150 mEq Intravenous New Bag 5/30/24 2107)   ondansetron (ZOFRAN) injection 4 mg (4 mg Intravenous Given 5/30/24 1536)   morphine injection 4 mg (4 mg Intravenous Given 5/30/24 1537)   sodium chloride 0.9 % bolus 500 mL (0 mL Intravenous Stopped 5/30/24 1741)   sodium chloride 0.9 % bolus 1,000 mL (1,000 mL Intravenous New Bag 5/30/24 1740)   morphine injection 4 mg (4 mg Intravenous Given 5/30/24 1725)   potassium chloride (KLOR-CON M20) CR tablet 40 mEq (40 mEq Oral Given 5/30/24 2047)   HYDROmorphone (DILAUDID) injection 1 mg (1 mg Intravenous Given 5/30/24 2047)       CONSULTS:  IP CONSULT TO NEPHROLOGY    CRITICAL CARE:  There was significant risk of life threatening deterioration of patient's condition requiring my direct management. Critical care time 0 minutes, excluding any documented procedures.    FINAL IMPRESSION      1. Metabolic acidosis    2. Chronic abdominal pain          DISPOSITION / PLAN     ED Disposition       ED Disposition   Decision to Admit    Condition   --    Comment   Level of Care: Telemetry [5]    Diagnosis: Metabolic acidosis [066644]   Admitting Physician: KATIE GREGORY [[id number]]   Attending Physician: DARRON AUSTIN [[id number]]   Certification: I Certify That Inpatient Hospital Services Are Medically Necessary For Greater Than 2 Midnights                 PATIENT REFERRED TO:  No follow-up provider specified.    DISCHARGE MEDICATIONS:     Medication List        ASK your doctor about these medications      glucosamine-chondroitin 500-400 MG capsule capsule     HYDROcodone-acetaminophen 7.5-325 MG per tablet  Commonly known as: NORCO     methocarbamol 750 MG tablet  Commonly known as: ROBAXIN     ondansetron ODT 4 MG disintegrating tablet  Commonly known as: ZOFRAN-ODT  Place 1 tablet on the tongue Every 8 (Eight) Hours As Needed for Nausea or Vomiting.     pantoprazole 40 MG EC tablet  Commonly known as: PROTONIX  Take 1 tablet by mouth 2 (Two) Times a Day Before Meals for 60 days.     promethazine 25 MG tablet  Commonly known as: PHENERGAN     sertraline 50 MG tablet  Commonly known as: ZOLOFT     sodium bicarbonate 650 MG tablet     vitamin D3 125 MCG (5000 UT) capsule capsule              Electronically signed by Juan Luis Chan DO, 05/30/24, 1:46 PM EDT.    Emergency Medicine Physician  [facility]  (Please note that portions of thisnote were completed with a voice recognition program.  Efforts were made to edit the dictations but occasionally words are mis-transcribed.)       Darron Austin DO  05/30/24 212

## 2024-05-31 LAB
A-A DO2: 18.6 MMHG
ALBUMIN SERPL-MCNC: 3.7 G/DL (ref 3.5–5.2)
ALBUMIN/GLOB SERPL: 1.5 G/DL
ALP SERPL-CCNC: 124 U/L (ref 39–117)
ALT SERPL W P-5'-P-CCNC: 7 U/L (ref 1–33)
ANION GAP SERPL CALCULATED.3IONS-SCNC: 12.8 MMOL/L (ref 5–15)
ANION GAP SERPL CALCULATED.3IONS-SCNC: 18.2 MMOL/L (ref 5–15)
APAP SERPL-MCNC: 10.4 MCG/ML (ref 0–30)
ARTERIAL PATENCY WRIST A: POSITIVE
AST SERPL-CCNC: 10 U/L (ref 1–32)
ATMOSPHERIC PRESS: 740 MMHG
BASE EXCESS BLDA CALC-SCNC: -14.9 MMOL/L (ref 0–2)
BDY SITE: ABNORMAL
BILIRUB SERPL-MCNC: 0.3 MG/DL (ref 0–1.2)
BUN SERPL-MCNC: 42 MG/DL (ref 8–23)
BUN SERPL-MCNC: 43 MG/DL (ref 8–23)
BUN/CREAT SERPL: 21.4 (ref 7–25)
BUN/CREAT SERPL: 22.3 (ref 7–25)
CALCIUM SPEC-SCNC: 10 MG/DL (ref 8.6–10.5)
CALCIUM SPEC-SCNC: 9.6 MG/DL (ref 8.6–10.5)
CHLORIDE SERPL-SCNC: 113 MMOL/L (ref 98–107)
CHLORIDE SERPL-SCNC: 116 MMOL/L (ref 98–107)
CO2 SERPL-SCNC: 10.2 MMOL/L (ref 22–29)
CO2 SERPL-SCNC: 6.8 MMOL/L (ref 22–29)
COHGB MFR BLD: 1.6 % (ref 0–2)
CREAT SERPL-MCNC: 1.93 MG/DL (ref 0.57–1)
CREAT SERPL-MCNC: 1.96 MG/DL (ref 0.57–1)
DEPRECATED RDW RBC AUTO: 64.9 FL (ref 37–54)
EGFRCR SERPLBLD CKD-EPI 2021: 27.3 ML/MIN/1.73
EGFRCR SERPLBLD CKD-EPI 2021: 27.8 ML/MIN/1.73
ERYTHROCYTE [DISTWIDTH] IN BLOOD BY AUTOMATED COUNT: 15.3 % (ref 12.3–15.4)
GEN 5 2HR TROPONIN T REFLEX: 20 NG/L
GLOBULIN UR ELPH-MCNC: 2.5 GM/DL
GLUCOSE BLDC GLUCOMTR-MCNC: 73 MG/DL (ref 70–130)
GLUCOSE SERPL-MCNC: 78 MG/DL (ref 65–99)
GLUCOSE SERPL-MCNC: 85 MG/DL (ref 65–99)
HCO3 BLDA-SCNC: 11.2 MMOL/L (ref 22–28)
HCT VFR BLD AUTO: 37.2 % (ref 34–46.6)
HCT VFR BLD CALC: 33.3 %
HGB BLD-MCNC: 11.9 G/DL (ref 12–15.9)
INHALED O2 CONCENTRATION: 21 %
Lab: ABNORMAL
MCH RBC QN AUTO: 36.1 PG (ref 26.6–33)
MCHC RBC AUTO-ENTMCNC: 32 G/DL (ref 31.5–35.7)
MCV RBC AUTO: 112.7 FL (ref 79–97)
METHGB BLD QL: 0.5 % (ref 0–1.5)
MODALITY: ABNORMAL
NOTIFIED BY: ABNORMAL
NOTIFIED WHO: ABNORMAL
OXYHGB MFR BLDV: 96.6 % (ref 94–99)
PCO2 BLDA: 26.8 MM HG (ref 35–45)
PCO2 TEMP ADJ BLD: ABNORMAL MM[HG]
PH BLDA: 7.23 PH UNITS (ref 7.35–7.45)
PH, TEMP CORRECTED: ABNORMAL
PLATELET # BLD AUTO: 208 10*3/MM3 (ref 140–450)
PMV BLD AUTO: 10.2 FL (ref 6–12)
PO2 BLDA: 96.6 MM HG (ref 75–100)
PO2 TEMP ADJ BLD: ABNORMAL MM[HG]
POTASSIUM SERPL-SCNC: 3.8 MMOL/L (ref 3.5–5.2)
POTASSIUM SERPL-SCNC: 4.9 MMOL/L (ref 3.5–5.2)
PROT SERPL-MCNC: 6.2 G/DL (ref 6–8.5)
RBC # BLD AUTO: 3.3 10*6/MM3 (ref 3.77–5.28)
SALICYLATES SERPL-MCNC: <0.3 MG/DL
SAO2 % BLDCOA: 98.7 % (ref 94–100)
SODIUM SERPL-SCNC: 136 MMOL/L (ref 136–145)
SODIUM SERPL-SCNC: 141 MMOL/L (ref 136–145)
TROPONIN T DELTA: -1 NG/L
TROPONIN T SERPL HS-MCNC: 21 NG/L
VENTILATOR MODE: ABNORMAL
WBC NRBC COR # BLD AUTO: 9.19 10*3/MM3 (ref 3.4–10.8)

## 2024-05-31 PROCEDURE — 99232 SBSQ HOSP IP/OBS MODERATE 35: CPT | Performed by: STUDENT IN AN ORGANIZED HEALTH CARE EDUCATION/TRAINING PROGRAM

## 2024-05-31 PROCEDURE — 80179 DRUG ASSAY SALICYLATE: CPT | Performed by: STUDENT IN AN ORGANIZED HEALTH CARE EDUCATION/TRAINING PROGRAM

## 2024-05-31 PROCEDURE — 36600 WITHDRAWAL OF ARTERIAL BLOOD: CPT

## 2024-05-31 PROCEDURE — 93005 ELECTROCARDIOGRAM TRACING: CPT | Performed by: STUDENT IN AN ORGANIZED HEALTH CARE EDUCATION/TRAINING PROGRAM

## 2024-05-31 PROCEDURE — 80053 COMPREHEN METABOLIC PANEL: CPT | Performed by: FAMILY MEDICINE

## 2024-05-31 PROCEDURE — 82948 REAGENT STRIP/BLOOD GLUCOSE: CPT

## 2024-05-31 PROCEDURE — 82805 BLOOD GASES W/O2 SATURATION: CPT

## 2024-05-31 PROCEDURE — 97161 PT EVAL LOW COMPLEX 20 MIN: CPT

## 2024-05-31 PROCEDURE — 83050 HGB METHEMOGLOBIN QUAN: CPT

## 2024-05-31 PROCEDURE — 85027 COMPLETE CBC AUTOMATED: CPT | Performed by: FAMILY MEDICINE

## 2024-05-31 PROCEDURE — 84484 ASSAY OF TROPONIN QUANT: CPT | Performed by: STUDENT IN AN ORGANIZED HEALTH CARE EDUCATION/TRAINING PROGRAM

## 2024-05-31 PROCEDURE — 25010000002 ONDANSETRON PER 1 MG: Performed by: FAMILY MEDICINE

## 2024-05-31 PROCEDURE — 82375 ASSAY CARBOXYHB QUANT: CPT

## 2024-05-31 PROCEDURE — 36415 COLL VENOUS BLD VENIPUNCTURE: CPT

## 2024-05-31 PROCEDURE — 25010000002 MORPHINE PER 10 MG: Performed by: FAMILY MEDICINE

## 2024-05-31 PROCEDURE — 0 DEXTROSE 5 % SOLUTION 1,000 ML FLEX CONT: Performed by: FAMILY MEDICINE

## 2024-05-31 PROCEDURE — 80143 DRUG ASSAY ACETAMINOPHEN: CPT | Performed by: STUDENT IN AN ORGANIZED HEALTH CARE EDUCATION/TRAINING PROGRAM

## 2024-05-31 RX ORDER — POTASSIUM CHLORIDE 750 MG/1
40 CAPSULE, EXTENDED RELEASE ORAL ONCE
Status: COMPLETED | OUTPATIENT
Start: 2024-05-31 | End: 2024-05-31

## 2024-05-31 RX ORDER — SODIUM BICARBONATE 650 MG/1
1300 TABLET ORAL 4 TIMES DAILY
Status: DISCONTINUED | OUTPATIENT
Start: 2024-05-31 | End: 2024-06-02

## 2024-05-31 RX ORDER — SODIUM CHLORIDE 9 MG/ML
100 INJECTION, SOLUTION INTRAVENOUS CONTINUOUS
Status: ACTIVE | OUTPATIENT
Start: 2024-05-31 | End: 2024-06-01

## 2024-05-31 RX ADMIN — Medication 10 ML: at 20:23

## 2024-05-31 RX ADMIN — ONDANSETRON 4 MG: 2 INJECTION INTRAMUSCULAR; INTRAVENOUS at 09:55

## 2024-05-31 RX ADMIN — SODIUM BICARBONATE 650 MG TABLET 1300 MG: at 18:19

## 2024-05-31 RX ADMIN — SODIUM BICARBONATE 650 MG TABLET 1300 MG: at 20:23

## 2024-05-31 RX ADMIN — MORPHINE SULFATE 3 MG: 4 INJECTION, SOLUTION INTRAMUSCULAR; INTRAVENOUS at 01:18

## 2024-05-31 RX ADMIN — ACETAMINOPHEN 650 MG: 325 TABLET, FILM COATED ORAL at 08:04

## 2024-05-31 RX ADMIN — MORPHINE SULFATE 3 MG: 4 INJECTION, SOLUTION INTRAMUSCULAR; INTRAVENOUS at 06:07

## 2024-05-31 RX ADMIN — POTASSIUM CHLORIDE 40 MEQ: 750 CAPSULE, EXTENDED RELEASE ORAL at 08:36

## 2024-05-31 RX ADMIN — Medication 150 MEQ: at 09:18

## 2024-05-31 RX ADMIN — SODIUM BICARBONATE 650 MG TABLET 1300 MG: at 09:07

## 2024-05-31 RX ADMIN — Medication 10 ML: at 00:11

## 2024-05-31 RX ADMIN — MORPHINE SULFATE 3 MG: 4 INJECTION, SOLUTION INTRAMUSCULAR; INTRAVENOUS at 09:57

## 2024-05-31 RX ADMIN — Medication 10 ML: at 08:38

## 2024-05-31 RX ADMIN — MORPHINE SULFATE 3 MG: 4 INJECTION, SOLUTION INTRAMUSCULAR; INTRAVENOUS at 14:07

## 2024-05-31 RX ADMIN — MORPHINE SULFATE 3 MG: 4 INJECTION, SOLUTION INTRAMUSCULAR; INTRAVENOUS at 20:23

## 2024-05-31 RX ADMIN — SERTRALINE HYDROCHLORIDE 50 MG: 50 TABLET ORAL at 08:36

## 2024-05-31 NOTE — ED NOTES
Called nephrology at this time, states she will get it over to Dr. Fernandez and will give us a call back.

## 2024-05-31 NOTE — H&P
[facility]   HISTORY AND PHYSICAL      Name:  Tasha Yarbrough   Age:  69 y.o.  Sex:  female  :  1955  MRN:  [id number]   Visit Number:  [id number]  Admission Date:  2024  Date Of Service:  24  Primary Care Physician:  Terrence Baldwin MD    Chief Complaint:     Weakness, nausea, abdominal pain, diarrhea    History Of Presenting Illness:      69-year-old female with a history of recurrent pancreatitis, hypertension, chronic tobacco abuse, chronic pain, C. difficile, has had multiple hospitalizations in the last few months with recurrent pancreatitis.  She had presented due to ongoing abdominal pain, back pain, has had intermittent diarrhea and loose stools at home.  She has felt very weak, had shortness of breath over the last few days.  She has been taking some pain medication but has not been helping much for her pancreas.  She unfortunately continues to smoke.  She did follow-up with GI last week who had recommended outpatient  referral to  for EUS.    In the ER, she was hemodynamically stable.  She had evidence of acute kidney injury, severe metabolic acidosis, and elevated lipase.  CT imaging of the abdomen did not reveal any acute pancreatitis.  Patient's case was discussed by ER provider with nephrology who recommended sodium bicarb drip and we were asked to admit for    Review Of Systems:    All systems were reviewed and negative except as mentioned in history of presenting illness, assessment and plan.    Past Medical History: Patient  has a past medical history of Hypertension, Impaired mobility, Injury of back, and Pancreatitis.    Past Surgical History: Patient  has a past surgical history that includes Tubal ligation; Colonoscopy; Colon surgery; Abdominal surgery; and Esophagogastroduodenoscopy (N/A, 2024).    Social History: Patient  reports that she has been smoking cigarettes. She has never used smokeless tobacco. She reports that she does not drink  alcohol and does not use drugs.    Family History:  Patient's family history has been reviewed and found to be noncontributory.     Allergies:      Rocephin [ceftriaxone], Ciprofloxacin, Codeine, and Pineapple    Home Medications:    Prior to Admission Medications       Prescriptions Last Dose Informant Patient Reported? Taking?    glucosamine-chondroitin 500-400 MG capsule capsule   Yes No    Take 1 capsule by mouth 2 (Two) Times a Day With Meals. Indications: Joint Damage causing Pain and Loss of Function    HYDROcodone-acetaminophen (NORCO) 7.5-325 MG per tablet   Yes No    Take 1 tablet by mouth.    methocarbamol (ROBAXIN) 750 MG tablet   Yes No    Take 1 tablet by mouth 4 (Four) Times a Day As Needed for Muscle Spasms.    ondansetron ODT (ZOFRAN-ODT) 4 MG disintegrating tablet   No No    Place 1 tablet on the tongue Every 8 (Eight) Hours As Needed for Nausea or Vomiting.    pantoprazole (PROTONIX) 40 MG EC tablet   No No    Take 1 tablet by mouth 2 (Two) Times a Day Before Meals for 60 days.    promethazine (PHENERGAN) 25 MG tablet   Yes No    Take 1 tablet by mouth.    sertraline (ZOLOFT) 50 MG tablet   Yes No    Take 1 tablet by mouth Daily.    sodium bicarbonate 650 MG tablet   Yes No    Take 1 tablet by mouth 2 (Two) Times a Day.    vitamin D3 125 MCG (5000 UT) capsule capsule   Yes No    Take 1 capsule by mouth Daily. Indications: Vitamin D Deficiency          ED Medications:    Medications   sodium chloride 0.9 % flush 10 mL (has no administration in time range)   iopamidol (ISOVUE-300) 61 % injection 100 mL (has no administration in time range)   sodium bicarbonate 8.4 % 150 mEq in dextrose (D5W) 5 % 1,000 mL infusion (greater than 100 mEq) (150 mEq Intravenous New Bag 5/30/24 2107)   ondansetron (ZOFRAN) injection 4 mg (4 mg Intravenous Given 5/30/24 1536)   morphine injection 4 mg (4 mg Intravenous Given 5/30/24 1537)   sodium chloride 0.9 % bolus 500 mL (0 mL Intravenous Stopped 5/30/24 1741)   sodium  "chloride 0.9 % bolus 1,000 mL (0 mL Intravenous Stopped 5/30/24 2152)   morphine injection 4 mg (4 mg Intravenous Given 5/30/24 1725)   potassium chloride (KLOR-CON M20) CR tablet 40 mEq (40 mEq Oral Given 5/30/24 2047)   HYDROmorphone (DILAUDID) injection 1 mg (1 mg Intravenous Given 5/30/24 2047)     Vital Signs:  Temp:  [97.2 °F (36.2 °C)] 97.2 °F (36.2 °C)  Heart Rate:  [] 93  Resp:  [18] 18  BP: (118-153)/(77-98) 129/86        05/30/24  1335   Weight: 60.3 kg (133 lb)     Body mass index is 24.33 kg/m².    Physical Exam:     Most recent vital Signs: /86 (BP Location: Left arm, Patient Position: Sitting)   Pulse 93   Temp 97.2 °F (36.2 °C) (Oral)   Resp 18   Ht 157.5 cm (62\")   Wt 60.3 kg (133 lb)   SpO2 92%   BMI 24.33 kg/m²     Physical Exam  Constitutional:       General: She is not in acute distress.     Appearance: She is normal weight. She is not toxic-appearing.   HENT:      Mouth/Throat:      Mouth: Mucous membranes are dry.      Pharynx: Oropharynx is clear.   Cardiovascular:      Rate and Rhythm: Normal rate and regular rhythm.      Pulses: Normal pulses.      Heart sounds: No murmur heard.     No gallop.   Pulmonary:      Effort: Pulmonary effort is normal. No respiratory distress.      Breath sounds: No wheezing or rales.   Abdominal:      Tenderness: There is abdominal tenderness. There is no guarding or rebound.   Musculoskeletal:         General: Normal range of motion.      Right lower leg: No edema.      Left lower leg: No edema.   Skin:     General: Skin is warm.      Capillary Refill: Capillary refill takes less than 2 seconds.      Findings: No lesion.   Neurological:      General: No focal deficit present.      Mental Status: She is alert.      Motor: Weakness present.   Psychiatric:         Mood and Affect: Mood normal.         Thought Content: Thought content normal.         Laboratory data:    I have reviewed the labs done in the emergency room.    Results from last 7 " "days   Lab Units 05/30/24  1536   SODIUM mmol/L 138   POTASSIUM mmol/L 4.7   CHLORIDE mmol/L 114*   CO2 mmol/L 4.5*   BUN mg/dL 40*   CREATININE mg/dL 2.03*   CALCIUM mg/dL 10.8*   BILIRUBIN mg/dL 0.2   ALK PHOS U/L 160*   ALT (SGPT) U/L 10   AST (SGOT) U/L 12   GLUCOSE mg/dL 83     Results from last 7 days   Lab Units 05/30/24  1536   WBC 10*3/mm3 11.25*   HEMOGLOBIN g/dL 13.6   HEMATOCRIT % 41.7   PLATELETS 10*3/mm3 352         Results from last 7 days   Lab Units 05/30/24  1536   HSTROP T ng/L 20*     Results from last 7 days   Lab Units 05/30/24  1536   PROBNP pg/mL 982.4*         Results from last 7 days   Lab Units 05/30/24  1537   LIPASE U/L 271*               Invalid input(s): \"USDES\", \"NITRITITE\", \"BACT\", \"EP\"    Pain Management Panel          Latest Ref Rng & Units 8/6/2022   Pain Management Panel   Amphetamine, Urine Qual Negative Negative    Barbiturates Screen, Urine Negative Negative    Benzodiazepine Screen, Urine Negative Negative    Buprenorphine, Screen, Urine Negative Negative    Cocaine Screen, Urine Negative Negative    Methadone Screen , Urine Negative Negative    Methamphetamine, Ur Negative Negative        EKG:          Radiology:    CT Abdomen Pelvis Without Contrast    Result Date: 5/30/2024  FINAL REPORT TECHNIQUE: Routine axial images through the abdomen and pelvis were obtained. CLINICAL HISTORY: Eval for peripancreatic abscess COMPARISON: 5/11/2024, 4/28/2024 FINDINGS: Abdomen:  The gallbladder has been removed.  There are bilateral renal cysts and 2 stable small nonobstructing right renal calculi.  The pancreas is now normal with no peripancreatic inflammation or abscess.  The solid abdominal organs and ureters are otherwise unremarkable.  There is a moderate to large hiatal hernia of the stomach.  There are postoperative changes of the rectum.  The GI tract is otherwise unremarkable with no sign of appendicitis.  Pelvis: The urinary bladder is normal. There is no pelvic or abdominal " ascites, adenopathy or acute osseous abnormality.  There are degenerative changes and scoliosis of the lumbar spine.     Hiatal hernia.  No acute disease, specifically no evidence of pancreatitis or abscess. Authenticated and Electronically Signed by Sergio Silverio M.D. on 05/30/2024 07:59:01 PM    XR Chest 1 View    Result Date: 5/30/2024  PROCEDURE: XR CHEST 1 VW-    HISTORY: SOA Triage Protocol  COMPARISON: April 6, 2024.  FINDINGS: The heart is normal in size. The mediastinum is unremarkable. There is no acute infiltrate. There is emphysematous change. There is no pneumothorax. There are no acute osseous abnormalities. Findings appear similar to the prior exam.      No significant interval change.        Images were reviewed, interpreted, and dictated by Dr. Lucía Bethea MD Transcribed by Kenia Aviles PA-C.  This report was signed and finalized on 5/30/2024 3:31 PM by Lucía Bethea MD.       Assessment:    Dehydration  Acute kidney injury  Metabolic acidosis  Acute on chronic pancreatitis, idiopathic  Ongoing tobacco use  Impaired mobility and ADLs    Plan:    Admit as inpatient    Dehydration/nausea/vomiting/diarrhea:  Likely secondary to ongoing pancreatitis issues.  She is significantly volume depleted currently.  Will place on sodium bicarb and drip per GI recommendations.  Will monitor urine output.  Will send C. difficile and GI panel if able due to history of C. difficile colitis.    Chronic pancreatitis:  Has been worked up previously, initially thought may be gallstone pancreatitis last fall but underwent ERCP and treatment at that time but has had multiple recurrent episodes since.  She has chronic abdominal pain secondary to this now.  Dr. Fleming has recommended multiple times patient go to  for GI evaluation and EUS  and MRI, patient has failed to do so.  She also continues to smoke unfortunately.    Further recommendations depend upon clinical course.    Risk Assessment: High  DVT Prophylaxis:  SCDs  Code Status: Full   Diet: Clear liquid    Advance Care Planning   ACP discussion was held with the patient during this visit. Patient does not have an advance directive, declines further assistance.           Lacey Ballesteros DO  05/30/24  23:21 EDT    Dictated utilizing Dragon dictation.

## 2024-05-31 NOTE — PLAN OF CARE
Goal Outcome Evaluation:  Plan of Care Reviewed With: patient        Progress: no change  Outcome Evaluation: Patient participated well in PT evaluation and demonstrated decreased strength, balance and overall mobility.  She required minimal assistance to perform bed mobility, transfers and gait x 60 feet with RW.  She reports her legs are tired after walking.  She is expected to benefit from additional PT services while hospitalized and upon discharge to home with home health care.      Anticipated Discharge Disposition (PT): home with home health, home with assist

## 2024-05-31 NOTE — CONSULTS
[facility]      Nephrology Consultation      Referring Provider:   Kerley, Brian Joseph, DO    Reason for Consultation:  Acute kidney injury associated problems.      Subjective:  Chief complaint   Chief Complaint   Patient presents with    Abdominal Pain    Nausea    Shortness of Breath     Pt states SOA, weakness. Pt states she can't hardly stand to walk due to soa. Pt is having Nausea and back pain. SOA for 3 days, getting worse.     Weakness - Generalized    Back Pain     History of present illness:    Patient is 69-year-old female with multiple medical problems including recurrent episodes of acute kidney injury and severe metabolic acidosis, she has long history of recurrent pancreatitis, hypertension, tobacco use and abuse, chronic pain, C. difficile colitis, she presented to the emergency room after she has been feeling very weak, she said she has not been able to go to the bathroom has been using bedside commode and still was having very frequent loose bowel movements, she also mentioned that she stopped eating about 3 days ago and still continues to have loose bowel movements.  She follows up with GI for her chronic pancreatitis and recently has been referred to  GI for EUS.  Currently patient is laying in the bed still feels weak and tired, denies any chest pain or shortness of breath still complains of abdominal pain, denies any swelling.  Last night when the ER physician called me and we started her on bicarb drip.  Currently I do not see anything running, talk to the nursing staff they said they are waiting for pharmacy to send and will be restarted.  I have reviewed labs/imaging/records from this hospitalization, including ER staff and admitting/attending physicians H/P's and progress notes to establish a comprehensive understanding of this patient's clinical hospital course, as well as to establish plan of care appropriately.     Past Medical History:   Diagnosis Date     Hypertension     Impaired mobility     Injury of back     Pancreatitis        Past Surgical History:   Procedure Laterality Date    ABDOMINAL SURGERY      COLON SURGERY      COLONOSCOPY      ENDOSCOPY N/A 4/23/2024    Procedure: ESOPHAGOGASTRODUODENOSCOPY WITH BIOPSY;  Surgeon: Jairo Negro MD;  Location: [facility] ENDOSCOPY;  Service: Gastroenterology;  Laterality: N/A;    TUBAL ABDOMINAL LIGATION       Family History   Problem Relation Age of Onset    Kidney disease Mother     Emphysema Mother     Heart disease Father     Lung cancer Sister     Heart disease Paternal Uncle      negative h/o ESRD     Social History     Tobacco Use    Smoking status: Every Day     Current packs/day: 1.00     Types: Cigarettes    Smokeless tobacco: Never   Vaping Use    Vaping status: Never Used   Substance Use Topics    Alcohol use: Never    Drug use: Never     Home medications:   Prior to Admission Medications       Prescriptions Last Dose Informant Patient Reported? Taking?    glucosamine-chondroitin 500-400 MG capsule capsule   Yes No    Take 1 capsule by mouth 2 (Two) Times a Day With Meals. Indications: Joint Damage causing Pain and Loss of Function    HYDROcodone-acetaminophen (NORCO) 7.5-325 MG per tablet   Yes No    Take 1 tablet by mouth.    methocarbamol (ROBAXIN) 750 MG tablet   Yes No    Take 1 tablet by mouth 4 (Four) Times a Day As Needed for Muscle Spasms.    ondansetron ODT (ZOFRAN-ODT) 4 MG disintegrating tablet   No No    Place 1 tablet on the tongue Every 8 (Eight) Hours As Needed for Nausea or Vomiting.    pantoprazole (PROTONIX) 40 MG EC tablet   No No    Take 1 tablet by mouth 2 (Two) Times a Day Before Meals for 60 days.    promethazine (PHENERGAN) 25 MG tablet   Yes No    Take 1 tablet by mouth.    sertraline (ZOLOFT) 50 MG tablet   Yes No    Take 1 tablet by mouth Daily.    sodium bicarbonate 650 MG tablet   Yes No    Take 1 tablet by mouth 2 (Two) Times a Day.    vitamin D3 125 MCG (5000 UT) capsule capsule    Yes No    Take 1 capsule by mouth Daily. Indications: Vitamin D Deficiency          Emergency department medications:   Medications   sodium chloride 0.9 % flush 10 mL (has no administration in time range)   iopamidol (ISOVUE-300) 61 % injection 100 mL (has no administration in time range)   sodium bicarbonate 8.4 % 150 mEq in dextrose (D5W) 5 % 1,000 mL infusion (greater than 100 mEq) (0 mEq Intravenous Stopped 5/31/24 0636)   sertraline (ZOLOFT) tablet 50 mg (has no administration in time range)   sodium chloride 0.9 % flush 10 mL (10 mL Intravenous Given 5/31/24 0011)   sodium chloride 0.9 % flush 10 mL (has no administration in time range)   sodium chloride 0.9 % infusion 40 mL (has no administration in time range)   nitroglycerin (NITROSTAT) SL tablet 0.4 mg (has no administration in time range)   acetaminophen (TYLENOL) tablet 650 mg (650 mg Oral Given 5/31/24 0804)     Or   acetaminophen (TYLENOL) 160 MG/5ML oral solution 650 mg ( Oral Not Given:  See Alt 5/31/24 0804)     Or   acetaminophen (TYLENOL) suppository 650 mg ( Rectal Not Given:  See Alt 5/31/24 0804)   morphine injection 3 mg (3 mg Intravenous Given 5/31/24 0607)     And   naloxone (NARCAN) injection 0.4 mg (has no administration in time range)   ondansetron ODT (ZOFRAN-ODT) disintegrating tablet 4 mg (has no administration in time range)     Or   ondansetron (ZOFRAN) injection 4 mg (has no administration in time range)   ondansetron (ZOFRAN) injection 4 mg (4 mg Intravenous Given 5/30/24 1536)   morphine injection 4 mg (4 mg Intravenous Given 5/30/24 1537)   sodium chloride 0.9 % bolus 500 mL (0 mL Intravenous Stopped 5/30/24 1741)   sodium chloride 0.9 % bolus 1,000 mL (0 mL Intravenous Stopped 5/30/24 2152)   morphine injection 4 mg (4 mg Intravenous Given 5/30/24 1725)   potassium chloride (KLOR-CON M20) CR tablet 40 mEq (40 mEq Oral Given 5/30/24 2047)   HYDROmorphone (DILAUDID) injection 1 mg (1 mg Intravenous Given 5/30/24 2047)  "      Allergies:  Rocephin [ceftriaxone], Ciprofloxacin, Codeine, and Pineapple    Review of Systems  14 point review of system were done and are negative except as mentioned in the history of present illness and assessment and plan.      Physical Exam:  Objective:  Vital Signs  BP (!) 88/64   Pulse 89   Temp 97.2 °F (36.2 °C) (Oral)   Resp 18   Ht 157.5 cm (62\")   Wt 60.3 kg (133 lb)   SpO2 98%   BMI 24.33 kg/m²   Objective    No intake/output data recorded.    Intake/Output Summary (Last 24 hours) at 5/31/2024 0826  Last data filed at 5/30/2024 1741  Gross per 24 hour   Intake 500 ml   Output --   Net 500 ml        Physical Exam     Constitutional: Awake, alert  Eyes: sclerae anicteric, no conjunctival injection  HEENT: mucous membranes dry  Neck: Supple, no thyromegaly, no lymphadenopathy, trachea midline, No JVD  Respiratory: Decreased breath sounds all over the chest, nonlabored respirations   Cardiovascular: RRR, systolic murmurs, no rubs, or gallops.  Gastrointestinal: Positive bowel sounds, obese, firm with nonspecific tenderness, nondistended  Musculoskeletal: No edema, no clubbing or cyanosis  Psychiatric: Appropriate affect, cooperative  Neurologic: Oriented x 3, moving all extremities, Cranial Nerves grossly intact, speech clear  Skin: warm and dry, no rashes            Results Review:   Results from last 7 days   Lab Units 05/31/24  0556 05/30/24  1536   SODIUM mmol/L 141 138   POTASSIUM mmol/L 3.8 4.7   CHLORIDE mmol/L 116* 114*   CO2 mmol/L 6.8* 4.5*   BUN mg/dL 43* 40*   CREATININE mg/dL 1.93* 2.03*   CALCIUM mg/dL 10.0 10.8*   ALBUMIN g/dL  --  4.6   BILIRUBIN mg/dL  --  0.2   ALK PHOS U/L  --  160*   ALT (SGPT) U/L  --  10   AST (SGOT) U/L  --  12   GLUCOSE mg/dL 78 83     Estimated Creatinine Clearance: 23.5 mL/min (A) (by C-G formula based on SCr of 1.93 mg/dL (H)).          Results from last 7 days   Lab Units 05/31/24  0556 05/30/24  1536   WBC 10*3/mm3 9.19 11.25*   HEMOGLOBIN g/dL " "11.9* 13.6   PLATELETS 10*3/mm3 208 352         Brief Urine Lab Results  (Last result in the past 365 days)        Color   Clarity   Blood   Leuk Est   Nitrite   Protein   CREAT   Urine HCG        05/11/24 1930 Yellow   Cloudy   Trace   Trace   Negative   100 mg/dL (2+)                 No results found for: \"UTPCR\"  Imaging Results (Last 24 Hours)       Procedure Component Value Units Date/Time    CT Abdomen Pelvis Without Contrast [[id number]] Collected: 05/30/24 1936     Updated: 05/30/24 1959    Narrative:      FINAL REPORT    TECHNIQUE:  Routine axial images through the abdomen and pelvis were  obtained.    CLINICAL HISTORY:  Eval for peripancreatic abscess    COMPARISON:  5/11/2024, 4/28/2024    FINDINGS:  Abdomen:  The gallbladder has been removed.  There are bilateral  renal cysts and 2 stable small nonobstructing right renal  calculi.  The pancreas is now normal with no peripancreatic  inflammation or abscess.  The solid abdominal organs and ureters  are otherwise unremarkable.  There is a moderate to large hiatal  hernia of the stomach.  There are postoperative changes of the  rectum.  The GI tract is otherwise unremarkable with no sign of  appendicitis.  Pelvis: The urinary bladder is normal. There is  no pelvic or abdominal ascites, adenopathy or acute osseous  abnormality.  There are degenerative changes and scoliosis of  the lumbar spine.      Impression:      Hiatal hernia.  No acute disease, specifically no evidence of  pancreatitis or abscess.    Authenticated and Electronically Signed by Sergio Silverio M.D. on  05/30/2024 07:59:01 PM    XR Chest 1 View [[id number]] Collected: 05/30/24 1524     Updated: 05/30/24 1533    Narrative:      PROCEDURE: XR CHEST 1 VW-        HISTORY: SOA Triage Protocol     COMPARISON: April 6, 2024.     FINDINGS: The heart is normal in size. The mediastinum is unremarkable.  There is no acute infiltrate. There is emphysematous change. There is no  pneumothorax. There are no acute " osseous abnormalities. Findings appear  similar to the prior exam.       Impression:      No significant interval change.                       Images were reviewed, interpreted, and dictated by Dr. Lucía Bethea MD  Transcribed by Kenia Aviles PA-C.     This report was signed and finalized on 5/30/2024 3:31 PM by Lucía Bethea MD.             iopamidol, 100 mL, Intravenous, Once in imaging  sertraline, 50 mg, Oral, Daily  sodium chloride, 10 mL, Intravenous, Q12H      sodium bicarbonate 8.4 % 150 mEq in dextrose (D5W) 5 % 1,000 mL infusion (greater than 100 mEq), 150 mEq, Last Rate: Stopped (05/31/24 0636)        Assessment/Plan:      Metabolic acidosis    Acute kidney injury: Patient appears to have significant worsening of her baseline chronic kidney disease, baseline GFR around 52 mL per minute.  It has been gradually worsening, most likely secondary to hemodynamic issues and low volume.  Check urine sodium and aggressively hydrate her.  Significant metabolic acidosis along with hyperkalemia noted which has been improving.   Metabolic acidosis: Since started the bicarb serum bicarb is slightly better, she has borderline potassium she was given 1 dose yesterday and it still did not drop will give more potassium supplements as needed.  Chronic kidney disease stage IIIa: Patient is not aware of having abnormal renal function but has been noted in the previous blood work.  She was supposed to follow-up with me in the office but has never made it.  Acute pancreatitis: Likely will need significant amount of fluid she is already getting bicarb at 150 cc an hour we will go ahead and add half-normal saline with 40 of potassium at 50 cc an hour to make it 200 mL/h for the next 24 hours.  She has been able to tolerate some clear liquids, still has persistent abdominal pain.  Chronic pancreatitis: Known to have chronic pancreatitis with pseudocyst.  Hypertension: Watch closely will consider starting medications if there  is persistent blood pressure greater than 140 systolic.      Risk and complexity: High       Plan:  Continue with D5 with 3 A of bicarb at 100 cc an hour, I will go ahead and start her on oral sodium bicarb as well 1300 mg 4 times a day.  Potassium is borderline will give 1 dose of 40 mEq p.o. and continue to supplement as needed.  As her hydration improves likely her renal function will improve with it.  Continue with rest of the current treatment plan and surveillance labs.  Details were discussed with the patient no family in the room.    Details were also discussed with the hospitalist service.   Further recommendations will depend on clinical course of the patient during the current hospitalization.    I also discussed the details with the nursing staff.  Rest as ordered.    In closing, I sincerely appreciate opportunity to participate in care of this patient. If I can be of any further assistance with the management of this patient, please don’t hesitate to contact me.    Nito Fernandez MD, FASN    05/31/24  08:26 EDT    Dictated using Dragon.

## 2024-05-31 NOTE — OUTREACH NOTE
Medical Week 2 Survey      Flowsheet Row Responses   [facility] facility patient discharged from? [facility]   Does the patient have one of the following disease processes/diagnoses(primary or secondary)? Other   Revoke Readmitted            EDWIN VIEYRA - Registered Nurse

## 2024-05-31 NOTE — PAYOR COMM NOTE
"TO:BC  FROM:MILDRED DAUGHERTY, RN PHONE [phone]  CLINICALS REF# [id number]    Jaymie Yarbrough (69 y.o. Female)       Date of Birth   1955    Social Security Number       Address   [address] [id number]    Home Phone   [phone]    MRN   [id number]       Cheondoism   None    Marital Status                               Admission Date   24    Admission Type   Emergency    Admitting Provider   Lacey Ballesteros DO    Attending Provider   Kerley, Brian Joseph, DO    Department, Room/Bed   [facility] EMERGENCY DEPARTMENT,        Discharge Date       Discharge Disposition       Discharge Destination                                 Attending Provider: Kerley, Brian Joseph, DO    Allergies: Rocephin [Ceftriaxone], Ciprofloxacin, Codeine, Pineapple    Isolation: None   Infection: Other (24), C.difficile (rule out) (24)   Code Status: CPR    Ht: 157.5 cm (62\")   Wt: 60.3 kg (133 lb)    Admission Cmt: None   Principal Problem: Metabolic acidosis [E87.20]                   Active Insurance as of 2024       Primary Coverage       Payor Plan Insurance Group Employer/Plan Group    ANTHEM MEDICARE REPLACEMENT ANTHEM MEDICARE ADVANTAGE [id number]       Payor Plan Address Payor Plan Phone Number Payor Plan Fax Number Effective Dates    [address] [phone]  2024 - None Entered    [facility] [id number]         Subscriber Name Subscriber Birth Date Member ID       JAYMIE YARBROUGH 1955 [id number]                     Emergency Contacts        (Rel.) Home Phone Work Phone Mobile Phone    ANTONIO YARBROUGH (Son) [phone] -- --    EAMONMONIE (Son) [phone] -- --                 History & Physical        Lacey Ballesteros DO at 24 [phone]            [facility] HOSPITALIST   HISTORY AND PHYSICAL      Name:  Jaymie Yarbrough   Age:  69 y.o.  Sex:  female  :  1955  MRN:  [id number]   Visit " Number:  [id number]  Admission Date:  5/30/2024  Date Of Service:  05/30/24  Primary Care Physician:  Terrenec Baldwin MD    Chief Complaint:     Weakness, nausea, abdominal pain, diarrhea    History Of Presenting Illness:      69-year-old female with a history of recurrent pancreatitis, hypertension, chronic tobacco abuse, chronic pain, C. difficile, has had multiple hospitalizations in the last few months with recurrent pancreatitis.  She had presented due to ongoing abdominal pain, back pain, has had intermittent diarrhea and loose stools at home.  She has felt very weak, had shortness of breath over the last few days.  She has been taking some pain medication but has not been helping much for her pancreas.  She unfortunately continues to smoke.  She did follow-up with GI last week who had recommended outpatient  referral to  for EUS.    In the ER, she was hemodynamically stable.  She had evidence of acute kidney injury, severe metabolic acidosis, and elevated lipase.  CT imaging of the abdomen did not reveal any acute pancreatitis.  Patient's case was discussed by ER provider with nephrology who recommended sodium bicarb drip and we were asked to admit for    Review Of Systems:    All systems were reviewed and negative except as mentioned in history of presenting illness, assessment and plan.    Past Medical History: Patient  has a past medical history of Hypertension, Impaired mobility, Injury of back, and Pancreatitis.    Past Surgical History: Patient  has a past surgical history that includes Tubal ligation; Colonoscopy; Colon surgery; Abdominal surgery; and Esophagogastroduodenoscopy (N/A, 4/23/2024).    Social History: Patient  reports that she has been smoking cigarettes. She has never used smokeless tobacco. She reports that she does not drink alcohol and does not use drugs.    Family History:  Patient's family history has been reviewed and found to be noncontributory.     Allergies:      Rocephin  [ceftriaxone], Ciprofloxacin, Codeine, and Pineapple    Home Medications:    Prior to Admission Medications       Prescriptions Last Dose Informant Patient Reported? Taking?    glucosamine-chondroitin 500-400 MG capsule capsule   Yes No    Take 1 capsule by mouth 2 (Two) Times a Day With Meals. Indications: Joint Damage causing Pain and Loss of Function    HYDROcodone-acetaminophen (NORCO) 7.5-325 MG per tablet   Yes No    Take 1 tablet by mouth.    methocarbamol (ROBAXIN) 750 MG tablet   Yes No    Take 1 tablet by mouth 4 (Four) Times a Day As Needed for Muscle Spasms.    ondansetron ODT (ZOFRAN-ODT) 4 MG disintegrating tablet   No No    Place 1 tablet on the tongue Every 8 (Eight) Hours As Needed for Nausea or Vomiting.    pantoprazole (PROTONIX) 40 MG EC tablet   No No    Take 1 tablet by mouth 2 (Two) Times a Day Before Meals for 60 days.    promethazine (PHENERGAN) 25 MG tablet   Yes No    Take 1 tablet by mouth.    sertraline (ZOLOFT) 50 MG tablet   Yes No    Take 1 tablet by mouth Daily.    sodium bicarbonate 650 MG tablet   Yes No    Take 1 tablet by mouth 2 (Two) Times a Day.    vitamin D3 125 MCG (5000 UT) capsule capsule   Yes No    Take 1 capsule by mouth Daily. Indications: Vitamin D Deficiency          ED Medications:    Medications   sodium chloride 0.9 % flush 10 mL (has no administration in time range)   iopamidol (ISOVUE-300) 61 % injection 100 mL (has no administration in time range)   sodium bicarbonate 8.4 % 150 mEq in dextrose (D5W) 5 % 1,000 mL infusion (greater than 100 mEq) (150 mEq Intravenous New Bag 5/30/24 2107)   ondansetron (ZOFRAN) injection 4 mg (4 mg Intravenous Given 5/30/24 1536)   morphine injection 4 mg (4 mg Intravenous Given 5/30/24 1537)   sodium chloride 0.9 % bolus 500 mL (0 mL Intravenous Stopped 5/30/24 1741)   sodium chloride 0.9 % bolus 1,000 mL (0 mL Intravenous Stopped 5/30/24 2152)   morphine injection 4 mg (4 mg Intravenous Given 5/30/24 1725)   potassium chloride  "(KLOR-CON M20) CR tablet 40 mEq (40 mEq Oral Given 5/30/24 2047)   HYDROmorphone (DILAUDID) injection 1 mg (1 mg Intravenous Given 5/30/24 2047)     Vital Signs:  Temp:  [97.2 °F (36.2 °C)] 97.2 °F (36.2 °C)  Heart Rate:  [] 93  Resp:  [18] 18  BP: (118-153)/(77-98) 129/86        05/30/24  1335   Weight: 60.3 kg (133 lb)     Body mass index is 24.33 kg/m².    Physical Exam:     Most recent vital Signs: /86 (BP Location: Left arm, Patient Position: Sitting)   Pulse 93   Temp 97.2 °F (36.2 °C) (Oral)   Resp 18   Ht 157.5 cm (62\")   Wt 60.3 kg (133 lb)   SpO2 92%   BMI 24.33 kg/m²     Physical Exam  Constitutional:       General: She is not in acute distress.     Appearance: She is normal weight. She is not toxic-appearing.   HENT:      Mouth/Throat:      Mouth: Mucous membranes are dry.      Pharynx: Oropharynx is clear.   Cardiovascular:      Rate and Rhythm: Normal rate and regular rhythm.      Pulses: Normal pulses.      Heart sounds: No murmur heard.     No gallop.   Pulmonary:      Effort: Pulmonary effort is normal. No respiratory distress.      Breath sounds: No wheezing or rales.   Abdominal:      Tenderness: There is abdominal tenderness. There is no guarding or rebound.   Musculoskeletal:         General: Normal range of motion.      Right lower leg: No edema.      Left lower leg: No edema.   Skin:     General: Skin is warm.      Capillary Refill: Capillary refill takes less than 2 seconds.      Findings: No lesion.   Neurological:      General: No focal deficit present.      Mental Status: She is alert.      Motor: Weakness present.   Psychiatric:         Mood and Affect: Mood normal.         Thought Content: Thought content normal.         Laboratory data:    I have reviewed the labs done in the emergency room.    Results from last 7 days   Lab Units 05/30/24  1536   SODIUM mmol/L 138   POTASSIUM mmol/L 4.7   CHLORIDE mmol/L 114*   CO2 mmol/L 4.5*   BUN mg/dL 40*   CREATININE mg/dL 2.03* " "  CALCIUM mg/dL 10.8*   BILIRUBIN mg/dL 0.2   ALK PHOS U/L 160*   ALT (SGPT) U/L 10   AST (SGOT) U/L 12   GLUCOSE mg/dL 83     Results from last 7 days   Lab Units 05/30/24  1536   WBC 10*3/mm3 11.25*   HEMOGLOBIN g/dL 13.6   HEMATOCRIT % 41.7   PLATELETS 10*3/mm3 352         Results from last 7 days   Lab Units 05/30/24  1536   HSTROP T ng/L 20*     Results from last 7 days   Lab Units 05/30/24  1536   PROBNP pg/mL 982.4*         Results from last 7 days   Lab Units 05/30/24  1537   LIPASE U/L 271*               Invalid input(s): \"USDES\", \"NITRITITE\", \"BACT\", \"EP\"    Pain Management Panel          Latest Ref Rng & Units 8/6/2022   Pain Management Panel   Amphetamine, Urine Qual Negative Negative    Barbiturates Screen, Urine Negative Negative    Benzodiazepine Screen, Urine Negative Negative    Buprenorphine, Screen, Urine Negative Negative    Cocaine Screen, Urine Negative Negative    Methadone Screen , Urine Negative Negative    Methamphetamine, Ur Negative Negative        EKG:          Radiology:    CT Abdomen Pelvis Without Contrast    Result Date: 5/30/2024  FINAL REPORT TECHNIQUE: Routine axial images through the abdomen and pelvis were obtained. CLINICAL HISTORY: Eval for peripancreatic abscess COMPARISON: 5/11/2024, 4/28/2024 FINDINGS: Abdomen:  The gallbladder has been removed.  There are bilateral renal cysts and 2 stable small nonobstructing right renal calculi.  The pancreas is now normal with no peripancreatic inflammation or abscess.  The solid abdominal organs and ureters are otherwise unremarkable.  There is a moderate to large hiatal hernia of the stomach.  There are postoperative changes of the rectum.  The GI tract is otherwise unremarkable with no sign of appendicitis.  Pelvis: The urinary bladder is normal. There is no pelvic or abdominal ascites, adenopathy or acute osseous abnormality.  There are degenerative changes and scoliosis of the lumbar spine.     Hiatal hernia.  No acute disease, " specifically no evidence of pancreatitis or abscess. Authenticated and Electronically Signed by Sergio Silverio M.D. on 05/30/2024 07:59:01 PM    XR Chest 1 View    Result Date: 5/30/2024  PROCEDURE: XR CHEST 1 VW-    HISTORY: SOA Triage Protocol  COMPARISON: April 6, 2024.  FINDINGS: The heart is normal in size. The mediastinum is unremarkable. There is no acute infiltrate. There is emphysematous change. There is no pneumothorax. There are no acute osseous abnormalities. Findings appear similar to the prior exam.      No significant interval change.        Images were reviewed, interpreted, and dictated by Dr. Lucía Bethea MD Transcribed by Kenia Aviles PA-C.  This report was signed and finalized on 5/30/2024 3:31 PM by Lucía Bethea MD.       Assessment:    Dehydration  Acute kidney injury  Metabolic acidosis  Acute on chronic pancreatitis, idiopathic  Ongoing tobacco use  Impaired mobility and ADLs    Plan:    Admit as inpatient    Dehydration/nausea/vomiting/diarrhea:  Likely secondary to ongoing pancreatitis issues.  She is significantly volume depleted currently.  Will place on sodium bicarb and drip per GI recommendations.  Will monitor urine output.  Will send C. difficile and GI panel if able due to history of C. difficile colitis.    Chronic pancreatitis:  Has been worked up previously, initially thought may be gallstone pancreatitis last fall but underwent ERCP and treatment at that time but has had multiple recurrent episodes since.  She has chronic abdominal pain secondary to this now.  Dr. Fleming has recommended multiple times patient go to  for GI evaluation and EUS  and MRI, patient has failed to do so.  She also continues to smoke unfortunately.    Further recommendations depend upon clinical course.    Risk Assessment: High  DVT Prophylaxis: SCDs  Code Status: Full   Diet: Clear liquid    Advance Care Planning  ACP discussion was held with the patient during this visit. Patient does not have an  advance directive, declines further assistance.           Lacey Ballesteros DO  05/30/24  23:21 EDT    Dictated utilizing Dragon dictation.    Electronically signed by Lacey Ballesteros DO at 05/30/24 2339          Emergency Department Notes        Turner Quick PCT at 05/30/24 2033       Summary:Nephrology                 Dr. Fernandez called back at this time, call transferred to Norman SALAS.     Electronically signed by Turner Quick PCT at 05/30/24 2033       Turner Quick PCT at 05/30/24 2026       Summary:Nephrology                 Called nephrology at this time, states she will get it over to Dr. Fernandez and will give us a call back.    Electronically signed by Turner Quick PCT at 05/30/24 2028       Hilary Aburto at 05/30/24 1721          Patient complains of pain. Asking for pain med's, messaging RN.    Electronically signed by Hilary Aburto at 05/30/24 1722       Darron Austin DO at 05/30/24 1346        Procedure Orders    1. Critical Care [[id number]] ordered by Darron Austin DO                   ED Course as of 05/30/24 2123   Thu May 30, 2024   1546 Patient was seen morphine in the past, do feel patient tolerates. [CR]   1613 Lipase(!): 271  Lipase noted be minimally elevated for this patient, this is 3 times the upper limit of normal. [CR]   1700 I received signout on this patient from Dr. Chan.  Disposition pending labs, CT abdomen pelvis and clinical re-evaluation.  Please see note above for complete details.   [JS]   2020 I reviewed the labs listed above. Notable abnormalities are highlighted below.    Old laboratory data was reviewed from the medical records and compared to today's results.   [JS]   2020 CBC & Differential(!) [JS]   2020 WBC(!): 11.25 [JS]   2020 Lipase(!) [JS]   2020 Lipase(!): 271 [JS]   2020 Blood Gas, Venous With Co-Ox(!!) [JS]   2020 pH, Venous(!!): 7.004 [JS]   2020 pCO2, Venous(!): 22.3 [JS]   2020 HCO3, Venous(!): 5.5 [JS]   2020 Creatinine(!):  2.03 [JS]   2020 CO2(!!): 4.5 [JS]   2020 proBNP(!): 982.4 [JS]   2020 HS Troponin T(!): 20 [JS]   2026 I have independently reviewed and interpreted the imaging listed above.  My interpretations are listed below:    -Chest x-ray negative for pneumonia.    -CT abdomen pelvis negative for small bowel obstruction.    Per radiology report negative for any signs of pancreatitis. [JS]   2033 Case discussed with Dr. Fernandez (nephrology).  He recommended starting patient on bicarb drip at 100 mL/h as well as oral potassium repletion already mill equivalents.  Will see patient as consult. [JS]   2121 On re-evaluation, patient resting comfortably.  Vital signs remained stable on room air.  Patient will require medical admission for further workup and management.  I discussed the findings of the ED workup with the patient including my recommendation for admission.  Patient agreeable with plan and disposition.    Case discussed with Dr. Ballesteros (hospitalist) who agrees to evaluate and admit the patient.  We discussed the HPI, pertinent PMHx, ED course and workup. [JS]      ED Course User Index  [CR] Juan Luis Chan DO  [JS] Darron Austin DO          [facility] EMERGENCY DEPARTMENT  Emergency Department Encounter  Emergency Medicine Physician Note     Pt Name:Tasha Yarbrough  MRN: [id number]  Birthdate 1955  Date of evaluation: 5/30/2024  PCP:  Terrence Baldwin MD  Note Started: 1:46 PM EDT      CHIEF COMPLAINT       Chief Complaint   Patient presents with    Abdominal Pain    Nausea    Shortness of Breath     Pt states SOA, weakness. Pt states she can't hardly stand to walk due to soa. Pt is having Nausea and back pain. SOA for 3 days, getting worse.     Weakness - Generalized    Back Pain       HISTORY OF PRESENT ILLNESS  (Location/Symptom, Timing/Onset, Context/Setting, Quality, Duration, Modifying Factors, Severity.)      Tasha Yarbrough is a 69 y.o. female who presents with abdominal pain, nausea,  dyspnea on exertion, generalized weakness and back pain.  Patient is well-known to this emergency department, describes the symptoms as her typical abdominal pain secondary to pancreatitis.  Patient describes the shortness of breath is relatively new, describes it more with dyspnea on exertion.  Patient denies any lightheadedness or dizziness.  Patient describes generalized back pain.    PAST MEDICAL / SURGICAL / SOCIAL / FAMILY HISTORY     Past Medical History:   Diagnosis Date    Hypertension     Impaired mobility     Injury of back     Pancreatitis      No additional pertinent       Past Surgical History:   Procedure Laterality Date    ABDOMINAL SURGERY      COLON SURGERY      COLONOSCOPY      ENDOSCOPY N/A 4/23/2024    Procedure: ESOPHAGOGASTRODUODENOSCOPY WITH BIOPSY;  Surgeon: Jairo Negro MD;  Location: [facility] ENDOSCOPY;  Service: Gastroenterology;  Laterality: N/A;    TUBAL ABDOMINAL LIGATION       No additional pertinent       Social History     Socioeconomic History    Marital status:    Tobacco Use    Smoking status: Every Day     Current packs/day: 1.00     Types: Cigarettes    Smokeless tobacco: Never   Vaping Use    Vaping status: Never Used   Substance and Sexual Activity    Alcohol use: Never    Drug use: Never    Sexual activity: Defer       Family History   Problem Relation Age of Onset    Kidney disease Mother     Emphysema Mother     Heart disease Father     Lung cancer Sister     Heart disease Paternal Uncle        Allergies:  Rocephin [ceftriaxone], Ciprofloxacin, Codeine, and Pineapple    Home Medications:  Prior to Admission medications    Medication Sig Start Date End Date Taking? Authorizing Provider   glucosamine-chondroitin 500-400 MG capsule capsule Take 1 capsule by mouth 2 (Two) Times a Day With Meals. Indications: Joint Damage causing Pain and Loss of Function    Provider, MD Taz   HYDROcodone-acetaminophen (NORCO) 7.5-325 MG per tablet Take 1 tablet by mouth. 4/30/24  "5/30/24  Taz Keller MD   methocarbamol (ROBAXIN) 750 MG tablet Take 1 tablet by mouth 4 (Four) Times a Day As Needed for Muscle Spasms.    Taz Keller MD   ondansetron ODT (ZOFRAN-ODT) 4 MG disintegrating tablet Place 1 tablet on the tongue Every 8 (Eight) Hours As Needed for Nausea or Vomiting. 4/6/24   Shawn Good PA-C   pantoprazole (PROTONIX) 40 MG EC tablet Take 1 tablet by mouth 2 (Two) Times a Day Before Meals for 60 days. 4/24/24 6/23/24  Mateo Storm DO   promethazine (PHENERGAN) 25 MG tablet Take 1 tablet by mouth. 4/30/24 10/27/24  Taz Keller MD   sertraline (ZOLOFT) 50 MG tablet Take 1 tablet by mouth Daily. 5/1/24 10/28/24  Taz Keller MD   sodium bicarbonate 650 MG tablet Take 1 tablet by mouth 2 (Two) Times a Day.    Taz Keller MD   vitamin D3 125 MCG (5000 UT) capsule capsule Take 1 capsule by mouth Daily. Indications: Vitamin D Deficiency    Taz Keller MD   metoprolol succinate XL (TOPROL-XL) 50 MG 24 hr tablet Take 50 mg by mouth Daily.  Patient not taking: Reported on 7/27/2022  8/3/22  Taz eKller MD         REVIEW OF SYSTEMS       Review of Systems   Constitutional:  Negative for diaphoresis and fever.   Respiratory:  Positive for shortness of breath. Negative for chest tightness.    Cardiovascular:  Negative for chest pain.   Gastrointestinal:  Positive for abdominal pain and nausea. Negative for vomiting.   Endocrine: Negative for polyuria.   Genitourinary:  Negative for difficulty urinating and frequency.   Musculoskeletal:  Positive for back pain.       PHYSICAL EXAM      INITIAL VITALS:   /84 (BP Location: Left arm, Patient Position: Sitting)   Pulse 90   Temp 97.2 °F (36.2 °C) (Oral)   Resp 18   Ht 157.5 cm (62\")   Wt 60.3 kg (133 lb)   SpO2 100%   BMI 24.33 kg/m²     Physical Exam  Constitutional:       Appearance: Normal appearance.   HENT:      Head: Normocephalic and atraumatic.      " Mouth/Throat:      Mouth: Mucous membranes are moist.      Pharynx: Oropharynx is clear.   Cardiovascular:      Rate and Rhythm: Normal rate and regular rhythm.      Pulses: Normal pulses.   Pulmonary:      Effort: Pulmonary effort is normal.      Breath sounds: Normal breath sounds.   Abdominal:      General: Abdomen is flat. There is no distension.      Palpations: Abdomen is soft.      Tenderness: There is no abdominal tenderness in the epigastric area. There is no guarding.   Musculoskeletal:         General: Normal range of motion.   Skin:     General: Skin is warm and dry.   Neurological:      General: No focal deficit present.      Mental Status: She is alert and oriented to person, place, and time.   Psychiatric:         Mood and Affect: Mood normal.         Behavior: Behavior normal.           DDX/DIAGNOSTIC RESULTS / EMERGENCY DEPARTMENT COURSE / MDM     Differential Diagnosis included but not limited: Pancreatitis, Gastrosoft reflux disease, CHF, gastroenteritis, ACS, pneumonia    Diagnoses Considered but Do Not Suspect: Low concern for mesenteric ischemia or pulmonary embolism.    Decision Rules/Scores utilized: N/A     Tests considered but not ordered and why:  N/A     MIPS: N/A     Code Status Discussion:  Not Discussed    Additional Patient Education Provided: None     Medical Decision Making    Medical Decision Making  Patient presenting with abdominal pain, patient here frequently for this abdominal pain.  Patient has a history of pancreatitis.  Plan to obtain lipase, CBC, CMP, also evaluate for ACS and cardiac cause patient describes shortness of breath and dyspnea on exertion.  Plan to obtain chest x-ray.  Patient has had multiple CT scans in the last month.  Plan to obtain repeat CT scan due to patient's complaints.  Do feel the benefits outweigh the risk.    Amount and/or Complexity of Data Reviewed  Labs: ordered. Decision-making details documented in ED Course.  Radiology:  ordered.  ECG/medicine tests: ordered.    Risk  Prescription drug management.        See ED COURSE for additional MDM statements    EKG    EKG Interpretation    Evaluated and interpreted by emergency department physician    Rhythm: Normal Sinus Rhythm  Rate: Normal  Axis: Ghada  Ectopy: none  Conduction: Normal  ST Segments: Normal  T Waves: Nonspecific T wave flattening lateral leads  Q Waves: None    Clinical Impression: Normal Sinus Rhythm    Juan Luis Chan DO      All EKG's are interpreted by the Emergency Department Physician who either signs or Co-signs this chart in the absence of a cardiologist.    Additional Scores                   PROCEDURES:  None Performed   Critical Care    Performed by: Darron Austin DO  Authorized by: Darron Austin DO    Comments:      CRITICAL CARE PROCEDURE NOTE  Authorized and performed by: Dr. Austin  Total critical care time: Approximately 35 minutes.  Patient was critically ill due to: Severe metabolic acidosis  Interventions: IV bicarbonate, coordination of care with nephrology, close airway/mental status/hemodynamic monitoring    Due to a high probability of clinically significant, life threatening deterioration, the patient required my highest level of preparedness to intervene emergently and I personally spent this critical care time directly and personally managing the patient.  This critical care time included obtaining a history; examining the patient; pulse oximetry; ordering and review of studies; arranging urgent treatment with development of a management plan; evaluation of patient's response to treatment; frequent reassessment; and, discussions with other providers.    This critical care time was performed to assess and manage the high probability of imminent, life-threatening deterioration that could result in multiorgan failure.  It was exclusive of separately billable procedures and treating other patients and teaching time.    Please see MDM  section and the rest of the note for further information on patient assessment and interventions.        DATA FOR LAB AND RADIOLOGY TESTS ORDERED BELOW ARE REVIEWED BY THE ED CLINICIAN:    RADIOLOGY: All x-rays, CT, MRI, and formal ultrasound images (except ED bedside ultrasound) are read by the radiologist, see reports below, unless otherwise noted in MDM or here.  Reports below are reviewed by myself.  CT Abdomen Pelvis Without Contrast   Final Result   Hiatal hernia.  No acute disease, specifically no evidence of   pancreatitis or abscess.      Authenticated and Electronically Signed by Sergio Silverio M.D. on   05/30/2024 07:59:01 PM      XR Chest 1 View   Final Result   No significant interval change.                               Images were reviewed, interpreted, and dictated by Dr. Lucía Bethea MD   Transcribed by Kenia Aviles PA-C.       This report was signed and finalized on 5/30/2024 3:31 PM by Lucía Bethea MD.              LABS: Lab orders shown below, the results are reviewed by myself, and all abnormals are listed below.  Labs Reviewed   COMPREHENSIVE METABOLIC PANEL - Abnormal; Notable for the following components:       Result Value    BUN 40 (*)     Creatinine 2.03 (*)     Chloride 114 (*)     CO2 4.5 (*)     Calcium 10.8 (*)     Alkaline Phosphatase 160 (*)     Anion Gap 19.5 (*)     eGFR 26.1 (*)     All other components within normal limits    Narrative:     GFR Normal >60  Chronic Kidney Disease <60  Kidney Failure <15     BNP (IN-HOUSE) - Abnormal; Notable for the following components:    proBNP 982.4 (*)     All other components within normal limits    Narrative:     This assay is used as an aid in the diagnosis of individuals suspected of having heart failure. It can be used as an aid in the diagnosis of acute decompensated heart failure (ADHF) in patients presenting with signs and symptoms of ADHF to the emergency department (ED). In addition, NT-proBNP of <300 pg/mL indicates ADHF is not  likely.    Age Range Result Interpretation  NT-proBNP Concentration (pg/mL:      <50             Positive            >450                   Gray                 300-450                    Negative             <300    50-75           Positive            >900                  Gray                300-900                  Negative            <300      >75             Positive            >1800                  Gray                300-1800                  Negative            <300   SINGLE HS TROPONIN T - Abnormal; Notable for the following components:    HS Troponin T 20 (*)     All other components within normal limits    Narrative:     High Sensitive Troponin T Reference Range:  <14.0 ng/L- Negative Female for AMI  <22.0 ng/L- Negative Male for AMI  >=14 - Abnormal Female indicating possible myocardial injury.  >=22 - Abnormal Male indicating possible myocardial injury.   Clinicians would have to utilize clinical acumen, EKG, Troponin, and serial changes to determine if it is an Acute Myocardial Infarction or myocardial injury due to an underlying chronic condition.        CBC WITH AUTO DIFFERENTIAL - Abnormal; Notable for the following components:    WBC 11.25 (*)     .7 (*)     MCH 35.8 (*)     RDW-SD 62.9 (*)     Neutrophil % 84.1 (*)     Lymphocyte % 9.3 (*)     Monocyte % 4.9 (*)     Eosinophil % 0.2 (*)     Immature Grans % 0.8 (*)     Neutrophils, Absolute 9.46 (*)     Immature Grans, Absolute 0.09 (*)     All other components within normal limits   LIPASE - Abnormal; Notable for the following components:    Lipase 271 (*)     All other components within normal limits   BLOOD GAS, VENOUS W/CO-OXIMETRY - Abnormal; Notable for the following components:    pH, Venous 7.004 (*)     pCO2, Venous 22.3 (*)     pO2, Venous 51.5 (*)     HCO3, Venous 5.5 (*)     Base Excess, Venous -24.3 (*)     O2 Saturation, Venous 85.9 (*)     Oxyhemoglobin Venous 84.1 (*)     All other components within normal limits   LACTIC  ACID, PLASMA - Normal   SCAN SLIDE   BLOOD GAS, VENOUS   RAINBOW DRAW    Narrative:     The following orders were created for panel order [place] Draw.  Procedure                               Abnormality         Status                     ---------                               -----------         ------                     Green Top (Gel)[[id number]]                                  In process                 Lavender Top[[id number]]                                     Final result               Gold Top - SST[[id number]]                                   Final result               Light Blue Top[[id number]]                                   Final result                 Please view results for these tests on the individual orders.   CBC AND DIFFERENTIAL    Narrative:     The following orders were created for panel order CBC & Differential.  Procedure                               Abnormality         Status                     ---------                               -----------         ------                     CBC Auto Differential[[id number]]        Abnormal            Final result               Scan Slide[[id number]]                                       Final result                 Please view results for these tests on the individual orders.   LAVENDER TOP   GOLD TOP - SST   LIGHT BLUE TOP   GREEN TOP       Vitals Reviewed:    Vitals:    05/30/24 1852 05/30/24 1859 05/30/24 2030 05/30/24 2053   BP: 139/77 153/92 131/88 118/84   BP Location:    Left arm   Patient Position:    Sitting   Pulse: 94 90 93 90   Resp:    18   Temp:       TempSrc:       SpO2:    100%   Weight:       Height:           MEDICATIONS GIVEN TO PATIENT THIS ENCOUNTER:  Medications   sodium chloride 0.9 % flush 10 mL (has no administration in time range)   iopamidol (ISOVUE-300) 61 % injection 100 mL (has no administration in time range)   sodium bicarbonate 8.4 % 150 mEq in dextrose (D5W) 5 % 1,000 mL infusion (greater than 100 mEq) (150 mEq  Intravenous New Bag 5/30/24 2107)   ondansetron (ZOFRAN) injection 4 mg (4 mg Intravenous Given 5/30/24 1536)   morphine injection 4 mg (4 mg Intravenous Given 5/30/24 1537)   sodium chloride 0.9 % bolus 500 mL (0 mL Intravenous Stopped 5/30/24 1741)   sodium chloride 0.9 % bolus 1,000 mL (1,000 mL Intravenous New Bag 5/30/24 1740)   morphine injection 4 mg (4 mg Intravenous Given 5/30/24 1725)   potassium chloride (KLOR-CON M20) CR tablet 40 mEq (40 mEq Oral Given 5/30/24 2047)   HYDROmorphone (DILAUDID) injection 1 mg (1 mg Intravenous Given 5/30/24 2047)       CONSULTS:  IP CONSULT TO NEPHROLOGY    CRITICAL CARE:  There was significant risk of life threatening deterioration of patient's condition requiring my direct management. Critical care time 0 minutes, excluding any documented procedures.    FINAL IMPRESSION      1. Metabolic acidosis    2. Chronic abdominal pain          DISPOSITION / PLAN     ED Disposition       ED Disposition   Decision to Admit    Condition   --    Comment   Level of Care: Telemetry [5]   Diagnosis: Metabolic acidosis [577919]   Admitting Physician: KATIE GREGORY [[id number]]   Attending Physician: ULICES MATTSON [[id number]]   Certification: I Certify That Inpatient Hospital Services Are Medically Necessary For Greater Than 2 Midnights                 PATIENT REFERRED TO:  No follow-up provider specified.    DISCHARGE MEDICATIONS:     Medication List        ASK your doctor about these medications      glucosamine-chondroitin 500-400 MG capsule capsule     HYDROcodone-acetaminophen 7.5-325 MG per tablet  Commonly known as: NORCO     methocarbamol 750 MG tablet  Commonly known as: ROBAXIN     ondansetron ODT 4 MG disintegrating tablet  Commonly known as: ZOFRAN-ODT  Place 1 tablet on the tongue Every 8 (Eight) Hours As Needed for Nausea or Vomiting.     pantoprazole 40 MG EC tablet  Commonly known as: PROTONIX  Take 1 tablet by mouth 2 (Two) Times a Day Before Meals for 60 days.      promethazine 25 MG tablet  Commonly known as: PHENERGAN     sertraline 50 MG tablet  Commonly known as: ZOLOFT     sodium bicarbonate 650 MG tablet     vitamin D3 125 MCG (5000 UT) capsule capsule              Electronically signed by Juan Luis Chan DO, 05/30/24, 1:46 PM EDT.    Emergency Medicine Physician  [facility]  (Please note that portions of thisnote were completed with a voice recognition program.  Efforts were made to edit the dictations but occasionally words are mis-transcribed.)       Darron Austin DO  05/30/24 2123      Electronically signed by Darron Austin DO at 05/30/24 2123       Vital Signs (last day)       Date/Time Temp Temp src Pulse Resp BP Patient Position SpO2    05/31/24 0645 -- -- 84 -- 97/66 -- 99    05/31/24 0630 -- -- 86 -- 96/61 -- 98    05/31/24 0617 -- -- 85 -- 85/70 -- 98    05/31/24 0545 -- -- 89 -- 112/72 -- 99    05/31/24 0533 -- -- 90 18 114/83 Lying 100    05/31/24 0415 -- -- -- -- 103/70 -- --    05/31/24 0115 -- -- 88 -- 117/77 -- 98    05/31/24 0109 -- -- 102 -- 119/69 -- 84    05/31/24 0107 -- -- 92 18 119/69 Lying 97    05/31/24 0100 -- -- 91 -- 117/78 -- 100    05/31/24 0045 -- -- -- -- 118/76 -- --    05/31/24 0002 -- -- 87 -- 127/87 -- 100    05/30/24 2338 -- -- 84 -- 103/71 Lying 99    05/30/24 2236 -- -- 93 18 129/86 Sitting 92    05/30/24 2145 -- -- 92 -- 120/84 -- 98    05/30/24 2053 -- -- 90 18 118/84 Sitting 100    05/30/24 2030 -- -- 93 -- 131/88 -- --    05/30/24 1859 -- -- 90 -- 153/92 -- --    05/30/24 1852 -- -- 94 -- 139/77 -- --    05/30/24 1847 -- -- 92 -- 131/80 -- 99    05/30/24 1335 97.2 (36.2) Oral 103 18 134/98 Sitting 98          Current Facility-Administered Medications   Medication Dose Route Frequency Provider Last Rate Last Admin    acetaminophen (TYLENOL) tablet 650 mg  650 mg Oral Q4H PRN Lacey Ballesteros DO        Or    acetaminophen (TYLENOL) 160 MG/5ML oral solution 650 mg  650 mg Oral Q4H PRN  Lacey Ballesteros DO        Or    acetaminophen (TYLENOL) suppository 650 mg  650 mg Rectal Q4H PRN Lacey Ballesteros DO        iopamidol (ISOVUE-300) 61 % injection 100 mL  100 mL Intravenous Once in imaging Lacey Ballesteros DO        morphine injection 3 mg  3 mg Intravenous Q4H PRN Lacey Ballesteros DO   3 mg at 05/31/24 0607    And    naloxone (NARCAN) injection 0.4 mg  0.4 mg Intravenous Q5 Min PRN Lacey Ballesteros DO        nitroglycerin (NITROSTAT) SL tablet 0.4 mg  0.4 mg Sublingual Q5 Min PRN Lacey Ballesteros DO        ondansetron ODT (ZOFRAN-ODT) disintegrating tablet 4 mg  4 mg Oral Q6H PRN Lacey Ballesteros DO        Or    ondansetron (ZOFRAN) injection 4 mg  4 mg Intravenous Q6H PRN Lacey Ballesteros DO        sertraline (ZOLOFT) tablet 50 mg  50 mg Oral Daily Lacey Ballesteros DO        sodium bicarbonate 8.4 % 150 mEq in dextrose (D5W) 5 % 1,000 mL infusion (greater than 100 mEq)  150 mEq Intravenous Continuous Lacey Ballesteros DO   Stopped at 05/31/24 0636    sodium chloride 0.9 % flush 10 mL  10 mL Intravenous PRN Lacey Ballesteros DO        sodium chloride 0.9 % flush 10 mL  10 mL Intravenous Q12H Lacey Ballesteros DO   10 mL at 05/31/24 0011    sodium chloride 0.9 % flush 10 mL  10 mL Intravenous PRN Lacey Ballesteros DO        sodium chloride 0.9 % infusion 40 mL  40 mL Intravenous PRN Lacey Ballesteros DO         Current Outpatient Medications   Medication Sig Dispense Refill    glucosamine-chondroitin 500-400 MG capsule capsule Take 1 capsule by mouth 2 (Two) Times a Day With Meals. Indications: Joint Damage causing Pain and Loss of Function      methocarbamol (ROBAXIN) 750 MG tablet Take 1 tablet by mouth 4 (Four) Times a Day As Needed for Muscle Spasms.      ondansetron ODT (ZOFRAN-ODT) 4 MG disintegrating tablet Place 1 tablet on the tongue Every 8 (Eight) Hours As Needed for Nausea or Vomiting. 12 tablet  0    pantoprazole (PROTONIX) 40 MG EC tablet Take 1 tablet by mouth 2 (Two) Times a Day Before Meals for 60 days. 60 tablet 1    promethazine (PHENERGAN) 25 MG tablet Take 1 tablet by mouth.      sertraline (ZOLOFT) 50 MG tablet Take 1 tablet by mouth Daily.      sodium bicarbonate 650 MG tablet Take 1 tablet by mouth 2 (Two) Times a Day.      vitamin D3 125 MCG (5000 UT) capsule capsule Take 1 capsule by mouth Daily. Indications: Vitamin D Deficiency       Lab Results (last 24 hours)       Procedure Component Value Units Date/Time    Basic Metabolic Panel [[id number]]  (Abnormal) Collected: 05/31/24 0556    Specimen: Blood Updated: 05/31/24 0628     Glucose 78 mg/dL      BUN 43 mg/dL      Creatinine 1.93 mg/dL      Sodium 141 mmol/L      Potassium 3.8 mmol/L      Chloride 116 mmol/L      CO2 6.8 mmol/L      Calcium 10.0 mg/dL      BUN/Creatinine Ratio 22.3     Anion Gap 18.2 mmol/L      eGFR 27.8 mL/min/1.73     Narrative:      GFR Normal >60  Chronic Kidney Disease <60  Kidney Failure <15      CBC (No Diff) [[id number]]  (Abnormal) Collected: 05/31/24 0556    Specimen: Blood Updated: 05/31/24 0617     WBC 9.19 10*3/mm3      RBC 3.30 10*6/mm3      Hemoglobin 11.9 g/dL      Hematocrit 37.2 %      .7 fL      MCH 36.1 pg      MCHC 32.0 g/dL      RDW 15.3 %      RDW-SD 64.9 fl      MPV 10.2 fL      Platelets 208 10*3/mm3     Blood Gas, Venous With Co-Ox [[id number]]  (Abnormal) Collected: 05/30/24 1735    Specimen: Venous Blood Updated: 05/30/24 1735     Site OTHER     pH, Venous 7.004 pH Units      Comment: 85 Value below critical limit        pCO2, Venous 22.3 mm Hg      Comment: 84 Value below reference range        pO2, Venous 51.5 mm Hg      HCO3, Venous 5.5 mmol/L      Comment: 84 Value below reference range        Base Excess, Venous -24.3 mmol/L      Comment: 84 Value below reference range        O2 Saturation, Venous 85.9 %      Comment: 83 Value above reference range        Oxyhemoglobin Venous 84.1 %       Methemoglobin Venous 0.5 %      Carboxyhemoglobin Venous 1.6 %      Barometric Pressure for Blood Gas 737 mmHg      Modality Room Air     FIO2 21 %      Ventilator Mode NA     Notified Who MD     Notified By AMY     Notified Time 05/30/2024 17:34     Collected by 33     Comment: Meter: [id number]     :  AMY       Lactic Acid, Plasma [[id number]]  (Normal) Collected: 05/30/24 1536    Specimen: Blood Updated: 05/30/24 1704     Lactate 0.7 mmol/L     CBC & Differential [[id number]]  (Abnormal) Collected: 05/30/24 1536    Specimen: Blood Updated: 05/30/24 1632    Narrative:      The following orders were created for panel order CBC & Differential.  Procedure                               Abnormality         Status                     ---------                               -----------         ------                     CBC Auto Differential[[id number]]        Abnormal            Final result               Scan Slide[[id number]]                                       Final result                 Please view results for these tests on the individual orders.    Scan Slide [[id number]] Collected: 05/30/24 1536    Specimen: Blood Updated: 05/30/24 1632     Macrocytes Slight/1+     WBC Morphology Normal     Platelet Morphology Normal    Comprehensive Metabolic Panel [[id number]]  (Abnormal) Collected: 05/30/24 1536    Specimen: Blood Updated: 05/30/24 1629     Glucose 83 mg/dL      BUN 40 mg/dL      Creatinine 2.03 mg/dL      Sodium 138 mmol/L      Potassium 4.7 mmol/L      Comment: Slight hemolysis detected by analyzer. Result may be falsely elevated.        Chloride 114 mmol/L      CO2 4.5 mmol/L      Calcium 10.8 mg/dL      Total Protein 8.0 g/dL      Albumin 4.6 g/dL      ALT (SGPT) 10 U/L      AST (SGOT) 12 U/L      Comment: Slight hemolysis detected by analyzer. Result may be falsely elevated.        Alkaline Phosphatase 160 U/L      Total Bilirubin 0.2 mg/dL      Globulin 3.4 gm/dL      A/G Ratio 1.4 g/dL       BUN/Creatinine Ratio 19.7     Anion Gap 19.5 mmol/L      eGFR 26.1 mL/min/1.73     Narrative:      GFR Normal >60  Chronic Kidney Disease <60  Kidney Failure <15      BNP [[id number]]  (Abnormal) Collected: 05/30/24 1536    Specimen: Blood Updated: 05/30/24 1629     proBNP 982.4 pg/mL     Narrative:      This assay is used as an aid in the diagnosis of individuals suspected of having heart failure. It can be used as an aid in the diagnosis of acute decompensated heart failure (ADHF) in patients presenting with signs and symptoms of ADHF to the emergency department (ED). In addition, NT-proBNP of <300 pg/mL indicates ADHF is not likely.    Age Range Result Interpretation  NT-proBNP Concentration (pg/mL:      <50             Positive            >450                   Gray                 300-450                    Negative             <300    50-75           Positive            >900                  Gray                300-900                  Negative            <300      >75             Positive            >1800                  Gray                300-1800                  Negative            <300    Single High Sensitivity Troponin T [[id number]]  (Abnormal) Collected: 05/30/24 1536    Specimen: Blood Updated: 05/30/24 1618     HS Troponin T 20 ng/L     Narrative:      High Sensitive Troponin T Reference Range:  <14.0 ng/L- Negative Female for AMI  <22.0 ng/L- Negative Male for AMI  >=14 - Abnormal Female indicating possible myocardial injury.  >=22 - Abnormal Male indicating possible myocardial injury.   Clinicians would have to utilize clinical acumen, EKG, Troponin, and serial changes to determine if it is an Acute Myocardial Infarction or myocardial injury due to an underlying chronic condition.         Lipase [[id number]]  (Abnormal) Collected: 05/30/24 1537    Specimen: Blood Updated: 05/30/24 1612     Lipase 271 U/L     CBC Auto Differential [[id number]]  (Abnormal) Collected: 05/30/24 1536    Specimen: Blood  Updated: 05/30/24 1611     WBC 11.25 10*3/mm3      RBC 3.80 10*6/mm3      Hemoglobin 13.6 g/dL      Hematocrit 41.7 %      .7 fL      MCH 35.8 pg      MCHC 32.6 g/dL      RDW 15.3 %      RDW-SD 62.9 fl      MPV 10.0 fL      Platelets 352 10*3/mm3      Neutrophil % 84.1 %      Lymphocyte % 9.3 %      Monocyte % 4.9 %      Eosinophil % 0.2 %      Basophil % 0.7 %      Immature Grans % 0.8 %      Neutrophils, Absolute 9.46 10*3/mm3      Lymphocytes, Absolute 1.05 10*3/mm3      Monocytes, Absolute 0.55 10*3/mm3      Eosinophils, Absolute 0.02 10*3/mm3      Basophils, Absolute 0.08 10*3/mm3      Immature Grans, Absolute 0.09 10*3/mm3      nRBC 0.0 /100 WBC     Green Top (Gel) [[id number]] Collected: 05/30/24 1536    Specimen: Blood Updated: 05/30/24 1600    [place] Draw [[id number]] Collected: 05/30/24 1536    Specimen: Blood Updated: 05/30/24 1600    Narrative:      The following orders were created for panel order [place] Draw.  Procedure                               Abnormality         Status                     ---------                               -----------         ------                     Green Top (Gel)[[id number]]                                  In process                 Lavender Top[[id number]]                                     Final result               Gold Top - SST[[id number]]                                   Final result               Light Blue Top[[id number]]                                   Final result                 Please view results for these tests on the individual orders.    Light Blue Top [[id number]] Collected: 05/30/24 1536    Specimen: Blood Updated: 05/30/24 1600     Extra Tube Hold for add-ons.     Comment: Auto resulted       Lavender Top [[id number]] Collected: 05/30/24 1536    Specimen: Blood Updated: 05/30/24 1545     Extra Tube hold for add-on     Comment: Auto resulted       Gold Top - SST [[id number]] Collected: 05/30/24 1536    Specimen: Blood Updated: 05/30/24 1545      Extra Tube Hold for add-ons.     Comment: Auto resulted.             Imaging Results (Last 24 Hours)       Procedure Component Value Units Date/Time    CT Abdomen Pelvis Without Contrast [[id number]] Collected: 05/30/24 1936     Updated: 05/30/24 1959    Narrative:      FINAL REPORT    TECHNIQUE:  Routine axial images through the abdomen and pelvis were  obtained.    CLINICAL HISTORY:  Eval for peripancreatic abscess    COMPARISON:  5/11/2024, 4/28/2024    FINDINGS:  Abdomen:  The gallbladder has been removed.  There are bilateral  renal cysts and 2 stable small nonobstructing right renal  calculi.  The pancreas is now normal with no peripancreatic  inflammation or abscess.  The solid abdominal organs and ureters  are otherwise unremarkable.  There is a moderate to large hiatal  hernia of the stomach.  There are postoperative changes of the  rectum.  The GI tract is otherwise unremarkable with no sign of  appendicitis.  Pelvis: The urinary bladder is normal. There is  no pelvic or abdominal ascites, adenopathy or acute osseous  abnormality.  There are degenerative changes and scoliosis of  the lumbar spine.      Impression:      Hiatal hernia.  No acute disease, specifically no evidence of  pancreatitis or abscess.    Authenticated and Electronically Signed by Sergio Silverio M.D. on  05/30/2024 07:59:01 PM    XR Chest 1 View [[id number]] Collected: 05/30/24 1524     Updated: 05/30/24 1533    Narrative:      PROCEDURE: XR CHEST 1 VW-        HISTORY: [facility] Triage Protocol     COMPARISON: April 6, 2024.     FINDINGS: The heart is normal in size. The mediastinum is unremarkable.  There is no acute infiltrate. There is emphysematous change. There is no  pneumothorax. There are no acute osseous abnormalities. Findings appear  similar to the prior exam.       Impression:      No significant interval change.                       Images were reviewed, interpreted, and dictated by Dr. Lucía Bethea MD  Transcribed by Kenia Aviles  MONIQUE.     This report was signed and finalized on 5/30/2024 3:31 PM by Lucía Bethea MD.             Physician Progress Notes (last 24 hours)  Notes from 05/30/24 0654 through 05/31/24 0654   No notes of this type exist for this encounter.       Consult Notes (last 24 hours)  Notes from 05/30/24 0654 through 05/31/24 0654   No notes of this type exist for this encounter.

## 2024-05-31 NOTE — PROGRESS NOTES
[facility]    Name:  Tasha Yarbrough   Age:  69 y.o.  Sex:  female  :  1955  MRN:  [id number]   Visit Number:  [id number]  Admission Date:  2024  Date Of Service:  24  Primary Care Physician:  Terrence Baldwin MD     LOS: 1 day :    Chief Complaint:      Follow-up; N/V/D    Subjective:    Feels much better than when she first came in, no more vomiting.  Still having some generalized abdominal discomfort.  Breathing feels good.    Hospital Course:    Tasha Yarbrough is a 69-year-old female with a history of recurrent pancreatitis, hypertension, chronic tobacco abuse, chronic pain, C. difficile, has had multiple hospitalizations in the last few months with recurrent pancreatitis.  She had presented due to ongoing abdominal pain, back pain, has had intermittent diarrhea and loose stools at home.  She has felt very weak, had shortness of breath over the last few days.  She has been taking some pain medication but has not been helping much for her pancreas.  She continues to smoke.  She did follow-up with GI last week who had recommended outpatient referral to  for EUS.     In the ER, she was hemodynamically stable.  She had evidence of acute kidney injury, severe metabolic acidosis, and elevated lipase.  CT imaging of the abdomen did not reveal any acute pancreatitis.  Patient's case was discussed by ER provider with nephrology who recommended sodium bicarb drip.    Inpatient general floor admission 2024 with dehydration secondary to N/V/D, likely related to chronic pancreatitis.    Review of Systems:     All systems were reviewed and negative except as mentioned in subjective, assessment and plan.    Vital Signs:    Temp:  [97.2 °F (36.2 °C)] 97.2 °F (36.2 °C)  Heart Rate:  [] 85  Resp:  [18] 18  BP: ()/(57-98) 114/69    Intake and output:    I/O last 3 completed shifts:  In: 500 [IV Piggyback:500]  Out: -   No intake/output data  recorded.    Physical Examination:    General Appearance:  Alert and cooperative.    Head:  Atraumatic and normocephalic.   Eyes: Conjunctivae and sclerae normal, no icterus. No pallor.   Throat: No oral lesions, no thrush, oral mucosa moist.   Neck: Supple, trachea midline, no thyromegaly.   Lungs:   Breath sounds heard bilaterally equally.  No wheezing or crackles. No Pleural rub or bronchial breathing.   Heart:  Normal S1 and S2, no murmur, no gallop, no rub. No JVD.   Abdomen:   Mildly tender throughout, nondistended.   Extremities: Supple, no edema, no cyanosis, no clubbing.   Skin: Warm.   Neurologic: Alert and oriented x 3. No facial asymmetry. Moves all four limbs. No tremors.      Laboratory results:    Results from last 7 days   Lab Units 05/31/24  0556 05/30/24  1536   SODIUM mmol/L 141 138   POTASSIUM mmol/L 3.8 4.7   CHLORIDE mmol/L 116* 114*   CO2 mmol/L 6.8* 4.5*   BUN mg/dL 43* 40*   CREATININE mg/dL 1.93* 2.03*   CALCIUM mg/dL 10.0 10.8*   BILIRUBIN mg/dL  --  0.2   ALK PHOS U/L  --  160*   ALT (SGPT) U/L  --  10   AST (SGOT) U/L  --  12   GLUCOSE mg/dL 78 83     Results from last 7 days   Lab Units 05/31/24  0556 05/30/24  1536   WBC 10*3/mm3 9.19 11.25*   HEMOGLOBIN g/dL 11.9* 13.6   HEMATOCRIT % 37.2 41.7   PLATELETS 10*3/mm3 208 352         Results from last 7 days   Lab Units 05/31/24  0826 05/31/24  0556 05/30/24  1536   HSTROP T ng/L 20* 21* 20*         Recent Labs     02/27/24  0522 04/21/24  0015 04/21/24  0919   PHART 7.305* 7.158* 7.199*   NHD6PRL 30.1* 26.4* 25.5*   PO2ART 90.6 104.0* 97.9   ZTD4AKH 15.0* 9.4* 9.9*   BASEEXCESS -10.3* -17.9* -16.6*      I have reviewed the patient's laboratory results.    Radiology results:    CT Abdomen Pelvis Without Contrast    Result Date: 5/30/2024  FINAL REPORT TECHNIQUE: Routine axial images through the abdomen and pelvis were obtained. CLINICAL HISTORY: Eval for peripancreatic abscess COMPARISON: 5/11/2024, 4/28/2024 FINDINGS: Abdomen:  The  gallbladder has been removed.  There are bilateral renal cysts and 2 stable small nonobstructing right renal calculi.  The pancreas is now normal with no peripancreatic inflammation or abscess.  The solid abdominal organs and ureters are otherwise unremarkable.  There is a moderate to large hiatal hernia of the stomach.  There are postoperative changes of the rectum.  The GI tract is otherwise unremarkable with no sign of appendicitis.  Pelvis: The urinary bladder is normal. There is no pelvic or abdominal ascites, adenopathy or acute osseous abnormality.  There are degenerative changes and scoliosis of the lumbar spine.     Impression: Hiatal hernia.  No acute disease, specifically no evidence of pancreatitis or abscess. Authenticated and Electronically Signed by Sergio Silverio M.D. on 05/30/2024 07:59:01 PM    XR Chest 1 View    Result Date: 5/30/2024  PROCEDURE: XR CHEST 1 VW-    HISTORY: SOA Triage Protocol  COMPARISON: April 6, 2024.  FINDINGS: The heart is normal in size. The mediastinum is unremarkable. There is no acute infiltrate. There is emphysematous change. There is no pneumothorax. There are no acute osseous abnormalities. Findings appear similar to the prior exam.      Impression: No significant interval change.        Images were reviewed, interpreted, and dictated by Dr. Lucía Bethea MD Transcribed by Kenia Aviles PA-C.  This report was signed and finalized on 5/30/2024 3:31 PM by Lucía Bethea MD.     I have reviewed the patient's radiology reports.    Medication Review:     I have reviewed the patient's active and prn medications.     Problem List:      Metabolic acidosis      Assessment/Plan:     Comfortable in bed, pleasantly conversational.    Updated son Thai on the phone.  He provided the additional history that she has been taking excessive Tylenol for a while.    Dehydration  N/V/D  VIVIENNE  Metabolic acidosis  CKD 3a  Acute pancreatitis  Bicarb drip. IVF.  Nephrology consultation,  recommendations appreciated.  C. difficile, GI panel.  Likely secondary to ongoing pancreatitis issues.      Chronic excessive Tylenol use  Checking salicylate level, Tylenol level.  She has been taking about 8 to 10 g of Tylenol daily.    Abnormal EKG  Trended troponins, low plateau.  ACS not suspected.     Chronic pancreatitis  Has been worked up previously, initially thought may be gallstone pancreatitis last fall but underwent ERCP and treatment at that time but has had multiple recurrent episodes since.  She has chronic abdominal pain secondary to this now.  Dr. Fleming has recommended multiple times patient go to  for GI evaluation and EUS and MRI, patient has failed to do so.  She also continues to smoke unfortunately.     Tobacco use  Encourage cessation.    Impaired mobility ADLs  PT/OT.    Further recommendations depend upon clinical course.     Risk Assessment: High  DVT Prophylaxis: SCDs  Code Status: Full   Diet: Clear liquid  Discharge Plan: At least a couple more days likely    Brian Joseph Kerley, DO  05/31/24  10:38 EDT    Dictated utilizing Dragon dictation.

## 2024-05-31 NOTE — CASE MANAGEMENT/SOCIAL WORK
Discharge Planning Assessment  [facility]     Patient Name: Tasha Yarbrough  MRN: [id number]  Today's Date: 5/31/2024    Admit Date: 5/30/2024    Plan: Pt awake and alert at time of visit.  Pt confirmed address, PCP, telephone numbers and contact persons:  Thai Yarbrough (son--[phone]  first contact)   Rj Yarbrough (son--[phone])   Pt reports does not have a POA or Living Will.  She lives in a mobile home with her two sons and has lived there for 14 yrs.  Pt reports has a WC, straight cane, walker (not sure if has wheels) shower chair and  BSC---all donated by family members.  Pt reports is able to perform personal care most of the time.  Her sons provide other ADLs.   She does not have HH following and when asked declined need.  Pt is a readmission related to previous admission.  Readmission assessment completed. Son reports pt is scheduled to see a GI physician at [facility] for possible scope (ERCP ?) Pt denies needs at DC except feels she may need O2 due to SOA at times.  Pt's O2 sat on RA was 100%.  Her HR was in 90's.  Pt uses [facility] as Pharmacy.  Meds to Beds explained.  Pt agreeable to using at DC.   SDOH completed.  Food Insecurity identified.   notified.   Discharge Needs Assessment       Row Name 05/31/24 1431       Living Environment    People in Home child(debra), adult    Name(s) of People in Home Thai Yarbrough/son (first contact)  and Rj Yarbrough/son    Current Living Arrangements other (see comments)  Mobile Home    Duration at Residence 14 years    Potentially Unsafe Housing Conditions none    In the past 12 months has the electric, gas, oil, or water company threatened to shut off services in your home? No    Primary Care Provided by self;other (see comments)  Pt usually able to perform personal care.  Other care provided by sons    Provides Primary Care For no one, unable/limited ability to care for self    Family Caregiver if Needed child(debra), adult    Family Caregiver Names  Thai Yarbrough/son  and Rj Yarbrough/son    Quality of Family Relationships supportive    Able to Return to Prior Arrangements yes       Resource/Environmental Concerns    Resource/Environmental Concerns none    Transportation Concerns none       Transportation Needs    In the past 12 months, has lack of transportation kept you from medical appointments or from getting medications? yes  Reported granddaughter now has car and should be able to take her to appointments    In the past 12 months, has lack of transportation kept you from meetings, work, or from getting things needed for daily living? No       Food Insecurity    Within the past 12 months, you worried that your food would run out before you got the money to buy more. Sometimes    Within the past 12 months, the food you bought just didn't last and you didn't have money to get more. Sometimes       Transition Planning    Patient/Family Anticipates Transition to home with family    Patient/Family Anticipated Services at Transition none;other (see comments)  Declines HH--reports her sons can help her    Transportation Anticipated family or friend will provide       Discharge Needs Assessment    Readmission Within the Last 30 Days other (see comments)  Readmission assessment completed    Equipment Currently Used at Home wheelchair;cane, straight;shower chair;commode  Reported all medical equipment she has had been given to her by family    Concerns to be Addressed adjustment to diagnosis/illness    Concerns Comments Pt reports feels she may need O2 due to feeling SOA at times.  Informed her she will probably be checked for need prior to DC.   At this time pt O2 sat is 100% on RA.  Heart rate was in 90's.    Anticipated Changes Related to Illness inability to care for self    Equipment Needed After Discharge other (see comments)  Pt feels like she may need O2 however O2 sat 100% on RA    Provided Post Acute Provider List? N/A    N/A Provider List Comment No  need identified at this time.  Pt declines HH    Current Discharge Risk chronically ill                   Discharge Plan       Row Name 05/31/24 1501       Plan    Plan Pt awake and alert at time of visit.  Pt confirmed address, PCP, telephone numbers and contact persons:  Thai Yarbrough (son--[phone]  first contact)   Rj Yarbrough (son--[phone])   Pt marcello does not have a POA or Living Will.  She lives in a mobile home with her two sons and has lived there for 14 yrs.  Pt reports has a WC, straight cane, walker (not sure if has wheels) shower chair and  BSC---all donated by family members.  Pt marcello is able to perform personal care most of the time.  Her sons provide other ADLs.   She does not have HH following and when asked declined need.  Pt is a readmission related to previous admission.  Readmission assessment completed. Son marcello pt is scheduled to see a GI physician at [facility] for possible scope (ERCP ?) Pt denies needs at DC except feels she may need O2 due to SOA at times.  Pt's O2 sat on RA was 100%.  Her HR was in 90's.  [facility].  Meds to Beds explained.  Pt agreeable to using at DC.   SDOH completed.  Food Insecurity identified.   notified.                  Continued Care and Services - Admitted Since 5/30/2024    No active coordination exists for this encounter.          Demographic Summary       Row Name 05/31/24 1418       General Information    Admission Type inpatient    Arrived From home    Required Notices Provided Important Message from Medicare    Expected Length of Stay (LOS) 72 hr    Referral Source admission list    Reason for Consult discharge planning    Preferred Language English       Contact Information    Permission Granted to Share Info With ;family/designee    Contact Information Obtained for     Contact Information Comments Thai Yarbrough/son/ [phone] (first contact), Rj Yarbrough/son/[phone]                    Functional Status       Row Name 05/31/24 1422       Functional Status    Usual Activity Tolerance moderate    Current Activity Tolerance poor    Functional Status Comments Reports functional status declined over last few days       Physical Activity    On average, how many days per week do you engage in moderate to strenuous exercise (like a brisk walk)? 0 days    On average, how many minutes do you engage in exercise at this level? 0 min    Number of minutes of exercise per week 0       Functional Status, IADL    Medications completely dependent    Meal Preparation completely dependent    Housekeeping completely dependent    Laundry completely dependent    Shopping completely dependent    IADL Comments Reports is usually able to perform personal care.  Dependent for other ADLs                   Psychosocial    No documentation.                  Abuse/Neglect    No documentation.                  Legal    No documentation.                  Substance Abuse    No documentation.                  Patient Forms    No documentation.                     Herminia Choi RN

## 2024-05-31 NOTE — THERAPY EVALUATION
Patient Name: Tasha Yarbrough  : 1955    MRN: [id number]                              Today's Date: 2024       Admit Date: 2024    Visit Dx:     ICD-10-CM ICD-9-CM   1. Metabolic acidosis  E87.20 276.2   2. Chronic abdominal pain  R10.9 789.00    G89.29 338.29     Patient Active Problem List   Diagnosis    Colon cancer screening    Gastroesophageal reflux disease with esophagitis    Left lower quadrant abdominal pain    Irritable bowel syndrome with constipation    Encounter for screening for malignant neoplasm of colon    Periumbilical abdominal pain    Diarrhea of presumed infectious origin    Acute kidney injury    Bacterial colitis    VIVIENNE (acute kidney injury)    C. difficile colitis    Generalized abdominal pain    Diarrhea of infectious origin    Abnormal finding on GI tract imaging    Anemia, chronic disease    Colitis due to Clostridium difficile    Pancreatitis, acute    Chronic kidney disease, stage III (moderate)    Acute pancreatitis    Metabolic acidosis    Acute UTI (urinary tract infection)    Epigastric pain    Nausea and vomiting in adult    Moderate malnutrition    Hyperkalemia     Past Medical History:   Diagnosis Date    Hypertension     Impaired functional mobility, balance, gait, and endurance     Impaired mobility     Injury of back     Pancreatitis      Past Surgical History:   Procedure Laterality Date    ABDOMINAL SURGERY      COLON SURGERY      COLONOSCOPY      ENDOSCOPY N/A 2024    Procedure: ESOPHAGOGASTRODUODENOSCOPY WITH BIOPSY;  Surgeon: Jairo Negro MD;  Location: [facility] ENDOSCOPY;  Service: Gastroenterology;  Laterality: N/A;    TUBAL ABDOMINAL LIGATION        General Information       Row Name 24 1418          Physical Therapy Time and Intention    Document Type evaluation  -JR     Mode of Treatment physical therapy  -JR       Row Name 24 1418          General Information    Patient Profile Reviewed yes  -JR     Prior Level of Function  independent:;all household mobility  -JR     Existing Precautions/Restrictions fall  -JR     Barriers to Rehab medically complex  -JR       Row Name 05/31/24 1418          Living Environment    People in Home child(debra), adult  -JR       Row Name 05/31/24 1418          Home Main Entrance    Number of Stairs, Main Entrance none  -JR       Row Name 05/31/24 1418          Stairs Within Home, Primary    Number of Stairs, Within Home, Primary none  -JR       Row Name 05/31/24 1418          Cognition    Orientation Status (Cognition) oriented x 4  -JR       Row Name 05/31/24 1418          Safety Issues, Functional Mobility    Safety Issues Affecting Function (Mobility) awareness of need for assistance;safety precautions follow-through/compliance;insight into deficits/self-awareness  -JR     Impairments Affecting Function (Mobility) strength;endurance/activity tolerance;range of motion (ROM);shortness of breath  -JR               User Key  (r) = Recorded By, (t) = Taken By, (c) = Cosigned By      Initials Name Provider Type    JR Gaye Falcon, PT Physical Therapist                   Mobility       Row Name 05/31/24 1418          Bed Mobility    Bed Mobility bed mobility (all) activities  -JR     All Activities, [place] (Bed Mobility) minimum assist (75% patient effort)  -     Assistive Device (Bed Mobility) head of bed elevated;bed rails  -[facility] Name 05/31/24 1418          Sit-Stand Transfer    Sit-Stand [place] (Transfers) minimum assist (75% patient effort)  -     Assistive Device (Sit-Stand Transfers) walker, front-wheeled  -[facility] Name 05/31/24 1418          Gait/Stairs (Locomotion)    [place] Level (Gait) minimum assist (75% patient effort)  -     Assistive Device (Gait) walker, front-wheeled  -     Patient was able to Ambulate yes  -JR     Distance in Feet (Gait) 60  -JR     Deviations/Abnormal Patterns (Gait) bilateral deviations;base of support, wide;festinating/shuffling;gait  speed decreased;stride length decreased  -JR     Bilateral Gait Deviations forward flexed posture;heel strike decreased  -JR               User Key  (r) = Recorded By, (t) = Taken By, (c) = Cosigned By      Initials Name Provider Type    Gaye Stokes PT Physical Therapist                   Obj/Interventions       Row Name 05/31/24 1418          Range of Motion Comprehensive    Comment, General Range of Motion Grossly WFL  -JR       Row Name 05/31/24 1418          Strength Comprehensive (MMT)    Comment, General Manual Muscle Testing (MMT) Assessment Grossly 3/5  -JR       Row Name 05/31/24 1418          Balance    Balance Assessment sitting static balance;sitting dynamic balance;sit to stand dynamic balance;standing static balance;standing dynamic balance  -JR     Static Sitting Balance contact guard  -JR     Dynamic Sitting Balance contact guard;minimal assist  -JR     Position, Sitting Balance unsupported;sitting edge of bed  -JR     Sit to Stand Dynamic Balance minimal assist  -JR     Static Standing Balance contact guard  -JR     Dynamic Standing Balance minimal assist  -JR     Position/Device Used, Standing Balance walker, rolling  -JR               User Key  (r) = Recorded By, (t) = Taken By, (c) = Cosigned By      Initials Name Provider Type    Gaye Stokes PT Physical Therapist                   Goals/Plan       [facility] Name 05/31/24 1418          Bed Mobility Goal 1 (PT)    Activity/Assistive Device (Bed Mobility Goal 1, PT) bed mobility activities, all  -JR     [place] Level/Cues Needed (Bed Mobility Goal 1, PT) supervision required  -JR     Time Frame (Bed Mobility Goal 1, PT) short term goal (STG)  -JR     Progress/Outcomes (Bed Mobility Goal 1, PT) goal ongoing  -[facility] Name 05/31/24 1418          Transfer Goal 1 (PT)    Activity/Assistive Device (Transfer Goal 1, PT) sit-to-stand/stand-to-sit;bed-to-chair/chair-to-bed  -JR     [place] Level/Cues Needed (Transfer Goal 1, PT)  supervision required  -JR     Time Frame (Transfer Goal 1, PT) long term goal (LTG)  -JR     Progress/Outcome (Transfer Goal 1, PT) goal ongoing  -JR       Row Name 05/31/24 1418          Gait Training Goal 1 (PT)    Activity/Assistive Device (Gait Training Goal 1, PT) gait (walking locomotion);walker, rolling;increase endurance/gait distance;improve balance and speed  -JR     [place] Level (Gait Training Goal 1, PT) supervision required  -JR     Distance (Gait Training Goal 1, PT) 150  -JR     Time Frame (Gait Training Goal 1, PT) long term goal (LTG)  -JR     Progress/Outcome (Gait Training Goal 1, PT) goal ongoing  -JR       Row Name 05/31/24 1418          Patient Education Goal (PT)    Activity (Patient Education Goal, PT) Perform BLE exercises x 20  -JR     [place]/Cues/Accuracy (Memory Goal 2, PT) demonstrates adequately  -JR     Time Frame (Patient Education Goal, PT) 4 days  -JR     Progress/Outcome (Patient Education Goal, PT) goal ongoing  -JR       Row Name 05/31/24 1418          Therapy Assessment/Plan (PT)    Planned Therapy Interventions (PT) strengthening;balance training;bed mobility training;transfer training;gait training;home exercise program;patient/family education  -JR               User Key  (r) = Recorded By, (t) = Taken By, (c) = Cosigned By      Initials Name Provider Type    Gaye Stokes, PT Physical Therapist                   Clinical Impression       Row Name 05/31/24 1418          Pain    Pretreatment Pain Rating 0/10 - no pain  -JR     Posttreatment Pain Rating 0/10 - no pain  -JR     Pain Intervention(s) Repositioned;Ambulation/increased activity  -JR     Additional Documentation Pain Scale: Numbers Pre/Post-Treatment (Group)  -       Row Name 05/31/24 1418          Plan of Care Review    Plan of Care Reviewed With patient  -JR     Progress no change  -JR     Outcome Evaluation Patient participated well in PT evaluation and demonstrated decreased strength, balance and  overall mobility.  She required minimal assistance to perform bed mobility, transfers and gait x 60 feet with RW.  She reports her legs are tired after walking.  She is expected to benefit from additional PT services while hospitalized and upon discharge to home with home health care.  -       Row Name 05/31/24 1418          Therapy Assessment/Plan (PT)    Patient/Family Therapy Goals Statement (PT) Patient is eager to go back home with her sons  -JR     Rehab Potential (PT) good, to achieve stated therapy goals  -JR     Criteria for Skilled Interventions Met (PT) yes;meets criteria;skilled treatment is necessary  -JR     Therapy Frequency (PT) 5 times/wk  -JR       Row Name 05/31/24 1418          Positioning and Restraints    Pre-Treatment Position in bed  -JR     Post Treatment Position bed  -JR     In Bed supine;call light within reach;encouraged to call for assist;patient within staff view;notified nsg  -JR               User Key  (r) = Recorded By, (t) = Taken By, (c) = Cosigned By      Initials Name Provider Type    JR Gaye Falcon, PT Physical Therapist                   Outcome Measures       Row Name 05/31/24 1418          How much help from another person do you currently need...    Turning from your back to your side while in flat bed without using bedrails? 3  -JR     Moving from lying on back to sitting on the side of a flat bed without bedrails? 3  -JR     Moving to and from a bed to a chair (including a wheelchair)? 3  -JR     Standing up from a chair using your arms (e.g., wheelchair, bedside chair)? 3  -JR     Climbing 3-5 steps with a railing? 2  -JR     To walk in hospital room? 3  -JR     AM-PAC 6 Clicks Score (PT) 17  -JR     Highest Level of Mobility Goal 5 --> Static standing  -JR       Row Name 05/31/24 1418          Functional Assessment    Outcome Measure Options AM-PAC 6 Clicks Basic Mobility (PT)  -JR               User Key  (r) = Recorded By, (t) = Taken By, (c) = Cosigned By       Initials Name Provider Type    Gaye Stokes, PT Physical Therapist                                 Physical Therapy Education       Title: PT OT SLP Therapies (In Progress)       Topic: Physical Therapy (In Progress)       Point: Mobility training (In Progress)       Learning Progress Summary             Patient Acceptance, E,TB, NR by  at 5/31/2024 [id number]    Comment: Role of PT and POC                         Point: Home exercise program (Not Started)       Learner Progress:  Not documented in this visit.              Point: Body mechanics (Not Started)       Learner Progress:  Not documented in this visit.              Point: Precautions (Not Started)       Learner Progress:  Not documented in this visit.                              User Key       Initials Effective Dates Name Provider Type Discipline     08/22/23 -  Gaye Falcon, PT Physical Therapist PT                  PT Recommendation and Plan  Planned Therapy Interventions (PT): strengthening, balance training, bed mobility training, transfer training, gait training, home exercise program, patient/family education  Plan of Care Reviewed With: patient  Progress: no change  Outcome Evaluation: Patient participated well in PT evaluation and demonstrated decreased strength, balance and overall mobility.  She required minimal assistance to perform bed mobility, transfers and gait x 60 feet with RW.  She reports her legs are tired after walking.  She is expected to benefit from additional PT services while hospitalized and upon discharge to home with home health care.     Time Calculation:   PT Evaluation Complexity  History, PT Evaluation Complexity: 1-2 personal factors and/or comorbidities  Examination of Body Systems (PT Eval Complexity): 1-2 elements  Clinical Presentation (PT Evaluation Complexity): evolving  Clinical Decision Making (PT Evaluation Complexity): low complexity  Overall Complexity (PT Evaluation Complexity): low complexity     PT Charges        Row Name 05/31/24 1418             Time Calculation    Start Time 1418  -JR      PT Received On 05/31/24  -JR         Untimed Charges    PT Eval/Re-eval Minutes 41  -JR         Total Minutes    Untimed Charges Total Minutes 41  -JR       Total Minutes 41  -JR                User Key  (r) = Recorded By, (t) = Taken By, (c) = Cosigned By      Initials Name Provider Type    JR Gaye Falcon, PT Physical Therapist                  Therapy Charges for Today       Code Description Service Date Service Provider Modifiers Qty    69103198094 HC PT EVAL LOW COMPLEXITY 3 5/31/2024 Gaye Falcon, PT GP 1            PT G-Codes  Outcome Measure Options: AM-PAC 6 Clicks Basic Mobility (PT)  AM-PAC 6 Clicks Score (PT): 17  PT Discharge Summary  Anticipated Discharge Disposition (PT): home with home health, home with assist    Gaye Falcon, PT  5/31/2024

## 2024-06-01 LAB
ADV 40+41 DNA STL QL NAA+NON-PROBE: NOT DETECTED
ALBUMIN SERPL-MCNC: 3.2 G/DL (ref 3.5–5.2)
ALBUMIN/GLOB SERPL: 1.7 G/DL
ALP SERPL-CCNC: 100 U/L (ref 39–117)
ALT SERPL W P-5'-P-CCNC: 6 U/L (ref 1–33)
ANION GAP SERPL CALCULATED.3IONS-SCNC: 10.2 MMOL/L (ref 5–15)
ANISOCYTOSIS BLD QL: NORMAL
AST SERPL-CCNC: 9 U/L (ref 1–32)
ASTRO TYP 1-8 RNA STL QL NAA+NON-PROBE: NOT DETECTED
BASOPHILS # BLD AUTO: 0.04 10*3/MM3 (ref 0–0.2)
BASOPHILS NFR BLD AUTO: 0.7 % (ref 0–1.5)
BILIRUB SERPL-MCNC: 0.2 MG/DL (ref 0–1.2)
BUN SERPL-MCNC: 33 MG/DL (ref 8–23)
BUN/CREAT SERPL: 24.1 (ref 7–25)
C CAYETANENSIS DNA STL QL NAA+NON-PROBE: NOT DETECTED
C COLI+JEJ+UPSA DNA STL QL NAA+NON-PROBE: NOT DETECTED
C DIFF GDH + TOXINS A+B STL QL IA.RAPID: NEGATIVE
C DIFF GDH + TOXINS A+B STL QL IA.RAPID: POSITIVE
CALCIUM SPEC-SCNC: 8.5 MG/DL (ref 8.6–10.5)
CHLORIDE SERPL-SCNC: 113 MMOL/L (ref 98–107)
CO2 SERPL-SCNC: 19.8 MMOL/L (ref 22–29)
CREAT SERPL-MCNC: 1.37 MG/DL (ref 0.57–1)
CRYPTOSP DNA STL QL NAA+NON-PROBE: NOT DETECTED
DEPRECATED RDW RBC AUTO: 62.2 FL (ref 37–54)
E HISTOLYT DNA STL QL NAA+NON-PROBE: NOT DETECTED
EAEC PAA PLAS AGGR+AATA ST NAA+NON-PRB: NOT DETECTED
EC STX1+STX2 GENES STL QL NAA+NON-PROBE: NOT DETECTED
EGFRCR SERPLBLD CKD-EPI 2021: 41.9 ML/MIN/1.73
EOSINOPHIL # BLD AUTO: 0.06 10*3/MM3 (ref 0–0.4)
EOSINOPHIL NFR BLD AUTO: 1.1 % (ref 0.3–6.2)
EPEC EAE GENE STL QL NAA+NON-PROBE: NOT DETECTED
ERYTHROCYTE [DISTWIDTH] IN BLOOD BY AUTOMATED COUNT: 15.6 % (ref 12.3–15.4)
ETEC LTA+ST1A+ST1B TOX ST NAA+NON-PROBE: NOT DETECTED
G LAMBLIA DNA STL QL NAA+NON-PROBE: NOT DETECTED
GLOBULIN UR ELPH-MCNC: 1.9 GM/DL
GLUCOSE SERPL-MCNC: 78 MG/DL (ref 65–99)
HCT VFR BLD AUTO: 26.3 % (ref 34–46.6)
HGB BLD-MCNC: 8.6 G/DL (ref 12–15.9)
IMM GRANULOCYTES # BLD AUTO: 0.03 10*3/MM3 (ref 0–0.05)
IMM GRANULOCYTES NFR BLD AUTO: 0.5 % (ref 0–0.5)
LIPASE SERPL-CCNC: 68 U/L (ref 13–60)
LYMPHOCYTES # BLD AUTO: 1.49 10*3/MM3 (ref 0.7–3.1)
LYMPHOCYTES NFR BLD AUTO: 26.1 % (ref 19.6–45.3)
MACROCYTES BLD QL SMEAR: NORMAL
MCH RBC QN AUTO: 35.7 PG (ref 26.6–33)
MCHC RBC AUTO-ENTMCNC: 32.7 G/DL (ref 31.5–35.7)
MCV RBC AUTO: 109.1 FL (ref 79–97)
MONOCYTES # BLD AUTO: 0.42 10*3/MM3 (ref 0.1–0.9)
MONOCYTES NFR BLD AUTO: 7.4 % (ref 5–12)
NEUTROPHILS NFR BLD AUTO: 3.66 10*3/MM3 (ref 1.7–7)
NEUTROPHILS NFR BLD AUTO: 64.2 % (ref 42.7–76)
NOROVIRUS GI+II RNA STL QL NAA+NON-PROBE: NOT DETECTED
NRBC BLD AUTO-RTO: 0 /100 WBC (ref 0–0.2)
P SHIGELLOIDES DNA STL QL NAA+NON-PROBE: NOT DETECTED
PLATELET # BLD AUTO: 85 10*3/MM3 (ref 140–450)
PMV BLD AUTO: 11.7 FL (ref 6–12)
POTASSIUM SERPL-SCNC: 3.6 MMOL/L (ref 3.5–5.2)
PROT SERPL-MCNC: 5.1 G/DL (ref 6–8.5)
RBC # BLD AUTO: 2.41 10*6/MM3 (ref 3.77–5.28)
RVA RNA STL QL NAA+NON-PROBE: NOT DETECTED
S ENT+BONG DNA STL QL NAA+NON-PROBE: NOT DETECTED
SAPO I+II+IV+V RNA STL QL NAA+NON-PROBE: NOT DETECTED
SHIGELLA SP+EIEC IPAH ST NAA+NON-PROBE: NOT DETECTED
SMALL PLATELETS BLD QL SMEAR: NORMAL
SODIUM SERPL-SCNC: 143 MMOL/L (ref 136–145)
V CHOL+PARA+VUL DNA STL QL NAA+NON-PROBE: NOT DETECTED
V CHOLERAE DNA STL QL NAA+NON-PROBE: NOT DETECTED
WBC MORPH BLD: NORMAL
WBC NRBC COR # BLD AUTO: 5.7 10*3/MM3 (ref 3.4–10.8)
Y ENTEROCOL DNA STL QL NAA+NON-PROBE: NOT DETECTED

## 2024-06-01 PROCEDURE — 97116 GAIT TRAINING THERAPY: CPT

## 2024-06-01 PROCEDURE — 97110 THERAPEUTIC EXERCISES: CPT

## 2024-06-01 PROCEDURE — 85007 BL SMEAR W/DIFF WBC COUNT: CPT | Performed by: STUDENT IN AN ORGANIZED HEALTH CARE EDUCATION/TRAINING PROGRAM

## 2024-06-01 PROCEDURE — 87507 IADNA-DNA/RNA PROBE TQ 12-25: CPT | Performed by: FAMILY MEDICINE

## 2024-06-01 PROCEDURE — 25810000003 SODIUM CHLORIDE 0.9 % SOLUTION: Performed by: INTERNAL MEDICINE

## 2024-06-01 PROCEDURE — 25010000002 MORPHINE PER 10 MG: Performed by: FAMILY MEDICINE

## 2024-06-01 PROCEDURE — 87449 NOS EACH ORGANISM AG IA: CPT | Performed by: FAMILY MEDICINE

## 2024-06-01 PROCEDURE — 99232 SBSQ HOSP IP/OBS MODERATE 35: CPT | Performed by: STUDENT IN AN ORGANIZED HEALTH CARE EDUCATION/TRAINING PROGRAM

## 2024-06-01 PROCEDURE — 85025 COMPLETE CBC W/AUTO DIFF WBC: CPT | Performed by: STUDENT IN AN ORGANIZED HEALTH CARE EDUCATION/TRAINING PROGRAM

## 2024-06-01 PROCEDURE — 87324 CLOSTRIDIUM AG IA: CPT | Performed by: FAMILY MEDICINE

## 2024-06-01 PROCEDURE — 80053 COMPREHEN METABOLIC PANEL: CPT | Performed by: STUDENT IN AN ORGANIZED HEALTH CARE EDUCATION/TRAINING PROGRAM

## 2024-06-01 PROCEDURE — 83690 ASSAY OF LIPASE: CPT | Performed by: STUDENT IN AN ORGANIZED HEALTH CARE EDUCATION/TRAINING PROGRAM

## 2024-06-01 PROCEDURE — 97530 THERAPEUTIC ACTIVITIES: CPT

## 2024-06-01 RX ORDER — FAMOTIDINE 20 MG/1
20 TABLET, FILM COATED ORAL 2 TIMES DAILY
COMMUNITY

## 2024-06-01 RX ORDER — POTASSIUM CHLORIDE 750 MG/1
40 CAPSULE, EXTENDED RELEASE ORAL ONCE
Status: COMPLETED | OUTPATIENT
Start: 2024-06-01 | End: 2024-06-01

## 2024-06-01 RX ORDER — FAMOTIDINE 20 MG/1
20 TABLET, FILM COATED ORAL
Status: DISCONTINUED | OUTPATIENT
Start: 2024-06-01 | End: 2024-06-02 | Stop reason: HOSPADM

## 2024-06-01 RX ORDER — SODIUM CHLORIDE 9 MG/ML
100 INJECTION, SOLUTION INTRAVENOUS CONTINUOUS
Status: DISCONTINUED | OUTPATIENT
Start: 2024-06-01 | End: 2024-06-02

## 2024-06-01 RX ADMIN — SODIUM BICARBONATE 650 MG TABLET 1300 MG: at 21:12

## 2024-06-01 RX ADMIN — SERTRALINE HYDROCHLORIDE 50 MG: 50 TABLET ORAL at 08:06

## 2024-06-01 RX ADMIN — MORPHINE SULFATE 3 MG: 4 INJECTION, SOLUTION INTRAMUSCULAR; INTRAVENOUS at 12:26

## 2024-06-01 RX ADMIN — MORPHINE SULFATE 3 MG: 4 INJECTION, SOLUTION INTRAMUSCULAR; INTRAVENOUS at 08:06

## 2024-06-01 RX ADMIN — Medication 10 ML: at 21:12

## 2024-06-01 RX ADMIN — Medication 10 ML: at 08:06

## 2024-06-01 RX ADMIN — SODIUM BICARBONATE 650 MG TABLET 1300 MG: at 12:23

## 2024-06-01 RX ADMIN — MORPHINE SULFATE 3 MG: 4 INJECTION, SOLUTION INTRAMUSCULAR; INTRAVENOUS at 16:33

## 2024-06-01 RX ADMIN — FAMOTIDINE 20 MG: 20 TABLET, FILM COATED ORAL at 16:34

## 2024-06-01 RX ADMIN — SODIUM CHLORIDE 100 ML/HR: 9 INJECTION, SOLUTION INTRAVENOUS at 09:13

## 2024-06-01 RX ADMIN — POTASSIUM CHLORIDE 40 MEQ: 750 CAPSULE, EXTENDED RELEASE ORAL at 08:06

## 2024-06-01 RX ADMIN — SODIUM BICARBONATE 650 MG TABLET 1300 MG: at 08:06

## 2024-06-01 RX ADMIN — SODIUM BICARBONATE 650 MG TABLET 1300 MG: at 18:19

## 2024-06-01 RX ADMIN — MORPHINE SULFATE 3 MG: 4 INJECTION, SOLUTION INTRAMUSCULAR; INTRAVENOUS at 21:12

## 2024-06-01 NOTE — PROGRESS NOTES
[facility]    Name:  Tasha Yarbrough   Age:  69 y.o.  Sex:  female  :  1955  MRN:  [id number]   Visit Number:  [id number]  Admission Date:  2024  Date Of Service:  24  Primary Care Physician:  Terrence Baldwin MD     LOS: 2 days :    Chief Complaint:      Follow-up; N/V/D    Subjective:    Feels much better than when she first came in, no more vomiting.  Still having some generalized abdominal discomfort.  Breathing feels good.    Hospital Course:    Tasha Yarbrough is a 69-year-old female with a history of recurrent pancreatitis, hypertension, chronic tobacco abuse, chronic pain, C. difficile, has had multiple hospitalizations in the last few months with recurrent pancreatitis.  She had presented due to ongoing abdominal pain, back pain, has had intermittent diarrhea and loose stools at home.  She has felt very weak, had shortness of breath over the last few days.  She has been taking some pain medication but has not been helping much for her pancreas.  She continues to smoke.  She did follow-up with GI last week who had recommended outpatient referral to  for EUS.     In the ER, she was hemodynamically stable.  She had evidence of acute kidney injury, severe metabolic acidosis, and elevated lipase.  CT imaging of the abdomen did not reveal any acute pancreatitis.  Patient's case was discussed by ER provider with nephrology who recommended sodium bicarb drip.    Inpatient general floor admission 2024 with dehydration secondary to N/V/D, likely related to chronic pancreatitis.    Review of Systems:     All systems were reviewed and negative except as mentioned in subjective, assessment and plan.    Vital Signs:    Temp:  [97.4 °F (36.3 °C)-99.4 °F (37.4 °C)] 98.3 °F (36.8 °C)  Heart Rate:  [] 95  Resp:  [12-19] 19  BP: ()/(55-82) 105/60    Intake and output:    I/O last 3 completed shifts:  In: 3679 [I.V.:3679]  Out: 400 [Urine:400]  I/O  this shift:  In: 250 [P.O.:200; I.V.:50]  Out: -     Physical Examination:    General Appearance:  Alert and cooperative.    Head:  Atraumatic and normocephalic.   Eyes: Conjunctivae and sclerae normal, no icterus. No pallor.   Throat: No oral lesions, no thrush, oral mucosa moist.   Neck: Supple, trachea midline, no thyromegaly.   Lungs:   Breath sounds heard bilaterally equally.  No wheezing or crackles. No Pleural rub or bronchial breathing.   Heart:  Normal S1 and S2, no murmur, no gallop, no rub. No JVD.   Abdomen:   Mildly tender throughout, nondistended.   Extremities: Supple, no edema, no cyanosis, no clubbing.   Skin: Warm.   Neurologic: Alert and oriented x 3. No facial asymmetry. Moves all four limbs. No tremors.      Laboratory results:    Results from last 7 days   Lab Units 06/01/24  0438 05/31/24  1151 05/31/24  0556 05/30/24  1536   SODIUM mmol/L 143 136 141 138   POTASSIUM mmol/L 3.6 4.9 3.8 4.7   CHLORIDE mmol/L 113* 113* 116* 114*   CO2 mmol/L 19.8* 10.2* 6.8* 4.5*   BUN mg/dL 33* 42* 43* 40*   CREATININE mg/dL 1.37* 1.96* 1.93* 2.03*   CALCIUM mg/dL 8.5* 9.6 10.0 10.8*   BILIRUBIN mg/dL 0.2 0.3  --  0.2   ALK PHOS U/L 100 124*  --  160*   ALT (SGPT) U/L 6 7  --  10   AST (SGOT) U/L 9 10  --  12   GLUCOSE mg/dL 78 85 78 83     Results from last 7 days   Lab Units 06/01/24  0438 05/31/24  0556 05/30/24  1536   WBC 10*3/mm3 5.70 9.19 11.25*   HEMOGLOBIN g/dL 8.6* 11.9* 13.6   HEMATOCRIT % 26.3* 37.2 41.7   PLATELETS 10*3/mm3 85* 208 352         Results from last 7 days   Lab Units 05/31/24  0826 05/31/24  0556 05/30/24  1536   HSTROP T ng/L 20* 21* 20*         Recent Labs     04/21/24  0015 04/21/24  0919 05/31/24  1126   PHART 7.158* 7.199* 7.230*   NWO4AWR 26.4* 25.5* 26.8*   PO2ART 104.0* 97.9 96.6   SMJ1INB 9.4* 9.9* 11.2*   BASEEXCESS -17.9* -16.6* -14.9*      I have reviewed the patient's laboratory results.    Radiology results:    CT Abdomen Pelvis Without Contrast    Result Date:  5/30/2024  FINAL REPORT TECHNIQUE: Routine axial images through the abdomen and pelvis were obtained. CLINICAL HISTORY: Eval for peripancreatic abscess COMPARISON: 5/11/2024, 4/28/2024 FINDINGS: Abdomen:  The gallbladder has been removed.  There are bilateral renal cysts and 2 stable small nonobstructing right renal calculi.  The pancreas is now normal with no peripancreatic inflammation or abscess.  The solid abdominal organs and ureters are otherwise unremarkable.  There is a moderate to large hiatal hernia of the stomach.  There are postoperative changes of the rectum.  The GI tract is otherwise unremarkable with no sign of appendicitis.  Pelvis: The urinary bladder is normal. There is no pelvic or abdominal ascites, adenopathy or acute osseous abnormality.  There are degenerative changes and scoliosis of the lumbar spine.     Impression: Hiatal hernia.  No acute disease, specifically no evidence of pancreatitis or abscess. Authenticated and Electronically Signed by Sergio Silverio M.D. on 05/30/2024 07:59:01 PM    XR Chest 1 View    Result Date: 5/30/2024  PROCEDURE: XR CHEST 1 VW-    HISTORY: SOA Triage Protocol  COMPARISON: April 6, 2024.  FINDINGS: The heart is normal in size. The mediastinum is unremarkable. There is no acute infiltrate. There is emphysematous change. There is no pneumothorax. There are no acute osseous abnormalities. Findings appear similar to the prior exam.      Impression: No significant interval change.        Images were reviewed, interpreted, and dictated by Dr. Lucía Bethea MD Transcribed by Kenia Aviles PA-C.  This report was signed and finalized on 5/30/2024 3:31 PM by Lucía Bethea MD.     I have reviewed the patient's radiology reports.    Medication Review:     I have reviewed the patient's active and prn medications.     Problem List:      Metabolic acidosis      Assessment/Plan:     Comfortable in bed, pleasantly conversational.    Updated son Thai on the phone.  He provided the  additional history that she has been taking excessive Tylenol for a while.    Dehydration  N/V/D  VIVIENNE  Metabolic acidosis  CKD 3a  Acute pancreatitis  Metabolic acidosis slowly improving. IVF.  Nephrology consultation, recommendations appreciated.  C. difficile toxin negative, GI panel negative.  Diarrhea likely secondary to ongoing pancreatitis issues.      Chronic excessive Tylenol use  Salicylate level not elevated, Tylenol level not elevated.  She has been taking about 8 to 10 g of Tylenol daily.  Cautioned patient regarding daily limits of Tylenol.    Abnormal EKG  Trended troponins, low plateau.  ACS not suspected.     Chronic pancreatitis  Has been worked up previously, initially thought may be gallstone pancreatitis last fall but underwent ERCP and treatment at that time but has had multiple recurrent episodes since.  She has chronic abdominal pain secondary to this now.  Dr. Fleming has recommended multiple times patient go to  for GI evaluation and EUS and MRI, patient has failed to do so.  She also continues to smoke unfortunately.     Tobacco use  Encourage cessation.    Impaired mobility ADLs  PT/OT.    Further recommendations depend upon clinical course.     Risk Assessment: High  DVT Prophylaxis: SCDs  Code Status: Full   Diet: Advance as tolerated  Discharge Plan: At least a couple more days likely    Brian Joseph Kerley, DO  06/01/24  10:37 EDT    Dictated utilizing Dragon dictation.

## 2024-06-01 NOTE — THERAPY TREATMENT NOTE
Patient Name: Tasha Yarbrough  : 1955    MRN: [id number]                              Today's Date: 2024       Admit Date: 2024    Visit Dx:     ICD-10-CM ICD-9-CM   1. Metabolic acidosis  E87.20 276.2   2. Chronic abdominal pain  R10.9 789.00    G89.29 338.29     Patient Active Problem List   Diagnosis    Colon cancer screening    Gastroesophageal reflux disease with esophagitis    Left lower quadrant abdominal pain    Irritable bowel syndrome with constipation    Encounter for screening for malignant neoplasm of colon    Periumbilical abdominal pain    Diarrhea of presumed infectious origin    Acute kidney injury    Bacterial colitis    VIVIENNE (acute kidney injury)    C. difficile colitis    Generalized abdominal pain    Diarrhea of infectious origin    Abnormal finding on GI tract imaging    Anemia, chronic disease    Colitis due to Clostridium difficile    Pancreatitis, acute    Chronic kidney disease, stage III (moderate)    Acute pancreatitis    Metabolic acidosis    Acute UTI (urinary tract infection)    Epigastric pain    Nausea and vomiting in adult    Moderate malnutrition    Hyperkalemia     Past Medical History:   Diagnosis Date    Hypertension     Impaired functional mobility, balance, gait, and endurance     Impaired mobility     Injury of back     Pancreatitis      Past Surgical History:   Procedure Laterality Date    ABDOMINAL SURGERY      COLON SURGERY      COLONOSCOPY      ENDOSCOPY N/A 2024    Procedure: ESOPHAGOGASTRODUODENOSCOPY WITH BIOPSY;  Surgeon: Jairo Negro MD;  Location: [facility] ENDOSCOPY;  Service: Gastroenterology;  Laterality: N/A;    TUBAL ABDOMINAL LIGATION        General Information       Row Name 24 1628          Physical Therapy Time and Intention    Document Type therapy note (daily note)  -CC     Mode of Treatment physical therapy  -CC       Row Name 24 1628          General Information    Patient Profile Reviewed yes  -CC     Existing  Precautions/Restrictions fall  -       Row Name 06/01/24 1628          Safety Issues, Functional Mobility    Safety Issues Affecting Function (Mobility) awareness of need for assistance;insight into deficits/self-awareness;safety precautions follow-through/compliance  -     Impairments Affecting Function (Mobility) strength;endurance/activity tolerance;shortness of breath  -               User Key  (r) = Recorded By, (t) = Taken By, (c) = Cosigned By      Initials Name Provider Type    CC Katia Sykes, PTA Physical Therapist Assistant                   Mobility    No documentation.                  Obj/Interventions    No documentation.                  Goals/Plan    No documentation.                  Clinical Impression       [facility] Name 06/01/24 1628          Pain    Pretreatment Pain Rating 5/10  -CC     Posttreatment Pain Rating 5/10  -CC     Pain Location - Side/Orientation Bilateral  -     Pain Location lower  -     Pain Location - back  -     Pain Intervention(s) Repositioned;Ambulation/increased activity  -     Additional Documentation Pain Scale: Numbers Pre/Post-Treatment (Group)  -[facility] Name 06/01/24 1620          Plan of Care Review    Plan of Care Reviewed With patient  -CC     Progress improving  -     Outcome Evaluation Pt agreeable to physical therapy. Performed sit <->stand with SBA with RW, amb with RW 80 feet with SBA  with increased time to perform tasks. Performed B LE ex inm sitting AP, LAQ, hip abd, marching and reclined QS and glute sets 1x15 reps. Con't with PT POC andprogress as tolerated  -[facility] Name 06/01/24 1625          Positioning and Restraints    Pre-Treatment Position sitting in chair/recliner  -     Post Treatment Position chair  -CC     In Chair notified nsg;sitting;call light within reach;encouraged to call for assist;exit alarm on;with family/caregiver  -               User Key  (r) = Recorded By, (t) = Taken By, (c) = Cosigned By       Initials Name Provider Type     Katia Sykes PTA Physical Therapist Assistant                   Outcome Measures       Row Name 06/01/24 1632          How much help from another person do you currently need...    Turning from your back to your side while in flat bed without using bedrails? 3  -CC     Moving from lying on back to sitting on the side of a flat bed without bedrails? 3  -CC     Moving to and from a bed to a chair (including a wheelchair)? 3  -CC     Standing up from a chair using your arms (e.g., wheelchair, bedside chair)? 4  -CC     Climbing 3-5 steps with a railing? 2  -CC     To walk in hospital room? 3  -CC     AM-PAC 6 Clicks Score (PT) 18  -CC     Highest Level of Mobility Goal 6 --> Walk 10 steps or more  -       Row Name 06/01/24 1632          Functional Assessment    Outcome Measure Options AM-PAC 6 Clicks Basic Mobility (PT)  -               User Key  (r) = Recorded By, (t) = Taken By, (c) = Cosigned By      Initials Name Provider Type     Katia Sykes PTA Physical Therapist Assistant                                 Physical Therapy Education       Title: PT OT SLP Therapies (In Progress)       Topic: Physical Therapy (In Progress)       Point: Mobility training (In Progress)       Learning Progress Summary             Patient Acceptance, E,TB, NR by  at 5/31/2024 1836    Comment: Role of PT and POC                         Point: Home exercise program (Done)       Learning Progress Summary             Patient Acceptance, E,TB, VU by  at 6/1/2024 1633    Comment: Perform ex daily btw therapy sessions                         Point: Body mechanics (Not Started)       Learner Progress:  Not documented in this visit.              Point: Precautions (Not Started)       Learner Progress:  Not documented in this visit.                              User Key       Initials Effective Dates Name Provider Type Dashawn WINTERS 08/22/23 -  Gaye Falcon, PT Physical Therapist PT     CC 06/16/21 -  Katia Sykes PTA Physical Therapist Assistant PT                  PT Recommendation and Plan     Plan of Care Reviewed With: patient  Progress: improving  Outcome Evaluation: Pt agreeable to physical therapy. Performed sit <->stand with SBA with RW, amb with RW 80 feet with SBA  with increased time to perform tasks. Performed B LE ex inm sitting AP, LAQ, hip abd, marching and reclined QS and glute sets 1x15 reps. Con't with PT POC andprogress as tolerated     Time Calculation:         PT Charges       Row Name 06/01/24 1635             Time Calculation    PT Received On 06/01/24  -      PT Goal Re-Cert Due Date 06/10/24  -CC         Time Calculation- PT    Total Timed Code Minutes- PT 39 minute(s)  -CC         Timed Charges    91108 - PT Therapeutic Exercise Minutes 15  -CC      47781 - Gait Training Minutes  15  -CC      84091 - PT Therapeutic Activity Minutes 9  -CC         Total Minutes    Timed Charges Total Minutes 39  -CC       Total Minutes 39  -CC                User Key  (r) = Recorded By, (t) = Taken By, (c) = Cosigned By      Initials Name Provider Type    CC Katia Sykes PTA Physical Therapist Assistant                  Therapy Charges for Today       Code Description Service Date Service Provider Modifiers Qty    32203855276 HC PT THER PROC EA 15 MIN 6/1/2024 Katia Sykes PTA GP 1    03963556195 HC GAIT TRAINING EA 15 MIN 6/1/2024 Katia Sykes PTA GP 1    40007341764 HC PT THERAPEUTIC ACT EA 15 MIN 6/1/2024 Katia Sykes PTA GP 1            PT G-Codes  Outcome Measure Options: AM-PAC 6 Clicks Basic Mobility (PT)  AM-PAC 6 Clicks Score (PT): 18       Katia Sykes PTA  6/1/2024

## 2024-06-01 NOTE — PLAN OF CARE
Goal Outcome Evaluation:  Plan of Care Reviewed With: patient        Progress: improving  Outcome Evaluation: Pt agreeable to physical therapy. Performed sit <->stand with SBA with RW, amb with RW 80 feet with SBA  with increased time to perform tasks. Performed B LE ex inm sitting AP, LAQ, hip abd, marching and reclined QS and glute sets 1x15 reps. Con't with PT POC andprogress as tolerated

## 2024-06-01 NOTE — PLAN OF CARE
Goal Outcome Evaluation:    No acute events overnight. Pain controlled with PRN pain medication. Bicarb gtt continues to infuse. VSS.

## 2024-06-01 NOTE — PROGRESS NOTES
[facility] Progress Note  [facility]. [place]        Patient Name: Tasha Yarbrough  : 1955  MRN: [id number]   LOS: 2 days    Patient Care Team:  Terrence Baldwin MD as PCP - General (Family Medicine)    Chief Complaint:    Chief Complaint   Patient presents with    Abdominal Pain    Nausea    Shortness of Breath     Pt states SOA, weakness. Pt states she can't hardly stand to walk due to soa. Pt is having Nausea and back pain. SOA for 3 days, getting worse.     Weakness - Generalized    Back Pain     Primary Care Physician:  Terrence Baldwin MD  Date of admission: 2024    Subjective     Interval History:   Follow-up acute kidney injury and severe metabolic acidosis.  Events noted from last 24 hours.    I reviewed the chart and other providers notes, labs and procedures done since my last note.  Patient is still complaining of loose bowel movements, she said every time she tries to eat she is having a bowel movement.  Denies having any chest pain or shortness of breath.    Review of Systems:   As noted above.    Objective     Vitals:   Temp:  [97.4 °F (36.3 °C)-99.4 °F (37.4 °C)] 97.4 °F (36.3 °C)  Heart Rate:  [] 81  Resp:  [12-16] 12  BP: ()/(55-82) 96/64    Intake/Output Summary (Last 24 hours) at 2024 0703  Last data filed at 2024 0655  Gross per 24 hour   Intake 3679 ml   Output 400 ml   Net 3279 ml       Physical Exam:    General Appearance: alert, oriented x 3, no acute distress   Skin: warm and dry  HEENT: oral mucosa normal, nonicteric sclera  Neck: supple, no JVD  Lungs: CTA  Heart: RRR, normal S1 and S2  Abdomen: obese, soft, nontender, non distended and positive bowel sounds.  : no palpable bladder  Extremities: Trace edema, no cyanosis or clubbing  Neuro: normal speech and mental status     Scheduled Meds:     Current Facility-Administered Medications   Medication Dose Route Frequency Provider Last Rate Last Admin    iopamidol  (ISOVUE-300) 61 % injection 100 mL  100 mL Intravenous Once in imaging Lacey Ballesteros, DO        morphine injection 3 mg  3 mg Intravenous Q4H PRN Lacey Ballesteros, DO   3 mg at 05/31/24 2023    And    naloxone (NARCAN) injection 0.4 mg  0.4 mg Intravenous Q5 Min PRN Lacey Ballesteros, DO        nitroglycerin (NITROSTAT) SL tablet 0.4 mg  0.4 mg Sublingual Q5 Min PRN Lacey Ballesteros, DO        ondansetron ODT (ZOFRAN-ODT) disintegrating tablet 4 mg  4 mg Oral Q6H PRN Lacey Ballesteros, DO        Or    ondansetron (ZOFRAN) injection 4 mg  4 mg Intravenous Q6H PRN Lacey Ballesteros, DO   4 mg at 05/31/24 0955    sertraline (ZOLOFT) tablet 50 mg  50 mg Oral Daily Lacey Ballesteros, DO   50 mg at 05/31/24 0836    sodium bicarbonate 8.4 % 150 mEq in dextrose (D5W) 5 % 1,000 mL infusion (greater than 100 mEq)  150 mEq Intravenous Continuous Lacey Ballesteros,  mL/hr at 05/31/24 1126 Currently Infusing at 05/31/24 1126    sodium bicarbonate tablet 1,300 mg  1,300 mg Oral 4x Daily Nito Fernandez MD, FASN   1,300 mg at 05/31/24 2023    sodium chloride 0.9 % flush 10 mL  10 mL Intravenous PRN Lacey Ballesteros, DO        sodium chloride 0.9 % flush 10 mL  10 mL Intravenous Q12H Lacey Ballesterosus, DO   10 mL at 05/31/24 2023    sodium chloride 0.9 % flush 10 mL  10 mL Intravenous PRN Lacey Ballesteros, DO        sodium chloride 0.9 % infusion 40 mL  40 mL Intravenous PRN Lacey Ballesteros, DO        sodium chloride 0.9 % infusion  100 mL/hr Intravenous Continuous Nito Fernandez MD, FASN 100 mL/hr at 05/31/24 2357 100 mL/hr at 05/31/24 2357       iopamidol, 100 mL, Intravenous, Once in imaging  sertraline, 50 mg, Oral, Daily  sodium bicarbonate, 1,300 mg, Oral, 4x Daily  sodium chloride, 10 mL, Intravenous, Q12H        IV Meds:   sodium bicarbonate 8.4 % 150 mEq in dextrose (D5W) 5 % 1,000 mL infusion (greater than 100 mEq), 150 mEq, Last Rate: 100 mL/hr  "at 05/31/24 1126  sodium chloride, 100 mL/hr, Last Rate: 100 mL/hr (05/31/24 2357)        Results Reviewed:   I have personally reviewed the results from the time of this admission to 6/1/2024 07:03 EDT     Results from last 7 days   Lab Units 06/01/24  0438 05/31/24  1151 05/31/24  0556 05/30/24  1536   SODIUM mmol/L 143 136 141 138   POTASSIUM mmol/L 3.6 4.9 3.8 4.7   CHLORIDE mmol/L 113* 113* 116* 114*   CO2 mmol/L 19.8* 10.2* 6.8* 4.5*   BUN mg/dL 33* 42* 43* 40*   CREATININE mg/dL 1.37* 1.96* 1.93* 2.03*   CALCIUM mg/dL 8.5* 9.6 10.0 10.8*   BILIRUBIN mg/dL 0.2 0.3  --  0.2   ALK PHOS U/L 100 124*  --  160*   ALT (SGPT) U/L 6 7  --  10   AST (SGOT) U/L 9 10  --  12   GLUCOSE mg/dL 78 85 78 83       Estimated Creatinine Clearance: 35.5 mL/min (A) (by C-G formula based on SCr of 1.37 mg/dL (H)).                Results from last 7 days   Lab Units 06/01/24  0438 05/31/24  0556 05/30/24  1536   WBC 10*3/mm3 5.70 9.19 11.25*   HEMOGLOBIN g/dL 8.6* 11.9* 13.6   PLATELETS 10*3/mm3 85* 208 352             Brief Urine Lab Results  (Last result in the past 365 days)        Color   Clarity   Blood   Leuk Est   Nitrite   Protein   CREAT   Urine HCG        05/11/24 1930 Yellow   Cloudy   Trace   Trace   Negative   100 mg/dL (2+)                   No results found for: \"UTPCR\"    Imaging Results (Last 24 Hours)       ** No results found for the last 24 hours. **                Assessment / Plan     ASSESSMENT:    Metabolic acidosis    Acute kidney injury: Patient appears to have significant worsening of her baseline chronic kidney disease, baseline GFR around 52 mL per minute.  It has been gradually worsening, most likely secondary to hemodynamic issues and low volume.  Check urine sodium and aggressively hydrate her.  Significant metabolic acidosis along with hyperkalemia noted which has been improving.   Metabolic acidosis: Since started the bicarb serum bicarb is slightly better, she has borderline potassium she was given " 1 dose yesterday and it still did not drop will give more potassium supplements as needed.  Chronic kidney disease stage IIIa: Patient is not aware of having abnormal renal function but has been noted in the previous blood work.  She was supposed to follow-up with me in the office but has never made it.  Acute pancreatitis: Likely will need significant amount of fluid she is already getting bicarb at 150 cc an hour we will go ahead and add half-normal saline with 40 of potassium at 50 cc an hour to make it 200 mL/h for the next 24 hours.  She has been able to tolerate some clear liquids, still has persistent abdominal pain.  Chronic pancreatitis: Known to have chronic pancreatitis with pseudocyst.  Hypertension: Watch closely will consider starting medications if there is persistent blood pressure greater than 140 systolic.      PLAN:  Her volume status appears to be significantly better, serum bicarb is improved as well.  I will go ahead and stop the IV bicarb but will continue with normal saline for the next 24 hours.  Continue oral sodium bicarb tablets 1300 mg 4 times a day.  Chronic diarrhea, may need further evaluation by the gastroenterology.  C. difficile is negative.  Details were discussed with the patient no family in the room.    Details were also discussed with the hospitalist service and or other providers as needed.   Continue with rest of the current treatment plan, and monitor with surveillance labs.  Further recommendations will depend on clinical course of the patient during the current hospitalization.   I have reviewed the copied text to this note, it was edited and the changes made as needed.  It is accurate to the point, when the note was signed today.     Thank you for involving us in the care of Tasha Yarbrough.  Please feel free to call with any questions.    Nito Fernandez MD, WINNIE  06/01/24  07:03 EDT    [facility]  [phone]      Part of this note  may be an electronic transcription/translation of spoken language to printed text using the Dragon Dictation System.

## 2024-06-02 ENCOUNTER — READMISSION MANAGEMENT (OUTPATIENT)
Dept: CALL CENTER | Facility: HOSPITAL | Age: 69
End: 2024-06-02
Payer: MEDICARE

## 2024-06-02 VITALS
DIASTOLIC BLOOD PRESSURE: 93 MMHG | HEART RATE: 92 BPM | SYSTOLIC BLOOD PRESSURE: 134 MMHG | OXYGEN SATURATION: 99 % | WEIGHT: 119.49 LBS | HEIGHT: 62 IN | TEMPERATURE: 99 F | BODY MASS INDEX: 21.99 KG/M2 | RESPIRATION RATE: 15 BRPM

## 2024-06-02 PROBLEM — E87.20 METABOLIC ACIDOSIS: Status: RESOLVED | Noted: 2024-04-21 | Resolved: 2024-06-02

## 2024-06-02 PROBLEM — K86.1 CHRONIC PANCREATITIS: Chronic | Status: ACTIVE | Noted: 2024-06-02

## 2024-06-02 LAB
ALBUMIN SERPL-MCNC: 3.1 G/DL (ref 3.5–5.2)
ALBUMIN/GLOB SERPL: 1.6 G/DL
ALP SERPL-CCNC: 94 U/L (ref 39–117)
ALT SERPL W P-5'-P-CCNC: 6 U/L (ref 1–33)
ANION GAP SERPL CALCULATED.3IONS-SCNC: 7.6 MMOL/L (ref 5–15)
AST SERPL-CCNC: 10 U/L (ref 1–32)
BASOPHILS # BLD AUTO: 0.03 10*3/MM3 (ref 0–0.2)
BASOPHILS NFR BLD AUTO: 0.5 % (ref 0–1.5)
BILIRUB SERPL-MCNC: <0.2 MG/DL (ref 0–1.2)
BUN SERPL-MCNC: 25 MG/DL (ref 8–23)
BUN/CREAT SERPL: 21.7 (ref 7–25)
CALCIUM SPEC-SCNC: 8.6 MG/DL (ref 8.6–10.5)
CHLORIDE SERPL-SCNC: 116 MMOL/L (ref 98–107)
CO2 SERPL-SCNC: 22.4 MMOL/L (ref 22–29)
CREAT SERPL-MCNC: 1.15 MG/DL (ref 0.57–1)
DEPRECATED RDW RBC AUTO: 64.5 FL (ref 37–54)
EGFRCR SERPLBLD CKD-EPI 2021: 51.7 ML/MIN/1.73
EOSINOPHIL # BLD AUTO: 0.07 10*3/MM3 (ref 0–0.4)
EOSINOPHIL NFR BLD AUTO: 1.2 % (ref 0.3–6.2)
ERYTHROCYTE [DISTWIDTH] IN BLOOD BY AUTOMATED COUNT: 15.6 % (ref 12.3–15.4)
GLOBULIN UR ELPH-MCNC: 1.9 GM/DL
GLUCOSE SERPL-MCNC: 83 MG/DL (ref 65–99)
HCT VFR BLD AUTO: 26.3 % (ref 34–46.6)
HGB BLD-MCNC: 8.4 G/DL (ref 12–15.9)
IMM GRANULOCYTES # BLD AUTO: 0.03 10*3/MM3 (ref 0–0.05)
IMM GRANULOCYTES NFR BLD AUTO: 0.5 % (ref 0–0.5)
LIPASE SERPL-CCNC: 67 U/L (ref 13–60)
LYMPHOCYTES # BLD AUTO: 1.37 10*3/MM3 (ref 0.7–3.1)
LYMPHOCYTES NFR BLD AUTO: 24 % (ref 19.6–45.3)
MACROCYTES BLD QL SMEAR: NORMAL
MCH RBC QN AUTO: 35.6 PG (ref 26.6–33)
MCHC RBC AUTO-ENTMCNC: 31.9 G/DL (ref 31.5–35.7)
MCV RBC AUTO: 111.4 FL (ref 79–97)
MONOCYTES # BLD AUTO: 0.53 10*3/MM3 (ref 0.1–0.9)
MONOCYTES NFR BLD AUTO: 9.3 % (ref 5–12)
NEUTROPHILS NFR BLD AUTO: 3.69 10*3/MM3 (ref 1.7–7)
NEUTROPHILS NFR BLD AUTO: 64.5 % (ref 42.7–76)
NRBC BLD AUTO-RTO: 0 /100 WBC (ref 0–0.2)
PLATELET # BLD AUTO: 140 10*3/MM3 (ref 140–450)
PMV BLD AUTO: 10.7 FL (ref 6–12)
POTASSIUM SERPL-SCNC: 4.3 MMOL/L (ref 3.5–5.2)
PROT SERPL-MCNC: 5 G/DL (ref 6–8.5)
RBC # BLD AUTO: 2.36 10*6/MM3 (ref 3.77–5.28)
SMALL PLATELETS BLD QL SMEAR: ADEQUATE
SODIUM SERPL-SCNC: 146 MMOL/L (ref 136–145)
WBC MORPH BLD: NORMAL
WBC NRBC COR # BLD AUTO: 5.72 10*3/MM3 (ref 3.4–10.8)

## 2024-06-02 PROCEDURE — 25010000002 MORPHINE PER 10 MG: Performed by: FAMILY MEDICINE

## 2024-06-02 PROCEDURE — 25010000002 ONDANSETRON PER 1 MG: Performed by: FAMILY MEDICINE

## 2024-06-02 PROCEDURE — 99239 HOSP IP/OBS DSCHRG MGMT >30: CPT | Performed by: STUDENT IN AN ORGANIZED HEALTH CARE EDUCATION/TRAINING PROGRAM

## 2024-06-02 PROCEDURE — 85007 BL SMEAR W/DIFF WBC COUNT: CPT | Performed by: STUDENT IN AN ORGANIZED HEALTH CARE EDUCATION/TRAINING PROGRAM

## 2024-06-02 PROCEDURE — 83690 ASSAY OF LIPASE: CPT | Performed by: STUDENT IN AN ORGANIZED HEALTH CARE EDUCATION/TRAINING PROGRAM

## 2024-06-02 PROCEDURE — 80053 COMPREHEN METABOLIC PANEL: CPT | Performed by: STUDENT IN AN ORGANIZED HEALTH CARE EDUCATION/TRAINING PROGRAM

## 2024-06-02 PROCEDURE — 85025 COMPLETE CBC W/AUTO DIFF WBC: CPT | Performed by: STUDENT IN AN ORGANIZED HEALTH CARE EDUCATION/TRAINING PROGRAM

## 2024-06-02 RX ORDER — SODIUM BICARBONATE 650 MG/1
650 TABLET ORAL 2 TIMES DAILY
Qty: 60 TABLET | Refills: 0 | Status: SHIPPED | OUTPATIENT
Start: 2024-06-02

## 2024-06-02 RX ORDER — DEXTROSE MONOHYDRATE 50 MG/ML
50 INJECTION, SOLUTION INTRAVENOUS CONTINUOUS
Status: DISCONTINUED | OUTPATIENT
Start: 2024-06-02 | End: 2024-06-02 | Stop reason: HOSPADM

## 2024-06-02 RX ORDER — SODIUM CHLORIDE 450 MG/100ML
100 INJECTION, SOLUTION INTRAVENOUS CONTINUOUS
Status: DISCONTINUED | OUTPATIENT
Start: 2024-06-02 | End: 2024-06-02

## 2024-06-02 RX ORDER — HYDROCODONE BITARTRATE AND ACETAMINOPHEN 7.5; 325 MG/1; MG/1
1 TABLET ORAL EVERY 8 HOURS PRN
Qty: 9 TABLET | Refills: 0 | Status: SHIPPED | OUTPATIENT
Start: 2024-06-02 | End: 2024-07-02

## 2024-06-02 RX ORDER — SODIUM BICARBONATE 650 MG/1
1300 TABLET ORAL 2 TIMES DAILY
Status: DISCONTINUED | OUTPATIENT
Start: 2024-06-02 | End: 2024-06-02 | Stop reason: HOSPADM

## 2024-06-02 RX ADMIN — SODIUM BICARBONATE 650 MG TABLET 1300 MG: at 08:01

## 2024-06-02 RX ADMIN — ONDANSETRON 4 MG: 2 INJECTION INTRAMUSCULAR; INTRAVENOUS at 11:00

## 2024-06-02 RX ADMIN — Medication 10 ML: at 08:01

## 2024-06-02 RX ADMIN — MORPHINE SULFATE 3 MG: 4 INJECTION, SOLUTION INTRAMUSCULAR; INTRAVENOUS at 03:33

## 2024-06-02 RX ADMIN — SODIUM CHLORIDE 100 ML/HR: 4.5 INJECTION, SOLUTION INTRAVENOUS at 08:01

## 2024-06-02 RX ADMIN — SERTRALINE HYDROCHLORIDE 50 MG: 50 TABLET ORAL at 08:01

## 2024-06-02 RX ADMIN — FAMOTIDINE 20 MG: 20 TABLET, FILM COATED ORAL at 08:01

## 2024-06-02 RX ADMIN — MORPHINE SULFATE 3 MG: 4 INJECTION, SOLUTION INTRAMUSCULAR; INTRAVENOUS at 08:01

## 2024-06-02 NOTE — OUTREACH NOTE
Prep Survey      Flowsheet Row Responses   [facility] facility patient discharged from? [facility]   Is LACE score < 7 ? No   Eligibility Readm Mgmt   Discharge diagnosis Acute on chronic pancreatitis   Does the patient have one of the following disease processes/diagnoses(primary or secondary)? Other   Does the patient have Home health ordered? No   Is there a DME ordered? No   Prep survey completed? Yes            ALDEN GRADY - Registered Nurse

## 2024-06-02 NOTE — PROGRESS NOTES
[facility] Progress Note  [facility]. [place]        Patient Name: Tasha Yarbrough  : 1955  MRN: [id number]   LOS: 3 days    Patient Care Team:  Terrence Baldwin MD as PCP - General (Family Medicine)    Chief Complaint:    Chief Complaint   Patient presents with    Abdominal Pain    Nausea    Shortness of Breath     Pt states SOA, weakness. Pt states she can't hardly stand to walk due to soa. Pt is having Nausea and back pain. SOA for 3 days, getting worse.     Weakness - Generalized    Back Pain     Primary Care Physician:  Terrence Baldwin MD  Date of admission: 2024    Subjective     Interval History:   Follow-up acute kidney injury and severe metabolic acidosis.  Events noted from last 24 hours.    I reviewed the chart and other providers notes, labs and procedures done since my last note.  Patient said diarrhea is almost resolved, she is starting to feel better.  Denies having any chest pain or shortness of breath.    Review of Systems:   As noted above.    Objective     Vitals:   Temp:  [98.1 °F (36.7 °C)-99.9 °F (37.7 °C)] 99 °F (37.2 °C)  Heart Rate:  [] 80  Resp:  [14-19] 14  BP: ()/(58-79) 119/72    Intake/Output Summary (Last 24 hours) at 2024 0721  Last data filed at 2024 1800  Gross per 24 hour   Intake 1884 ml   Output --   Net 1884 ml       Physical Exam:    General Appearance: alert, oriented x 3, no acute distress   Skin: warm and dry  HEENT: oral mucosa normal, nonicteric sclera  Neck: supple, no JVD  Lungs: CTA  Heart: RRR, normal S1 and S2  Abdomen: obese, soft, nontender, non distended and positive bowel sounds.  : no palpable bladder  Extremities: Trace edema, no cyanosis or clubbing  Neuro: normal speech and mental status     Scheduled Meds:     Current Facility-Administered Medications   Medication Dose Route Frequency Provider Last Rate Last Admin    famotidine (PEPCID) tablet 20 mg  20 mg Oral BID AC Kerley, Brian  Randolph, DO   20 mg at 06/01/24 1634    iopamidol (ISOVUE-300) 61 % injection 100 mL  100 mL Intravenous Once in imaging Lacey Ballesteros,         morphine injection 3 mg  3 mg Intravenous Q4H PRN Lacey Ballesteros, DO   3 mg at 06/02/24 0333    And    naloxone (NARCAN) injection 0.4 mg  0.4 mg Intravenous Q5 Min PRN Lacey Ballesteros, DO        nitroglycerin (NITROSTAT) SL tablet 0.4 mg  0.4 mg Sublingual Q5 Min PRN Lacey Ballesteros, DO        ondansetron ODT (ZOFRAN-ODT) disintegrating tablet 4 mg  4 mg Oral Q6H PRN Lacey Ballesteros, DO        Or    ondansetron (ZOFRAN) injection 4 mg  4 mg Intravenous Q6H PRN Lacey Ballesteros, DO   4 mg at 05/31/24 0955    sertraline (ZOLOFT) tablet 50 mg  50 mg Oral Daily Lacey Ballesteros, DO   50 mg at 06/01/24 0806    sodium bicarbonate tablet 1,300 mg  1,300 mg Oral BID Nito Fernandez MD, WINNIE        sodium chloride 0.45 % infusion  100 mL/hr Intravenous Continuous Nito Fernandez MD, WINNIE        sodium chloride 0.9 % flush 10 mL  10 mL Intravenous PRN Lacey Ballesteros, DO        sodium chloride 0.9 % flush 10 mL  10 mL Intravenous Q12H Lacey Ballesteros, DO   10 mL at 06/01/24 2112    sodium chloride 0.9 % flush 10 mL  10 mL Intravenous PRN Lacey Ballesteros, DO        sodium chloride 0.9 % infusion 40 mL  40 mL Intravenous PRN Lacey Ballesteros, DO           famotidine, 20 mg, Oral, BID AC  iopamidol, 100 mL, Intravenous, Once in imaging  sertraline, 50 mg, Oral, Daily  sodium bicarbonate, 1,300 mg, Oral, BID  sodium chloride, 10 mL, Intravenous, Q12H        IV Meds:   sodium chloride, 100 mL/hr        Results Reviewed:   I have personally reviewed the results from the time of this admission to 6/2/2024 07:21 EDT     Results from last 7 days   Lab Units 06/02/24  0417 06/01/24  0438 05/31/24  1151   SODIUM mmol/L 146* 143 136   POTASSIUM mmol/L 4.3 3.6 4.9   CHLORIDE mmol/L 116* 113* 113*   CO2 mmol/L 22.4 19.8*  "10.2*   BUN mg/dL 25* 33* 42*   CREATININE mg/dL 1.15* 1.37* 1.96*   CALCIUM mg/dL 8.6 8.5* 9.6   BILIRUBIN mg/dL <0.2 0.2 0.3   ALK PHOS U/L 94 100 124*   ALT (SGPT) U/L 6 6 7   AST (SGOT) U/L 10 9 10   GLUCOSE mg/dL 83 78 85       Estimated Creatinine Clearance: 39.5 mL/min (A) (by C-G formula based on SCr of 1.15 mg/dL (H)).                Results from last 7 days   Lab Units 06/02/24  0417 06/01/24  0438 05/31/24  0556 05/30/24  1536   WBC 10*3/mm3 5.72 5.70 9.19 11.25*   HEMOGLOBIN g/dL 8.4* 8.6* 11.9* 13.6   PLATELETS 10*3/mm3 140 85* 208 352             Brief Urine Lab Results  (Last result in the past 365 days)        Color   Clarity   Blood   Leuk Est   Nitrite   Protein   CREAT   Urine HCG        05/11/24 1930 Yellow   Cloudy   Trace   Trace   Negative   100 mg/dL (2+)                   No results found for: \"UTPCR\"    Imaging Results (Last 24 Hours)       ** No results found for the last 24 hours. **                Assessment / Plan     ASSESSMENT:    Metabolic acidosis    Acute kidney injury: Patient appears to have significant worsening of her baseline chronic kidney disease, baseline GFR around 52 mL per minute.  It has been gradually worsening, most likely secondary to hemodynamic issues and low volume.  Check urine sodium and aggressively hydrate her.  Significant metabolic acidosis along with hyperkalemia noted which has been improving.   Metabolic acidosis: Since started the bicarb serum bicarb is slightly better, she has borderline potassium she was given 1 dose yesterday and it still did not drop will give more potassium supplements as needed.  Chronic kidney disease stage IIIa: Patient is not aware of having abnormal renal function but has been noted in the previous blood work.  She was supposed to follow-up with me in the office but has never made it.  Acute pancreatitis: Likely will need significant amount of fluid she is already getting bicarb at 150 cc an hour we will go ahead and add " half-normal saline with 40 of potassium at 50 cc an hour to make it 200 mL/h for the next 24 hours.  She has been able to tolerate some clear liquids, still has persistent abdominal pain.  Chronic pancreatitis: Known to have chronic pancreatitis with pseudocyst.  Hypertension: Watch closely will consider starting medications if there is persistent blood pressure greater than 140 systolic.      PLAN:  Serum sodium is slightly up we will stop the normal saline and change her to half-normal saline we will continue IV fluids for another 24 hours.  She is starting to feel a lot better diarrhea has stopped.  Details were discussed with the patient as well as family in the room.   at the bedside.  Details were also discussed with the hospitalist service and or other providers as needed.  If patient goes home she has a follow-up set up with our office on June 17th 2024 at 11 AM.  She can keep that appointment.  Continue with rest of the current treatment plan, and monitor with surveillance labs.  Further recommendations will depend on clinical course of the patient during the current hospitalization.   I have reviewed the copied text to this note, it was edited and the changes made as needed.  It is accurate to the point, when the note was signed today.     Thank you for involving us in the care of Tasha Yarbrough.  Please feel free to call with any questions.    Nito Fernandez MD, WINNIE  06/02/24  07:21 EDT    [facility]  [phone]      Part of this note may be an electronic transcription/translation of spoken language to printed text using the Dragon Dictation System.

## 2024-06-02 NOTE — PLAN OF CARE
Goal Outcome Evaluation:      VSS. NSR on monitor.  Pt report intermittent RUQ abdominal pain, PRN meds administered. Pt was able to eat 100% of breakfast this AM without an increase in pain. Pt being discharged home today.

## 2024-06-02 NOTE — DISCHARGE SUMMARY
[facility]   DISCHARGE SUMMARY      Name:  Tasha Yarbrough   Age:  69 y.o.  Sex:  female  :  1955  MRN:  [id number]   Visit Number:  [id number]    Admission Date:  2024  Date of Discharge:  2024  Primary Care Physician:  Terrence Baldwin MD    Important issues to note:    -Provided short-term Norco for pain control for chronic pancreatitis.  Discuss with PCP regarding long-term pain control.  -Keep appointment with  GI for chronic pancreatitis.  -Caution regarding dosages for Tylenol.  Concern the patient has been consistently ingesting 8 to 10 g daily.  -Diarrhea improving, likely related to her chronic pancreatitis.  -Continue sodium bicarb oral 650 mg twice a day.  Follow-up nephrology.    Discharge Diagnoses:     Acute on chronic pancreatitis, resolved  Chronic peritonitis  Metabolic acidosis, resolved  VIVIENNE, improved  Diarrhea, improved  Abnormal EKG  Diabetes  Chronic excessive Tylenol use  N/V/D, resolved    Problem List:     Active Hospital Problems    Diagnosis  POA    Chronic pancreatitis [K86.1]  Yes      Resolved Hospital Problems    Diagnosis Date Resolved POA    **Metabolic acidosis [E87.20] 2024 Yes     Presenting Problem:    Chief Complaint   Patient presents with    Abdominal Pain    Nausea    Shortness of Breath     Pt states SOA, weakness. Pt states she can't hardly stand to walk due to soa. Pt is having Nausea and back pain. SOA for 3 days, getting worse.     Weakness - Generalized    Back Pain      Consults:     Consulting Physician(s)         Provider   Role Specialty     Nito Fernandez MD, WINNIE      Consulting Physician Nephrology          Procedures Performed:    None    History of presenting illness/Hospital Course:    Tasha Yarbrough is a 69-year-old female with a history of recurrent pancreatitis, hypertension, chronic tobacco abuse, chronic pain, C. difficile, has had multiple hospitalizations in the last few months with recurrent  pancreatitis.  She had presented due to ongoing abdominal pain, back pain, has had intermittent diarrhea and loose stools at home.  She has felt very weak, had shortness of breath over the last few days.  She has been taking some pain medication but has not been helping much for her pancreas.  She continues to smoke.  She did follow-up with GI last week who had recommended outpatient referral to  for EUS.     In the ER, she was hemodynamically stable.  She had evidence of acute kidney injury, severe metabolic acidosis, and elevated lipase.  CT imaging of the abdomen did not reveal any acute pancreatitis.  Patient's case was discussed by ER provider with nephrology who recommended sodium bicarb drip.     Inpatient general floor admission 5/30/2024 with metabolic acidosis due to dehydration secondary to N/V/D, likely related to chronic pancreatitis.  Acidosis resolved.  Tolerating p.o.  Diarrhea improved.  VIVIENNE improved significantly.    Stable for discharge home 6/2/2024.      Dehydration, resolved  N/V/D, resolved  VIVIENNE, improved  Metabolic acidosis, resolved  CKD 3a  Acute on chronic pancreatitis, improved  CO2 on chemistry normalized.  Nephrology consultation, recommendations appreciated.  C. difficile toxin negative, GI panel negative.  Diarrhea likely secondary to ongoing pancreatitis issues.  Tolerating p.o.  Providing short-term Norco.  Do agree with consideration of continued outpatient pain management given chronic pancreatitis. Continue sodium bicarb oral 650 mg twice a day.  Follow-up nephrology.     Chronic excessive Tylenol use  Salicylate level not elevated, Tylenol level not elevated.  She has been taking about 8 to 10 g of Tylenol daily.  Cautioned patient regarding daily limits of Tylenol.     Abnormal EKG  Trended troponins, low plateau.  ACS not suspected.     Chronic pancreatitis  Has been worked up previously, initially thought may be gallstone pancreatitis last fall but underwent ERCP and  treatment at that time but has had multiple recurrent episodes since.  She has chronic abdominal pain secondary to this now.  Dr. Fleming has recommended multiple times patient go to  for GI evaluation and EUS and MRI, patient has failed to do so.  She says she has an appointment later this summer at .       Tobacco use  Encouraged cessation.    Vital Signs:    Temp:  [98.1 °F (36.7 °C)-99.9 °F (37.7 °C)] 99 °F (37.2 °C)  Heart Rate:  [] 92  Resp:  [14-17] 15  BP: (101-134)/(58-93) 134/93    Physical Exam:    General Appearance:  Alert and cooperative.    Head:  Atraumatic and normocephalic.   Eyes: Conjunctivae and sclerae normal, no icterus. No pallor.   Ears:  Ears with no abnormalities noted.   Throat: No oral lesions, no thrush, oral mucosa moist.   Neck: Supple, trachea midline, no thyromegaly.   Back:   No kyphoscoliosis present. No tenderness to palpation.   Lungs:   Breath sounds heard bilaterally equally.  No crackles or wheezing. No Pleural rub or bronchial breathing.   Heart:  Normal S1 and S2, no murmur, no gallop, no rub. No JVD.   Abdomen:   Normal bowel sounds, no masses, no organomegaly. Soft, mildly tender all over, no longer distended.   Extremities: Supple, no edema, no cyanosis, no clubbing.   Pulses: Pulses palpable bilaterally.   Skin: Warm.   Neurologic: Alert and oriented x 3. No facial asymmetry. Moves all four limbs. No tremors.     Pertinent Lab Results:     Results from last 7 days   Lab Units 06/02/24  0417 06/01/24  0438 05/31/24  1151   SODIUM mmol/L 146* 143 136   POTASSIUM mmol/L 4.3 3.6 4.9   CHLORIDE mmol/L 116* 113* 113*   CO2 mmol/L 22.4 19.8* 10.2*   BUN mg/dL 25* 33* 42*   CREATININE mg/dL 1.15* 1.37* 1.96*   CALCIUM mg/dL 8.6 8.5* 9.6   BILIRUBIN mg/dL <0.2 0.2 0.3   ALK PHOS U/L 94 100 124*   ALT (SGPT) U/L 6 6 7   AST (SGOT) U/L 10 9 10   GLUCOSE mg/dL 83 78 85     Results from last 7 days   Lab Units 06/02/24  0417 06/01/24  0438 05/31/24  0556   WBC 10*3/mm3  5.72 5.70 9.19   HEMOGLOBIN g/dL 8.4* 8.6* 11.9*   HEMATOCRIT % 26.3* 26.3* 37.2   PLATELETS 10*3/mm3 140 85* 208         Results from last 7 days   Lab Units 05/31/24  0826 05/31/24  0556 05/30/24  1536   HSTROP T ng/L 20* 21* 20*     Results from last 7 days   Lab Units 05/30/24  1536   PROBNP pg/mL 982.4*         Results from last 7 days   Lab Units 06/02/24  0417   LIPASE U/L 67*     Results from last 7 days   Lab Units 05/31/24  1126   PH, ARTERIAL pH units 7.230*   PO2 ART mm Hg 96.6   PCO2, ARTERIAL mm Hg 26.8*   HCO3 ART mmol/L 11.2*           Pertinent Radiology Results:    Imaging Results (All)       Procedure Component Value Units Date/Time    CT Abdomen Pelvis Without Contrast [[id number]] Collected: 05/30/24 1936     Updated: 05/30/24 1959    Narrative:      FINAL REPORT    TECHNIQUE:  Routine axial images through the abdomen and pelvis were  obtained.    CLINICAL HISTORY:  Eval for peripancreatic abscess    COMPARISON:  5/11/2024, 4/28/2024    FINDINGS:  Abdomen:  The gallbladder has been removed.  There are bilateral  renal cysts and 2 stable small nonobstructing right renal  calculi.  The pancreas is now normal with no peripancreatic  inflammation or abscess.  The solid abdominal organs and ureters  are otherwise unremarkable.  There is a moderate to large hiatal  hernia of the stomach.  There are postoperative changes of the  rectum.  The GI tract is otherwise unremarkable with no sign of  appendicitis.  Pelvis: The urinary bladder is normal. There is  no pelvic or abdominal ascites, adenopathy or acute osseous  abnormality.  There are degenerative changes and scoliosis of  the lumbar spine.      Impression:      Hiatal hernia.  No acute disease, specifically no evidence of  pancreatitis or abscess.    Authenticated and Electronically Signed by Sergio Silverio M.D. on  05/30/2024 07:59:01 PM    XR Chest 1 View [[id number]] Collected: 05/30/24 1524     Updated: 05/30/24 1533    Narrative:      PROCEDURE: XR  CHEST 1 VW-        HISTORY: SOA Triage Protocol     COMPARISON: April 6, 2024.     FINDINGS: The heart is normal in size. The mediastinum is unremarkable.  There is no acute infiltrate. There is emphysematous change. There is no  pneumothorax. There are no acute osseous abnormalities. Findings appear  similar to the prior exam.       Impression:      No significant interval change.                       Images were reviewed, interpreted, and dictated by Dr. Lucía Bethea MD  Transcribed by Kenia Aviles PA-C.     This report was signed and finalized on 5/30/2024 3:31 PM by Lucía Bethea MD.               Echo:      Condition on Discharge:      Stable.    Code status during the hospital stay:    Code Status and Medical Interventions:   Ordered at: 05/30/24 8615     Code Status (Patient has no pulse and is not breathing):    CPR (Attempt to Resuscitate)     Medical Interventions (Patient has pulse or is breathing):    Full Support     Discharge Disposition:    Home or Self Care    Discharge Medications:       Discharge Medications        Changes to Medications        Instructions Start Date   HYDROcodone-acetaminophen 7.5-325 MG per tablet  Commonly known as: NORCO  What changed:   when to take this  reasons to take this   1 tablet, Oral, Every 8 Hours PRN             Continue These Medications        Instructions Start Date   famotidine 20 MG tablet  Commonly known as: PEPCID   20 mg, Oral, 2 Times Daily      glucosamine-chondroitin 500-400 MG capsule capsule   1 capsule, Oral, 2 Times Daily With Meals      methocarbamol 750 MG tablet  Commonly known as: ROBAXIN   750 mg, Oral, 4 Times Daily PRN      ondansetron ODT 4 MG disintegrating tablet  Commonly known as: ZOFRAN-ODT   4 mg, Translingual, Every 8 Hours PRN      pantoprazole 40 MG EC tablet  Commonly known as: PROTONIX   40 mg, Oral, 2 Times Daily Before Meals      promethazine 25 MG tablet  Commonly known as: PHENERGAN   25 mg, Oral      sertraline 50 MG  tablet  Commonly known as: ZOLOFT   50 mg, Oral, Daily      sodium bicarbonate 650 MG tablet   650 mg, Oral, 2 Times Daily      vitamin D3 125 MCG (5000 UT) capsule capsule   1 capsule, Oral, Daily             Discharge Diet:     Diet Instructions       Diet: Gastrointestinal Diets; Low Irritant; Soft to Chew (NDD 3); Chopped Meat; Thin (IDDSI 0)      Discharge Diet: Gastrointestinal Diets    Gastrointestinal Diet: Low Irritant    Texture: Soft to Chew (NDD 3)    Soft to Chew: Chopped Meat    Fluid Consistency: Thin (IDDSI 0)          Activity at Discharge:     Activity Instructions       Activity as Tolerated            Follow-up Appointments:     Contact information for follow-up providers       Terrence Baldwin MD Follow up in 3 day(s).    Specialty: Family Medicine  Contact information:  [address] [place] [address] [address] TSAI [address] [place] [address] [phone]                       Contact information for after-discharge care       [facility] .    Service: Food Pantry  Contact information:  [address] [facility] [address] [phone]                                 No future appointments.  Test Results Pending at Discharge:    Pending Labs       Order Current Status    [place] (Gel) In process    [place] Draw In process               Brian Joseph Kerley, DO  06/02/24  10:24 EDT    Time: I spent 35 minutes on this discharge activity which included: face-to-face encounter with the patient, reviewing the data in the system, coordination of the care with the nursing staff as well as consultants, documentation, and entering orders.     Dictated utilizing Dragon dictation.

## 2024-06-03 NOTE — PAYOR COMM NOTE
"To:  Hargill  From: Mia Stallworth RN  Phone: [phone]  Fax: [phone]  NPI: [id number]  TIN: [id number]  Member ID: [id number]  MRN: [id number]    Jaymie Knutson (69 y.o. Female)       Date of Birth   1955    Social Security Number       Address   [address] [place] [address]    Home Phone   [phone]    MRN   [id number]       Zoroastrian   None    Marital Status                               Admission Date   5/30/24    Admission Type   Emergency    Admitting Provider   Kerley, Brian Joseph, DO    Attending Provider       Department, Room/Bed   [facility] INTENSIVE CARE, I06/1       Discharge Date   6/2/2024    Discharge Disposition   Home or Self Care    Discharge Destination                                 Attending Provider: (none)   Allergies: Rocephin [Ceftriaxone], Ciprofloxacin, Codeine, Pineapple    Isolation: None   Infection: Other (02/26/24), C.difficile (rule out) (05/30/24), C.difficile (06/01/24)   Code Status: Prior    Ht: 157.5 cm (62\")   Wt: 54.2 kg (119 lb 7.8 oz)    Admission Cmt: None   Principal Problem: Metabolic acidosis [E87.20]                   Active Insurance as of 5/30/2024       Primary Coverage       Payor Plan Insurance Group Employer/Plan Group    ANTH MEDICARE REPLACEMENT ANTHEM MEDICARE ADVANTAGE [id number]       Payor Plan Address Payor Plan Phone Number Payor Plan Fax Number Effective Dates    [address] [phone]  1/1/2024 - None Entered    [facility] [id number]         Subscriber Name Subscriber Birth Date Member ID       JAYMIE KNUTSON 1955 [id number]                     Emergency Contacts        (Rel.) Home Phone Work Phone Mobile Phone    EAMONANTONIO (Son) [phone] -- --    MONIE KNUTSON (Son) [phone] -- --                 Discharge Summary        Kerley, Brian Joseph, DO at 06/02/24 [address] [place] [address] HOSPITALIST   DISCHARGE SUMMARY      Name:  Jaymie Knutson   Age:  69 " y.o.  Sex:  female  :  1955  MRN:  [id number]   Visit Number:  [id number]    Admission Date:  2024  Date of Discharge:  2024  Primary Care Physician:  Terrence Baldwin MD    Important issues to note:    -Provided short-term Norco for pain control for chronic pancreatitis.  Discuss with PCP regarding long-term pain control.  -Keep appointment with  GI for chronic pancreatitis.  -Caution regarding dosages for Tylenol.  Concern the patient has been consistently ingesting 8 to 10 g daily.  -Diarrhea improving, likely related to her chronic pancreatitis.  -Continue sodium bicarb oral 650 mg twice a day.  Follow-up nephrology.    Discharge Diagnoses:     Acute on chronic pancreatitis, resolved  Chronic peritonitis  Metabolic acidosis, resolved  VIVIENNE, improved  Diarrhea, improved  Abnormal EKG  Diabetes  Chronic excessive Tylenol use  N/V/D, resolved    Problem List:     Active Hospital Problems    Diagnosis  POA    Chronic pancreatitis [K86.1]  Yes      Resolved Hospital Problems    Diagnosis Date Resolved POA    **Metabolic acidosis [E87.20] 2024 Yes     Presenting Problem:    Chief Complaint   Patient presents with    Abdominal Pain    Nausea    Shortness of Breath     Pt states SOA, weakness. Pt states she can't hardly stand to walk due to soa. Pt is having Nausea and back pain. SOA for 3 days, getting worse.     Weakness - Generalized    Back Pain      Consults:     Consulting Physician(s)         Provider   Role Specialty     Nito Fernandez MD, WINNIE      Consulting Physician Nephrology          Procedures Performed:    None    History of presenting illness/Hospital Course:    Tasha Yarbrough is a 69-year-old female with a history of recurrent pancreatitis, hypertension, chronic tobacco abuse, chronic pain, C. difficile, has had multiple hospitalizations in the last few months with recurrent pancreatitis.  She had presented due to ongoing abdominal pain, back pain, has had intermittent diarrhea  and loose stools at home.  She has felt very weak, had shortness of breath over the last few days.  She has been taking some pain medication but has not been helping much for her pancreas.  She continues to smoke.  She did follow-up with GI last week who had recommended outpatient referral to  for EUS.     In the ER, she was hemodynamically stable.  She had evidence of acute kidney injury, severe metabolic acidosis, and elevated lipase.  CT imaging of the abdomen did not reveal any acute pancreatitis.  Patient's case was discussed by ER provider with nephrology who recommended sodium bicarb drip.     Inpatient general floor admission 5/30/2024 with metabolic acidosis due to dehydration secondary to N/V/D, likely related to chronic pancreatitis.  Acidosis resolved.  Tolerating p.o.  Diarrhea improved.  VIVIENNE improved significantly.    Stable for discharge home 6/2/2024.      Dehydration, resolved  N/V/D, resolved  VIVIENNE, improved  Metabolic acidosis, resolved  CKD 3a  Acute on chronic pancreatitis, improved  CO2 on chemistry normalized.  Nephrology consultation, recommendations appreciated.  C. difficile toxin negative, GI panel negative.  Diarrhea likely secondary to ongoing pancreatitis issues.  Tolerating p.o.  Providing short-term Norco.  Do agree with consideration of continued outpatient pain management given chronic pancreatitis. Continue sodium bicarb oral 650 mg twice a day.  Follow-up nephrology.     Chronic excessive Tylenol use  Salicylate level not elevated, Tylenol level not elevated.  She has been taking about 8 to 10 g of Tylenol daily.  Cautioned patient regarding daily limits of Tylenol.     Abnormal EKG  Trended troponins, low plateau.  ACS not suspected.     Chronic pancreatitis  Has been worked up previously, initially thought may be gallstone pancreatitis last fall but underwent ERCP and treatment at that time but has had multiple recurrent episodes since.  She has chronic abdominal pain secondary  to this now.  Dr. Fleming has recommended multiple times patient go to  for GI evaluation and EUS and MRI, patient has failed to do so.  She says she has an appointment later this summer at .       Tobacco use  Encouraged cessation.    Vital Signs:    Temp:  [98.1 °F (36.7 °C)-99.9 °F (37.7 °C)] 99 °F (37.2 °C)  Heart Rate:  [] 92  Resp:  [14-17] 15  BP: (101-134)/(58-93) 134/93    Physical Exam:    General Appearance:  Alert and cooperative.    Head:  Atraumatic and normocephalic.   Eyes: Conjunctivae and sclerae normal, no icterus. No pallor.   Ears:  Ears with no abnormalities noted.   Throat: No oral lesions, no thrush, oral mucosa moist.   Neck: Supple, trachea midline, no thyromegaly.   Back:   No kyphoscoliosis present. No tenderness to palpation.   Lungs:   Breath sounds heard bilaterally equally.  No crackles or wheezing. No Pleural rub or bronchial breathing.   Heart:  Normal S1 and S2, no murmur, no gallop, no rub. No JVD.   Abdomen:   Normal bowel sounds, no masses, no organomegaly. Soft, mildly tender all over, no longer distended.   Extremities: Supple, no edema, no cyanosis, no clubbing.   Pulses: Pulses palpable bilaterally.   Skin: Warm.   Neurologic: Alert and oriented x 3. No facial asymmetry. Moves all four limbs. No tremors.     Pertinent Lab Results:     Results from last 7 days   Lab Units 06/02/24 0417 06/01/24  0438 05/31/24  1151   SODIUM mmol/L 146* 143 136   POTASSIUM mmol/L 4.3 3.6 4.9   CHLORIDE mmol/L 116* 113* 113*   CO2 mmol/L 22.4 19.8* 10.2*   BUN mg/dL 25* 33* 42*   CREATININE mg/dL 1.15* 1.37* 1.96*   CALCIUM mg/dL 8.6 8.5* 9.6   BILIRUBIN mg/dL <0.2 0.2 0.3   ALK PHOS U/L 94 100 124*   ALT (SGPT) U/L 6 6 7   AST (SGOT) U/L 10 9 10   GLUCOSE mg/dL 83 78 85     Results from last 7 days   Lab Units 06/02/24 0417 06/01/24  0438 05/31/24  0556   WBC 10*3/mm3 5.72 5.70 9.19   HEMOGLOBIN g/dL 8.4* 8.6* 11.9*   HEMATOCRIT % 26.3* 26.3* 37.2   PLATELETS 10*3/mm3 140 85* 208          Results from last 7 days   Lab Units 05/31/24  0826 05/31/24  0556 05/30/24  1536   HSTROP T ng/L 20* 21* 20*     Results from last 7 days   Lab Units 05/30/24  1536   PROBNP pg/mL 982.4*         Results from last 7 days   Lab Units 06/02/24  0417   LIPASE U/L 67*     Results from last 7 days   Lab Units 05/31/24  1126   PH, ARTERIAL pH units 7.230*   PO2 ART mm Hg 96.6   PCO2, ARTERIAL mm Hg 26.8*   HCO3 ART mmol/L 11.2*           Pertinent Radiology Results:    Imaging Results (All)       Procedure Component Value Units Date/Time    CT Abdomen Pelvis Without Contrast [[id number]] Collected: 05/30/24 1936     Updated: 05/30/24 1959    Narrative:      FINAL REPORT    TECHNIQUE:  Routine axial images through the abdomen and pelvis were  obtained.    CLINICAL HISTORY:  Eval for peripancreatic abscess    COMPARISON:  5/11/2024, 4/28/2024    FINDINGS:  Abdomen:  The gallbladder has been removed.  There are bilateral  renal cysts and 2 stable small nonobstructing right renal  calculi.  The pancreas is now normal with no peripancreatic  inflammation or abscess.  The solid abdominal organs and ureters  are otherwise unremarkable.  There is a moderate to large hiatal  hernia of the stomach.  There are postoperative changes of the  rectum.  The GI tract is otherwise unremarkable with no sign of  appendicitis.  Pelvis: The urinary bladder is normal. There is  no pelvic or abdominal ascites, adenopathy or acute osseous  abnormality.  There are degenerative changes and scoliosis of  the lumbar spine.      Impression:      Hiatal hernia.  No acute disease, specifically no evidence of  pancreatitis or abscess.    Authenticated and Electronically Signed by Sergio Silverio M.D. on  05/30/2024 07:59:01 PM    XR Chest 1 View [[id number]] Collected: 05/30/24 1524     Updated: 05/30/24 1533    Narrative:      PROCEDURE: XR CHEST 1 VW-        HISTORY: SOA Triage Protocol     COMPARISON: April 6, 2024.     FINDINGS: The heart is normal  in size. The mediastinum is unremarkable.  There is no acute infiltrate. There is emphysematous change. There is no  pneumothorax. There are no acute osseous abnormalities. Findings appear  similar to the prior exam.       Impression:      No significant interval change.                       Images were reviewed, interpreted, and dictated by Dr. Lucía Bethea MD  Transcribed by Kenia Aviles PA-C.     This report was signed and finalized on 5/30/2024 3:31 PM by Lucía Bethea MD.               Echo:      Condition on Discharge:      Stable.    Code status during the hospital stay:    Code Status and Medical Interventions:   Ordered at: 05/30/24 2659     Code Status (Patient has no pulse and is not breathing):    CPR (Attempt to Resuscitate)     Medical Interventions (Patient has pulse or is breathing):    Full Support     Discharge Disposition:    Home or Self Care    Discharge Medications:       Discharge Medications        Changes to Medications        Instructions Start Date   HYDROcodone-acetaminophen 7.5-325 MG per tablet  Commonly known as: NORCO  What changed:   when to take this  reasons to take this   1 tablet, Oral, Every 8 Hours PRN             Continue These Medications        Instructions Start Date   famotidine 20 MG tablet  Commonly known as: PEPCID   20 mg, Oral, 2 Times Daily      glucosamine-chondroitin 500-400 MG capsule capsule   1 capsule, Oral, 2 Times Daily With Meals      methocarbamol 750 MG tablet  Commonly known as: ROBAXIN   750 mg, Oral, 4 Times Daily PRN      ondansetron ODT 4 MG disintegrating tablet  Commonly known as: ZOFRAN-ODT   4 mg, Translingual, Every 8 Hours PRN      pantoprazole 40 MG EC tablet  Commonly known as: PROTONIX   40 mg, Oral, 2 Times Daily Before Meals      promethazine 25 MG tablet  Commonly known as: PHENERGAN   25 mg, Oral      sertraline 50 MG tablet  Commonly known as: ZOLOFT   50 mg, Oral, Daily      sodium bicarbonate 650 MG tablet   650 mg, Oral, 2 Times  Daily      vitamin D3 125 MCG (5000 UT) capsule capsule   1 capsule, Oral, Daily             Discharge Diet:     Diet Instructions       Diet: Gastrointestinal Diets; Low Irritant; Soft to Chew (NDD 3); Chopped Meat; Thin (IDDSI 0)      Discharge Diet: Gastrointestinal Diets    Gastrointestinal Diet: Low Irritant    Texture: Soft to Chew (NDD 3)    Soft to Chew: Chopped Meat    Fluid Consistency: Thin (IDDSI 0)          Activity at Discharge:     Activity Instructions       Activity as Tolerated            Follow-up Appointments:     Contact information for follow-up providers       Terrence Baldwin MD Follow up in 3 day(s).    Specialty: Family Medicine  Contact information:  [address]  [facility] [address]  [phone]                       Contact information for after-discharge care       [facility] .    Service: Food Pantry  Contact information:  [address] [facility] [address]  [phone]                                 No future appointments.  Test Results Pending at Discharge:    Pending Labs       Order Current Status    Green Top (Gel) In process    Accident Draw In process               Brian Joseph Kerley, DO  06/02/24  10:24 EDT    Time: I spent 35 minutes on this discharge activity which included: face-to-face encounter with the patient, reviewing the data in the system, coordination of the care with the nursing staff as well as consultants, documentation, and entering orders.     Dictated utilizing Dragon dictation.      Electronically signed by Kerley, Brian Joseph, DO at 06/02/24 3506

## 2024-06-06 ENCOUNTER — TELEPHONE (OUTPATIENT)
Dept: GASTROENTEROLOGY | Facility: CLINIC | Age: 69
End: 2024-06-06
Payer: MEDICARE

## 2024-06-06 NOTE — TELEPHONE ENCOUNTER
----- Message from Susan VIEYRA sent at 5/30/2024 10:07 AM EDT -----  Regarding: RE: pain meds  Called son's cell # and left message for patient to call back.  Called patient's home number, no answer, no vm.  ----- Message -----  From: Susan Rios MA  Sent: 5/23/2024   1:58 PM EDT  To: Susan Rios MA  Subject: RE: pain meds                                    Called son's cell # and left message for patient to call back.  Called patient's home number, no answer, no vm.  ----- Message -----  From: Monica Carrillo MA  Sent: 5/22/2024   5:14 PM EDT  To: Susan Rios MA  Subject: FW: pain meds                                      ----- Message -----  From: Michelle rWight PA-C  Sent: 5/22/2024   4:21 PM EDT  To: [facility]  Subject: pain meds                                        Please let pt know that Dr. Fleming will not be giving any pain meds, she already has current opioid rx from Dr. Baldwin, recommend she discuss further with him

## 2024-06-06 NOTE — TELEPHONE ENCOUNTER
Called patient's son, Rj, left message for patient to call back.  Called patient's home number, left message for patient to call back.

## 2024-06-12 ENCOUNTER — READMISSION MANAGEMENT (OUTPATIENT)
Dept: CALL CENTER | Facility: HOSPITAL | Age: 69
End: 2024-06-12
Payer: MEDICARE

## 2024-06-12 NOTE — OUTREACH NOTE
Medical Week 1 Survey      Flowsheet Row Responses   [facility] facility patient discharged from? [facility]   Does the patient have one of the following disease processes/diagnoses(primary or secondary)? Other   Week 1 attempt successful? No   Unsuccessful attempts Attempt 1            Starr Enriquez Registered Nurse

## 2024-06-16 ENCOUNTER — HOSPITAL ENCOUNTER (EMERGENCY)
Facility: HOSPITAL | Age: 69
Discharge: HOME | End: 2024-06-16
Attending: EMERGENCY MEDICINE | Admitting: EMERGENCY MEDICINE
Payer: MEDICARE

## 2024-06-16 VITALS
OXYGEN SATURATION: 95 % | SYSTOLIC BLOOD PRESSURE: 131 MMHG | BODY MASS INDEX: 21.99 KG/M2 | TEMPERATURE: 98.1 F | HEIGHT: 62 IN | WEIGHT: 119.49 LBS | HEART RATE: 94 BPM | RESPIRATION RATE: 18 BRPM | DIASTOLIC BLOOD PRESSURE: 86 MMHG

## 2024-06-16 DIAGNOSIS — K86.1 CHRONIC PANCREATITIS, UNSPECIFIED PANCREATITIS TYPE: Primary | ICD-10-CM

## 2024-06-16 LAB
ALBUMIN SERPL-MCNC: 3.7 G/DL (ref 3.5–5.2)
ALBUMIN/GLOB SERPL: 1.3 G/DL
ALP SERPL-CCNC: 106 U/L (ref 39–117)
ALT SERPL W P-5'-P-CCNC: 8 U/L (ref 1–33)
ANION GAP SERPL CALCULATED.3IONS-SCNC: 14 MMOL/L (ref 5–15)
ANION GAP SERPL CALCULATED.3IONS-SCNC: 17.9 MMOL/L (ref 5–15)
AST SERPL-CCNC: 12 U/L (ref 1–32)
BACTERIA UR QL AUTO: ABNORMAL /HPF
BASOPHILS # BLD AUTO: 0.05 10*3/MM3 (ref 0–0.2)
BASOPHILS NFR BLD AUTO: 0.8 % (ref 0–1.5)
BILIRUB SERPL-MCNC: <0.2 MG/DL (ref 0–1.2)
BILIRUB UR QL STRIP: NEGATIVE
BUN SERPL-MCNC: 22 MG/DL (ref 8–23)
BUN SERPL-MCNC: 23 MG/DL (ref 8–23)
BUN/CREAT SERPL: 17.3 (ref 7–25)
BUN/CREAT SERPL: 17.5 (ref 7–25)
CALCIUM SPEC-SCNC: 8.7 MG/DL (ref 8.6–10.5)
CALCIUM SPEC-SCNC: 9.6 MG/DL (ref 8.6–10.5)
CHLORIDE SERPL-SCNC: 109 MMOL/L (ref 98–107)
CHLORIDE SERPL-SCNC: 112 MMOL/L (ref 98–107)
CLARITY UR: CLEAR
CO2 SERPL-SCNC: 13.1 MMOL/L (ref 22–29)
CO2 SERPL-SCNC: 14 MMOL/L (ref 22–29)
COLOR UR: YELLOW
CREAT SERPL-MCNC: 1.26 MG/DL (ref 0.57–1)
CREAT SERPL-MCNC: 1.33 MG/DL (ref 0.57–1)
D-LACTATE SERPL-SCNC: 0.7 MMOL/L (ref 0.5–2)
DEPRECATED RDW RBC AUTO: 55.6 FL (ref 37–54)
EGFRCR SERPLBLD CKD-EPI 2021: 43.4 ML/MIN/1.73
EGFRCR SERPLBLD CKD-EPI 2021: 46.3 ML/MIN/1.73
EOSINOPHIL # BLD AUTO: 0.09 10*3/MM3 (ref 0–0.4)
EOSINOPHIL NFR BLD AUTO: 1.4 % (ref 0.3–6.2)
ERYTHROCYTE [DISTWIDTH] IN BLOOD BY AUTOMATED COUNT: 13.7 % (ref 12.3–15.4)
GLOBULIN UR ELPH-MCNC: 2.9 GM/DL
GLUCOSE SERPL-MCNC: 71 MG/DL (ref 65–99)
GLUCOSE SERPL-MCNC: 75 MG/DL (ref 65–99)
GLUCOSE UR STRIP-MCNC: NEGATIVE MG/DL
HCT VFR BLD AUTO: 34.2 % (ref 34–46.6)
HGB BLD-MCNC: 10.9 G/DL (ref 12–15.9)
HGB UR QL STRIP.AUTO: NEGATIVE
HOLD SPECIMEN: NORMAL
HOLD SPECIMEN: NORMAL
HYALINE CASTS UR QL AUTO: ABNORMAL /LPF
IMM GRANULOCYTES # BLD AUTO: 0.05 10*3/MM3 (ref 0–0.05)
IMM GRANULOCYTES NFR BLD AUTO: 0.8 % (ref 0–0.5)
KETONES UR QL STRIP: NEGATIVE
LEUKOCYTE ESTERASE UR QL STRIP.AUTO: ABNORMAL
LIPASE SERPL-CCNC: 121 U/L (ref 13–60)
LYMPHOCYTES # BLD AUTO: 1.29 10*3/MM3 (ref 0.7–3.1)
LYMPHOCYTES NFR BLD AUTO: 20.7 % (ref 19.6–45.3)
MACROCYTES BLD QL SMEAR: NORMAL
MCH RBC QN AUTO: 35.2 PG (ref 26.6–33)
MCHC RBC AUTO-ENTMCNC: 31.9 G/DL (ref 31.5–35.7)
MCV RBC AUTO: 110.3 FL (ref 79–97)
MONOCYTES # BLD AUTO: 0.48 10*3/MM3 (ref 0.1–0.9)
MONOCYTES NFR BLD AUTO: 7.7 % (ref 5–12)
NEUTROPHILS NFR BLD AUTO: 4.28 10*3/MM3 (ref 1.7–7)
NEUTROPHILS NFR BLD AUTO: 68.6 % (ref 42.7–76)
NITRITE UR QL STRIP: NEGATIVE
NRBC BLD AUTO-RTO: 0 /100 WBC (ref 0–0.2)
PH UR STRIP.AUTO: 5.5 [PH] (ref 5–8)
PLAT MORPH BLD: NORMAL
PLATELET # BLD AUTO: 318 10*3/MM3 (ref 140–450)
PMV BLD AUTO: 10.4 FL (ref 6–12)
POTASSIUM SERPL-SCNC: 4.1 MMOL/L (ref 3.5–5.2)
POTASSIUM SERPL-SCNC: 4.2 MMOL/L (ref 3.5–5.2)
PROT SERPL-MCNC: 6.6 G/DL (ref 6–8.5)
PROT UR QL STRIP: ABNORMAL
RBC # BLD AUTO: 3.1 10*6/MM3 (ref 3.77–5.28)
RBC # UR STRIP: ABNORMAL /HPF
REF LAB TEST METHOD: ABNORMAL
SODIUM SERPL-SCNC: 140 MMOL/L (ref 136–145)
SODIUM SERPL-SCNC: 140 MMOL/L (ref 136–145)
SP GR UR STRIP: >=1.03 (ref 1–1.03)
SQUAMOUS #/AREA URNS HPF: ABNORMAL /HPF
UROBILINOGEN UR QL STRIP: ABNORMAL
WBC # UR STRIP: ABNORMAL /HPF
WBC MORPH BLD: NORMAL
WBC NRBC COR # BLD AUTO: 6.24 10*3/MM3 (ref 3.4–10.8)
WHOLE BLOOD HOLD COAG: NORMAL
WHOLE BLOOD HOLD SPECIMEN: NORMAL

## 2024-06-16 PROCEDURE — 25810000003 SODIUM CHLORIDE 0.9 % SOLUTION: Performed by: EMERGENCY MEDICINE

## 2024-06-16 PROCEDURE — 81001 URINALYSIS AUTO W/SCOPE: CPT | Performed by: EMERGENCY MEDICINE

## 2024-06-16 PROCEDURE — 85025 COMPLETE CBC W/AUTO DIFF WBC: CPT | Performed by: EMERGENCY MEDICINE

## 2024-06-16 PROCEDURE — 83690 ASSAY OF LIPASE: CPT | Performed by: EMERGENCY MEDICINE

## 2024-06-16 PROCEDURE — 80053 COMPREHEN METABOLIC PANEL: CPT | Performed by: EMERGENCY MEDICINE

## 2024-06-16 PROCEDURE — 25010000002 MORPHINE PER 10 MG: Performed by: EMERGENCY MEDICINE

## 2024-06-16 PROCEDURE — 85007 BL SMEAR W/DIFF WBC COUNT: CPT | Performed by: EMERGENCY MEDICINE

## 2024-06-16 PROCEDURE — 96374 THER/PROPH/DIAG INJ IV PUSH: CPT

## 2024-06-16 PROCEDURE — 83605 ASSAY OF LACTIC ACID: CPT | Performed by: EMERGENCY MEDICINE

## 2024-06-16 PROCEDURE — 25010000002 HYDROMORPHONE 1 MG/ML SOLUTION: Performed by: EMERGENCY MEDICINE

## 2024-06-16 PROCEDURE — 96375 TX/PRO/DX INJ NEW DRUG ADDON: CPT

## 2024-06-16 PROCEDURE — 99283 EMERGENCY DEPT VISIT LOW MDM: CPT

## 2024-06-16 PROCEDURE — 63710000001 ONDANSETRON ODT 4 MG TABLET DISPERSIBLE: Performed by: EMERGENCY MEDICINE

## 2024-06-16 PROCEDURE — 25010000002 ONDANSETRON PER 1 MG: Performed by: EMERGENCY MEDICINE

## 2024-06-16 RX ORDER — ONDANSETRON 4 MG/1
4 TABLET, ORALLY DISINTEGRATING ORAL EVERY 8 HOURS PRN
Qty: 15 TABLET | Refills: 0 | Status: SHIPPED | OUTPATIENT
Start: 2024-06-16

## 2024-06-16 RX ORDER — SODIUM CHLORIDE 0.9 % (FLUSH) 0.9 %
10 SYRINGE (ML) INJECTION AS NEEDED
Status: DISCONTINUED | OUTPATIENT
Start: 2024-06-16 | End: 2024-06-16 | Stop reason: HOSPADM

## 2024-06-16 RX ORDER — ONDANSETRON 4 MG/1
4 TABLET, ORALLY DISINTEGRATING ORAL ONCE
Status: COMPLETED | OUTPATIENT
Start: 2024-06-16 | End: 2024-06-16

## 2024-06-16 RX ORDER — ONDANSETRON 2 MG/ML
4 INJECTION INTRAMUSCULAR; INTRAVENOUS ONCE
Status: COMPLETED | OUTPATIENT
Start: 2024-06-16 | End: 2024-06-16

## 2024-06-16 RX ORDER — OXYCODONE HYDROCHLORIDE 5 MG/1
5 TABLET ORAL EVERY 4 HOURS PRN
Qty: 12 TABLET | Refills: 0 | Status: SHIPPED | OUTPATIENT
Start: 2024-06-16

## 2024-06-16 RX ORDER — OXYCODONE HYDROCHLORIDE 5 MG/1
5 TABLET ORAL ONCE
Status: COMPLETED | OUTPATIENT
Start: 2024-06-16 | End: 2024-06-16

## 2024-06-16 RX ADMIN — OXYCODONE HYDROCHLORIDE 5 MG: 5 TABLET ORAL at 20:54

## 2024-06-16 RX ADMIN — ONDANSETRON 4 MG: 4 TABLET, ORALLY DISINTEGRATING ORAL at 20:54

## 2024-06-16 RX ADMIN — MORPHINE SULFATE 4 MG: 4 INJECTION, SOLUTION INTRAMUSCULAR; INTRAVENOUS at 16:29

## 2024-06-16 RX ADMIN — ONDANSETRON 4 MG: 2 INJECTION INTRAMUSCULAR; INTRAVENOUS at 16:30

## 2024-06-16 RX ADMIN — HYDROMORPHONE HYDROCHLORIDE 1 MG: 1 INJECTION, SOLUTION INTRAMUSCULAR; INTRAVENOUS; SUBCUTANEOUS at 18:00

## 2024-06-16 RX ADMIN — SODIUM CHLORIDE 1000 ML: 900 INJECTION, SOLUTION INTRAVENOUS at 17:57

## 2024-06-17 NOTE — ED PROVIDER NOTES
EMERGENCY DEPARTMENT ENCOUNTER    Pt Name: Tasha Yarbrough  MRN: [id number]  Pt :   1955  Room Number:    Date of encounter:  2024  PCP: Terrence Baldwin MD  ED Provider: Juan Luis Martins MD    Historian: Patient and Family      HPI:  Chief Complaint   Patient presents with    Abdominal Pain          Context: Tasha Yarbrough is a 69 y.o. female who presents to the ED c/o nausea, vomiting, abdominal pain, pain located in the upper abdomen, feels like prior episodes of pancreatitis.  Has vomiting without blood, reports normal bowel movements.  No fevers, chills.  Son reports that feeling worse the last couple days, patient has had multiple admissions for pancreatitis in the past      PAST MEDICAL HISTORY  Past Medical History:   Diagnosis Date    Hypertension     Impaired functional mobility, balance, gait, and endurance     Impaired mobility     Injury of back     Pancreatitis          PAST SURGICAL HISTORY  Past Surgical History:   Procedure Laterality Date    ABDOMINAL SURGERY      COLON SURGERY      COLONOSCOPY      ENDOSCOPY N/A 2024    Procedure: ESOPHAGOGASTRODUODENOSCOPY WITH BIOPSY;  Surgeon: Jairo Negro MD;  Location: [facility] ENDOSCOPY;  Service: Gastroenterology;  Laterality: N/A;    TUBAL ABDOMINAL LIGATION           FAMILY HISTORY  Family History   Problem Relation Age of Onset    Kidney disease Mother     Emphysema Mother     Heart disease Father     Lung cancer Sister     Heart disease Paternal Uncle          SOCIAL HISTORY  Social History     Socioeconomic History    Marital status:    Tobacco Use    Smoking status: Every Day     Current packs/day: 1.00     Types: Cigarettes    Smokeless tobacco: Never   Vaping Use    Vaping status: Never Used   Substance and Sexual Activity    Alcohol use: Never    Drug use: Never    Sexual activity: Defer         ALLERGIES  Rocephin [ceftriaxone], Ciprofloxacin, Codeine, and Pineapple        REVIEW OF SYSTEMS  Review of Systems    Constitutional:  Negative for chills and fever.   HENT:  Negative for sore throat and trouble swallowing.    Eyes:  Negative for pain and redness.   Respiratory:  Negative for cough and shortness of breath.    Cardiovascular:  Negative for chest pain and leg swelling.   Gastrointestinal:  Positive for abdominal pain, nausea and vomiting.   Genitourinary:  Negative for dysuria and urgency.   Musculoskeletal:  Negative for back pain and neck pain.   Skin:  Negative for rash and wound.   Neurological:  Negative for dizziness and weakness.        All systems reviewed and negative except for those discussed in HPI.       PHYSICAL EXAM    I have reviewed the triage vital signs and nursing notes.    ED Triage Vitals [06/16/24 1605]   Temp Heart Rate Resp BP SpO2   98.1 °F (36.7 °C) 94 18 (!) 159/108 98 %      Temp src Heart Rate Source Patient Position BP Location FiO2 (%)   -- -- -- -- --       Physical Exam  Constitutional:       Appearance: Normal appearance. She is not ill-appearing.   HENT:      Head: Normocephalic and atraumatic.      Right Ear: External ear normal.      Left Ear: External ear normal.      Nose: Nose normal.      Mouth/Throat:      Mouth: Mucous membranes are moist.      Pharynx: Oropharynx is clear.   Eyes:      Extraocular Movements: Extraocular movements intact.      Conjunctiva/sclera: Conjunctivae normal.      Pupils: Pupils are equal, round, and reactive to light.   Cardiovascular:      Rate and Rhythm: Normal rate and regular rhythm.      Pulses:           Radial pulses are 2+ on the right side and 2+ on the left side.   Pulmonary:      Effort: Pulmonary effort is normal.      Breath sounds: Normal breath sounds.   Abdominal:      General: There is no distension.      Palpations: Abdomen is soft.      Tenderness: There is abdominal tenderness in the epigastric area.   Musculoskeletal:         General: No tenderness or deformity. Normal range of motion.      Cervical back: Normal range of  motion and neck supple.      Right lower leg: No edema.      Left lower leg: No edema.   Skin:     General: Skin is warm and dry.      Capillary Refill: Capillary refill takes less than 2 seconds.   Neurological:      General: No focal deficit present.      Mental Status: She is alert and oriented to person, place, and time.            LAB RESULTS  Recent Results (from the past 24 hour(s))   Comprehensive Metabolic Panel    Collection Time: 06/16/24  4:37 PM    Specimen: Blood   Result Value Ref Range    Glucose 75 65 - 99 mg/dL    BUN 23 8 - 23 mg/dL    Creatinine 1.33 (H) 0.57 - 1.00 mg/dL    Sodium 140 136 - 145 mmol/L    Potassium 4.1 3.5 - 5.2 mmol/L    Chloride 109 (H) 98 - 107 mmol/L    CO2 13.1 (L) 22.0 - 29.0 mmol/L    Calcium 9.6 8.6 - 10.5 mg/dL    Total Protein 6.6 6.0 - 8.5 g/dL    Albumin 3.7 3.5 - 5.2 g/dL    ALT (SGPT) 8 1 - 33 U/L    AST (SGOT) 12 1 - 32 U/L    Alkaline Phosphatase 106 39 - 117 U/L    Total Bilirubin <0.2 0.0 - 1.2 mg/dL    Globulin 2.9 gm/dL    A/G Ratio 1.3 g/dL    BUN/Creatinine Ratio 17.3 7.0 - 25.0    Anion Gap 17.9 (H) 5.0 - 15.0 mmol/L    eGFR 43.4 (L) >60.0 mL/min/1.73   Lipase    Collection Time: 06/16/24  4:37 PM    Specimen: Blood   Result Value Ref Range    Lipase 121 (H) 13 - 60 U/L   Lactic Acid, Plasma    Collection Time: 06/16/24  4:37 PM    Specimen: Blood   Result Value Ref Range    Lactate 0.7 0.5 - 2.0 mmol/L   Green Top (Gel)    Collection Time: 06/16/24  4:37 PM   Result Value Ref Range    Extra Tube Hold for add-ons.    Lavender Top    Collection Time: 06/16/24  4:37 PM   Result Value Ref Range    Extra Tube hold for add-on    Gold Top - SST    Collection Time: 06/16/24  4:37 PM   Result Value Ref Range    Extra Tube Hold for add-ons.    Light Blue Top    Collection Time: 06/16/24  4:37 PM   Result Value Ref Range    Extra Tube Hold for add-ons.    CBC Auto Differential    Collection Time: 06/16/24  4:37 PM    Specimen: Blood   Result Value Ref Range    WBC  6.24 3.40 - 10.80 10*3/mm3    RBC 3.10 (L) 3.77 - 5.28 10*6/mm3    Hemoglobin 10.9 (L) 12.0 - 15.9 g/dL    Hematocrit 34.2 34.0 - 46.6 %    .3 (H) 79.0 - 97.0 fL    MCH 35.2 (H) 26.6 - 33.0 pg    MCHC 31.9 31.5 - 35.7 g/dL    RDW 13.7 12.3 - 15.4 %    RDW-SD 55.6 (H) 37.0 - 54.0 fl    MPV 10.4 6.0 - 12.0 fL    Platelets 318 140 - 450 10*3/mm3    Neutrophil % 68.6 42.7 - 76.0 %    Lymphocyte % 20.7 19.6 - 45.3 %    Monocyte % 7.7 5.0 - 12.0 %    Eosinophil % 1.4 0.3 - 6.2 %    Basophil % 0.8 0.0 - 1.5 %    Immature Grans % 0.8 (H) 0.0 - 0.5 %    Neutrophils, Absolute 4.28 1.70 - 7.00 10*3/mm3    Lymphocytes, Absolute 1.29 0.70 - 3.10 10*3/mm3    Monocytes, Absolute 0.48 0.10 - 0.90 10*3/mm3    Eosinophils, Absolute 0.09 0.00 - 0.40 10*3/mm3    Basophils, Absolute 0.05 0.00 - 0.20 10*3/mm3    Immature Grans, Absolute 0.05 0.00 - 0.05 10*3/mm3    nRBC 0.0 0.0 - 0.2 /100 WBC   Scan Slide    Collection Time: 06/16/24  4:37 PM    Specimen: Blood   Result Value Ref Range    Macrocytes Large/3+ None Seen    WBC Morphology Normal Normal    Platelet Morphology Normal Normal   Urinalysis With Microscopic If Indicated (No Culture) - Urine, Clean Catch    Collection Time: 06/16/24  5:59 PM    Specimen: Urine, Clean Catch   Result Value Ref Range    Color, UA Yellow Yellow, Straw    Appearance, UA Clear Clear    pH, UA 5.5 5.0 - 8.0    Specific Gravity, UA >=1.030 1.005 - 1.030    Glucose, UA Negative Negative    Ketones, UA Negative Negative    Bilirubin, UA Negative Negative    Blood, UA Negative Negative    Protein, UA 30 mg/dL (1+) (A) Negative    Leuk Esterase, UA Small (1+) (A) Negative    Nitrite, UA Negative Negative    Urobilinogen, UA 0.2 E.U./dL 0.2 - 1.0 E.U./dL   Urinalysis, Microscopic Only - Urine, Clean Catch    Collection Time: 06/16/24  5:59 PM    Specimen: Urine, Clean Catch   Result Value Ref Range    RBC, UA None Seen None Seen, 0-2 /HPF    WBC, UA 6-10 (A) None Seen, 0-2 /HPF    Bacteria, UA None Seen  None Seen /HPF    Squamous Epithelial Cells, UA 3-6 (A) None Seen, 0-2 /HPF    Hyaline Casts, UA 0-2 None Seen /LPF    Methodology Manual Light Microscopy    Basic Metabolic Panel    Collection Time: 06/16/24  7:42 PM    Specimen: Blood   Result Value Ref Range    Glucose 71 65 - 99 mg/dL    BUN 22 8 - 23 mg/dL    Creatinine 1.26 (H) 0.57 - 1.00 mg/dL    Sodium 140 136 - 145 mmol/L    Potassium 4.2 3.5 - 5.2 mmol/L    Chloride 112 (H) 98 - 107 mmol/L    CO2 14.0 (L) 22.0 - 29.0 mmol/L    Calcium 8.7 8.6 - 10.5 mg/dL    BUN/Creatinine Ratio 17.5 7.0 - 25.0    Anion Gap 14.0 5.0 - 15.0 mmol/L    eGFR 46.3 (L) >60.0 mL/min/1.73       If labs were ordered, I independently reviewed the results and considered them in treating the patient.        RADIOLOGY  No Radiology Exams Resulted Within Past 24 Hours        PROCEDURES    Procedures    Interpretations    O2 Sat: The patients oxygen saturation was 95% on Room Air.  This was independently interpreted by me as Normal      Cardiac Monitoring: I reviewed and independently interpreted the Rhythm Strip as Normal Sinus rhythm rate of 94      MEDICATIONS GIVEN IN ER    Medications   sodium chloride 0.9 % flush 10 mL (has no administration in time range)   lactated ringers bolus 1,000 mL (0 mL Intravenous Hold 6/16/24 1813)   morphine injection 4 mg (4 mg Intravenous Given 6/16/24 1629)   ondansetron (ZOFRAN) injection 4 mg (4 mg Intravenous Given 6/16/24 1630)   sodium chloride 0.9 % bolus 1,000 mL (0 mL Intravenous Stopped 6/16/24 1942)   HYDROmorphone (DILAUDID) injection 1 mg (1 mg Intravenous Given 6/16/24 1800)   oxyCODONE (ROXICODONE) immediate release tablet 5 mg (5 mg Oral Given 6/16/24 2054)   ondansetron ODT (ZOFRAN-ODT) disintegrating tablet 4 mg (4 mg Oral Given 6/16/24 2054)         MEDICAL DECISION MAKING, PROGRESS, and CONSULTS    All labs, if obtained, have been independently reviewed by me.  All radiology studies, if obtained, have been reviewed by me and the  radiologist dictating the report.  All EKG's, if obtained, have been independently viewed and interpreted by me      Discussion below represents my analysis of pertinent findings related to patient's condition, differential diagnosis, treatment plan and final disposition.      Differential diagnosis:    69-year-old female with known history of chronic pancreatitis presents to the ED with complaint of abdominal pain, nausea and vomiting.  Suspect exacerbation of underlying chronic pancreatitis, her vitals are stable, she had many many CAT scans, I do not think repeat imaging would be of much use in this case.  Will obtain laboratory workup including lipase, provide IV morphine, IV Zofran, IV fluids    Additional Sources:  External (non-ED) record review:  Discharge summary from recent mission 6/2/2024       Orders placed during this visit:  Orders Placed This Encounter   Procedures    [place] Draw    Comprehensive Metabolic Panel    Lipase    Urinalysis With Microscopic If Indicated (No Culture) - Urine, Clean Catch    Lactic Acid, Plasma    CBC Auto Differential    Scan Slide    Urinalysis, Microscopic Only - Urine, Clean Catch    Basic Metabolic Panel    NPO Diet NPO Type: Strict NPO    Undress & Gown    Insert Peripheral IV    CBC & Differential    Green Top (Gel)    Lavender Top    Gold Top - SST    Light Blue Top         Additional orders considered but not ordered:  None    ED Course:    Consultants:  None    ED Course as of 06/16/24 2220   Sun Jun 16, 2024   1732 Lipase(!): 121  Lipase elevated but is not much different than the patient's usual lipase [CS]   1733 Lactic Acid, Plasma  Lactic is negative [CS]   2017 Basic Metabolic Panel(!)  Repeat chemistries show improved creatinine, improved anion gap.  Bicarb improved as well [CS]   2033 Patient tolerating oral intake, back to baseline, likely exacerbation of chronic pancreatitis, will DC patient home with oral pain meds and nausea meds.  Her and her son  voiced understanding agreeable to plan [CS]      ED Course User Index  [CS] Juan Luis Martins MD           After my consideration of clinical presentation and any laboratory/radiology studies obtained, I discussed the findings with the patient/patient representative who is in agreement with the treatment plan and the final disposition. Risks and benefits of discharge were discussed.     AS OF 22:20 EDT VITALS:    BP - 131/86  HR - 94  TEMP - 98.1 °F (36.7 °C)  O2 SATS - 95%    I reviewed the patients prescription monitoring report if available prior to discharge    DIAGNOSIS  Final diagnoses:   Chronic pancreatitis, unspecified pancreatitis type         DISPOSITION  ED Disposition       ED Disposition   Discharge    Condition   Stable    Comment   --                   Please note that portions of this document were completed with voice recognition software.        Juan Luis Martins MD  06/16/24 4012

## 2024-06-18 ENCOUNTER — READMISSION MANAGEMENT (OUTPATIENT)
Dept: CALL CENTER | Facility: HOSPITAL | Age: 69
End: 2024-06-18
Payer: MEDICARE

## 2024-06-18 ENCOUNTER — HOSPITAL ENCOUNTER (EMERGENCY)
Facility: HOSPITAL | Age: 69
Discharge: HOME | End: 2024-06-18
Attending: EMERGENCY MEDICINE | Admitting: EMERGENCY MEDICINE
Payer: MEDICARE

## 2024-06-18 VITALS
TEMPERATURE: 97.9 F | DIASTOLIC BLOOD PRESSURE: 63 MMHG | RESPIRATION RATE: 18 BRPM | OXYGEN SATURATION: 97 % | WEIGHT: 138 LBS | SYSTOLIC BLOOD PRESSURE: 129 MMHG | BODY MASS INDEX: 25.4 KG/M2 | HEIGHT: 62 IN | HEART RATE: 72 BPM

## 2024-06-18 DIAGNOSIS — T14.8XXA ANIMAL BITE: Primary | ICD-10-CM

## 2024-06-18 DIAGNOSIS — L03.116 CELLULITIS OF LEFT LOWER EXTREMITY: ICD-10-CM

## 2024-06-18 PROCEDURE — 90715 TDAP VACCINE 7 YRS/> IM: CPT

## 2024-06-18 PROCEDURE — 90471 IMMUNIZATION ADMIN: CPT

## 2024-06-18 PROCEDURE — 99283 EMERGENCY DEPT VISIT LOW MDM: CPT

## 2024-06-18 PROCEDURE — 25010000002 TETANUS-DIPHTH-ACELL PERTUSSIS 5-2.5-18.5 LF-MCG/0.5 SUSPENSION PREFILLED SYRINGE

## 2024-06-18 RX ORDER — AMOXICILLIN AND CLAVULANATE POTASSIUM 875; 125 MG/1; MG/1
1 TABLET, FILM COATED ORAL 2 TIMES DAILY
Qty: 20 TABLET | Refills: 0 | Status: SHIPPED | OUTPATIENT
Start: 2024-06-18 | End: 2024-06-28

## 2024-06-18 RX ORDER — BACITRACIN ZINC 500 [USP'U]/G
1 OINTMENT TOPICAL ONCE
Status: COMPLETED | OUTPATIENT
Start: 2024-06-18 | End: 2024-06-18

## 2024-06-18 RX ADMIN — BACITRACIN ZINC 1 APPLICATION: 500 OINTMENT TOPICAL at 18:42

## 2024-06-18 RX ADMIN — TETANUS TOXOID, REDUCED DIPHTHERIA TOXOID AND ACELLULAR PERTUSSIS VACCINE, ADSORBED 0.5 ML: 5; 2.5; 8; 8; 2.5 SUSPENSION INTRAMUSCULAR at 18:31

## 2024-06-18 NOTE — DISCHARGE INSTRUCTIONS
Please have your primary care provider reevaluate your left leg cellulitis and wounds within 72 hours.  Return to the ER for any acute changes or worsening of your condition.

## 2024-06-18 NOTE — OUTREACH NOTE
Medical Week 1 Survey      Flowsheet Row Responses   [facility] facility patient discharged from? [facility]   Does the patient have one of the following disease processes/diagnoses(primary or secondary)? Other   Week 1 attempt successful? No   Unsuccessful attempts Attempt 2            Angela CRABTREE - Licensed Nurse

## 2024-06-18 NOTE — ED PROVIDER NOTES
EMERGENCY DEPARTMENT ENCOUNTER    Pt Name: Tasha Yarbrough  MRN: [id number]  Pt :   1955  Room Number:  23SF/23  Date of encounter:  2024  PCP: Terrence Baldwin MD  ED Provider: Prasad Hull PA-C    Historian: Patient, nursing notes      HPI:  Chief Complaint: Cat bite        Context: Tasha Yarbrough is a 69 y.o. female who presents to the ED c/o a cat bite to her left leg which occurred yesterday.  Patient states her cat excellently scratched and or bit her she is uncertain of which 1, and she has had swelling and redness creeping up her left leg since.  Patient denies any fever or chills, nausea or vomiting, numbness or tingling, bleeding from the wounds, or any other complaint.      PAST MEDICAL HISTORY  Past Medical History:   Diagnosis Date    Hypertension     Impaired functional mobility, balance, gait, and endurance     Impaired mobility     Injury of back     Pancreatitis          PAST SURGICAL HISTORY  Past Surgical History:   Procedure Laterality Date    ABDOMINAL SURGERY      COLON SURGERY      COLONOSCOPY      ENDOSCOPY N/A 2024    Procedure: ESOPHAGOGASTRODUODENOSCOPY WITH BIOPSY;  Surgeon: Jairo Negro MD;  Location: [facility] ENDOSCOPY;  Service: Gastroenterology;  Laterality: N/A;    TUBAL ABDOMINAL LIGATION           FAMILY HISTORY  Family History   Problem Relation Age of Onset    Kidney disease Mother     Emphysema Mother     Heart disease Father     Lung cancer Sister     Heart disease Paternal Uncle          SOCIAL HISTORY  Social History     Socioeconomic History    Marital status:    Tobacco Use    Smoking status: Every Day     Current packs/day: 1.00     Types: Cigarettes    Smokeless tobacco: Never   Vaping Use    Vaping status: Never Used   Substance and Sexual Activity    Alcohol use: Never    Drug use: Never    Sexual activity: Defer         ALLERGIES  Rocephin [ceftriaxone], Ciprofloxacin, Codeine, and Pineapple        REVIEW OF SYSTEMS  Review of Systems    Constitutional:  Negative for chills and fever.   HENT:  Negative for congestion and sore throat.    Respiratory:  Negative for cough and shortness of breath.    Cardiovascular:  Negative for chest pain.   Gastrointestinal:  Negative for abdominal pain, nausea and vomiting.   Genitourinary:  Negative for dysuria.   Musculoskeletal:  Negative for back pain.   Skin:  Positive for color change and wound.   Neurological:  Negative for dizziness and headaches.   Psychiatric/Behavioral:  Negative for confusion.    All other systems reviewed and are negative.         All systems reviewed and negative except for those discussed in HPI.       PHYSICAL EXAM    I have reviewed the triage vital signs and nursing notes.    ED Triage Vitals [06/18/24 1728]   Temp Heart Rate Resp BP SpO2   99.6 °F (37.6 °C) 115 18 134/64 95 %      Temp src Heart Rate Source Patient Position BP Location FiO2 (%)   Oral Monitor Sitting Right arm --       Physical Exam  Vitals and nursing note reviewed.   Constitutional:       General: She is not in acute distress.     Appearance: She is not ill-appearing, toxic-appearing or diaphoretic.   HENT:      Head: Normocephalic and atraumatic.      Mouth/Throat:      Mouth: Mucous membranes are moist.      Pharynx: Oropharynx is clear.   Eyes:      Extraocular Movements: Extraocular movements intact.   Cardiovascular:      Rate and Rhythm: Normal rate.      Heart sounds: Normal heart sounds.   Pulmonary:      Effort: Pulmonary effort is normal. No respiratory distress.      Breath sounds: Normal breath sounds.   Abdominal:      Tenderness: There is no abdominal tenderness.   Skin:     General: Skin is warm and dry.      Findings: Erythema and wound present. No rash.          Neurological:      Mental Status: She is alert.             LAB RESULTS  No results found for this or any previous visit (from the past 24 hour(s)).    If labs were ordered, I independently reviewed the results and considered them in  treating the patient.        RADIOLOGY  No Radiology Exams Resulted Within Past 24 Hours       PROCEDURES    Procedures    No orders to display       MEDICATIONS GIVEN IN ER    Medications   Tetanus-Diphth-Acell Pertussis (BOOSTRIX) injection 0.5 mL (has no administration in time range)         MEDICAL DECISION MAKING, PROGRESS, and CONSULTS    All labs, if obtained, have been independently reviewed by me.  All radiology studies, if obtained, have been reviewed by me and the radiologist dictating the report.  All EKG's, if obtained, have been independently viewed and interpreted by me/my attending physician.      Discussion below represents my analysis of pertinent findings related to patient's condition, differential diagnosis, treatment plan and final disposition.    69-year-old female presenting the ER for evaluation of left lower extremity cat bites which occurred yesterday on exam she had 4 puncture type wounds superficial with no active pus or bleeding but surrounding erythema and warmth consistent with cellulitis.  Will treat the patient for animal bite and cellulitis with Augmentin twice daily for 10 days.  Tdap was given in the emergency department along with strict ED return precautions and recommendations to follow-up for an in person evaluation and wound recheck with primary care within 72 hours patient verbalized understanding and agreement that plan.                     Differential diagnosis:    Differential diagnosis included but was not limited to animal bite, cellulitis, wound, abscess, among others      Additional sources:    - Discussed/ obtained information from independent historians: Son at bedside in addition to patient    - External (non-ED) record review: Primary care records and immunization records    - Chronic or social conditions impacting care: None    Orders placed during this visit:  No orders of the defined types were placed in this encounter.        Additional orders considered but  not ordered: None      ED Course:    Consultants: None                Shared Decision Making:  After my consideration of clinical presentation and any laboratory/radiology studies obtained, I discussed the findings with the patient/patient representative who is in agreement with the treatment plan and the final disposition.   Risks and benefits of discharge and/or observation/admission were discussed.       AS OF 18:16 EDT VITALS:    BP - 134/64  HR - 115  TEMP - 99.6 °F (37.6 °C) (Oral)  O2 SATS - 95%                  DIAGNOSIS  Final diagnoses:   Animal bite   Cellulitis of left lower extremity         DISPOSITION  Discharge home      Please note that portions of this document were completed with voice recognition software.      Prasad Hull PA-C  06/18/24 8093

## 2024-06-19 ENCOUNTER — NURSE TRIAGE (OUTPATIENT)
Dept: CALL CENTER | Facility: HOSPITAL | Age: 69
End: 2024-06-19
Payer: MEDICARE

## 2024-06-19 NOTE — TELEPHONE ENCOUNTER
The patient's son is concerned that her cellulitis is getting worse. He states that she was in the ED yesterday and diagnosed with cellulitis from a cat bite on her left ankle. Yesterday he states that the redness was just below the knee and today it is well above the knee. Also, her pain was well below the knee and today it is all the way up to her groin. She has received 3 doses of oral antibiotics.   Triage completed and I advised that she be seen by a HCP within the next 24 hours. He said that they will follow this advice.   Reason for Disposition   [1] Taking antibiotic > 24 hours AND [2] cellulitis symptoms are WORSE (e.g., spreading redness, pain, swelling)    Additional Information   Negative: Shock suspected (e.g., cold/pale/clammy skin, too weak to stand, low BP, rapid pulse)   Negative: Sounds like a life-threatening emergency to the triager   Negative:  surgical wound infection suspected (post-op)   Negative: Surgical wound infection suspected (post-op)   Negative: [1] Widespread rash AND [2] drug rash suspected (i.e., allergic reaction to antibiotic)   Negative: Animal bite wound infection suspected   Negative: SEVERE pain   Negative: [1] SEVERE pain with bending of finger (or toe) AND [2] cellulitis on hand (or foot)   Negative: [1] Widespread rash AND [2] bright red, sunburn-like   Negative: Fever > 103 F (39.4 C)   Negative: Black (necrotic), dark purple, or blisters develop in area of cellulitis   Negative: Patient sounds very sick or weak to the triager   Negative: [1] Taking antibiotic > 24 hours AND [2] fever persists   Negative: [1] Taking antibiotic > 24 hours AND [2] red streak (or line) runs from area of infection   Negative: [1] Fever > 100.0 F (37.8 C) AND [2] new-onset   Negative: [1] Red streak runs from area of infection AND [2] new-onset   Negative: [1] Caller has URGENT question AND [2] triager unable to answer question    Answer Assessment - Initial Assessment Questions  1.  "SYMPTOM: \"What's the main symptom you're concerned about?\" (e.g., redness, swelling, pain, fever, weakness)  Cellulitis from a cat bite- on the ankle       Redness was just below the knee, today it is just above the knee  Pain was well below the knee, today the pain goes from the ankle to the knee  2. CELLULITIS LOCATION: \"Where is the cellulitis located?\" (e.g., hand, arm, foot, leg, face)      Left leg  3. CELLULITIS SIZE: \"What is the size of the red area?\" (e.g., inches, centimeters; compare to size of a coin) .      From the ankle up  4. BETTER-SAME-WORSE: \"Are you getting better, staying the same, or getting worse compared to the day you started the antibiotics?\"   Redness is worse, swollen, and pain increased from ankle to the groin   5. PAIN: Do you have any pain?\"  If Yes, ask: \"How bad is the pain?\"  (e.g., Scale 1-10; mild, moderate, or severe)     - MILD (1-3): Doesn't interfere with normal activities.      - MODERATE (4-7): Interferes with normal activities or awakens from sleep.     - SEVERE (8-10): Excruciating pain, unable to do any normal activities.    Moderate 5/10   6. FEVER: \"Do you have a fever?\" If Yes, ask: \"What is it, how was it measured and when did it start?\"    Not sure   7. OTHER SYMPTOMS: \"Do you have any other symptoms?\" (e.g., pus coming from a wound, red streaks, weakness)      No   8. DIAGNOSIS DATE: \"When was the cellulitis diagnosed?\" \"By whom?\"    Yesterday   9. ANTIBIOTIC NAME: \"What antibiotic(s) are you taking?\"  \"How many times per day?\" (Be sure the patient is receiving the antibiotic as directed).      Ammoxcillin/ clauv for 10 days,   She took 2 yesterday and 1 today   10. ANTIBIOTIC DATE: \"When was the antibiotic started?\"        Yesterday   11. FOLLOW-UP APPOINTMENT: \"Do you have follow-up appointment with your doctor?\"        No    Protocols used: Cellulitis on Antibiotic Follow-up Call-ADULT-    "

## 2024-06-21 DIAGNOSIS — R19.7 DIARRHEA, UNSPECIFIED TYPE: ICD-10-CM

## 2024-06-21 DIAGNOSIS — D53.9 MACROCYTIC ANEMIA: ICD-10-CM

## 2024-06-22 ENCOUNTER — HOSPITAL ENCOUNTER (EMERGENCY)
Facility: HOSPITAL | Age: 69
Discharge: OTHER INSTITUTION | End: 2024-06-22
Attending: STUDENT IN AN ORGANIZED HEALTH CARE EDUCATION/TRAINING PROGRAM
Payer: MEDICARE

## 2024-06-22 ENCOUNTER — APPOINTMENT (OUTPATIENT)
Dept: CT IMAGING | Facility: HOSPITAL | Age: 69
End: 2024-06-22
Payer: MEDICARE

## 2024-06-22 VITALS
DIASTOLIC BLOOD PRESSURE: 91 MMHG | WEIGHT: 138 LBS | HEART RATE: 76 BPM | HEIGHT: 62 IN | OXYGEN SATURATION: 98 % | RESPIRATION RATE: 18 BRPM | BODY MASS INDEX: 25.4 KG/M2 | TEMPERATURE: 98 F | SYSTOLIC BLOOD PRESSURE: 153 MMHG

## 2024-06-22 DIAGNOSIS — K85.90 ACUTE PANCREATITIS, UNSPECIFIED COMPLICATION STATUS, UNSPECIFIED PANCREATITIS TYPE: Primary | ICD-10-CM

## 2024-06-22 LAB
ALBUMIN SERPL-MCNC: 4 G/DL (ref 3.5–5.2)
ALBUMIN/GLOB SERPL: 1.1 G/DL
ALP SERPL-CCNC: 167 U/L (ref 39–117)
ALT SERPL W P-5'-P-CCNC: 9 U/L (ref 1–33)
ANION GAP SERPL CALCULATED.3IONS-SCNC: 16.9 MMOL/L (ref 5–15)
AST SERPL-CCNC: 12 U/L (ref 1–32)
ATMOSPHERIC PRESS: 732 MMHG
BACTERIA UR QL AUTO: NORMAL /HPF
BASE EXCESS BLDV CALC-SCNC: -19.1 MMOL/L (ref 0–2)
BASOPHILS # BLD AUTO: 0.06 10*3/MM3 (ref 0–0.2)
BASOPHILS NFR BLD AUTO: 0.8 % (ref 0–1.5)
BDY SITE: ABNORMAL
BILIRUB SERPL-MCNC: <0.2 MG/DL (ref 0–1.2)
BILIRUB UR QL STRIP: NEGATIVE
BUN SERPL-MCNC: 42 MG/DL (ref 8–23)
BUN/CREAT SERPL: 25.9 (ref 7–25)
CALCIUM SPEC-SCNC: 10.3 MG/DL (ref 8.6–10.5)
CHLORIDE SERPL-SCNC: 113 MMOL/L (ref 98–107)
CLARITY UR: CLEAR
CO2 SERPL-SCNC: 7.1 MMOL/L (ref 22–29)
COHGB MFR BLD: 1.8 % (ref 0–5)
COLOR UR: YELLOW
CREAT SERPL-MCNC: 1.62 MG/DL (ref 0.57–1)
CRP SERPL-MCNC: 4.55 MG/DL (ref 0–0.5)
D-LACTATE SERPL-SCNC: 0.9 MMOL/L (ref 0.5–2)
DEPRECATED RDW RBC AUTO: 59.2 FL (ref 37–54)
EGFRCR SERPLBLD CKD-EPI 2021: 34.3 ML/MIN/1.73
EOSINOPHIL # BLD AUTO: 0.07 10*3/MM3 (ref 0–0.4)
EOSINOPHIL NFR BLD AUTO: 0.9 % (ref 0.3–6.2)
ERYTHROCYTE [DISTWIDTH] IN BLOOD BY AUTOMATED COUNT: 14.6 % (ref 12.3–15.4)
FLUAV SUBTYP SPEC NAA+PROBE: NOT DETECTED
FLUBV RNA ISLT QL NAA+PROBE: NOT DETECTED
GEN 5 2HR TROPONIN T REFLEX: 10 NG/L
GLOBULIN UR ELPH-MCNC: 3.7 GM/DL
GLUCOSE SERPL-MCNC: 99 MG/DL (ref 65–99)
GLUCOSE UR STRIP-MCNC: NEGATIVE MG/DL
HCO3 BLDV-SCNC: 9 MMOL/L (ref 22–28)
HCT VFR BLD AUTO: 40.7 % (ref 34–46.6)
HGB BLD-MCNC: 13 G/DL (ref 12–15.9)
HGB UR QL STRIP.AUTO: NEGATIVE
HYALINE CASTS UR QL AUTO: NORMAL /LPF
IMM GRANULOCYTES # BLD AUTO: 0.09 10*3/MM3 (ref 0–0.05)
IMM GRANULOCYTES NFR BLD AUTO: 1.2 % (ref 0–0.5)
INHALED O2 CONCENTRATION: 21 %
KETONES UR QL STRIP: NEGATIVE
LEUKOCYTE ESTERASE UR QL STRIP.AUTO: NEGATIVE
LIPASE SERPL-CCNC: 1375 U/L (ref 13–60)
LYMPHOCYTES # BLD AUTO: 1 10*3/MM3 (ref 0.7–3.1)
LYMPHOCYTES NFR BLD AUTO: 12.9 % (ref 19.6–45.3)
Lab: ABNORMAL
Lab: ABNORMAL
MACROCYTES BLD QL SMEAR: NORMAL
MCH RBC QN AUTO: 35 PG (ref 26.6–33)
MCHC RBC AUTO-ENTMCNC: 31.9 G/DL (ref 31.5–35.7)
MCV RBC AUTO: 109.7 FL (ref 79–97)
METHGB BLD QL: 0.7 % (ref 0–3)
MODALITY: ABNORMAL
MONOCYTES # BLD AUTO: 0.62 10*3/MM3 (ref 0.1–0.9)
MONOCYTES NFR BLD AUTO: 8 % (ref 5–12)
NEUTROPHILS NFR BLD AUTO: 5.91 10*3/MM3 (ref 1.7–7)
NEUTROPHILS NFR BLD AUTO: 76.2 % (ref 42.7–76)
NITRITE UR QL STRIP: NEGATIVE
NOTIFIED BY: ABNORMAL
NOTIFIED WHO: ABNORMAL
NRBC BLD AUTO-RTO: 0 /100 WBC (ref 0–0.2)
OXYHGB MFR BLDV: 58.3 % (ref 40–70)
PCO2 BLDV: 28 MM HG (ref 40–50)
PH BLDV: 7.11 PH UNITS (ref 7.32–7.42)
PH UR STRIP.AUTO: 5.5 [PH] (ref 5–8)
PLATELET # BLD AUTO: 333 10*3/MM3 (ref 140–450)
PMV BLD AUTO: 10.2 FL (ref 6–12)
PO2 BLDV: 30.9 MM HG (ref 30–50)
POTASSIUM SERPL-SCNC: 4.7 MMOL/L (ref 3.5–5.2)
PROT SERPL-MCNC: 7.7 G/DL (ref 6–8.5)
PROT UR QL STRIP: ABNORMAL
RBC # BLD AUTO: 3.71 10*6/MM3 (ref 3.77–5.28)
RBC # UR STRIP: NORMAL /HPF
REF LAB TEST METHOD: NORMAL
SAO2 % BLDCOV: 59.8 % (ref 45–75)
SARS-COV-2 RNA RESP QL NAA+PROBE: NOT DETECTED
SMALL PLATELETS BLD QL SMEAR: ADEQUATE
SODIUM SERPL-SCNC: 137 MMOL/L (ref 136–145)
SP GR UR STRIP: >=1.03 (ref 1–1.03)
SQUAMOUS #/AREA URNS HPF: NORMAL /HPF
TROPONIN T DELTA: -2 NG/L
TROPONIN T SERPL HS-MCNC: 12 NG/L
UROBILINOGEN UR QL STRIP: ABNORMAL
VENTILATOR MODE: ABNORMAL
WBC # UR STRIP: NORMAL /HPF
WBC MORPH BLD: NORMAL
WBC NRBC COR # BLD AUTO: 7.75 10*3/MM3 (ref 3.4–10.8)

## 2024-06-22 PROCEDURE — 93005 ELECTROCARDIOGRAM TRACING: CPT | Performed by: STUDENT IN AN ORGANIZED HEALTH CARE EDUCATION/TRAINING PROGRAM

## 2024-06-22 PROCEDURE — 25010000002 HYDROMORPHONE 1 MG/ML SOLUTION: Performed by: STUDENT IN AN ORGANIZED HEALTH CARE EDUCATION/TRAINING PROGRAM

## 2024-06-22 PROCEDURE — 25510000001 IOPAMIDOL 61 % SOLUTION: Performed by: STUDENT IN AN ORGANIZED HEALTH CARE EDUCATION/TRAINING PROGRAM

## 2024-06-22 PROCEDURE — 82820 HEMOGLOBIN-OXYGEN AFFINITY: CPT

## 2024-06-22 PROCEDURE — 86140 C-REACTIVE PROTEIN: CPT | Performed by: STUDENT IN AN ORGANIZED HEALTH CARE EDUCATION/TRAINING PROGRAM

## 2024-06-22 PROCEDURE — 25010000002 DROPERIDOL PER 5 MG: Performed by: STUDENT IN AN ORGANIZED HEALTH CARE EDUCATION/TRAINING PROGRAM

## 2024-06-22 PROCEDURE — 99285 EMERGENCY DEPT VISIT HI MDM: CPT

## 2024-06-22 PROCEDURE — 96375 TX/PRO/DX INJ NEW DRUG ADDON: CPT

## 2024-06-22 PROCEDURE — 96374 THER/PROPH/DIAG INJ IV PUSH: CPT

## 2024-06-22 PROCEDURE — 85025 COMPLETE CBC W/AUTO DIFF WBC: CPT | Performed by: STUDENT IN AN ORGANIZED HEALTH CARE EDUCATION/TRAINING PROGRAM

## 2024-06-22 PROCEDURE — 74177 CT ABD & PELVIS W/CONTRAST: CPT

## 2024-06-22 PROCEDURE — 83605 ASSAY OF LACTIC ACID: CPT | Performed by: STUDENT IN AN ORGANIZED HEALTH CARE EDUCATION/TRAINING PROGRAM

## 2024-06-22 PROCEDURE — 82805 BLOOD GASES W/O2 SATURATION: CPT

## 2024-06-22 PROCEDURE — 25810000003 SODIUM CHLORIDE 0.9 % SOLUTION: Performed by: STUDENT IN AN ORGANIZED HEALTH CARE EDUCATION/TRAINING PROGRAM

## 2024-06-22 PROCEDURE — 83690 ASSAY OF LIPASE: CPT | Performed by: STUDENT IN AN ORGANIZED HEALTH CARE EDUCATION/TRAINING PROGRAM

## 2024-06-22 PROCEDURE — 81001 URINALYSIS AUTO W/SCOPE: CPT | Performed by: STUDENT IN AN ORGANIZED HEALTH CARE EDUCATION/TRAINING PROGRAM

## 2024-06-22 PROCEDURE — 36415 COLL VENOUS BLD VENIPUNCTURE: CPT

## 2024-06-22 PROCEDURE — 85007 BL SMEAR W/DIFF WBC COUNT: CPT | Performed by: STUDENT IN AN ORGANIZED HEALTH CARE EDUCATION/TRAINING PROGRAM

## 2024-06-22 PROCEDURE — 87636 SARSCOV2 & INF A&B AMP PRB: CPT | Performed by: STUDENT IN AN ORGANIZED HEALTH CARE EDUCATION/TRAINING PROGRAM

## 2024-06-22 PROCEDURE — 25010000002 DIPHENHYDRAMINE PER 50 MG: Performed by: STUDENT IN AN ORGANIZED HEALTH CARE EDUCATION/TRAINING PROGRAM

## 2024-06-22 PROCEDURE — 80053 COMPREHEN METABOLIC PANEL: CPT | Performed by: STUDENT IN AN ORGANIZED HEALTH CARE EDUCATION/TRAINING PROGRAM

## 2024-06-22 PROCEDURE — 84484 ASSAY OF TROPONIN QUANT: CPT | Performed by: STUDENT IN AN ORGANIZED HEALTH CARE EDUCATION/TRAINING PROGRAM

## 2024-06-22 RX ORDER — DROPERIDOL 2.5 MG/ML
2.5 INJECTION, SOLUTION INTRAMUSCULAR; INTRAVENOUS ONCE
Status: COMPLETED | OUTPATIENT
Start: 2024-06-22 | End: 2024-06-22

## 2024-06-22 RX ORDER — DIPHENHYDRAMINE HYDROCHLORIDE 50 MG/ML
25 INJECTION INTRAMUSCULAR; INTRAVENOUS ONCE
Status: COMPLETED | OUTPATIENT
Start: 2024-06-22 | End: 2024-06-22

## 2024-06-22 RX ADMIN — SODIUM CHLORIDE 1000 ML: 9 INJECTION, SOLUTION INTRAVENOUS at 19:53

## 2024-06-22 RX ADMIN — HYDROMORPHONE HYDROCHLORIDE 1 MG: 1 INJECTION, SOLUTION INTRAMUSCULAR; INTRAVENOUS; SUBCUTANEOUS at 22:01

## 2024-06-22 RX ADMIN — DIPHENHYDRAMINE HYDROCHLORIDE 25 MG: 50 INJECTION, SOLUTION INTRAMUSCULAR; INTRAVENOUS at 19:53

## 2024-06-22 RX ADMIN — DROPERIDOL 2.5 MG: 2.5 INJECTION, SOLUTION INTRAMUSCULAR; INTRAVENOUS at 19:53

## 2024-06-22 RX ADMIN — IOPAMIDOL 85 ML: 612 INJECTION, SOLUTION INTRAVENOUS at 20:16

## 2024-06-22 NOTE — ED PROVIDER NOTES
Subjective:  History of Present Illness:    Patient is a 69-year-old female history of pancreatitis, hypertension who presents today with abdominal pain.  Reports she believes that she is having another flare of pancreatitis.  Denies diarrhea but has had nausea and vomiting at home today.  No new leg swelling or pain.  No dysuria.  Concurrently, patient reports that she was bitten by cat, currently undergoing antibiotic therapy.  Has redness to the left leg which they state is greatly improved since starting antibiotics.  Denies fever prior to arrival.  Patient has extensive medical history of chronic pancreatitis.  Has been referred to the emergency [place] and importance stressed of follow-up however, patient has still not followed up with the emergency [place].      Nurses Notes reviewed and agree, including vitals, allergies, social history and prior medical history.     REVIEW OF SYSTEMS: All systems reviewed and not pertinent unless noted.  Review of Systems   Constitutional:  Positive for activity change. Negative for appetite change, chills, fatigue and fever.   HENT:  Negative for rhinorrhea, sinus pressure and sinus pain.    Eyes:  Negative for discharge and itching.   Respiratory:  Negative for cough and shortness of breath.    Cardiovascular:  Negative for chest pain and leg swelling.   Gastrointestinal:  Positive for abdominal pain, nausea and vomiting. Negative for abdominal distention and diarrhea.   Endocrine: Negative for cold intolerance and heat intolerance.   Genitourinary:  Negative for decreased urine volume, difficulty urinating, flank pain, frequency, urgency, vaginal bleeding, vaginal discharge and vaginal pain.   Musculoskeletal:  Negative for gait problem, neck pain and neck stiffness.   Skin:  Negative for color change.   Allergic/Immunologic: Negative for environmental allergies.   Neurological:  Negative for seizures, syncope, facial asymmetry and speech difficulty.  "  Psychiatric/Behavioral:  Negative for self-injury and suicidal ideas.        Past Medical History:   Diagnosis Date    Hypertension     Impaired functional mobility, balance, gait, and endurance     Impaired mobility     Injury of back     Pancreatitis        Allergies:    Rocephin [ceftriaxone], Ciprofloxacin, Codeine, and Pineapple      Past Surgical History:   Procedure Laterality Date    ABDOMINAL SURGERY      COLON SURGERY      COLONOSCOPY      ENDOSCOPY N/A 4/23/2024    Procedure: ESOPHAGOGASTRODUODENOSCOPY WITH BIOPSY;  Surgeon: Jairo Negro MD;  Location: [facility] ENDOSCOPY;  Service: Gastroenterology;  Laterality: N/A;    TUBAL ABDOMINAL LIGATION           Social History     Socioeconomic History    Marital status:    Tobacco Use    Smoking status: Every Day     Current packs/day: 1.00     Types: Cigarettes    Smokeless tobacco: Never   Vaping Use    Vaping status: Never Used   Substance and Sexual Activity    Alcohol use: Never    Drug use: Never    Sexual activity: Defer         Family History   Problem Relation Age of Onset    Kidney disease Mother     Emphysema Mother     Heart disease Father     Lung cancer Sister     Heart disease Paternal Uncle        Objective  Physical Exam:  /89   Pulse 74   Temp 98 °F (36.7 °C) (Oral)   Resp 18   Ht 157.5 cm (62\")   Wt 62.6 kg (138 lb)   SpO2 98%   BMI 25.24 kg/m²      Physical Exam  Constitutional:       General: She is not in acute distress.     Appearance: Normal appearance. She is not ill-appearing.   HENT:      Head: Normocephalic and atraumatic.      Nose: Nose normal. No congestion or rhinorrhea.      Mouth/Throat:      Mouth: Mucous membranes are dry.      Pharynx: Oropharynx is clear. No oropharyngeal exudate or posterior oropharyngeal erythema.   Eyes:      Extraocular Movements: Extraocular movements intact.      Conjunctiva/sclera: Conjunctivae normal.      Pupils: Pupils are equal, round, and reactive to light.   Cardiovascular: "      Rate and Rhythm: Normal rate and regular rhythm.      Pulses: Normal pulses.      Heart sounds: Normal heart sounds.   Pulmonary:      Effort: Pulmonary effort is normal. No respiratory distress.      Breath sounds: Normal breath sounds.   Abdominal:      General: Abdomen is flat. Bowel sounds are normal. There is no distension.      Palpations: Abdomen is soft.      Tenderness: There is generalized abdominal tenderness. There is no right CVA tenderness, left CVA tenderness, guarding or rebound.   Musculoskeletal:         General: No swelling or tenderness. Normal range of motion.      Cervical back: Normal range of motion and neck supple.   Skin:     General: Skin is warm and dry.      Capillary Refill: Capillary refill takes less than 2 seconds.   Neurological:      General: No focal deficit present.      Mental Status: She is alert and oriented to person, place, and time. Mental status is at baseline.      Cranial Nerves: No cranial nerve deficit.      Sensory: No sensory deficit.      Motor: No weakness.      Coordination: Coordination normal.   Psychiatric:         Mood and Affect: Mood normal.         Behavior: Behavior normal.         Thought Content: Thought content normal.         Judgment: Judgment normal.         Procedures    ED Course:    ED Course as of 06/22/24 2232   Sat Jun 22, 2024 1944 EKG interpreted by me, normal sinus rhythm with no concerning ST changes noted, rate of 80 [JE]      ED Course User Index  [JE] Randolph Suazo MD       Lab Results (last 24 hours)       Procedure Component Value Units Date/Time    COVID-19 and FLU A/B PCR, 1 HR TAT - Swab, Nasopharynx [[id number]]  (Normal) Collected: 06/22/24 1919    Specimen: Swab from Nasopharynx Updated: 06/22/24 1941     COVID19 Not Detected     Influenza A PCR Not Detected     Influenza B PCR Not Detected    Narrative:      Fact sheet for providers: https://www.fda.gov/media/863203/download    Fact sheet for patients:  https://www.fda.gov/media/961642/download    Test performed by PCR.    CBC & Differential [[id number]]  (Abnormal) Collected: 06/22/24 1922    Specimen: Blood Updated: 06/22/24 1951    Narrative:      The following orders were created for panel order CBC & Differential.  Procedure                               Abnormality         Status                     ---------                               -----------         ------                     CBC Auto Differential[[id number]]        Abnormal            Final result               Scan Slide[[id number]]                                       Final result                 Please view results for these tests on the individual orders.    Comprehensive Metabolic Panel [[id number]]  (Abnormal) Collected: 06/22/24 1922    Specimen: Blood Updated: 06/22/24 1956     Glucose 99 mg/dL      BUN 42 mg/dL      Creatinine 1.62 mg/dL      Sodium 137 mmol/L      Potassium 4.7 mmol/L      Chloride 113 mmol/L      CO2 7.1 mmol/L      Calcium 10.3 mg/dL      Total Protein 7.7 g/dL      Albumin 4.0 g/dL      ALT (SGPT) 9 U/L      AST (SGOT) 12 U/L      Alkaline Phosphatase 167 U/L      Total Bilirubin <0.2 mg/dL      Globulin 3.7 gm/dL      A/G Ratio 1.1 g/dL      BUN/Creatinine Ratio 25.9     Anion Gap 16.9 mmol/L      eGFR 34.3 mL/min/1.73     Narrative:      GFR Normal >60  Chronic Kidney Disease <60  Kidney Failure <15      Lipase [[id number]]  (Abnormal) Collected: 06/22/24 1922    Specimen: Blood Updated: 06/22/24 1954     Lipase 1,375 U/L     High Sensitivity Troponin T [[id number]]  (Normal) Collected: 06/22/24 1922    Specimen: Blood Updated: 06/22/24 1946     HS Troponin T 12 ng/L     Narrative:      High Sensitive Troponin T Reference Range:  <14.0 ng/L- Negative Female for AMI  <22.0 ng/L- Negative Male for AMI  >=14 - Abnormal Female indicating possible myocardial injury.  >=22 - Abnormal Male indicating possible myocardial injury.   Clinicians would have to utilize clinical acumen,  EKG, Troponin, and serial changes to determine if it is an Acute Myocardial Infarction or myocardial injury due to an underlying chronic condition.         C-reactive Protein [[id number]]  (Abnormal) Collected: 06/22/24 1922    Specimen: Blood Updated: 06/22/24 1946     C-Reactive Protein 4.55 mg/dL     Lactic Acid, Plasma [[id number]]  (Normal) Collected: 06/22/24 1922    Specimen: Blood Updated: 06/22/24 1941     Lactate 0.9 mmol/L     CBC Auto Differential [[id number]]  (Abnormal) Collected: 06/22/24 1922    Specimen: Blood Updated: 06/22/24 1930     WBC 7.75 10*3/mm3      RBC 3.71 10*6/mm3      Hemoglobin 13.0 g/dL      Hematocrit 40.7 %      .7 fL      MCH 35.0 pg      MCHC 31.9 g/dL      RDW 14.6 %      RDW-SD 59.2 fl      MPV 10.2 fL      Platelets 333 10*3/mm3      Neutrophil % 76.2 %      Lymphocyte % 12.9 %      Monocyte % 8.0 %      Eosinophil % 0.9 %      Basophil % 0.8 %      Immature Grans % 1.2 %      Neutrophils, Absolute 5.91 10*3/mm3      Lymphocytes, Absolute 1.00 10*3/mm3      Monocytes, Absolute 0.62 10*3/mm3      Eosinophils, Absolute 0.07 10*3/mm3      Basophils, Absolute 0.06 10*3/mm3      Immature Grans, Absolute 0.09 10*3/mm3      nRBC 0.0 /100 WBC     Scan Slide [[id number]] Collected: 06/22/24 1922    Specimen: Blood Updated: 06/22/24 1951     Macrocytes Slight/1+     WBC Morphology Normal     Platelet Estimate Adequate    Urinalysis With Culture If Indicated - Urine, Clean Catch [[id number]]  (Abnormal) Collected: 06/22/24 1928    Specimen: Urine, Clean Catch Updated: 06/22/24 1935     Color, UA Yellow     Appearance, UA Clear     pH, UA 5.5     Specific Gravity, UA >=1.030     Glucose, UA Negative     Ketones, UA Negative     Bilirubin, UA Negative     Blood, UA Negative     Protein,  mg/dL (2+)     Leuk Esterase, UA Negative     Nitrite, UA Negative     Urobilinogen, UA 0.2 E.U./dL    Narrative:      In absence of clinical symptoms, the presence of pyuria, bacteria, and/or  nitrites on the urinalysis result does not correlate with infection.    Urinalysis, Microscopic Only - Urine, Clean Catch [[id number]] Collected: 06/22/24 1928    Specimen: Urine, Clean Catch Updated: 06/22/24 1941     RBC, UA None Seen /HPF      WBC, UA 0-2 /HPF      Comment: Urine culture not indicated.        Bacteria, UA None Seen /HPF      Squamous Epithelial Cells, UA 0-2 /HPF      Hyaline Casts, UA 0-2 /LPF      Methodology Manual Light Microscopy    Blood Gas, Venous With Co-Ox [[id number]]  (Abnormal) Collected: 06/22/24 2052    Specimen: Venous Blood Updated: 06/22/24 2053     Site OTHER     pH, Venous 7.114 pH Units      Comment: 85 Value below critical limit        pCO2, Venous 28.0 mm Hg      Comment: 84 Value below reference range        pO2, Venous 30.9 mm Hg      HCO3, Venous 9.0 mmol/L      Comment: 84 Value below reference range        Base Excess, Venous -19.1 mmol/L      Comment: 84 Value below reference range        O2 Saturation, Venous 59.8 %      Oxyhemoglobin Venous 58.3 %      Comment: 84 Value below reference range        Methemoglobin Venous 0.7 %      Carboxyhemoglobin Venous 1.8 %      Barometric Pressure for Blood Gas 732 mmHg      Modality Room Air     FIO2 21 %      Ventilator Mode NA     Notified Who MD     Notified By [id number]     Notified Time 06/22/2024 20:53     Collected by [id number]     Comment: Meter: [id number]     :  [id number]       High Sensitivity Troponin T 2Hr [[id number]]  (Normal) Collected: 06/22/24 2124    Specimen: Blood Updated: 06/22/24 2154     HS Troponin T 10 ng/L      Troponin T Delta -2 ng/L     Narrative:      High Sensitive Troponin T Reference Range:  <14.0 ng/L- Negative Female for AMI  <22.0 ng/L- Negative Male for AMI  >=14 - Abnormal Female indicating possible myocardial injury.  >=22 - Abnormal Male indicating possible myocardial injury.   Clinicians would have to utilize clinical acumen, EKG, Troponin, and serial changes to determine if it is  an Acute Myocardial Infarction or myocardial injury due to an underlying chronic condition.                  CT Abdomen Pelvis With Contrast    Result Date: 6/22/2024  FINAL REPORT TECHNIQUE: Thin section axial imaging from the lung bases through the symphysis pubis were performed with the administration of intravenous contrast.  This study was performed with techniques to keep radiation doses as low as reasonably achievable, (ALARA). Individualized dose reduction techniques using automated exposure control or adjustment of mA and/or kV according to the patient's size were employed. CLINICAL HISTORY: recurrent pancreatitis, epigastric abdominal pain x 3 days COMPARISON: 5/30/2024 FINDINGS: There is mild atelectasis.  There is a moderate to large hiatal hernia.  The liver is unremarkable.  There has been a prior cholecystectomy.  The spleen is normal.  There is mild fluid surrounding the pancreas suggesting mild acute pancreatitis. There is no ductal dilation.  The adrenal glands are unremarkable.  There are multiple renal cysts bilaterally. There is a less than 3 mm stone in the left kidney.  No hydronephrosis.  The aorta has normal contour without significant plaque formation.  The GI tract demonstrates no obstruction.  There are postoperative changes of the in the rectosigmoid region.  There is no bowel wall thickening or inflammatory process.  The appendix is normal.  Uterus is retroverted.  There is no free fluid or inflammatory process. There is no adenopathy or mass.  There are no acute bony abnormalities.     Impression: Possible mild acute pancreatitis. Authenticated and Electronically Signed by Hesham Green MD on 06/22/2024 09:33:57 PM        MDM     Amount and/or Complexity of Data Reviewed  Decide to obtain previous medical records or to obtain history from someone other than the patient: yes        Initial impression of presenting illness: Abdominal pain, nausea, vomiting    DDX: includes but is not  limited to: Chronic pancreatitis, acute pancreatitis, pseudocyst, SBO, sepsis    Patient arrives stable with vitals interpreted by myself.     Pertinent features from physical exam: Oscitation, regular rate rhythm, no murmur, endorsing diffuse tenderness to palpation on exam, erythema with no fluctuance to the left leg, patient's son at bedside states this is much improved since starting antibiotics.    Initial diagnostic plan: CBC, CMP, lipase, UA, CRP, lactic acid, troponin, EKG, CT abdomen pelvis    Results from initial plan were reviewed and interpreted by me revealing metabolic acidosis on VBG, low bicarb, suspect GI losses as etiology, elevated lipase with findings consistent with acute pancreatitis on CT imaging    Diagnostic information from other sources: Discussed with patient son at bedside reviewed past medical records    Interventions / Re-evaluation: An IV fluids, droperidol, Benadryl for symptom control, Dilaudid    Results/clinical rationale were discussed with patient at bedside    Consultations/Discussion of results with other physicians: Given my concern for acute pancreatitis with metabolic acidosis given GI losses discussed with Dr. Roa, hospitalist, who recommends transfer given patient's recurrent pancreatitis and need to establish at tertiary care center.  He discussed this with Dr. Casarez who is our on-call gastroenterologist today.  Given this, discussed with Dr. Neil, transfer physician at [facility], who accepts patient in transfer for further evaluation.    Disposition plan: Transfer  -----        Final diagnoses:   Acute pancreatitis, unspecified complication status, unspecified pancreatitis type          Randolph Suazo MD  06/22/24 2380

## 2024-06-23 NOTE — ED NOTES
[facility] communication called for transport to [facility]

## 2024-07-13 ENCOUNTER — HOSPITAL ENCOUNTER (INPATIENT)
Facility: HOSPITAL | Age: 69
LOS: 2 days | Discharge: HOME | End: 2024-07-15
Attending: EMERGENCY MEDICINE | Admitting: FAMILY MEDICINE
Payer: MEDICARE

## 2024-07-13 DIAGNOSIS — N17.9 ACUTE RENAL FAILURE SUPERIMPOSED ON CHRONIC KIDNEY DISEASE, UNSPECIFIED ACUTE RENAL FAILURE TYPE, UNSPECIFIED CKD STAGE: ICD-10-CM

## 2024-07-13 DIAGNOSIS — E87.5 HYPERKALEMIA: Primary | ICD-10-CM

## 2024-07-13 DIAGNOSIS — N18.9 ACUTE RENAL FAILURE SUPERIMPOSED ON CHRONIC KIDNEY DISEASE, UNSPECIFIED ACUTE RENAL FAILURE TYPE, UNSPECIFIED CKD STAGE: ICD-10-CM

## 2024-07-13 DIAGNOSIS — K86.1 CHRONIC PANCREATITIS, UNSPECIFIED PANCREATITIS TYPE: ICD-10-CM

## 2024-07-13 DIAGNOSIS — E87.20 METABOLIC ACIDOSIS: ICD-10-CM

## 2024-07-13 LAB
ALBUMIN SERPL-MCNC: 4.4 G/DL (ref 3.5–5.2)
ALBUMIN/GLOB SERPL: 1.3 G/DL
ALP SERPL-CCNC: 172 U/L (ref 39–117)
ALT SERPL W P-5'-P-CCNC: 11 U/L (ref 1–33)
ANION GAP SERPL CALCULATED.3IONS-SCNC: 13.9 MMOL/L (ref 5–15)
ANION GAP SERPL CALCULATED.3IONS-SCNC: 15.4 MMOL/L (ref 5–15)
APAP SERPL-MCNC: 7.3 MCG/ML (ref 0–30)
AST SERPL-CCNC: 15 U/L (ref 1–32)
BACTERIA UR QL AUTO: ABNORMAL /HPF
BASOPHILS # BLD AUTO: 0.06 10*3/MM3 (ref 0–0.2)
BASOPHILS NFR BLD AUTO: 0.9 % (ref 0–1.5)
BILIRUB SERPL-MCNC: <0.2 MG/DL (ref 0–1.2)
BILIRUB UR QL STRIP: NEGATIVE
BUN SERPL-MCNC: 37 MG/DL (ref 8–23)
BUN SERPL-MCNC: 37 MG/DL (ref 8–23)
BUN/CREAT SERPL: 24.3 (ref 7–25)
BUN/CREAT SERPL: 25.2 (ref 7–25)
CALCIUM SPEC-SCNC: 9.4 MG/DL (ref 8.6–10.5)
CALCIUM SPEC-SCNC: 9.7 MG/DL (ref 8.6–10.5)
CHLORIDE SERPL-SCNC: 108 MMOL/L (ref 98–107)
CHLORIDE SERPL-SCNC: 108 MMOL/L (ref 98–107)
CLARITY UR: CLEAR
CO2 SERPL-SCNC: 10.1 MMOL/L (ref 22–29)
CO2 SERPL-SCNC: 11.6 MMOL/L (ref 22–29)
COLOR UR: YELLOW
CREAT SERPL-MCNC: 1.47 MG/DL (ref 0.57–1)
CREAT SERPL-MCNC: 1.52 MG/DL (ref 0.57–1)
D-LACTATE SERPL-SCNC: 0.7 MMOL/L (ref 0.5–2)
DEPRECATED RDW RBC AUTO: 63.6 FL (ref 37–54)
EGFRCR SERPLBLD CKD-EPI 2021: 37 ML/MIN/1.73
EGFRCR SERPLBLD CKD-EPI 2021: 38.5 ML/MIN/1.73
EOSINOPHIL # BLD AUTO: 0.13 10*3/MM3 (ref 0–0.4)
EOSINOPHIL NFR BLD AUTO: 2 % (ref 0.3–6.2)
ERYTHROCYTE [DISTWIDTH] IN BLOOD BY AUTOMATED COUNT: 15.1 % (ref 12.3–15.4)
GLOBULIN UR ELPH-MCNC: 3.3 GM/DL
GLUCOSE BLDC GLUCOMTR-MCNC: 67 MG/DL (ref 70–130)
GLUCOSE SERPL-MCNC: 75 MG/DL (ref 65–99)
GLUCOSE SERPL-MCNC: 80 MG/DL (ref 65–99)
GLUCOSE UR STRIP-MCNC: NEGATIVE MG/DL
HCT VFR BLD AUTO: 38.1 % (ref 34–46.6)
HGB BLD-MCNC: 12 G/DL (ref 12–15.9)
HGB UR QL STRIP.AUTO: NEGATIVE
HOLD SPECIMEN: NORMAL
HOLD SPECIMEN: NORMAL
HYALINE CASTS UR QL AUTO: ABNORMAL /LPF
IMM GRANULOCYTES # BLD AUTO: 0.08 10*3/MM3 (ref 0–0.05)
IMM GRANULOCYTES NFR BLD AUTO: 1.2 % (ref 0–0.5)
KETONES UR QL STRIP: NEGATIVE
LEUKOCYTE ESTERASE UR QL STRIP.AUTO: NEGATIVE
LIPASE SERPL-CCNC: 166 U/L (ref 13–60)
LYMPHOCYTES # BLD AUTO: 1.99 10*3/MM3 (ref 0.7–3.1)
LYMPHOCYTES NFR BLD AUTO: 30.1 % (ref 19.6–45.3)
MACROCYTES BLD QL SMEAR: NORMAL
MCH RBC QN AUTO: 35.4 PG (ref 26.6–33)
MCHC RBC AUTO-ENTMCNC: 31.5 G/DL (ref 31.5–35.7)
MCV RBC AUTO: 112.4 FL (ref 79–97)
MONOCYTES # BLD AUTO: 0.54 10*3/MM3 (ref 0.1–0.9)
MONOCYTES NFR BLD AUTO: 8.2 % (ref 5–12)
NEUTROPHILS NFR BLD AUTO: 3.82 10*3/MM3 (ref 1.7–7)
NEUTROPHILS NFR BLD AUTO: 57.6 % (ref 42.7–76)
NITRITE UR QL STRIP: NEGATIVE
NRBC BLD AUTO-RTO: 0 /100 WBC (ref 0–0.2)
PH UR STRIP.AUTO: <=5 [PH] (ref 5–8)
PLAT MORPH BLD: NORMAL
PLATELET # BLD AUTO: 237 10*3/MM3 (ref 140–450)
PMV BLD AUTO: 10.2 FL (ref 6–12)
POTASSIUM SERPL-SCNC: 6.2 MMOL/L (ref 3.5–5.2)
POTASSIUM SERPL-SCNC: 6.3 MMOL/L (ref 3.5–5.2)
PROT SERPL-MCNC: 7.7 G/DL (ref 6–8.5)
PROT UR QL STRIP: ABNORMAL
RBC # BLD AUTO: 3.39 10*6/MM3 (ref 3.77–5.28)
RBC # UR STRIP: ABNORMAL /HPF
REF LAB TEST METHOD: ABNORMAL
SODIUM SERPL-SCNC: 132 MMOL/L (ref 136–145)
SODIUM SERPL-SCNC: 135 MMOL/L (ref 136–145)
SP GR UR STRIP: 1.03 (ref 1–1.03)
SQUAMOUS #/AREA URNS HPF: ABNORMAL /HPF
UROBILINOGEN UR QL STRIP: ABNORMAL
WBC # UR STRIP: ABNORMAL /HPF
WBC MORPH BLD: NORMAL
WBC NRBC COR # BLD AUTO: 6.62 10*3/MM3 (ref 3.4–10.8)
WHOLE BLOOD HOLD COAG: NORMAL
WHOLE BLOOD HOLD SPECIMEN: NORMAL

## 2024-07-13 PROCEDURE — 25010000002 CALCIUM GLUCONATE-NACL 1-0.675 GM/50ML-% SOLUTION: Performed by: EMERGENCY MEDICINE

## 2024-07-13 PROCEDURE — 83690 ASSAY OF LIPASE: CPT | Performed by: EMERGENCY MEDICINE

## 2024-07-13 PROCEDURE — 25810000003 SODIUM CHLORIDE 0.9 % SOLUTION: Performed by: EMERGENCY MEDICINE

## 2024-07-13 PROCEDURE — 93005 ELECTROCARDIOGRAM TRACING: CPT | Performed by: EMERGENCY MEDICINE

## 2024-07-13 PROCEDURE — 82948 REAGENT STRIP/BLOOD GLUCOSE: CPT

## 2024-07-13 PROCEDURE — 80143 DRUG ASSAY ACETAMINOPHEN: CPT | Performed by: FAMILY MEDICINE

## 2024-07-13 PROCEDURE — 85025 COMPLETE CBC W/AUTO DIFF WBC: CPT | Performed by: EMERGENCY MEDICINE

## 2024-07-13 PROCEDURE — 99291 CRITICAL CARE FIRST HOUR: CPT

## 2024-07-13 PROCEDURE — 63710000001 INSULIN REGULAR HUMAN PER 5 UNITS: Performed by: EMERGENCY MEDICINE

## 2024-07-13 PROCEDURE — 25010000002 HYDROMORPHONE 1 MG/ML SOLUTION: Performed by: EMERGENCY MEDICINE

## 2024-07-13 PROCEDURE — 0 DEXTROSE 5 % SOLUTION 1,000 ML FLEX CONT: Performed by: EMERGENCY MEDICINE

## 2024-07-13 PROCEDURE — 82948 REAGENT STRIP/BLOOD GLUCOSE: CPT | Performed by: EMERGENCY MEDICINE

## 2024-07-13 PROCEDURE — 25010000002 ONDANSETRON PER 1 MG: Performed by: EMERGENCY MEDICINE

## 2024-07-13 PROCEDURE — 99223 1ST HOSP IP/OBS HIGH 75: CPT | Performed by: FAMILY MEDICINE

## 2024-07-13 PROCEDURE — 85007 BL SMEAR W/DIFF WBC COUNT: CPT | Performed by: EMERGENCY MEDICINE

## 2024-07-13 PROCEDURE — 83605 ASSAY OF LACTIC ACID: CPT | Performed by: EMERGENCY MEDICINE

## 2024-07-13 PROCEDURE — 80053 COMPREHEN METABOLIC PANEL: CPT | Performed by: EMERGENCY MEDICINE

## 2024-07-13 PROCEDURE — 81001 URINALYSIS AUTO W/SCOPE: CPT | Performed by: EMERGENCY MEDICINE

## 2024-07-13 RX ORDER — NITROGLYCERIN 0.4 MG/1
0.4 TABLET SUBLINGUAL
Status: DISCONTINUED | OUTPATIENT
Start: 2024-07-13 | End: 2024-07-15 | Stop reason: HOSPADM

## 2024-07-13 RX ORDER — SODIUM CHLORIDE 0.9 % (FLUSH) 0.9 %
10 SYRINGE (ML) INJECTION AS NEEDED
Status: DISCONTINUED | OUTPATIENT
Start: 2024-07-13 | End: 2024-07-15 | Stop reason: HOSPADM

## 2024-07-13 RX ORDER — ONDANSETRON 2 MG/ML
4 INJECTION INTRAMUSCULAR; INTRAVENOUS EVERY 6 HOURS PRN
Status: DISCONTINUED | OUTPATIENT
Start: 2024-07-13 | End: 2024-07-15 | Stop reason: HOSPADM

## 2024-07-13 RX ORDER — MORPHINE SULFATE 2 MG/ML
2 INJECTION, SOLUTION INTRAMUSCULAR; INTRAVENOUS EVERY 4 HOURS PRN
Status: DISCONTINUED | OUTPATIENT
Start: 2024-07-13 | End: 2024-07-14

## 2024-07-13 RX ORDER — ONDANSETRON 2 MG/ML
4 INJECTION INTRAMUSCULAR; INTRAVENOUS ONCE
Status: COMPLETED | OUTPATIENT
Start: 2024-07-13 | End: 2024-07-13

## 2024-07-13 RX ORDER — ONDANSETRON 4 MG/1
4 TABLET, ORALLY DISINTEGRATING ORAL EVERY 6 HOURS PRN
Status: DISCONTINUED | OUTPATIENT
Start: 2024-07-13 | End: 2024-07-15 | Stop reason: HOSPADM

## 2024-07-13 RX ORDER — SODIUM CHLORIDE 0.9 % (FLUSH) 0.9 %
10 SYRINGE (ML) INJECTION EVERY 12 HOURS SCHEDULED
Status: DISCONTINUED | OUTPATIENT
Start: 2024-07-13 | End: 2024-07-15 | Stop reason: HOSPADM

## 2024-07-13 RX ORDER — SODIUM BICARBONATE 650 MG/1
1300 TABLET ORAL 4 TIMES DAILY
Status: DISCONTINUED | OUTPATIENT
Start: 2024-07-14 | End: 2024-07-15

## 2024-07-13 RX ORDER — NICOTINE POLACRILEX 4 MG
15 LOZENGE BUCCAL
Status: DISCONTINUED | OUTPATIENT
Start: 2024-07-13 | End: 2024-07-15 | Stop reason: HOSPADM

## 2024-07-13 RX ORDER — FAMOTIDINE 10 MG/ML
20 INJECTION, SOLUTION INTRAVENOUS ONCE
Status: COMPLETED | OUTPATIENT
Start: 2024-07-13 | End: 2024-07-13

## 2024-07-13 RX ORDER — SODIUM CHLORIDE 9 MG/ML
40 INJECTION, SOLUTION INTRAVENOUS AS NEEDED
Status: DISCONTINUED | OUTPATIENT
Start: 2024-07-13 | End: 2024-07-15 | Stop reason: HOSPADM

## 2024-07-13 RX ORDER — DEXTROSE MONOHYDRATE 25 G/50ML
25 INJECTION, SOLUTION INTRAVENOUS
Status: DISCONTINUED | OUTPATIENT
Start: 2024-07-13 | End: 2024-07-15 | Stop reason: HOSPADM

## 2024-07-13 RX ORDER — CALCIUM GLUCONATE 20 MG/ML
1000 INJECTION, SOLUTION INTRAVENOUS ONCE
Status: COMPLETED | OUTPATIENT
Start: 2024-07-13 | End: 2024-07-13

## 2024-07-13 RX ORDER — DEXTROSE MONOHYDRATE 25 G/50ML
25 INJECTION, SOLUTION INTRAVENOUS ONCE
Status: COMPLETED | OUTPATIENT
Start: 2024-07-13 | End: 2024-07-13

## 2024-07-13 RX ORDER — HYDROCODONE BITARTRATE AND ACETAMINOPHEN 7.5; 325 MG/1; MG/1
1 TABLET ORAL EVERY 6 HOURS PRN
Status: DISCONTINUED | OUTPATIENT
Start: 2024-07-13 | End: 2024-07-15

## 2024-07-13 RX ORDER — HEPARIN SODIUM 5000 [USP'U]/ML
5000 INJECTION, SOLUTION INTRAVENOUS; SUBCUTANEOUS EVERY 12 HOURS SCHEDULED
Status: DISCONTINUED | OUTPATIENT
Start: 2024-07-14 | End: 2024-07-15 | Stop reason: HOSPADM

## 2024-07-13 RX ADMIN — DEXTROSE MONOHYDRATE 25 G: 25 INJECTION, SOLUTION INTRAVENOUS at 21:02

## 2024-07-13 RX ADMIN — SODIUM BICARBONATE 150 MEQ: 84 INJECTION, SOLUTION INTRAVENOUS at 22:08

## 2024-07-13 RX ADMIN — SODIUM CHLORIDE 1000 ML: 9 INJECTION, SOLUTION INTRAVENOUS at 21:46

## 2024-07-13 RX ADMIN — INSULIN HUMAN 10 UNITS: 100 INJECTION, SOLUTION PARENTERAL at 21:09

## 2024-07-13 RX ADMIN — Medication 10 ML: at 23:18

## 2024-07-13 RX ADMIN — HYDROMORPHONE HYDROCHLORIDE 1 MG: 1 INJECTION, SOLUTION INTRAMUSCULAR; INTRAVENOUS; SUBCUTANEOUS at 19:31

## 2024-07-13 RX ADMIN — FAMOTIDINE 20 MG: 10 INJECTION, SOLUTION INTRAVENOUS at 19:26

## 2024-07-13 RX ADMIN — DEXTROSE MONOHYDRATE 12.5 G: 25 INJECTION, SOLUTION INTRAVENOUS at 22:31

## 2024-07-13 RX ADMIN — CALCIUM GLUCONATE 1000 MG: 20 INJECTION, SOLUTION INTRAVENOUS at 21:11

## 2024-07-13 RX ADMIN — HYDROMORPHONE HYDROCHLORIDE 1 MG: 1 INJECTION, SOLUTION INTRAMUSCULAR; INTRAVENOUS; SUBCUTANEOUS at 21:57

## 2024-07-13 RX ADMIN — ONDANSETRON 4 MG: 2 INJECTION INTRAMUSCULAR; INTRAVENOUS at 19:29

## 2024-07-13 RX ADMIN — SODIUM ZIRCONIUM CYCLOSILICATE 10 G: 10 POWDER, FOR SUSPENSION ORAL at 21:13

## 2024-07-13 RX ADMIN — DEXTROSE MONOHYDRATE 25 G: 25 INJECTION, SOLUTION INTRAVENOUS at 23:14

## 2024-07-14 LAB
ANION GAP SERPL CALCULATED.3IONS-SCNC: 11.3 MMOL/L (ref 5–15)
ANION GAP SERPL CALCULATED.3IONS-SCNC: 12.8 MMOL/L (ref 5–15)
BUN SERPL-MCNC: 29 MG/DL (ref 8–23)
BUN SERPL-MCNC: 32 MG/DL (ref 8–23)
BUN/CREAT SERPL: 25 (ref 7–25)
BUN/CREAT SERPL: 25.2 (ref 7–25)
CALCIUM SPEC-SCNC: 9.1 MG/DL (ref 8.6–10.5)
CALCIUM SPEC-SCNC: 9.3 MG/DL (ref 8.6–10.5)
CHLORIDE SERPL-SCNC: 110 MMOL/L (ref 98–107)
CHLORIDE SERPL-SCNC: 111 MMOL/L (ref 98–107)
CO2 SERPL-SCNC: 12.2 MMOL/L (ref 22–29)
CO2 SERPL-SCNC: 18.7 MMOL/L (ref 22–29)
CREAT SERPL-MCNC: 1.16 MG/DL (ref 0.57–1)
CREAT SERPL-MCNC: 1.27 MG/DL (ref 0.57–1)
EGFRCR SERPLBLD CKD-EPI 2021: 45.9 ML/MIN/1.73
EGFRCR SERPLBLD CKD-EPI 2021: 51.1 ML/MIN/1.73
GLUCOSE BLDC GLUCOMTR-MCNC: 102 MG/DL (ref 70–130)
GLUCOSE BLDC GLUCOMTR-MCNC: 63 MG/DL (ref 70–130)
GLUCOSE BLDC GLUCOMTR-MCNC: 76 MG/DL (ref 70–130)
GLUCOSE BLDC GLUCOMTR-MCNC: 80 MG/DL (ref 70–130)
GLUCOSE BLDC GLUCOMTR-MCNC: 83 MG/DL (ref 70–130)
GLUCOSE BLDC GLUCOMTR-MCNC: 87 MG/DL (ref 70–130)
GLUCOSE BLDC GLUCOMTR-MCNC: 89 MG/DL (ref 70–130)
GLUCOSE SERPL-MCNC: 84 MG/DL (ref 65–99)
GLUCOSE SERPL-MCNC: 84 MG/DL (ref 65–99)
HCT VFR BLD AUTO: 36.4 % (ref 34–46.6)
HGB BLD-MCNC: 11.2 G/DL (ref 12–15.9)
IRON 24H UR-MRATE: 128 MCG/DL (ref 37–145)
IRON SATN MFR SERPL: 38 % (ref 20–50)
POTASSIUM SERPL-SCNC: 5.1 MMOL/L (ref 3.5–5.2)
POTASSIUM SERPL-SCNC: 5.5 MMOL/L (ref 3.5–5.2)
SODIUM SERPL-SCNC: 136 MMOL/L (ref 136–145)
SODIUM SERPL-SCNC: 140 MMOL/L (ref 136–145)
TIBC SERPL-MCNC: 337 MCG/DL (ref 298–536)
TRANSFERRIN SERPL-MCNC: 226 MG/DL (ref 200–360)

## 2024-07-14 PROCEDURE — 25010000002 HYDROMORPHONE 1 MG/ML SOLUTION: Performed by: FAMILY MEDICINE

## 2024-07-14 PROCEDURE — 25010000002 HEPARIN (PORCINE) PER 1000 UNITS: Performed by: FAMILY MEDICINE

## 2024-07-14 PROCEDURE — 80048 BASIC METABOLIC PNL TOTAL CA: CPT | Performed by: INTERNAL MEDICINE

## 2024-07-14 PROCEDURE — 0 DEXTROSE 5 % SOLUTION 1,000 ML FLEX CONT: Performed by: FAMILY MEDICINE

## 2024-07-14 PROCEDURE — 25010000002 MORPHINE PER 10 MG: Performed by: FAMILY MEDICINE

## 2024-07-14 PROCEDURE — 80048 BASIC METABOLIC PNL TOTAL CA: CPT | Performed by: FAMILY MEDICINE

## 2024-07-14 PROCEDURE — 83540 ASSAY OF IRON: CPT | Performed by: FAMILY MEDICINE

## 2024-07-14 PROCEDURE — 84466 ASSAY OF TRANSFERRIN: CPT | Performed by: FAMILY MEDICINE

## 2024-07-14 PROCEDURE — 82948 REAGENT STRIP/BLOOD GLUCOSE: CPT

## 2024-07-14 PROCEDURE — 25010000002 ONDANSETRON PER 1 MG: Performed by: FAMILY MEDICINE

## 2024-07-14 PROCEDURE — 0 DEXTROSE 5 % SOLUTION 1,000 ML FLEX CONT: Performed by: INTERNAL MEDICINE

## 2024-07-14 PROCEDURE — 99232 SBSQ HOSP IP/OBS MODERATE 35: CPT | Performed by: FAMILY MEDICINE

## 2024-07-14 PROCEDURE — 85018 HEMOGLOBIN: CPT | Performed by: FAMILY MEDICINE

## 2024-07-14 PROCEDURE — 85014 HEMATOCRIT: CPT | Performed by: FAMILY MEDICINE

## 2024-07-14 RX ADMIN — HYDROMORPHONE HYDROCHLORIDE 0.5 MG: 1 INJECTION, SOLUTION INTRAMUSCULAR; INTRAVENOUS; SUBCUTANEOUS at 11:22

## 2024-07-14 RX ADMIN — SODIUM BICARBONATE 650 MG TABLET 1300 MG: at 20:36

## 2024-07-14 RX ADMIN — Medication 10 ML: at 20:37

## 2024-07-14 RX ADMIN — HYDROMORPHONE HYDROCHLORIDE 0.5 MG: 1 INJECTION, SOLUTION INTRAMUSCULAR; INTRAVENOUS; SUBCUTANEOUS at 22:43

## 2024-07-14 RX ADMIN — MORPHINE SULFATE 2 MG: 2 INJECTION, SOLUTION INTRAMUSCULAR; INTRAVENOUS at 15:09

## 2024-07-14 RX ADMIN — HEPARIN SODIUM 5000 UNITS: 5000 INJECTION INTRAVENOUS; SUBCUTANEOUS at 20:36

## 2024-07-14 RX ADMIN — HYDROCODONE BITARTRATE AND ACETAMINOPHEN 1 TABLET: 7.5; 325 TABLET ORAL at 06:21

## 2024-07-14 RX ADMIN — MORPHINE SULFATE 2 MG: 2 INJECTION, SOLUTION INTRAMUSCULAR; INTRAVENOUS at 08:04

## 2024-07-14 RX ADMIN — MORPHINE SULFATE 2 MG: 2 INJECTION, SOLUTION INTRAMUSCULAR; INTRAVENOUS at 19:30

## 2024-07-14 RX ADMIN — SODIUM BICARBONATE 650 MG TABLET 1300 MG: at 18:20

## 2024-07-14 RX ADMIN — SODIUM BICARBONATE 650 MG TABLET 1300 MG: at 07:30

## 2024-07-14 RX ADMIN — SERTRALINE 50 MG: 50 TABLET, FILM COATED ORAL at 08:07

## 2024-07-14 RX ADMIN — Medication 10 ML: at 08:07

## 2024-07-14 RX ADMIN — HYDROCODONE BITARTRATE AND ACETAMINOPHEN 1 TABLET: 7.5; 325 TABLET ORAL at 00:12

## 2024-07-14 RX ADMIN — SODIUM BICARBONATE 650 MG TABLET 1300 MG: at 11:22

## 2024-07-14 RX ADMIN — HYDROCODONE BITARTRATE AND ACETAMINOPHEN 1 TABLET: 7.5; 325 TABLET ORAL at 13:04

## 2024-07-14 RX ADMIN — ONDANSETRON 4 MG: 2 INJECTION INTRAMUSCULAR; INTRAVENOUS at 20:37

## 2024-07-14 RX ADMIN — ONDANSETRON 4 MG: 2 INJECTION INTRAMUSCULAR; INTRAVENOUS at 15:09

## 2024-07-14 RX ADMIN — SODIUM BICARBONATE 650 MG TABLET 1300 MG: at 00:12

## 2024-07-14 RX ADMIN — HEPARIN SODIUM 5000 UNITS: 5000 INJECTION INTRAVENOUS; SUBCUTANEOUS at 00:12

## 2024-07-14 RX ADMIN — HEPARIN SODIUM 5000 UNITS: 5000 INJECTION INTRAVENOUS; SUBCUTANEOUS at 08:07

## 2024-07-14 RX ADMIN — HYDROCODONE BITARTRATE AND ACETAMINOPHEN 1 TABLET: 7.5; 325 TABLET ORAL at 19:30

## 2024-07-14 RX ADMIN — SODIUM BICARBONATE 150 MEQ: 84 INJECTION, SOLUTION INTRAVENOUS at 21:41

## 2024-07-14 RX ADMIN — SODIUM BICARBONATE 150 MEQ: 84 INJECTION, SOLUTION INTRAVENOUS at 08:34

## 2024-07-14 NOTE — PLAN OF CARE
Goal Outcome Evaluation:  Plan of Care Reviewed With: patient        Progress: improving     Sodium, potassium & creatinine all better. Still c/o pain

## 2024-07-14 NOTE — ED NOTES
Nephrology contacted att per Eliezer QUINTERO, states Jim will be paged and will give us a call back.

## 2024-07-14 NOTE — H&P
[facility]   HISTORY AND PHYSICAL      Name:  Tasha Yarbrough   Age:  69 y.o.  Sex:  female  :  1955  MRN:  [id number]   Visit Number:  [id number]  Admission Date:  2024  Date Of Service:  24  Primary Care Physician:  Terrence Baldwin MD    Chief Complaint:     Abdominal pain, nausea, chronic diarrhea    History Of Presenting Illness:      69-year-old with a history of hypertension, recurrent pancreatitis, CKD stage III, chronic diarrhea, chronic tobacco abuse, who presented from home with multiple complaints.  Patient known to me from prior hospitalizations due to recurrent pancreatitis.  Had recently been evaluated at [facility] for pancreatitis where she was treated and discharged about 2 weeks ago.  She is due for follow-up with specialty in August.  She noted increased pain over the last 2 days with associated nausea.  She has chronic diarrhea.  No fevers or chills.  Feeling hungry currently at time of visit.  No alcohol use.  Continues to smoke unfortunately.  Follows with Dr. Fleming locally.    In the ER the patient was hemodynamically stable.  Her labs were indicative of metabolic acidosis with anion gap of 16, CO2 of 11.  Potassium is elevated at 6.2.  Show lipase of 166.  Her CBC was unremarkable.  Urinalysis unremarkable.  Due to recurrent nature of pancreatitis, defer any CT imaging.  She was treated for hyperkalemia, Dr. Fernandez consult placed on sodium bicarb and drip.  Admitted to the ICU.    Review Of Systems:    All systems were reviewed and negative except as mentioned in history of presenting illness, assessment and plan.    Past Medical History: Patient  has a past medical history of Hypertension, Impaired functional mobility, balance, gait, and endurance, Impaired mobility, Injury of back, and Pancreatitis.    Past Surgical History: Patient  has a past surgical history that includes Tubal ligation; Colonoscopy; Colon surgery; Abdominal surgery;  and Esophagogastroduodenoscopy (N/A, 4/23/2024).    Social History: Patient  reports that she has been smoking cigarettes. She has never used smokeless tobacco. She reports that she does not drink alcohol and does not use drugs.    Family History:  Patient's family history has been reviewed and found to be noncontributory.     Allergies:      Rocephin [ceftriaxone], Ciprofloxacin, Codeine, and Pineapple    Home Medications:    Prior to Admission Medications       Prescriptions Last Dose Informant Patient Reported? Taking?    famotidine (PEPCID) 20 MG tablet   Yes No    Take 1 tablet by mouth 2 (Two) Times a Day.    glucosamine-chondroitin 500-400 MG capsule capsule   Yes No    Take 1 capsule by mouth 2 (Two) Times a Day With Meals. Indications: Joint Damage causing Pain and Loss of Function    methocarbamol (ROBAXIN) 750 MG tablet   Yes No    Take 1 tablet by mouth 4 (Four) Times a Day As Needed for Muscle Spasms.    ondansetron ODT (ZOFRAN-ODT) 4 MG disintegrating tablet   No No    Place 1 tablet on the tongue Every 8 (Eight) Hours As Needed for Nausea or Vomiting.    ondansetron ODT (ZOFRAN-ODT) 4 MG disintegrating tablet   No No    Place 1 tablet on the tongue Every 8 (Eight) Hours As Needed for Nausea or Vomiting.    oxyCODONE (Roxicodone) 5 MG immediate release tablet   No No    Take 1 tablet by mouth Every 4 (Four) Hours As Needed for Moderate Pain.    promethazine (PHENERGAN) 25 MG tablet   Yes No    Take 1 tablet by mouth.    sertraline (ZOLOFT) 50 MG tablet   Yes No    Take 1 tablet by mouth Daily.    sodium bicarbonate 650 MG tablet   Yes No    Take 1 tablet by mouth 2 (Two) Times a Day.    sodium bicarbonate 650 MG tablet   No No    Take 1 tablet by mouth 2 (Two) Times a Day.    vitamin D3 125 MCG (5000 UT) capsule capsule   Yes No    Take 1 capsule by mouth Daily. Indications: Vitamin D Deficiency          ED Medications:    Medications   sodium chloride 0.9 % flush 10 mL (has no administration in time  "range)   sodium bicarbonate 8.4 % 150 mEq in dextrose (D5W) 5 % 1,000 mL infusion (greater than 100 mEq) (has no administration in time range)   sodium chloride 0.9 % bolus 1,000 mL (has no administration in time range)   HYDROmorphone (DILAUDID) injection 1 mg (1 mg Intravenous Given 7/13/24 1931)   ondansetron (ZOFRAN) injection 4 mg (4 mg Intravenous Given 7/13/24 1929)   famotidine (PEPCID) injection 20 mg (20 mg Intravenous Given 7/13/24 1926)   calcium gluconate 1000 Mg/50ml 0.675% NaCl IV SOLN (1,000 mg Intravenous New Bag 7/13/24 2111)   insulin regular (humuLIN R,novoLIN R) injection 10 Units (10 Units Intravenous Given 7/13/24 2109)   dextrose (D50W) (25 g/50 mL) IV injection 25 g (25 g Intravenous Given 7/13/24 2102)   sodium zirconium cyclosilicate (LOKELMA) packet 10 g (10 g Oral Given 7/13/24 2113)     Vital Signs:  Temp:  [98.2 °F (36.8 °C)] 98.2 °F (36.8 °C)  Heart Rate:  [72-88] 72  Resp:  [17] 17  BP: (113-128)/(72-85) 113/75        07/13/24 1833   Weight: 59 kg (130 lb)     Body mass index is 23.78 kg/m².    Physical Exam:     Most recent vital Signs: /75   Pulse 72   Temp 98.2 °F (36.8 °C) (Oral)   Resp 17   Ht 157.5 cm (62\")   Wt 59 kg (130 lb)   SpO2 96%   BMI 23.78 kg/m²     Physical Exam  Constitutional:       General: She is not in acute distress.     Appearance: She is normal weight. She is not toxic-appearing.   HENT:      Mouth/Throat:      Mouth: Mucous membranes are dry.      Pharynx: Oropharynx is clear.   Eyes:      Extraocular Movements: Extraocular movements intact.      Pupils: Pupils are equal, round, and reactive to light.   Cardiovascular:      Rate and Rhythm: Normal rate.      Pulses: Normal pulses.   Pulmonary:      Effort: Pulmonary effort is normal.      Breath sounds: No wheezing or rales.   Abdominal:      General: Abdomen is flat. Bowel sounds are normal.      Tenderness: There is abdominal tenderness. There is no guarding.      Hernia: No hernia is " present.   Musculoskeletal:         General: Normal range of motion.      Right lower leg: No edema.      Left lower leg: No edema.   Skin:     General: Skin is warm and dry.      Capillary Refill: Capillary refill takes less than 2 seconds.      Findings: No lesion.   Neurological:      General: No focal deficit present.      Mental Status: She is alert. Mental status is at baseline.         Laboratory data:    I have reviewed the labs done in the emergency room.    Results from last 7 days   Lab Units 07/13/24  2018 07/13/24  1921   SODIUM mmol/L 132* 135*   POTASSIUM mmol/L 6.3* 6.2*   CHLORIDE mmol/L 108* 108*   CO2 mmol/L 10.1* 11.6*   BUN mg/dL 37* 37*   CREATININE mg/dL 1.47* 1.52*   CALCIUM mg/dL 9.4 9.7   BILIRUBIN mg/dL  --  <0.2   ALK PHOS U/L  --  172*   ALT (SGPT) U/L  --  11   AST (SGOT) U/L  --  15   GLUCOSE mg/dL 80 75     Results from last 7 days   Lab Units 07/13/24  1921   WBC 10*3/mm3 6.62   HEMOGLOBIN g/dL 12.0   HEMATOCRIT % 38.1   PLATELETS 10*3/mm3 237                     Results from last 7 days   Lab Units 07/13/24  1921   LIPASE U/L 166*         Results from last 7 days   Lab Units 07/13/24  2101   COLOR UA  Yellow   GLUCOSE UA  Negative   KETONES UA  Negative   BLOOD UA  Negative   LEUKOCYTES UA  Negative   PH, URINE  <=5.0   BILIRUBIN UA  Negative   UROBILINOGEN UA  0.2 E.U./dL   RBC UA /HPF None Seen   WBC UA /HPF None Seen       Pain Management Panel          Latest Ref Rng & Units 8/6/2022   Pain Management Panel   Amphetamine, Urine Qual Negative Negative    Barbiturates Screen, Urine Negative Negative    Benzodiazepine Screen, Urine Negative Negative    Buprenorphine, Screen, Urine Negative Negative    Cocaine Screen, Urine Negative Negative    Methadone Screen , Urine Negative Negative    Methamphetamine, Ur Negative Negative        EKG:      Appears to be a sinus rhythm with a normal rate no acute ST or T wave changes    Radiology:    No radiology results for the last 3  days    Assessment:    Acute on chronic pancreatitis, POA  Dehydration  Metabolic acidosis  Chronic diarrhea  Hyperkalemia  Chronic kidney disease stage III  Chronic tobacco abuse    Plan:    Admit as inpatient    Pancreatitis:  Patient with multiple bouts of chronic pancreatitis leading to significant dehydration and chronic nausea.  Underlying etiology unknown.  She is due to follow-up with  in August.  Has seen Dr. Fleming locally.  Previously underwent cholecystectomy followed by ERCP.  Has been recommended to quit smoking.  Recent workup at  also demonstrated pancreatic insufficiency and she is supposed to be on Creon now.  Will advance diet as tolerated, continue with fluids and pain control.    Dehydration/metabolic acidosis/hyperkalemia/renal failure:  Placed on sodium bicarb drip per Dr. Fernandez.  Will repeat BMP.  Treated for hyperkalemia in the emergency room already.  Monitor urine output.  EKG stable.    Chronic diarrhea:  Combination of GI losses likely contributing to the metabolic acidosis.  She may need to be on antidiarrheal therapy upon discharge to help her.  No evidence of recurrent C. difficile at this time.    Further recommendations depend upon clinical course.  Plan of care discussed with the patient.    Risk Assessment: High  DVT Prophylaxis: Heparin  Code Status: Full  Diet: Clear liquids    Advance Care Planning   ACP discussion was held with the patient during this visit. Patient does not have an advance directive, declines further assistance.           Lacey Ballesteros DO  07/13/24  21:40 EDT    Dictated utilizing Dragon dictation.

## 2024-07-14 NOTE — PAYOR COMM NOTE
"TO:BC  FROM:MILDRED DAUGHERTY, RN PHONE [phone]  CLINICALS REF# [id number]    Jaymie Yarbrough (69 y.o. Female)       Date of Birth   1955    Social Security Number       Address   [address] [id number]    Home Phone   [phone]    MRN   [id number]       Jewish   None    Marital Status                               Admission Date   24    Admission Type   Emergency    Admitting Provider   Lacey Ballesteros DO    Attending Provider   Vivienne Roa MD    Department, Room/Bed   [facility] INTENSIVE CARE, I       Discharge Date       Discharge Disposition       Discharge Destination                                 Attending Provider: Vivienne Roa MD    Allergies: Rocephin [Ceftriaxone], Ciprofloxacin, Codeine, Pineapple    Isolation: None   Infection: Other (24), C.difficile (24)   Code Status: CPR    Ht: 160 cm (63\")   Wt: 58.4 kg (128 lb 12 oz)    Admission Cmt: None   Principal Problem: Hyperkalemia [E87.5]                   Active Insurance as of 2024       Primary Coverage       Payor Plan Insurance Group Employer/Plan Group    ANTHEM MEDICARE REPLACEMENT ANTHEM MEDICARE ADVANTAGE [id number]       Payor Plan Address Payor Plan Phone Number Payor Plan Fax Number Effective Dates    [address] [phone]  2024 - None Entered    [facility] [id number]         Subscriber Name Subscriber Birth Date Member ID       JAYMIE YARBROUGH 1955 [id number]                     Emergency Contacts        (Rel.) Home Phone Work Phone Mobile Phone    ANTONIO YARBROUGH (Son) [phone] -- --    EAMONMONIE (Son) [phone] -- --                 History & Physical        Lacey Ballesteros DO at 24 2140            [facility] HOSPITALIST   HISTORY AND PHYSICAL      Name:  Jaymie Yarbrough   Age:  69 y.o.  Sex:  female  :  1955  MRN:  [id number]   Visit Number:  [id number]  Admission " Date:  7/13/2024  Date Of Service:  07/13/24  Primary Care Physician:  Terrence Baldwin MD    Chief Complaint:     Abdominal pain, nausea, chronic diarrhea    History Of Presenting Illness:      69-year-old with a history of hypertension, recurrent pancreatitis, CKD stage III, chronic diarrhea, chronic tobacco abuse, who presented from home with multiple complaints.  Patient known to me from prior hospitalizations due to recurrent pancreatitis.  Had recently been evaluated at [facility] for pancreatitis where she was treated and discharged about 2 weeks ago.  She is due for follow-up with specialty in August.  She noted increased pain over the last 2 days with associated nausea.  She has chronic diarrhea.  No fevers or chills.  Feeling hungry currently at time of visit.  No alcohol use.  Continues to smoke unfortunately.  Follows with Dr. Fleming locally.    In the ER the patient was hemodynamically stable.  Her labs were indicative of metabolic acidosis with anion gap of 16, CO2 of 11.  Potassium is elevated at 6.2.  Show lipase of 166.  Her CBC was unremarkable.  Urinalysis unremarkable.  Due to recurrent nature of pancreatitis, defer any CT imaging.  She was treated for hyperkalemia, Dr. Fernandez consult placed on sodium bicarb and drip.  Admitted to the ICU.    Review Of Systems:    All systems were reviewed and negative except as mentioned in history of presenting illness, assessment and plan.    Past Medical History: Patient  has a past medical history of Hypertension, Impaired functional mobility, balance, gait, and endurance, Impaired mobility, Injury of back, and Pancreatitis.    Past Surgical History: Patient  has a past surgical history that includes Tubal ligation; Colonoscopy; Colon surgery; Abdominal surgery; and Esophagogastroduodenoscopy (N/A, 4/23/2024).    Social History: Patient  reports that she has been smoking cigarettes. She has never used smokeless tobacco. She reports that she does not  drink alcohol and does not use drugs.    Family History:  Patient's family history has been reviewed and found to be noncontributory.     Allergies:      Rocephin [ceftriaxone], Ciprofloxacin, Codeine, and Pineapple    Home Medications:    Prior to Admission Medications       Prescriptions Last Dose Informant Patient Reported? Taking?    famotidine (PEPCID) 20 MG tablet   Yes No    Take 1 tablet by mouth 2 (Two) Times a Day.    glucosamine-chondroitin 500-400 MG capsule capsule   Yes No    Take 1 capsule by mouth 2 (Two) Times a Day With Meals. Indications: Joint Damage causing Pain and Loss of Function    methocarbamol (ROBAXIN) 750 MG tablet   Yes No    Take 1 tablet by mouth 4 (Four) Times a Day As Needed for Muscle Spasms.    ondansetron ODT (ZOFRAN-ODT) 4 MG disintegrating tablet   No No    Place 1 tablet on the tongue Every 8 (Eight) Hours As Needed for Nausea or Vomiting.    ondansetron ODT (ZOFRAN-ODT) 4 MG disintegrating tablet   No No    Place 1 tablet on the tongue Every 8 (Eight) Hours As Needed for Nausea or Vomiting.    oxyCODONE (Roxicodone) 5 MG immediate release tablet   No No    Take 1 tablet by mouth Every 4 (Four) Hours As Needed for Moderate Pain.    promethazine (PHENERGAN) 25 MG tablet   Yes No    Take 1 tablet by mouth.    sertraline (ZOLOFT) 50 MG tablet   Yes No    Take 1 tablet by mouth Daily.    sodium bicarbonate 650 MG tablet   Yes No    Take 1 tablet by mouth 2 (Two) Times a Day.    sodium bicarbonate 650 MG tablet   No No    Take 1 tablet by mouth 2 (Two) Times a Day.    vitamin D3 125 MCG (5000 UT) capsule capsule   Yes No    Take 1 capsule by mouth Daily. Indications: Vitamin D Deficiency          ED Medications:    Medications   sodium chloride 0.9 % flush 10 mL (has no administration in time range)   sodium bicarbonate 8.4 % 150 mEq in dextrose (D5W) 5 % 1,000 mL infusion (greater than 100 mEq) (has no administration in time range)   sodium chloride 0.9 % bolus 1,000 mL (has no  "administration in time range)   HYDROmorphone (DILAUDID) injection 1 mg (1 mg Intravenous Given 7/13/24 1931)   ondansetron (ZOFRAN) injection 4 mg (4 mg Intravenous Given 7/13/24 1929)   famotidine (PEPCID) injection 20 mg (20 mg Intravenous Given 7/13/24 1926)   calcium gluconate 1000 Mg/50ml 0.675% NaCl IV SOLN (1,000 mg Intravenous New Bag 7/13/24 2111)   insulin regular (humuLIN R,novoLIN R) injection 10 Units (10 Units Intravenous Given 7/13/24 2109)   dextrose (D50W) (25 g/50 mL) IV injection 25 g (25 g Intravenous Given 7/13/24 2102)   sodium zirconium cyclosilicate (LOKELMA) packet 10 g (10 g Oral Given 7/13/24 2113)     Vital Signs:  Temp:  [98.2 °F (36.8 °C)] 98.2 °F (36.8 °C)  Heart Rate:  [72-88] 72  Resp:  [17] 17  BP: (113-128)/(72-85) 113/75        07/13/24 1833   Weight: 59 kg (130 lb)     Body mass index is 23.78 kg/m².    Physical Exam:     Most recent vital Signs: /75   Pulse 72   Temp 98.2 °F (36.8 °C) (Oral)   Resp 17   Ht 157.5 cm (62\")   Wt 59 kg (130 lb)   SpO2 96%   BMI 23.78 kg/m²     Physical Exam  Constitutional:       General: She is not in acute distress.     Appearance: She is normal weight. She is not toxic-appearing.   HENT:      Mouth/Throat:      Mouth: Mucous membranes are dry.      Pharynx: Oropharynx is clear.   Eyes:      Extraocular Movements: Extraocular movements intact.      Pupils: Pupils are equal, round, and reactive to light.   Cardiovascular:      Rate and Rhythm: Normal rate.      Pulses: Normal pulses.   Pulmonary:      Effort: Pulmonary effort is normal.      Breath sounds: No wheezing or rales.   Abdominal:      General: Abdomen is flat. Bowel sounds are normal.      Tenderness: There is abdominal tenderness. There is no guarding.      Hernia: No hernia is present.   Musculoskeletal:         General: Normal range of motion.      Right lower leg: No edema.      Left lower leg: No edema.   Skin:     General: Skin is warm and dry.      Capillary Refill: " Capillary refill takes less than 2 seconds.      Findings: No lesion.   Neurological:      General: No focal deficit present.      Mental Status: She is alert. Mental status is at baseline.         Laboratory data:    I have reviewed the labs done in the emergency room.    Results from last 7 days   Lab Units 07/13/24 2018 07/13/24  1921   SODIUM mmol/L 132* 135*   POTASSIUM mmol/L 6.3* 6.2*   CHLORIDE mmol/L 108* 108*   CO2 mmol/L 10.1* 11.6*   BUN mg/dL 37* 37*   CREATININE mg/dL 1.47* 1.52*   CALCIUM mg/dL 9.4 9.7   BILIRUBIN mg/dL  --  <0.2   ALK PHOS U/L  --  172*   ALT (SGPT) U/L  --  11   AST (SGOT) U/L  --  15   GLUCOSE mg/dL 80 75     Results from last 7 days   Lab Units 07/13/24  1921   WBC 10*3/mm3 6.62   HEMOGLOBIN g/dL 12.0   HEMATOCRIT % 38.1   PLATELETS 10*3/mm3 237                     Results from last 7 days   Lab Units 07/13/24  1921   LIPASE U/L 166*         Results from last 7 days   Lab Units 07/13/24  2101   COLOR UA  Yellow   GLUCOSE UA  Negative   KETONES UA  Negative   BLOOD UA  Negative   LEUKOCYTES UA  Negative   PH, URINE  <=5.0   BILIRUBIN UA  Negative   UROBILINOGEN UA  0.2 E.U./dL   RBC UA /HPF None Seen   WBC UA /HPF None Seen       Pain Management Panel          Latest Ref Rng & Units 8/6/2022   Pain Management Panel   Amphetamine, Urine Qual Negative Negative    Barbiturates Screen, Urine Negative Negative    Benzodiazepine Screen, Urine Negative Negative    Buprenorphine, Screen, Urine Negative Negative    Cocaine Screen, Urine Negative Negative    Methadone Screen , Urine Negative Negative    Methamphetamine, Ur Negative Negative        EKG:      Appears to be a sinus rhythm with a normal rate no acute ST or T wave changes    Radiology:    No radiology results for the last 3 days    Assessment:    Acute on chronic pancreatitis, POA  Dehydration  Metabolic acidosis  Chronic diarrhea  Hyperkalemia  Chronic kidney disease stage III  Chronic tobacco abuse    Plan:    Admit as  inpatient    Pancreatitis:  Patient with multiple bouts of chronic pancreatitis leading to significant dehydration and chronic nausea.  Underlying etiology unknown.  She is due to follow-up with  in August.  Has seen Dr. Fleming locally.  Previously underwent cholecystectomy followed by ERCP.  Has been recommended to quit smoking.  Recent workup at  also demonstrated pancreatic insufficiency and she is supposed to be on Creon now.  Will advance diet as tolerated, continue with fluids and pain control.    Dehydration/metabolic acidosis/hyperkalemia/renal failure:  Placed on sodium bicarb drip per Dr. Fernandez.  Will repeat BMP.  Treated for hyperkalemia in the emergency room already.  Monitor urine output.  EKG stable.    Chronic diarrhea:  Combination of GI losses likely contributing to the metabolic acidosis.  She may need to be on antidiarrheal therapy upon discharge to help her.  No evidence of recurrent C. difficile at this time.    Further recommendations depend upon clinical course.  Plan of care discussed with the patient.    Risk Assessment: High  DVT Prophylaxis: Heparin  Code Status: Full  Diet: Clear liquids    Advance Care Planning  ACP discussion was held with the patient during this visit. Patient does not have an advance directive, declines further assistance.           Lacey Ballesteros DO  24  21:40 EDT    Dictated utilizing Dragon dictation.    Electronically signed by Lacey Ballesteros DO at 24 2356          Emergency Department Notes        Hermelinda Rodas RN at 24 2215          Report called to Rj MADRID    Electronically signed by Hermelinda Rodas RN at 24 2215       Juan Luis Martins MD at 24 2149        Procedure Orders    1. Critical Care [[id number]] ordered by Juan Luis Martins MD                  EMERGENCY DEPARTMENT ENCOUNTER    Pt Name: Tasha Yarbrough  MRN: [id number]  Pt :   1955  Room Number:    Date of encounter:   7/13/2024  PCP: Terrence Baldwin MD  ED Provider: Juan Luis Martins MD    Historian: Patient      HPI:  Chief Complaint   Patient presents with    Abdominal Pain    Nausea          Context: Tasha Yarbrough is a 69 y.o. female who presents to the ED c/o abdominal pain, nausea, vomiting.  Patient has a history of chronic pancreatitis and feels like she is having exacerbation of her pancreatitis.  Reports the pain is in the upper abdomen.  Denies any blood in her vomit.  Symptoms present for about the past 3 days.      PAST MEDICAL HISTORY  Past Medical History:   Diagnosis Date    Hypertension     Impaired functional mobility, balance, gait, and endurance     Impaired mobility     Injury of back     Pancreatitis          PAST SURGICAL HISTORY  Past Surgical History:   Procedure Laterality Date    ABDOMINAL SURGERY      COLON SURGERY      COLONOSCOPY      ENDOSCOPY N/A 4/23/2024    Procedure: ESOPHAGOGASTRODUODENOSCOPY WITH BIOPSY;  Surgeon: Jairo Negro MD;  Location: [facility] ENDOSCOPY;  Service: Gastroenterology;  Laterality: N/A;    TUBAL ABDOMINAL LIGATION           FAMILY HISTORY  Family History   Problem Relation Age of Onset    Kidney disease Mother     Emphysema Mother     Heart disease Father     Lung cancer Sister     Heart disease Paternal Uncle          SOCIAL HISTORY  Social History     Socioeconomic History    Marital status:    Tobacco Use    Smoking status: Every Day     Current packs/day: 1.00     Types: Cigarettes    Smokeless tobacco: Never   Vaping Use    Vaping status: Never Used   Substance and Sexual Activity    Alcohol use: Never    Drug use: Never    Sexual activity: Defer         ALLERGIES  Rocephin [ceftriaxone], Ciprofloxacin, Codeine, and Pineapple        REVIEW OF SYSTEMS  Review of Systems   Constitutional:  Negative for chills and fever.   HENT:  Negative for sore throat and trouble swallowing.    Eyes:  Negative for pain and redness.   Respiratory:  Negative for cough and  shortness of breath.    Cardiovascular:  Negative for chest pain and leg swelling.   Gastrointestinal:  Positive for abdominal pain, nausea and vomiting.   Genitourinary:  Negative for dysuria and urgency.   Musculoskeletal:  Negative for back pain and neck pain.   Skin:  Negative for rash and wound.   Neurological:  Negative for dizziness and weakness.        All systems reviewed and negative except for those discussed in HPI.       PHYSICAL EXAM    I have reviewed the triage vital signs and nursing notes.    ED Triage Vitals [07/13/24 1833]   Temp Heart Rate Resp BP SpO2   98.2 °F (36.8 °C) 88 17 121/79 96 %      Temp src Heart Rate Source Patient Position BP Location FiO2 (%)   Oral Monitor Sitting Right arm --       Physical Exam  Constitutional:       Appearance: Normal appearance. She is ill-appearing.   HENT:      Head: Normocephalic and atraumatic.      Right Ear: External ear normal.      Left Ear: External ear normal.      Nose: Nose normal.      Mouth/Throat:      Mouth: Mucous membranes are moist.      Pharynx: Oropharynx is clear.   Eyes:      Extraocular Movements: Extraocular movements intact.      Conjunctiva/sclera: Conjunctivae normal.      Pupils: Pupils are equal, round, and reactive to light.   Cardiovascular:      Rate and Rhythm: Normal rate and regular rhythm.      Pulses:           Radial pulses are 2+ on the right side and 2+ on the left side.   Pulmonary:      Effort: Pulmonary effort is normal.      Breath sounds: Normal breath sounds.   Abdominal:      General: There is no distension.      Palpations: Abdomen is soft.      Tenderness: There is abdominal tenderness in the epigastric area.   Musculoskeletal:         General: No tenderness or deformity. Normal range of motion.      Cervical back: Normal range of motion and neck supple.      Right lower leg: No edema.      Left lower leg: No edema.   Skin:     General: Skin is warm and dry.      Capillary Refill: Capillary refill takes less  than 2 seconds.   Neurological:      General: No focal deficit present.      Mental Status: She is alert and oriented to person, place, and time.            LAB RESULTS  Recent Results (from the past 24 hour(s))   Comprehensive Metabolic Panel    Collection Time: 07/13/24  7:21 PM    Specimen: Blood   Result Value Ref Range    Glucose 75 65 - 99 mg/dL    BUN 37 (H) 8 - 23 mg/dL    Creatinine 1.52 (H) 0.57 - 1.00 mg/dL    Sodium 135 (L) 136 - 145 mmol/L    Potassium 6.2 (C) 3.5 - 5.2 mmol/L    Chloride 108 (H) 98 - 107 mmol/L    CO2 11.6 (L) 22.0 - 29.0 mmol/L    Calcium 9.7 8.6 - 10.5 mg/dL    Total Protein 7.7 6.0 - 8.5 g/dL    Albumin 4.4 3.5 - 5.2 g/dL    ALT (SGPT) 11 1 - 33 U/L    AST (SGOT) 15 1 - 32 U/L    Alkaline Phosphatase 172 (H) 39 - 117 U/L    Total Bilirubin <0.2 0.0 - 1.2 mg/dL    Globulin 3.3 gm/dL    A/G Ratio 1.3 g/dL    BUN/Creatinine Ratio 24.3 7.0 - 25.0    Anion Gap 15.4 (H) 5.0 - 15.0 mmol/L    eGFR 37.0 (L) >60.0 mL/min/1.73   Lipase    Collection Time: 07/13/24  7:21 PM    Specimen: Blood   Result Value Ref Range    Lipase 166 (H) 13 - 60 U/L   Green Top (Gel)    Collection Time: 07/13/24  7:21 PM   Result Value Ref Range    Extra Tube Hold for add-ons.    Lavender Top    Collection Time: 07/13/24  7:21 PM   Result Value Ref Range    Extra Tube hold for add-on    Light Blue Top    Collection Time: 07/13/24  7:21 PM   Result Value Ref Range    Extra Tube Hold for add-ons.    CBC Auto Differential    Collection Time: 07/13/24  7:21 PM    Specimen: Blood   Result Value Ref Range    WBC 6.62 3.40 - 10.80 10*3/mm3    RBC 3.39 (L) 3.77 - 5.28 10*6/mm3    Hemoglobin 12.0 12.0 - 15.9 g/dL    Hematocrit 38.1 34.0 - 46.6 %    .4 (H) 79.0 - 97.0 fL    MCH 35.4 (H) 26.6 - 33.0 pg    MCHC 31.5 31.5 - 35.7 g/dL    RDW 15.1 12.3 - 15.4 %    RDW-SD 63.6 (H) 37.0 - 54.0 fl    MPV 10.2 6.0 - 12.0 fL    Platelets 237 140 - 450 10*3/mm3    Neutrophil % 57.6 42.7 - 76.0 %    Lymphocyte % 30.1 19.6 - 45.3  %    Monocyte % 8.2 5.0 - 12.0 %    Eosinophil % 2.0 0.3 - 6.2 %    Basophil % 0.9 0.0 - 1.5 %    Immature Grans % 1.2 (H) 0.0 - 0.5 %    Neutrophils, Absolute 3.82 1.70 - 7.00 10*3/mm3    Lymphocytes, Absolute 1.99 0.70 - 3.10 10*3/mm3    Monocytes, Absolute 0.54 0.10 - 0.90 10*3/mm3    Eosinophils, Absolute 0.13 0.00 - 0.40 10*3/mm3    Basophils, Absolute 0.06 0.00 - 0.20 10*3/mm3    Immature Grans, Absolute 0.08 (H) 0.00 - 0.05 10*3/mm3    nRBC 0.0 0.0 - 0.2 /100 WBC   Scan Slide    Collection Time: 07/13/24  7:21 PM    Specimen: Blood   Result Value Ref Range    Macrocytes Slight/1+ None Seen    WBC Morphology Normal Normal    Platelet Morphology Normal Normal   Lactic Acid, Plasma    Collection Time: 07/13/24  7:50 PM    Specimen: Blood   Result Value Ref Range    Lactate 0.7 0.5 - 2.0 mmol/L   Basic Metabolic Panel    Collection Time: 07/13/24  8:18 PM    Specimen: Blood   Result Value Ref Range    Glucose 80 65 - 99 mg/dL    BUN 37 (H) 8 - 23 mg/dL    Creatinine 1.47 (H) 0.57 - 1.00 mg/dL    Sodium 132 (L) 136 - 145 mmol/L    Potassium 6.3 (C) 3.5 - 5.2 mmol/L    Chloride 108 (H) 98 - 107 mmol/L    CO2 10.1 (L) 22.0 - 29.0 mmol/L    Calcium 9.4 8.6 - 10.5 mg/dL    BUN/Creatinine Ratio 25.2 (H) 7.0 - 25.0    Anion Gap 13.9 5.0 - 15.0 mmol/L    eGFR 38.5 (L) >60.0 mL/min/1.73   Gold Top - SST    Collection Time: 07/13/24  8:23 PM   Result Value Ref Range    Extra Tube Hold for add-ons.    Urinalysis With Microscopic If Indicated (No Culture) - Urine, Clean Catch    Collection Time: 07/13/24  9:01 PM    Specimen: Urine, Clean Catch   Result Value Ref Range    Color, UA Yellow Yellow, Straw    Appearance, UA Clear Clear    pH, UA <=5.0 5.0 - 8.0    Specific Gravity, UA 1.028 1.005 - 1.030    Glucose, UA Negative Negative    Ketones, UA Negative Negative    Bilirubin, UA Negative Negative    Blood, UA Negative Negative    Protein, UA 30 mg/dL (1+) (A) Negative    Leuk Esterase, UA Negative Negative    Nitrite, UA  Negative Negative    Urobilinogen, UA 0.2 E.U./dL 0.2 - 1.0 E.U./dL   Urinalysis, Microscopic Only - Urine, Clean Catch    Collection Time: 07/13/24  9:01 PM    Specimen: Urine, Clean Catch   Result Value Ref Range    RBC, UA None Seen None Seen, 0-2 /HPF    WBC, UA None Seen None Seen, 0-2 /HPF    Bacteria, UA Trace (A) None Seen /HPF    Squamous Epithelial Cells, UA 3-6 (A) None Seen, 0-2 /HPF    Hyaline Casts, UA 0-2 None Seen /LPF    Methodology Manual Light Microscopy        If labs were ordered, I independently reviewed the results and considered them in treating the patient.        RADIOLOGY  No Radiology Exams Resulted Within Past 24 Hours        PROCEDURES    Critical Care    Performed by: Juan Luis Martins MD  Authorized by: Lacey Ballesteros DO    Critical care provider statement:     Critical care time (minutes):  37    Critical care time was exclusive of:  Separately billable procedures and treating other patients    Critical care was necessary to treat or prevent imminent or life-threatening deterioration of the following conditions:  Metabolic crisis and endocrine crisis    Critical care was time spent personally by me on the following activities:  Ordering and performing treatments and interventions, ordering and review of laboratory studies, ordering and review of radiographic studies, pulse oximetry, re-evaluation of patient's condition, discussions with consultants, blood draw for specimens, evaluation of patient's response to treatment and examination of patient    I assumed direction of critical care for this patient from another provider in my specialty: no      Care discussed with: admitting provider        Interpretations    O2 Sat: The patients oxygen saturation was 96% on Room Air.  This was independently interpreted by me as Normal    EKG: I reviewed and independently interpreted the EKG as sinus rhythm at 75 bpm, normal axis, normal intervals, no ST elevation, no T wave  inversions    Cardiac Monitoring: I reviewed and independently interpreted the Rhythm Strip as Normal Sinus rhythm rate of 72      MEDICATIONS GIVEN IN ER    Medications   sodium chloride 0.9 % flush 10 mL (has no administration in time range)   sodium bicarbonate 8.4 % 150 mEq in dextrose (D5W) 5 % 1,000 mL infusion (greater than 100 mEq) (has no administration in time range)   sodium chloride 0.9 % bolus 1,000 mL (1,000 mL Intravenous New Bag 7/13/24 2146)   HYDROmorphone (DILAUDID) injection 1 mg (1 mg Intravenous Given 7/13/24 1931)   ondansetron (ZOFRAN) injection 4 mg (4 mg Intravenous Given 7/13/24 1929)   famotidine (PEPCID) injection 20 mg (20 mg Intravenous Given 7/13/24 1926)   calcium gluconate 1000 Mg/50ml 0.675% NaCl IV SOLN (1,000 mg Intravenous New Bag 7/13/24 2111)   insulin regular (humuLIN R,novoLIN R) injection 10 Units (10 Units Intravenous Given 7/13/24 2109)   dextrose (D50W) (25 g/50 mL) IV injection 25 g (25 g Intravenous Given 7/13/24 2102)   sodium zirconium cyclosilicate (LOKELMA) packet 10 g (10 g Oral Given 7/13/24 2113)         MEDICAL DECISION MAKING, PROGRESS, and CONSULTS    All labs, if obtained, have been independently reviewed by me.  All radiology studies, if obtained, have been reviewed by me and the radiologist dictating the report.  All EKG's, if obtained, have been independently viewed and interpreted by me      Discussion below represents my analysis of pertinent findings related to patient's condition, differential diagnosis, treatment plan and final disposition.      Differential diagnosis:    69-year-old female presented ED with complaint of abdominal pain, nausea, vomiting.  The patient with history of chronic pancreatitis, suspect exacerbation of chronic pancreatitis.  Patient was quite uncomfortable appearing, she had multiple CT scans we will try to hold off on CTs, will obtain labs, including lipase, lactic acid, provide IV fluids, pain medication, nausea  medication    Additional Sources:  External (non-ED) record review:  Recent discharge summary from [facility] after admission for pancreatitis 6/23/2024      Orders placed during this visit:  Orders Placed This Encounter   Procedures    Critical Care    [place] Draw    Comprehensive Metabolic Panel    Lipase    Urinalysis With Microscopic If Indicated (No Culture) - Urine, Clean Catch    Lactic Acid, Plasma    CBC Auto Differential    Scan Slide    Basic Metabolic Panel    Urinalysis, Microscopic Only - Urine, Clean Catch    NPO Diet NPO Type: Strict NPO    Undress & Gown    Inpatient Nephrology Consult    POC Glucose Q1H    ECG 12 Lead Rhythm Change    Insert Peripheral IV    Inpatient Admission    CBC & Differential    Green Top (Gel)    Lavender Top    Gold Top - SST    Light Blue Top         Additional orders considered but not ordered:  Imaging: CT scan, patient had many many CT scans given her relative stability, thought it could be held off at the moment    ED Course:    Consultants:  Hospitalist    ED Course as of 07/13/24 2147   Sat Jul 13, 2024 2041 Lactic Acid, Plasma [CS]   2146 Comprehensive Metabolic Panel(!!)  Patient with severe hyperkalemia, EKG ordered does not show any T wave peaks, will order hyperkalemia cocktail, and IV fluids [CS]   2147 Spoke to Dr. Fernandez, nephrology regarding the patient's hyperkalemia, agrees with fluids, hyperkalemia cocktail, suggest bicarb drip results been ordered. [CS]   2147 Consult the hospitalist agrees to evaluate the patient for admission I spoke directly to Dr. Ballesteros.  Patient aware and agreeable to plan for admission [CS]      ED Course User Index  [CS] Juan Luis Martins MD           After my consideration of clinical presentation and any laboratory/radiology studies obtained, I discussed the findings with the patient/patient representative who is in agreement with the treatment plan and the final disposition. Risks and benefits of admission  was discussed     AS OF 21:47 EDT VITALS:    BP - 113/75  HR - 72  TEMP - 98.2 °F (36.8 °C) (Oral)  O2 SATS - 96%    I reviewed the patients prescription monitoring report if available prior to discharge    DIAGNOSIS  Final diagnoses:   Hyperkalemia   Chronic pancreatitis, unspecified pancreatitis type   Acute renal failure superimposed on chronic kidney disease, unspecified acute renal failure type, unspecified CKD stage   Metabolic acidosis         DISPOSITION  ED Disposition       ED Disposition   Decision to Admit    Condition   --    Comment   Level of Care: Critical Care [6]   Diagnosis: Hyperkalemia [846348]   Admitting Physician: KATIE GREGORY [[id number]]   Attending Physician: KATIE GREGORY [[id number]]   Certification: I Certify That Inpatient Hospital Services Are Medically Necessary For Greater Than 2 Midnights                     Please note that portions of this document were completed with voice recognition software.        Juan Luis Martins MD  07/13/24 2147      Electronically signed by Juan Luis Martins MD at 07/13/24 2147       Turner Quick PCT at 07/13/24 2131       Summary:Nephrology                 Jim called back and transferred back to Eliezer QUINTERO.     Electronically signed by Turner Quick PCT at 07/13/24 2132       Turner Quick PCT at 07/13/24 2125       Summary:Nephrology                 Nephrology contacted att per Eliezer QUINTERO, states Jim will be paged and will give us a call back.     Electronically signed by Turner Quick PCT at 07/13/24 2132       Hermelinda Rodas RN at 07/13/24 2039          Awaiting repeat BMP prior to giving medications due to hemolysis    Electronically signed by Hermelinda Rodas RN at 07/13/24 2040       Vital Signs (last day)       Date/Time Temp Temp src Pulse Resp BP Patient Position SpO2    07/14/24 0700 97.7 (36.5) Oral -- -- -- -- --    07/14/24 0630 -- -- 70 -- -- -- 97    07/14/24 0600 -- -- 69 16 118/76 --  98    07/14/24 0530 -- -- 68 -- -- -- 95    07/14/24 0500 -- -- 69 -- 98/70 -- 95    07/14/24 0430 -- -- 74 -- -- -- 97    07/14/24 0400 -- -- 64 14 113/83 -- 100    07/14/24 0330 97.5 (36.4) Axillary 70 -- -- -- 92    07/14/24 0300 -- -- 67 -- 90/64 -- 96    07/14/24 0230 -- -- 70 -- -- -- 96    07/14/24 0200 -- -- 68 16 116/74 -- 98    07/14/24 0130 -- -- 70 -- -- -- 94    07/14/24 0100 -- -- 67 -- 91/57 -- 97    07/14/24 0050 -- -- 70 -- -- -- 98    07/14/24 0049 -- -- 71 -- -- -- 97    07/14/24 0048 -- -- 70 -- -- -- 95    07/14/24 0047 -- -- 71 -- -- -- 96    07/14/24 0046 -- -- 70 -- -- -- 95    07/14/24 0045 -- -- 67 -- -- -- 97    07/14/24 0044 -- -- 68 -- -- -- 96    07/14/24 0043 -- -- 65 -- -- -- 97    07/14/24 0042 -- -- 68 -- -- -- 98    07/14/24 0041 -- -- 69 -- -- -- 98    07/14/24 0040 -- -- 69 -- -- -- 95    07/14/24 0039 -- -- 73 -- -- -- 96    07/14/24 0038 -- -- 69 -- -- -- 96    07/14/24 0037 -- -- 66 -- -- -- 98    07/14/24 0036 -- -- 68 -- -- -- 97    07/14/24 0035 -- -- 71 -- -- -- 98    07/14/24 0034 -- -- 71 -- -- -- 97    07/14/24 0033 -- -- 69 -- -- -- 99    07/14/24 0032 -- -- 70 -- -- -- 98    07/14/24 0031 -- -- 69 -- -- -- 98    07/14/24 0030 -- -- 67 -- -- -- 97    07/14/24 0029 -- -- 69 -- -- -- 98    07/14/24 0028 -- -- 71 -- -- -- 98    07/14/24 0027 -- -- 66 -- -- -- 99    07/14/24 0026 -- -- 68 -- -- -- 98    07/14/24 0025 -- -- 70 -- -- -- 98    07/14/24 0024 -- -- 69 -- -- -- 99    07/14/24 0023 -- -- 66 -- -- -- 98    07/14/24 0022 -- -- 65 -- -- -- 99    07/14/24 0021 -- -- 71 -- -- -- 97    07/14/24 0020 -- -- 68 -- -- -- 99    07/14/24 0019 -- -- 71 -- -- -- 99    07/14/24 0018 -- -- 69 -- -- -- 99    07/14/24 0017 -- -- 69 -- -- -- 98    07/14/24 0016 -- -- 64 -- -- -- 99    07/14/24 0015 -- -- 73 -- -- -- 97    07/14/24 0014 -- -- 70 -- -- -- 97    07/14/24 0013 -- -- 67 -- -- -- 99    07/14/24 0012 -- -- 67 -- -- -- 98    07/14/24 0011 -- -- 69 -- -- -- 98    07/14/24  0010 -- -- -- -- 103/77 -- 98 07/14/24 0009 -- -- -- -- -- -- 96 07/14/24 0008 -- -- 67 -- -- -- 97 07/14/24 0007 -- -- 63 -- -- -- 97 07/14/24 0006 -- -- 67 -- -- -- 97 07/14/24 0005 -- -- 67 -- -- -- 98 07/14/24 0004 -- -- 68 -- -- -- 97 07/14/24 0003 -- -- 64 -- -- -- 97 07/14/24 0002 -- -- 64 -- -- -- 97 07/14/24 0001 -- -- 64 18 -- -- 97 07/13/24 2359 -- -- 67 -- -- -- 98 07/13/24 2358 -- -- 64 -- -- -- 97 07/13/24 2357 -- -- 66 -- -- -- 97 07/13/24 2356 -- -- 63 -- -- -- 97 07/13/24 2355 -- -- 67 -- -- -- 97 07/13/24 2353 -- -- 66 -- -- -- 97 07/13/24 2352 -- -- 65 -- -- -- 98 07/13/24 2351 -- -- 67 -- -- -- 100 07/13/24 2350 -- -- 69 -- -- -- 98 07/13/24 2349 -- -- 68 -- -- -- 98 07/13/24 2348 -- -- 65 -- -- -- 98 07/13/24 2346 -- -- 66 -- -- -- 99 07/13/24 2345 -- -- 67 -- -- -- 100 07/13/24 2344 -- -- 66 -- -- -- 99 07/13/24 2343 -- -- 64 -- -- -- 100 07/13/24 2342 -- -- 70 -- -- -- 97 07/13/24 2341 -- -- 66 -- -- -- 97 07/13/24 2340 -- -- 65 -- -- -- 98    07/13/24 2338 -- -- 70 -- -- -- 99    07/13/24 2337 -- -- 65 -- -- -- 98    07/13/24 2336 -- -- 66 -- -- -- 98    07/13/24 2335 -- -- 69 -- -- -- 98    07/13/24 2334 -- -- 67 -- -- -- 98    07/13/24 2333 -- -- 66 -- -- -- 98    07/13/24 2331 -- -- 67 -- -- -- 97    07/13/24 2330 -- -- 66 -- -- -- 99    07/13/24 2329 -- -- 68 -- -- -- 98    07/13/24 2328 -- -- 70 -- -- -- 98    07/13/24 2327 -- -- 68 -- -- -- 99    07/13/24 2326 -- -- 73 -- -- -- 98    07/13/24 2324 -- -- 72 -- -- -- 98    07/13/24 2323 -- -- 71 -- -- -- 99    07/13/24 2322 -- -- 71 -- -- -- 98    07/13/24 2321 -- -- 72 -- -- -- 98    07/13/24 2320 -- -- 74 -- -- -- 97    07/13/24 2319 -- -- 70 -- -- -- 96    07/13/24 2317 -- -- 71 -- -- -- 98    07/13/24 2316 -- -- 67 -- -- -- 97    07/13/24 2315 -- -- 70 -- -- -- 98    07/13/24 2314 -- -- 66 -- -- -- 98    07/13/24 2313 -- -- 67 -- -- -- 98    07/13/24 2312  -- -- 74 -- -- -- 98    07/13/24 2310 -- -- 69 -- -- -- 98    07/13/24 2309 -- -- 73 -- -- -- 98    07/13/24 2308 -- -- 71 -- -- -- 98    07/13/24 2307 -- -- 69 -- -- -- 98    07/13/24 2306 -- -- 75 -- -- -- 97    07/13/24 2305 -- -- 75 -- -- -- 98    07/13/24 2303 -- -- 75 -- -- -- 97    07/13/24 2302 -- -- 71 -- -- -- 97    07/13/24 2301 -- -- 75 -- -- -- 97    07/13/24 2300 -- -- 73 -- -- -- 98    07/13/24 2259 -- -- 73 -- -- -- 98    07/13/24 2258 -- -- 78 -- -- -- 98    07/13/24 2256 -- -- 76 -- -- -- 97    07/13/24 2255 -- -- 77 -- -- -- 97    07/13/24 2254 -- -- 89 -- -- -- 96    07/13/24 2253 -- -- 87 -- -- -- 93    07/13/24 2252 -- -- 92 -- -- -- 97 07/13/24 2251 -- -- -- -- -- -- 97 07/13/24 2249 -- -- 88 -- -- -- 95    07/13/24 2248 -- -- 77 -- -- -- 97    07/13/24 2247 -- -- 75 -- -- -- --    07/13/24 2246 -- -- 74 18 106/72 -- 96    07/13/24 2245 97.4 (36.3) Oral 75 -- -- -- 96    07/13/24 2244 -- -- 76 -- 100/54 -- 96    07/13/24 2242 -- -- -- -- -- -- 97    07/13/24 2215 -- -- 85 -- -- -- 96    07/13/24 2214 -- -- 86 -- -- -- 96    07/13/24 2213 -- -- 85 -- -- -- 96    07/13/24 2212 -- -- 87 -- -- -- 96    07/13/24 2211 -- -- 82 -- -- -- 96    07/13/24 2210 -- -- 82 -- -- -- 96    07/13/24 2209 -- -- 80 -- -- -- 96    07/13/24 2207 -- -- 77 -- -- -- 99    07/13/24 2206 -- -- 76 -- -- -- 99    07/13/24 2205 -- -- 73 -- -- -- 98    07/13/24 2204 -- -- 76 -- -- -- 98    07/13/24 2203 -- -- 82 -- -- -- 95    07/13/24 2201 -- -- 76 -- -- -- 96    07/13/24 2200 -- -- 79 -- 135/80 -- 98    07/13/24 2159 -- -- 79 -- -- -- 97    07/13/24 2158 -- -- 73 -- -- -- 98    07/13/24 2157 -- -- 76 -- -- -- 98    07/13/24 2155 -- -- 78 -- -- -- 98    07/13/24 2154 -- -- 81 -- -- -- 97    07/13/24 2153 -- -- 78 -- -- -- 97    07/13/24 2152 -- -- 77 -- -- -- 97    07/13/24 2151 -- -- 76 -- -- -- 98    07/13/24 2150 -- -- 83 -- -- -- 97    07/13/24 2149 -- -- 76 -- -- -- 99    07/13/24 2147 -- -- 79 -- -- -- 100     07/13/24 2146 -- -- 80 -- -- -- 100    07/13/24 2145 -- -- 79 -- -- -- 100    07/13/24 2144 -- -- 82 -- -- -- 100    07/13/24 2143 -- -- 78 -- -- -- 100 07/13/24 2141 -- -- 82 -- -- -- 99 07/13/24 2140 -- -- 79 -- -- -- 99    07/13/24 2139 -- -- 84 -- -- -- 98    07/13/24 2138 -- -- 84 -- -- -- 97    07/13/24 2137 -- -- 85 -- -- -- 99    07/13/24 2136 -- -- 82 -- -- -- 97 07/13/24 2134 -- -- 80 -- -- -- 97 07/13/24 2133 -- -- 81 -- -- -- 98 07/13/24 2132 -- -- 84 -- -- -- 97 07/13/24 2131 -- -- 80 -- -- -- 97 07/13/24 2130 -- -- 79 -- 125/70 -- 97 07/13/24 2128 -- -- 81 -- -- -- 97 07/13/24 2127 -- -- 80 -- -- -- 97 07/13/24 2126 -- -- 79 -- -- -- 98 07/13/24 2125 -- -- 81 -- -- -- 98 07/13/24 2124 -- -- 80 -- -- -- 100    07/13/24 2123 -- -- 80 -- -- -- 98 07/13/24 2121 -- -- 78 -- -- -- 98 07/13/24 2120 -- -- 74 -- -- -- 98 07/13/24 2119 -- -- 74 -- -- -- 98    07/13/24 2118 -- -- 77 -- -- -- 97 07/13/24 2117 -- -- 77 -- -- -- 98 07/13/24 2116 -- -- 80 -- -- -- 97    07/13/24 2114 -- -- 78 -- -- -- 97    07/13/24 2113 -- -- 77 -- -- -- 97 07/13/24 2112 -- -- 75 -- -- -- 97 07/13/24 2111 -- -- 73 -- -- -- 97 07/13/24 2110 -- -- 75 -- -- -- 97 07/13/24 2109 -- -- 72 -- -- -- 97 07/13/24 2107 -- -- 74 -- -- -- 97 07/13/24 2106 -- -- 71 -- -- -- 97 07/13/24 2105 -- -- 71 -- -- -- 98    07/13/24 2104 -- -- 71 -- -- -- 98    07/13/24 2103 -- -- 73 -- -- -- 98    07/13/24 2101 -- -- 83 -- -- -- 97 07/13/24 2057 -- -- 83 -- -- -- --    07/13/24 2056 -- -- 72 -- -- -- 95    07/13/24 2054 -- -- 73 -- -- -- 97 07/13/24 2053 -- -- 74 -- -- -- 96 07/13/24 2052 -- -- 72 -- -- -- 96    07/13/24 2051 -- -- 73 -- -- -- 96 07/13/24 2050 -- -- 72 -- -- -- 95    07/13/24 2048 -- -- 72 -- -- -- --    07/13/24 2047 -- -- 70 -- -- -- 95    07/13/24 2046 -- -- 72 -- -- -- 96    07/13/24 2045 -- -- 73 -- -- -- 96 07/13/24 2044 -- -- 72 -- -- -- 96     07/13/24 2043 -- -- 72 -- -- -- 96 07/13/24 2042 -- -- 72 -- -- -- 96 07/13/24 2040 -- -- 76 -- -- -- 95 07/13/24 2039 -- -- 70 -- -- -- 96 07/13/24 2038 -- -- 73 -- -- -- 97 07/13/24 2037 -- -- 73 -- -- -- 96 07/13/24 2036 -- -- 73 -- -- -- 96 07/13/24 2035 -- -- 73 -- -- -- 96 07/13/24 2033 -- -- 76 -- -- -- 96 07/13/24 2032 -- -- 73 -- -- -- 96 07/13/24 2031 -- -- 73 -- -- -- 97 07/13/24 2030 -- -- 72 -- 113/75 -- 96 07/13/24 2029 -- -- 73 -- -- -- 97 07/13/24 2027 -- -- 72 -- -- -- 97 07/13/24 2026 -- -- 75 -- -- -- 95 07/13/24 2025 -- -- 71 -- -- -- 96 07/13/24 2024 -- -- 74 -- -- -- 95 07/13/24 2023 -- -- 74 -- -- -- 96 07/13/24 2022 -- -- 72 -- -- -- 96 07/13/24 2020 -- -- 72 -- 115/72 -- 95 07/13/24 2019 -- -- 71 -- -- -- 96 07/13/24 2018 -- -- 75 -- -- -- 95 07/13/24 2017 -- -- -- -- -- -- 97 07/13/24 2016 -- -- -- -- -- -- 95 07/13/24 2015 -- -- -- -- -- -- 96 07/13/24 2013 -- -- -- -- -- -- 96 07/13/24 2012 -- -- -- -- -- -- 96 07/13/24 2011 -- -- -- -- -- -- 95 07/13/24 2010 -- -- -- -- -- -- 96 07/13/24 2009 -- -- -- -- -- -- 96 07/13/24 2007 -- -- -- -- -- -- 96 07/13/24 2006 -- -- -- -- -- -- 95 07/13/24 2005 -- -- -- -- -- -- 96 07/13/24 2004 -- -- -- -- -- -- 96 07/13/24 2003 -- -- -- -- -- -- 97    07/13/24 2002 -- -- -- -- -- -- 96 07/13/24 1930 -- -- -- -- 128/80 -- 96 07/13/24 1927 -- -- -- -- 127/85 -- --    07/13/24 1833 98.2 (36.8) Oral 88 17 121/79 Sitting 96          Current Facility-Administered Medications   Medication Dose Route Frequency Provider Last Rate Last Admin    dextrose (D50W) (25 g/50 mL) IV injection 25 g  25 g Intravenous Q15 Min Lacey Shah DO   25 g at 07/13/24 2314    dextrose (GLUTOSE) oral gel 15 g  15 g Oral Q15 Min Lacey Shah DO        glucagon (GLUCAGEN) injection 1 mg  1 mg Intramuscular Q15 Min Lacey Shah DO         heparin (porcine) 5000 UNIT/ML injection 5,000 Units  5,000 Units Subcutaneous Q12H Lacey Ballesteros, DO   5,000 Units at 07/14/24 0807    HYDROcodone-acetaminophen (NORCO) 7.5-325 MG per tablet 1 tablet  1 tablet Oral Q6H PRN Lacey Ballesteros, DO   1 tablet at 07/14/24 0621    Morphine sulfate (PF) injection 2 mg  2 mg Intravenous Q4H PRN Lacey Ballesteros, DO   2 mg at 07/14/24 0804    nitroglycerin (NITROSTAT) SL tablet 0.4 mg  0.4 mg Sublingual Q5 Min PRN Lacey Ballesteros, DO        ondansetron ODT (ZOFRAN-ODT) disintegrating tablet 4 mg  4 mg Oral Q6H PRN Lacey Ballesteros, DO        Or    ondansetron (ZOFRAN) injection 4 mg  4 mg Intravenous Q6H PRN aLcey Ballesteros, DO        sertraline (ZOLOFT) tablet 50 mg  50 mg Oral Daily Lacey Ballesteros, DO   50 mg at 07/14/24 0807    sodium bicarbonate 8.4 % 150 mEq in dextrose (D5W) 5 % 1,000 mL infusion (greater than 100 mEq)  150 mEq Intravenous Continuous Lacey Ballesteros,  mL/hr at 07/14/24 0834 150 mEq at 07/14/24 0834    sodium bicarbonate tablet 1,300 mg  1,300 mg Oral 4x Daily Nito Fernandez MD, FASN   1,300 mg at 07/14/24 0730    sodium chloride 0.9 % flush 10 mL  10 mL Intravenous PRN Lacey Ballesterosus, DO        sodium chloride 0.9 % flush 10 mL  10 mL Intravenous Q12H Lacey Ballesteros, DO   10 mL at 07/14/24 0807    sodium chloride 0.9 % flush 10 mL  10 mL Intravenous PRN Lacey Ballesteros, DO        sodium chloride 0.9 % infusion 40 mL  40 mL Intravenous PRN Lacey Ballesteros, DO         Lab Results (last 24 hours)       Procedure Component Value Units Date/Time    Iron Profile [[id number]]  (Normal) Collected: 07/14/24 0602    Specimen: Blood Updated: 07/14/24 0631     Iron 128 mcg/dL      Iron Saturation (TSAT) 38 %      Transferrin 226 mg/dL      TIBC 337 mcg/dL     Basic Metabolic Panel [[id number]]  (Abnormal) Collected: 07/14/24 0602    Specimen: Blood Updated: 07/14/24  0631     Glucose 84 mg/dL      BUN 29 mg/dL      Creatinine 1.16 mg/dL      Sodium 140 mmol/L      Potassium 5.1 mmol/L      Comment: Slight hemolysis detected by analyzer. Result may be falsely elevated.        Chloride 110 mmol/L      CO2 18.7 mmol/L      Calcium 9.1 mg/dL      BUN/Creatinine Ratio 25.0     Anion Gap 11.3 mmol/L      eGFR 51.1 mL/min/1.73     Narrative:      GFR Normal >60  Chronic Kidney Disease <60  Kidney Failure <15      Hemoglobin & Hematocrit, Blood [[id number]]  (Abnormal) Collected: 07/14/24 0602    Specimen: Blood Updated: 07/14/24 0609     Hemoglobin 11.2 g/dL      Hematocrit 36.4 %     Basic Metabolic Panel [[id number]]  (Abnormal) Collected: 07/14/24 0158    Specimen: Blood Updated: 07/14/24 0227     Glucose 84 mg/dL      BUN 32 mg/dL      Creatinine 1.27 mg/dL      Sodium 136 mmol/L      Potassium 5.5 mmol/L      Comment: Slight hemolysis detected by analyzer. Result may be falsely elevated.        Chloride 111 mmol/L      CO2 12.2 mmol/L      Calcium 9.3 mg/dL      BUN/Creatinine Ratio 25.2     Anion Gap 12.8 mmol/L      eGFR 45.9 mL/min/1.73     Narrative:      GFR Normal >60  Chronic Kidney Disease <60  Kidney Failure <15      POC Glucose Once [[id number]]  (Normal) Collected: 07/14/24 0217    Specimen: Blood Updated: 07/14/24 0220     Glucose 89 mg/dL      Comment: Serial Number: [id number]:  [id number]       POC Glucose Once [[id number]]  (Normal) Collected: 07/13/24 2353    Specimen: Blood Updated: 07/14/24 0219     Glucose 76 mg/dL      Comment: Serial Number: [id number]:  [id number]       POC Glucose Once [[id number]]  (Abnormal) Collected: 07/13/24 2313    Specimen: Blood Updated: 07/14/24 0219     Glucose 63 mg/dL      Comment: Serial Number: [id number]:  [id number]       Acetaminophen Level [[id number]]  (Normal) Collected: 07/13/24 2023    Specimen: Blood Updated: 07/13/24 2252     Acetaminophen 7.3 mcg/mL     Narrative:      Toxic = Greater than 150 mcg/mL    POC  Glucose Q1H [[id number]]  (Abnormal) Collected: 07/13/24 2153    Specimen: Blood Updated: 07/13/24 2155     Glucose 67 mg/dL      Comment: Serial Number: [id number]:  [id number]       Urinalysis, Microscopic Only - Urine, Clean Catch [[id number]]  (Abnormal) Collected: 07/13/24 2101    Specimen: Urine, Clean Catch Updated: 07/13/24 2130     RBC, UA None Seen /HPF      WBC, UA None Seen /HPF      Bacteria, UA Trace /HPF      Squamous Epithelial Cells, UA 3-6 /HPF      Hyaline Casts, UA 0-2 /LPF      Methodology Manual Light Microscopy    Urinalysis With Microscopic If Indicated (No Culture) - Urine, Clean Catch [[id number]]  (Abnormal) Collected: 07/13/24 2101    Specimen: Urine, Clean Catch Updated: 07/13/24 2121     Color, UA Yellow     Appearance, UA Clear     pH, UA <=5.0     Specific Gravity, UA 1.028     Glucose, UA Negative     Ketones, UA Negative     Bilirubin, UA Negative     Blood, UA Negative     Protein, UA 30 mg/dL (1+)     Leuk Esterase, UA Negative     Nitrite, UA Negative     Urobilinogen, UA 0.2 E.U./dL    Basic Metabolic Panel [[id number]]  (Abnormal) Collected: 07/13/24 2018    Specimen: Blood Updated: 07/13/24 2051     Glucose 80 mg/dL      BUN 37 mg/dL      Creatinine 1.47 mg/dL      Sodium 132 mmol/L      Potassium 6.3 mmol/L      Chloride 108 mmol/L      CO2 10.1 mmol/L      Calcium 9.4 mg/dL      BUN/Creatinine Ratio 25.2     Anion Gap 13.9 mmol/L      eGFR 38.5 mL/min/1.73     Narrative:      GFR Normal >60  Chronic Kidney Disease <60  Kidney Failure <15      Lactic Acid, Plasma [[id number]]  (Normal) Collected: 07/13/24 1950    Specimen: Blood Updated: 07/13/24 2041     Lactate 0.7 mmol/L     [facility] Draw [[id number]] Collected: 07/13/24 1921    Specimen: Blood Updated: 07/13/24 2031    Narrative:      The following orders were created for panel order [facility] Draw.  Procedure                               Abnormality         Status                     ---------                                -----------         ------                     Green Top (Gel)[[id number]]                                  Final result               Lavender Top[[id number]]                                     Final result               Gold Top - SST[[id number]]                                   Final result               Light Blue Top[[id number]]                                   Final result                 Please view results for these tests on the individual orders.    Gold Top - SST [[id number]] Collected: 07/13/24 2023    Specimen: Blood Updated: 07/13/24 2031     Extra Tube Hold for add-ons.     Comment: Auto resulted.       Comprehensive Metabolic Panel [[id number]]  (Abnormal) Collected: 07/13/24 1921    Specimen: Blood Updated: 07/13/24 1953     Glucose 75 mg/dL      BUN 37 mg/dL      Creatinine 1.52 mg/dL      Sodium 135 mmol/L      Potassium 6.2 mmol/L      Comment: Slight hemolysis detected by analyzer. Result may be falsely elevated.        Chloride 108 mmol/L      CO2 11.6 mmol/L      Calcium 9.7 mg/dL      Total Protein 7.7 g/dL      Albumin 4.4 g/dL      ALT (SGPT) 11 U/L      AST (SGOT) 15 U/L      Alkaline Phosphatase 172 U/L      Total Bilirubin <0.2 mg/dL      Globulin 3.3 gm/dL      A/G Ratio 1.3 g/dL      BUN/Creatinine Ratio 24.3     Anion Gap 15.4 mmol/L      eGFR 37.0 mL/min/1.73     Narrative:      GFR Normal >60  Chronic Kidney Disease <60  Kidney Failure <15      CBC & Differential [[id number]]  (Abnormal) Collected: 07/13/24 1921    Specimen: Blood Updated: 07/13/24 1952    Narrative:      The following orders were created for panel order CBC & Differential.  Procedure                               Abnormality         Status                     ---------                               -----------         ------                     CBC Auto Differential[[id number]]        Abnormal            Final result               Scan Slide[[id number]]                                       Final result                  Please view results for these tests on the individual orders.    Scan Slide [[id number]] Collected: 07/13/24 1921    Specimen: Blood Updated: 07/13/24 1952     Macrocytes Slight/1+     WBC Morphology Normal     Platelet Morphology Normal    Lipase [[id number]]  (Abnormal) Collected: 07/13/24 1921    Specimen: Blood Updated: 07/13/24 1946     Lipase 166 U/L     Lavender Top [[id number]] Collected: 07/13/24 1921    Specimen: Blood Updated: 07/13/24 1931     Extra Tube hold for add-on     Comment: Auto resulted       Light Blue Top [[id number]] Collected: 07/13/24 1921    Specimen: Blood Updated: 07/13/24 1931     Extra Tube Hold for add-ons.     Comment: Auto resulted       Green Top (Gel) [[id number]] Collected: 07/13/24 1921    Specimen: Blood Updated: 07/13/24 1931     Extra Tube Hold for add-ons.     Comment: Auto resulted.       CBC Auto Differential [[id number]]  (Abnormal) Collected: 07/13/24 1921    Specimen: Blood Updated: 07/13/24 1930     WBC 6.62 10*3/mm3      RBC 3.39 10*6/mm3      Hemoglobin 12.0 g/dL      Hematocrit 38.1 %      .4 fL      MCH 35.4 pg      MCHC 31.5 g/dL      RDW 15.1 %      RDW-SD 63.6 fl      MPV 10.2 fL      Platelets 237 10*3/mm3      Neutrophil % 57.6 %      Lymphocyte % 30.1 %      Monocyte % 8.2 %      Eosinophil % 2.0 %      Basophil % 0.9 %      Immature Grans % 1.2 %      Neutrophils, Absolute 3.82 10*3/mm3      Lymphocytes, Absolute 1.99 10*3/mm3      Monocytes, Absolute 0.54 10*3/mm3      Eosinophils, Absolute 0.13 10*3/mm3      Basophils, Absolute 0.06 10*3/mm3      Immature Grans, Absolute 0.08 10*3/mm3      nRBC 0.0 /100 WBC           Imaging Results (Last 24 Hours)       ** No results found for the last 24 hours. **          Physician Progress Notes (last 24 hours)  Notes from 07/13/24 0958 through 07/14/24 0958   No notes of this type exist for this encounter.       Consult Notes (last 24 hours)  Notes from 07/13/24 0958 through 07/14/24 0958   No notes of this type  exist for this encounter.

## 2024-07-14 NOTE — ED PROVIDER NOTES
EMERGENCY DEPARTMENT ENCOUNTER    Pt Name: Tasha Yarbrough  MRN: [id number]  Pt :   1955  Room Number:    Date of encounter:  2024  PCP: Terrence Baldwin MD  ED Provider: Juan Luis Martins MD    Historian: Patient      HPI:  Chief Complaint   Patient presents with    Abdominal Pain    Nausea          Context: Tasha Yarbrough is a 69 y.o. female who presents to the ED c/o abdominal pain, nausea, vomiting.  Patient has a history of chronic pancreatitis and feels like she is having exacerbation of her pancreatitis.  Reports the pain is in the upper abdomen.  Denies any blood in her vomit.  Symptoms present for about the past 3 days.      PAST MEDICAL HISTORY  Past Medical History:   Diagnosis Date    Hypertension     Impaired functional mobility, balance, gait, and endurance     Impaired mobility     Injury of back     Pancreatitis          PAST SURGICAL HISTORY  Past Surgical History:   Procedure Laterality Date    ABDOMINAL SURGERY      COLON SURGERY      COLONOSCOPY      ENDOSCOPY N/A 2024    Procedure: ESOPHAGOGASTRODUODENOSCOPY WITH BIOPSY;  Surgeon: Jairo Negro MD;  Location: [facility];  Service: Gastroenterology;  Laterality: N/A;    TUBAL ABDOMINAL LIGATION           FAMILY HISTORY  Family History   Problem Relation Age of Onset    Kidney disease Mother     Emphysema Mother     Heart disease Father     Lung cancer Sister     Heart disease Paternal Uncle          SOCIAL HISTORY  Social History     Socioeconomic History    Marital status:    Tobacco Use    Smoking status: Every Day     Current packs/day: 1.00     Types: Cigarettes    Smokeless tobacco: Never   Vaping Use    Vaping status: Never Used   Substance and Sexual Activity    Alcohol use: Never    Drug use: Never    Sexual activity: Defer         ALLERGIES  Rocephin [ceftriaxone], Ciprofloxacin, Codeine, and Pineapple        REVIEW OF SYSTEMS  Review of Systems   Constitutional:  Negative for chills and fever.    HENT:  Negative for sore throat and trouble swallowing.    Eyes:  Negative for pain and redness.   Respiratory:  Negative for cough and shortness of breath.    Cardiovascular:  Negative for chest pain and leg swelling.   Gastrointestinal:  Positive for abdominal pain, nausea and vomiting.   Genitourinary:  Negative for dysuria and urgency.   Musculoskeletal:  Negative for back pain and neck pain.   Skin:  Negative for rash and wound.   Neurological:  Negative for dizziness and weakness.        All systems reviewed and negative except for those discussed in HPI.       PHYSICAL EXAM    I have reviewed the triage vital signs and nursing notes.    ED Triage Vitals [07/13/24 1833]   Temp Heart Rate Resp BP SpO2   98.2 °F (36.8 °C) 88 17 121/79 96 %      Temp src Heart Rate Source Patient Position BP Location FiO2 (%)   Oral Monitor Sitting Right arm --       Physical Exam  Constitutional:       Appearance: Normal appearance. She is ill-appearing.   HENT:      Head: Normocephalic and atraumatic.      Right Ear: External ear normal.      Left Ear: External ear normal.      Nose: Nose normal.      Mouth/Throat:      Mouth: Mucous membranes are moist.      Pharynx: Oropharynx is clear.   Eyes:      Extraocular Movements: Extraocular movements intact.      Conjunctiva/sclera: Conjunctivae normal.      Pupils: Pupils are equal, round, and reactive to light.   Cardiovascular:      Rate and Rhythm: Normal rate and regular rhythm.      Pulses:           Radial pulses are 2+ on the right side and 2+ on the left side.   Pulmonary:      Effort: Pulmonary effort is normal.      Breath sounds: Normal breath sounds.   Abdominal:      General: There is no distension.      Palpations: Abdomen is soft.      Tenderness: There is abdominal tenderness in the epigastric area.   Musculoskeletal:         General: No tenderness or deformity. Normal range of motion.      Cervical back: Normal range of motion and neck supple.      Right lower  leg: No edema.      Left lower leg: No edema.   Skin:     General: Skin is warm and dry.      Capillary Refill: Capillary refill takes less than 2 seconds.   Neurological:      General: No focal deficit present.      Mental Status: She is alert and oriented to person, place, and time.            LAB RESULTS  Recent Results (from the past 24 hour(s))   Comprehensive Metabolic Panel    Collection Time: 07/13/24  7:21 PM    Specimen: Blood   Result Value Ref Range    Glucose 75 65 - 99 mg/dL    BUN 37 (H) 8 - 23 mg/dL    Creatinine 1.52 (H) 0.57 - 1.00 mg/dL    Sodium 135 (L) 136 - 145 mmol/L    Potassium 6.2 (C) 3.5 - 5.2 mmol/L    Chloride 108 (H) 98 - 107 mmol/L    CO2 11.6 (L) 22.0 - 29.0 mmol/L    Calcium 9.7 8.6 - 10.5 mg/dL    Total Protein 7.7 6.0 - 8.5 g/dL    Albumin 4.4 3.5 - 5.2 g/dL    ALT (SGPT) 11 1 - 33 U/L    AST (SGOT) 15 1 - 32 U/L    Alkaline Phosphatase 172 (H) 39 - 117 U/L    Total Bilirubin <0.2 0.0 - 1.2 mg/dL    Globulin 3.3 gm/dL    A/G Ratio 1.3 g/dL    BUN/Creatinine Ratio 24.3 7.0 - 25.0    Anion Gap 15.4 (H) 5.0 - 15.0 mmol/L    eGFR 37.0 (L) >60.0 mL/min/1.73   Lipase    Collection Time: 07/13/24  7:21 PM    Specimen: Blood   Result Value Ref Range    Lipase 166 (H) 13 - 60 U/L   Green Top (Gel)    Collection Time: 07/13/24  7:21 PM   Result Value Ref Range    Extra Tube Hold for add-ons.    Lavender Top    Collection Time: 07/13/24  7:21 PM   Result Value Ref Range    Extra Tube hold for add-on    Light Blue Top    Collection Time: 07/13/24  7:21 PM   Result Value Ref Range    Extra Tube Hold for add-ons.    CBC Auto Differential    Collection Time: 07/13/24  7:21 PM    Specimen: Blood   Result Value Ref Range    WBC 6.62 3.40 - 10.80 10*3/mm3    RBC 3.39 (L) 3.77 - 5.28 10*6/mm3    Hemoglobin 12.0 12.0 - 15.9 g/dL    Hematocrit 38.1 34.0 - 46.6 %    .4 (H) 79.0 - 97.0 fL    MCH 35.4 (H) 26.6 - 33.0 pg    MCHC 31.5 31.5 - 35.7 g/dL    RDW 15.1 12.3 - 15.4 %    RDW-SD 63.6 (H) 37.0  - 54.0 fl    MPV 10.2 6.0 - 12.0 fL    Platelets 237 140 - 450 10*3/mm3    Neutrophil % 57.6 42.7 - 76.0 %    Lymphocyte % 30.1 19.6 - 45.3 %    Monocyte % 8.2 5.0 - 12.0 %    Eosinophil % 2.0 0.3 - 6.2 %    Basophil % 0.9 0.0 - 1.5 %    Immature Grans % 1.2 (H) 0.0 - 0.5 %    Neutrophils, Absolute 3.82 1.70 - 7.00 10*3/mm3    Lymphocytes, Absolute 1.99 0.70 - 3.10 10*3/mm3    Monocytes, Absolute 0.54 0.10 - 0.90 10*3/mm3    Eosinophils, Absolute 0.13 0.00 - 0.40 10*3/mm3    Basophils, Absolute 0.06 0.00 - 0.20 10*3/mm3    Immature Grans, Absolute 0.08 (H) 0.00 - 0.05 10*3/mm3    nRBC 0.0 0.0 - 0.2 /100 WBC   Scan Slide    Collection Time: 07/13/24  7:21 PM    Specimen: Blood   Result Value Ref Range    Macrocytes Slight/1+ None Seen    WBC Morphology Normal Normal    Platelet Morphology Normal Normal   Lactic Acid, Plasma    Collection Time: 07/13/24  7:50 PM    Specimen: Blood   Result Value Ref Range    Lactate 0.7 0.5 - 2.0 mmol/L   Basic Metabolic Panel    Collection Time: 07/13/24  8:18 PM    Specimen: Blood   Result Value Ref Range    Glucose 80 65 - 99 mg/dL    BUN 37 (H) 8 - 23 mg/dL    Creatinine 1.47 (H) 0.57 - 1.00 mg/dL    Sodium 132 (L) 136 - 145 mmol/L    Potassium 6.3 (C) 3.5 - 5.2 mmol/L    Chloride 108 (H) 98 - 107 mmol/L    CO2 10.1 (L) 22.0 - 29.0 mmol/L    Calcium 9.4 8.6 - 10.5 mg/dL    BUN/Creatinine Ratio 25.2 (H) 7.0 - 25.0    Anion Gap 13.9 5.0 - 15.0 mmol/L    eGFR 38.5 (L) >60.0 mL/min/1.73   [facility]    Collection Time: 07/13/24  8:23 PM   Result Value Ref Range    Extra Tube Hold for add-ons.    Urinalysis With Microscopic If Indicated (No Culture) - Urine, Clean Catch    Collection Time: 07/13/24  9:01 PM    Specimen: Urine, Clean Catch   Result Value Ref Range    Color, UA Yellow Yellow, Straw    Appearance, UA Clear Clear    pH, UA <=5.0 5.0 - 8.0    Specific Gravity, UA 1.028 1.005 - 1.030    Glucose, UA Negative Negative    Ketones, UA Negative Negative    Bilirubin, UA  Negative Negative    Blood, UA Negative Negative    Protein, UA 30 mg/dL (1+) (A) Negative    Leuk Esterase, UA Negative Negative    Nitrite, UA Negative Negative    Urobilinogen, UA 0.2 E.U./dL 0.2 - 1.0 E.U./dL   Urinalysis, Microscopic Only - Urine, Clean Catch    Collection Time: 07/13/24  9:01 PM    Specimen: Urine, Clean Catch   Result Value Ref Range    RBC, UA None Seen None Seen, 0-2 /HPF    WBC, UA None Seen None Seen, 0-2 /HPF    Bacteria, UA Trace (A) None Seen /HPF    Squamous Epithelial Cells, UA 3-6 (A) None Seen, 0-2 /HPF    Hyaline Casts, UA 0-2 None Seen /LPF    Methodology Manual Light Microscopy        If labs were ordered, I independently reviewed the results and considered them in treating the patient.        RADIOLOGY  No Radiology Exams Resulted Within Past 24 Hours        PROCEDURES    Critical Care    Performed by: Juan Luis Martins MD  Authorized by: Lacey Ballesteros DO    Critical care provider statement:     Critical care time (minutes):  37    Critical care time was exclusive of:  Separately billable procedures and treating other patients    Critical care was necessary to treat or prevent imminent or life-threatening deterioration of the following conditions:  Metabolic crisis and endocrine crisis    Critical care was time spent personally by me on the following activities:  Ordering and performing treatments and interventions, ordering and review of laboratory studies, ordering and review of radiographic studies, pulse oximetry, re-evaluation of patient's condition, discussions with consultants, blood draw for specimens, evaluation of patient's response to treatment and examination of patient    I assumed direction of critical care for this patient from another provider in my specialty: no      Care discussed with: admitting provider        Interpretations    O2 Sat: The patients oxygen saturation was 96% on Room Air.  This was independently interpreted by me as  Normal    EKG: I reviewed and independently interpreted the EKG as sinus rhythm at 75 bpm, normal axis, normal intervals, no ST elevation, no T wave inversions    Cardiac Monitoring: I reviewed and independently interpreted the Rhythm Strip as Normal Sinus rhythm rate of 72      MEDICATIONS GIVEN IN ER    Medications   sodium chloride 0.9 % flush 10 mL (has no administration in time range)   sodium bicarbonate 8.4 % 150 mEq in dextrose (D5W) 5 % 1,000 mL infusion (greater than 100 mEq) (has no administration in time range)   sodium chloride 0.9 % bolus 1,000 mL (1,000 mL Intravenous New Bag 7/13/24 2146)   HYDROmorphone (DILAUDID) injection 1 mg (1 mg Intravenous Given 7/13/24 1931)   ondansetron (ZOFRAN) injection 4 mg (4 mg Intravenous Given 7/13/24 1929)   famotidine (PEPCID) injection 20 mg (20 mg Intravenous Given 7/13/24 1926)   calcium gluconate 1000 Mg/50ml 0.675% NaCl IV SOLN (1,000 mg Intravenous New Bag 7/13/24 2111)   insulin regular (humuLIN R,novoLIN R) injection 10 Units (10 Units Intravenous Given 7/13/24 2109)   dextrose (D50W) (25 g/50 mL) IV injection 25 g (25 g Intravenous Given 7/13/24 2102)   sodium zirconium cyclosilicate (LOKELMA) packet 10 g (10 g Oral Given 7/13/24 2113)         MEDICAL DECISION MAKING, PROGRESS, and CONSULTS    All labs, if obtained, have been independently reviewed by me.  All radiology studies, if obtained, have been reviewed by me and the radiologist dictating the report.  All EKG's, if obtained, have been independently viewed and interpreted by me      Discussion below represents my analysis of pertinent findings related to patient's condition, differential diagnosis, treatment plan and final disposition.      Differential diagnosis:    69-year-old female presented ED with complaint of abdominal pain, nausea, vomiting.  The patient with history of chronic pancreatitis, suspect exacerbation of chronic pancreatitis.  Patient was quite uncomfortable appearing, she had  multiple CT scans we will try to hold off on CTs, will obtain labs, including lipase, lactic acid, provide IV fluids, pain medication, nausea medication    Additional Sources:  External (non-ED) record review:  Recent discharge summary from [facility] after admission for pancreatitis 6/23/2024      Orders placed during this visit:  Orders Placed This Encounter   Procedures    Critical Care    [place] Draw    Comprehensive Metabolic Panel    Lipase    Urinalysis With Microscopic If Indicated (No Culture) - Urine, Clean Catch    Lactic Acid, Plasma    CBC Auto Differential    Scan Slide    Basic Metabolic Panel    Urinalysis, Microscopic Only - Urine, Clean Catch    NPO Diet NPO Type: Strict NPO    Undress & Gown    Inpatient Nephrology Consult    POC Glucose Q1H    ECG 12 Lead Rhythm Change    Insert Peripheral IV    Inpatient Admission    CBC & Differential    Green Top (Gel)    Lavender Top    Gold Top - SST    Light Blue Top         Additional orders considered but not ordered:  Imaging: CT scan, patient had many many CT scans given her relative stability, thought it could be held off at the moment    ED Course:    Consultants:  Hospitalist    ED Course as of 07/13/24 2147   Sat Jul 13, 2024 2041 Lactic Acid, Plasma [CS]   2146 Comprehensive Metabolic Panel(!!)  Patient with severe hyperkalemia, EKG ordered does not show any T wave peaks, will order hyperkalemia cocktail, and IV fluids [CS]   2147 Spoke to Dr. Fernandez, nephrology regarding the patient's hyperkalemia, agrees with fluids, hyperkalemia cocktail, suggest bicarb drip results been ordered. [CS]   2147 Consult the hospitalist agrees to evaluate the patient for admission I spoke directly to Dr. Ballesteros.  Patient aware and agreeable to plan for admission [CS]      ED Course User Index  [CS] Juan Luis Martins MD           After my consideration of clinical presentation and any laboratory/radiology studies obtained, I discussed the findings  with the patient/patient representative who is in agreement with the treatment plan and the final disposition. Risks and benefits of admission was discussed     AS OF 21:47 EDT VITALS:    BP - 113/75  HR - 72  TEMP - 98.2 °F (36.8 °C) (Oral)  O2 SATS - 96%    I reviewed the patients prescription monitoring report if available prior to discharge    DIAGNOSIS  Final diagnoses:   Hyperkalemia   Chronic pancreatitis, unspecified pancreatitis type   Acute renal failure superimposed on chronic kidney disease, unspecified acute renal failure type, unspecified CKD stage   Metabolic acidosis         DISPOSITION  ED Disposition       ED Disposition   Decision to Admit    Condition   --    Comment   Level of Care: Critical Care [6]   Diagnosis: Hyperkalemia [368743]   Admitting Physician: KATIE GREGORY [[id number]]   Attending Physician: KATIE GREGORY [[id number]]   Certification: I Certify That Inpatient Hospital Services Are Medically Necessary For Greater Than 2 Midnights                     Please note that portions of this document were completed with voice recognition software.        Juan Luis Martins MD  07/13/24 8457

## 2024-07-14 NOTE — PROGRESS NOTES
[facility]    PROGRESS NOTE    Name:  Tasha Yarbrough   Age:  69 y.o.  Sex:  female  :  1955  MRN:  [id number]   Visit Number:  [id number]  Admission Date:  2024  Date Of Service:  24  Primary Care Physician:  Terrence Baldwin MD     LOS: 1 day :    Chief Complaint:      Abdominal pain, nausea, chronic diarrhea     Subjective:    Patient was seen examined at bedside.  Patient resting comfortably in bed with no distress.  Currently reporting chronic pancreatitis epigastric pain that is severe and requesting medicine for pain.  She denies any nausea, reports antiemetics helping.  She reports tolerating p.o.  Currently on bicarb drip.  Vitals are stable and afebrile.  No other complaints.  Discussed with nursing.    Hospital Course:    69-year-old with a history of hypertension, recurrent pancreatitis, CKD stage III, chronic diarrhea, chronic tobacco abuse, who presented from home with multiple complaints.  Patient known to me from prior hospitalizations due to recurrent pancreatitis.  Had recently been evaluated at [facility] for pancreatitis where she was treated and discharged about 2 weeks ago.  She is due for follow-up with specialty in August.  She noted increased pain over the last 2 days with associated nausea.  She has chronic diarrhea.  No fevers or chills.  Feeling hungry currently at time of visit.  No alcohol use.  Continues to smoke unfortunately.  Follows with Dr. Fleming locally.     In the ER the patient was hemodynamically stable.  Her labs were indicative of metabolic acidosis with anion gap of 16, CO2 of 11.  Potassium is elevated at 6.2.  Show lipase of 166.  Her CBC was unremarkable.  Urinalysis unremarkable.  Due to recurrent nature of pancreatitis, defer any CT imaging.  She was treated for hyperkalemia, Dr. Fernandez consult placed on sodium bicarb and drip.  Admitted to the ICU.    Review of Systems:     All systems were reviewed and negative  except as mentioned in subjective, assessment and plan.    Vital Signs:    Temp:  [97.4 °F (36.3 °C)-98.2 °F (36.8 °C)] 97.7 °F (36.5 °C)  Heart Rate:  [63-92] 70  Resp:  [14-18] 16  BP: ()/(54-85) 118/76    Intake and output:    I/O last 3 completed shifts:  In: 802 [I.V.:752; IV Piggyback:50]  Out: 200 [Urine:200]  I/O this shift:  In: 240 [P.O.:240]  Out: -     Physical Examination:    General Appearance:  Alert and cooperative.  No acute distress.   Head:  Atraumatic and normocephalic.   Eyes: Conjunctivae and sclerae normal, no icterus. No pallor.   Throat: No oral lesions, no thrush, oral mucosa moist.   Neck: Supple, trachea midline, no thyromegaly.   Lungs:   Breath sounds heard bilaterally equally.  No wheezing or crackles. No Pleural rub or bronchial breathing.   Heart:  Normal S1 and S2, no murmur, no gallop, no rub. No JVD.   Abdomen:   Normal bowel sounds, no masses, no organomegaly. Soft, epigastric tenderness, nondistended, no rebound tenderness.   Extremities: Supple, no edema, no cyanosis, no clubbing.   Skin: No bleeding or rash.   Neurologic: Alert and oriented x 3. No facial asymmetry. Moves all four limbs. No tremors.      Laboratory results:    Results from last 7 days   Lab Units 07/14/24  0602 07/14/24  0158 07/13/24 2018 07/13/24  1921   SODIUM mmol/L 140 136 132* 135*   POTASSIUM mmol/L 5.1 5.5* 6.3* 6.2*   CHLORIDE mmol/L 110* 111* 108* 108*   CO2 mmol/L 18.7* 12.2* 10.1* 11.6*   BUN mg/dL 29* 32* 37* 37*   CREATININE mg/dL 1.16* 1.27* 1.47* 1.52*   CALCIUM mg/dL 9.1 9.3 9.4 9.7   BILIRUBIN mg/dL  --   --   --  <0.2   ALK PHOS U/L  --   --   --  172*   ALT (SGPT) U/L  --   --   --  11   AST (SGOT) U/L  --   --   --  15   GLUCOSE mg/dL 84 84 80 75     Results from last 7 days   Lab Units 07/14/24  0602 07/13/24  1921   WBC 10*3/mm3  --  6.62   HEMOGLOBIN g/dL 11.2* 12.0   HEMATOCRIT % 36.4 38.1   PLATELETS 10*3/mm3  --  237                 Recent Labs     04/21/24  0015  04/21/24  0919 05/31/24  1126   PHART 7.158* 7.199* 7.230*   [id number] 26.4* 25.5* 26.8*   PO2ART 104.0* 97.9 96.6   [id number] 9.4* 9.9* 11.2*   BASEEXCESS -17.9* -16.6* -14.9*      I have reviewed the patient's laboratory results.    Radiology results:    No radiology results from the last 24 hrs  I have reviewed the patient's radiology reports.    Medication Review:     I have reviewed the patient's active and prn medications.     Problem List:      Hyperkalemia      Assessment:    Acute on chronic pancreatitis, POA  Dehydration  Metabolic acidosis  Chronic diarrhea  Hyperkalemia  Chronic kidney disease stage III  Chronic tobacco abuse    Plan:    Pancreatitis:  Patient with multiple bouts of chronic pancreatitis leading to significant dehydration and chronic nausea.  Underlying etiology unknown.  She is due to follow-up with  in August.  Has seen Dr. Fleming locally.  Previously underwent cholecystectomy followed by ERCP.  Has been recommended to quit smoking.  Recent workup at  also demonstrated pancreatic insufficiency and she is supposed to be on Creon now.  She reports that she was prescribed Creon however she misses some doses at times.  Will advance diet as tolerated, continue with fluids and pain control.     Dehydration/metabolic acidosis/hyperkalemia/renal failure:  Placed on sodium bicarb drip per Dr. Fernandez.  Will repeat BMP.  Treated for hyperkalemia in the emergency room already.  Monitor urine output.  EKG stable.  Discussed with Dr. Fernandez, will continue with bicarb drip for now.     Chronic diarrhea:  Combination of GI losses likely contributing to the metabolic acidosis.  She may need to be on antidiarrheal therapy upon discharge to help her.  No evidence of recurrent C. difficile at this time.     Further recommendations depend upon clinical course.  Plan of care discussed with the patient.  I have reviewed the copied text and it is accurate as of 7/14/2024    DVT Prophylaxis: Heparin  Code  Status: Full  Diet: Clear liquids  Discharge Plan: Likely discharge in 1 to 2 days.    Vivienne Roa MD  07/14/24  10:19 EDT    Dictated utilizing Dragon dictation.

## 2024-07-14 NOTE — CASE MANAGEMENT/SOCIAL WORK
Discharge Planning Assessment  [facility]     Patient Name: Tasha Yarbrough  MRN: [id number]  Today's Date: 7/14/2024    Admit Date: 7/13/2024    Plan: Home with family   Discharge Needs Assessment       Row Name 07/14/24 1613       Living Environment    People in Home child(debra), adult    Name(s) of People in Home Thai Yarbrough    Current Living Arrangements home    Potentially Unsafe Housing Conditions none    In the past 12 months has the electric, gas, oil, or water company threatened to shut off services in your home? No    Primary Care Provided by self    Provides Primary Care For no one    Family Caregiver if Needed child(debra), adult    Quality of Family Relationships involved    Able to Return to Prior Arrangements yes       Resource/Environmental Concerns    Resource/Environmental Concerns none    Transportation Concerns none       Transportation Needs    In the past 12 months, has lack of transportation kept you from medical appointments or from getting medications? no    In the past 12 months, has lack of transportation kept you from meetings, work, or from getting things needed for daily living? No       Food Insecurity    Within the past 12 months, you worried that your food would run out before you got the money to buy more. Never true    Within the past 12 months, the food you bought just didn't last and you didn't have money to get more. Never true       Transition Planning    Patient/Family Anticipates Transition to home with family    Transportation Anticipated family or friend will provide       Discharge Needs Assessment    Readmission Within the Last 30 Days no previous admission in last 30 days    Equipment Currently Used at Home cane, straight;commode;wheelchair;walker, rolling;shower chair    Anticipated Changes Related to Illness inability to care for self                   Discharge Plan       Row Name 07/14/24 1616       Plan    Plan Home with family    Patient/Family in  Agreement with Plan yes    Plan Comments Spoke to pt regarding discharge plans .Confirmed address Phone number and Primary provider as being correct on face sheet .Her two sons live with her She is independent  with ADLS .Has all equipment  needed Has transportation to the doctor .Has agreed to Meds to Bed Does not have a Health Surrogate is declining information  regarding this .Plan is to return  home .Did report  that the new medication  Creon is expensive gave her information on Medicaid  and Medicare extra Help                  Continued Care and Services - Admitted Since 7/13/2024    No active coordination exists for this encounter.       Selected Continued Care - Prior Encounters Includes continued care and service providers with selected services from prior encounters from 4/14/2024 to 7/14/2024      Discharged on 6/2/2024 Admission date: 5/30/2024 - Discharge disposition: Home or Self Care      Community Resources       Service Provider Selected Services Address Phone Fax Patient Preferred    [facility] [employer] [address] [address] [phone] -- --                             Demographic Summary       Row Name 07/14/24 1611       General Information    Admission Type inpatient    Arrived From emergency department    Referral Source admission list    Reason for Consult discharge planning    Preferred Language English                   Functional Status       Row Name 07/14/24 1612       Functional Status    Usual Activity Tolerance moderate       Physical Activity    On average, how many days per week do you engage in moderate to strenuous exercise (like a brisk walk)? 0 days    On average, how many minutes do you engage in exercise at this level? 0 min    Number of minutes of exercise per week 0       Functional Status, IADL    Medications independent    Meal Preparation assistive person    Housekeeping completely dependent    Laundry completely  dependent    Shopping completely dependent                   Psychosocial    No documentation.                  Abuse/Neglect    No documentation.                  Legal    No documentation.                  Substance Abuse    No documentation.                  Patient Forms    No documentation.                     Deborah Montes RN

## 2024-07-14 NOTE — CONSULTS
[facility]      Nephrology Consultation      Referring Provider:   Lacey Ballesteros,*    Reason for Consultation:  Acute kidney injury associated problems.  Hyperkalemia.      Subjective:  Chief complaint   Chief Complaint   Patient presents with    Abdominal Pain    Nausea     History of present illness:    Patient is 69-year-old female with multimedical problems including recurrent episodes of acute kidney injury as well as severe metabolic acidosis leading to hyper kalemia.  She has history of recurrent pancreatitis acute on chronic, hypertension, tobacco use and abuse, chronic pain, C. difficile colitis, he was recently discharged from [facility] for chronic pancreatitis and discharged about 2 weeks ago.  For the last 2 days she has been having persistent diarrhea and nausea.  She did mention that they started her on pancreatic enzymes, that is new to her and she forgets to take those medicines.  In the ER she was noted to be having worsening of her baseline chronic kidney disease stage III as well as severe metabolic acidosis along with hyperkalemia and was admitted for further evaluation and treatment.  He was appropriately treated with medications and IV bicarb was started with significant improvement of her potassium this morning.  Currently she is lying in the bed awake alert and interactive, denies having any chest pain or shortness of breath denies having any fever or chills.  I have reviewed labs/imaging/records from this hospitalization, including ER staff and admitting/attending physicians H/P's and progress notes to establish a comprehensive understanding of this patient's clinical hospital course, as well as to establish plan of care appropriately.     Past Medical History:   Diagnosis Date    COPD (chronic obstructive pulmonary disease)     Hypertension     Impaired functional mobility, balance, gait, and endurance     Impaired mobility     Injury of back      Migraines     Multiple gastric ulcers     Pancreatitis     UTI (urinary tract infection)        Past Surgical History:   Procedure Laterality Date    ABDOMINAL SURGERY      CHOLECYSTECTOMY      COLON SURGERY      COLON RUPTURED /  [id number] ?    COLONOSCOPY      ENDOSCOPY N/A 04/23/2024    Procedure: ESOPHAGOGASTRODUODENOSCOPY WITH BIOPSY;  Surgeon: Jairo Negro MD;  Location: [facility] ENDOSCOPY;  Service: Gastroenterology;  Laterality: N/A;    KNEE SURGERY Right     TOTAL REPLACEMENT    TUBAL ABDOMINAL LIGATION       Family History   Problem Relation Age of Onset    Kidney disease Mother     Emphysema Mother     Heart disease Father     Lung cancer Sister     Heart disease Paternal Uncle      negative h/o ESRD     Social History     Tobacco Use    Smoking status: Every Day     Current packs/day: 1.00     Types: Cigarettes    Smokeless tobacco: Never   Vaping Use    Vaping status: Some Days    Substances: Flavoring   Substance Use Topics    Alcohol use: Never    Drug use: Never     Home medications:   Prior to Admission Medications       Prescriptions Last Dose Informant Patient Reported? Taking?    famotidine (PEPCID) 20 MG tablet   Yes No    Take 1 tablet by mouth 2 (Two) Times a Day.    glucosamine-chondroitin 500-400 MG capsule capsule   Yes No    Take 1 capsule by mouth 2 (Two) Times a Day With Meals. Indications: Joint Damage causing Pain and Loss of Function    methocarbamol (ROBAXIN) 750 MG tablet   Yes No    Take 1 tablet by mouth 4 (Four) Times a Day As Needed for Muscle Spasms.    ondansetron ODT (ZOFRAN-ODT) 4 MG disintegrating tablet   No No    Place 1 tablet on the tongue Every 8 (Eight) Hours As Needed for Nausea or Vomiting.    ondansetron ODT (ZOFRAN-ODT) 4 MG disintegrating tablet   No No    Place 1 tablet on the tongue Every 8 (Eight) Hours As Needed for Nausea or Vomiting.    oxyCODONE (Roxicodone) 5 MG immediate release tablet Not Taking  No No    Take 1 tablet by mouth Every 4 (Four) Hours As  Needed for Moderate Pain.    Patient not taking:  Reported on 7/13/2024    promethazine (PHENERGAN) 25 MG tablet   Yes No    Take 1 tablet by mouth.    sertraline (ZOLOFT) 50 MG tablet   Yes No    Take 1 tablet by mouth Daily.    sodium bicarbonate 650 MG tablet   Yes No    Take 1 tablet by mouth 2 (Two) Times a Day.    sodium bicarbonate 650 MG tablet   No No    Take 1 tablet by mouth 2 (Two) Times a Day.    vitamin D3 125 MCG (5000 UT) capsule capsule   Yes No    Take 1 capsule by mouth Daily. Indications: Vitamin D Deficiency          Emergency department medications:   Medications   sodium chloride 0.9 % flush 10 mL (has no administration in time range)   sodium bicarbonate 8.4 % 150 mEq in dextrose (D5W) 5 % 1,000 mL infusion (greater than 100 mEq) (150 mEq Intravenous New Bag 7/13/24 2208)   dextrose (GLUTOSE) oral gel 15 g (has no administration in time range)   dextrose (D50W) (25 g/50 mL) IV injection 25 g (25 g Intravenous Given 7/13/24 2314)   glucagon (GLUCAGEN) injection 1 mg (has no administration in time range)   sertraline (ZOLOFT) tablet 50 mg (has no administration in time range)   nitroglycerin (NITROSTAT) SL tablet 0.4 mg (has no administration in time range)   sodium chloride 0.9 % flush 10 mL (10 mL Intravenous Given 7/13/24 2318)   sodium chloride 0.9 % flush 10 mL (has no administration in time range)   sodium chloride 0.9 % infusion 40 mL (has no administration in time range)   ondansetron ODT (ZOFRAN-ODT) disintegrating tablet 4 mg (has no administration in time range)     Or   ondansetron (ZOFRAN) injection 4 mg (has no administration in time range)   Morphine sulfate (PF) injection 2 mg (has no administration in time range)   sodium bicarbonate tablet 1,300 mg (1,300 mg Oral Given 7/14/24 0730)   heparin (porcine) 5000 UNIT/ML injection 5,000 Units (5,000 Units Subcutaneous Given 7/14/24 0012)   HYDROcodone-acetaminophen (NORCO) 7.5-325 MG per tablet 1 tablet (1 tablet Oral Given 7/14/24  "0621)   HYDROmorphone (DILAUDID) injection 1 mg (1 mg Intravenous Given 7/13/24 1931)   ondansetron (ZOFRAN) injection 4 mg (4 mg Intravenous Given 7/13/24 1929)   famotidine (PEPCID) injection 20 mg (20 mg Intravenous Given 7/13/24 1926)   calcium gluconate 1000 Mg/50ml 0.675% NaCl IV SOLN (0 mg Intravenous Stopped 7/13/24 2126)   insulin regular (humuLIN R,novoLIN R) injection 10 Units (10 Units Intravenous Given 7/13/24 2109)   dextrose (D50W) (25 g/50 mL) IV injection 25 g (25 g Intravenous Given 7/13/24 2102)   sodium zirconium cyclosilicate (LOKELMA) packet 10 g (10 g Oral Given 7/13/24 2113)   sodium chloride 0.9 % bolus 1,000 mL (1,000 mL Intravenous New Bag 7/13/24 2146)   HYDROmorphone (DILAUDID) injection 1 mg (1 mg Intravenous Given 7/13/24 2157)       Allergies:  Rocephin [ceftriaxone], Ciprofloxacin, Codeine, and Pineapple    Review of Systems  14 point review of system were done and are negative except as mentioned in the history of present illness and assessment and plan.      Physical Exam:  Objective:  Vital Signs  /76 (BP Location: Left arm)   Pulse 70   Temp 97.7 °F (36.5 °C) (Oral)   Resp 16   Ht 160 cm (63\")   Wt 58.4 kg (128 lb 12 oz)   SpO2 97%   BMI 22.81 kg/m²   Objective    No intake/output data recorded.    Intake/Output Summary (Last 24 hours) at 7/14/2024 0731  Last data filed at 7/14/2024 0655  Gross per 24 hour   Intake 802 ml   Output 200 ml   Net 602 ml        Physical Exam     Constitutional: Awake, alert  Eyes: sclerae anicteric, no conjunctival injection  HEENT: mucous membranes dry  Neck: Supple, no thyromegaly, no lymphadenopathy, trachea midline, No JVD  Respiratory: Decreased breath sounds all over the chest.  Cardiovascular: RRR, systolic murmurs, rubs, or gallops.  Gastrointestinal: Positive bowel sounds, obese, soft, nontender, nondistended  Musculoskeletal: No edema, no clubbing or cyanosis  Psychiatric: Appropriate affect, cooperative  Neurologic: Oriented x " "3, moving all extremities, Cranial Nerves grossly intact, speech clear  Skin: warm and dry, no rashes            Results Review:   Results from last 7 days   Lab Units 07/14/24  0602 07/14/24  0158 07/13/24 2018 07/13/24 1921   SODIUM mmol/L 140 136 132* 135*   POTASSIUM mmol/L 5.1 5.5* 6.3* 6.2*   CHLORIDE mmol/L 110* 111* 108* 108*   CO2 mmol/L 18.7* 12.2* 10.1* 11.6*   BUN mg/dL 29* 32* 37* 37*   CREATININE mg/dL 1.16* 1.27* 1.47* 1.52*   CALCIUM mg/dL 9.1 9.3 9.4 9.7   ALBUMIN g/dL  --   --   --  4.4   BILIRUBIN mg/dL  --   --   --  <0.2   ALK PHOS U/L  --   --   --  172*   ALT (SGPT) U/L  --   --   --  11   AST (SGOT) U/L  --   --   --  15   GLUCOSE mg/dL 84 84 80 75     Estimated Creatinine Clearance: 42.2 mL/min (A) (by C-G formula based on SCr of 1.16 mg/dL (H)).          Results from last 7 days   Lab Units 07/14/24 0602 07/13/24 1921   WBC 10*3/mm3  --  6.62   HEMOGLOBIN g/dL 11.2* 12.0   PLATELETS 10*3/mm3  --  237         Brief Urine Lab Results  (Last result in the past 365 days)        Color   Clarity   Blood   Leuk Est   Nitrite   Protein   CREAT   Urine HCG        07/13/24 2101 Yellow   Clear   Negative   Negative   Negative   30 mg/dL (1+)                 No results found for: \"UTPCR\"  Imaging Results (Last 24 Hours)       ** No results found for the last 24 hours. **          heparin (porcine), 5,000 Units, Subcutaneous, Q12H  sertraline, 50 mg, Oral, Daily  sodium bicarbonate, 1,300 mg, Oral, 4x Daily  sodium chloride, 10 mL, Intravenous, Q12H      sodium bicarbonate, 150 mEq, Last Rate: 150 mEq (07/13/24 2208)        Assessment/Plan:      Hyperkalemia    Acute kidney injury: Patient appears to have significant worsening of her baseline chronic kidney disease, baseline GFR around 52 mL per minute.  It has been gradually worsening, most likely secondary to hemodynamic issues and low volume.   Significant metabolic acidosis along with hyperkalemia noted which has been improving.   Metabolic " acidosis: Since started the bicarb serum bicarb is slightly better, she had a high potassium, responded to routine treatment.  Serum bicarb is gradually improving since she has been on bicarb drip as well as oral supplements.  Chronic kidney disease stage IIIa: Patient is not aware of having abnormal renal function but has been noted in the previous blood work.  She was supposed to follow-up with me in the office but has never made it.  Acute pancreatitis: Likely will need significant amount of fluid she is already getting bicarb at 100 cc an hour.    Chronic pancreatitis: Known to have chronic pancreatitis with pseudocyst.  Hypertension: Watch closely will consider starting medications if there is persistent blood pressure greater than 140 systolic.      Risk and complexity: High       Plan:  Continue with bicarb drip will decrease rate to 75 cc an will continue with oral supplements.  Renal function continues to improve getting close to her baseline.  Potassium this morning is back to normal.  Continue with rest of the current treatment plan and surveillance labs.  Details were discussed with the patient no family in the room.    Details were also discussed with the hospitalist service.   Further recommendations will depend on clinical course of the patient during the current hospitalization.    I also discussed the details with the nursing staff.  Rest as ordered.    In closing, I sincerely appreciate opportunity to participate in care of this patient. If I can be of any further assistance with the management of this patient, please don’t hesitate to contact me.    Nito Fernandez MD, WINNIE    07/14/24  07:31 EDT    Dictated using Dragon.

## 2024-07-15 ENCOUNTER — READMISSION MANAGEMENT (OUTPATIENT)
Dept: CALL CENTER | Facility: HOSPITAL | Age: 69
End: 2024-07-15
Payer: MEDICARE

## 2024-07-15 VITALS
RESPIRATION RATE: 18 BRPM | DIASTOLIC BLOOD PRESSURE: 68 MMHG | TEMPERATURE: 98.1 F | WEIGHT: 133.16 LBS | BODY MASS INDEX: 23.59 KG/M2 | HEIGHT: 63 IN | HEART RATE: 66 BPM | OXYGEN SATURATION: 97 % | SYSTOLIC BLOOD PRESSURE: 131 MMHG

## 2024-07-15 LAB
ALBUMIN SERPL-MCNC: 3.3 G/DL (ref 3.5–5.2)
ANION GAP SERPL CALCULATED.3IONS-SCNC: 8.5 MMOL/L (ref 5–15)
BUN SERPL-MCNC: 19 MG/DL (ref 8–23)
BUN/CREAT SERPL: 19 (ref 7–25)
CALCIUM SPEC-SCNC: 8.6 MG/DL (ref 8.6–10.5)
CHLORIDE SERPL-SCNC: 107 MMOL/L (ref 98–107)
CO2 SERPL-SCNC: 29.5 MMOL/L (ref 22–29)
CREAT SERPL-MCNC: 1 MG/DL (ref 0.57–1)
DEPRECATED RDW RBC AUTO: 59.7 FL (ref 37–54)
EGFRCR SERPLBLD CKD-EPI 2021: 61.1 ML/MIN/1.73
ERYTHROCYTE [DISTWIDTH] IN BLOOD BY AUTOMATED COUNT: 14.8 % (ref 12.3–15.4)
GLUCOSE BLDC GLUCOMTR-MCNC: 73 MG/DL (ref 70–130)
GLUCOSE BLDC GLUCOMTR-MCNC: 83 MG/DL (ref 70–130)
GLUCOSE SERPL-MCNC: 75 MG/DL (ref 65–99)
HCT VFR BLD AUTO: 30.9 % (ref 34–46.6)
HGB BLD-MCNC: 9.9 G/DL (ref 12–15.9)
MCH RBC QN AUTO: 35.1 PG (ref 26.6–33)
MCHC RBC AUTO-ENTMCNC: 32 G/DL (ref 31.5–35.7)
MCV RBC AUTO: 109.6 FL (ref 79–97)
PHOSPHATE SERPL-MCNC: 2.6 MG/DL (ref 2.5–4.5)
PLATELET # BLD AUTO: 164 10*3/MM3 (ref 140–450)
PMV BLD AUTO: 10.5 FL (ref 6–12)
POTASSIUM SERPL-SCNC: 4.3 MMOL/L (ref 3.5–5.2)
RBC # BLD AUTO: 2.82 10*6/MM3 (ref 3.77–5.28)
SODIUM SERPL-SCNC: 145 MMOL/L (ref 136–145)
WBC NRBC COR # BLD AUTO: 4.95 10*3/MM3 (ref 3.4–10.8)

## 2024-07-15 PROCEDURE — 99239 HOSP IP/OBS DSCHRG MGMT >30: CPT | Performed by: INTERNAL MEDICINE

## 2024-07-15 PROCEDURE — 25010000002 HEPARIN (PORCINE) PER 1000 UNITS: Performed by: FAMILY MEDICINE

## 2024-07-15 PROCEDURE — 80069 RENAL FUNCTION PANEL: CPT | Performed by: INTERNAL MEDICINE

## 2024-07-15 PROCEDURE — 85027 COMPLETE CBC AUTOMATED: CPT | Performed by: FAMILY MEDICINE

## 2024-07-15 PROCEDURE — 82948 REAGENT STRIP/BLOOD GLUCOSE: CPT | Performed by: FAMILY MEDICINE

## 2024-07-15 PROCEDURE — 82948 REAGENT STRIP/BLOOD GLUCOSE: CPT

## 2024-07-15 PROCEDURE — 25010000002 HYDROMORPHONE 1 MG/ML SOLUTION: Performed by: FAMILY MEDICINE

## 2024-07-15 PROCEDURE — 25010000002 ONDANSETRON PER 1 MG: Performed by: FAMILY MEDICINE

## 2024-07-15 RX ORDER — HYDROCODONE BITARTRATE AND ACETAMINOPHEN 10; 325 MG/1; MG/1
1 TABLET ORAL EVERY 6 HOURS PRN
Status: DISCONTINUED | OUTPATIENT
Start: 2024-07-15 | End: 2024-07-15 | Stop reason: HOSPADM

## 2024-07-15 RX ORDER — SODIUM BICARBONATE 650 MG/1
1300 TABLET ORAL 2 TIMES DAILY
Status: DISCONTINUED | OUTPATIENT
Start: 2024-07-15 | End: 2024-07-15 | Stop reason: HOSPADM

## 2024-07-15 RX ORDER — HYDROCODONE BITARTRATE AND ACETAMINOPHEN 10; 325 MG/1; MG/1
1 TABLET ORAL EVERY 6 HOURS PRN
Qty: 10 TABLET | Refills: 0 | Status: SHIPPED | OUTPATIENT
Start: 2024-07-15 | End: 2024-07-20

## 2024-07-15 RX ADMIN — ONDANSETRON 4 MG: 2 INJECTION INTRAMUSCULAR; INTRAVENOUS at 08:45

## 2024-07-15 RX ADMIN — HYDROMORPHONE HYDROCHLORIDE 0.5 MG: 1 INJECTION, SOLUTION INTRAMUSCULAR; INTRAVENOUS; SUBCUTANEOUS at 07:17

## 2024-07-15 RX ADMIN — HYDROCODONE BITARTRATE AND ACETAMINOPHEN 1 TABLET: 7.5; 325 TABLET ORAL at 07:17

## 2024-07-15 RX ADMIN — HYDROMORPHONE HYDROCHLORIDE 0.5 MG: 1 INJECTION, SOLUTION INTRAMUSCULAR; INTRAVENOUS; SUBCUTANEOUS at 01:25

## 2024-07-15 RX ADMIN — HYDROCODONE BITARTRATE AND ACETAMINOPHEN 1 TABLET: 7.5; 325 TABLET ORAL at 01:25

## 2024-07-15 RX ADMIN — SERTRALINE 50 MG: 50 TABLET, FILM COATED ORAL at 08:45

## 2024-07-15 RX ADMIN — HEPARIN SODIUM 5000 UNITS: 5000 INJECTION INTRAVENOUS; SUBCUTANEOUS at 08:45

## 2024-07-15 RX ADMIN — SODIUM BICARBONATE 650 MG TABLET 1300 MG: at 08:45

## 2024-07-15 RX ADMIN — Medication 10 ML: at 08:45

## 2024-07-15 RX ADMIN — HYDROMORPHONE HYDROCHLORIDE 0.5 MG: 1 INJECTION, SOLUTION INTRAMUSCULAR; INTRAVENOUS; SUBCUTANEOUS at 04:36

## 2024-07-15 NOTE — PROGRESS NOTES
[facility] Progress Note  [facility]. [place]        Patient Name: Tasha Yarbrough  : 1955  MRN: [id number]   LOS: 2 days    Patient Care Team:  Terrence Baldwin MD as PCP - General (Family Medicine)    Chief Complaint:    Chief Complaint   Patient presents with    Abdominal Pain    Nausea     Primary Care Physician:  Terrence Baldwin MD  Date of admission: 2024    Subjective     Interval History:   Follow-up acute kidney injury and hyperkalemia  Events noted from last 24 hours.    I reviewed the chart and other providers notes, labs and procedures done since my last note.  Patient feels better this morning.  She has some nausea this morning she thinks because she ate some sherbet.  She is also still having diarrhea.  She had some shortness of breath last night for a few minutes but she feels okay this morning.  Patient complains of bilateral rib pain worse on the left with deep inspiration.  She denies chest pain.      Review of Systems:   As noted above.    Objective     Vitals:   Temp:  [97.7 °F (36.5 °C)-99.2 °F (37.3 °C)] 98.3 °F (36.8 °C)  Heart Rate:  [65-96] 68  Resp:  [8-25] 16  BP: ()/(51-68) 96/55    Intake/Output Summary (Last 24 hours) at 7/15/2024 0722  Last data filed at 7/15/2024 0558  Gross per 24 hour   Intake 2371.95 ml   Output 2150 ml   Net 221.95 ml       Physical Exam:    General Appearance: alert, oriented x 3, no acute distress   Skin: warm and dry  HEENT: oral mucosa normal, nonicteric sclera  Neck: supple, no JVD  Lungs: CTA, decreased breath sounds  Heart: RRR, normal S1 and S2  Abdomen: obese, soft, tender to palpation, palpable ventral hernia, non distended and positive bowel sounds.  : no palpable bladder  Extremities: No BLE edema, no cyanosis or clubbing  Neuro: normal speech and mental status     Scheduled Meds:     Current Facility-Administered Medications   Medication Dose Route Frequency Provider Last Rate Last Admin     dextrose (D50W) (25 g/50 mL) IV injection 25 g  25 g Intravenous Q15 Min PRN Lacey Ballesteros, DO   25 g at 07/13/24 2314    dextrose (GLUTOSE) oral gel 15 g  15 g Oral Q15 Min PRN Lacey Ballesteros,         glucagon (GLUCAGEN) injection 1 mg  1 mg Intramuscular Q15 Min PRN Lacey Ballesteros, DO        heparin (porcine) 5000 UNIT/ML injection 5,000 Units  5,000 Units Subcutaneous Q12H Lacey Ballesteros, DO   5,000 Units at 07/14/24 2036    HYDROcodone-acetaminophen (NORCO) 7.5-325 MG per tablet 1 tablet  1 tablet Oral Q6H PRN Lacey Ballesteros DO   1 tablet at 07/15/24 0717    HYDROmorphone (DILAUDID) injection 0.5 mg  0.5 mg Intravenous Q3H PRN Lacey Ballesteros, DO   0.5 mg at 07/15/24 0717    nitroglycerin (NITROSTAT) SL tablet 0.4 mg  0.4 mg Sublingual Q5 Min PRN Lacey Ballesteros,         ondansetron ODT (ZOFRAN-ODT) disintegrating tablet 4 mg  4 mg Oral Q6H PRN Lacey Ballesteros DO        Or    ondansetron (ZOFRAN) injection 4 mg  4 mg Intravenous Q6H PRN Lacey Ballesteros, DO   4 mg at 07/14/24 2037    sertraline (ZOLOFT) tablet 50 mg  50 mg Oral Daily Lacey Ballesteros DO   50 mg at 07/14/24 0807    sodium bicarbonate tablet 1,300 mg  1,300 mg Oral 4x Daily Nito Fernandez MD, FASN   1,300 mg at 07/14/24 2036    sodium chloride 0.9 % flush 10 mL  10 mL Intravenous PRN Lacey Ballesteros,         sodium chloride 0.9 % flush 10 mL  10 mL Intravenous Q12H Lacey Ballesteros, DO   10 mL at 07/14/24 2037    sodium chloride 0.9 % flush 10 mL  10 mL Intravenous PRN Lacey Ballesteros, DO        sodium chloride 0.9 % infusion 40 mL  40 mL Intravenous PRN Lacey Ballesteros DO           heparin (porcine), 5,000 Units, Subcutaneous, Q12H  sertraline, 50 mg, Oral, Daily  sodium bicarbonate, 1,300 mg, Oral, 4x Daily  sodium chloride, 10 mL, Intravenous, Q12H        IV Meds:        Results Reviewed:   I have personally reviewed the results from  "the time of this admission to 7/15/2024 07:22 EDT     Results from last 7 days   Lab Units 07/15/24  0411 07/14/24  0602 07/14/24  0158 07/13/24 2018 07/13/24  1921   SODIUM mmol/L 145 140 136   < > 135*   POTASSIUM mmol/L 4.3 5.1 5.5*   < > 6.2*   CHLORIDE mmol/L 107 110* 111*   < > 108*   CO2 mmol/L 29.5* 18.7* 12.2*   < > 11.6*   BUN mg/dL 19 29* 32*   < > 37*   CREATININE mg/dL 1.00 1.16* 1.27*   < > 1.52*   CALCIUM mg/dL 8.6 9.1 9.3   < > 9.7   BILIRUBIN mg/dL  --   --   --   --  <0.2   ALK PHOS U/L  --   --   --   --  172*   ALT (SGPT) U/L  --   --   --   --  11   AST (SGOT) U/L  --   --   --   --  15   GLUCOSE mg/dL 75 84 84   < > 75    < > = values in this interval not displayed.       Estimated Creatinine Clearance: 50.6 mL/min (by C-G formula based on SCr of 1 mg/dL).    Results from last 7 days   Lab Units 07/15/24  0411   PHOSPHORUS mg/dL 2.6             Results from last 7 days   Lab Units 07/15/24  0411 07/14/24  0602 07/13/24  1921   WBC 10*3/mm3 4.95  --  6.62   HEMOGLOBIN g/dL 9.9* 11.2* 12.0   PLATELETS 10*3/mm3 164  --  237             Brief Urine Lab Results  (Last result in the past 365 days)        Color   Clarity   Blood   Leuk Est   Nitrite   Protein   CREAT   Urine HCG        07/13/24 2101 Yellow   Clear   Negative   Negative   Negative   30 mg/dL (1+)                   No results found for: \"UTPCR\"    Imaging Results (Last 24 Hours)       ** No results found for the last 24 hours. **                Assessment / Plan     ASSESSMENT:    Hyperkalemia  Acute kidney injury: Patient appears to have significant worsening of her baseline chronic kidney disease, baseline GFR around 52 mL per minute.  It is now improving, currently 61.1.  Significant metabolic acidosis along with hyperkalemia noted which has been improving.   Metabolic acidosis: Since started the bicarb serum bicarb is now slightly elevated at 29.5, she had a high potassium, responded to routine treatment, currently normal at 4.3.  " Serum bicarb is improved since she has been on bicarb drip as well as oral supplements.  Chronic kidney disease stage IIIa: Patient is not aware of having abnormal renal function but has been noted in the previous blood work.  She was supposed to follow-up with me in the office but has never made it.  Acute pancreatitis: Likely will need significant amount of fluid she is already getting bicarb at 100 cc an hour.  Rest of the treatment plan as per hospitalist service.  Chronic pancreatitis: Known to have chronic pancreatitis with pseudocyst.  She has not started her pancreatic enzymes since she has been in the hospital.  Hypertension: Watch closely will consider starting medications if there is persistent blood pressure greater than 140 systolic.        PLAN:  Stop bicarb drip.  Continue oral supplements.  Bicarb is now slightly elevated at 29.5.  Renal function has improved to baseline.  Potassium this morning is back to normal.  Details were discussed with the patient no family in the room.    Details were also discussed with the hospitalist service and or other providers as needed.   Continue with rest of the current treatment plan, and monitor with surveillance labs.  Further recommendations will depend on clinical course of the patient during the current hospitalization.   I have reviewed the copied text to this note, it was edited and the changes made as needed.  It is accurate to the point, when the note was signed today.     Thank you for involving us in the care of Tasha Yarbrough.  Please feel free to call with any questions.    Nito Fernandez MD, WINNIE  07/15/24  07:22 EDT    [facility]  [phone]      Part of this note may be an electronic transcription/translation of spoken language to printed text using the Dragon Dictation System.

## 2024-07-15 NOTE — CASE MANAGEMENT/SOCIAL WORK
Case Management Discharge Note      Final Note: Pt discharged home with son via private car. Pt declined home health services. Denies any needs at discharge.         Selected Continued Care - Discharged on 7/15/2024 Admission date: 7/13/2024 - Discharge disposition: Home or Self Care      Destination    No services have been selected for the patient.                Durable Medical Equipment    No services have been selected for the patient.                Dialysis/Infusion    No services have been selected for the patient.                Home Medical Care    No services have been selected for the patient.                Therapy    No services have been selected for the patient.                Community Resources    No services have been selected for the patient.                Community & DME    No services have been selected for the patient.                    Selected Continued Care - Prior Encounters Includes continued care and service providers with selected services from prior encounters from 4/14/2024 to 7/15/2024      Discharged on 6/2/2024 Admission date: 5/30/2024 - Discharge disposition: Home or Self Care      Community Resources       Service Provider Selected Services Address Phone Fax Patient Preferred    [facility] PATIENTS ONLY [employer] [facility] [employer] [address] [address] [phone] -- --                          Transportation Services  Private: Car    Final Discharge Disposition Code: 01 - home or self-care

## 2024-07-15 NOTE — OUTREACH NOTE
Prep Survey      Flowsheet Row Responses   [facility] facility patient discharged from? [facility]   Is LACE score < 7 ? No   Eligibility Readm Mgmt   Discharge diagnosis Hyperkalemia   Does the patient have one of the following disease processes/diagnoses(primary or secondary)? Other   Does the patient have Home health ordered? No   Is there a DME ordered? No   Prep survey completed? Yes            Aster CRABTREE - Registered Nurse

## 2024-07-15 NOTE — CONSULTS
"Dietitian Assessment    Patient Name: Tasha Yarbrough  YOB: 1955  MRN: [id number]  Admission date: 7/13/2024    Comment:        Clinical Nutrition Assessment      Reason for Assessment MST   H&P  Past Medical History:   Diagnosis Date    COPD (chronic obstructive pulmonary disease)     Hypertension     Impaired functional mobility, balance, gait, and endurance     Impaired mobility     Injury of back     Migraines     Multiple gastric ulcers     Pancreatitis     UTI (urinary tract infection)        Past Surgical History:   Procedure Laterality Date    ABDOMINAL SURGERY      CHOLECYSTECTOMY      COLON SURGERY      COLON RUPTURED /  [id number] ?    COLONOSCOPY      ENDOSCOPY N/A 04/23/2024    Procedure: ESOPHAGOGASTRODUODENOSCOPY WITH BIOPSY;  Surgeon: Jairo Negro MD;  Location: [facility] ENDOSCOPY;  Service: Gastroenterology;  Laterality: N/A;    KNEE SURGERY Right     TOTAL REPLACEMENT    TUBAL ABDOMINAL LIGATION              Current Problems        Encounter Information        Trending Narrative     7/15: Pt clear liquid x 2 days. PMH pancreatitis. Average PO intake 100%. Will add Boost Breeze daily to help meet estimated needs.      Anthropometrics        Current Height, Weight Height: 160 cm (63\")  Weight: 60.4 kg (133 lb 2.5 oz) (07/15/24 0400)   Trending Weight Hx     This admission:              PTA:     Wt Readings from Last 30 Encounters:   07/15/24 0400 60.4 kg (133 lb 2.5 oz)   07/13/24 2245 58.4 kg (128 lb 12 oz)   07/13/24 1833 59 kg (130 lb)   06/22/24 1908 62.6 kg (138 lb)   06/18/24 1728 62.6 kg (138 lb)   06/16/24 1605 54.2 kg (119 lb 7.8 oz)   06/02/24 0300 54.2 kg (119 lb 7.8 oz)   05/31/24 1505 58.1 kg (128 lb 1.4 oz)   05/30/24 1335 60.3 kg (133 lb)   05/22/24 0925 61.2 kg (135 lb)   05/14/24 0400 61.7 kg (136 lb 0.4 oz)   05/13/24 0300 60.5 kg (133 lb 6.1 oz)   05/12/24 0537 58.3 kg (128 lb 8.5 oz)   05/11/24 1727 58.1 kg (128 lb 1.4 oz)   04/28/24 1518 58.1 kg (128 lb)   04/24/24 " 0422 58.4 kg (128 lb 12 oz)   04/23/24 0257 58.2 kg (128 lb 4.9 oz)   04/22/24 0356 57 kg (125 lb 10.6 oz)   04/21/24 1456 58.8 kg (129 lb 10.1 oz)   04/20/24 2201 65.8 kg (145 lb)   04/10/24 0346 60 kg (132 lb 4.4 oz)   04/09/24 1300 60.8 kg (134 lb 0.6 oz)   04/08/24 1755 65.8 kg (145 lb)   04/06/24 1945 65.8 kg (145 lb)   03/28/24 0900 66.2 kg (146 lb)   03/23/24 1651 66.2 kg (145 lb 15.1 oz)   03/16/24 1353 66.2 kg (146 lb)   03/09/24 0243 66.2 kg (146 lb)   03/04/24 0415 67.8 kg (149 lb 7.6 oz)   03/03/24 0430 62.6 kg (138 lb 0.1 oz)   03/02/24 0057 62.6 kg (138 lb)   02/29/24 0339 62.7 kg (138 lb 3.7 oz)   02/27/24 0326 63.9 kg (140 lb 14 oz)   02/26/24 0302 62.4 kg (137 lb 9.1 oz)   02/24/24 2333 65.8 kg (145 lb)   09/25/23 0323 68.9 kg (151 lb 14.4 oz)   09/24/23 0336 68.5 kg (151 lb 0.2 oz)   09/23/23 0500 67.4 kg (148 lb 9.4 oz)   09/22/23 2345 66.8 kg (147 lb 4.3 oz)   09/22/23 1932 61.2 kg (135 lb)   09/02/23 1847 60.8 kg (134 lb)   08/18/22 2132 78.9 kg (174 lb)   08/08/22 0500 79.7 kg (175 lb 11.3 oz)   08/07/22 0536 77.4 kg (170 lb 10.2 oz)   08/06/22 2340 77 kg (169 lb 12.1 oz)   08/06/22 2019 76.7 kg (169 lb)   07/30/22 0441 79 kg (174 lb 2.6 oz)   07/29/22 0417 78.7 kg (173 lb 8 oz)   07/27/22 0353 78.9 kg (173 lb 15.1 oz)   07/26/22 1552 76.7 kg (169 lb)   05/06/21 1626 92.1 kg (203 lb)   10/04/20 0002 90.7 kg (200 lb)   08/15/20 1940 97.5 kg (215 lb)   05/23/20 1814 95.3 kg (210 lb)      BMI kg/m2 Body mass index is 23.59 kg/m².     Labs        Pertinent Labs     Results from last 7 days   Lab Units 07/15/24  0411 07/14/24  0602 07/14/24  0158 07/13/24 2018 07/13/24  1921   SODIUM mmol/L 145 140 136   < > 135*   POTASSIUM mmol/L 4.3 5.1 5.5*   < > 6.2*   CHLORIDE mmol/L 107 110* 111*   < > 108*   CO2 mmol/L 29.5* 18.7* 12.2*   < > 11.6*   BUN mg/dL 19 29* 32*   < > 37*   CREATININE mg/dL 1.00 1.16* 1.27*   < > 1.52*   CALCIUM mg/dL 8.6 9.1 9.3   < > 9.7   BILIRUBIN mg/dL  --   --   --   --  <0.2    ALK PHOS U/L  --   --   --   --  172*   ALT (SGPT) U/L  --   --   --   --  11   AST (SGOT) U/L  --   --   --   --  15   GLUCOSE mg/dL 75 84 84   < > 75    < > = values in this interval not displayed.       Results from last 7 days   Lab Units 07/15/24  0411   PHOSPHORUS mg/dL 2.6   HEMOGLOBIN g/dL 9.9*   HEMATOCRIT % 30.9*       Lab Results   Component Value Date    HGBA1C 4.3 06/23/2024            Medications       Scheduled Medications heparin (porcine), 5,000 Units, Subcutaneous, Q12H  sertraline, 50 mg, Oral, Daily  sodium bicarbonate, 1,300 mg, Oral, BID  sodium chloride, 10 mL, Intravenous, Q12H        Infusions       PRN Medications   dextrose    dextrose    glucagon (human recombinant)    HYDROcodone-acetaminophen    HYDROmorphone    nitroglycerin    ondansetron ODT **OR** ondansetron    sodium chloride    sodium chloride    sodium chloride     Physical Findings        Trending Physical   Appearance, NFPE    --  Edema  None noted     Bowel Function LBM: 7/13     Tubes Peripheral IV     Chewing/Swallowing WNL     Skin WNL       Estimated/Assessed Needs       Energy Requirements    EST Needs, Method, Wt used 4688-7438 kcal (25-30 kcal/kg CBW)       Protein Requirements    EST Needs, Method, Wt used 48 g pro (.8 g pro/kg CBW)       Fluid Requirements     Estimated Needs (mL/day) 9489-7881 mL       Current Nutrition Orders & Evaluation of Intake       Oral Nutrition     Food Allergies Pineapple    Current PO Diet Diet: Liquid; Clear Liquid; Fluid Consistency: Thin (IDDSI 0)   Supplement    PO Evaluation     Trending % PO Intake 100% x 2 meals       Nutrition Diagnosis         Nutrition Dx Problem 1 Altered GI function r/t pancreatitis AEB pt has chronic diarrhea and uses Creon.       Nutrition Dx Problem 2        Intervention Goal         Intervention Goal(s) Maintain CBW  PO intake meet >50% of estimated needs  Adhere to ONS     Nutrition Intervention        RD Action Will order Boost Breeze daily       Nutrition Prescription          Diet Prescription Clear liquid   Supplement Prescription Boost Breeze daily        Monitor/Evaluation        Monitor Per protocol, I&O, PO intake, Supplement intake, Pertinent labs, Weight, Skin status, GI status, Symptoms, POC/GOC, Swallow function, Hemodynamic stability     RD to f/up    Electronically signed by:  Monica Montano RD  07/15/24 07:30 EDT

## 2024-07-15 NOTE — PLAN OF CARE
Goal Outcome Evaluation:                                              Problem: Adult Inpatient Plan of Care  Goal: Plan of Care Review  Outcome: Unable to Meet, Plan Revised  Goal: Patient-Specific Goal (Individualized)  Outcome: Unable to Meet, Plan Revised  Goal: Absence of Hospital-Acquired Illness or Injury  Outcome: Unable to Meet, Plan Revised  Goal: Optimal Comfort and Wellbeing  Outcome: Unable to Meet, Plan Revised  Goal: Readiness for Transition of Care  Outcome: Unable to Meet, Plan Revised     Problem: Fall Injury Risk  Goal: Absence of Fall and Fall-Related Injury  Outcome: Unable to Meet, Plan Revised     Problem: Electrolyte Imbalance  Goal: Electrolyte Balance  Outcome: Unable to Meet, Plan Revised     Problem: Pain Acute  Goal: Acceptable Pain Control and Functional Ability  Outcome: Unable to Meet, Plan Revised     Problem: Skin Injury Risk Increased  Goal: Skin Health and Integrity  Outcome: Unable to Meet, Plan Revised

## 2024-07-15 NOTE — CASE MANAGEMENT/SOCIAL WORK
"DCP; Home when medically stable, likely today. Pt denies needs, refusing HH services states \"My sons help me with whatever I need.\" CM continues to follow.  "

## 2024-07-15 NOTE — DISCHARGE SUMMARY
[facility] HOSPITALIST   DISCHARGE SUMMARY      Name:  Tasha Yarbrough   Age:  69 y.o.  Sex:  female  :  1955  MRN:  [id number]   Visit Number:  [id number]    Admission Date:  2024  Date of Discharge:  7/15/2024  Primary Care Physician:  Terrence Baldwin MD      Discharge Diagnoses:     Acute on chronic pancreatitis, POA  Dehydration  Metabolic acidosis  Chronic diarrhea  Hyperkalemia  Chronic kidney disease stage III  Chronic tobacco abuse    Problem List:     Active Hospital Problems    Diagnosis  POA    **Hyperkalemia [E87.5]  Yes      Resolved Hospital Problems   No resolved problems to display.     Presenting Problem:    Chief Complaint   Patient presents with    Abdominal Pain    Nausea      Consults:     Consulting Physician(s)         Provider   Role Specialty     Nito Fernandez MD, WINNIE      Consulting Physician Nephrology     Vivienne Roa MD      Consulting Physician Hospitalist          Procedures Performed:        History of presenting illness/Hospital Course:    69-year-old with a history of hypertension, recurrent pancreatitis, CKD stage III, chronic diarrhea, chronic tobacco abuse, who presented from home with multiple complaints.  Patient known to me from prior hospitalizations due to recurrent pancreatitis.  Had recently been evaluated at [facility] for pancreatitis where she was treated and discharged about 2 weeks ago.  She is due for follow-up with specialty in August.  She noted increased pain over the last 2 days with associated nausea.  She has chronic diarrhea.  No fevers or chills.  Feeling hungry currently at time of visit.  No alcohol use.  Continues to smoke unfortunately.  Follows with Dr. Fleming locally.     In the ER the patient was hemodynamically stable.  Her labs were indicative of metabolic acidosis with anion gap of 16, CO2 of 11.  Potassium is elevated at 6.2.  Show lipase of 166.  Her CBC was unremarkable.  Urinalysis unremarkable.  Due  to recurrent nature of pancreatitis, defer any CT imaging.  She was treated for hyperkalemia, Dr. Fernandez consult placed on sodium bicarb and drip.  Admitted to the ICU.    Pancreatitis:  Patient with multiple bouts of chronic pancreatitis leading to significant dehydration and chronic nausea.  Underlying etiology unknown.  She is due to follow-up with  in August.  Has seen Dr. Fleming locally.  Previously underwent cholecystectomy followed by ERCP.  Has been recommended to quit smoking.  Recent workup at  also demonstrated pancreatic insufficiency and she is supposed to be on Creon now.  She reports that she was prescribed Creon however she misses some doses at times.    Metabolic acidosis resolved.  Bicarb drip discontinued.  Hyperkalemia resolved.  Diet advanced and patient tolerated GI soft food on day of discharge without difficulty.  Prescribed short course of oral pain medication.  Follow-up with GI at  and primary physician.       Vital Signs:    Temp:  [98.1 °F (36.7 °C)-99.2 °F (37.3 °C)] 98.1 °F (36.7 °C)  Heart Rate:  [65-88] 74  Resp:  [8-18] 18  BP: ()/(51-71) 106/65    Physical Exam:    General Appearance:  Alert and cooperative.    Head:  Atraumatic and normocephalic.   Eyes: Conjunctivae and sclerae normal, no icterus. No pallor.   Ears:  Ears with no abnormalities noted.   Throat: No oral lesions, no thrush, oral mucosa moist.   Neck: Supple, trachea midline, no thyromegaly.   Back:   No kyphoscoliosis present. No tenderness to palpation.   Lungs:   Breath sounds heard bilaterally equally.  No crackles or wheezing. No Pleural rub or bronchial breathing.   Heart:  Normal S1 and S2, no murmur, no gallop, no rub. No JVD.   Abdomen:   Normal bowel sounds, no masses, no organomegaly. Soft, nontender, nondistended, no rebound tenderness.   Extremities: Supple, no edema, no cyanosis, no clubbing.   Pulses: Pulses palpable bilaterally.   Skin: No bleeding or rash.   Neurologic: Alert and  oriented x 3. No facial asymmetry. Moves all four limbs. No tremors.     Pertinent Lab Results:     Results from last 7 days   Lab Units 07/15/24  0411 07/14/24  0602 07/14/24  0158 07/13/24 2018 07/13/24 1921   SODIUM mmol/L 145 140 136   < > 135*   POTASSIUM mmol/L 4.3 5.1 5.5*   < > 6.2*   CHLORIDE mmol/L 107 110* 111*   < > 108*   CO2 mmol/L 29.5* 18.7* 12.2*   < > 11.6*   BUN mg/dL 19 29* 32*   < > 37*   CREATININE mg/dL 1.00 1.16* 1.27*   < > 1.52*   CALCIUM mg/dL 8.6 9.1 9.3   < > 9.7   BILIRUBIN mg/dL  --   --   --   --  <0.2   ALK PHOS U/L  --   --   --   --  172*   ALT (SGPT) U/L  --   --   --   --  11   AST (SGOT) U/L  --   --   --   --  15   GLUCOSE mg/dL 75 84 84   < > 75    < > = values in this interval not displayed.     Results from last 7 days   Lab Units 07/15/24  0411 07/14/24  0602 07/13/24 1921   WBC 10*3/mm3 4.95  --  6.62   HEMOGLOBIN g/dL 9.9* 11.2* 12.0   HEMATOCRIT % 30.9* 36.4 38.1   PLATELETS 10*3/mm3 164  --  237                     Results from last 7 days   Lab Units 07/13/24  1921   LIPASE U/L 166*               Pertinent Radiology Results:    Imaging Results (All)       None            Echo:      Condition on Discharge:      Stable.    Code status during the hospital stay:    Code Status and Medical Interventions:   Ordered at: 07/13/24 2240     Code Status (Patient has no pulse and is not breathing):    CPR (Attempt to Resuscitate)     Medical Interventions (Patient has pulse or is breathing):    Full Support     Discharge Disposition:    Home or Self Care    Discharge Medications:       Discharge Medications        New Medications        Instructions Start Date   HYDROcodone-acetaminophen  MG per tablet  Commonly known as: NORCO   1 tablet, Oral, Every 6 Hours PRN             Changes to Medications        Instructions Start Date   sodium bicarbonate 650 MG tablet  What changed: Another medication with the same name was removed. Continue taking this medication, and follow  the directions you see here.   650 mg, Oral, 2 Times Daily             Continue These Medications        Instructions Start Date   famotidine 20 MG tablet  Commonly known as: PEPCID   20 mg, Oral, 2 Times Daily      glucosamine-chondroitin 500-400 MG capsule capsule   1 capsule, Oral, 2 Times Daily With Meals      methocarbamol 750 MG tablet  Commonly known as: ROBAXIN   750 mg, Oral, 4 Times Daily PRN      ondansetron ODT 4 MG disintegrating tablet  Commonly known as: ZOFRAN-ODT   4 mg, Translingual, Every 8 Hours PRN      ondansetron ODT 4 MG disintegrating tablet  Commonly known as: ZOFRAN-ODT   4 mg, Translingual, Every 8 Hours PRN      promethazine 25 MG tablet  Commonly known as: PHENERGAN   25 mg, Oral      sertraline 50 MG tablet  Commonly known as: ZOLOFT   50 mg, Oral, Daily      vitamin D3 125 MCG (5000 UT) capsule capsule   1 capsule, Oral, Daily             Stop These Medications      oxyCODONE 5 MG immediate release tablet  Commonly known as: Roxicodone            Discharge Diet:     Diet Instructions       Diet: Gastrointestinal Diets; Low Irritant; Regular (IDDSI 7); Thin (IDDSI 0)      Discharge Diet: Gastrointestinal Diets    Gastrointestinal Diet: Low Irritant    Texture: Regular (IDDSI 7)    Fluid Consistency: Thin (IDDSI 0)          Activity at Discharge:     Activity Instructions       Activity as Tolerated            Follow-up Appointments:    Additional Instructions for the Follow-ups that You Need to Schedule       Discharge Follow-up with PCP   As directed       Currently Documented PCP:    Terrence Baldwin MD    PCP Phone Number:    [phone]     Follow Up Details: 1 week               Follow-up Information       Nito Fernandez MD, WINNIE Follow up in 2 week(s).    Specialty: Nephrology  Contact information:  [address] [place] [address] [phone]               Terrence Baldwin MD .    Specialty: Family Medicine  Why: 1 week  Contact information:  [address] [place]  [address]  [facility] [address]  [phone]                           No future appointments.  Test Results Pending at Discharge:           Janak Damon DO  07/15/24  14:18 EDT    Time: I spent >30 minutes on this discharge activity which included: face-to-face encounter with the patient, reviewing the data in the system, coordination of the care with the nursing staff as well as consultants, documentation, and entering orders.     Dictated utilizing Dragon dictation.

## 2024-07-15 NOTE — PLAN OF CARE
Goal Outcome Evaluation:  Plan of Care Reviewed With: patient, son        Progress: improving     Bicarb drip complete, labs much improved.  Patient sill having significant pain.  Patient and family asking about getting the cost of creon reduced somehow, patient states that it is too expensive.  Patient and family are also asking about whipple procedure.

## 2024-07-16 NOTE — PAYOR COMM NOTE
"To:  Coretta  From: Mia Stallworth RN  Phone: [phone]  Fax: [phone]  NPI: [id number]  TIN: [id number]  Member ID: [id number]  MRN: [id number]    Jaymie Yarbrough (69 y.o. Female)       Date of Birth   1955    Social Security Number       Address   [address] [place] [address]    Home Phone   [phone]    MRN   [id number]       Jehovah's witness   None    Marital Status                               Admission Date   24    Admission Type   Emergency    Admitting Provider   Lacey Ballesteros DO    Attending Provider       Department, Room/Bed   [facility] INTENSIVE CARE, I       Discharge Date   7/15/2024    Discharge Disposition   Home or Self Care    Discharge Destination   Home                              Attending Provider: (none)   Allergies: Rocephin [Ceftriaxone], Ciprofloxacin, Codeine, Pineapple    Isolation: None   Infection: Other (24)   Code Status: Prior    Ht: 160 cm (63\")   Wt: 60.4 kg (133 lb 2.5 oz)    Admission Cmt: None   Principal Problem: Hyperkalemia [E87.5]                   Active Insurance as of 2024       Primary Coverage       Payor Plan Insurance Group Employer/Plan Group    ANTH MEDICARE REPLACEMENT ANTH MEDICARE ADVANTAGE [id number]       Payor Plan Address Payor Plan Phone Number Payor Plan Fax Number Effective Dates    [address] [phone]  2024 - None Entered    [facility] [id number]         Subscriber Name Subscriber Birth Date Member ID       JAYMIE YARBROUGH 1955 [id number]                     Emergency Contacts        (Rel.) Home Phone Work Phone Mobile Phone    EAMONANTONIO WORTHINGTON (Son) [phone] -- --    EAMONMONIE (Son) [phone] -- --                 Discharge Summary        Janak Damon DO at 07/15/24 1418              [facility] HOSPITALIST   DISCHARGE SUMMARY      Name:  Jaymie Yarbrough   Age:  69 y.o.  Sex:  female  :  1955  MRN:  [id number]   Visit " Number:  [id number]    Admission Date:  7/13/2024  Date of Discharge:  7/15/2024  Primary Care Physician:  Terrence Baldwin MD      Discharge Diagnoses:     Acute on chronic pancreatitis, POA  Dehydration  Metabolic acidosis  Chronic diarrhea  Hyperkalemia  Chronic kidney disease stage III  Chronic tobacco abuse    Problem List:     Active Hospital Problems    Diagnosis  POA    **Hyperkalemia [E87.5]  Yes      Resolved Hospital Problems   No resolved problems to display.     Presenting Problem:    Chief Complaint   Patient presents with    Abdominal Pain    Nausea      Consults:     Consulting Physician(s)         Provider   Role Specialty     Nito Fernandez MD, WINNIE      Consulting Physician Nephrology     Vivienne Roa MD      Consulting Physician Hospitalist          Procedures Performed:        History of presenting illness/Hospital Course:    69-year-old with a history of hypertension, recurrent pancreatitis, CKD stage III, chronic diarrhea, chronic tobacco abuse, who presented from home with multiple complaints.  Patient known to me from prior hospitalizations due to recurrent pancreatitis.  Had recently been evaluated at [facility] for pancreatitis where she was treated and discharged about 2 weeks ago.  She is due for follow-up with specialty in August.  She noted increased pain over the last 2 days with associated nausea.  She has chronic diarrhea.  No fevers or chills.  Feeling hungry currently at time of visit.  No alcohol use.  Continues to smoke unfortunately.  Follows with Dr. Fleming locally.     In the ER the patient was hemodynamically stable.  Her labs were indicative of metabolic acidosis with anion gap of 16, CO2 of 11.  Potassium is elevated at 6.2.  Show lipase of 166.  Her CBC was unremarkable.  Urinalysis unremarkable.  Due to recurrent nature of pancreatitis, defer any CT imaging.  She was treated for hyperkalemia, Dr. Fernandez consult placed on sodium bicarb and drip.  Admitted  to the ICU.    Pancreatitis:  Patient with multiple bouts of chronic pancreatitis leading to significant dehydration and chronic nausea.  Underlying etiology unknown.  She is due to follow-up with  in August.  Has seen Dr. Fleming locally.  Previously underwent cholecystectomy followed by ERCP.  Has been recommended to quit smoking.  Recent workup at  also demonstrated pancreatic insufficiency and she is supposed to be on Creon now.  She reports that she was prescribed Creon however she misses some doses at times.    Metabolic acidosis resolved.  Bicarb drip discontinued.  Hyperkalemia resolved.  Diet advanced and patient tolerated GI soft food on day of discharge without difficulty.  Prescribed short course of oral pain medication.  Follow-up with GI at  and primary physician.       Vital Signs:    Temp:  [98.1 °F (36.7 °C)-99.2 °F (37.3 °C)] 98.1 °F (36.7 °C)  Heart Rate:  [65-88] 74  Resp:  [8-18] 18  BP: ()/(51-71) 106/65    Physical Exam:    General Appearance:  Alert and cooperative.    Head:  Atraumatic and normocephalic.   Eyes: Conjunctivae and sclerae normal, no icterus. No pallor.   Ears:  Ears with no abnormalities noted.   Throat: No oral lesions, no thrush, oral mucosa moist.   Neck: Supple, trachea midline, no thyromegaly.   Back:   No kyphoscoliosis present. No tenderness to palpation.   Lungs:   Breath sounds heard bilaterally equally.  No crackles or wheezing. No Pleural rub or bronchial breathing.   Heart:  Normal S1 and S2, no murmur, no gallop, no rub. No JVD.   Abdomen:   Normal bowel sounds, no masses, no organomegaly. Soft, nontender, nondistended, no rebound tenderness.   Extremities: Supple, no edema, no cyanosis, no clubbing.   Pulses: Pulses palpable bilaterally.   Skin: No bleeding or rash.   Neurologic: Alert and oriented x 3. No facial asymmetry. Moves all four limbs. No tremors.     Pertinent Lab Results:     Results from last 7 days   Lab Units 07/15/24  7895  07/14/24  0602 07/14/24  0158 07/13/24 2018 07/13/24  1921   SODIUM mmol/L 145 140 136   < > 135*   POTASSIUM mmol/L 4.3 5.1 5.5*   < > 6.2*   CHLORIDE mmol/L 107 110* 111*   < > 108*   CO2 mmol/L 29.5* 18.7* 12.2*   < > 11.6*   BUN mg/dL 19 29* 32*   < > 37*   CREATININE mg/dL 1.00 1.16* 1.27*   < > 1.52*   CALCIUM mg/dL 8.6 9.1 9.3   < > 9.7   BILIRUBIN mg/dL  --   --   --   --  <0.2   ALK PHOS U/L  --   --   --   --  172*   ALT (SGPT) U/L  --   --   --   --  11   AST (SGOT) U/L  --   --   --   --  15   GLUCOSE mg/dL 75 84 84   < > 75    < > = values in this interval not displayed.     Results from last 7 days   Lab Units 07/15/24  0411 07/14/24  0602 07/13/24  1921   WBC 10*3/mm3 4.95  --  6.62   HEMOGLOBIN g/dL 9.9* 11.2* 12.0   HEMATOCRIT % 30.9* 36.4 38.1   PLATELETS 10*3/mm3 164  --  237                     Results from last 7 days   Lab Units 07/13/24  1921   LIPASE U/L 166*               Pertinent Radiology Results:    Imaging Results (All)       None            Echo:      Condition on Discharge:      Stable.    Code status during the hospital stay:    Code Status and Medical Interventions:   Ordered at: 07/13/24 2240     Code Status (Patient has no pulse and is not breathing):    CPR (Attempt to Resuscitate)     Medical Interventions (Patient has pulse or is breathing):    Full Support     Discharge Disposition:    Home or Self Care    Discharge Medications:       Discharge Medications        New Medications        Instructions Start Date   HYDROcodone-acetaminophen  MG per tablet  Commonly known as: NORCO   1 tablet, Oral, Every 6 Hours PRN             Changes to Medications        Instructions Start Date   sodium bicarbonate 650 MG tablet  What changed: Another medication with the same name was removed. Continue taking this medication, and follow the directions you see here.   650 mg, Oral, 2 Times Daily             Continue These Medications        Instructions Start Date   famotidine 20 MG  tablet  Commonly known as: PEPCID   20 mg, Oral, 2 Times Daily      glucosamine-chondroitin 500-400 MG capsule capsule   1 capsule, Oral, 2 Times Daily With Meals      methocarbamol 750 MG tablet  Commonly known as: ROBAXIN   750 mg, Oral, 4 Times Daily PRN      ondansetron ODT 4 MG disintegrating tablet  Commonly known as: ZOFRAN-ODT   4 mg, Translingual, Every 8 Hours PRN      ondansetron ODT 4 MG disintegrating tablet  Commonly known as: ZOFRAN-ODT   4 mg, Translingual, Every 8 Hours PRN      promethazine 25 MG tablet  Commonly known as: PHENERGAN   25 mg, Oral      sertraline 50 MG tablet  Commonly known as: ZOLOFT   50 mg, Oral, Daily      vitamin D3 125 MCG (5000 UT) capsule capsule   1 capsule, Oral, Daily             Stop These Medications      oxyCODONE 5 MG immediate release tablet  Commonly known as: Roxicodone            Discharge Diet:     Diet Instructions       Diet: Gastrointestinal Diets; Low Irritant; Regular (IDDSI 7); Thin (IDDSI 0)      Discharge Diet: Gastrointestinal Diets    Gastrointestinal Diet: Low Irritant    Texture: Regular (IDDSI 7)    Fluid Consistency: Thin (IDDSI 0)          Activity at Discharge:     Activity Instructions       Activity as Tolerated            Follow-up Appointments:    Additional Instructions for the Follow-ups that You Need to Schedule       Discharge Follow-up with PCP   As directed       Currently Documented PCP:    Terrence Baldwin MD    PCP Phone Number:    [phone]     Follow Up Details: 1 week               Follow-up Information       Nito Fernandez MD, FASN Follow up in 2 week(s).    Specialty: Nephrology  Contact information:  [address]  [place] [address]  [phone]               Terrence Baldwin MD .    Specialty: Family Medicine  Why: 1 week  Contact information:  [address]  [place] [address]  [phone]                           No future appointments.  Test Results Pending at Discharge:           Janak Damon    07/15/24  14:18 EDT    Time: I spent >30 minutes on this discharge activity which included: face-to-face encounter with the patient, reviewing the data in the system, coordination of the care with the nursing staff as well as consultants, documentation, and entering orders.     Dictated utilizing Dragon dictation.        Electronically signed by Janak Damon DO at 07/15/24 2259

## 2024-07-25 ENCOUNTER — READMISSION MANAGEMENT (OUTPATIENT)
Dept: CALL CENTER | Facility: HOSPITAL | Age: 69
End: 2024-07-25
Payer: MEDICARE

## 2024-07-25 NOTE — OUTREACH NOTE
Medical Week 2 Survey      Flowsheet Row Responses   [facility] patient discharged from? [place]   Does the patient have one of the following disease processes/diagnoses(primary or secondary)? Other   Week 2 attempt successful? No   Unsuccessful attempts Attempt 1            Graciela Enriquez Registered Nurse

## 2024-07-30 ENCOUNTER — APPOINTMENT (OUTPATIENT)
Dept: CT IMAGING | Facility: HOSPITAL | Age: 69
End: 2024-07-30
Payer: MEDICARE

## 2024-07-30 ENCOUNTER — HOSPITAL ENCOUNTER (INPATIENT)
Facility: HOSPITAL | Age: 69
LOS: 3 days | Discharge: HOME | End: 2024-08-02
Attending: STUDENT IN AN ORGANIZED HEALTH CARE EDUCATION/TRAINING PROGRAM | Admitting: FAMILY MEDICINE
Payer: MEDICARE

## 2024-07-30 DIAGNOSIS — E87.20 METABOLIC ACIDOSIS: ICD-10-CM

## 2024-07-30 DIAGNOSIS — K86.1 CHRONIC PANCREATITIS, UNSPECIFIED PANCREATITIS TYPE: Primary | ICD-10-CM

## 2024-07-30 PROBLEM — K85.90 PANCREATITIS: Status: ACTIVE | Noted: 2024-07-30

## 2024-07-30 LAB
ALBUMIN SERPL-MCNC: 5 G/DL (ref 3.5–5.2)
ALBUMIN/GLOB SERPL: 1.4 G/DL
ALP SERPL-CCNC: 197 U/L (ref 39–117)
ALT SERPL W P-5'-P-CCNC: 8 U/L (ref 1–33)
ANION GAP SERPL CALCULATED.3IONS-SCNC: 24.2 MMOL/L (ref 5–15)
AST SERPL-CCNC: 11 U/L (ref 1–32)
ATMOSPHERIC PRESS: 732 MMHG
BASE EXCESS BLDV CALC-SCNC: -24.9 MMOL/L (ref 0–2)
BASOPHILS # BLD AUTO: 0.09 10*3/MM3 (ref 0–0.2)
BASOPHILS NFR BLD AUTO: 0.6 % (ref 0–1.5)
BDY SITE: ABNORMAL
BILIRUB SERPL-MCNC: <0.2 MG/DL (ref 0–1.2)
BUN SERPL-MCNC: 53 MG/DL (ref 8–23)
BUN/CREAT SERPL: 22.7 (ref 7–25)
CALCIUM SPEC-SCNC: 10.8 MG/DL (ref 8.6–10.5)
CHLORIDE SERPL-SCNC: 109 MMOL/L (ref 98–107)
CO2 SERPL-SCNC: 3.8 MMOL/L (ref 22–29)
CREAT SERPL-MCNC: 2.33 MG/DL (ref 0.57–1)
DEPRECATED RDW RBC AUTO: 65.4 FL (ref 37–54)
EGFRCR SERPLBLD CKD-EPI 2021: 22.1 ML/MIN/1.73
EOSINOPHIL # BLD AUTO: 0 10*3/MM3 (ref 0–0.4)
EOSINOPHIL NFR BLD AUTO: 0 % (ref 0.3–6.2)
ERYTHROCYTE [DISTWIDTH] IN BLOOD BY AUTOMATED COUNT: 15.5 % (ref 12.3–15.4)
GLOBULIN UR ELPH-MCNC: 3.6 GM/DL
GLUCOSE SERPL-MCNC: 120 MG/DL (ref 65–99)
HCO3 BLDV-SCNC: 6 MMOL/L (ref 22–28)
HCT VFR BLD AUTO: 48.8 % (ref 34–46.6)
HGB BLD-MCNC: 15.1 G/DL (ref 12–15.9)
IMM GRANULOCYTES # BLD AUTO: 0.15 10*3/MM3 (ref 0–0.05)
IMM GRANULOCYTES NFR BLD AUTO: 1 % (ref 0–0.5)
LIPASE SERPL-CCNC: 179 U/L (ref 13–60)
LYMPHOCYTES # BLD AUTO: 0.72 10*3/MM3 (ref 0.7–3.1)
LYMPHOCYTES NFR BLD AUTO: 5 % (ref 19.6–45.3)
Lab: ABNORMAL
Lab: ABNORMAL
MACROCYTES BLD QL SMEAR: NORMAL
MCH RBC QN AUTO: 34.6 PG (ref 26.6–33)
MCHC RBC AUTO-ENTMCNC: 30.9 G/DL (ref 31.5–35.7)
MCV RBC AUTO: 111.7 FL (ref 79–97)
MODALITY: ABNORMAL
MONOCYTES # BLD AUTO: 0.46 10*3/MM3 (ref 0.1–0.9)
MONOCYTES NFR BLD AUTO: 3.2 % (ref 5–12)
NEUTROPHILS NFR BLD AUTO: 13.09 10*3/MM3 (ref 1.7–7)
NEUTROPHILS NFR BLD AUTO: 90.2 % (ref 42.7–76)
NOTIFIED BY: ABNORMAL
NOTIFIED WHO: ABNORMAL
NRBC BLD AUTO-RTO: 0 /100 WBC (ref 0–0.2)
PCO2 BLDV: 26.7 MM HG (ref 40–50)
PH BLDV: 6.96 PH UNITS (ref 7.32–7.42)
PLAT MORPH BLD: NORMAL
PLATELET # BLD AUTO: 359 10*3/MM3 (ref 140–450)
PMV BLD AUTO: 10 FL (ref 6–12)
PO2 BLDV: 39.2 MM HG (ref 30–50)
POTASSIUM SERPL-SCNC: 5.5 MMOL/L (ref 3.5–5.2)
PROT SERPL-MCNC: 8.6 G/DL (ref 6–8.5)
RBC # BLD AUTO: 4.37 10*6/MM3 (ref 3.77–5.28)
SAO2 % BLDCOV: 73.9 % (ref 45–75)
SODIUM SERPL-SCNC: 137 MMOL/L (ref 136–145)
VENTILATOR MODE: ABNORMAL
WBC MORPH BLD: NORMAL
WBC NRBC COR # BLD AUTO: 14.51 10*3/MM3 (ref 3.4–10.8)

## 2024-07-30 PROCEDURE — 74177 CT ABD & PELVIS W/CONTRAST: CPT

## 2024-07-30 PROCEDURE — 85025 COMPLETE CBC W/AUTO DIFF WBC: CPT | Performed by: STUDENT IN AN ORGANIZED HEALTH CARE EDUCATION/TRAINING PROGRAM

## 2024-07-30 PROCEDURE — 82805 BLOOD GASES W/O2 SATURATION: CPT

## 2024-07-30 PROCEDURE — 80053 COMPREHEN METABOLIC PANEL: CPT | Performed by: STUDENT IN AN ORGANIZED HEALTH CARE EDUCATION/TRAINING PROGRAM

## 2024-07-30 PROCEDURE — 99223 1ST HOSP IP/OBS HIGH 75: CPT | Performed by: FAMILY MEDICINE

## 2024-07-30 PROCEDURE — 83690 ASSAY OF LIPASE: CPT | Performed by: STUDENT IN AN ORGANIZED HEALTH CARE EDUCATION/TRAINING PROGRAM

## 2024-07-30 PROCEDURE — 99285 EMERGENCY DEPT VISIT HI MDM: CPT

## 2024-07-30 PROCEDURE — 36415 COLL VENOUS BLD VENIPUNCTURE: CPT

## 2024-07-30 PROCEDURE — 25510000001 IOPAMIDOL 61 % SOLUTION: Performed by: STUDENT IN AN ORGANIZED HEALTH CARE EDUCATION/TRAINING PROGRAM

## 2024-07-30 PROCEDURE — 25010000002 ONDANSETRON PER 1 MG: Performed by: STUDENT IN AN ORGANIZED HEALTH CARE EDUCATION/TRAINING PROGRAM

## 2024-07-30 PROCEDURE — 85007 BL SMEAR W/DIFF WBC COUNT: CPT | Performed by: STUDENT IN AN ORGANIZED HEALTH CARE EDUCATION/TRAINING PROGRAM

## 2024-07-30 PROCEDURE — 25010000002 HYDROMORPHONE 1 MG/ML SOLUTION: Performed by: STUDENT IN AN ORGANIZED HEALTH CARE EDUCATION/TRAINING PROGRAM

## 2024-07-30 RX ORDER — ONDANSETRON 2 MG/ML
4 INJECTION INTRAMUSCULAR; INTRAVENOUS ONCE
Status: COMPLETED | OUTPATIENT
Start: 2024-07-30 | End: 2024-07-30

## 2024-07-30 RX ORDER — SODIUM CHLORIDE 0.9 % (FLUSH) 0.9 %
10 SYRINGE (ML) INJECTION AS NEEDED
Status: DISCONTINUED | OUTPATIENT
Start: 2024-07-30 | End: 2024-08-02 | Stop reason: HOSPADM

## 2024-07-30 RX ADMIN — ONDANSETRON 4 MG: 2 INJECTION INTRAMUSCULAR; INTRAVENOUS at 20:30

## 2024-07-30 RX ADMIN — IOPAMIDOL 100 ML: 612 INJECTION, SOLUTION INTRAVENOUS at 22:12

## 2024-07-30 RX ADMIN — HYDROMORPHONE HYDROCHLORIDE 1 MG: 1 INJECTION, SOLUTION INTRAMUSCULAR; INTRAVENOUS; SUBCUTANEOUS at 22:21

## 2024-07-30 RX ADMIN — HYDROMORPHONE HYDROCHLORIDE 1 MG: 1 INJECTION, SOLUTION INTRAMUSCULAR; INTRAVENOUS; SUBCUTANEOUS at 20:30

## 2024-07-30 NOTE — Clinical Note
Level of Care: Telemetry [5]   Diagnosis: Pancreatitis [202663]   Certification: I Certify That Inpatient Hospital Services Are Medically Necessary For Greater Than 2 Midnights

## 2024-07-30 NOTE — ED PROVIDER NOTES
[facility] EMERGENCY DEPARTMENT  Emergency Department Encounter  Emergency Medicine Physician Note       Pt Name: Tasha Yarbrough  MRN: [id number]  Pt :   1955  Room Number:    Date of encounter:  2024  PCP: Terrence Baldwin MD  ED Physician: Darron Austin,     HPI:  Tasha Yarbrough is a 69 y.o. female who presents to the ED with chief complaint of abdominal pain.  Past medical history of chronic pancreatitis.  Currently follows with gastroenterology at [facility].  Reports epigastric abdominal pain similar to pancreatitis episodes that has been ongoing for the past several days.  Associated nausea and vomiting.  No fever, chills, chest pain or shortness of breath, problems with urination or bowel movements.      Patient has been taking prescribed medications intermittently. She also has not been following a low fat diet as instructed. Daily smoker.    PAST MEDICAL HISTORY  Past Medical History:   Diagnosis Date    COPD (chronic obstructive pulmonary disease)     Hypertension     Impaired functional mobility, balance, gait, and endurance     Impaired mobility     Injury of back     Migraines     Multiple gastric ulcers     Pancreatitis     UTI (urinary tract infection)      Current Outpatient Medications   Medication Instructions    famotidine (PEPCID) 20 mg, Oral, 2 Times Daily    glucosamine-chondroitin 500-400 MG capsule capsule 1 capsule, Oral, 2 Times Daily With Meals    methocarbamol (ROBAXIN) 750 mg, Oral, 4 Times Daily PRN    ondansetron ODT (ZOFRAN-ODT) 4 mg, Translingual, Every 8 Hours PRN    ondansetron ODT (ZOFRAN-ODT) 4 mg, Translingual, Every 8 Hours PRN    promethazine (PHENERGAN) 25 mg, Oral    sertraline (ZOLOFT) 50 mg, Oral, Daily    sodium bicarbonate 650 mg, Oral, 2 Times Daily    vitamin D3 125 MCG (5000 UT) capsule capsule 1 capsule, Oral, Daily      PAST SURGICAL HISTORY  Past Surgical History:   Procedure Laterality Date    ABDOMINAL SURGERY       CHOLECYSTECTOMY      COLON SURGERY      COLON RUPTURED /  [id number] ?    COLONOSCOPY      ENDOSCOPY N/A 04/23/2024    Procedure: ESOPHAGOGASTRODUODENOSCOPY WITH BIOPSY;  Surgeon: Jairo Negro MD;  Location: [facility] ENDOSCOPY;  Service: Gastroenterology;  Laterality: N/A;    KNEE SURGERY Right     TOTAL REPLACEMENT    TUBAL ABDOMINAL LIGATION         FAMILY HISTORY  Family History   Problem Relation Age of Onset    Kidney disease Mother     Emphysema Mother     Heart disease Father     Lung cancer Sister     Heart disease Paternal Uncle        SOCIAL HISTORY  Social History     Socioeconomic History    Marital status:    Tobacco Use    Smoking status: Every Day     Current packs/day: 1.00     Types: Cigarettes    Smokeless tobacco: Never   Vaping Use    Vaping status: Some Days    Substances: Flavoring   Substance and Sexual Activity    Alcohol use: Never    Drug use: Never    Sexual activity: Defer     ALLERGIES  Rocephin [ceftriaxone], Ciprofloxacin, Codeine, and Pineapple    REVIEW OF SYSTEMS  All systems reviewed and negative except for those discussed in HPI.     PHYSICAL EXAM  ED Triage Vitals [07/30/24 1751]   Temp Heart Rate Resp BP SpO2   97.4 °F (36.3 °C) 106 18 132/91 98 %      Temp src Heart Rate Source Patient Position BP Location FiO2 (%)   Oral Monitor Sitting Left arm --     I have reviewed the triage vital signs and nursing notes.    General: Alert.  Nontoxic appearance.  No acute distress.  Head: Normocephalic.  Atraumatic.  Eyes: No scleral icterus.  ENT: Moist mucous membranes.  Cardiovascular: Regular rate and rhythm.  No murmurs.  No rubs.  2+ distal pulses bilaterally.  Respiratory: Equal breath sounds bilaterally.  No rales.  No rhonchi.  No wheezing.  GI: Abdomen is soft.  Nondistended.  + tenderness epigastric abdominal pain.  No rebound.  No guarding.  No CVA tenderness.  MSK: Moves all 4 extremities.  Neurologic: Oriented x 3.  No focal deficits.  Skin: No erythema.  No edema. No  pallor. No cyanosis.  Psych: Normal mood and affect.    LAB RESULTS  Recent Results (from the past 24 hour(s))   CBC Auto Differential    Collection Time: 07/30/24  8:04 PM    Specimen: Blood   Result Value Ref Range    WBC 14.51 (H) 3.40 - 10.80 10*3/mm3    RBC 4.37 3.77 - 5.28 10*6/mm3    Hemoglobin 15.1 12.0 - 15.9 g/dL    Hematocrit 48.8 (H) 34.0 - 46.6 %    .7 (H) 79.0 - 97.0 fL    MCH 34.6 (H) 26.6 - 33.0 pg    MCHC 30.9 (L) 31.5 - 35.7 g/dL    RDW 15.5 (H) 12.3 - 15.4 %    RDW-SD 65.4 (H) 37.0 - 54.0 fl    MPV 10.0 6.0 - 12.0 fL    Platelets 359 140 - 450 10*3/mm3    Neutrophil % 90.2 (H) 42.7 - 76.0 %    Lymphocyte % 5.0 (L) 19.6 - 45.3 %    Monocyte % 3.2 (L) 5.0 - 12.0 %    Eosinophil % 0.0 (L) 0.3 - 6.2 %    Basophil % 0.6 0.0 - 1.5 %    Immature Grans % 1.0 (H) 0.0 - 0.5 %    Neutrophils, Absolute 13.09 (H) 1.70 - 7.00 10*3/mm3    Lymphocytes, Absolute 0.72 0.70 - 3.10 10*3/mm3    Monocytes, Absolute 0.46 0.10 - 0.90 10*3/mm3    Eosinophils, Absolute 0.00 0.00 - 0.40 10*3/mm3    Basophils, Absolute 0.09 0.00 - 0.20 10*3/mm3    Immature Grans, Absolute 0.15 (H) 0.00 - 0.05 10*3/mm3    nRBC 0.0 0.0 - 0.2 /100 WBC   Scan Slide    Collection Time: 07/30/24  8:04 PM    Specimen: Blood   Result Value Ref Range    Macrocytes Large/3+ None Seen    WBC Morphology Normal Normal    Platelet Morphology Normal Normal   Comprehensive Metabolic Panel    Collection Time: 07/30/24  9:13 PM    Specimen: Blood   Result Value Ref Range    Glucose 120 (H) 65 - 99 mg/dL    BUN 53 (H) 8 - 23 mg/dL    Creatinine 2.33 (H) 0.57 - 1.00 mg/dL    Sodium 137 136 - 145 mmol/L    Potassium 5.5 (H) 3.5 - 5.2 mmol/L    Chloride 109 (H) 98 - 107 mmol/L    CO2 3.8 (C) 22.0 - 29.0 mmol/L    Calcium 10.8 (H) 8.6 - 10.5 mg/dL    Total Protein 8.6 (H) 6.0 - 8.5 g/dL    Albumin 5.0 3.5 - 5.2 g/dL    ALT (SGPT) 8 1 - 33 U/L    AST (SGOT) 11 1 - 32 U/L    Alkaline Phosphatase 197 (H) 39 - 117 U/L    Total Bilirubin <0.2 0.0 - 1.2 mg/dL     Globulin 3.6 gm/dL    A/G Ratio 1.4 g/dL    BUN/Creatinine Ratio 22.7 7.0 - 25.0    Anion Gap 24.2 (H) 5.0 - 15.0 mmol/L    eGFR 22.1 (L) >60.0 mL/min/1.73   Lipase    Collection Time: 07/30/24  9:13 PM    Specimen: Blood   Result Value Ref Range    Lipase 179 (H) 13 - 60 U/L   Blood Gas, Venous -With Co-Ox Panel: Yes    Collection Time: 07/30/24 11:20 PM    Specimen: Venous Blood   Result Value Ref Range    Site Right Brachial     pH, Venous 6.962 (C) 7.320 - 7.420 pH Units    pCO2, Venous 26.7 (L) 40.0 - 50.0 mm Hg    pO2, Venous 39.2 30.0 - 50.0 mm Hg    HCO3, Venous 6.0 (L) 22.0 - 28.0 mmol/L    Base Excess, Venous -24.9 (L) 0.0 - 2.0 mmol/L    O2 Saturation, Venous 73.9 45.0 - 75.0 %    Barometric Pressure for Blood Gas 732 mmHg    Modality Room Air     Ventilator Mode NA     Notified Who RN     Notified By [id number]     Notified Time 07/30/2024 23:22     Collected by RN        RADIOLOGY  CT Abdomen Pelvis With Contrast    Result Date: 7/30/2024  FINAL REPORT TECHNIQUE: null CLINICAL HISTORY: Leukocytosis, epigastric abdominal pain COMPARISON: null FINDINGS: CT abdomen and pelvis with contrast Comparison: CT/SR - CT ABDOMEN PELVIS W CONTRAST - 6/22/24 20:04 EDT CT/SR - CT ABDOMEN PELVIS W CONTRAST - 9/22/23 20:48 EDT Findings: Moderate-to-large hiatal hernia similar to the prior exam. 2 mm nonobstructing right inferior and 5 mm nonobstructing right mid renal stones. Bilateral simple renal cysts. Cholecystectomy. Abdominal solid organs otherwise unremarkable. No bowel obstruction, pneumoperitoneum, or pneumatosis. Right ovarian cyst 3 x 2.1 cm image 52, similar to prior exams. Uterus and ovaries otherwise unremarkable. Appendix is not identified. No inflammatory changes. L2 left compression fracture similar to prior exam. No acute fractures. Moderate to severe lumbar degenerative spondylosis similar to prior.     IMPRESSION: 1. No CT explanation for leukocytosis. 2. Right ovarian cyst. Consider nonemergent  ultrasound characterization and follow-up. 3. Additional nonacute findings as above. Authenticated and Electronically Signed by Luisito Barnett MD on 07/30/2024 10:52:56 PM     PROCEDURES  Critical Care    Performed by: Darron Austin DO  Authorized by: Darron Austin DO    Comments:      CRITICAL CARE PROCEDURE NOTE  Authorized and performed by: Dr. Austin  Total critical care time: Approximately 45 minutes.  Patient was critically ill due to: Metabolic acidosis  Interventions: IV bicarbonate, coordination of care with nephrology, close airway/mental status/hemodynamic monitoring    Due to a high probability of clinically significant, life threatening deterioration, the patient required my highest level of preparedness to intervene emergently and I personally spent this critical care time directly and personally managing the patient.  This critical care time included obtaining a history; examining the patient; pulse oximetry; ordering and review of studies; arranging urgent treatment with development of a management plan; evaluation of patient's response to treatment; frequent reassessment; and, discussions with other providers.    This critical care time was performed to assess and manage the high probability of imminent, life-threatening deterioration that could result in multiorgan failure.  It was exclusive of separately billable procedures and treating other patients and teaching time.    Please see MDM section and the rest of the note for further information on patient assessment and interventions.      RISK STRATIFICATION    MEDICATIONS GIVEN IN ER  Medications   sodium chloride 0.9 % flush 10 mL (has no administration in time range)   sodium bicarbonate 8.4 % 150 mEq in dextrose (D5W) 5 % 1,000 mL infusion (greater than 100 mEq) (has no administration in time range)   sodium bicarbonate injection 8.4% 150 mEq (has no administration in time range)   HYDROmorphone (DILAUDID) injection 1 mg (1 mg  Intravenous Given 7/30/24 2030)   ondansetron (ZOFRAN) injection 4 mg (4 mg Intravenous Given 7/30/24 2030)   iopamidol (ISOVUE-300) 61 % injection 100 mL (100 mL Intravenous Given 7/30/24 2212)   HYDROmorphone (DILAUDID) injection 1 mg (1 mg Intravenous Given 7/30/24 2221)     MEDICAL DECISION MAKING  69 y.o. female with past medical history listed above who presents with acute on chronic epigastric abdominal pain.    Vital signs in triage remarkable mild tachycardia, otherwise within normal limits.    Based on clinical presentation and physical exam, differential diagnosis includes, but is not limited to, pancreatitis, gastritis, metabolic derangement.    I have discussed the indication, risk, and alternatives of the following high risk medications: IV dilaudid    At least 3 different tests have been ordered on this patient.    Please see ED course below for my interpretation of the ED workup.  ED Course as of 07/30/24 2337 Tue Jul 30, 2024 2328 I reviewed the labs listed above. Notable findings are highlighted below.    Old laboratory data was reviewed from the medical records and compared to today's results.   [JS]   2328 CBC & Differential(!) [JS]   2328 WBC(!): 14.51 [JS]   2328 Comprehensive Metabolic Panel(!!) [JS]   2328 Glucose(!): 120 [JS]   2328 Creatinine(!): 2.33  Baseline 1.0. [JS]   2328 Potassium(!): 5.5 [JS]   2329 CO2(!!): 3.8 [JS]   2329 Anion Gap(!): 24.2 [JS]   2329 Blood Gas, Venous -With Co-Ox Panel: Yes(!!) [JS]   2329 pH, Venous(!!): 6.962 [JS]   2329 HCO3, Venous(!): 6.0 [JS]   2329 Lipase(!): 179 [JS]   2329 CT Abdomen Pelvis With Contrast  I have independently reviewed and interpreted the CT abdomen pelvis.  My interpretation is negative for small bowel obstruction.    [JS]   2330 Case discussed with Dr. Fernandez (nephrology).  Recommends treatment with 3 A of bicarb push and initiation of bicarb infusion 3 A 150 cc/h. Will see patient as consult. [JS]      ED Course User Index  [JS]  Darron Austin DO      On re-evaluation, patient resting comfortably.  Vital signs remained stable on room air. Will proceed with medical admission for further workup and management.  I discussed the findings of the ED workup with the patient including my recommendation for admission.  Patient agreeable with plan and disposition.    Case discussed with Dr. Roa (hospitalist) who agrees to evaluate and admit the patient.  We discussed the HPI, pertinent PMHx, ED course and workup.    REPEAT VITAL SIGNS  AS OF 23:37 EDT VITALS:  BP - 139/92  HR - 106  TEMP - 97.4 °F (36.3 °C) (Oral)  O2 SATS - 99%    DIAGNOSIS  Final diagnoses:   Chronic pancreatitis, unspecified pancreatitis type   Metabolic acidosis     DISPOSITION  ED Disposition       ED Disposition   Decision to Admit    Condition   --    Comment   Level of Care: Telemetry [5]   Diagnosis: Pancreatitis [202663]   Certification: I Certify That Inpatient Hospital Services Are Medically Necessary For Greater Than 2 Midnights                 Please note that portions of this document were completed with voice recognition software.        Darron Austin DO  07/31/24 2267

## 2024-07-31 ENCOUNTER — READMISSION MANAGEMENT (OUTPATIENT)
Dept: CALL CENTER | Facility: HOSPITAL | Age: 69
End: 2024-07-31
Payer: MEDICARE

## 2024-07-31 ENCOUNTER — APPOINTMENT (OUTPATIENT)
Dept: GENERAL RADIOLOGY | Facility: HOSPITAL | Age: 69
End: 2024-07-31
Payer: MEDICARE

## 2024-07-31 LAB
ANION GAP SERPL CALCULATED.3IONS-SCNC: 21.8 MMOL/L (ref 5–15)
BASOPHILS # BLD AUTO: 0.04 10*3/MM3 (ref 0–0.2)
BASOPHILS NFR BLD AUTO: 0.4 % (ref 0–1.5)
BUN SERPL-MCNC: 55 MG/DL (ref 8–23)
BUN/CREAT SERPL: 25.1 (ref 7–25)
CALCIUM SPEC-SCNC: 9.8 MG/DL (ref 8.6–10.5)
CHLORIDE SERPL-SCNC: 111 MMOL/L (ref 98–107)
CO2 SERPL-SCNC: 11.2 MMOL/L (ref 22–29)
CREAT SERPL-MCNC: 2.19 MG/DL (ref 0.57–1)
D-LACTATE SERPL-SCNC: 1.2 MMOL/L (ref 0.5–2)
DEPRECATED RDW RBC AUTO: 58.9 FL (ref 37–54)
EGFRCR SERPLBLD CKD-EPI 2021: 23.9 ML/MIN/1.73
EOSINOPHIL # BLD AUTO: 0 10*3/MM3 (ref 0–0.4)
EOSINOPHIL NFR BLD AUTO: 0 % (ref 0.3–6.2)
ERYTHROCYTE [DISTWIDTH] IN BLOOD BY AUTOMATED COUNT: 15.4 % (ref 12.3–15.4)
GLUCOSE SERPL-MCNC: 95 MG/DL (ref 65–99)
HCT VFR BLD AUTO: 35.7 % (ref 34–46.6)
HGB BLD-MCNC: 11.9 G/DL (ref 12–15.9)
IMM GRANULOCYTES # BLD AUTO: 0.11 10*3/MM3 (ref 0–0.05)
IMM GRANULOCYTES NFR BLD AUTO: 1 % (ref 0–0.5)
LYMPHOCYTES # BLD AUTO: 1.13 10*3/MM3 (ref 0.7–3.1)
LYMPHOCYTES NFR BLD AUTO: 10.2 % (ref 19.6–45.3)
MACROCYTES BLD QL SMEAR: NORMAL
MCH RBC QN AUTO: 35.1 PG (ref 26.6–33)
MCHC RBC AUTO-ENTMCNC: 33.3 G/DL (ref 31.5–35.7)
MCV RBC AUTO: 105.3 FL (ref 79–97)
MONOCYTES # BLD AUTO: 1.01 10*3/MM3 (ref 0.1–0.9)
MONOCYTES NFR BLD AUTO: 9.1 % (ref 5–12)
NEUTROPHILS NFR BLD AUTO: 79.3 % (ref 42.7–76)
NEUTROPHILS NFR BLD AUTO: 8.83 10*3/MM3 (ref 1.7–7)
NRBC BLD AUTO-RTO: 0 /100 WBC (ref 0–0.2)
PLATELET # BLD AUTO: 271 10*3/MM3 (ref 140–450)
PMV BLD AUTO: 10.2 FL (ref 6–12)
POTASSIUM SERPL-SCNC: 3.3 MMOL/L (ref 3.5–5.2)
RBC # BLD AUTO: 3.39 10*6/MM3 (ref 3.77–5.28)
SMALL PLATELETS BLD QL SMEAR: ADEQUATE
SODIUM SERPL-SCNC: 144 MMOL/L (ref 136–145)
WBC MORPH BLD: NORMAL
WBC NRBC COR # BLD AUTO: 11.12 10*3/MM3 (ref 3.4–10.8)

## 2024-07-31 PROCEDURE — 85025 COMPLETE CBC W/AUTO DIFF WBC: CPT | Performed by: FAMILY MEDICINE

## 2024-07-31 PROCEDURE — 63710000001 INSULIN REGULAR HUMAN PER 5 UNITS: Performed by: FAMILY MEDICINE

## 2024-07-31 PROCEDURE — 83605 ASSAY OF LACTIC ACID: CPT | Performed by: STUDENT IN AN ORGANIZED HEALTH CARE EDUCATION/TRAINING PROGRAM

## 2024-07-31 PROCEDURE — 05H533Z INSERTION OF INFUSION DEVICE INTO RIGHT SUBCLAVIAN VEIN, PERCUTANEOUS APPROACH: ICD-10-PCS | Performed by: SURGERY

## 2024-07-31 PROCEDURE — 97162 PT EVAL MOD COMPLEX 30 MIN: CPT

## 2024-07-31 PROCEDURE — 80048 BASIC METABOLIC PNL TOTAL CA: CPT | Performed by: FAMILY MEDICINE

## 2024-07-31 PROCEDURE — 25010000002 HEPARIN (PORCINE) PER 1000 UNITS: Performed by: FAMILY MEDICINE

## 2024-07-31 PROCEDURE — 85007 BL SMEAR W/DIFF WBC COUNT: CPT | Performed by: FAMILY MEDICINE

## 2024-07-31 PROCEDURE — 71101 X-RAY EXAM UNILAT RIBS/CHEST: CPT

## 2024-07-31 PROCEDURE — B546ZZA ULTRASONOGRAPHY OF RIGHT SUBCLAVIAN VEIN, GUIDANCE: ICD-10-PCS | Performed by: SURGERY

## 2024-07-31 PROCEDURE — 71045 X-RAY EXAM CHEST 1 VIEW: CPT

## 2024-07-31 PROCEDURE — 25010000002 POTASSIUM CHLORIDE PER 2 MEQ OF POTASSIUM: Performed by: INTERNAL MEDICINE

## 2024-07-31 PROCEDURE — 97166 OT EVAL MOD COMPLEX 45 MIN: CPT

## 2024-07-31 PROCEDURE — 0 DEXTROSE 5 % SOLUTION 1,000 ML FLEX CONT: Performed by: STUDENT IN AN ORGANIZED HEALTH CARE EDUCATION/TRAINING PROGRAM

## 2024-07-31 PROCEDURE — 0 DEXTROSE 5 % SOLUTION 1,000 ML FLEX CONT: Performed by: FAMILY MEDICINE

## 2024-07-31 PROCEDURE — 99233 SBSQ HOSP IP/OBS HIGH 50: CPT | Performed by: INTERNAL MEDICINE

## 2024-07-31 PROCEDURE — 25010000002 MORPHINE PER 10 MG: Performed by: FAMILY MEDICINE

## 2024-07-31 PROCEDURE — 25010000002 ONDANSETRON PER 1 MG: Performed by: FAMILY MEDICINE

## 2024-07-31 RX ORDER — MORPHINE SULFATE 2 MG/ML
2 INJECTION, SOLUTION INTRAMUSCULAR; INTRAVENOUS
Status: DISCONTINUED | OUTPATIENT
Start: 2024-07-31 | End: 2024-08-02 | Stop reason: HOSPADM

## 2024-07-31 RX ORDER — DEXTROSE MONOHYDRATE 25 G/50ML
50 INJECTION, SOLUTION INTRAVENOUS ONCE
Status: COMPLETED | OUTPATIENT
Start: 2024-07-31 | End: 2024-07-31

## 2024-07-31 RX ORDER — METHOCARBAMOL 750 MG/1
750 TABLET, FILM COATED ORAL 4 TIMES DAILY PRN
Status: DISCONTINUED | OUTPATIENT
Start: 2024-07-31 | End: 2024-08-02 | Stop reason: HOSPADM

## 2024-07-31 RX ORDER — SODIUM BICARBONATE 650 MG/1
1300 TABLET ORAL 4 TIMES DAILY
Status: DISCONTINUED | OUTPATIENT
Start: 2024-07-31 | End: 2024-08-02 | Stop reason: HOSPADM

## 2024-07-31 RX ORDER — PROMETHAZINE HYDROCHLORIDE 12.5 MG/1
25 TABLET ORAL EVERY 6 HOURS PRN
Status: DISCONTINUED | OUTPATIENT
Start: 2024-07-31 | End: 2024-08-02 | Stop reason: HOSPADM

## 2024-07-31 RX ORDER — FAMOTIDINE 20 MG/1
20 TABLET, FILM COATED ORAL 2 TIMES DAILY
Status: DISCONTINUED | OUTPATIENT
Start: 2024-07-31 | End: 2024-08-02 | Stop reason: HOSPADM

## 2024-07-31 RX ORDER — ONDANSETRON 2 MG/ML
4 INJECTION INTRAMUSCULAR; INTRAVENOUS EVERY 6 HOURS PRN
Status: DISCONTINUED | OUTPATIENT
Start: 2024-07-31 | End: 2024-08-02 | Stop reason: HOSPADM

## 2024-07-31 RX ORDER — HYDROCODONE BITARTRATE AND ACETAMINOPHEN 7.5; 325 MG/1; MG/1
1 TABLET ORAL EVERY 8 HOURS PRN
Status: DISCONTINUED | OUTPATIENT
Start: 2024-07-31 | End: 2024-08-02 | Stop reason: HOSPADM

## 2024-07-31 RX ORDER — AMOXICILLIN 250 MG
2 CAPSULE ORAL 2 TIMES DAILY PRN
Status: DISCONTINUED | OUTPATIENT
Start: 2024-07-31 | End: 2024-08-02 | Stop reason: HOSPADM

## 2024-07-31 RX ORDER — ACETAMINOPHEN 325 MG/1
650 TABLET ORAL EVERY 4 HOURS PRN
Status: DISCONTINUED | OUTPATIENT
Start: 2024-07-31 | End: 2024-08-02 | Stop reason: HOSPADM

## 2024-07-31 RX ORDER — SODIUM CHLORIDE 0.9 % (FLUSH) 0.9 %
10 SYRINGE (ML) INJECTION EVERY 12 HOURS SCHEDULED
Status: DISCONTINUED | OUTPATIENT
Start: 2024-07-31 | End: 2024-08-02 | Stop reason: HOSPADM

## 2024-07-31 RX ORDER — SODIUM CHLORIDE 0.9 % (FLUSH) 0.9 %
10 SYRINGE (ML) INJECTION AS NEEDED
Status: DISCONTINUED | OUTPATIENT
Start: 2024-07-31 | End: 2024-08-02 | Stop reason: HOSPADM

## 2024-07-31 RX ORDER — POLYETHYLENE GLYCOL 3350 17 G/17G
17 POWDER, FOR SOLUTION ORAL DAILY PRN
Status: DISCONTINUED | OUTPATIENT
Start: 2024-07-31 | End: 2024-08-02 | Stop reason: HOSPADM

## 2024-07-31 RX ORDER — HEPARIN SODIUM 5000 [USP'U]/ML
5000 INJECTION, SOLUTION INTRAVENOUS; SUBCUTANEOUS EVERY 12 HOURS SCHEDULED
Status: DISCONTINUED | OUTPATIENT
Start: 2024-07-31 | End: 2024-08-02 | Stop reason: HOSPADM

## 2024-07-31 RX ORDER — BISACODYL 5 MG/1
5 TABLET, DELAYED RELEASE ORAL DAILY PRN
Status: DISCONTINUED | OUTPATIENT
Start: 2024-07-31 | End: 2024-08-02 | Stop reason: HOSPADM

## 2024-07-31 RX ORDER — ACETAMINOPHEN 650 MG/1
650 SUPPOSITORY RECTAL EVERY 4 HOURS PRN
Status: DISCONTINUED | OUTPATIENT
Start: 2024-07-31 | End: 2024-08-02 | Stop reason: HOSPADM

## 2024-07-31 RX ORDER — NITROGLYCERIN 0.4 MG/1
0.4 TABLET SUBLINGUAL
Status: DISCONTINUED | OUTPATIENT
Start: 2024-07-31 | End: 2024-08-02 | Stop reason: HOSPADM

## 2024-07-31 RX ORDER — PANTOPRAZOLE SODIUM 40 MG/10ML
40 INJECTION, POWDER, LYOPHILIZED, FOR SOLUTION INTRAVENOUS
Status: DISCONTINUED | OUTPATIENT
Start: 2024-07-31 | End: 2024-08-02 | Stop reason: HOSPADM

## 2024-07-31 RX ORDER — SODIUM CHLORIDE 0.9 % (FLUSH) 0.9 %
20 SYRINGE (ML) INJECTION AS NEEDED
Status: DISCONTINUED | OUTPATIENT
Start: 2024-07-31 | End: 2024-08-02 | Stop reason: HOSPADM

## 2024-07-31 RX ORDER — SODIUM BICARBONATE 650 MG/1
650 TABLET ORAL 2 TIMES DAILY
Status: DISCONTINUED | OUTPATIENT
Start: 2024-07-31 | End: 2024-07-31

## 2024-07-31 RX ORDER — SODIUM CHLORIDE 9 MG/ML
40 INJECTION, SOLUTION INTRAVENOUS AS NEEDED
Status: DISCONTINUED | OUTPATIENT
Start: 2024-07-31 | End: 2024-08-02 | Stop reason: HOSPADM

## 2024-07-31 RX ORDER — BISACODYL 10 MG
10 SUPPOSITORY, RECTAL RECTAL DAILY PRN
Status: DISCONTINUED | OUTPATIENT
Start: 2024-07-31 | End: 2024-08-02 | Stop reason: HOSPADM

## 2024-07-31 RX ORDER — ACETAMINOPHEN 160 MG/5ML
650 SOLUTION ORAL EVERY 4 HOURS PRN
Status: DISCONTINUED | OUTPATIENT
Start: 2024-07-31 | End: 2024-08-02 | Stop reason: HOSPADM

## 2024-07-31 RX ORDER — NALOXONE HCL 0.4 MG/ML
0.4 VIAL (ML) INJECTION
Status: DISCONTINUED | OUTPATIENT
Start: 2024-07-31 | End: 2024-08-02 | Stop reason: HOSPADM

## 2024-07-31 RX ADMIN — Medication 10 ML: at 21:08

## 2024-07-31 RX ADMIN — MORPHINE SULFATE 2 MG: 2 INJECTION, SOLUTION INTRAMUSCULAR; INTRAVENOUS at 14:09

## 2024-07-31 RX ADMIN — INSULIN HUMAN 10 UNITS: 100 INJECTION, SOLUTION PARENTERAL at 03:09

## 2024-07-31 RX ADMIN — SODIUM BICARBONATE 150 MEQ: 84 INJECTION, SOLUTION INTRAVENOUS at 00:26

## 2024-07-31 RX ADMIN — METHOCARBAMOL 750 MG: 750 TABLET, FILM COATED ORAL at 16:33

## 2024-07-31 RX ADMIN — SODIUM BICARBONATE 650 MG TABLET 1300 MG: at 17:05

## 2024-07-31 RX ADMIN — SODIUM BICARBONATE 650 MG TABLET 1300 MG: at 11:16

## 2024-07-31 RX ADMIN — MORPHINE SULFATE 2 MG: 2 INJECTION, SOLUTION INTRAMUSCULAR; INTRAVENOUS at 02:56

## 2024-07-31 RX ADMIN — Medication 10 ML: at 08:00

## 2024-07-31 RX ADMIN — SERTRALINE HYDROCHLORIDE 50 MG: 50 TABLET ORAL at 08:00

## 2024-07-31 RX ADMIN — FAMOTIDINE 20 MG: 20 TABLET, FILM COATED ORAL at 21:07

## 2024-07-31 RX ADMIN — Medication 10 ML: at 10:33

## 2024-07-31 RX ADMIN — SODIUM BICARBONATE 150 MEQ: 84 INJECTION INTRAVENOUS at 00:26

## 2024-07-31 RX ADMIN — SODIUM BICARBONATE 650 MG TABLET 1300 MG: at 21:07

## 2024-07-31 RX ADMIN — FAMOTIDINE 20 MG: 20 TABLET, FILM COATED ORAL at 08:00

## 2024-07-31 RX ADMIN — Medication 10 ML: at 21:07

## 2024-07-31 RX ADMIN — METHOCARBAMOL 750 MG: 750 TABLET, FILM COATED ORAL at 07:55

## 2024-07-31 RX ADMIN — HEPARIN SODIUM 5000 UNITS: 5000 INJECTION INTRAVENOUS; SUBCUTANEOUS at 21:07

## 2024-07-31 RX ADMIN — HEPARIN SODIUM 5000 UNITS: 5000 INJECTION INTRAVENOUS; SUBCUTANEOUS at 08:00

## 2024-07-31 RX ADMIN — SODIUM BICARBONATE 150 MEQ: 84 INJECTION, SOLUTION INTRAVENOUS at 07:55

## 2024-07-31 RX ADMIN — HYDROCODONE BITARTRATE AND ACETAMINOPHEN 1 TABLET: 7.5; 325 TABLET ORAL at 12:21

## 2024-07-31 RX ADMIN — DEXTROSE MONOHYDRATE 50 ML: 25 INJECTION, SOLUTION INTRAVENOUS at 03:09

## 2024-07-31 RX ADMIN — SODIUM BICARBONATE 150 MEQ: 84 INJECTION, SOLUTION INTRAVENOUS at 17:32

## 2024-07-31 RX ADMIN — PANTOPRAZOLE SODIUM 40 MG: 40 INJECTION, POWDER, FOR SOLUTION INTRAVENOUS at 07:54

## 2024-07-31 RX ADMIN — MORPHINE SULFATE 2 MG: 2 INJECTION, SOLUTION INTRAMUSCULAR; INTRAVENOUS at 07:55

## 2024-07-31 RX ADMIN — HYDROCODONE BITARTRATE AND ACETAMINOPHEN 1 TABLET: 7.5; 325 TABLET ORAL at 21:07

## 2024-07-31 RX ADMIN — ONDANSETRON 4 MG: 2 INJECTION INTRAMUSCULAR; INTRAVENOUS at 02:56

## 2024-07-31 RX ADMIN — Medication 10 ML: at 10:32

## 2024-07-31 RX ADMIN — SODIUM BICARBONATE 650 MG TABLET 1300 MG: at 07:56

## 2024-07-31 RX ADMIN — POTASSIUM CHLORIDE 75 ML/HR: 149 INJECTION, SOLUTION, CONCENTRATE INTRAVENOUS at 07:37

## 2024-07-31 NOTE — OUTREACH NOTE
Medical Week 2 Survey      Flowsheet Row Responses   [facility] patient discharged from? [place]   Does the patient have one of the following disease processes/diagnoses(primary or secondary)? Other   Week 2 attempt successful? No   Unsuccessful attempts Attempt 2   Revoke Readmitted            Shadia LOVING - Registered Nurse

## 2024-07-31 NOTE — ED NOTES
[facility] contacted at this time for a consult at this time, states that Jim will give us a call back.

## 2024-07-31 NOTE — PLAN OF CARE
Goal Outcome Evaluation:      VITALS STABLE SINCE ARRIVAL.

## 2024-07-31 NOTE — PLAN OF CARE
Goal Outcome Evaluation:  Plan of Care Reviewed With: patient        Progress: no change  Outcome Evaluation: OT evaluation completed today.  Pt presents with weakness, pain and decreased activity tolerance.  Pt able to sit eob with supervision, stood with min assist and walked 3' with min assist using RW.  Pt is expected to benefit from skilled OT to improve her strength and independence with ADL tasks.      Anticipated Discharge Disposition (OT): home, home with home health

## 2024-07-31 NOTE — CASE MANAGEMENT/SOCIAL WORK
Discharge Planning Assessment  [facility]     Patient Name: Tasha Yarbrough  MRN: [id number]  Today's Date: 7/31/2024    Admit Date: 7/30/2024    Plan: Met with pt for dcp, she was able to provide demographics. Her son Thai is present, she consents for his presence. She does not have an AD, POA, or financial stressors. Home DME includes cane, wc, walker, bsc, shower chair.Dr. Baldwin is her primary, Dr. Fernandez follows. Pt uses [facility], [facility] to meds to bed. Pt has lived in her home 15 years, both sons live with her and provide assistance and transportation.Pt performs her own ADL's. They plan for her to return at [place]. Readmission assessment done. Thai states pt has pancreatic flare up every few weeks. Last admission they declined HH services feeling it was not beneficial, they decline HH services currently.   Discharge Needs Assessment       Row Name 07/31/24 1403       Living Environment    People in Home child(debra), adult    Name(s) of People in Home Daniel Yarbrough    Current Living Arrangements home    Duration at Residence 15 yrs    Primary Care Provided by self;child(debra)    Provides Primary Care For no one, unable/limited ability to care for self    Family Caregiver if Needed child(debra), adult    Quality of Family Relationships involved;helpful    Able to Return to Prior Arrangements yes       Resource/Environmental Concerns    Resource/Environmental Concerns none    Transportation Concerns none       Transportation Needs    In the past 12 months, has lack of transportation kept you from medical appointments or from getting medications? no    In the past 12 months, has lack of transportation kept you from meetings, work, or from getting things needed for daily living? No       Food Insecurity    Within the past 12 months, you worried that your food would run out before you got the money to buy more. Never true    Within the past 12 months, the food you bought just didn't last and you didn't have money to get  more. Never true       Transition Planning    Patient/Family Anticipates Transition to home with family    Patient/Family Anticipated Services at Transition none    Transportation Anticipated family or friend will provide       Discharge Needs Assessment    Readmission Within the Last 30 Days previous discharge plan unsuccessful    Equipment Currently Used at Home commode;shower chair    Concerns to be Addressed denies needs/concerns at this time    Concerns Comments pt and family declined HH previous admissions, decline today also    Anticipated Changes Related to Illness none    Equipment Needed After Discharge none                   Discharge Plan       Row Name 07/31/24 7373       Plan    Plan Met with pt for dcp, she was able to provide demographics. Her son Thai is present, she consents for his presence. She does not have an AD, POA, or financial stressors. Home DME includes cane, wc, walker, bsc, shower chair.Dr. Baldwin is her primary, Dr. Fernandez follows. [facility] uses [facility] to meds to bed. Pt has lived in her home 15 years, both sons live with her and provide assistance and transportation.Pt performs her own ADL's. They plan for her to return at [place]. Readmission assessment done. Thai states pt has pancreatic flare up every few weeks. Last admission they declined HH services feeling it was not beneficial, they decline HH services currently.                  Continued Care and Services - Admitted Since 7/30/2024    No active coordination exists for this encounter.       Selected Continued Care - Prior Encounters Includes continued care and service providers with selected services from prior encounters from 5/1/2024 to 7/31/2024      Discharged on 6/2/2024 Admission date: 5/30/2024 - Discharge disposition: Home or Self Care      Community Resources       Service Provider Selected Services Address Phone Fax Patient Preferred    [facility] [address],  [place] [address] [phone] -- --                          Expected Discharge Date and Time       Expected Discharge Date Expected Discharge Time    Aug 3, 2024            Demographic Summary    No documentation.                  Functional Status    No documentation.                  Psychosocial    No documentation.                  Abuse/Neglect    No documentation.                  Legal    No documentation.                  Substance Abuse    No documentation.                  Patient Forms    No documentation.                     Heather Rubio RN

## 2024-07-31 NOTE — THERAPY EVALUATION
Patient Name: Tasha Yarbrough  : 1955    MRN: [id number]                              Today's Date: 2024       Admit Date: 2024    Visit Dx:     ICD-10-CM ICD-9-CM   1. Chronic pancreatitis, unspecified pancreatitis type  K86.1 577.1   2. Metabolic acidosis  E87.20 276.2     Patient Active Problem List   Diagnosis    Colon cancer screening    Gastroesophageal reflux disease with esophagitis    Left lower quadrant abdominal pain    Irritable bowel syndrome with constipation    Encounter for screening for malignant neoplasm of colon    Periumbilical abdominal pain    Diarrhea of presumed infectious origin    Acute kidney injury    Bacterial colitis    VIVIENNE (acute kidney injury)    C. difficile colitis    Generalized abdominal pain    Diarrhea of infectious origin    Abnormal finding on GI tract imaging    Anemia, chronic disease    Colitis due to Clostridium difficile    Pancreatitis, acute    Chronic kidney disease, stage III (moderate)    Acute pancreatitis    Acute UTI (urinary tract infection)    Epigastric pain    Nausea and vomiting in adult    Moderate malnutrition    Hyperkalemia    Chronic pancreatitis    Pancreatitis     Past Medical History:   Diagnosis Date    COPD (chronic obstructive pulmonary disease)     Hypertension     Impaired functional mobility, balance, gait, and endurance     Impaired mobility     Injury of back     Migraines     Multiple gastric ulcers     Pancreatitis     UTI (urinary tract infection)      Past Surgical History:   Procedure Laterality Date    ABDOMINAL SURGERY      CHOLECYSTECTOMY      COLON SURGERY      COLON RUPTURED /  [id number] ?    COLONOSCOPY      ENDOSCOPY N/A 2024    Procedure: ESOPHAGOGASTRODUODENOSCOPY WITH BIOPSY;  Surgeon: Jairo Negro MD;  Location: [facility] ENDOSCOPY;  Service: Gastroenterology;  Laterality: N/A;    KNEE SURGERY Right     TOTAL REPLACEMENT    TUBAL ABDOMINAL LIGATION        General Information       Row Name 24 1019           Physical Therapy Time and Intention    Document Type evaluation  -JR     Mode of Treatment physical therapy  -JR       Row Name 07/31/24 1019          General Information    Patient Profile Reviewed yes  -JR     Prior Level of Function independent:;all household mobility  -JR     Existing Precautions/Restrictions fall  -JR     Barriers to Rehab medically complex  -JR       Row Name 07/31/24 1019          Living Environment    People in Home child(debra), adult  -JR       Row Name 07/31/24 1019          Home Main Entrance    Number of Stairs, Main Entrance one  -JR       Row Name 07/31/24 1019          Stairs Within Home, Primary    Number of Stairs, Within Home, Primary none  -JR       Row Name 07/31/24 1019          Cognition    Orientation Status (Cognition) oriented x 4  -JR       Row Name 07/31/24 1019          Safety Issues, Functional Mobility    Safety Issues Affecting Function (Mobility) safety precautions follow-through/compliance;safety precaution awareness  -JR     Impairments Affecting Function (Mobility) strength;endurance/activity tolerance  -JR               User Key  (r) = Recorded By, (t) = Taken By, (c) = Cosigned By      Initials Name Provider Type    JR Gaye Falcon, PT Physical Therapist                   Mobility       Row Name 07/31/24 1019          Bed Mobility    Bed Mobility bed mobility (all) activities  -JR     All Activities, [place] (Bed Mobility) supervision  -JR     Assistive Device (Bed Mobility) head of bed elevated  -       Row Name 07/31/24 1019          Sit-Stand Transfer    Sit-Stand [place] (Transfers) minimum assist (75% patient effort)  -     Assistive Device (Sit-Stand Transfers) walker, front-wheeled  -       Row Name 07/31/24 1019          Gait/Stairs (Locomotion)    [place] Level (Gait) minimum assist (75% patient effort)  -     Assistive Device (Gait) walker, front-wheeled  -JR     Patient was able to Ambulate yes  -JR     Distance in Feet  (Gait) 3  -JR     Deviations/Abnormal Patterns (Gait) base of support, narrow;alfonzo decreased;festinating/shuffling;stride length decreased  -JR     Bilateral Gait Deviations forward flexed posture;heel strike decreased;weight shift ability decreased  -JR               User Key  (r) = Recorded By, (t) = Taken By, (c) = Cosigned By      Initials Name Provider Type    Gaye Stokes PT Physical Therapist                   Obj/Interventions       Row Name 07/31/24 1019          Range of Motion Comprehensive    General Range of Motion bilateral lower extremity ROM WFL  -JR       [facility] Name 07/31/24 1019          Strength Comprehensive (MMT)    Comment, General Manual Muscle Testing (MMT) Assessment BLE grossly 3+/5  -JR       [facility] Name 07/31/24 1019          Balance    Balance Assessment sitting static balance;sitting dynamic balance;sit to stand dynamic balance;standing static balance;standing dynamic balance  -JR     Static Sitting Balance supervision  -JR     Dynamic Sitting Balance contact guard  -JR     Position, Sitting Balance unsupported;sitting edge of bed  -JR     Sit to Stand Dynamic Balance minimal assist  -JR     Static Standing Balance contact guard  -JR     Dynamic Standing Balance minimal assist  -JR     Position/Device Used, Standing Balance walker, rolling  -JR               User Key  (r) = Recorded By, (t) = Taken By, (c) = Cosigned By      Initials Name Provider Type    Gaye Stokes PT Physical Therapist                   Goals/Plan       Row Name 07/31/24 1019          Bed Mobility Goal 1 (PT)    Activity/Assistive Device (Bed Mobility Goal 1, PT) bed mobility activities, all  -JR     [place] Level/Cues Needed (Bed Mobility Goal 1, PT) modified independence  -JR     Time Frame (Bed Mobility Goal 1, PT) short term goal (STG)  -JR     Progress/Outcomes (Bed Mobility Goal 1, PT) goal ongoing  -JR       Row Name 07/31/24 1019          Transfer Goal 1 (PT)    Activity/Assistive Device (Transfer  Goal 1, PT) sit-to-stand/stand-to-sit;bed-to-chair/chair-to-bed;walker, rolling  -JR     [place] Level/Cues Needed (Transfer Goal 1, PT) contact guard required  -JR     Time Frame (Transfer Goal 1, PT) long term goal (LTG)  -JR     Progress/Outcome (Transfer Goal 1, PT) goal ongoing  -       Row Name 07/31/24 1019          Gait Training Goal 1 (PT)    Activity/Assistive Device (Gait Training Goal 1, PT) gait (walking locomotion);walker, rolling;increase endurance/gait distance;decrease fall risk;diminish gait deviation;improve balance and speed  -JR     [place] Level (Gait Training Goal 1, PT) supervision required  -JR     Distance (Gait Training Goal 1, PT) 50  -JR     Time Frame (Gait Training Goal 1, PT) long term goal (LTG)  -JR     Progress/Outcome (Gait Training Goal 1, PT) goal ongoing  -       Row Name 07/31/24 1019          Patient Education Goal (PT)    Activity (Patient Education Goal, PT) Perform BLE exercises x 15 reps  -JR     [place]/Cues/Accuracy (Memory Goal 2, PT) demonstrates adequately  -JR     Time Frame (Patient Education Goal, PT) 4 days  -JR     Progress/Outcome (Patient Education Goal, PT) goal ongoing  -       Row Name 07/31/24 1019          Therapy Assessment/Plan (PT)    Planned Therapy Interventions (PT) strengthening;balance training;bed mobility training;transfer training;gait training;patient/family education;home exercise program  -               User Key  (r) = Recorded By, (t) = Taken By, (c) = Cosigned By      Initials Name Provider Type    JR Gaye Falcon, PT Physical Therapist                   Clinical Impression       Row Name 07/31/24 1019          Pain    Pretreatment Pain Rating 6/10  -JR     Posttreatment Pain Rating 7/10  -     Pain Location - abdomen  -JR     Pain Intervention(s) Repositioned;Ambulation/increased activity  -     Additional Documentation Pain Scale: Numbers Pre/Post-Treatment (Group)  -[facility] Name 07/31/24 1019           Plan of Care Review    Plan of Care Reviewed With patient  -JR     Progress no change  -JR     Outcome Evaluation Patient participated well in skilled PT evaluation and demonstrated decreased strength, activity tolerance and overall mobility.  She required supervision for bed mobility.  She required minimal assistance to perform transfers and gait x 3 feet with RW.  She is expected to benefit from additional PT services.  -JR       Row Name 07/31/24 1019          Therapy Assessment/Plan (PT)    Patient/Family Therapy Goals Statement (PT) Patient is eager to get stronger and go home  -JR     Rehab Potential (PT) good, to achieve stated therapy goals  -JR     Criteria for Skilled Interventions Met (PT) yes;meets criteria;skilled treatment is necessary  -JR     Therapy Frequency (PT) 5 times/wk  -JR       Row Name 07/31/24 1019          Positioning and Restraints    Pre-Treatment Position in bed  -JR     Post Treatment Position chair  -JR     In Chair reclined;call light within reach;encouraged to call for assist;exit alarm on;notified nsg  -JR               User Key  (r) = Recorded By, (t) = Taken By, (c) = Cosigned By      Initials Name Provider Type    JR Gaye Falcon, PT Physical Therapist                   Outcome Measures       Row Name 07/31/24 1019 07/31/24 0819       How much help from another person do you currently need...    Turning from your back to your side while in flat bed without using bedrails? 4  -JR 4  -RP    Moving from lying on back to sitting on the side of a flat bed without bedrails? 3  -JR 4  -RP    Moving to and from a bed to a chair (including a wheelchair)? 3  -JR 3  -RP    Standing up from a chair using your arms (e.g., wheelchair, bedside chair)? 3  -JR 3  -RP    Climbing 3-5 steps with a railing? 2  -JR 1  -RP    To walk in hospital room? 3  -JR 3  -RP    AM-PAC 6 Clicks Score (PT) 18  -JR 18  -RP    Highest Level of Mobility Goal 6 --> Walk 10 steps or more  -JR 6 --> Walk 10 steps or  more  -      Row Name 07/31/24 1418 07/31/24 1019       Functional Assessment    Outcome Measure Options AM-PAC 6 Clicks Daily Activity (OT)  -[facility] 6 Clicks Basic Mobility (PT)  -              User Key  (r) = Recorded By, (t) = Taken By, (c) = Cosigned By      Initials Name Provider Type     Georgina Beasley Occupational Therapist    Gaye Stokes, PT Physical Therapist    Madai Plummer, RN Registered Nurse                                 Physical Therapy Education       Title: PT OT SLP Therapies (In Progress)       Topic: Physical Therapy (In Progress)       Point: Mobility training (Done)       Learning Progress Summary             Patient Acceptance, E,TB, VU by  at 7/31/2024 1620    Comment: Role of PT and POC                         Point: Home exercise program (Not Started)       Learner Progress:  Not documented in this visit.              Point: Body mechanics (Not Started)       Learner Progress:  Not documented in this visit.              Point: Precautions (Not Started)       Learner Progress:  Not documented in this visit.                              User Key       Initials Effective Dates Name Provider Type [facility] 08/22/23 -  Gaye Falcon, PT Physical Therapist PT                  PT Recommendation and Plan  Planned Therapy Interventions (PT): strengthening, balance training, bed mobility training, transfer training, gait training, patient/family education, home exercise program  Plan of Care Reviewed With: patient  Progress: no change  Outcome Evaluation: Patient participated well in skilled PT evaluation and demonstrated decreased strength, activity tolerance and overall mobility.  She required supervision for bed mobility.  She required minimal assistance to perform transfers and gait x 3 feet with RW.  She is expected to benefit from additional PT services.     Time Calculation:   PT Evaluation Complexity  History, PT Evaluation Complexity: 1-2 personal factors and/or  comorbidities  Examination of Body Systems (PT Eval Complexity): total of 3 or more elements  Clinical Presentation (PT Evaluation Complexity): evolving  Clinical Decision Making (PT Evaluation Complexity): moderate complexity  Overall Complexity (PT Evaluation Complexity): moderate complexity     PT Charges       Row Name 07/31/24 1019             Time Calculation    Start Time 1019  -JR      PT Received On 07/31/24  -JR      PT Goal Re-Cert Due Date 08/10/24  -JR         Untimed Charges    PT Eval/Re-eval Minutes 55  -JR         Total Minutes    Untimed Charges Total Minutes 55  -JR       Total Minutes 55  -JR                User Key  (r) = Recorded By, (t) = Taken By, (c) = Cosigned By      Initials Name Provider Type    JR Gaye Falcon, PT Physical Therapist                  Therapy Charges for Today       Code Description Service Date Service Provider Modifiers Qty    71936218410 HC PT EVAL MOD COMPLEXITY 4 7/31/2024 Gaye Falcon, PT GP 1            PT G-Codes  Outcome Measure Options: AM-PAC 6 Clicks Daily Activity (OT)  AM-PAC 6 Clicks Score (PT): 18  AM-PAC 6 Clicks Score (OT): 21  PT Discharge Summary  Anticipated Discharge Disposition (PT): home with home health    Gaye Falcon, PT  7/31/2024

## 2024-07-31 NOTE — H&P
[facility]   HISTORY AND PHYSICAL      Name:  Tasha Yarbrough   Age:  69 y.o.  Sex:  female  :  1955  MRN:  [id number]   Visit Number:  [id number]  Admission Date:  2024  Date Of Service:  24  Primary Care Physician:  Terrence Baldwin MD    Chief Complaint:     Abdominal pain    History Of Presenting Illness:      69-year-old with a history of hypertension, recurrent pancreatitis, CKD stage III, chronic diarrhea, chronic tobacco abuse, medical noncompliance, who presented from home with chief complaint of abdominal pain.  Patient is known to our service due to her previous admissions for recurrent pancreatitis.  She was evaluated at [facility] previously and supposed to be on Creon, she reports that she has not been taking it as prescribed and sometimes forget to take it.  She reports that her pain started 3 days ago, described as similar to previous episodes in her abdomen that is generalized but mostly located over her epigastric area.  She reports that she initially had nausea and vomiting on the first day but was unable to eat or drink anything after the over the past 2 days.  She denies fever, chills, chest pain, shortness of breath or urinary symptoms.    On ER evaluation, patient was hemodynamically stable and was afebrile.  Her workup showed venous BG with a pH of 6.962/HCO3 6.0.  Labs significant for potassium of 5.5, CO2 3.8, creatinine 2.33 (baseline creatinine of 1) BUN 53, glucose 197, calcium 10.8.  Lipase 179, WBC 14.5.  CT abdomen pelvis Right ovarian cyst. Consider nonemergent ultrasound characterization and follow-up. Moderate-to-large hiatal hernia similar to the prior exam. 2 mm nonobstructing right inferior and 5 mm nonobstructing right mid renal stones. Additional nonacute findings.  Case discussed by ER provider with Dr. Fernandez with nephrology, recommended bicarb drip.  Patient received sodium bicarb, Dilaudid, Zofran and was started on  bicarb drip.  Hospitalist consulted for admission, further management and treatment.    Review Of Systems:    All systems were reviewed and negative except as mentioned in history of presenting illness, assessment and plan.    Past Medical History: Patient  has a past medical history of COPD (chronic obstructive pulmonary disease), Hypertension, Impaired functional mobility, balance, gait, and endurance, Impaired mobility, Injury of back, Migraines, Multiple gastric ulcers, Pancreatitis, and UTI (urinary tract infection).    Past Surgical History: Patient  has a past surgical history that includes Tubal ligation; Colonoscopy; Colon surgery; Abdominal surgery; Esophagogastroduodenoscopy (N/A, 04/23/2024); Cholecystectomy; and Knee surgery (Right).    Social History: Patient  reports that she has been smoking cigarettes. She has never used smokeless tobacco. She reports that she does not drink alcohol and does not use drugs.    Family History:  Patient's family history has been reviewed and found to be noncontributory.     Allergies:      Rocephin [ceftriaxone], Ciprofloxacin, Codeine, and Pineapple    Home Medications:    Prior to Admission Medications       Prescriptions Last Dose Informant Patient Reported? Taking?    famotidine (PEPCID) 20 MG tablet   Yes No    Take 1 tablet by mouth 2 (Two) Times a Day.    glucosamine-chondroitin 500-400 MG capsule capsule   Yes No    Take 1 capsule by mouth 2 (Two) Times a Day With Meals. Indications: Joint Damage causing Pain and Loss of Function    methocarbamol (ROBAXIN) 750 MG tablet   Yes No    Take 1 tablet by mouth 4 (Four) Times a Day As Needed for Muscle Spasms.    ondansetron ODT (ZOFRAN-ODT) 4 MG disintegrating tablet   No No    Place 1 tablet on the tongue Every 8 (Eight) Hours As Needed for Nausea or Vomiting.    ondansetron ODT (ZOFRAN-ODT) 4 MG disintegrating tablet   No No    Place 1 tablet on the tongue Every 8 (Eight) Hours As Needed for Nausea or Vomiting.     "promethazine (PHENERGAN) 25 MG tablet   Yes No    Take 1 tablet by mouth.    sertraline (ZOLOFT) 50 MG tablet   Yes No    Take 1 tablet by mouth Daily.    sodium bicarbonate 650 MG tablet   No No    Take 1 tablet by mouth 2 (Two) Times a Day.    vitamin D3 125 MCG (5000 UT) capsule capsule   Yes No    Take 1 capsule by mouth Daily. Indications: Vitamin D Deficiency          ED Medications:    Medications   sodium chloride 0.9 % flush 10 mL (has no administration in time range)   sodium bicarbonate 8.4 % 150 mEq in dextrose (D5W) 5 % 1,000 mL infusion (greater than 100 mEq) (has no administration in time range)   sodium bicarbonate injection 8.4% 150 mEq (has no administration in time range)   HYDROmorphone (DILAUDID) injection 1 mg (1 mg Intravenous Given 7/30/24 2030)   ondansetron (ZOFRAN) injection 4 mg (4 mg Intravenous Given 7/30/24 2030)   iopamidol (ISOVUE-300) 61 % injection 100 mL (100 mL Intravenous Given 7/30/24 2212)   HYDROmorphone (DILAUDID) injection 1 mg (1 mg Intravenous Given 7/30/24 2221)     Vital Signs:  Temp:  [97.4 °F (36.3 °C)] 97.4 °F (36.3 °C)  Heart Rate:  [106] 106  Resp:  [18] 18  BP: (132-150)/(80-92) 139/92        07/30/24  1751   Weight: 60.6 kg (133 lb 8 oz)     Body mass index is 23.65 kg/m².    Physical Exam:     Most recent vital Signs: /92   Pulse 106   Temp 97.4 °F (36.3 °C) (Oral)   Resp 18   Ht 160 cm (63\")   Wt 60.6 kg (133 lb 8 oz)   SpO2 99%   BMI 23.65 kg/m²     Physical Exam  Vitals and nursing note reviewed.   Constitutional:       General: She is not in acute distress.     Appearance: She is ill-appearing.   HENT:      Head: Normocephalic and atraumatic.      Right Ear: External ear normal.      Left Ear: External ear normal.      Nose: Nose normal.      Mouth/Throat:      Mouth: Mucous membranes are dry.   Eyes:      Extraocular Movements: Extraocular movements intact.      Conjunctiva/sclera: Conjunctivae normal.      Pupils: Pupils are equal, round, and " "reactive to light.   Cardiovascular:      Rate and Rhythm: Normal rate and regular rhythm.      Pulses: Normal pulses.      Heart sounds: Normal heart sounds.   Pulmonary:      Effort: Pulmonary effort is normal. No respiratory distress.      Breath sounds: Normal breath sounds. No wheezing or rhonchi.   Abdominal:      General: Bowel sounds are normal. There is no distension.      Palpations: Abdomen is soft.      Tenderness: There is generalized abdominal tenderness. There is no guarding or rebound.   Musculoskeletal:         General: Normal range of motion.      Cervical back: Normal range of motion and neck supple.      Right lower leg: No edema.      Left lower leg: No edema.   Skin:     General: Skin is warm and dry.      Findings: No rash.   Neurological:      General: No focal deficit present.      Mental Status: She is alert and oriented to person, place, and time. Mental status is at baseline.      Motor: No weakness.   Psychiatric:         Mood and Affect: Mood normal.         Behavior: Behavior normal.         Thought Content: Thought content normal.         Laboratory data:    I have reviewed the labs done in the emergency room.    Results from last 7 days   Lab Units 07/30/24  2113   SODIUM mmol/L 137   POTASSIUM mmol/L 5.5*   CHLORIDE mmol/L 109*   CO2 mmol/L 3.8*   BUN mg/dL 53*   CREATININE mg/dL 2.33*   CALCIUM mg/dL 10.8*   BILIRUBIN mg/dL <0.2   ALK PHOS U/L 197*   ALT (SGPT) U/L 8   AST (SGOT) U/L 11   GLUCOSE mg/dL 120*     Results from last 7 days   Lab Units 07/30/24 2004   WBC 10*3/mm3 14.51*   HEMOGLOBIN g/dL 15.1   HEMATOCRIT % 48.8*   PLATELETS 10*3/mm3 359                     Results from last 7 days   Lab Units 07/30/24  2113   LIPASE U/L 179*               Invalid input(s): \"USDES\", \"NITRITITE\", \"BACT\", \"EP\"    Pain Management Panel          Latest Ref Rng & Units 8/6/2022   Pain Management Panel   Amphetamine, Urine Qual Negative Negative    Barbiturates Screen, Urine Negative " Negative    Benzodiazepine Screen, Urine Negative Negative    Buprenorphine, Screen, Urine Negative Negative    Cocaine Screen, Urine Negative Negative    Methadone Screen , Urine Negative Negative    Methamphetamine, Ur Negative Negative       Details                   Radiology:    CT Abdomen Pelvis With Contrast    Result Date: 7/30/2024  FINAL REPORT TECHNIQUE: null CLINICAL HISTORY: Leukocytosis, epigastric abdominal pain COMPARISON: null FINDINGS: CT abdomen and pelvis with contrast Comparison: CT/SR - CT ABDOMEN PELVIS W CONTRAST - 6/22/24 20:04 EDT CT/SR - CT ABDOMEN PELVIS W CONTRAST - 9/22/23 20:48 EDT Findings: Moderate-to-large hiatal hernia similar to the prior exam. 2 mm nonobstructing right inferior and 5 mm nonobstructing right mid renal stones. Bilateral simple renal cysts. Cholecystectomy. Abdominal solid organs otherwise unremarkable. No bowel obstruction, pneumoperitoneum, or pneumatosis. Right ovarian cyst 3 x 2.1 cm image 52, similar to prior exams. Uterus and ovaries otherwise unremarkable. Appendix is not identified. No inflammatory changes. L2 left compression fracture similar to prior exam. No acute fractures. Moderate to severe lumbar degenerative spondylosis similar to prior.     IMPRESSION: 1. No CT explanation for leukocytosis. 2. Right ovarian cyst. Consider nonemergent ultrasound characterization and follow-up. 3. Additional nonacute findings as above. Authenticated and Electronically Signed by Luisito Barnett MD on 07/30/2024 10:52:56 PM     Assessment:    Acute kidney injury on chronic kidney disease stage III, POA  Acute hyperkalemia, POA  Acute on chronic pancreatitis, POA  Dehydration, POA  Metabolic acidosis, POA  Chronic diarrhea  Chronic tobacco abuse  Medical noncompliance    Plan:    Patient is admitted to ICU for further management and treatment.    Pancreatitis:  -Patient with multiple bouts of chronic pancreatitis leading to significant dehydration and chronic nausea.   Underlying etiology unknown.  She is due to follow-up with .  Has seen Dr. Fleming locally.  Previously underwent cholecystectomy followed by ERCP.  Has been recommended to quit smoking. Recent workup at  also demonstrated pancreatic insufficiency and she is supposed to be on Creon now however patient is noncompliant with it.  Will keep her n.p.o. for now and will advance diet as tolerated, continue with fluids and pain control.     Dehydration/metabolic acidosis/hyperkalemia/renal failure:  -Dr. Fernandez and nephrology consulted.    -Placed on sodium bicarb drip per Dr. Fernandez.    -Monitor BMP and potassium  -Given insulin and D50  -Monitor urine output.       Chronic diarrhea:  -Combination of GI losses likely contributing to the metabolic acidosis.      -Continue home meds as warranted.  -Further orders as indicated per clinical course.    Risk Assessment: High  DVT Prophylaxis: Heparin prophylaxis (benefit> risk)  Code Status: Full  Diet: N.p.o., will advance as tolerated    Advance Care Planning   ACP discussion was held with the patient during this visit. Patient does not have an advance directive, information provided.       Vivienne Roa MD  07/30/24  23:36 EDT    Dictated utilizing Dragon dictation.

## 2024-07-31 NOTE — PLAN OF CARE
Goal Outcome Evaluation:  Plan of Care Reviewed With: patient        Progress: no change  Outcome Evaluation: Patient participated well in skilled PT evaluation and demonstrated decreased strength, activity tolerance and overall mobility.  She required supervision for bed mobility.  She required minimal assistance to perform transfers and gait x 3 feet with RW.  She is expected to benefit from additional PT services.      Anticipated Discharge Disposition (PT): home with home health

## 2024-07-31 NOTE — PROGRESS NOTES
[facility]    Name:  Tasha Yarbrough   Age:  69 y.o.  Sex:  female  :  1955  MRN:  [id number]   Visit Number:  [id number]  Admission Date:  2024  Date Of Service:  24  Primary Care Physician:  Terrence Baldwin MD     LOS: 1 day :    Chief Complaint:      Abdominal pain    Subjective:    24: endorses thirst no appetite. Pain improving.    History Of Presenting Illness:       69-year-old with a history of hypertension, recurrent pancreatitis, CKD stage III, chronic diarrhea, chronic tobacco abuse, medical noncompliance, who presented from home with chief complaint of abdominal pain.  Patient is known to our service due to her previous admissions for recurrent pancreatitis.  She was evaluated at [facility] previously and supposed to be on Creon, she reports that she has not been taking it as prescribed and sometimes forget to take it.  She reports that her pain started 3 days ago, described as similar to previous episodes in her abdomen that is generalized but mostly located over her epigastric area.  She reports that she initially had nausea and vomiting on the first day but was unable to eat or drink anything after the over the past 2 days.  She denies fever, chills, chest pain, shortness of breath or urinary symptoms.     On ER evaluation, patient was hemodynamically stable and was afebrile.  Her workup showed venous BG with a pH of 6.962/HCO3 6.0.  Labs significant for potassium of 5.5, CO2 3.8, creatinine 2.33 (baseline creatinine of 1) BUN 53, glucose 197, calcium 10.8.  Lipase 179, WBC 14.5.  CT abdomen pelvis Right ovarian cyst. Consider nonemergent ultrasound characterization and follow-up. Moderate-to-large hiatal hernia similar to the prior exam. 2 mm nonobstructing right inferior and 5 mm nonobstructing right mid renal stones. Additional nonacute findings.  Case discussed by ER provider with Dr. Fernandez with nephrology,  recommended bicarb drip.  Patient received sodium bicarb, Dilaudid, Zofran and was started on bicarb drip.  Hospitalist consulted for admission, further management and treatment.  Edited by: Mateo Storm DO at 7/31/2024 0984     Review of Systems:     All systems were reviewed and negative except as mentioned in subjective, assessment and plan.    Vital Signs:    Temp:  [97.4 °F (36.3 °C)-97.8 °F (36.6 °C)] 97.4 °F (36.3 °C)  Heart Rate:  [] 84  Resp:  [16-18] 16  BP: ()/(65-92) 113/85    Intake and output:    I/O last 3 completed shifts:  In: 675 [I.V.:675]  Out: 475 [Urine:475]  No intake/output data recorded.    Physical Examination:    Constitutional: No acute distress, awake, alert  HENT: NCAT, mucous membranes moist  Respiratory: Clear to auscultation bilaterally, respiratory effort normal   Cardiovascular: RRR, no murmurs, rubs, or gallops  Gastrointestinal: Positive bowel sounds, soft, moderate generalized abdominal tenderness, nondistended  Musculoskeletal: No bilateral ankle edema  Psychiatric: Appropriate affect, cooperative  Neurologic: Oriented x 3, speech clear  Skin: No rashes  Edited by: Mateo Storm DO at 7/31/2024 0958     Laboratory results:    Results from last 7 days   Lab Units 07/31/24  0436 07/30/24  2113   SODIUM mmol/L 144 137   POTASSIUM mmol/L 3.3* 5.5*   CHLORIDE mmol/L 111* 109*   CO2 mmol/L 11.2* 3.8*   BUN mg/dL 55* 53*   CREATININE mg/dL 2.19* 2.33*   CALCIUM mg/dL 9.8 10.8*   BILIRUBIN mg/dL  --  <0.2   ALK PHOS U/L  --  197*   ALT (SGPT) U/L  --  8   AST (SGOT) U/L  --  11   GLUCOSE mg/dL 95 120*     Results from last 7 days   Lab Units 07/31/24  0437 07/30/24 2004   WBC 10*3/mm3 11.12* 14.51*   HEMOGLOBIN g/dL 11.9* 15.1   HEMATOCRIT % 35.7 48.8*   PLATELETS 10*3/mm3 271 359                 Recent Labs     04/21/24  0015 04/21/24  0919 05/31/24  1126   PHART 7.158* 7.199* 7.230*   YYY3CUP 26.4* 25.5* 26.8*   PO2ART 104.0* 97.9 96.6   NOU3VTL 9.4* 9.9*  11.2*   BASEEXCESS -17.9* -16.6* -14.9*      I have reviewed the patient's laboratory results.    Radiology results:    CT Abdomen Pelvis With Contrast    Result Date: 7/30/2024  FINAL REPORT TECHNIQUE: null CLINICAL HISTORY: Leukocytosis, epigastric abdominal pain COMPARISON: null FINDINGS: CT abdomen and pelvis with contrast Comparison: CT/SR - CT ABDOMEN PELVIS W CONTRAST - 6/22/24 20:04 EDT CT/SR - CT ABDOMEN PELVIS W CONTRAST - 9/22/23 20:48 EDT Findings: Moderate-to-large hiatal hernia similar to the prior exam. 2 mm nonobstructing right inferior and 5 mm nonobstructing right mid renal stones. Bilateral simple renal cysts. Cholecystectomy. Abdominal solid organs otherwise unremarkable. No bowel obstruction, pneumoperitoneum, or pneumatosis. Right ovarian cyst 3 x 2.1 cm image 52, similar to prior exams. Uterus and ovaries otherwise unremarkable. Appendix is not identified. No inflammatory changes. L2 left compression fracture similar to prior exam. No acute fractures. Moderate to severe lumbar degenerative spondylosis similar to prior.     Impression: IMPRESSION: 1. No CT explanation for leukocytosis. 2. Right ovarian cyst. Consider nonemergent ultrasound characterization and follow-up. 3. Additional nonacute findings as above. Authenticated and Electronically Signed by Luisito Barnett MD on 07/30/2024 10:52:56 PM   I have reviewed the patient's radiology reports.    Medication Review:     I have reviewed the patient's active and prn medications.     Problem List:      Pancreatitis      Assessment/Plan:    Acute kidney injury on chronic kidney disease stage III, POA  Acute hyperkalemia, POA  Acute on chronic pancreatitis, POA  Dehydration, POA  Metabolic acidosis, POA  Chronic diarrhea  Chronic tobacco abuse  Medical noncompliance     Plan:     Patient is admitted to ICU for further management and treatment.     Pancreatitis:  -Patient with multiple bouts of chronic pancreatitis leading to significant  dehydration and chronic nausea.  Underlying etiology unknown.  She is due to follow-up with .  Has seen Dr. Fleming locally.  Previously underwent cholecystectomy followed by ERCP.  Has been recommended to quit smoking. Recent workup at  also demonstrated pancreatic insufficiency and she is supposed to be on Creon now however patient is noncompliant with it.  Advance to clear liquids. General surgery to place CVC.     Dehydration/metabolic acidosis/hyperkalemia resolved/renal failure:  -Dr. Fernandez and nephrology consulted.    -Placed on sodium bicarb drip per Dr. Fernandez.    -Monitor BMP and potassium  -Given insulin and D50  -Monitor urine output.   -7/31/24 improving with current regimen will need CVC to continue with fluids etc.      Chronic diarrhea:  -Combination of GI losses likely contributing to the metabolic acidosis.    -continue bicarb.     -Continue home meds as warranted.  -Further orders as indicated per clinical course.      DVT Prophylaxis: Heparin prophylaxis (benefit> risk)  Code Status: Full  Diet: clear liquids  Disposition: anticipate home in 2 to 3 days  Edited by: Mateo Storm DO at 7/31/2024 0927           Mateo Storm DO  07/31/24  09:27 EDT    Dictated utilizing Dragon dictation.

## 2024-07-31 NOTE — ED NOTES
Pt reports upper abd pain, reports some nausea and 1 episode of vomiting, reports still not eating healthy and forgetting to take creon medication occasionally, reports still smiking

## 2024-07-31 NOTE — CONSULTS
Tasha Yarbrough    1955    Primary Care Provider: Terrence Baldwin MD    Chief Complaint   Patient presents with    Abdominal Pain          SUBJECTIVE:    History of present illness: I was asked to see this patient today for evaluation and treatment of a history significant for severe pancreatitis hyperkalemia chronic renal insufficiency and dehydration along with metabolic acidosis.  She did present with the above-mentioned problems, she was recently admitted to the [facility] for treatment of chronic pancreatitis.  I have been asked to see the patient for placement of central line due to poor IV access.    Review of Systems:  Constitutional:  Negative for chills, fever, and unexpected weight change.  HENT: Negative for trouble swallowing and voice change.  Eyes:  Negative for visual disturbance.  Respiratory:  Negative for apnea, cough, chest tightness, shortness of breath, and wheezing.  Cardiovascular:  Negative for chest pain, palpitations, and leg swelling.  Gastrointestinal: Positive history of epigastric abdominal discomfort associated with nausea   musculoskeletal:  Negative for back pain, gait problem, and joint swelling.  Skin:  Negative for color change, rash, and wound  Neurological:  Negative for dizziness, syncope, speech difficulty, weakness, numbness, and headaches.  Hematological:  Negative for adenopathy.  Does not bruise/bleed easily.  Psychiatric/Behavioral:  Negative for confusion.  The patient is not nervous/anxious.        History:    Past Medical History:   Diagnosis Date    COPD (chronic obstructive pulmonary disease)     Hypertension     Impaired functional mobility, balance, gait, and endurance     Impaired mobility     Injury of back     Migraines     Multiple gastric ulcers     Pancreatitis     UTI (urinary tract infection)        Past Surgical History:   Procedure Laterality Date    ABDOMINAL SURGERY      CHOLECYSTECTOMY      COLON SURGERY      COLON RUPTURED /  [id number]  ?    COLONOSCOPY      ENDOSCOPY N/A 04/23/2024    Procedure: ESOPHAGOGASTRODUODENOSCOPY WITH BIOPSY;  Surgeon: Jairo Negro MD;  Location: [facility];  Service: Gastroenterology;  Laterality: N/A;    KNEE SURGERY Right     TOTAL REPLACEMENT    TUBAL ABDOMINAL LIGATION         Family History   Problem Relation Age of Onset    Kidney disease Mother     Emphysema Mother     Heart disease Father     Lung cancer Sister     Heart disease Paternal Uncle        Social History     Socioeconomic History    Marital status:    Tobacco Use    Smoking status: Every Day     Current packs/day: 1.00     Types: Cigarettes    Smokeless tobacco: Never   Vaping Use    Vaping status: Some Days    Substances: Flavoring   Substance and Sexual Activity    Alcohol use: Never    Drug use: Never    Sexual activity: Defer       Allergies:  Allergies   Allergen Reactions    Rocephin [Ceftriaxone] Anaphylaxis    Ciprofloxacin Dizziness    Codeine Itching    Pineapple Unknown - Low Severity       Medications:    Current Facility-Administered Medications:     acetaminophen (TYLENOL) tablet 650 mg, 650 mg, Oral, Q4H PRN **OR** acetaminophen (TYLENOL) 160 MG/5ML oral solution 650 mg, 650 mg, Oral, Q4H PRN **OR** acetaminophen (TYLENOL) suppository 650 mg, 650 mg, Rectal, Q4H PRN, Vivienne Roa MD    sennosides-docusate (PERICOLACE) 8.6-50 MG per tablet 2 tablet, 2 tablet, Oral, BID PRN **AND** polyethylene glycol (MIRALAX) packet 17 g, 17 g, Oral, Daily PRN **AND** bisacodyl (DULCOLAX) EC tablet 5 mg, 5 mg, Oral, Daily PRN **AND** bisacodyl (DULCOLAX) suppository 10 mg, 10 mg, Rectal, Daily PRN, Vivienne Roa MD    famotidine (PEPCID) tablet 20 mg, 20 mg, Oral, BID, Vivienne Roa MD, 20 mg at 07/31/24 0800    heparin (porcine) 5000 UNIT/ML injection 5,000 Units, 5,000 Units, Subcutaneous, Q12H, Vivienne Roa MD, 5,000 Units at 07/31/24 0800    HYDROcodone-acetaminophen (NORCO) 7.5-325 MG per tablet 1 tablet, 1 tablet, Oral,  Q8H PRN, Vivienne Roa MD    melatonin tablet 5 mg, 5 mg, Oral, Nightly PRN, Vivienne Roa MD    methocarbamol (ROBAXIN) tablet 750 mg, 750 mg, Oral, 4x Daily PRN, Vivienne Roa MD, 750 mg at 07/31/24 0755    Morphine sulfate (PF) injection 2 mg, 2 mg, Intravenous, Q2H PRN, 2 mg at 07/31/24 0755 **AND** naloxone (NARCAN) injection 0.4 mg, 0.4 mg, Intravenous, Q5 Min PRN, Vivienne Roa MD    nitroglycerin (NITROSTAT) SL tablet 0.4 mg, 0.4 mg, Sublingual, Q5 Min PRN, Vivienne Roa MD    ondansetron (ZOFRAN) injection 4 mg, 4 mg, Intravenous, Q6H PRN, Vivienne Roa MD, 4 mg at 07/31/24 0256    pantoprazole (PROTONIX) injection 40 mg, 40 mg, Intravenous, Q AM, Vivienne Roa MD, 40 mg at 07/31/24 0754    promethazine (PHENERGAN) tablet 25 mg, 25 mg, Oral, Q6H PRN, Vivienne Roa MD    sertraline (ZOLOFT) tablet 50 mg, 50 mg, Oral, Daily, Vivienne Rao MD, 50 mg at 07/31/24 0800    sodium bicarbonate 8.4 % 150 mEq in dextrose (D5W) 5 % 1,000 mL infusion (greater than 100 mEq), 150 mEq, Intravenous, Continuous, Vivienne Roa MD, Stopped at 07/31/24 0800    sodium bicarbonate tablet 1,300 mg, 1,300 mg, Oral, 4x Daily, Nito Fernandez MD, FASN, 1,300 mg at 07/31/24 0756    sodium chloride 0.45 % 1,000 mL with potassium chloride 40 mEq infusion, 75 mL/hr, Intravenous, Continuous, Nito Fernandez MD, FASN, Stopped at 07/31/24 0800    [COMPLETED] Insert Peripheral IV, , , Once **AND** sodium chloride 0.9 % flush 10 mL, 10 mL, Intravenous, PRN, Vivienne Roa MD    sodium chloride 0.9 % flush 10 mL, 10 mL, Intravenous, Q12H, Vivienne Roa MD, 10 mL at 07/31/24 0800    sodium chloride 0.9 % flush 10 mL, 10 mL, Intravenous, PRN, Vivienne Roa MD    sodium chloride 0.9 % flush 10 mL, 10 mL, Intravenous, Q12H, Kang Townsend MD    sodium chloride 0.9 % flush 10 mL, 10 mL, Intravenous, Q12H, Kang Townsend MD    sodium chloride 0.9 % flush 10 mL, 10 mL, Intravenous, Q12H, Kang Townsend MD     sodium chloride 0.9 % flush 10 mL, 10 mL, Intravenous, PRN, Kang Townsend MD    sodium chloride 0.9 % flush 20 mL, 20 mL, Intravenous, Cary LIZ Kent J, MD    sodium chloride 0.9 % infusion 40 mL, 40 mL, Intravenous, PRNJanina Hossam, MD    sodium chloride 0.9 % infusion 40 mL, 40 mL, Intravenous, PRN, Kang Townsend MD    OBJECTIVE:    Vital Signs:   Vitals:    07/31/24 0600 07/31/24 0700 07/31/24 0751 07/31/24 0800   BP: 101/69 103/70  113/85   Pulse: 76 108  84   Resp:       Temp:   97.4 °F (36.3 °C)    TempSrc:   Oral    SpO2: 98% 100%  98%   Weight:       Height:           Physical Exam:   General Appearance:    Alert, cooperative, in no acute distress   Head:    Normocephalic, without obvious abnormality, atraumatic   Eyes:            Lids and lashes normal, conjunctivae and sclerae normal, no   icterus, no pallor, corneas clear, PERRLA   Throat:   No oral lesions, no thrush, oral mucosa moist   Neck:   No adenopathy, supple, trachea midline, no thyromegaly, no   carotid bruit, no JVD   Lungs:     Clear to auscultation,respirations regular, even and                  unlabored    Heart:    Regular rhythm and normal rate, normal S1 and S2, no            murmur, no gallop, no rub, no click   Chest Wall:    No abnormalities observed   Abdomen:     Normal bowel sounds, no masses, no organomegaly, soft        non-tender, non-distended, no guarding, no rebound                tenderness   Extremities:   Moves all extremities well, no edema, no cyanosis, no             redness   Pulses:   Pulses palpable and equal bilaterally   Skin:   No bleeding, bruising or rash   Lymph nodes:   No palpable adenopathy   Neurologic:   Cranial nerves 2 - 12 grossly intact, sensation intact, DTR       present and equal bilaterally   Results Review:    Lab Results (last 24 hours)       Procedure Component Value Units Date/Time    Basic Metabolic Panel [[id number]]  (Abnormal) Collected: 07/31/24 0436    Specimen: Blood  Updated: 07/31/24 0533     Glucose 95 mg/dL      BUN 55 mg/dL      Creatinine 2.19 mg/dL      Sodium 144 mmol/L      Potassium 3.3 mmol/L      Chloride 111 mmol/L      CO2 11.2 mmol/L      Calcium 9.8 mg/dL      BUN/Creatinine Ratio 25.1     Anion Gap 21.8 mmol/L      eGFR 23.9 mL/min/1.73     Narrative:      GFR Normal >60  Chronic Kidney Disease <60  Kidney Failure <15      Scan Slide [[id number]] Collected: 07/31/24 0437    Specimen: Blood Updated: 07/31/24 0530     Macrocytes Slight/1+     WBC Morphology Normal     Platelet Estimate Adequate    CBC Auto Differential [[id number]]  (Abnormal) Collected: 07/31/24 0437    Specimen: Blood Updated: 07/31/24 0524     WBC 11.12 10*3/mm3      RBC 3.39 10*6/mm3      Hemoglobin 11.9 g/dL      Hematocrit 35.7 %      .3 fL      MCH 35.1 pg      MCHC 33.3 g/dL      RDW 15.4 %      RDW-SD 58.9 fl      MPV 10.2 fL      Platelets 271 10*3/mm3      Neutrophil % 79.3 %      Lymphocyte % 10.2 %      Monocyte % 9.1 %      Eosinophil % 0.0 %      Basophil % 0.4 %      Immature Grans % 1.0 %      Neutrophils, Absolute 8.83 10*3/mm3      Lymphocytes, Absolute 1.13 10*3/mm3      Monocytes, Absolute 1.01 10*3/mm3      Eosinophils, Absolute 0.00 10*3/mm3      Basophils, Absolute 0.04 10*3/mm3      Immature Grans, Absolute 0.11 10*3/mm3      nRBC 0.0 /100 WBC     Lactic Acid, Plasma [[id number]]  (Normal) Collected: 07/31/24 0055    Specimen: Blood Updated: 07/31/24 0117     Lactate 1.2 mmol/L     Blood Gas, Venous -With Co-Ox Panel: Yes [[id number]]  (Abnormal) Collected: 07/30/24 2320    Specimen: Venous Blood Updated: 07/30/24 2320     Site Right Brachial     pH, Venous 6.962 pH Units      Comment: 85 Value below critical limit        pCO2, Venous 26.7 mm Hg      Comment: 84 Value below reference range        pO2, Venous 39.2 mm Hg      HCO3, Venous 6.0 mmol/L      Comment: 84 Value below reference range        Base Excess, Venous -24.9 mmol/L      Comment: 84 Value below reference  range        O2 Saturation, Venous 73.9 %      Barometric Pressure for Blood Gas 732 mmHg      Modality Room Air     Ventilator Mode NA     Notified Who RN     Notified By [id number]     Notified Time 07/30/2024 23:22     Collected by RN     Comment: Meter: [id number]     :  [id number]       Comprehensive Metabolic Panel [[id number]]  (Abnormal) Collected: 07/30/24 2113    Specimen: Blood Updated: 07/30/24 2216     Glucose 120 mg/dL      BUN 53 mg/dL      Creatinine 2.33 mg/dL      Sodium 137 mmol/L      Potassium 5.5 mmol/L      Chloride 109 mmol/L      CO2 3.8 mmol/L      Calcium 10.8 mg/dL      Total Protein 8.6 g/dL      Albumin 5.0 g/dL      ALT (SGPT) 8 U/L      AST (SGOT) 11 U/L      Alkaline Phosphatase 197 U/L      Total Bilirubin <0.2 mg/dL      Globulin 3.6 gm/dL      A/G Ratio 1.4 g/dL      BUN/Creatinine Ratio 22.7     Anion Gap 24.2 mmol/L      eGFR 22.1 mL/min/1.73     Narrative:      GFR Normal >60  Chronic Kidney Disease <60  Kidney Failure <15      Lipase [[id number]]  (Abnormal) Collected: 07/30/24 2113    Specimen: Blood Updated: 07/30/24 2204     Lipase 179 U/L     CBC & Differential [[id number]]  (Abnormal) Collected: 07/30/24 2004    Specimen: Blood Updated: 07/30/24 2036    Narrative:      The following orders were created for panel order CBC & Differential.  Procedure                               Abnormality         Status                     ---------                               -----------         ------                     CBC Auto Differential[[id number]]        Abnormal            Final result               Scan Slide[[id number]]                                       Final result                 Please view results for these tests on the individual orders.    Scan Slide [[id number]] Collected: 07/30/24 2004    Specimen: Blood Updated: 07/30/24 2036     Macrocytes Large/3+     WBC Morphology Normal     Platelet Morphology Normal    CBC Auto Differential [[id number]]  (Abnormal)  Collected: 07/30/24 2004    Specimen: Blood Updated: 07/30/24 2025     WBC 14.51 10*3/mm3      RBC 4.37 10*6/mm3      Hemoglobin 15.1 g/dL      Hematocrit 48.8 %      .7 fL      MCH 34.6 pg      MCHC 30.9 g/dL      RDW 15.5 %      RDW-SD 65.4 fl      MPV 10.0 fL      Platelets 359 10*3/mm3      Neutrophil % 90.2 %      Lymphocyte % 5.0 %      Monocyte % 3.2 %      Eosinophil % 0.0 %      Basophil % 0.6 %      Immature Grans % 1.0 %      Neutrophils, Absolute 13.09 10*3/mm3      Lymphocytes, Absolute 0.72 10*3/mm3      Monocytes, Absolute 0.46 10*3/mm3      Eosinophils, Absolute 0.00 10*3/mm3      Basophils, Absolute 0.09 10*3/mm3      Immature Grans, Absolute 0.15 10*3/mm3      nRBC 0.0 /100 WBC               I reviewed the patient's new clinical results.  I reviewed the patient's new imaging results and agree with the interpretation.  I reviewed the patient's other test results and agree with the interpretation    ASSESSMENT PLAN:      Pancreatitis      I did have a detailed and extensive discussion with the patient in the ICU today along with the hospitalist service and the nursing staff.  She needs to undergo central line placement, risk and benefits of operative versus nonoperative intervention were discussed with the patient, including the possibility of infection, bleeding, possible pneumothorax, etc.  She understands, agrees, and wishes to proceed with the above-mentioned surgical treatment plan.        I discussed the patients findings and my recommendations with patient, nursing staff, and consulting provider    Kang Townsend MD  07/31/24  10:04 EDT

## 2024-07-31 NOTE — THERAPY EVALUATION
Patient Name: Tasha Yarbrough  : 1955    MRN: [id number]                              Today's Date: 2024       Admit Date: 2024    Visit Dx:     ICD-10-CM ICD-9-CM   1. Chronic pancreatitis, unspecified pancreatitis type  K86.1 577.1   2. Metabolic acidosis  E87.20 276.2     Patient Active Problem List   Diagnosis    Colon cancer screening    Gastroesophageal reflux disease with esophagitis    Left lower quadrant abdominal pain    Irritable bowel syndrome with constipation    Encounter for screening for malignant neoplasm of colon    Periumbilical abdominal pain    Diarrhea of presumed infectious origin    Acute kidney injury    Bacterial colitis    VIVIENNE (acute kidney injury)    C. difficile colitis    Generalized abdominal pain    Diarrhea of infectious origin    Abnormal finding on GI tract imaging    Anemia, chronic disease    Colitis due to Clostridium difficile    Pancreatitis, acute    Chronic kidney disease, stage III (moderate)    Acute pancreatitis    Acute UTI (urinary tract infection)    Epigastric pain    Nausea and vomiting in adult    Moderate malnutrition    Hyperkalemia    Chronic pancreatitis    Pancreatitis     Past Medical History:   Diagnosis Date    COPD (chronic obstructive pulmonary disease)     Hypertension     Impaired functional mobility, balance, gait, and endurance     Impaired mobility     Injury of back     Migraines     Multiple gastric ulcers     Pancreatitis     UTI (urinary tract infection)      Past Surgical History:   Procedure Laterality Date    ABDOMINAL SURGERY      CHOLECYSTECTOMY      COLON SURGERY      COLON RUPTURED /  [id number] ?    COLONOSCOPY      ENDOSCOPY N/A 2024    Procedure: ESOPHAGOGASTRODUODENOSCOPY WITH BIOPSY;  Surgeon: Jairo Negro MD;  Location: [facility] ENDOSCOPY;  Service: Gastroenterology;  Laterality: N/A;    KNEE SURGERY Right     TOTAL REPLACEMENT    TUBAL ABDOMINAL LIGATION        General Information       Row Name 24 1347           OT Time and Intention    Document Type evaluation  -     Mode of Treatment occupational therapy  -[facility] Name 07/31/24 1347          General Information    Patient Profile Reviewed yes  -     Prior Level of Function independent:;ADL's;all household mobility  uses a cane at home, but a w/c for community distances.  -     Existing Precautions/Restrictions fall  -     Barriers to Rehab medically complex  -[facility] Name 07/31/24 1347          Occupational Profile    Reason for Services/Referral (Occupational Profile) ADL decline  -[facility] Name 07/31/24 1347          Living Environment    People in Home child(debra), adult  lives home with her sons  -[facility] Name 07/31/24 1347          Home Main Entrance    Number of Stairs, Main Entrance one  -[facility] Name 07/31/24 1347          Cognition    Orientation Status (Cognition) oriented x 4  -[facility] Name 07/31/24 1347          Safety Issues, Functional Mobility    Safety Issues Affecting Function (Mobility) safety precaution awareness;safety precautions follow-through/compliance  -     Impairments Affecting Function (Mobility) endurance/activity tolerance;strength  -               User Key  (r) = Recorded By, (t) = Taken By, (c) = Cosigned By      Initials Name Provider Type     Georgina Beasley Occupational Therapist                     Mobility/ADL's       [facility] Name 07/31/24 1409          Bed Mobility    Bed Mobility bed mobility (all) activities  -     All Activities, [place] (Bed Mobility) supervision  -     Assistive Device (Bed Mobility) head of bed elevated  -[facility] Name 07/31/24 1409          Transfers    Transfers sit-stand transfer  -[facility] Name 07/31/24 1409          Sit-Stand Transfer    Sit-Stand [place] (Transfers) minimum assist (75% patient effort)  -     Assistive Device (Sit-Stand Transfers) walker, front-wheeled  -[facility] Name 07/31/24 1409          Functional Mobility     Functional Mobility- Ind. Level minimum assist (75% patient effort)  -     Functional Mobility- Device walker, front-wheeled  -     Functional Mobility-Distance (Feet) 3  -     Patient was able to Ambulate yes  -[facility] Name 07/31/24 1409          Activities of Daily Living    BADL Assessment/Intervention bathing;upper body dressing;lower body dressing;grooming;feeding;toileting  -AH       Row Name 07/31/24 1409          Bathing Assessment/Intervention    [place] Level (Bathing) set up  -AH       Row Name 07/31/24 1409          Upper Body Dressing Assessment/Training    [place] Level (Upper Body Dressing) independent  -AH       Row Name 07/31/24 1409          Lower Body Dressing Assessment/Training    [place] Level (Lower Body Dressing) don;pants/bottoms;socks;standby assist  -AH       Row Name 07/31/24 1409          Grooming Assessment/Training    [place] Level (Grooming) independent  -AH       Row Name 07/31/24 1409          Self-Feeding Assessment/Training    [place] Level (Feeding) independent  -AH       Row Name 07/31/24 1409          Toileting Assessment/Training    [place] Level (Toileting) contact guard assist  -               User Key  (r) = Recorded By, (t) = Taken By, (c) = Cosigned By      Initials Name Provider Type    Georgina Butterfield Occupational Therapist                   Obj/Interventions       [facility] Name 07/31/24 1410          Sensory Assessment (Somatosensory)    Sensory Assessment (Somatosensory) sensation intact  -AH       Row Name 07/31/24 1410          Vision Assessment/Intervention    Visual Impairment/Limitations WFL  -AH       Row Name 07/31/24 1410          Range of Motion Comprehensive    General Range of Motion bilateral upper extremity ROM WFL  -AH       Row Name 07/31/24 1410          Strength Comprehensive (MMT)    Comment, General Manual Muscle Testing (MMT) Assessment [facility]               User Key  (r) = Recorded By, (t) = Taken By,  (c) = Cosigned By      Initials Name Provider Type    Georgina Butterfield Occupational Therapist                   Goals/Plan       Row Name 07/31/24 1414          Bed Mobility Goal 1 (OT)    Activity/Assistive Device (Bed Mobility Goal 1, OT) bed mobility activities, all  -     [place] Level/Cues Needed (Bed Mobility Goal 1, OT) independent  -     Time Frame (Bed Mobility Goal 1, OT) by discharge  -     Progress/Outcomes (Bed Mobility Goal 1, OT) goal ongoing  -[facility] Name 07/31/24 1414          Transfer Goal 1 (OT)    Activity/Assistive Device (Transfer Goal 1, OT) sit-to-stand/stand-to-sit;walker, rolling  -     [place] Level/Cues Needed (Transfer Goal 1, OT) supervision required  -     Time Frame (Transfer Goal 1, OT) by discharge  -     Progress/Outcome (Transfer Goal 1, OT) goal ongoing  -AH       Row Name 07/31/24 1414          Bathing Goal 1 (OT)    Activity/Device (Bathing Goal 1, OT) bathing skills, all  -     [place] Level/Cues Needed (Bathing Goal 1, OT) set-up required  -     Time Frame (Bathing Goal 1, OT) long term goal (LTG);5 days  -     Progress/Outcomes (Bathing Goal 1, OT) goal ongoing  -AH       Row Name 07/31/24 1414          Toileting Goal 1 (OT)    Activity/Device (Toileting Goal 1, OT) adjust/manage clothing;perform perineal hygiene  -     [place] Level/Cues Needed (Toileting Goal 1, OT) modified independence  -AH     Time Frame (Toileting Goal 1, OT) by discharge  -     Progress/Outcome (Toileting Goal 1, OT) goal ongoing  -AH       Row Name 07/31/24 1414          Strength Goal 1 (OT)    Strength Goal 1 (OT) Pt will perform UB strengthening ex using theraband for resistance.  -     Time Frame (Strength Goal 1, OT) by discharge  -     Progress/Outcome (Strength Goal 1, OT) goal ongoing  -AH       Row Name 07/31/24 1414          Therapy Assessment/Plan (OT)    Planned Therapy Interventions (OT) activity tolerance training;BADL  retraining;patient/caregiver education/training;transfer/mobility retraining;strengthening exercise  -               User Key  (r) = Recorded By, (t) = Taken By, (c) = Cosigned By      Initials Name Provider Type    Georgina Butterfield Occupational Therapist                   Clinical Impression       Row Name 07/31/24 1410          Pain Assessment    Pretreatment Pain Rating 6/10  -     Posttreatment Pain Rating 7/10  -     Pain Location - abdomen  -     Pain Intervention(s) Repositioned;Ambulation/increased activity  -AH       Row Name 07/31/24 1410          Plan of Care Review    Plan of Care Reviewed With patient  -     Progress no change  -     Outcome Evaluation OT evaluation completed today.  Pt presents with weakness, pain and decreased activity tolerance.  Pt able to sit eob with supervision, stood with min assist and walked 3' with min assist using RW.  Pt is expected to benefit from skilled OT to improve her strength and independence with ADL tasks.  -AH       Row Name 07/31/24 1410          Therapy Assessment/Plan (OT)    Patient/Family Therapy Goal Statement (OT) d/c home  -     Rehab Potential (OT) good, to achieve stated therapy goals  -     Criteria for Skilled Therapeutic Interventions Met (OT) yes;skilled treatment is necessary  -     Therapy Frequency (OT) 5 times/wk  -AH       Row Name 07/31/24 1410          Therapy Plan Review/Discharge Plan (OT)    Anticipated Discharge Disposition (OT) home;home with home health  -[facility] 07/31/24 1410          Positioning and Restraints    Pre-Treatment Position in bed  -     Post Treatment Position chair  -     In Chair reclined;call light within reach;encouraged to call for assist;exit alarm on;notified nsg  -               User Key  (r) = Recorded By, (t) = Taken By, (c) = Cosigned By      Initials Name Provider Type    Georgina Butterfield Occupational Therapist                   Outcome Measures       [facility] Name 07/31/24 2429           How much help from another is currently needed...    Putting on and taking off regular lower body clothing? 3  -AH     Bathing (including washing, rinsing, and drying) 3  -AH     Toileting (which includes using toilet bed pan or urinal) 3  -AH     Putting on and taking off regular upper body clothing 4  -AH     Taking care of personal grooming (such as brushing teeth) 4  -AH     Eating meals 4  -AH     AM-PAC 6 Clicks Score (OT) 21  -AH       Row Name 07/31/24 0819 07/31/24 0251       How much help from another person do you currently need...    Turning from your back to your side while in flat bed without using bedrails? 4  -RP 4  -CG    Moving from lying on back to sitting on the side of a flat bed without bedrails? 4  -RP 4  -CG    Moving to and from a bed to a chair (including a wheelchair)? 3  -RP 3  -CG    Standing up from a chair using your arms (e.g., wheelchair, bedside chair)? 3  -RP 3  -CG    Climbing 3-5 steps with a railing? 1  -RP 1  -CG    To walk in hospital room? 3  -RP 3  -CG    AM-PAC 6 Clicks Score (PT) 18  -RP 18  -CG    Highest Level of Mobility Goal 6 --> Walk 10 steps or more  -RP 6 --> Walk 10 steps or more  -CG      Row Name 07/31/24 1418          Functional Assessment    Outcome Measure Options AM-PAC 6 Clicks Daily Activity (OT)  -               User Key  (r) = Recorded By, (t) = Taken By, (c) = Cosigned By      Initials Name Provider Type     Georgina Beasley Occupational Therapist    Armando Rivas RN Registered Nurse    Madai Plummer RN Registered Nurse                    Occupational Therapy Education       Title: PT OT SLP Therapies (In Progress)       Topic: Occupational Therapy (In Progress)       Point: ADL training (Done)       Description:   Instruct learner(s) on proper safety adaptation and remediation techniques during self care or transfers.   Instruct in proper use of assistive devices.                  Learning Progress Summary             Patient  Acceptance, E,TB, VU by  at 7/31/2024 8509    Comment: Role of OT/POC                         Point: Home exercise program (Not Started)       Description:   Instruct learner(s) on appropriate technique for monitoring, assisting and/or progressing therapeutic exercises/activities.                  Learner Progress:  Not documented in this visit.              Point: Precautions (Not Started)       Description:   Instruct learner(s) on prescribed precautions during self-care and functional transfers.                  Learner Progress:  Not documented in this visit.              Point: Body mechanics (Not Started)       Description:   Instruct learner(s) on proper positioning and spine alignment during self-care, functional mobility activities and/or exercises.                  Learner Progress:  Not documented in this visit.                              User Key       Initials Effective Dates Name Provider Type Discipline     06/16/21 -  Georgina Beasley Occupational Therapist OT                  OT Recommendation and Plan  Planned Therapy Interventions (OT): activity tolerance training, BADL retraining, patient/caregiver education/training, transfer/mobility retraining, strengthening exercise  Therapy Frequency (OT): 5 times/wk  Plan of Care Review  Plan of Care Reviewed With: patient  Progress: no change  Outcome Evaluation: OT evaluation completed today.  Pt presents with weakness, pain and decreased activity tolerance.  Pt able to sit eob with supervision, stood with min assist and walked 3' with min assist using RW.  Pt is expected to benefit from skilled OT to improve her strength and independence with ADL tasks.     Time Calculation:   Evaluation Complexity (OT)  Review Occupational Profile/Medical/Therapy History Complexity: expanded/moderate complexity  Assessment, Occupational Performance/Identification of Deficit Complexity: 3-5 performance deficits  Clinical Decision Making Complexity (OT): detailed  assessment/moderate complexity  Overall Complexity of Evaluation (OT): moderate complexity     Time Calculation- OT       Row Name 07/31/24 1418             Time Calculation- OT    OT Start Time 1020  -      OT Received On 07/31/24  -      OT Goal Re-Cert Due Date 08/10/24  -         Untimed Charges    OT Eval/Re-eval Minutes 53  -AH         Total Minutes    Untimed Charges Total Minutes 53  -AH       Total Minutes 53  -AH                User Key  (r) = Recorded By, (t) = Taken By, (c) = Cosigned By      Initials Name Provider Type    Georgina Butterfield Occupational Therapist                  Therapy Charges for Today       Code Description Service Date Service Provider Modifiers Qty    [id number] HC OT EVAL MOD COMPLEXITY 4 7/31/2024 Georgina Beasley GO 1                 Georgina Beasley  7/31/2024

## 2024-07-31 NOTE — CONSULTS
[facility]      Nephrology Consultation      Referring Provider:   Mateo Storm DO    Reason for Consultation:  Acute Kidney Injury on chronic kidney disease 3 and associated problems.      Subjective:  Chief complaint   Chief Complaint   Patient presents with    Abdominal Pain     History of present illness:    69-year-old with a history of hypertension, recurrent pancreatitis, CKD stage III, chronic diarrhea, chronic tobacco abuse, medical noncompliance, who presented from home with chief complaint of abdominal pain.  Patient is known to our service due to her previous admissions for recurrent pancreatitis.  She was evaluated at [facility] previously and supposed to be on Creon, she reports that she has not been taking it as prescribed and sometimes forget to take it; She says she takes it 1-3 times per day, varies greatly.  She reports that her pain started 3 days ago, described as similar to previous episodes in her abdomen that is generalized but mostly located over her epigastric area, feels like her ribs have been kicked in.  She reports that she initially had nausea and vomiting on the first day but was unable to eat or drink anything after the over the past 2 days.  She was also having ear pain and headache.  She denies fever, chills, chest pain, shortness of breath or urinary symptoms.   Has been drinking about 2 bottles of water per day.  Son is at the bedside he said she usually misses her Creon, but also appears to be taking bicarb only once a day.  He also mentioned that he told her about 3 days ago that she needs to go to the hospital but she refused.  She denies any of the issues that were raised by the son.   have reviewed labs/imaging/records from this hospitalization, including ER staff and admitting/attending physicians H/P's and progress notes to establish a comprehensive understanding of this patient's clinical hospital course, as well as to establish  plan of care appropriately.     Past Medical History:   Diagnosis Date    COPD (chronic obstructive pulmonary disease)     Hypertension     Impaired functional mobility, balance, gait, and endurance     Impaired mobility     Injury of back     Migraines     Multiple gastric ulcers     Pancreatitis     UTI (urinary tract infection)        Past Surgical History:   Procedure Laterality Date    ABDOMINAL SURGERY      CHOLECYSTECTOMY      COLON SURGERY      COLON RUPTURED /  [id number] ?    COLONOSCOPY      ENDOSCOPY N/A 04/23/2024    Procedure: ESOPHAGOGASTRODUODENOSCOPY WITH BIOPSY;  Surgeon: Jairo Negro MD;  Location: [facility] ENDOSCOPY;  Service: Gastroenterology;  Laterality: N/A;    KNEE SURGERY Right     TOTAL REPLACEMENT    TUBAL ABDOMINAL LIGATION       Family History   Problem Relation Age of Onset    Kidney disease Mother     Emphysema Mother     Heart disease Father     Lung cancer Sister     Heart disease Paternal Uncle      negative h/o ESRD     Social History     Tobacco Use    Smoking status: Every Day     Current packs/day: 1.00     Types: Cigarettes    Smokeless tobacco: Never   Vaping Use    Vaping status: Some Days    Substances: Flavoring   Substance Use Topics    Alcohol use: Never    Drug use: Never     Home medications:   Prior to Admission Medications       Prescriptions Last Dose Informant Patient Reported? Taking?    famotidine (PEPCID) 20 MG tablet   Yes No    Take 1 tablet by mouth 2 (Two) Times a Day.    glucosamine-chondroitin 500-400 MG capsule capsule   Yes No    Take 1 capsule by mouth 2 (Two) Times a Day With Meals. Indications: Joint Damage causing Pain and Loss of Function    methocarbamol (ROBAXIN) 750 MG tablet   Yes No    Take 1 tablet by mouth 4 (Four) Times a Day As Needed for Muscle Spasms.    ondansetron ODT (ZOFRAN-ODT) 4 MG disintegrating tablet   No No    Place 1 tablet on the tongue Every 8 (Eight) Hours As Needed for Nausea or Vomiting.    ondansetron ODT (ZOFRAN-ODT) 4 MG  disintegrating tablet   No No    Place 1 tablet on the tongue Every 8 (Eight) Hours As Needed for Nausea or Vomiting.    promethazine (PHENERGAN) 25 MG tablet   Yes No    Take 1 tablet by mouth.    sertraline (ZOLOFT) 50 MG tablet   Yes No    Take 1 tablet by mouth Daily.    sodium bicarbonate 650 MG tablet   No No    Take 1 tablet by mouth 2 (Two) Times a Day.    vitamin D3 125 MCG (5000 UT) capsule capsule   Yes No    Take 1 capsule by mouth Daily. Indications: Vitamin D Deficiency          Emergency department medications:   Medications   sodium chloride 0.9 % flush 10 mL (has no administration in time range)   sodium bicarbonate 8.4 % 150 mEq in dextrose (D5W) 5 % 1,000 mL infusion (greater than 100 mEq) (150 mEq Intravenous New Bag 7/31/24 1732)   famotidine (PEPCID) tablet 20 mg (20 mg Oral Given 7/31/24 2107)   methocarbamol (ROBAXIN) tablet 750 mg (750 mg Oral Given 7/31/24 1633)   promethazine (PHENERGAN) tablet 25 mg (has no administration in time range)   sertraline (ZOLOFT) tablet 50 mg (50 mg Oral Given 7/31/24 0800)   sodium chloride 0.9 % flush 10 mL (10 mL Intravenous Given 7/31/24 2108)   sodium chloride 0.9 % flush 10 mL (10 mL Intravenous Given 7/31/24 1033)   sodium chloride 0.9 % infusion 40 mL (has no administration in time range)   acetaminophen (TYLENOL) tablet 650 mg (has no administration in time range)     Or   acetaminophen (TYLENOL) 160 MG/5ML oral solution 650 mg (has no administration in time range)     Or   acetaminophen (TYLENOL) suppository 650 mg (has no administration in time range)   ondansetron (ZOFRAN) injection 4 mg (4 mg Intravenous Given 7/31/24 0256)   heparin (porcine) 5000 UNIT/ML injection 5,000 Units (5,000 Units Subcutaneous Given 7/31/24 2107)   nitroglycerin (NITROSTAT) SL tablet 0.4 mg (has no administration in time range)   Morphine sulfate (PF) injection 2 mg (2 mg Intravenous Given 7/31/24 1409)     And   naloxone (NARCAN) injection 0.4 mg (has no administration  in time range)   HYDROcodone-acetaminophen (NORCO) 7.5-325 MG per tablet 1 tablet (1 tablet Oral Given 7/31/24 2107)   sennosides-docusate (PERICOLACE) 8.6-50 MG per tablet 2 tablet (has no administration in time range)     And   polyethylene glycol (MIRALAX) packet 17 g (has no administration in time range)     And   bisacodyl (DULCOLAX) EC tablet 5 mg (has no administration in time range)     And   bisacodyl (DULCOLAX) suppository 10 mg (has no administration in time range)   melatonin tablet 5 mg (has no administration in time range)   pantoprazole (PROTONIX) injection 40 mg (40 mg Intravenous Given 7/31/24 0754)   sodium bicarbonate tablet 1,300 mg (1,300 mg Oral Given 7/31/24 2107)   sodium chloride 0.45 % 1,000 mL with potassium chloride 40 mEq infusion (75 mL/hr Intravenous Rate/Dose Verify 7/31/24 1722)   sodium chloride 0.9 % flush 10 mL (10 mL Intravenous Given 7/31/24 2108)   sodium chloride 0.9 % flush 10 mL (10 mL Intravenous Given 7/31/24 2108)   sodium chloride 0.9 % flush 10 mL (10 mL Intravenous Given 7/31/24 2107)   sodium chloride 0.9 % flush 10 mL (10 mL Intravenous Given 7/31/24 1033)   sodium chloride 0.9 % flush 20 mL (has no administration in time range)   sodium chloride 0.9 % infusion 40 mL (has no administration in time range)   HYDROmorphone (DILAUDID) injection 1 mg (1 mg Intravenous Given 7/30/24 2030)   ondansetron (ZOFRAN) injection 4 mg (4 mg Intravenous Given 7/30/24 2030)   iopamidol (ISOVUE-300) 61 % injection 100 mL (100 mL Intravenous Given 7/30/24 2212)   HYDROmorphone (DILAUDID) injection 1 mg (1 mg Intravenous Given 7/30/24 2221)   sodium bicarbonate injection 8.4% 150 mEq (150 mEq Intravenous Given 7/31/24 0026)   dextrose (D50W) (25 g/50 mL) IV injection 50 mL (50 mL Intravenous Given 7/31/24 0309)   insulin regular (humuLIN R,novoLIN R) injection 10 Units (10 Units Intravenous Given 7/31/24 0309)       Allergies:  Rocephin [ceftriaxone], Ciprofloxacin, Codeine, and  "Pineapple    Review of Systems  14 point review of system were done and are negative except as mentioned in the history of present illness and assessment and plan.      Physical Exam:  Objective:  Vital Signs  /59   Pulse 87   Temp 98.5 °F (36.9 °C) (Oral)   Resp 16   Ht 160 cm (63\")   Wt 55.8 kg (123 lb 0.3 oz)   SpO2 97%   BMI 21.79 kg/m²   Objective    I/O this shift:  In: -   Out: 400 [Urine:400]    Intake/Output Summary (Last 24 hours) at 7/31/2024 2147  Last data filed at 7/31/2024 1938  Gross per 24 hour   Intake 3526.25 ml   Output 1025 ml   Net 2501.25 ml        Physical Exam     Constitutional: Awake, alert  Eyes: sclerae anicteric, no conjunctival injection  HEENT: mucous membranes dry  Neck: Supple, no thyromegaly, no lymphadenopathy, trachea midline, No JVD  Respiratory: Clear to auscultation bilaterally, nonlabored respirations   Cardiovascular: RRR, no murmurs, rubs, or gallops.  Gastrointestinal: Positive bowel sounds, obese, soft, +tender, nondistended  Musculoskeletal: Trace edema, no clubbing or cyanosis  Psychiatric: Appropriate affect, cooperative  Neurologic: Oriented x 3, moving all extremities, Cranial Nerves grossly intact, speech clear  Skin: warm and dry, no rashes            Results Review:   Results from last 7 days   Lab Units 07/31/24  0436 07/30/24 2113   SODIUM mmol/L 144 137   POTASSIUM mmol/L 3.3* 5.5*   CHLORIDE mmol/L 111* 109*   CO2 mmol/L 11.2* 3.8*   BUN mg/dL 55* 53*   CREATININE mg/dL 2.19* 2.33*   CALCIUM mg/dL 9.8 10.8*   ALBUMIN g/dL  --  5.0   BILIRUBIN mg/dL  --  <0.2   ALK PHOS U/L  --  197*   ALT (SGPT) U/L  --  8   AST (SGOT) U/L  --  11   GLUCOSE mg/dL 95 120*     Estimated Creatinine Clearance: 21.4 mL/min (A) (by C-G formula based on SCr of 2.19 mg/dL (H)).          Results from last 7 days   Lab Units 07/31/24  0437 07/30/24 2004   WBC 10*3/mm3 11.12* 14.51*   HEMOGLOBIN g/dL 11.9* 15.1   PLATELETS 10*3/mm3 271 359         Brief Urine Lab Results  " "(Last result in the past 365 days)        Color   Clarity   Blood   Leuk Est   Nitrite   Protein   CREAT   Urine HCG        07/13/24 2101 Yellow   Clear   Negative   Negative   Negative   30 mg/dL (1+)                 No results found for: \"UTPCR\"  Imaging Results (Last 24 Hours)       Procedure Component Value Units Date/Time    XR Ribs Right With PA Chest [[id number]] Collected: 07/31/24 2028     Updated: 07/31/24 2035    Narrative:      FINAL REPORT    TECHNIQUE:  3 views of the bilateral ribs were obtained.    CLINICAL HISTORY:  right ribs pain, anterior lateral, no known injury.    FINDINGS:  A right central venous catheter ends in the SVC.  There is no  pneumothorax.  The heart is normal in size.  The mediastinum is  unremarkable.  A small calcified granuloma is noted in the left  lower lung field.  The lungs are otherwise clear.  There is no  pleural effusion or pneumothorax.  Three views demonstrate no  acute displaced fracture or other acute osseous abnormality.      Impression:      No acute disease.  Specifically, no right rib fracture is  identified.    Authenticated and Electronically Signed by Sergio Silverio M.D. on  07/31/2024 08:34:57 PM    XR Chest 1 View [[id number]] Collected: 07/31/24 1017     Updated: 07/31/24 1021    Narrative:      PROCEDURE: XR CHEST 1 VW-     HISTORY: line placement; K86.1-Other chronic pancreatitis;  E87.20-Acidosis, unspecified     COMPARISON: May 30, 2024.     FINDINGS: The heart is normal in size. The lungs are clear except for  mild, stable bilateral chronic change. There is evidence of old  calcified granulomatous disease.. The mediastinum is unremarkable. There  is no pneumothorax.  There are no acute osseous abnormalities. Small  hiatal hernia again noted. Right subclavian line has been placed since  the prior exam. Tip is in the region of the junction of the SVC and  right atrium.       Impression:      Placement of right subclavian line as described " without  pneumothorax.                 This report was signed and finalized on 7/31/2024 10:18 AM by Lucía Bethea MD.       CT Abdomen Pelvis With Contrast [[id number]] Collected: 07/30/24 2252     Updated: 07/30/24 2254    Narrative:      FINAL REPORT    TECHNIQUE:  null    CLINICAL HISTORY:  Leukocytosis, epigastric abdominal pain    COMPARISON:  null    FINDINGS:  CT abdomen and pelvis with contrast    Comparison: CT/SR - CT ABDOMEN PELVIS W CONTRAST - 6/22/24 20:04 EDT    CT/SR - CT ABDOMEN PELVIS W CONTRAST - 9/22/23 20:48 EDT    Findings:    Moderate-to-large hiatal hernia similar to the prior exam.    2 mm nonobstructing right inferior and 5 mm nonobstructing right mid renal stones.    Bilateral simple renal cysts.    Cholecystectomy. Abdominal solid organs otherwise unremarkable.    No bowel obstruction, pneumoperitoneum, or pneumatosis.    Right ovarian cyst 3 x 2.1 cm image 52, similar to prior exams.    Uterus and ovaries otherwise unremarkable.    Appendix is not identified. No inflammatory changes.    L2 left compression fracture similar to prior exam. No acute fractures.    Moderate to severe lumbar degenerative spondylosis similar to prior.      Impression:      IMPRESSION:    1. No CT explanation for leukocytosis.    2. Right ovarian cyst. Consider nonemergent ultrasound characterization and follow-up.    3. Additional nonacute findings as above.    Authenticated and Electronically Signed by Luisito Barnett MD  on 07/30/2024 10:52:56 PM          famotidine, 20 mg, Oral, BID  heparin (porcine), 5,000 Units, Subcutaneous, Q12H  pantoprazole, 40 mg, Intravenous, Q AM  sertraline, 50 mg, Oral, Daily  sodium bicarbonate, 1,300 mg, Oral, 4x Daily  sodium chloride, 10 mL, Intravenous, Q12H  sodium chloride, 10 mL, Intravenous, Q12H  sodium chloride, 10 mL, Intravenous, Q12H  sodium chloride, 10 mL, Intravenous, Q12H      sodium bicarbonate, 150 mEq, Last Rate: 150 mEq (07/31/24 2262)  sodium chloride  0.45 % 1,000 mL with potassium chloride 40 mEq infusion, 75 mL/hr, Last Rate: 75 mL/hr (07/31/24 1722)        Assessment/Plan:      Pancreatitis  VIVIENNE on CKD 3b: Most likely due to dehydration/GI losses from vomiting and chronic diarrhea, severe acidosis noted but has responded well to IV bicarb push as well as IV infusion of bicarb.  She appears to be tolerating p.o. well will start oral sodium bicarb.  Severe metabolic acidosis: Significant improvement noted.  Chronic pancreatitis: Causing chronic nausea and vomiting with subsequent dehydration, encouraged to be compliant with her medications.  Pancreatic insufficiency: Patient noncompliant with Creon.  Hypertension: Currently hypotensive.    Risk and complexity: Moderate      Plan:  Patient on bicarb drip for metabolic acidosis, potassium has gone down significantly.  I will go ahead and continue with the bicarb drip and start her on half-normal saline with 40 of potassium at 75 cc an hour for 24 hours.  Continue with rest of the current treatment plan and surveillance labs.  Details were discussed with the patient as well as family in the room.  Son at the bedside.  Details were also discussed with the hospitalist service.   Further recommendations will depend on clinical course of the patient during the current hospitalization.    I also discussed the details with the nursing staff.  Rest as ordered.    In closing, I sincerely appreciate opportunity to participate in care of this patient. If I can be of any further assistance with the management of this patient, please don’t hesitate to contact me.    Nito Fernandez MD, WINNIE    07/31/24  21:47 EDT    Dictated using Dragon.

## 2024-07-31 NOTE — ED NOTES
Attempted to call report, nurse in another pts room, will call back   Ketoconazole Counseling:   Patient counseled regarding improving absorption with orange juice.  Adverse effects include but are not limited to breast enlargement, headache, diarrhea, nausea, upset stomach, liver function test abnormalities, taste disturbance, and stomach pain.  There is a rare possibility of liver failure that can occur when taking ketoconazole. The patient understands that monitoring of LFTs may be required, especially at baseline. The patient verbalized understanding of the proper use and possible adverse effects of ketoconazole.  All of the patient's questions and concerns were addressed.

## 2024-07-31 NOTE — OP NOTE
PATIENT:    Tasha Yarbrough    DATE OF SURGERY:      PHYSICIAN:    Kang Townsend MD    REFERRING PHYSICIAN:  Mateo Storm DO    YOB: 1955    PREOPERATIVE DIAGNOSIS: No IV access    POSTOPERATIVE DIAGNOSIS: No IV access    PROCEDURE:       1)  Right Subclavian placement        EBL:  Less than 50 cc    COMPLICATIONS:  None    ANESTHESIA:  Local    OPERATIVE PROCEDURE:  The patient was placed in the supine position. Right Subclavian region was then prepped and draped in the usual fashion.  Full sterile precautions were performed.  Timeout was also performed.  1% lidocaine was used locally.  A large-bore needle was used to gain access to the vein without ultrasound guidance.  A guidewire was inserted and then over the guidewire, a dilator and then the deep line was then advanced and appropriately positioned.  It was anchored in place with suture.  All ports aspirated and flushed well.  Dressing was applied.  There were no complications.  Patient tolerated the procedure well.  There was scant blood loss throughout the case and there was no ongoing bleeding at conclusion.  Chest x-ray will be ordered to confirm placement.       Kang Townsend MD  7/31/2024  10:06 EDT

## 2024-07-31 NOTE — PAYOR COMM NOTE
"  SUBMITTED INPT ADMISSION IN AVAILITY; AUTH# [id number]  UR MANAGER; HELEN PROCTOR RN  [phone] AND [phone]  NPI#[id number]; TAX ID#[id number]          Jaymie Yarbrough (69 y.o. Female)       Date of Birth   1955    Social Security Number       Address   [address] [place] [address]    Home Phone   [phone]    MRN   [id number]       Nondenominational   None    Marital Status                               Admission Date   24    Admission Type   Emergency    Admitting Provider   Vivienne Roa MD    Attending Provider   Mateo Storm DO    Department, Room/Bed   [facility] INTENSIVE CARE, I       Discharge Date       Discharge Disposition       Discharge Destination                                 Attending Provider: Mateo Storm DO    Allergies: Rocephin [Ceftriaxone], Ciprofloxacin, Codeine, Pineapple    Isolation: None   Infection: Other (24)   Code Status: CPR    Ht: 160 cm (63\")   Wt: 55.8 kg (123 lb 0.3 oz)    Admission Cmt: None   Principal Problem: Pancreatitis [K85.90]                   Active Insurance as of 2024       Primary Coverage       Payor Plan Insurance Group Employer/Plan Group    ANTHEM MEDICARE REPLACEMENT ANTHEM MEDICARE ADVANTAGE [id number]       Payor Plan Address Payor Plan Phone Number Payor Plan Fax Number Effective Dates    [address] [phone]  2024 - None Entered    [facility] [id number]         Subscriber Name Subscriber Birth Date Member ID       JAYMIE YARBROUGH 1955 [id number]                     Emergency Contacts        (Rel.) Home Phone Work Phone Mobile Phone    ANTONIO YARBROUGH (Son) [phone] -- --    MONIE YARBROUGH (Son) [phone] -- --                 History & Physical        Vivienne Roa MD at 24 [facility], [facility]            [facility] HOSPITALIST   HISTORY AND PHYSICAL      Name:  Jaymie Yarbrough   Age:  69 y.o.  Sex:  female  :  1955  MRN:  " [id number]   Visit Number:  [id number]  Admission Date:  7/30/2024  Date Of Service:  07/30/24  Primary Care Physician:  Terrence Baldwin MD    Chief Complaint:     Abdominal pain    History Of Presenting Illness:      69-year-old with a history of hypertension, recurrent pancreatitis, CKD stage III, chronic diarrhea, chronic tobacco abuse, medical noncompliance, who presented from home with chief complaint of abdominal pain.  Patient is known to our service due to her previous admissions for recurrent pancreatitis.  She was evaluated at [facility] previously and supposed to be on Creon, she reports that she has not been taking it as prescribed and sometimes forget to take it.  She reports that her pain started 3 days ago, described as similar to previous episodes in her abdomen that is generalized but mostly located over her epigastric area.  She reports that she initially had nausea and vomiting on the first day but was unable to eat or drink anything after the over the past 2 days.  She denies fever, chills, chest pain, shortness of breath or urinary symptoms.    On ER evaluation, patient was hemodynamically stable and was afebrile.  Her workup showed venous BG with a pH of 6.962/HCO3 6.0.  Labs significant for potassium of 5.5, CO2 3.8, creatinine 2.33 (baseline creatinine of 1) BUN 53, glucose 197, calcium 10.8.  Lipase 179, WBC 14.5.  CT abdomen pelvis Right ovarian cyst. Consider nonemergent ultrasound characterization and follow-up. Moderate-to-large hiatal hernia similar to the prior exam. 2 mm nonobstructing right inferior and 5 mm nonobstructing right mid renal stones. Additional nonacute findings.  Case discussed by ER provider with Dr. Fernandez with nephrology, recommended bicarb drip.  Patient received sodium bicarb, Dilaudid, Zofran and was started on bicarb drip.  Hospitalist consulted for admission, further management and treatment.    Review Of Systems:    All systems were reviewed and  negative except as mentioned in history of presenting illness, assessment and plan.    Past Medical History: Patient  has a past medical history of COPD (chronic obstructive pulmonary disease), Hypertension, Impaired functional mobility, balance, gait, and endurance, Impaired mobility, Injury of back, Migraines, Multiple gastric ulcers, Pancreatitis, and UTI (urinary tract infection).    Past Surgical History: Patient  has a past surgical history that includes Tubal ligation; Colonoscopy; Colon surgery; Abdominal surgery; Esophagogastroduodenoscopy (N/A, 04/23/2024); Cholecystectomy; and Knee surgery (Right).    Social History: Patient  reports that she has been smoking cigarettes. She has never used smokeless tobacco. She reports that she does not drink alcohol and does not use drugs.    Family History:  Patient's family history has been reviewed and found to be noncontributory.     Allergies:      Rocephin [ceftriaxone], Ciprofloxacin, Codeine, and Pineapple    Home Medications:    Prior to Admission Medications       Prescriptions Last Dose Informant Patient Reported? Taking?    famotidine (PEPCID) 20 MG tablet   Yes No    Take 1 tablet by mouth 2 (Two) Times a Day.    glucosamine-chondroitin 500-400 MG capsule capsule   Yes No    Take 1 capsule by mouth 2 (Two) Times a Day With Meals. Indications: Joint Damage causing Pain and Loss of Function    methocarbamol (ROBAXIN) 750 MG tablet   Yes No    Take 1 tablet by mouth 4 (Four) Times a Day As Needed for Muscle Spasms.    ondansetron ODT (ZOFRAN-ODT) 4 MG disintegrating tablet   No No    Place 1 tablet on the tongue Every 8 (Eight) Hours As Needed for Nausea or Vomiting.    ondansetron ODT (ZOFRAN-ODT) 4 MG disintegrating tablet   No No    Place 1 tablet on the tongue Every 8 (Eight) Hours As Needed for Nausea or Vomiting.    promethazine (PHENERGAN) 25 MG tablet   Yes No    Take 1 tablet by mouth.    sertraline (ZOLOFT) 50 MG tablet   Yes No    Take 1 tablet by  "mouth Daily.    sodium bicarbonate 650 MG tablet   No No    Take 1 tablet by mouth 2 (Two) Times a Day.    vitamin D3 125 MCG (5000 UT) capsule capsule   Yes No    Take 1 capsule by mouth Daily. Indications: Vitamin D Deficiency          ED Medications:    Medications   sodium chloride 0.9 % flush 10 mL (has no administration in time range)   sodium bicarbonate 8.4 % 150 mEq in dextrose (D5W) 5 % 1,000 mL infusion (greater than 100 mEq) (has no administration in time range)   sodium bicarbonate injection 8.4% 150 mEq (has no administration in time range)   HYDROmorphone (DILAUDID) injection 1 mg (1 mg Intravenous Given 7/30/24 2030)   ondansetron (ZOFRAN) injection 4 mg (4 mg Intravenous Given 7/30/24 2030)   iopamidol (ISOVUE-300) 61 % injection 100 mL (100 mL Intravenous Given 7/30/24 2212)   HYDROmorphone (DILAUDID) injection 1 mg (1 mg Intravenous Given 7/30/24 2221)     Vital Signs:  Temp:  [97.4 °F (36.3 °C)] 97.4 °F (36.3 °C)  Heart Rate:  [106] 106  Resp:  [18] 18  BP: (132-150)/(80-92) 139/92        07/30/24  1751   Weight: 60.6 kg (133 lb 8 oz)     Body mass index is 23.65 kg/m².    Physical Exam:     Most recent vital Signs: /92   Pulse 106   Temp 97.4 °F (36.3 °C) (Oral)   Resp 18   Ht 160 cm (63\")   Wt 60.6 kg (133 lb 8 oz)   SpO2 99%   BMI 23.65 kg/m²     Physical Exam  Vitals and nursing note reviewed.   Constitutional:       General: She is not in acute distress.     Appearance: She is ill-appearing.   HENT:      Head: Normocephalic and atraumatic.      Right Ear: External ear normal.      Left Ear: External ear normal.      Nose: Nose normal.      Mouth/Throat:      Mouth: Mucous membranes are dry.   Eyes:      Extraocular Movements: Extraocular movements intact.      Conjunctiva/sclera: Conjunctivae normal.      Pupils: Pupils are equal, round, and reactive to light.   Cardiovascular:      Rate and Rhythm: Normal rate and regular rhythm.      Pulses: Normal pulses.      Heart sounds: " "Normal heart sounds.   Pulmonary:      Effort: Pulmonary effort is normal. No respiratory distress.      Breath sounds: Normal breath sounds. No wheezing or rhonchi.   Abdominal:      General: Bowel sounds are normal. There is no distension.      Palpations: Abdomen is soft.      Tenderness: There is generalized abdominal tenderness. There is no guarding or rebound.   Musculoskeletal:         General: Normal range of motion.      Cervical back: Normal range of motion and neck supple.      Right lower leg: No edema.      Left lower leg: No edema.   Skin:     General: Skin is warm and dry.      Findings: No rash.   Neurological:      General: No focal deficit present.      Mental Status: She is alert and oriented to person, place, and time. Mental status is at baseline.      Motor: No weakness.   Psychiatric:         Mood and Affect: Mood normal.         Behavior: Behavior normal.         Thought Content: Thought content normal.         Laboratory data:    I have reviewed the labs done in the emergency room.    Results from last 7 days   Lab Units 07/30/24  2113   SODIUM mmol/L 137   POTASSIUM mmol/L 5.5*   CHLORIDE mmol/L 109*   CO2 mmol/L 3.8*   BUN mg/dL 53*   CREATININE mg/dL 2.33*   CALCIUM mg/dL 10.8*   BILIRUBIN mg/dL <0.2   ALK PHOS U/L 197*   ALT (SGPT) U/L 8   AST (SGOT) U/L 11   GLUCOSE mg/dL 120*     Results from last 7 days   Lab Units 07/30/24 2004   WBC 10*3/mm3 14.51*   HEMOGLOBIN g/dL 15.1   HEMATOCRIT % 48.8*   PLATELETS 10*3/mm3 359                     Results from last 7 days   Lab Units 07/30/24  2113   LIPASE U/L 179*               Invalid input(s): \"USDES\", \"NITRITITE\", \"BACT\", \"EP\"    Pain Management Panel          Latest Ref Rng & Units 8/6/2022   Pain Management Panel   Amphetamine, Urine Qual Negative Negative    Barbiturates Screen, Urine Negative Negative    Benzodiazepine Screen, Urine Negative Negative    Buprenorphine, Screen, Urine Negative Negative    Cocaine Screen, Urine Negative " Negative    Methadone Screen , Urine Negative Negative    Methamphetamine, Ur Negative Negative       Details                   Radiology:    CT Abdomen Pelvis With Contrast    Result Date: 7/30/2024  FINAL REPORT TECHNIQUE: null CLINICAL HISTORY: Leukocytosis, epigastric abdominal pain COMPARISON: null FINDINGS: CT abdomen and pelvis with contrast Comparison: CT/SR - CT ABDOMEN PELVIS W CONTRAST - 6/22/24 20:04 EDT CT/SR - CT ABDOMEN PELVIS W CONTRAST - 9/22/23 20:48 EDT Findings: Moderate-to-large hiatal hernia similar to the prior exam. 2 mm nonobstructing right inferior and 5 mm nonobstructing right mid renal stones. Bilateral simple renal cysts. Cholecystectomy. Abdominal solid organs otherwise unremarkable. No bowel obstruction, pneumoperitoneum, or pneumatosis. Right ovarian cyst 3 x 2.1 cm image 52, similar to prior exams. Uterus and ovaries otherwise unremarkable. Appendix is not identified. No inflammatory changes. L2 left compression fracture similar to prior exam. No acute fractures. Moderate to severe lumbar degenerative spondylosis similar to prior.     IMPRESSION: 1. No CT explanation for leukocytosis. 2. Right ovarian cyst. Consider nonemergent ultrasound characterization and follow-up. 3. Additional nonacute findings as above. Authenticated and Electronically Signed by Luisito Barnett MD on 07/30/2024 10:52:56 PM     Assessment:    Acute kidney injury on chronic kidney disease stage III, POA  Acute hyperkalemia, POA  Acute on chronic pancreatitis, POA  Dehydration, POA  Metabolic acidosis, POA  Chronic diarrhea  Chronic tobacco abuse  Medical noncompliance    Plan:    Patient is admitted to ICU for further management and treatment.    Pancreatitis:  -Patient with multiple bouts of chronic pancreatitis leading to significant dehydration and chronic nausea.  Underlying etiology unknown.  She is due to follow-up with .  Has seen Dr. Fleming locally.  Previously underwent cholecystectomy followed  by ERCP.  Has been recommended to quit smoking. Recent workup at  also demonstrated pancreatic insufficiency and she is supposed to be on Creon now however patient is noncompliant with it.  Will keep her n.p.o. for now and will advance diet as tolerated, continue with fluids and pain control.     Dehydration/metabolic acidosis/hyperkalemia/renal failure:  -Dr. Fernandez and nephrology consulted.    -Placed on sodium bicarb drip per Dr. Fernandez.    -Monitor BMP and potassium  -Given insulin and D50  -Monitor urine output.       Chronic diarrhea:  -Combination of GI losses likely contributing to the metabolic acidosis.      -Continue home meds as warranted.  -Further orders as indicated per clinical course.    Risk Assessment: High  DVT Prophylaxis: Heparin prophylaxis (benefit> risk)  Code Status: Full  Diet: N.p.o., will advance as tolerated    Advance Care Planning  ACP discussion was held with the patient during this visit. Patient does not have an advance directive, information provided.       Vivienne Roa MD  07/30/24  23:36 EDT    Dictated utilizing Dragon dictation.    Electronically signed by Vivienne Roa MD at 07/31/24 0250          Emergency Department Notes        Felisha Hollis, RN at 07/31/24 0155          Attempted to call report, nurse in another pts room, will call back    Electronically signed by Felisha Hollis, RN at 07/31/24 0155       Turner Quick PCT at 07/30/24 2329       Summary:Nephrology                 Jim called back at this time, call sent to Norman SALAS.     Electronically signed by Turner Quick PCT at 07/30/24 2330       Turner Quick PCT at 07/30/24 2327       Summary:Nephrology                 Nephrology associates contacted at this time for a consult at this time, states that Jim will give us a call back.     Electronically signed by Turner Quick PCT at 07/30/24 2329       Felisha Hollis, RN at 07/30/24 2210          Pt requests more pain medicine,  rates pain in abd 6/10, md aware    Electronically signed by Felisha Hollis, RN at 07/30/24 2213       Felisha Hollis, RN at 07/30/24 2006          Pt reports upper abd pain, reports some nausea and 1 episode of vomiting, reports still not eating healthy and forgetting to take creon medication occasionally, reports still smiking    Electronically signed by Felisha Hollis, RN at 07/30/24 2008       Current Facility-Administered Medications   Medication Dose Route Frequency Provider Last Rate Last Admin    acetaminophen (TYLENOL) tablet 650 mg  650 mg Oral Q4H PRN Vivienne Roa MD        Or    acetaminophen (TYLENOL) 160 MG/5ML oral solution 650 mg  650 mg Oral Q4H PRN Vivienne Roa MD        Or    acetaminophen (TYLENOL) suppository 650 mg  650 mg Rectal Q4H PRN Vivienne Roa MD        sennosides-docusate (PERICOLACE) 8.6-50 MG per tablet 2 tablet  2 tablet Oral BID PRN Vivienne Roa MD        And    polyethylene glycol (MIRALAX) packet 17 g  17 g Oral Daily PRN Vivienne Roa MD        And    bisacodyl (DULCOLAX) EC tablet 5 mg  5 mg Oral Daily PRN Vivienne Roa MD        And    bisacodyl (DULCOLAX) suppository 10 mg  10 mg Rectal Daily PRN Vivienne Roa MD        famotidine (PEPCID) tablet 20 mg  20 mg Oral BID Vivienne Roa MD   20 mg at 07/31/24 0800    heparin (porcine) 5000 UNIT/ML injection 5,000 Units  5,000 Units Subcutaneous Q12H Vivienne Roa MD   5,000 Units at 07/31/24 0800    HYDROcodone-acetaminophen (NORCO) 7.5-325 MG per tablet 1 tablet  1 tablet Oral Q8H PRN Vivienne Roa MD   1 tablet at 07/31/24 1221    melatonin tablet 5 mg  5 mg Oral Nightly PRN Vivienne Roa MD        methocarbamol (ROBAXIN) tablet 750 mg  750 mg Oral 4x Daily PRN Vivienne Roa MD   750 mg at 07/31/24 0755    Morphine sulfate (PF) injection 2 mg  2 mg Intravenous Q2H PRN Vivienne Roa MD   2 mg at 07/31/24 0759    And    naloxone (NARCAN) injection 0.4 mg  0.4 mg Intravenous Q5 Min  PRN Vivienne Roa MD        nitroglycerin (NITROSTAT) SL tablet 0.4 mg  0.4 mg Sublingual Q5 Min PRN Vivienne Roa MD        ondansetron (ZOFRAN) injection 4 mg  4 mg Intravenous Q6H PRN Vivienne Roa MD   4 mg at 07/31/24 0256    pantoprazole (PROTONIX) injection 40 mg  40 mg Intravenous Q AM Vivienne Roa MD   40 mg at 07/31/24 0754    promethazine (PHENERGAN) tablet 25 mg  25 mg Oral Q6H PRN Vivienne Roa MD        sertraline (ZOLOFT) tablet 50 mg  50 mg Oral Daily Vivienne Roa MD   50 mg at 07/31/24 0800    sodium bicarbonate 8.4 % 150 mEq in dextrose (D5W) 5 % 1,000 mL infusion (greater than 100 mEq)  150 mEq Intravenous Continuous Vivienne Roa  mL/hr at 07/31/24 1048 Restarted at 07/31/24 1048    sodium bicarbonate tablet 1,300 mg  1,300 mg Oral 4x Daily Nito Fernandez MD, FASN   1,300 mg at 07/31/24 1116    sodium chloride 0.45 % 1,000 mL with potassium chloride 40 mEq infusion  75 mL/hr Intravenous Continuous Niot Fernandez MD, FASN 75 mL/hr at 07/31/24 1048 75 mL/hr at 07/31/24 1048    sodium chloride 0.9 % flush 10 mL  10 mL Intravenous PRN Vivienne Roa MD        sodium chloride 0.9 % flush 10 mL  10 mL Intravenous Q12H Vivienne Roa MD   10 mL at 07/31/24 0800    sodium chloride 0.9 % flush 10 mL  10 mL Intravenous PRN Vivienne Roa MD   10 mL at 07/31/24 1033    sodium chloride 0.9 % flush 10 mL  10 mL Intravenous Q12H Kang Townsend MD   10 mL at 07/31/24 1033    sodium chloride 0.9 % flush 10 mL  10 mL Intravenous Q12H Kang Townsend MD        sodium chloride 0.9 % flush 10 mL  10 mL Intravenous Q12H Kang Townsend MD        sodium chloride 0.9 % flush 10 mL  10 mL Intravenous PRN Kang Townsend MD   10 mL at 07/31/24 1033    sodium chloride 0.9 % flush 20 mL  20 mL Intravenous PRN Kang Townsend MD        sodium chloride 0.9 % infusion 40 mL  40 mL Intravenous PRN Vivienne Roa MD        sodium chloride 0.9 % infusion 40 mL  40 mL Intravenous PRN  Kang Townsend MD            Physician Progress Notes (last 24 hours)        Mateo Storm, DO at 24 0927              [facility]    Name:  Tasha Yarbrough   Age:  69 y.o.  Sex:  female  :  1955  MRN:  [id number]   Visit Number:  [id number]  Admission Date:  2024  Date Of Service:  24  Primary Care Physician:  Terrence Baldwin MD     LOS: 1 day :    Chief Complaint:      Abdominal pain    Subjective:    24: endorses thirst no appetite. Pain improving.    History Of Presenting Illness:       69-year-old with a history of hypertension, recurrent pancreatitis, CKD stage III, chronic diarrhea, chronic tobacco abuse, medical noncompliance, who presented from home with chief complaint of abdominal pain.  Patient is known to our service due to her previous admissions for recurrent pancreatitis.  She was evaluated at [facility] previously and supposed to be on Creon, she reports that she has not been taking it as prescribed and sometimes forget to take it.  She reports that her pain started 3 days ago, described as similar to previous episodes in her abdomen that is generalized but mostly located over her epigastric area.  She reports that she initially had nausea and vomiting on the first day but was unable to eat or drink anything after the over the past 2 days.  She denies fever, chills, chest pain, shortness of breath or urinary symptoms.     On ER evaluation, patient was hemodynamically stable and was afebrile.  Her workup showed venous BG with a pH of 6.962/HCO3 6.0.  Labs significant for potassium of 5.5, CO2 3.8, creatinine 2.33 (baseline creatinine of 1) BUN 53, glucose 197, calcium 10.8.  Lipase 179, WBC 14.5.  CT abdomen pelvis Right ovarian cyst. Consider nonemergent ultrasound characterization and follow-up. Moderate-to-large hiatal hernia similar to the prior exam. 2 mm nonobstructing right inferior and 5 mm  nonobstructing right mid renal stones. Additional nonacute findings.  Case discussed by ER provider with Dr. Fernandez with nephrology, recommended bicarb drip.  Patient received sodium bicarb, Dilaudid, Zofran and was started on bicarb drip.  Hospitalist consulted for admission, further management and treatment.  Edited by: Mateo Storm DO at 7/31/2024 0924     Review of Systems:     All systems were reviewed and negative except as mentioned in subjective, assessment and plan.    Vital Signs:    Temp:  [97.4 °F (36.3 °C)-97.8 °F (36.6 °C)] 97.4 °F (36.3 °C)  Heart Rate:  [] 84  Resp:  [16-18] 16  BP: ()/(65-92) 113/85    Intake and output:    I/O last 3 completed shifts:  In: 675 [I.V.:675]  Out: 475 [Urine:475]  No intake/output data recorded.    Physical Examination:    Constitutional: No acute distress, awake, alert  HENT: NCAT, mucous membranes moist  Respiratory: Clear to auscultation bilaterally, respiratory effort normal   Cardiovascular: RRR, no murmurs, rubs, or gallops  Gastrointestinal: Positive bowel sounds, soft, moderate generalized abdominal tenderness, nondistended  Musculoskeletal: No bilateral ankle edema  Psychiatric: Appropriate affect, cooperative  Neurologic: Oriented x 3, speech clear  Skin: No rashes  Edited by: Mateo Storm DO at 7/31/2024 0922     Laboratory results:    Results from last 7 days   Lab Units 07/31/24  0436 07/30/24  2113   SODIUM mmol/L 144 137   POTASSIUM mmol/L 3.3* 5.5*   CHLORIDE mmol/L 111* 109*   CO2 mmol/L 11.2* 3.8*   BUN mg/dL 55* 53*   CREATININE mg/dL 2.19* 2.33*   CALCIUM mg/dL 9.8 10.8*   BILIRUBIN mg/dL  --  <0.2   ALK PHOS U/L  --  197*   ALT (SGPT) U/L  --  8   AST (SGOT) U/L  --  11   GLUCOSE mg/dL 95 120*     Results from last 7 days   Lab Units 07/31/24  0437 07/30/24 2004   WBC 10*3/mm3 11.12* 14.51*   HEMOGLOBIN g/dL 11.9* 15.1   HEMATOCRIT % 35.7 48.8*   PLATELETS 10*3/mm3 271 359                 Recent Labs     04/21/24  0015  04/21/24  0919 05/31/24  1126   PHART 7.158* 7.199* 7.230*   UNB4MFX 26.4* 25.5* 26.8*   PO2ART 104.0* 97.9 96.6   GED9HXX 9.4* 9.9* 11.2*   BASEEXCESS -17.9* -16.6* -14.9*      I have reviewed the patient's laboratory results.    Radiology results:    CT Abdomen Pelvis With Contrast    Result Date: 7/30/2024  FINAL REPORT TECHNIQUE: null CLINICAL HISTORY: Leukocytosis, epigastric abdominal pain COMPARISON: null FINDINGS: CT abdomen and pelvis with contrast Comparison: CT/SR - CT ABDOMEN PELVIS W CONTRAST - 6/22/24 20:04 EDT CT/SR - CT ABDOMEN PELVIS W CONTRAST - 9/22/23 20:48 EDT Findings: Moderate-to-large hiatal hernia similar to the prior exam. 2 mm nonobstructing right inferior and 5 mm nonobstructing right mid renal stones. Bilateral simple renal cysts. Cholecystectomy. Abdominal solid organs otherwise unremarkable. No bowel obstruction, pneumoperitoneum, or pneumatosis. Right ovarian cyst 3 x 2.1 cm image 52, similar to prior exams. Uterus and ovaries otherwise unremarkable. Appendix is not identified. No inflammatory changes. L2 left compression fracture similar to prior exam. No acute fractures. Moderate to severe lumbar degenerative spondylosis similar to prior.     Impression: IMPRESSION: 1. No CT explanation for leukocytosis. 2. Right ovarian cyst. Consider nonemergent ultrasound characterization and follow-up. 3. Additional nonacute findings as above. Authenticated and Electronically Signed by Luisito Barnett MD on 07/30/2024 10:52:56 PM   I have reviewed the patient's radiology reports.    Medication Review:     I have reviewed the patient's active and prn medications.     Problem List:      Pancreatitis      Assessment/Plan:    Acute kidney injury on chronic kidney disease stage III, POA  Acute hyperkalemia, POA  Acute on chronic pancreatitis, POA  Dehydration, POA  Metabolic acidosis, POA  Chronic diarrhea  Chronic tobacco abuse  Medical noncompliance     Plan:     Patient is admitted to ICU for  further management and treatment.     Pancreatitis:  -Patient with multiple bouts of chronic pancreatitis leading to significant dehydration and chronic nausea.  Underlying etiology unknown.  She is due to follow-up with .  Has seen Dr. Fleming locally.  Previously underwent cholecystectomy followed by ERCP.  Has been recommended to quit smoking. Recent workup at  also demonstrated pancreatic insufficiency and she is supposed to be on Creon now however patient is noncompliant with it.  Advance to clear liquids. General surgery to place CVC.     Dehydration/metabolic acidosis/hyperkalemia resolved/renal failure:  -Dr. Fernandez and nephrology consulted.    -Placed on sodium bicarb drip per Dr. Fernandez.    -Monitor BMP and potassium  -Given insulin and D50  -Monitor urine output.   -7/31/24 improving with current regimen will need CVC to continue with fluids etc.      Chronic diarrhea:  -Combination of GI losses likely contributing to the metabolic acidosis.    -continue bicarb.     -Continue home meds as warranted.  -Further orders as indicated per clinical course.      DVT Prophylaxis: Heparin prophylaxis (benefit> risk)  Code Status: Full  Diet: clear liquids  Disposition: anticipate home in 2 to 3 days  Edited by: Mateo Storm DO at 7/31/2024 0927           Mateo Storm DO  07/31/24  09:27 EDT    Dictated utilizing Dragon dictation.        Electronically signed by Mateo Storm DO at 07/31/24 0934

## 2024-08-01 LAB
ALBUMIN SERPL-MCNC: 3.3 G/DL (ref 3.5–5.2)
ALBUMIN/GLOB SERPL: 1.7 G/DL
ALP SERPL-CCNC: 104 U/L (ref 39–117)
ALT SERPL W P-5'-P-CCNC: <5 U/L (ref 1–33)
ANION GAP SERPL CALCULATED.3IONS-SCNC: 12.1 MMOL/L (ref 5–15)
AST SERPL-CCNC: 10 U/L (ref 1–32)
BILIRUB SERPL-MCNC: 0.2 MG/DL (ref 0–1.2)
BUN SERPL-MCNC: 42 MG/DL (ref 8–23)
BUN/CREAT SERPL: 27.1 (ref 7–25)
CALCIUM SPEC-SCNC: 8 MG/DL (ref 8.6–10.5)
CHLORIDE SERPL-SCNC: 102 MMOL/L (ref 98–107)
CO2 SERPL-SCNC: 28.9 MMOL/L (ref 22–29)
CREAT SERPL-MCNC: 1.55 MG/DL (ref 0.57–1)
DEPRECATED RDW RBC AUTO: 58.8 FL (ref 37–54)
EGFRCR SERPLBLD CKD-EPI 2021: 36.1 ML/MIN/1.73
ERYTHROCYTE [DISTWIDTH] IN BLOOD BY AUTOMATED COUNT: 15.6 % (ref 12.3–15.4)
GLOBULIN UR ELPH-MCNC: 2 GM/DL
GLUCOSE SERPL-MCNC: 87 MG/DL (ref 65–99)
HCT VFR BLD AUTO: 25.9 % (ref 34–46.6)
HGB BLD-MCNC: 8.7 G/DL (ref 12–15.9)
MCH RBC QN AUTO: 34.8 PG (ref 26.6–33)
MCHC RBC AUTO-ENTMCNC: 33.6 G/DL (ref 31.5–35.7)
MCV RBC AUTO: 103.6 FL (ref 79–97)
PLATELET # BLD AUTO: 154 10*3/MM3 (ref 140–450)
PMV BLD AUTO: 10.8 FL (ref 6–12)
POTASSIUM SERPL-SCNC: 3.3 MMOL/L (ref 3.5–5.2)
PROT SERPL-MCNC: 5.3 G/DL (ref 6–8.5)
RBC # BLD AUTO: 2.5 10*6/MM3 (ref 3.77–5.28)
SODIUM SERPL-SCNC: 143 MMOL/L (ref 136–145)
WBC NRBC COR # BLD AUTO: 6.14 10*3/MM3 (ref 3.4–10.8)

## 2024-08-01 PROCEDURE — 85027 COMPLETE CBC AUTOMATED: CPT | Performed by: INTERNAL MEDICINE

## 2024-08-01 PROCEDURE — 25010000002 POTASSIUM CHLORIDE PER 2 MEQ OF POTASSIUM: Performed by: INTERNAL MEDICINE

## 2024-08-01 PROCEDURE — 0 DEXTROSE 5 % SOLUTION 1,000 ML FLEX CONT: Performed by: FAMILY MEDICINE

## 2024-08-01 PROCEDURE — 97530 THERAPEUTIC ACTIVITIES: CPT

## 2024-08-01 PROCEDURE — 25010000002 ONDANSETRON PER 1 MG: Performed by: FAMILY MEDICINE

## 2024-08-01 PROCEDURE — 25010000002 HEPARIN (PORCINE) PER 1000 UNITS: Performed by: FAMILY MEDICINE

## 2024-08-01 PROCEDURE — 97535 SELF CARE MNGMENT TRAINING: CPT

## 2024-08-01 PROCEDURE — 97116 GAIT TRAINING THERAPY: CPT

## 2024-08-01 PROCEDURE — 80053 COMPREHEN METABOLIC PANEL: CPT | Performed by: INTERNAL MEDICINE

## 2024-08-01 RX ADMIN — PANTOPRAZOLE SODIUM 40 MG: 40 INJECTION, POWDER, FOR SOLUTION INTRAVENOUS at 06:27

## 2024-08-01 RX ADMIN — Medication 10 ML: at 09:04

## 2024-08-01 RX ADMIN — HYDROCODONE BITARTRATE AND ACETAMINOPHEN 1 TABLET: 7.5; 325 TABLET ORAL at 08:13

## 2024-08-01 RX ADMIN — HEPARIN SODIUM 5000 UNITS: 5000 INJECTION INTRAVENOUS; SUBCUTANEOUS at 08:13

## 2024-08-01 RX ADMIN — POTASSIUM CHLORIDE 125 ML/HR: 149 INJECTION, SOLUTION, CONCENTRATE INTRAVENOUS at 20:05

## 2024-08-01 RX ADMIN — Medication 5 MG: at 20:05

## 2024-08-01 RX ADMIN — ONDANSETRON 4 MG: 2 INJECTION INTRAMUSCULAR; INTRAVENOUS at 10:04

## 2024-08-01 RX ADMIN — FAMOTIDINE 20 MG: 20 TABLET, FILM COATED ORAL at 08:13

## 2024-08-01 RX ADMIN — SODIUM BICARBONATE 650 MG TABLET 1300 MG: at 20:05

## 2024-08-01 RX ADMIN — SODIUM BICARBONATE 650 MG TABLET 1300 MG: at 11:43

## 2024-08-01 RX ADMIN — FAMOTIDINE 20 MG: 20 TABLET, FILM COATED ORAL at 20:05

## 2024-08-01 RX ADMIN — SODIUM BICARBONATE 150 MEQ: 84 INJECTION, SOLUTION INTRAVENOUS at 00:37

## 2024-08-01 RX ADMIN — METHOCARBAMOL 750 MG: 750 TABLET, FILM COATED ORAL at 08:13

## 2024-08-01 RX ADMIN — SERTRALINE HYDROCHLORIDE 50 MG: 50 TABLET ORAL at 08:13

## 2024-08-01 RX ADMIN — Medication 10 ML: at 20:06

## 2024-08-01 RX ADMIN — Medication 10 ML: at 09:03

## 2024-08-01 RX ADMIN — POTASSIUM CHLORIDE 75 ML/HR: 149 INJECTION, SOLUTION, CONCENTRATE INTRAVENOUS at 00:37

## 2024-08-01 RX ADMIN — SODIUM BICARBONATE 650 MG TABLET 1300 MG: at 17:52

## 2024-08-01 RX ADMIN — Medication 10 ML: at 20:05

## 2024-08-01 RX ADMIN — SODIUM BICARBONATE 650 MG TABLET 1300 MG: at 08:13

## 2024-08-01 RX ADMIN — HEPARIN SODIUM 5000 UNITS: 5000 INJECTION INTRAVENOUS; SUBCUTANEOUS at 20:05

## 2024-08-01 RX ADMIN — HYDROCODONE BITARTRATE AND ACETAMINOPHEN 1 TABLET: 7.5; 325 TABLET ORAL at 16:09

## 2024-08-01 RX ADMIN — METHOCARBAMOL 750 MG: 750 TABLET, FILM COATED ORAL at 16:09

## 2024-08-01 NOTE — PROGRESS NOTES
[facility] Progress Note  [facility]. [place]        Patient Name: Tasha Yarbrough  : 1955  MRN: [id number]   LOS: 2 days    Patient Care Team:  Terrence Baldwin MD as PCP - General (Family Medicine)    Chief Complaint:    Chief Complaint   Patient presents with    Abdominal Pain     Primary Care Physician:  Terrence Baldwin MD  Date of admission: 2024    Subjective     Interval History:   Follow-up acute kidney injury  Events noted from last 24 hours.  I reviewed the chart and other providers notes, labs and procedures done since my last note.  She is starting to feel better, states she has not had anything to eat, she feels hungry and wants to go home.  Denies having any chest pain or shortness of breath.    Review of Systems:   As noted above.    Objective     Vitals:   Temp:  [97.4 °F (36.3 °C)-98.5 °F (36.9 °C)] 97.8 °F (36.6 °C)  Heart Rate:  [] 65  BP: ()/(57-85) 93/60    Intake/Output Summary (Last 24 hours) at 2024 0629  Last data filed at 2024 0320  Gross per 24 hour   Intake 3526.25 ml   Output 1625 ml   Net 1901.25 ml       Physical Exam:    General Appearance: alert, oriented x 3, no acute distress   Skin: warm and dry  HEENT: oral mucosa normal, nonicteric sclera  Neck: supple, no JVD  Lungs: CTA  Heart: RRR, normal S1 and S2  Abdomen: obese, soft, minimal tenderness, non distended and positive bowel sounds.  : no palpable bladder  Extremities: Trace edema, no cyanosis or clubbing  Neuro: normal speech and mental status     Scheduled Meds:     Current Facility-Administered Medications   Medication Dose Route Frequency Provider Last Rate Last Admin    acetaminophen (TYLENOL) tablet 650 mg  650 mg Oral Q4H PRN Vivienne Roa MD        Or    acetaminophen (TYLENOL) 160 MG/5ML oral solution 650 mg  650 mg Oral Q4H PRN Vivienne Roa MD        Or    acetaminophen (TYLENOL) suppository 650 mg  650 mg Rectal Q4H PRN Vivienne Roa  MD        sennosides-docusate (PERICOLACE) 8.6-50 MG per tablet 2 tablet  2 tablet Oral BID PRN Vivienne Roa MD        And    polyethylene glycol (MIRALAX) packet 17 g  17 g Oral Daily PRN Vivienne Roa MD        And    bisacodyl (DULCOLAX) EC tablet 5 mg  5 mg Oral Daily PRN Vivienne Roa MD        And    bisacodyl (DULCOLAX) suppository 10 mg  10 mg Rectal Daily PRN Vivienne Roa MD        famotidine (PEPCID) tablet 20 mg  20 mg Oral BID Vivienne Roa MD   20 mg at 07/31/24 2107    heparin (porcine) 5000 UNIT/ML injection 5,000 Units  5,000 Units Subcutaneous Q12H Vivienne Roa MD   5,000 Units at 07/31/24 2107    HYDROcodone-acetaminophen (NORCO) 7.5-325 MG per tablet 1 tablet  1 tablet Oral Q8H PRN Vivienne Roa MD   1 tablet at 07/31/24 2107    melatonin tablet 5 mg  5 mg Oral Nightly PRN Vivienne Roa MD        methocarbamol (ROBAXIN) tablet 750 mg  750 mg Oral 4x Daily PRN Vivienne Roa MD   750 mg at 07/31/24 1633    Morphine sulfate (PF) injection 2 mg  2 mg Intravenous Q2H PRN Vivienne Roa MD   2 mg at 07/31/24 1409    And    naloxone (NARCAN) injection 0.4 mg  0.4 mg Intravenous Q5 Min PRN Vivienne Roa MD        nitroglycerin (NITROSTAT) SL tablet 0.4 mg  0.4 mg Sublingual Q5 Min PRN Vivienne Roa MD        ondansetron (ZOFRAN) injection 4 mg  4 mg Intravenous Q6H PRN Vivienne Roa MD   4 mg at 07/31/24 0256    pantoprazole (PROTONIX) injection 40 mg  40 mg Intravenous Q AM Vivienne Roa MD   40 mg at 08/01/24 0627    promethazine (PHENERGAN) tablet 25 mg  25 mg Oral Q6H PRN Vivienne Roa MD        sertraline (ZOLOFT) tablet 50 mg  50 mg Oral Daily Vivienne Roa MD   50 mg at 07/31/24 0800    sodium bicarbonate 8.4 % 150 mEq in dextrose (D5W) 5 % 1,000 mL infusion (greater than 100 mEq)  150 mEq Intravenous Continuous Vivienne Roa  mL/hr at 08/01/24 0037 150 mEq at 08/01/24 0037    sodium bicarbonate tablet 1,300 mg  1,300 mg Oral 4x Daily  Nito Fernandez MD, FASN   1,300 mg at 07/31/24 2107    sodium chloride 0.45 % 1,000 mL with potassium chloride 40 mEq infusion  75 mL/hr Intravenous Continuous Nito Fernandez MD, FASN 75 mL/hr at 08/01/24 0037 75 mL/hr at 08/01/24 0037    sodium chloride 0.9 % flush 10 mL  10 mL Intravenous PRN Vivienne Roa MD        sodium chloride 0.9 % flush 10 mL  10 mL Intravenous Q12H Vivienne Roa MD   10 mL at 07/31/24 2108    sodium chloride 0.9 % flush 10 mL  10 mL Intravenous PRN Vivienne Roa MD   10 mL at 07/31/24 1033    sodium chloride 0.9 % flush 10 mL  10 mL Intravenous Q12H Kang Townsend MD   10 mL at 07/31/24 2108    sodium chloride 0.9 % flush 10 mL  10 mL Intravenous Q12H Kang Townsend MD   10 mL at 07/31/24 2108    sodium chloride 0.9 % flush 10 mL  10 mL Intravenous Q12H Kang Townsend MD   10 mL at 07/31/24 2107    sodium chloride 0.9 % flush 10 mL  10 mL Intravenous PRN Kang Townsend MD   10 mL at 07/31/24 1033    sodium chloride 0.9 % flush 20 mL  20 mL Intravenous PRN Kang Townsend MD        sodium chloride 0.9 % infusion 40 mL  40 mL Intravenous PRN Vivienne Roa MD        sodium chloride 0.9 % infusion 40 mL  40 mL Intravenous PRN Kang Townsend MD           famotidine, 20 mg, Oral, BID  heparin (porcine), 5,000 Units, Subcutaneous, Q12H  pantoprazole, 40 mg, Intravenous, Q AM  sertraline, 50 mg, Oral, Daily  sodium bicarbonate, 1,300 mg, Oral, 4x Daily  sodium chloride, 10 mL, Intravenous, Q12H  sodium chloride, 10 mL, Intravenous, Q12H  sodium chloride, 10 mL, Intravenous, Q12H  sodium chloride, 10 mL, Intravenous, Q12H        IV Meds:   sodium bicarbonate, 150 mEq, Last Rate: 150 mEq (08/01/24 0037)  sodium chloride 0.45 % 1,000 mL with potassium chloride 40 mEq infusion, 75 mL/hr, Last Rate: 75 mL/hr (08/01/24 0037)        Results Reviewed:   I have personally reviewed the results from the time of this admission to 8/1/2024 06:29 EDT     Results from last 7 days   Lab  "Units 08/01/24  0423 07/31/24  0436 07/30/24 2113   SODIUM mmol/L 143 144 137   POTASSIUM mmol/L 3.3* 3.3* 5.5*   CHLORIDE mmol/L 102 111* 109*   CO2 mmol/L 28.9 11.2* 3.8*   BUN mg/dL 42* 55* 53*   CREATININE mg/dL 1.55* 2.19* 2.33*   CALCIUM mg/dL 8.0* 9.8 10.8*   BILIRUBIN mg/dL 0.2  --  <0.2   ALK PHOS U/L 104  --  197*   ALT (SGPT) U/L <5  --  8   AST (SGOT) U/L 10  --  11   GLUCOSE mg/dL 87 95 120*       Estimated Creatinine Clearance: 30.6 mL/min (A) (by C-G formula based on SCr of 1.55 mg/dL (H)).                Results from last 7 days   Lab Units 08/01/24 0423 07/31/24 0437 07/30/24 2004   WBC 10*3/mm3 6.14 11.12* 14.51*   HEMOGLOBIN g/dL 8.7* 11.9* 15.1   PLATELETS 10*3/mm3 154 271 359             Brief Urine Lab Results  (Last result in the past 365 days)        Color   Clarity   Blood   Leuk Est   Nitrite   Protein   CREAT   Urine HCG        07/13/24 2101 Yellow   Clear   Negative   Negative   Negative   30 mg/dL (1+)                   No results found for: \"UTPCR\"    Imaging Results (Last 24 Hours)       Procedure Component Value Units Date/Time    XR Ribs Right With PA Chest [[id number]] Collected: 07/31/24 2028     Updated: 07/31/24 2035    Narrative:      FINAL REPORT    TECHNIQUE:  3 views of the bilateral ribs were obtained.    CLINICAL HISTORY:  right ribs pain, anterior lateral, no known injury.    FINDINGS:  A right central venous catheter ends in the SVC.  There is no  pneumothorax.  The heart is normal in size.  The mediastinum is  unremarkable.  A small calcified granuloma is noted in the left  lower lung field.  The lungs are otherwise clear.  There is no  pleural effusion or pneumothorax.  Three views demonstrate no  acute displaced fracture or other acute osseous abnormality.      Impression:      No acute disease.  Specifically, no right rib fracture is  identified.    Authenticated and Electronically Signed by Sergio Silverio M.D. on  07/31/2024 08:34:57 PM    XR Chest 1 View [[id number]] " Collected: 07/31/24 1017     Updated: 07/31/24 1021    Narrative:      PROCEDURE: XR CHEST 1 VW-     HISTORY: line placement; K86.1-Other chronic pancreatitis;  E87.20-Acidosis, unspecified     COMPARISON: May 30, 2024.     FINDINGS: The heart is normal in size. The lungs are clear except for  mild, stable bilateral chronic change. There is evidence of old  calcified granulomatous disease.. The mediastinum is unremarkable. There  is no pneumothorax.  There are no acute osseous abnormalities. Small  hiatal hernia again noted. Right subclavian line has been placed since  the prior exam. Tip is in the region of the junction of the SVC and  right atrium.       Impression:      Placement of right subclavian line as described without  pneumothorax.                 This report was signed and finalized on 7/31/2024 10:18 AM by Lucía Bethea MD.                   Assessment / Plan     ASSESSMENT:    Pancreatitis    VIVIENNE on CKD 3b: Most likely due to dehydration/GI losses from vomiting and chronic diarrhea, severe acidosis noted but has responded well to IV bicarb push as well as IV infusion of bicarb.  She appears to be tolerating p.o. well will start oral sodium bicarb.  Severe metabolic acidosis: Significant improvement noted.  Chronic pancreatitis: Causing chronic nausea and vomiting with subsequent dehydration, encouraged to be compliant with her medications.  Pancreatic insufficiency: Patient noncompliant with Creon.  Hypertension: Currently hypotensive.      PLAN:  Serum bicarb is back to normal, go ahead and stop the bicarb drip continue half-normal saline with 40 of potassium at 125 cc an will another 24 hours.  Continue with oral sodium bicarb, with the decreased dose.  Details were discussed with the patient no family in the room.    Details were also discussed with the hospitalist service and or other providers as needed.  Advance diet discussed with the hospitalist service as well as the nursing staff.  Continue  with rest of the current treatment plan, and monitor with surveillance labs.  Further recommendations will depend on clinical course of the patient during the current hospitalization.   I have reviewed the copied text to this note, it was edited and the changes made as needed.  It is accurate to the point, when the note was signed today.     Thank you for involving us in the care of Tasha Yarbrough.  Please feel free to call with any questions.    Nito Fernandez MD, WINNIE  08/01/24  06:29 EDT    [facility]  [phone]      Part of this note may be an electronic transcription/translation of spoken language to printed text using the Dragon Dictation System.

## 2024-08-01 NOTE — THERAPY TREATMENT NOTE
Patient Name: Tasha Yarbrough  : 1955    MRN: [id number]                              Today's Date: 2024       Admit Date: 2024    Visit Dx:     ICD-10-CM ICD-9-CM   1. Chronic pancreatitis, unspecified pancreatitis type  K86.1 577.1   2. Metabolic acidosis  E87.20 276.2     Patient Active Problem List   Diagnosis    Colon cancer screening    Gastroesophageal reflux disease with esophagitis    Left lower quadrant abdominal pain    Irritable bowel syndrome with constipation    Encounter for screening for malignant neoplasm of colon    Periumbilical abdominal pain    Diarrhea of presumed infectious origin    Acute kidney injury    Bacterial colitis    VIVIENNE (acute kidney injury)    C. difficile colitis    Generalized abdominal pain    Diarrhea of infectious origin    Abnormal finding on GI tract imaging    Anemia, chronic disease    Colitis due to Clostridium difficile    Pancreatitis, acute    Chronic kidney disease, stage III (moderate)    Acute pancreatitis    Acute UTI (urinary tract infection)    Epigastric pain    Nausea and vomiting in adult    Moderate malnutrition    Hyperkalemia    Chronic pancreatitis    Pancreatitis     Past Medical History:   Diagnosis Date    COPD (chronic obstructive pulmonary disease)     Hypertension     Impaired functional mobility, balance, gait, and endurance     Impaired mobility     Injury of back     Migraines     Multiple gastric ulcers     Pancreatitis     UTI (urinary tract infection)      Past Surgical History:   Procedure Laterality Date    ABDOMINAL SURGERY      CHOLECYSTECTOMY      COLON SURGERY      COLON RUPTURED /  [id number] ?    COLONOSCOPY      ENDOSCOPY N/A 2024    Procedure: ESOPHAGOGASTRODUODENOSCOPY WITH BIOPSY;  Surgeon: Jairo Negro MD;  Location: [facility] ENDOSCOPY;  Service: Gastroenterology;  Laterality: N/A;    KNEE SURGERY Right     TOTAL REPLACEMENT    TUBAL ABDOMINAL LIGATION        General Information       Row Name 24 1414           OT Time and Intention    Document Type therapy note (daily note)  -     Mode of Treatment occupational therapy  -[facility] Name 08/01/24 1414          General Information    Patient Profile Reviewed yes  -               User Key  (r) = Recorded By, (t) = Taken By, (c) = Cosigned By      Initials Name Provider Type     Georgina Beasley Occupational Therapist                     Mobility/ADL's       [facility] Name 08/01/24 1415          Bed Mobility    Bed Mobility bed mobility (all) activities  -     All Activities, Bates (Bed Mobility) modified independence  -     Assistive Device (Bed Mobility) head of bed elevated  -[facility] Name 08/01/24 1415          Transfers    Transfers toilet transfer  -[facility] Name 08/01/24 1415          Sit-Stand Transfer    Sit-Stand Bates (Transfers) contact guard  -     Assistive Device (Sit-Stand Transfers) walker, front-wheeled  -[facility] Name 08/01/24 1415          Toilet Transfer    Type (Toilet Transfer) sit-stand;stand-sit  -     Bates Level (Toilet Transfer) contact guard  -     Assistive Device (Toilet Transfer) walker, front-wheeled  -[facility] Name 08/01/24 1415          Functional Mobility    Functional Mobility- Ind. Level contact guard assist  -     Functional Mobility- Device walker, front-wheeled  -     Functional Mobility-Distance (Feet) 24  -     Functional Mobility- Comment pt walked 8' to bathroom and 24' to her chair  -[facility] Name 08/01/24 1415          Lower Body Dressing Assessment/Training    Bates Level (Lower Body Dressing) doff;don;socks;pants/bottoms;standby assist  -[facility] Name 08/01/24 1415          Grooming Assessment/Training    Bates Level (Grooming) wash face, hands;standby assist  -[facility] Name 08/01/24 1415          Toileting Assessment/Training    Bates Level (Toileting) adjust/manage clothing;perform perineal hygiene;[facility]               User  Key  (r) = Recorded By, (t) = Taken By, (c) = Cosigned By      Initials Name Provider Type    Georgina Butterfield Occupational Therapist                   Obj/Interventions    No documentation.                  Goals/Plan    No documentation.                  Clinical Impression       Row Name 08/01/24 1418          Pain Assessment    Pretreatment Pain Rating 6/10  -     Posttreatment Pain Rating 6/10  -     Pain Location - Side/Orientation Right  -     Pain Location - shoulder;abdomen  -     Pain Intervention(s) Repositioned;Ambulation/increased activity  -       Row Name 08/01/24 1418          Plan of Care Review    Plan of Care Reviewed With patient  -     Progress improving  -     Outcome Evaluation Pt seen for therapy today.  Pt received supine in bed, independent with bed mobility, stood with cga/sba and walked to bathroom.  Pt able to doff/don her brief and socks independently.  Pt completed toileting hygiene independently and was able to stand at sink with sba to wash her hands.  Pt then walked 24' to her chair.  Pt did c/o abdominal and R shoulder pain during session.  Pt tolerated increased activity today.  Cont OT per POC.  -       Row Name 08/01/24 1418          Positioning and Restraints    Pre-Treatment Position in bed  -     Post Treatment Position chair  -     In Chair sitting;call light within reach;encouraged to call for assist;with family/caregiver;notified [facility]  -               User Key  (r) = Recorded By, (t) = Taken By, (c) = Cosigned By      Initials Name Provider Type    Georgina Butterfield Occupational Therapist                   Outcome Measures       Row Name 08/01/24 8331          How much help from another is currently needed...    Putting on and taking off regular lower body clothing? 4  -AH     Bathing (including washing, rinsing, and drying) 3  -AH     Toileting (which includes using toilet bed pan or urinal) 4  -AH     Putting on and taking off regular upper body  clothing 4  -     Taking care of personal grooming (such as brushing teeth) 4  -     Eating meals 4  -     AM-PAC 6 Clicks Score (OT) 23  -       Row Name 08/01/24 0800          How much help from another person do you currently need...    Turning from your back to your side while in flat bed without using bedrails? 4  -RP     Moving from lying on back to sitting on the side of a flat bed without bedrails? 3  -RP     Moving to and from a bed to a chair (including a wheelchair)? 3  -RP     Standing up from a chair using your arms (e.g., wheelchair, bedside chair)? 3  -RP     Climbing 3-5 steps with a railing? 2  -RP     To walk in hospital room? 3  -RP     AM-PAC 6 Clicks Score (PT) 18  -RP     Highest Level of Mobility Goal 6 --> Walk 10 steps or more  -       Row Name 08/01/24 1425          Functional Assessment    Outcome Measure Options AM-PAC 6 Clicks Daily Activity (OT)  -               User Key  (r) = Recorded By, (t) = Taken By, (c) = Cosigned By      Initials Name Provider Type     Georgina Beasley Occupational Therapist    Madai Plummer, RN Registered Nurse                    Occupational Therapy Education       Title: PT OT SLP Therapies (In Progress)       Topic: Occupational Therapy (In Progress)       Point: ADL training (Done)       Description:   Instruct learner(s) on proper safety adaptation and remediation techniques during self care or transfers.   Instruct in proper use of assistive devices.                  Learning Progress Summary             Patient Acceptance, E,TB, VU by  at 8/1/2024 1426    Comment: benefit of working with therapy    Acceptance, E,TB, VU by  at 7/31/2024 1418    Comment: Role of OT/POC                         Point: Home exercise program (Not Started)       Description:   Instruct learner(s) on appropriate technique for monitoring, assisting and/or progressing therapeutic exercises/activities.                  Learner Progress:  Not documented in this  visit.              Point: Precautions (Not Started)       Description:   Instruct learner(s) on prescribed precautions during self-care and functional transfers.                  Learner Progress:  Not documented in this visit.              Point: Body mechanics (Not Started)       Description:   Instruct learner(s) on proper positioning and spine alignment during self-care, functional mobility activities and/or exercises.                  Learner Progress:  Not documented in this visit.                              User Key       Initials Effective Dates Name Provider Type Discipline     06/16/21 -  Georgina Beasley Occupational Therapist OT                  OT Recommendation and Plan  Planned Therapy Interventions (OT): activity tolerance training, BADL retraining, patient/caregiver education/training, transfer/mobility retraining, strengthening exercise  Therapy Frequency (OT): 5 times/wk  Plan of Care Review  Plan of Care Reviewed With: patient  Progress: improving  Outcome Evaluation: Pt seen for therapy today.  Pt received supine in bed, independent with bed mobility, stood with cga/sba and walked to bathroom.  Pt able to doff/don her brief and socks independently.  Pt completed toileting hygiene independently and was able to stand at sink with sba to wash her hands.  Pt then walked 24' to her chair.  Pt did c/o abdominal and R shoulder pain during session.  Pt tolerated increased activity today.  Cont OT per POC.     Time Calculation:   Evaluation Complexity (OT)  Review Occupational Profile/Medical/Therapy History Complexity: expanded/moderate complexity  Assessment, Occupational Performance/Identification of Deficit Complexity: 3-5 performance deficits  Clinical Decision Making Complexity (OT): detailed assessment/moderate complexity  Overall Complexity of Evaluation (OT): moderate complexity     Time Calculation- OT       Row Name 08/01/24 0578             Time Calculation- OT    OT Start Time 1323  -       OT Stop Time 1346  -      OT Time Calculation (min) 23 min  -      OT Received On 08/01/24  -      OT Goal Re-Cert Due Date 08/10/24  -         Timed Charges    00602 - OT Therapeutic Activity Minutes 5  -      37723 - OT Self Care/Mgmt Minutes 18  -         Total Minutes    Timed Charges Total Minutes 23  -       Total Minutes 23  -                User Key  (r) = Recorded By, (t) = Taken By, (c) = Cosigned By      Initials Name Provider Type    Georgina Butterfield Occupational Therapist                  Therapy Charges for Today       Code Description Service Date Service Provider Modifiers Qty    54295178356 HC OT EVAL MOD COMPLEXITY 4 7/31/2024 Georgina Beasley 1    99349611218 HC OT THERAPEUTIC ACT EA 15 MIN 8/1/2024 Georgina Beasley 1    02211299132 HC OT SELF CARE/MGMT/TRAIN EA 15 MIN 8/1/2024 Georgina Beasley 1                 Georgina Beasley  8/1/2024

## 2024-08-01 NOTE — CASE MANAGEMENT/SOCIAL WORK
Anticipated home dc 8/2. Pt and family decline HH. Pt's 2 sons live with her and provide assistance.

## 2024-08-01 NOTE — PLAN OF CARE
Goal Outcome Evaluation:  Plan of Care Reviewed With: patient        Progress: improving  Outcome Evaluation: Patient participated well in PT treatment and demonstrated improving strength, balance and overall mobility.  She is able to perform transfers and gait x 24 feet with CGA and RW.  She reports some right shoulder pain, but reports it is improving.  Plan to continue PT per POC.      Anticipated Discharge Disposition (PT): home with home health

## 2024-08-01 NOTE — PLAN OF CARE
Goal Outcome Evaluation:              Outcome Evaluation: pt VSS. Intermittent pain managed with PRN medications, See MAR. Bicarb gtt continued as ordered. NSR on monitor. Remains on room air maintaining O2 Sat >90%. No complaints per pt att

## 2024-08-01 NOTE — PLAN OF CARE
Goal Outcome Evaluation:  Plan of Care Reviewed With: patient        Progress: improving  Outcome Evaluation: Pt seen for therapy today.  Pt received supine in bed, independent with bed mobility, stood with cga/sba and walked to bathroom.  Pt able to doff/don her brief and socks independently.  Pt completed toileting hygiene independently and was able to stand at sink with sba to wash her hands.  Pt then walked 24' to her chair.  Pt did c/o abdominal and R shoulder pain during session.  Pt tolerated increased activity today.  Cont OT per POC.      Anticipated Discharge Disposition (OT): home, home with home health

## 2024-08-01 NOTE — PROGRESS NOTES
[facility]    Name:  Tasha Yarbrough   Age:  69 y.o.  Sex:  female  :  1955  MRN:  [id number]   Visit Number:  [id number]  Admission Date:  2024  Date Of Service:  24  Primary Care Physician:  Terrence Baldwin MD     LOS: 2 days :    Chief Complaint:      Right shoulder pain    Subjective:    24: Had vomiting x 1 this morning. Discussed with Dr. Fernandez reports is doing better from his standpoint. Right shoulder pain from CVC.    History Of Presenting Illness:       69-year-old with a history of hypertension, recurrent pancreatitis, CKD stage III, chronic diarrhea, chronic tobacco abuse, medical noncompliance, who presented from home with chief complaint of abdominal pain.  Patient is known to our service due to her previous admissions for recurrent pancreatitis.  She was evaluated at [facility] previously and supposed to be on Creon, she reports that she has not been taking it as prescribed and sometimes forget to take it.  She reports that her pain started 3 days ago, described as similar to previous episodes in her abdomen that is generalized but mostly located over her epigastric area.  She reports that she initially had nausea and vomiting on the first day but was unable to eat or drink anything after the over the past 2 days.  She denies fever, chills, chest pain, shortness of breath or urinary symptoms.     On ER evaluation, patient was hemodynamically stable and was afebrile.  Her workup showed venous BG with a pH of 6.962/HCO3 6.0.  Labs significant for potassium of 5.5, CO2 3.8, creatinine 2.33 (baseline creatinine of 1) BUN 53, glucose 197, calcium 10.8.  Lipase 179, WBC 14.5.  CT abdomen pelvis Right ovarian cyst. Consider nonemergent ultrasound characterization and follow-up. Moderate-to-large hiatal hernia similar to the prior exam. 2 mm nonobstructing right inferior and 5 mm nonobstructing right mid renal stones. Additional  nonacute findings.  Case discussed by ER provider with Dr. Fernandez with nephrology, recommended bicarb drip.  Patient received sodium bicarb, Dilaudid, Zofran and was started on bicarb drip.  Hospitalist consulted for admission, further management and treatment.  Edited by: Mateo Storm DO at 8/1/2024 1036     Review of Systems:     All systems were reviewed and negative except as mentioned in subjective, assessment and plan.    Vital Signs:    Temp:  [97.4 °F (36.3 °C)-98.5 °F (36.9 °C)] 98.2 °F (36.8 °C)  Heart Rate:  [65-92] 89  BP: ()/(57-69) 95/58    Intake and output:    I/O last 3 completed shifts:  In: 6363.9 [P.O.:210; I.V.:6153.9]  Out: 2500 [Urine:2500]  I/O this shift:  In: 600 [P.O.:600]  Out: -     Physical Examination:    Constitutional: No acute distress, awake, alert  HENT: NCAT, mucous membranes moist  Respiratory: Clear to auscultation bilaterally, respiratory effort normal   Cardiovascular: RRR, no murmurs, rubs, or gallops  Gastrointestinal: Positive bowel sounds, soft, mild generalized abdominal tenderness, nondistended  Musculoskeletal: No bilateral ankle edema  Psychiatric: Appropriate affect, cooperative  Neurologic: Oriented x 3, speech clear  Skin: No rashes  Exam stable 8/1/24  Edited by: Mateo Storm DO at 8/1/2024 1036     Laboratory results:    Results from last 7 days   Lab Units 08/01/24  0423 07/31/24  0436 07/30/24  2113   SODIUM mmol/L 143 144 137   POTASSIUM mmol/L 3.3* 3.3* 5.5*   CHLORIDE mmol/L 102 111* 109*   CO2 mmol/L 28.9 11.2* 3.8*   BUN mg/dL 42* 55* 53*   CREATININE mg/dL 1.55* 2.19* 2.33*   CALCIUM mg/dL 8.0* 9.8 10.8*   BILIRUBIN mg/dL 0.2  --  <0.2   ALK PHOS U/L 104  --  197*   ALT (SGPT) U/L <5  --  8   AST (SGOT) U/L 10  --  11   GLUCOSE mg/dL 87 95 120*     Results from last 7 days   Lab Units 08/01/24  0423 07/31/24  0437 07/30/24 2004   WBC 10*3/mm3 6.14 11.12* 14.51*   HEMOGLOBIN g/dL 8.7* 11.9* 15.1   HEMATOCRIT % 25.9* 35.7 48.8*    PLATELETS 10*3/mm3 154 271 359                 Recent Labs     04/21/24  0015 04/21/24  0919 05/31/24  1126   PHART 7.158* 7.199* 7.230*   GWF4VSL 26.4* 25.5* 26.8*   PO2ART 104.0* 97.9 96.6   MPW8SMJ 9.4* 9.9* 11.2*   BASEEXCESS -17.9* -16.6* -14.9*      I have reviewed the patient's laboratory results.    Radiology results:    XR Ribs Right With PA Chest    Result Date: 7/31/2024  FINAL REPORT TECHNIQUE: 3 views of the bilateral ribs were obtained. CLINICAL HISTORY: right ribs pain, anterior lateral, no known injury. FINDINGS: A right central venous catheter ends in the SVC.  There is no pneumothorax.  The heart is normal in size.  The mediastinum is unremarkable.  A small calcified granuloma is noted in the left lower lung field.  The lungs are otherwise clear.  There is no pleural effusion or pneumothorax.  Three views demonstrate no acute displaced fracture or other acute osseous abnormality.     Impression: No acute disease.  Specifically, no right rib fracture is identified. Authenticated and Electronically Signed by Sergio Silverio M.D. on 07/31/2024 08:34:57 PM    XR Chest 1 View    Result Date: 7/31/2024  PROCEDURE: XR CHEST 1 VW-  HISTORY: line placement; K86.1-Other chronic pancreatitis; E87.20-Acidosis, unspecified  COMPARISON: May 30, 2024.  FINDINGS: The heart is normal in size. The lungs are clear except for mild, stable bilateral chronic change. There is evidence of old calcified granulomatous disease.. The mediastinum is unremarkable. There is no pneumothorax.  There are no acute osseous abnormalities. Small hiatal hernia again noted. Right subclavian line has been placed since the prior exam. Tip is in the region of the junction of the SVC and right atrium.      Impression: Placement of right subclavian line as described without pneumothorax.      This report was signed and finalized on 7/31/2024 10:18 AM by Lcuía Bethea MD.      CT Abdomen Pelvis With Contrast    Result Date: 7/30/2024  FINAL REPORT  TECHNIQUE: null CLINICAL HISTORY: Leukocytosis, epigastric abdominal pain COMPARISON: null FINDINGS: CT abdomen and pelvis with contrast Comparison: CT/SR - CT ABDOMEN PELVIS W CONTRAST - 6/22/24 20:04 EDT CT/SR - CT ABDOMEN PELVIS W CONTRAST - 9/22/23 20:48 EDT Findings: Moderate-to-large hiatal hernia similar to the prior exam. 2 mm nonobstructing right inferior and 5 mm nonobstructing right mid renal stones. Bilateral simple renal cysts. Cholecystectomy. Abdominal solid organs otherwise unremarkable. No bowel obstruction, pneumoperitoneum, or pneumatosis. Right ovarian cyst 3 x 2.1 cm image 52, similar to prior exams. Uterus and ovaries otherwise unremarkable. Appendix is not identified. No inflammatory changes. L2 left compression fracture similar to prior exam. No acute fractures. Moderate to severe lumbar degenerative spondylosis similar to prior.     Impression: IMPRESSION: 1. No CT explanation for leukocytosis. 2. Right ovarian cyst. Consider nonemergent ultrasound characterization and follow-up. 3. Additional nonacute findings as above. Authenticated and Electronically Signed by Luisito Barnett MD on 07/30/2024 10:52:56 PM   I have reviewed the patient's radiology reports.    Medication Review:     I have reviewed the patient's active and prn medications.     Problem List:      Pancreatitis      Assessment/Plan:    Acute kidney injury on chronic kidney disease stage III, POA  Acute hyperkalemia, POA  Acute on chronic pancreatitis, POA  Dehydration, POA  Metabolic acidosis, POA  Chronic diarrhea  Chronic tobacco abuse  Medical noncompliance     Plan:      Acute on Chronic Pancreatitis:  -Patient with multiple bouts of chronic pancreatitis leading to significant dehydration and chronic nausea.  Underlying etiology unknown.  She is due to follow-up with .  Has seen Dr. Fleming locally.  Previously underwent cholecystectomy followed by ERCP.  Has been recommended to quit smoking. Recent workup at  also  demonstrated pancreatic insufficiency and she is supposed to be on Creon now however patient is noncompliant with it.  Advance to full liquids.   -General surgery to placed CVC 7/31/24.     Dehydration/metabolic acidosis/hyperkalemia resolved/renal failure:  -Dr. Fernandez and nephrology consulted.    -s/p Bicarb  -Monitor BMP  -s/p insulin and D50  -Monitor urine output.    - Continuing IV Fluids     Chronic diarrhea:  -Combination of GI losses likely contributing to the metabolic acidosis.    -s/p bicarb.      DVT Prophylaxis: Heparin prophylaxis (benefit> risk)  Code Status: Full  Diet: clear liquids  Disposition: anticipate home tomorrow  Edited by: Mateo Storm DO at 8/1/2024 1036           Mateo Storm DO  08/01/24  10:37 EDT    Dictated utilizing Dragon dictation.

## 2024-08-01 NOTE — THERAPY TREATMENT NOTE
Patient Name: Tasha Yarbrough  : 1955    MRN: [id number]                              Today's Date: 2024       Admit Date: 2024    Visit Dx:     ICD-10-CM ICD-9-CM   1. Chronic pancreatitis, unspecified pancreatitis type  K86.1 577.1   2. Metabolic acidosis  E87.20 276.2     Patient Active Problem List   Diagnosis    Colon cancer screening    Gastroesophageal reflux disease with esophagitis    Left lower quadrant abdominal pain    Irritable bowel syndrome with constipation    Encounter for screening for malignant neoplasm of colon    Periumbilical abdominal pain    Diarrhea of presumed infectious origin    Acute kidney injury    Bacterial colitis    VIVIENNE (acute kidney injury)    C. difficile colitis    Generalized abdominal pain    Diarrhea of infectious origin    Abnormal finding on GI tract imaging    Anemia, chronic disease    Colitis due to Clostridium difficile    Pancreatitis, acute    Chronic kidney disease, stage III (moderate)    Acute pancreatitis    Acute UTI (urinary tract infection)    Epigastric pain    Nausea and vomiting in adult    Moderate malnutrition    Hyperkalemia    Chronic pancreatitis    Pancreatitis     Past Medical History:   Diagnosis Date    COPD (chronic obstructive pulmonary disease)     Hypertension     Impaired functional mobility, balance, gait, and endurance     Impaired mobility     Injury of back     Migraines     Multiple gastric ulcers     Pancreatitis     UTI (urinary tract infection)      Past Surgical History:   Procedure Laterality Date    ABDOMINAL SURGERY      CHOLECYSTECTOMY      COLON SURGERY      COLON RUPTURED /  [id number] ?    COLONOSCOPY      ENDOSCOPY N/A 2024    Procedure: ESOPHAGOGASTRODUODENOSCOPY WITH BIOPSY;  Surgeon: Jairo Negro MD;  Location: [facility] ENDOSCOPY;  Service: Gastroenterology;  Laterality: N/A;    KNEE SURGERY Right     TOTAL REPLACEMENT    TUBAL ABDOMINAL LIGATION        General Information       Row Name 24 1324           Physical Therapy Time and Intention    Document Type therapy note (daily note)  -JR     Mode of Treatment physical therapy  -JR       Row Name 08/01/24 1324          General Information    Patient Profile Reviewed yes  -JR               User Key  (r) = Recorded By, (t) = Taken By, (c) = Cosigned By      Initials Name Provider Type    Gaye Stokes, MARCIAL Physical Therapist                   Mobility       Row Name 08/01/24 1324          Bed Mobility    Bed Mobility bed mobility (all) activities  -JR     All Activities, [place] (Bed Mobility) modified independence  -JR     Assistive Device (Bed Mobility) head of bed elevated  -JR       Row Name 08/01/24 1324          Sit-Stand Transfer    Sit-Stand [place] (Transfers) contact guard  -     Assistive Device (Sit-Stand Transfers) walker, front-wheeled  -JR       Row Name 08/01/24 1324          Gait/Stairs (Locomotion)    [place] Level (Gait) contact guard  -     Assistive Device (Gait) walker, front-wheeled  -JR     Patient was able to Ambulate yes  -JR     Distance in Feet (Gait) 24  -JR     Deviations/Abnormal Patterns (Gait) base of support, narrow;alfonzo decreased;festinating/shuffling;stride length decreased  -JR     Bilateral Gait Deviations forward flexed posture;heel strike decreased;weight shift ability decreased  -JR               User Key  (r) = Recorded By, (t) = Taken By, (c) = Cosigned By      Initials Name Provider Type    Gaye Stokes PT Physical Therapist                   Obj/Interventions       Row Name 08/01/24 1324          Balance    Balance Assessment sitting static balance;sitting dynamic balance;sit to stand dynamic balance;standing static balance;standing dynamic balance  -JR     Static Sitting Balance supervision  -JR     Dynamic Sitting Balance supervision  -JR     Position, Sitting Balance unsupported;sitting edge of bed  -JR     Sit to Stand Dynamic Balance contact guard  -JR     Static Standing Balance contact  guard  -JR     Dynamic Standing Balance contact guard  -JR     Position/Device Used, Standing Balance walker, rolling  -JR               User Key  (r) = Recorded By, (t) = Taken By, (c) = Cosigned By      Initials Name Provider Type    Gaye Stokes, PT Physical Therapist                   Goals/Plan    No documentation.                  Clinical Impression       Row Name 08/01/24 1324          Pain    Pretreatment Pain Rating 6/10  -JR     Posttreatment Pain Rating 6/10  -JR     Pain Location - Side/Orientation Right  -JR     Pain Location - shoulder  -JR     Pain Intervention(s) Repositioned;Ambulation/increased activity  -JR     Additional Documentation Pain Scale: Numbers Pre/Post-Treatment (Group)  -JR       Row Name 08/01/24 1324          Plan of Care Review    Plan of Care Reviewed With patient  -JR     Progress improving  -JR     Outcome Evaluation Patient participated well in PT treatment and demonstrated improving strength, balance and overall mobility.  She is able to perform transfers and gait x 24 feet with CGA and RW.  She reports some right shoulder pain, but reports it is improving.  Plan to continue PT per POC.  -JR       Row Name 08/01/24 1324          Positioning and Restraints    Pre-Treatment Position in bed  -JR     Post Treatment Position chair  -JR     In Chair sitting;call light within reach;encouraged to call for assist;with family/caregiver;notified nsg  -JR               User Key  (r) = Recorded By, (t) = Taken By, (c) = Cosigned By      Initials Name Provider Type    Gaye Stokes, PT Physical Therapist                   Outcome Measures       Row Name 08/01/24 1324 08/01/24 0800       How much help from another person do you currently need...    Turning from your back to your side while in flat bed without using bedrails? 4  -JR 4  -RP    Moving from lying on back to sitting on the side of a flat bed without bedrails? 4  -JR 3  -RP    Moving to and from a bed to a chair (including a  wheelchair)? 3  -JR 3  -RP    Standing up from a chair using your arms (e.g., wheelchair, bedside chair)? 3  - 3  -RP    Climbing 3-5 steps with a railing? 2  -JR 2  -RP    To walk in hospital room? 3  -JR 3  -RP    AM-PAC 6 Clicks Score (PT) 19  - 18  -RP    Highest Level of Mobility Goal 6 --> Walk 10 steps or more  - 6 --> Walk 10 steps or more  -      Row Name 08/01/24 1425 08/01/24 1324       Functional Assessment    Outcome Measure Options AM-PAC 6 Clicks Daily Activity (OT)  - AM-PAC 6 Clicks Basic Mobility (PT)  -              User Key  (r) = Recorded By, (t) = Taken By, (c) = Cosigned By      Initials Name Provider Type    Georgina Butterfield Occupational Therapist    Gaye Stokes, PT Physical Therapist    Madai Plummer, RN Registered Nurse                                 Physical Therapy Education       Title: PT OT SLP Therapies (In Progress)       Topic: Physical Therapy (In Progress)       Point: Mobility training (Done)       Learning Progress Summary             Patient Acceptance, E,TB, VU by  at 7/31/2024 1620    Comment: Role of PT and POC                         Point: Home exercise program (Not Started)       Learner Progress:  Not documented in this visit.              Point: Body mechanics (Done)       Learning Progress Summary             Patient Acceptance, E,TB, VU by  at 8/1/2024 1525    Comment: Posture in standing during gait                         Point: Precautions (Not Started)       Learner Progress:  Not documented in this visit.                              User Key       Initials Effective Dates Name Provider Type Discipline     08/22/23 -  Gaye Falcon, PT Physical Therapist PT                  PT Recommendation and Plan  Planned Therapy Interventions (PT): strengthening, balance training, bed mobility training, transfer training, gait training, patient/family education, home exercise program  Plan of Care Reviewed With: patient  Progress:  improving  Outcome Evaluation: Patient participated well in PT treatment and demonstrated improving strength, balance and overall mobility.  She is able to perform transfers and gait x 24 feet with CGA and RW.  She reports some right shoulder pain, but reports it is improving.  Plan to continue PT per POC.     Time Calculation:   PT Evaluation Complexity  History, PT Evaluation Complexity: 1-2 personal factors and/or comorbidities  Examination of Body Systems (PT Eval Complexity): total of 3 or more elements  Clinical Presentation (PT Evaluation Complexity): evolving  Clinical Decision Making (PT Evaluation Complexity): moderate complexity  Overall Complexity (PT Evaluation Complexity): moderate complexity     PT Charges       Row Name 08/01/24 1324             Time Calculation    Start Time 1324  -JR      Stop Time 1349  -JR      Time Calculation (min) 25 min  -JR      PT Received On 08/01/24  -JR      PT Goal Re-Cert Due Date 08/10/24  -JR         Time Calculation- PT    Total Timed Code Minutes- PT 25 minute(s)  -JR         Timed Charges    33824 - Gait Training Minutes  13  -JR      54355 - PT Therapeutic Activity Minutes 12  -JR         Total Minutes    Timed Charges Total Minutes 25  -JR       Total Minutes 25  -JR                User Key  (r) = Recorded By, (t) = Taken By, (c) = Cosigned By      Initials Name Provider Type    JR Gaye Falcon, PT Physical Therapist                  Therapy Charges for Today       Code Description Service Date Service Provider Modifiers Qty    26593374279  PT EVAL MOD COMPLEXITY 4 7/31/2024 Gaye Falcon, PT GP 1    [id number]  PT THERAPEUTIC ACT EA 15 MIN 8/1/2024 Gaye Falcon, PT GP 1    [id number] HC GAIT TRAINING EA 15 MIN 8/1/2024 Gaye Falcon, PT GP 1            PT G-Codes  Outcome Measure Options: AM-PAC 6 Clicks Daily Activity (OT)  AM-PAC 6 Clicks Score (PT): 19  AM-PAC 6 Clicks Score (OT): 23  PT Discharge Summary  Anticipated Discharge Disposition (PT): home  with home health    Gaye Falcon, PT  8/1/2024

## 2024-08-02 ENCOUNTER — READMISSION MANAGEMENT (OUTPATIENT)
Dept: CALL CENTER | Facility: HOSPITAL | Age: 69
End: 2024-08-02
Payer: MEDICARE

## 2024-08-02 VITALS
HEART RATE: 65 BPM | WEIGHT: 129 LBS | BODY MASS INDEX: 22.86 KG/M2 | TEMPERATURE: 97.6 F | DIASTOLIC BLOOD PRESSURE: 74 MMHG | HEIGHT: 63 IN | SYSTOLIC BLOOD PRESSURE: 116 MMHG | RESPIRATION RATE: 16 BRPM | OXYGEN SATURATION: 99 %

## 2024-08-02 LAB
ALBUMIN SERPL-MCNC: 3.3 G/DL (ref 3.5–5.2)
ALBUMIN/GLOB SERPL: 1.6 G/DL
ALP SERPL-CCNC: 102 U/L (ref 39–117)
ALT SERPL W P-5'-P-CCNC: 8 U/L (ref 1–33)
ANION GAP SERPL CALCULATED.3IONS-SCNC: 6.7 MMOL/L (ref 5–15)
AST SERPL-CCNC: 15 U/L (ref 1–32)
BILIRUB SERPL-MCNC: <0.2 MG/DL (ref 0–1.2)
BUN SERPL-MCNC: 29 MG/DL (ref 8–23)
BUN/CREAT SERPL: 20.9 (ref 7–25)
CALCIUM SPEC-SCNC: 8.7 MG/DL (ref 8.6–10.5)
CHLORIDE SERPL-SCNC: 109 MMOL/L (ref 98–107)
CO2 SERPL-SCNC: 31.3 MMOL/L (ref 22–29)
CREAT SERPL-MCNC: 1.39 MG/DL (ref 0.57–1)
DEPRECATED RDW RBC AUTO: 64 FL (ref 37–54)
EGFRCR SERPLBLD CKD-EPI 2021: 41.2 ML/MIN/1.73
ERYTHROCYTE [DISTWIDTH] IN BLOOD BY AUTOMATED COUNT: 15.9 % (ref 12.3–15.4)
GLOBULIN UR ELPH-MCNC: 2.1 GM/DL
GLUCOSE SERPL-MCNC: 80 MG/DL (ref 65–99)
HCT VFR BLD AUTO: 28.8 % (ref 34–46.6)
HGB BLD-MCNC: 9 G/DL (ref 12–15.9)
MCH RBC QN AUTO: 34.4 PG (ref 26.6–33)
MCHC RBC AUTO-ENTMCNC: 31.3 G/DL (ref 31.5–35.7)
MCV RBC AUTO: 109.9 FL (ref 79–97)
PLATELET # BLD AUTO: 130 10*3/MM3 (ref 140–450)
PMV BLD AUTO: 10.7 FL (ref 6–12)
POTASSIUM SERPL-SCNC: 5.7 MMOL/L (ref 3.5–5.2)
PROT SERPL-MCNC: 5.4 G/DL (ref 6–8.5)
RBC # BLD AUTO: 2.62 10*6/MM3 (ref 3.77–5.28)
SODIUM SERPL-SCNC: 147 MMOL/L (ref 136–145)
WBC NRBC COR # BLD AUTO: 4.88 10*3/MM3 (ref 3.4–10.8)

## 2024-08-02 PROCEDURE — 80053 COMPREHEN METABOLIC PANEL: CPT | Performed by: INTERNAL MEDICINE

## 2024-08-02 PROCEDURE — 85027 COMPLETE CBC AUTOMATED: CPT | Performed by: INTERNAL MEDICINE

## 2024-08-02 PROCEDURE — 25010000002 HEPARIN (PORCINE) PER 1000 UNITS: Performed by: FAMILY MEDICINE

## 2024-08-02 PROCEDURE — 25010000002 POTASSIUM CHLORIDE PER 2 MEQ OF POTASSIUM: Performed by: INTERNAL MEDICINE

## 2024-08-02 RX ORDER — HYDROCODONE BITARTRATE AND ACETAMINOPHEN 7.5; 325 MG/1; MG/1
1 TABLET ORAL EVERY 8 HOURS PRN
Qty: 10 TABLET | Refills: 0 | Status: SHIPPED | OUTPATIENT
Start: 2024-08-02

## 2024-08-02 RX ORDER — ONDANSETRON 4 MG/1
4 TABLET, ORALLY DISINTEGRATING ORAL EVERY 8 HOURS PRN
Qty: 12 TABLET | Refills: 0 | Status: SHIPPED | OUTPATIENT
Start: 2024-08-02

## 2024-08-02 RX ADMIN — ACETAMINOPHEN 650 MG: 325 TABLET, FILM COATED ORAL at 12:15

## 2024-08-02 RX ADMIN — HEPARIN SODIUM 5000 UNITS: 5000 INJECTION INTRAVENOUS; SUBCUTANEOUS at 10:10

## 2024-08-02 RX ADMIN — Medication 10 ML: at 10:12

## 2024-08-02 RX ADMIN — FAMOTIDINE 20 MG: 20 TABLET, FILM COATED ORAL at 10:10

## 2024-08-02 RX ADMIN — SERTRALINE HYDROCHLORIDE 50 MG: 50 TABLET ORAL at 10:10

## 2024-08-02 RX ADMIN — SODIUM BICARBONATE 650 MG TABLET 1300 MG: at 10:10

## 2024-08-02 RX ADMIN — POTASSIUM CHLORIDE 125 ML/HR: 149 INJECTION, SOLUTION, CONCENTRATE INTRAVENOUS at 04:11

## 2024-08-02 RX ADMIN — SODIUM BICARBONATE 650 MG TABLET 1300 MG: at 12:15

## 2024-08-02 RX ADMIN — PANTOPRAZOLE SODIUM 40 MG: 40 INJECTION, POWDER, FOR SOLUTION INTRAVENOUS at 05:26

## 2024-08-02 NOTE — DISCHARGE SUMMARY
[facility]   DISCHARGE SUMMARY      Name:  Tasha Yarbrough   Age:  69 y.o.  Sex:  female  :  1955  MRN:  [id number]   Visit Number:  [id number]    Admission Date:  2024  Date of Discharge:  2024  Primary Care Physician:  Terrence Baldwin MD    Important issues to note:    Start: hydrocodone  Stop: nothing  Follow up: PCP and GI  Brief Summary: Presented with abdominal pain and prerenal azotemia due to pancreatitis. Initially had improvement with IV fluids which improved to baseline by the time of discharge. Tolerating PO. Creatinine improved.    Discharge Diagnoses:       Pancreatitis        Problem List:     Active Hospital Problems    Diagnosis  POA    **Pancreatitis [K85.90]  Yes      Resolved Hospital Problems   No resolved problems to display.     Presenting Problem:    Chief Complaint   Patient presents with    Abdominal Pain      Consults:     Consulting Physician(s)         Provider   Role Specialty     Nito Fernandez MD, WINNIE      Consulting Physician Nephrology     Kang Townsend MD      Consulting Physician General Surgery              History Of Presenting Illness:       69-year-old with a history of hypertension, recurrent pancreatitis, CKD stage III, chronic diarrhea, chronic tobacco abuse, medical noncompliance, who presented from home with chief complaint of abdominal pain.  Patient is known to our service due to her previous admissions for recurrent pancreatitis.  She was evaluated at [facility] previously and supposed to be on Creon, she reports that she has not been taking it as prescribed and sometimes forget to take it.  She reports that her pain started 3 days ago, described as similar to previous episodes in her abdomen that is generalized but mostly located over her epigastric area.  She reports that she initially had nausea and vomiting on the first day but was unable to eat or drink anything after the over the past 2 days.  She  denies fever, chills, chest pain, shortness of breath or urinary symptoms.     On ER evaluation, patient was hemodynamically stable and was afebrile.  Her workup showed venous BG with a pH of 6.962/HCO3 6.0.  Labs significant for potassium of 5.5, CO2 3.8, creatinine 2.33 (baseline creatinine of 1) BUN 53, glucose 197, calcium 10.8.  Lipase 179, WBC 14.5.  CT abdomen pelvis Right ovarian cyst. Consider nonemergent ultrasound characterization and follow-up. Moderate-to-large hiatal hernia similar to the prior exam. 2 mm nonobstructing right inferior and 5 mm nonobstructing right mid renal stones. Additional nonacute findings.  Case discussed by ER provider with Dr. Fernandez with nephrology, recommended bicarb drip.  Patient received sodium bicarb, Dilaudid, Zofran and was started on bicarb drip.  Hospitalist consulted for admission, further management and treatment.  Edited by: aMteo Storm DO at 8/1/2024 1036      Hospital Course:    Acute kidney injury on chronic kidney disease stage III, POA  Acute hyperkalemia, POA  Acute on chronic pancreatitis, POA  Dehydration, POA  Metabolic acidosis, POA  Chronic diarrhea  Chronic tobacco abuse  Medical noncompliance     Plan:      Acute on Chronic Pancreatitis:  -Patient with multiple bouts of chronic pancreatitis leading to significant dehydration and chronic nausea.  Underlying etiology unknown.  She is due to follow-up with .  Has seen Dr. Fleming locally.  Previously underwent cholecystectomy followed by ERCP.  Has been recommended to quit smoking. Recent workup at  also demonstrated pancreatic insufficiency and she is supposed to be on Creon now however patient is noncompliant with it.  Advance to full liquids.   -General surgery to placed CVC 7/31/24. DC prior to DC     Dehydration/metabolic acidosis/hyperkalemia resolved/renal failure:  -Dr. Fernandez and nephrology consulted.    -s/p Bicarb  -Monitor BMP stable can recheck outpatient  -s/p insulin and  D50  -good urine output.    - s/p IV Fluids     Chronic diarrhea:  -Combination of GI losses likely contributing to the metabolic acidosis.    -s/p bicarb.          Vital Signs:    Temp:  [97.3 °F (36.3 °C)-98.9 °F (37.2 °C)] 97.6 °F (36.4 °C)  Heart Rate:  [71-86] 74  Resp:  [14-16] 16  BP: ()/(62-80) 116/74    Physical Exam:    Constitutional: No acute distress, awake, alert  HENT: NCAT, mucous membranes moist  Respiratory: Clear to auscultation bilaterally, respiratory effort normal   Cardiovascular: RRR, no murmurs, rubs, or gallops  Gastrointestinal: Positive bowel sounds, soft, mild generalized abdominal tenderness, nondistended  Musculoskeletal: No bilateral ankle edema  Psychiatric: Appropriate affect, cooperative  Neurologic: Oriented x 3, speech clear  Skin: No rashes  Exam stable 8/2/24    Pertinent Lab Results:     Results from last 7 days   Lab Units 08/02/24 0708 08/01/24 0423 07/31/24  0436 07/30/24  2113   SODIUM mmol/L 147* 143 144 137   POTASSIUM mmol/L 5.7* 3.3* 3.3* 5.5*   CHLORIDE mmol/L 109* 102 111* 109*   CO2 mmol/L 31.3* 28.9 11.2* 3.8*   BUN mg/dL 29* 42* 55* 53*   CREATININE mg/dL 1.39* 1.55* 2.19* 2.33*   CALCIUM mg/dL 8.7 8.0* 9.8 10.8*   BILIRUBIN mg/dL <0.2 0.2  --  <0.2   ALK PHOS U/L 102 104  --  197*   ALT (SGPT) U/L 8 <5  --  8   AST (SGOT) U/L 15 10  --  11   GLUCOSE mg/dL 80 87 95 120*     Results from last 7 days   Lab Units 08/02/24 0708 08/01/24 0423 07/31/24  0437   WBC 10*3/mm3 4.88 6.14 11.12*   HEMOGLOBIN g/dL 9.0* 8.7* 11.9*   HEMATOCRIT % 28.8* 25.9* 35.7   PLATELETS 10*3/mm3 130* 154 271                     Results from last 7 days   Lab Units 07/30/24  2113   LIPASE U/L 179*               Pertinent Radiology Results:    Imaging Results (All)       Procedure Component Value Units Date/Time    XR Ribs Right With PA Chest [[id number]] Collected: 07/31/24 2028     Updated: 07/31/24 2035    Narrative:      FINAL REPORT    TECHNIQUE:  3 views of the bilateral ribs  were obtained.    CLINICAL HISTORY:  right ribs pain, anterior lateral, no known injury.    FINDINGS:  A right central venous catheter ends in the SVC.  There is no  pneumothorax.  The heart is normal in size.  The mediastinum is  unremarkable.  A small calcified granuloma is noted in the left  lower lung field.  The lungs are otherwise clear.  There is no  pleural effusion or pneumothorax.  Three views demonstrate no  acute displaced fracture or other acute osseous abnormality.      Impression:      No acute disease.  Specifically, no right rib fracture is  identified.    Authenticated and Electronically Signed by Sergio Silverio M.D. on  07/31/2024 08:34:57 PM    XR Chest 1 View [[id number]] Collected: 07/31/24 1017     Updated: 07/31/24 1021    Narrative:      PROCEDURE: XR CHEST 1 VW-     HISTORY: line placement; K86.1-Other chronic pancreatitis;  E87.20-Acidosis, unspecified     COMPARISON: May 30, 2024.     FINDINGS: The heart is normal in size. The lungs are clear except for  mild, stable bilateral chronic change. There is evidence of old  calcified granulomatous disease.. The mediastinum is unremarkable. There  is no pneumothorax.  There are no acute osseous abnormalities. Small  hiatal hernia again noted. Right subclavian line has been placed since  the prior exam. Tip is in the region of the junction of the SVC and  right atrium.       Impression:      Placement of right subclavian line as described without  pneumothorax.                 This report was signed and finalized on 7/31/2024 10:18 AM by Lucía Bethea MD.       CT Abdomen Pelvis With Contrast [[id number]] Collected: 07/30/24 2252     Updated: 07/30/24 2254    Narrative:      FINAL REPORT    TECHNIQUE:  null    CLINICAL HISTORY:  Leukocytosis, epigastric abdominal pain    COMPARISON:  null    FINDINGS:  CT abdomen and pelvis with contrast    Comparison: CT/SR - CT ABDOMEN PELVIS W CONTRAST - 6/22/24 20:04 EDT    CT/SR - CT ABDOMEN PELVIS W CONTRAST -  9/22/23 20:48 EDT    Findings:    Moderate-to-large hiatal hernia similar to the prior exam.    2 mm nonobstructing right inferior and 5 mm nonobstructing right mid renal stones.    Bilateral simple renal cysts.    Cholecystectomy. Abdominal solid organs otherwise unremarkable.    No bowel obstruction, pneumoperitoneum, or pneumatosis.    Right ovarian cyst 3 x 2.1 cm image 52, similar to prior exams.    Uterus and ovaries otherwise unremarkable.    Appendix is not identified. No inflammatory changes.    L2 left compression fracture similar to prior exam. No acute fractures.    Moderate to severe lumbar degenerative spondylosis similar to prior.      Impression:      IMPRESSION:    1. No CT explanation for leukocytosis.    2. Right ovarian cyst. Consider nonemergent ultrasound characterization and follow-up.    3. Additional nonacute findings as above.    Authenticated and Electronically Signed by Luisito Barnett MD  on 07/30/2024 10:52:56 PM            Echo:      Condition on Discharge:      Stable.    Code status during the hospital stay:    Code Status and Medical Interventions: CPR (Attempt to Resuscitate); Full Support   Ordered at: 07/31/24 0235     Code Status (Patient has no pulse and is not breathing):    CPR (Attempt to Resuscitate)     Medical Interventions (Patient has pulse or is breathing):    Full Support     Discharge Disposition:    Home or Self Care    Discharge Medications:       Discharge Medications        New Medications        Instructions Start Date   HYDROcodone-acetaminophen 7.5-325 MG per tablet  Commonly known as: NORCO   1 tablet, Oral, Every 8 Hours PRN             Changes to Medications        Instructions Start Date   ondansetron ODT 4 MG disintegrating tablet  Commonly known as: ZOFRAN-ODT  What changed: Another medication with the same name was removed. Continue taking this medication, and follow the directions you see here.   4 mg, Translingual, Every 8 Hours PRN              Continue These Medications        Instructions Start Date   famotidine 20 MG tablet  Commonly known as: PEPCID   20 mg, Oral, 2 Times Daily      glucosamine-chondroitin 500-400 MG capsule capsule   1 capsule, Oral, 2 Times Daily With Meals      methocarbamol 750 MG tablet  Commonly known as: ROBAXIN   750 mg, Oral, 4 Times Daily PRN      pancrelipase (Lip-Prot-Amyl) 62305-66401 units capsule delayed-release particles capsule  Commonly known as: CREON   12,000 units of lipase, Oral, 3 Times Daily With Meals      promethazine 25 MG tablet  Commonly known as: PHENERGAN   25 mg, Oral, As Needed      sertraline 50 MG tablet  Commonly known as: ZOLOFT   50 mg, Oral, Daily      sodium bicarbonate 650 MG tablet   650 mg, Oral, 2 Times Daily             Stop These Medications      vitamin D3 125 MCG (5000 UT) capsule capsule            Discharge Diet:     Diet Instructions       Advance Diet As Tolerated -Target Diet: low fat diet      Target Diet: low fat diet          Activity at Discharge:       Follow-up Appointments:    Additional Instructions for the Follow-ups that You Need to Schedule       Discharge Follow-up with PCP   As directed       Currently Documented PCP:    Terrence Baldwin MD    PCP Phone Number:    [phone]     Follow Up Details: 1 week        Discharge Follow-up with Specified Provider: Dr. Fleming; 1 Month   As directed      To: Dr. Fleming   Follow Up: 1 Month               Follow-up Information       Terrence Baldwin MD .    Specialty: Family Medicine  Why: 1 week  Contact information:  [address] [place] [address] DR TSAI [address] [place] [address] [phone]                           No future appointments.  Test Results Pending at Discharge:           Mateo Storm DO  08/02/24  09:14 EDT    Time: I spent 45 minutes on this discharge activity which included: face-to-face encounter with the patient, reviewing the data in the system, coordination of the care with the nursing staff as well as consultants,  documentation, and entering orders.     Dictated utilizing Dragon dictation.

## 2024-08-02 NOTE — PROGRESS NOTES
[facility] Progress Note  [facility]. [place]        Patient Name: Tasha Yarbrough  : 1955  MRN: [id number]   LOS: 3 days    Patient Care Team:  Terrence Baldwin MD as PCP - General (Family Medicine)    Chief Complaint:    Chief Complaint   Patient presents with    Abdominal Pain     Primary Care Physician:  Terrence Baldwin MD  Date of admission: 2024    Subjective     Interval History:   Follow-up acute kidney injury  Events noted from last 24 hours.  I reviewed the chart and other providers notes, labs and procedures done since my last note.  She is starting to feel better, states she has not had anything to eat, she feels hungry and wants to go home.  Denies having any chest pain or shortness of breath.    Review of Systems:   As noted above.    Objective     Vitals:   Temp:  [97.3 °F (36.3 °C)-98.9 °F (37.2 °C)] 97.6 °F (36.4 °C)  Heart Rate:  [71-89] 74  Resp:  [14-16] 16  BP: ()/(58-80) 116/74  Flow (L/min):  [2] 2    Intake/Output Summary (Last 24 hours) at 2024 0730  Last data filed at 2024 0411  Gross per 24 hour   Intake 4083.75 ml   Output 1500 ml   Net 2583.75 ml       Physical Exam:    General Appearance: alert, oriented x 3, no acute distress   Skin: warm and dry  HEENT: oral mucosa normal, nonicteric sclera  Neck: supple, no JVD  Lungs: CTA  Heart: RRR, normal S1 and S2  Abdomen: obese, soft, minimal tenderness, non distended and positive bowel sounds.  : no palpable bladder  Extremities: Trace edema, no cyanosis or clubbing  Neuro: normal speech and mental status     Scheduled Meds:     Current Facility-Administered Medications   Medication Dose Route Frequency Provider Last Rate Last Admin    acetaminophen (TYLENOL) tablet 650 mg  650 mg Oral Q4H PRN Vivienne Roa MD        Or    acetaminophen (TYLENOL) 160 MG/5ML oral solution 650 mg  650 mg Oral Q4H PRN Vivienne Roa MD        Or    acetaminophen (TYLENOL) suppository 650 mg   650 mg Rectal Q4H PRN Vivienne Roa MD        sennosides-docusate (PERICOLACE) 8.6-50 MG per tablet 2 tablet  2 tablet Oral BID PRN Vivienne Roa MD        And    polyethylene glycol (MIRALAX) packet 17 g  17 g Oral Daily PRN Vivienne Roa MD        And    bisacodyl (DULCOLAX) EC tablet 5 mg  5 mg Oral Daily PRN Vivienne Roa MD        And    bisacodyl (DULCOLAX) suppository 10 mg  10 mg Rectal Daily PRN Vivienne Roa MD        famotidine (PEPCID) tablet 20 mg  20 mg Oral BID Vivienne Roa MD   20 mg at 08/01/24 2005    heparin (porcine) 5000 UNIT/ML injection 5,000 Units  5,000 Units Subcutaneous Q12H Vivienne Roa MD   5,000 Units at 08/01/24 2005    HYDROcodone-acetaminophen (NORCO) 7.5-325 MG per tablet 1 tablet  1 tablet Oral Q8H PRN Vivienne Roa MD   1 tablet at 08/01/24 1609    melatonin tablet 5 mg  5 mg Oral Nightly PRN Vivienne Roa MD   5 mg at 08/01/24 2005    methocarbamol (ROBAXIN) tablet 750 mg  750 mg Oral 4x Daily PRN Vivienne Roa MD   750 mg at 08/01/24 1609    Morphine sulfate (PF) injection 2 mg  2 mg Intravenous Q2H PRN Vivienne Roa MD   2 mg at 07/31/24 1409    And    naloxone (NARCAN) injection 0.4 mg  0.4 mg Intravenous Q5 Min PRN Vivienne Roa MD        nitroglycerin (NITROSTAT) SL tablet 0.4 mg  0.4 mg Sublingual Q5 Min PRN Vivienne Roa MD        ondansetron (ZOFRAN) injection 4 mg  4 mg Intravenous Q6H PRN Vivienne Roa MD   4 mg at 08/01/24 1004    pantoprazole (PROTONIX) injection 40 mg  40 mg Intravenous Q AM Vivienne Roa MD   40 mg at 08/02/24 0526    promethazine (PHENERGAN) tablet 25 mg  25 mg Oral Q6H PRN Vivienne Roa MD        sertraline (ZOLOFT) tablet 50 mg  50 mg Oral Daily Vivienne Roa MD   50 mg at 08/01/24 0813    sodium bicarbonate tablet 1,300 mg  1,300 mg Oral 4x Daily Nito Fernandez MD, FASN   1,300 mg at 08/01/24 2005    sodium chloride 0.45 % 1,000 mL with potassium chloride 40 mEq infusion  125 mL/hr  Intravenous Continuous Nito Fernandez MD, FASN 125 mL/hr at 08/02/24 0411 125 mL/hr at 08/02/24 0411    sodium chloride 0.9 % flush 10 mL  10 mL Intravenous PRN Vivienne Roa MD        sodium chloride 0.9 % flush 10 mL  10 mL Intravenous Q12H Vivienne Roa MD   10 mL at 08/01/24 2005    sodium chloride 0.9 % flush 10 mL  10 mL Intravenous PRN Vivienne Roa MD   10 mL at 07/31/24 1033    sodium chloride 0.9 % flush 10 mL  10 mL Intravenous Q12H Kang Townsend MD   10 mL at 08/01/24 2006    sodium chloride 0.9 % flush 10 mL  10 mL Intravenous Q12H Kang Townsend MD   10 mL at 08/01/24 2006    sodium chloride 0.9 % flush 10 mL  10 mL Intravenous Q12H Kang Townsend MD   10 mL at 08/01/24 2006    sodium chloride 0.9 % flush 10 mL  10 mL Intravenous PRN Kang Townsend MD   10 mL at 07/31/24 1033    sodium chloride 0.9 % flush 20 mL  20 mL Intravenous PRN Kang Townsend MD        sodium chloride 0.9 % infusion 40 mL  40 mL Intravenous PRN Vivienne Roa MD        sodium chloride 0.9 % infusion 40 mL  40 mL Intravenous PRN Kang Townsend MD           famotidine, 20 mg, Oral, BID  heparin (porcine), 5,000 Units, Subcutaneous, Q12H  pantoprazole, 40 mg, Intravenous, Q AM  sertraline, 50 mg, Oral, Daily  sodium bicarbonate, 1,300 mg, Oral, 4x Daily  sodium chloride, 10 mL, Intravenous, Q12H  sodium chloride, 10 mL, Intravenous, Q12H  sodium chloride, 10 mL, Intravenous, Q12H  sodium chloride, 10 mL, Intravenous, Q12H        IV Meds:   sodium chloride 0.45 % 1,000 mL with potassium chloride 40 mEq infusion, 125 mL/hr, Last Rate: 125 mL/hr (08/02/24 0411)        Results Reviewed:   I have personally reviewed the results from the time of this admission to 8/2/2024 07:30 EDT     Results from last 7 days   Lab Units 08/01/24  0423 07/31/24  0436 07/30/24  2113   SODIUM mmol/L 143 144 137   POTASSIUM mmol/L 3.3* 3.3* 5.5*   CHLORIDE mmol/L 102 111* 109*   CO2 mmol/L 28.9 11.2* 3.8*   BUN mg/dL 42* 55* 53*  "  CREATININE mg/dL 1.55* 2.19* 2.33*   CALCIUM mg/dL 8.0* 9.8 10.8*   BILIRUBIN mg/dL 0.2  --  <0.2   ALK PHOS U/L 104  --  197*   ALT (SGPT) U/L <5  --  8   AST (SGOT) U/L 10  --  11   GLUCOSE mg/dL 87 95 120*       Estimated Creatinine Clearance: 31.6 mL/min (A) (by C-G formula based on SCr of 1.55 mg/dL (H)).                Results from last 7 days   Lab Units 08/01/24  0423 07/31/24  0437 07/30/24 2004   WBC 10*3/mm3 6.14 11.12* 14.51*   HEMOGLOBIN g/dL 8.7* 11.9* 15.1   PLATELETS 10*3/mm3 154 271 359             Brief Urine Lab Results  (Last result in the past 365 days)        Color   Clarity   Blood   Leuk Est   Nitrite   Protein   CREAT   Urine HCG        07/13/24 2101 Yellow   Clear   Negative   Negative   Negative   30 mg/dL (1+)                   No results found for: \"UTPCR\"    Imaging Results (Last 24 Hours)       ** No results found for the last 24 hours. **                Assessment / Plan     ASSESSMENT:    Pancreatitis    VIVIENNE on CKD 3b: Most likely due to dehydration/GI losses from vomiting and chronic diarrhea, severe acidosis noted but has responded well to IV bicarb push as well as IV infusion of bicarb.  She appears to be tolerating p.o. well will start oral sodium bicarb.  Severe metabolic acidosis: Significant improvement noted.  Chronic pancreatitis: Causing chronic nausea and vomiting with subsequent dehydration, encouraged to be compliant with her medications.  Pancreatic insufficiency: Patient noncompliant with Creon.  Hypertension: Currently hypotensive.      PLAN:  Serum bicarb is back to normal, go ahead and stop the bicarb drip continue half-normal saline with 40 of potassium at 125 cc an will another 24 hours.  Continue with oral sodium bicarb, with the decreased dose.  She feels like that she is doing well and wants to go home.  Encouraged her to be compliant with her medications.  Oral potassium supplements given.  Details were discussed with the patient no family in the room.  "   Details were also discussed with the hospitalist service and or other providers as needed.  Advance diet discussed with the hospitalist service as well as the nursing staff.  Continue with rest of the current treatment plan, and monitor with surveillance labs.  Further recommendations will depend on clinical course of the patient during the current hospitalization.   I have reviewed the copied text to this note, it was edited and the changes made as needed.  It is accurate to the point, when the note was signed today.     Thank you for involving us in the care of Tasha Yarbrough.  Please feel free to call with any questions.    Nito Fernandez MD, FASN  08/02/24  07:30 EDT    [facility]  [phone]      Part of this note may be an electronic transcription/translation of spoken language to printed text using the Dragon Dictation System.

## 2024-08-02 NOTE — PLAN OF CARE
Goal Outcome Evaluation:              Outcome Evaluation: VSS. No acute events during this shift. No concerns voiced at this time.

## 2024-08-02 NOTE — CASE MANAGEMENT/SOCIAL WORK
Case Management Discharge Note      Final Note: Pt discharged home with son via private car. Pt declines  services and denies any other needs at discharge.         Selected Continued Care - Discharged on 8/2/2024 Admission date: 7/30/2024 - Discharge disposition: Home or Self Care      Destination    No services have been selected for the patient.                Durable Medical Equipment    No services have been selected for the patient.                Dialysis/Infusion    No services have been selected for the patient.                Home Medical Care    No services have been selected for the patient.                Therapy    No services have been selected for the patient.                Community Resources    No services have been selected for the patient.                Community & DME    No services have been selected for the patient.                    Selected Continued Care - Prior Encounters Includes continued care and service providers with selected services from prior encounters from 5/1/2024 to 8/2/2024      Discharged on 6/2/2024 Admission date: 5/30/2024 - Discharge disposition: Home or Self Care      Community Resources       Service Provider Selected Services Address Phone Fax Patient Preferred    [facility] PATIENTS ONLY [employer] [address] [address] [phone] -- --                          Transportation Services  Private: Car    Final Discharge Disposition Code: 01 - home or self-care

## 2024-08-02 NOTE — CASE MANAGEMENT/SOCIAL WORK
DCP; Return home with 2 adult sons. Pt declining HH services. IMM delivered at the time of this visit. Pt denies any other needs. Son will drive her home once she is discharged. CM continues to follow.

## 2024-08-03 NOTE — PAYOR COMM NOTE
"To:  Coretta  From: Mia Stallworth RN  Phone: [phone]  Fax: [phone]  NPI: [id number]  TIN: [id number]  Member ID: [id number]   MRN: [id number]    Jaymie Yarbrough (69 y.o. Female)       Date of Birth   1955    Social Security Number       Address   [address] [place], [place] [address]    Home Phone   [phone]    MRN   [id number]       Oriental orthodox   None    Marital Status                               Admission Date   24    Admission Type   Emergency    Admitting Provider   Vivienne Roa MD    Attending Provider       Department, Room/Bed   [facility] TELEMETRY 3, 323/1       Discharge Date   2024    Discharge Disposition   Home or Self Care    Discharge Destination   Home                              Attending Provider: (none)   Allergies: Rocephin [Ceftriaxone], Ciprofloxacin, Codeine, Pineapple    Isolation: None   Infection: Other (24)   Code Status: Prior    Ht: 160 cm (63\")   Wt: 58.5 kg (129 lb)    Admission Cmt: None   Principal Problem: Pancreatitis [K85.90]                   Active Insurance as of 2024       Primary Coverage       Payor Plan Insurance Group Employer/Plan Group    ANTH MEDICARE REPLACEMENT ANTH MEDICARE ADVANTAGE [id number]       Payor Plan Address Payor Plan Phone Number Payor Plan Fax Number Effective Dates    [address] [phone]  2024 - None Entered    [facility] [id number]         Subscriber Name Subscriber Birth Date Member ID       JAYMIE YARBROUGH 1955 [id number]                     Emergency Contacts        (Rel.) Home Phone Work Phone Mobile Phone    EAMONANTONIO WORTHINGTON (Son) [phone] -- --    EAMON,MONIE (Son) [phone] -- --                 Discharge Summary        Mateo Storm DO at 24 0913              [facility] HOSPITALIST   DISCHARGE SUMMARY      Name:  Jaymie Yarbrough   Age:  69 y.o.  Sex:  female  :  1955  MRN:  [id number]   Visit Number:  " [id number]    Admission Date:  7/30/2024  Date of Discharge:  8/2/2024  Primary Care Physician:  Terrence Baldwin MD    Important issues to note:    Start: hydrocodone  Stop: nothing  Follow up: PCP and GI  Brief Summary: Presented with abdominal pain and prerenal azotemia due to pancreatitis. Initially had improvement with IV fluids which improved to baseline by the time of discharge. Tolerating PO. Creatinine improved.    Discharge Diagnoses:       Pancreatitis        Problem List:     Active Hospital Problems    Diagnosis  POA    **Pancreatitis [K85.90]  Yes      Resolved Hospital Problems   No resolved problems to display.     Presenting Problem:    Chief Complaint   Patient presents with    Abdominal Pain      Consults:     Consulting Physician(s)         Provider   Role Specialty     Nito Fernandez MD, WINNIE      Consulting Physician Nephrology     Kang Townsend MD      Consulting Physician General Surgery              History Of Presenting Illness:       69-year-old with a history of hypertension, recurrent pancreatitis, CKD stage III, chronic diarrhea, chronic tobacco abuse, medical noncompliance, who presented from home with chief complaint of abdominal pain.  Patient is known to our service due to her previous admissions for recurrent pancreatitis.  She was evaluated at [facility] previously and supposed to be on Creon, she reports that she has not been taking it as prescribed and sometimes forget to take it.  She reports that her pain started 3 days ago, described as similar to previous episodes in her abdomen that is generalized but mostly located over her epigastric area.  She reports that she initially had nausea and vomiting on the first day but was unable to eat or drink anything after the over the past 2 days.  She denies fever, chills, chest pain, shortness of breath or urinary symptoms.     On ER evaluation, patient was hemodynamically stable and was afebrile.  Her workup showed  venous BG with a pH of 6.962/HCO3 6.0.  Labs significant for potassium of 5.5, CO2 3.8, creatinine 2.33 (baseline creatinine of 1) BUN 53, glucose 197, calcium 10.8.  Lipase 179, WBC 14.5.  CT abdomen pelvis Right ovarian cyst. Consider nonemergent ultrasound characterization and follow-up. Moderate-to-large hiatal hernia similar to the prior exam. 2 mm nonobstructing right inferior and 5 mm nonobstructing right mid renal stones. Additional nonacute findings.  Case discussed by ER provider with Dr. Fernandez with nephrology, recommended bicarb drip.  Patient received sodium bicarb, Dilaudid, Zofran and was started on bicarb drip.  Hospitalist consulted for admission, further management and treatment.  Edited by: Mateo Storm DO at 8/1/2024 1036      Hospital Course:    Acute kidney injury on chronic kidney disease stage III, POA  Acute hyperkalemia, POA  Acute on chronic pancreatitis, POA  Dehydration, POA  Metabolic acidosis, POA  Chronic diarrhea  Chronic tobacco abuse  Medical noncompliance     Plan:      Acute on Chronic Pancreatitis:  -Patient with multiple bouts of chronic pancreatitis leading to significant dehydration and chronic nausea.  Underlying etiology unknown.  She is due to follow-up with .  Has seen Dr. Fleming locally.  Previously underwent cholecystectomy followed by ERCP.  Has been recommended to quit smoking. Recent workup at  also demonstrated pancreatic insufficiency and she is supposed to be on Creon now however patient is noncompliant with it.  Advance to full liquids.   -General surgery to placed CVC 7/31/24. DC prior to DC     Dehydration/metabolic acidosis/hyperkalemia resolved/renal failure:  -Dr. Fernandez and nephrology consulted.    -s/p Bicarb  -Monitor BMP stable can recheck outpatient  -s/p insulin and D50  -good urine output.    - s/p IV Fluids     Chronic diarrhea:  -Combination of GI losses likely contributing to the metabolic acidosis.    -s/p bicarb.          Vital  Signs:    Temp:  [97.3 °F (36.3 °C)-98.9 °F (37.2 °C)] 97.6 °F (36.4 °C)  Heart Rate:  [71-86] 74  Resp:  [14-16] 16  BP: ()/(62-80) 116/74    Physical Exam:    Constitutional: No acute distress, awake, alert  HENT: NCAT, mucous membranes moist  Respiratory: Clear to auscultation bilaterally, respiratory effort normal   Cardiovascular: RRR, no murmurs, rubs, or gallops  Gastrointestinal: Positive bowel sounds, soft, mild generalized abdominal tenderness, nondistended  Musculoskeletal: No bilateral ankle edema  Psychiatric: Appropriate affect, cooperative  Neurologic: Oriented x 3, speech clear  Skin: No rashes  Exam stable 8/2/24    Pertinent Lab Results:     Results from last 7 days   Lab Units 08/02/24  0708 08/01/24 0423 07/31/24  0436 07/30/24  2113   SODIUM mmol/L 147* 143 144 137   POTASSIUM mmol/L 5.7* 3.3* 3.3* 5.5*   CHLORIDE mmol/L 109* 102 111* 109*   CO2 mmol/L 31.3* 28.9 11.2* 3.8*   BUN mg/dL 29* 42* 55* 53*   CREATININE mg/dL 1.39* 1.55* 2.19* 2.33*   CALCIUM mg/dL 8.7 8.0* 9.8 10.8*   BILIRUBIN mg/dL <0.2 0.2  --  <0.2   ALK PHOS U/L 102 104  --  197*   ALT (SGPT) U/L 8 <5  --  8   AST (SGOT) U/L 15 10  --  11   GLUCOSE mg/dL 80 87 95 120*     Results from last 7 days   Lab Units 08/02/24  0708 08/01/24 0423 07/31/24  0437   WBC 10*3/mm3 4.88 6.14 11.12*   HEMOGLOBIN g/dL 9.0* 8.7* 11.9*   HEMATOCRIT % 28.8* 25.9* 35.7   PLATELETS 10*3/mm3 130* 154 271                     Results from last 7 days   Lab Units 07/30/24  2113   LIPASE U/L 179*               Pertinent Radiology Results:    Imaging Results (All)       Procedure Component Value Units Date/Time    XR Ribs Right With PA Chest [[id number]] Collected: 07/31/24 2028     Updated: 07/31/24 2035    Narrative:      FINAL REPORT    TECHNIQUE:  3 views of the bilateral ribs were obtained.    CLINICAL HISTORY:  right ribs pain, anterior lateral, no known injury.    FINDINGS:  A right central venous catheter ends in the SVC.  There is  no  pneumothorax.  The heart is normal in size.  The mediastinum is  unremarkable.  A small calcified granuloma is noted in the left  lower lung field.  The lungs are otherwise clear.  There is no  pleural effusion or pneumothorax.  Three views demonstrate no  acute displaced fracture or other acute osseous abnormality.      Impression:      No acute disease.  Specifically, no right rib fracture is  identified.    Authenticated and Electronically Signed by Sergio Silverio M.D. on  07/31/2024 08:34:57 PM    XR Chest 1 View [[id number]] Collected: 07/31/24 1017     Updated: 07/31/24 1021    Narrative:      PROCEDURE: XR CHEST 1 VW-     HISTORY: line placement; K86.1-Other chronic pancreatitis;  E87.20-Acidosis, unspecified     COMPARISON: May 30, 2024.     FINDINGS: The heart is normal in size. The lungs are clear except for  mild, stable bilateral chronic change. There is evidence of old  calcified granulomatous disease.. The mediastinum is unremarkable. There  is no pneumothorax.  There are no acute osseous abnormalities. Small  hiatal hernia again noted. Right subclavian line has been placed since  the prior exam. Tip is in the region of the junction of the SVC and  right atrium.       Impression:      Placement of right subclavian line as described without  pneumothorax.                 This report was signed and finalized on 7/31/2024 10:18 AM by Lucía Bethea MD.       CT Abdomen Pelvis With Contrast [[id number]] Collected: 07/30/24 2252     Updated: 07/30/24 2254    Narrative:      FINAL REPORT    TECHNIQUE:  null    CLINICAL HISTORY:  Leukocytosis, epigastric abdominal pain    COMPARISON:  null    FINDINGS:  CT abdomen and pelvis with contrast    Comparison: CT/SR - CT ABDOMEN PELVIS W CONTRAST - 6/22/24 20:04 EDT    CT/SR - CT ABDOMEN PELVIS W CONTRAST - 9/22/23 20:48 EDT    Findings:    Moderate-to-large hiatal hernia similar to the prior exam.    2 mm nonobstructing right inferior and 5 mm nonobstructing right mid  renal stones.    Bilateral simple renal cysts.    Cholecystectomy. Abdominal solid organs otherwise unremarkable.    No bowel obstruction, pneumoperitoneum, or pneumatosis.    Right ovarian cyst 3 x 2.1 cm image 52, similar to prior exams.    Uterus and ovaries otherwise unremarkable.    Appendix is not identified. No inflammatory changes.    L2 left compression fracture similar to prior exam. No acute fractures.    Moderate to severe lumbar degenerative spondylosis similar to prior.      Impression:      IMPRESSION:    1. No CT explanation for leukocytosis.    2. Right ovarian cyst. Consider nonemergent ultrasound characterization and follow-up.    3. Additional nonacute findings as above.    Authenticated and Electronically Signed by Luisito Barnett MD  on 07/30/2024 10:52:56 PM            Echo:      Condition on Discharge:      Stable.    Code status during the hospital stay:    Code Status and Medical Interventions: CPR (Attempt to Resuscitate); Full Support   Ordered at: 07/31/24 0235     Code Status (Patient has no pulse and is not breathing):    CPR (Attempt to Resuscitate)     Medical Interventions (Patient has pulse or is breathing):    Full Support     Discharge Disposition:    Home or Self Care    Discharge Medications:       Discharge Medications        New Medications        Instructions Start Date   HYDROcodone-acetaminophen 7.5-325 MG per tablet  Commonly known as: NORCO   1 tablet, Oral, Every 8 Hours PRN             Changes to Medications        Instructions Start Date   ondansetron ODT 4 MG disintegrating tablet  Commonly known as: ZOFRAN-ODT  What changed: Another medication with the same name was removed. Continue taking this medication, and follow the directions you see here.   4 mg, Translingual, Every 8 Hours PRN             Continue These Medications        Instructions Start Date   famotidine 20 MG tablet  Commonly known as: PEPCID   20 mg, Oral, 2 Times Daily      glucosamine-chondroitin  500-400 MG capsule capsule   1 capsule, Oral, 2 Times Daily With Meals      methocarbamol 750 MG tablet  Commonly known as: ROBAXIN   750 mg, Oral, 4 Times Daily PRN      pancrelipase (Lip-Prot-Amyl) 03452-79833 units capsule delayed-release particles capsule  Commonly known as: CREON   12,000 units of lipase, Oral, 3 Times Daily With Meals      promethazine 25 MG tablet  Commonly known as: PHENERGAN   25 mg, Oral, As Needed      sertraline 50 MG tablet  Commonly known as: ZOLOFT   50 mg, Oral, Daily      sodium bicarbonate 650 MG tablet   650 mg, Oral, 2 Times Daily             Stop These Medications      vitamin D3 125 MCG (5000 UT) capsule capsule            Discharge Diet:     Diet Instructions       Advance Diet As Tolerated -Target Diet: low fat diet      Target Diet: low fat diet          Activity at Discharge:       Follow-up Appointments:    Additional Instructions for the Follow-ups that You Need to Schedule       Discharge Follow-up with PCP   As directed       Currently Documented PCP:    Terrence Baldwin MD    PCP Phone Number:    [phone]     Follow Up Details: 1 week        Discharge Follow-up with Specified Provider: Dr. Fleming; 1 Month   As directed      To: Dr. Fleming   Follow Up: 1 Month               Follow-up Information       Terrence Baldwin MD .    Specialty: Family Medicine  Why: 1 week  Contact information:  [address] [place] [address] [address]  [place] [address] [phone]                           No future appointments.  Test Results Pending at Discharge:           Mateo Storm DO  08/02/24  09:14 EDT    Time: I spent 45 minutes on this discharge activity which included: face-to-face encounter with the patient, reviewing the data in the system, coordination of the care with the nursing staff as well as consultants, documentation, and entering orders.     Dictated utilizing Dragon dictation.        Electronically signed by Mateo Storm DO at 08/02/24 0992

## 2024-08-03 NOTE — OUTREACH NOTE
Prep Survey      Flowsheet Row Responses   [facility] patient discharged from? [facility]   Is LACE score < 7 ? No   Eligibility Readm Mgmt   Discharge diagnosis Pancreatitis   Does the patient have one of the following disease processes/diagnoses(primary or secondary)? Other   Does the patient have Home health ordered? No   Is there a DME ordered? No   Prep survey completed? Yes            ALDEN GRADY - Registered Nurse

## 2024-08-06 ENCOUNTER — READMISSION MANAGEMENT (OUTPATIENT)
Dept: CALL CENTER | Facility: HOSPITAL | Age: 69
End: 2024-08-06
Payer: MEDICARE

## 2024-08-06 NOTE — OUTREACH NOTE
Medical Week 1 Survey      Flowsheet Row Responses   [facility] patient discharged from? [place]   Does the patient have one of the following disease processes/diagnoses(primary or secondary)? Other   Week 1 attempt successful? No   Unsuccessful attempts Attempt 1            Yenny MILLS - Licensed Nurse

## 2024-08-13 ENCOUNTER — READMISSION MANAGEMENT (OUTPATIENT)
Dept: CALL CENTER | Facility: HOSPITAL | Age: 69
End: 2024-08-13
Payer: MEDICARE

## 2024-08-13 NOTE — OUTREACH NOTE
Medical Week 2 Survey      Flowsheet Row Responses   [facility] patient discharged from? [place]   Does the patient have one of the following disease processes/diagnoses(primary or secondary)? Other   Week 2 attempt successful? No   Unsuccessful attempts Attempt 1  [All numbers listed were attempted-no answer]            Radha H - Registered Nurse

## 2024-08-24 ENCOUNTER — APPOINTMENT (OUTPATIENT)
Dept: GENERAL RADIOLOGY | Facility: HOSPITAL | Age: 69
End: 2024-08-24
Payer: MEDICARE

## 2024-08-24 ENCOUNTER — APPOINTMENT (OUTPATIENT)
Dept: CT IMAGING | Facility: HOSPITAL | Age: 69
End: 2024-08-24
Payer: MEDICARE

## 2024-08-24 ENCOUNTER — HOSPITAL ENCOUNTER (INPATIENT)
Facility: HOSPITAL | Age: 69
LOS: 2 days | Discharge: HOME | End: 2024-08-26
Attending: STUDENT IN AN ORGANIZED HEALTH CARE EDUCATION/TRAINING PROGRAM | Admitting: FAMILY MEDICINE
Payer: MEDICARE

## 2024-08-24 DIAGNOSIS — E87.20 METABOLIC ACIDOSIS: Primary | ICD-10-CM

## 2024-08-24 DIAGNOSIS — K86.1 CHRONIC PANCREATITIS, UNSPECIFIED PANCREATITIS TYPE: Chronic | ICD-10-CM

## 2024-08-24 LAB
A-A DO2: 14.1 MMHG
ALBUMIN SERPL-MCNC: 5 G/DL (ref 3.5–5.2)
ALBUMIN/GLOB SERPL: 1.4 G/DL
ALP SERPL-CCNC: 173 U/L (ref 39–117)
ALT SERPL W P-5'-P-CCNC: 7 U/L (ref 1–33)
ANION GAP SERPL CALCULATED.3IONS-SCNC: 19 MMOL/L (ref 5–15)
APAP SERPL-MCNC: 48.8 MCG/ML (ref 0–30)
ARTERIAL PATENCY WRIST A: ABNORMAL
AST SERPL-CCNC: 16 U/L (ref 1–32)
ATMOSPHERIC PRESS: 738 MMHG
BACTERIA UR QL AUTO: ABNORMAL /HPF
BASE EXCESS BLDA CALC-SCNC: -23.3 MMOL/L (ref 0–2)
BASOPHILS # BLD AUTO: 0.11 10*3/MM3 (ref 0–0.2)
BASOPHILS NFR BLD AUTO: 0.8 % (ref 0–1.5)
BDY SITE: ABNORMAL
BILIRUB SERPL-MCNC: 0.2 MG/DL (ref 0–1.2)
BILIRUB UR QL STRIP: NEGATIVE
BUN SERPL-MCNC: 56 MG/DL (ref 8–23)
BUN/CREAT SERPL: 28.3 (ref 7–25)
CALCIUM SPEC-SCNC: 10.4 MG/DL (ref 8.6–10.5)
CHLORIDE SERPL-SCNC: 114 MMOL/L (ref 98–107)
CLARITY UR: ABNORMAL
CO2 SERPL-SCNC: 5 MMOL/L (ref 22–29)
COHGB MFR BLD: 1.9 % (ref 0–2)
COLOR UR: YELLOW
CREAT SERPL-MCNC: 1.98 MG/DL (ref 0.57–1)
D-LACTATE SERPL-SCNC: 1.1 MMOL/L (ref 0.5–2)
DEPRECATED RDW RBC AUTO: 65.1 FL (ref 37–54)
EGFRCR SERPLBLD CKD-EPI 2021: 26.9 ML/MIN/1.73
EOSINOPHIL # BLD AUTO: 0.1 10*3/MM3 (ref 0–0.4)
EOSINOPHIL NFR BLD AUTO: 0.8 % (ref 0.3–6.2)
ERYTHROCYTE [DISTWIDTH] IN BLOOD BY AUTOMATED COUNT: 15.9 % (ref 12.3–15.4)
ETHANOL BLD-MCNC: <10 MG/DL (ref 0–10)
ETHANOL UR QL: <0.01 %
GLOBULIN UR ELPH-MCNC: 3.6 GM/DL
GLUCOSE SERPL-MCNC: 118 MG/DL (ref 65–99)
GLUCOSE UR STRIP-MCNC: NEGATIVE MG/DL
HCO3 BLDA-SCNC: 5.9 MMOL/L (ref 22–28)
HCT VFR BLD AUTO: 43.7 % (ref 34–46.6)
HCT VFR BLD CALC: 39.4 %
HGB BLD-MCNC: 13.8 G/DL (ref 12–15.9)
HGB UR QL STRIP.AUTO: NEGATIVE
HOLD SPECIMEN: NORMAL
HOLD SPECIMEN: NORMAL
HYALINE CASTS UR QL AUTO: ABNORMAL /LPF
IMM GRANULOCYTES # BLD AUTO: 0.07 10*3/MM3 (ref 0–0.05)
IMM GRANULOCYTES NFR BLD AUTO: 0.5 % (ref 0–0.5)
INHALED O2 CONCENTRATION: 21 %
KETONES UR QL STRIP: NEGATIVE
LEUKOCYTE ESTERASE UR QL STRIP.AUTO: ABNORMAL
LIPASE SERPL-CCNC: 83 U/L (ref 13–60)
LYMPHOCYTES # BLD AUTO: 1.5 10*3/MM3 (ref 0.7–3.1)
LYMPHOCYTES NFR BLD AUTO: 11.6 % (ref 19.6–45.3)
Lab: ABNORMAL
Lab: ABNORMAL
MACROCYTES BLD QL SMEAR: NORMAL
MCH RBC QN AUTO: 34.4 PG (ref 26.6–33)
MCHC RBC AUTO-ENTMCNC: 31.6 G/DL (ref 31.5–35.7)
MCV RBC AUTO: 109 FL (ref 79–97)
METHGB BLD QL: 0.8 % (ref 0–1.5)
MODALITY: ABNORMAL
MONOCYTES # BLD AUTO: 0.88 10*3/MM3 (ref 0.1–0.9)
MONOCYTES NFR BLD AUTO: 6.8 % (ref 5–12)
NEUTROPHILS NFR BLD AUTO: 10.29 10*3/MM3 (ref 1.7–7)
NEUTROPHILS NFR BLD AUTO: 79.5 % (ref 42.7–76)
NITRITE UR QL STRIP: NEGATIVE
NOTIFIED BY: ABNORMAL
NOTIFIED WHO: ABNORMAL
NRBC BLD AUTO-RTO: 0 /100 WBC (ref 0–0.2)
NT-PROBNP SERPL-MCNC: 321.2 PG/ML (ref 0–900)
OXYHGB MFR BLDV: 95.6 % (ref 94–99)
PCO2 BLDA: 21.8 MM HG (ref 35–45)
PCO2 TEMP ADJ BLD: ABNORMAL MM[HG]
PH BLDA: 7.04 PH UNITS (ref 7.3–7.5)
PH UR STRIP.AUTO: 5.5 [PH] (ref 5–8)
PH, TEMP CORRECTED: ABNORMAL
PLATELET # BLD AUTO: 279 10*3/MM3 (ref 140–450)
PMV BLD AUTO: 10.9 FL (ref 6–12)
PO2 BLDA: 106 MM HG (ref 75–100)
PO2 TEMP ADJ BLD: ABNORMAL MM[HG]
POTASSIUM SERPL-SCNC: 4.6 MMOL/L (ref 3.5–5.2)
POTASSIUM UR-SCNC: 33.6 MMOL/L
PROT SERPL-MCNC: 8.6 G/DL (ref 6–8.5)
PROT UR QL STRIP: ABNORMAL
RBC # BLD AUTO: 4.01 10*6/MM3 (ref 3.77–5.28)
RBC # UR STRIP: ABNORMAL /HPF
REF LAB TEST METHOD: ABNORMAL
SALICYLATES SERPL-MCNC: <0.3 MG/DL
SAO2 % BLDCOA: 98.2 % (ref 94–100)
SMALL PLATELETS BLD QL SMEAR: ADEQUATE
SODIUM SERPL-SCNC: 138 MMOL/L (ref 136–145)
SODIUM UR-SCNC: 48 MMOL/L
SP GR UR STRIP: 1.03 (ref 1–1.03)
SQUAMOUS #/AREA URNS HPF: ABNORMAL /HPF
TROPONIN T SERPL HS-MCNC: 20 NG/L
UROBILINOGEN UR QL STRIP: ABNORMAL
VENTILATOR MODE: ABNORMAL
WBC # UR STRIP: ABNORMAL /HPF
WBC MORPH BLD: NORMAL
WBC NRBC COR # BLD AUTO: 12.95 10*3/MM3 (ref 3.4–10.8)
WHOLE BLOOD HOLD COAG: NORMAL
WHOLE BLOOD HOLD SPECIMEN: NORMAL

## 2024-08-24 PROCEDURE — 85007 BL SMEAR W/DIFF WBC COUNT: CPT | Performed by: STUDENT IN AN ORGANIZED HEALTH CARE EDUCATION/TRAINING PROGRAM

## 2024-08-24 PROCEDURE — 25010000002 MORPHINE PER 10 MG: Performed by: STUDENT IN AN ORGANIZED HEALTH CARE EDUCATION/TRAINING PROGRAM

## 2024-08-24 PROCEDURE — 87147 CULTURE TYPE IMMUNOLOGIC: CPT | Performed by: FAMILY MEDICINE

## 2024-08-24 PROCEDURE — 85025 COMPLETE CBC W/AUTO DIFF WBC: CPT | Performed by: STUDENT IN AN ORGANIZED HEALTH CARE EDUCATION/TRAINING PROGRAM

## 2024-08-24 PROCEDURE — 83605 ASSAY OF LACTIC ACID: CPT | Performed by: PHYSICIAN ASSISTANT

## 2024-08-24 PROCEDURE — 36600 WITHDRAWAL OF ARTERIAL BLOOD: CPT

## 2024-08-24 PROCEDURE — 71250 CT THORAX DX C-: CPT

## 2024-08-24 PROCEDURE — 99285 EMERGENCY DEPT VISIT HI MDM: CPT

## 2024-08-24 PROCEDURE — 82010 KETONE BODYS QUAN: CPT | Performed by: INTERNAL MEDICINE

## 2024-08-24 PROCEDURE — 80053 COMPREHEN METABOLIC PANEL: CPT | Performed by: STUDENT IN AN ORGANIZED HEALTH CARE EDUCATION/TRAINING PROGRAM

## 2024-08-24 PROCEDURE — 84484 ASSAY OF TROPONIN QUANT: CPT | Performed by: STUDENT IN AN ORGANIZED HEALTH CARE EDUCATION/TRAINING PROGRAM

## 2024-08-24 PROCEDURE — G0480 DRUG TEST DEF 1-7 CLASSES: HCPCS | Performed by: FAMILY MEDICINE

## 2024-08-24 PROCEDURE — 82077 ASSAY SPEC XCP UR&BREATH IA: CPT | Performed by: INTERNAL MEDICINE

## 2024-08-24 PROCEDURE — 0 DEXTROSE 5 % SOLUTION 1,000 ML FLEX CONT: Performed by: INTERNAL MEDICINE

## 2024-08-24 PROCEDURE — 83050 HGB METHEMOGLOBIN QUAN: CPT

## 2024-08-24 PROCEDURE — 82436 ASSAY OF URINE CHLORIDE: CPT | Performed by: FAMILY MEDICINE

## 2024-08-24 PROCEDURE — 71045 X-RAY EXAM CHEST 1 VIEW: CPT

## 2024-08-24 PROCEDURE — 83880 ASSAY OF NATRIURETIC PEPTIDE: CPT | Performed by: STUDENT IN AN ORGANIZED HEALTH CARE EDUCATION/TRAINING PROGRAM

## 2024-08-24 PROCEDURE — 80320 DRUG SCREEN QUANTALCOHOLS: CPT | Performed by: FAMILY MEDICINE

## 2024-08-24 PROCEDURE — 84133 ASSAY OF URINE POTASSIUM: CPT | Performed by: FAMILY MEDICINE

## 2024-08-24 PROCEDURE — 74176 CT ABD & PELVIS W/O CONTRAST: CPT

## 2024-08-24 PROCEDURE — 0 DEXTROSE 5 % SOLUTION 500 ML FLEX CONT: Performed by: FAMILY MEDICINE

## 2024-08-24 PROCEDURE — 84300 ASSAY OF URINE SODIUM: CPT | Performed by: FAMILY MEDICINE

## 2024-08-24 PROCEDURE — 87086 URINE CULTURE/COLONY COUNT: CPT | Performed by: FAMILY MEDICINE

## 2024-08-24 PROCEDURE — 25010000002 ONDANSETRON PER 1 MG: Performed by: STUDENT IN AN ORGANIZED HEALTH CARE EDUCATION/TRAINING PROGRAM

## 2024-08-24 PROCEDURE — 80179 DRUG ASSAY SALICYLATE: CPT | Performed by: INTERNAL MEDICINE

## 2024-08-24 PROCEDURE — 84600 ASSAY OF VOLATILES: CPT | Performed by: FAMILY MEDICINE

## 2024-08-24 PROCEDURE — 25810000003 SODIUM CHLORIDE 0.9 % SOLUTION: Performed by: PHYSICIAN ASSISTANT

## 2024-08-24 PROCEDURE — 93005 ELECTROCARDIOGRAM TRACING: CPT | Performed by: STUDENT IN AN ORGANIZED HEALTH CARE EDUCATION/TRAINING PROGRAM

## 2024-08-24 PROCEDURE — 83930 ASSAY OF BLOOD OSMOLALITY: CPT | Performed by: FAMILY MEDICINE

## 2024-08-24 PROCEDURE — 82570 ASSAY OF URINE CREATININE: CPT | Performed by: FAMILY MEDICINE

## 2024-08-24 PROCEDURE — 82805 BLOOD GASES W/O2 SATURATION: CPT

## 2024-08-24 PROCEDURE — 99223 1ST HOSP IP/OBS HIGH 75: CPT | Performed by: FAMILY MEDICINE

## 2024-08-24 PROCEDURE — 84156 ASSAY OF PROTEIN URINE: CPT | Performed by: FAMILY MEDICINE

## 2024-08-24 PROCEDURE — 82375 ASSAY CARBOXYHB QUANT: CPT

## 2024-08-24 PROCEDURE — 0 DEXTROSE 5 % SOLUTION 250 ML FLEX CONT: Performed by: FAMILY MEDICINE

## 2024-08-24 PROCEDURE — 81001 URINALYSIS AUTO W/SCOPE: CPT | Performed by: FAMILY MEDICINE

## 2024-08-24 PROCEDURE — 25010000002 ACETYLCYSTEINE PER 100 MG: Performed by: FAMILY MEDICINE

## 2024-08-24 PROCEDURE — 83690 ASSAY OF LIPASE: CPT | Performed by: PHYSICIAN ASSISTANT

## 2024-08-24 PROCEDURE — 80143 DRUG ASSAY ACETAMINOPHEN: CPT | Performed by: INTERNAL MEDICINE

## 2024-08-24 RX ORDER — ONDANSETRON 2 MG/ML
4 INJECTION INTRAMUSCULAR; INTRAVENOUS EVERY 6 HOURS PRN
Status: DISCONTINUED | OUTPATIENT
Start: 2024-08-24 | End: 2024-08-26 | Stop reason: HOSPADM

## 2024-08-24 RX ORDER — SODIUM CHLORIDE 0.9 % (FLUSH) 0.9 %
10 SYRINGE (ML) INJECTION EVERY 12 HOURS SCHEDULED
Status: DISCONTINUED | OUTPATIENT
Start: 2024-08-24 | End: 2024-08-26 | Stop reason: HOSPADM

## 2024-08-24 RX ORDER — HEPARIN SODIUM 5000 [USP'U]/ML
5000 INJECTION, SOLUTION INTRAVENOUS; SUBCUTANEOUS EVERY 12 HOURS SCHEDULED
Status: DISCONTINUED | OUTPATIENT
Start: 2024-08-24 | End: 2024-08-26 | Stop reason: HOSPADM

## 2024-08-24 RX ORDER — SODIUM CHLORIDE 0.9 % (FLUSH) 0.9 %
10 SYRINGE (ML) INJECTION AS NEEDED
Status: DISCONTINUED | OUTPATIENT
Start: 2024-08-24 | End: 2024-08-26 | Stop reason: HOSPADM

## 2024-08-24 RX ORDER — SODIUM CHLORIDE 9 MG/ML
40 INJECTION, SOLUTION INTRAVENOUS AS NEEDED
Status: DISCONTINUED | OUTPATIENT
Start: 2024-08-24 | End: 2024-08-26 | Stop reason: HOSPADM

## 2024-08-24 RX ORDER — MORPHINE SULFATE 2 MG/ML
2 INJECTION, SOLUTION INTRAMUSCULAR; INTRAVENOUS ONCE
Status: COMPLETED | OUTPATIENT
Start: 2024-08-24 | End: 2024-08-24

## 2024-08-24 RX ORDER — HYDROMORPHONE HYDROCHLORIDE 2 MG/ML
0.5 INJECTION, SOLUTION INTRAMUSCULAR; INTRAVENOUS; SUBCUTANEOUS
Status: DISCONTINUED | OUTPATIENT
Start: 2024-08-24 | End: 2024-08-25 | Stop reason: SDUPTHER

## 2024-08-24 RX ORDER — ONDANSETRON 2 MG/ML
4 INJECTION INTRAMUSCULAR; INTRAVENOUS ONCE
Status: COMPLETED | OUTPATIENT
Start: 2024-08-24 | End: 2024-08-24

## 2024-08-24 RX ORDER — ONDANSETRON 4 MG/1
4 TABLET, ORALLY DISINTEGRATING ORAL EVERY 6 HOURS PRN
Status: DISCONTINUED | OUTPATIENT
Start: 2024-08-24 | End: 2024-08-26 | Stop reason: HOSPADM

## 2024-08-24 RX ORDER — NITROGLYCERIN 0.4 MG/1
0.4 TABLET SUBLINGUAL
Status: DISCONTINUED | OUTPATIENT
Start: 2024-08-24 | End: 2024-08-26 | Stop reason: HOSPADM

## 2024-08-24 RX ORDER — MORPHINE SULFATE 2 MG/ML
2 INJECTION, SOLUTION INTRAMUSCULAR; INTRAVENOUS EVERY 4 HOURS PRN
Status: DISCONTINUED | OUTPATIENT
Start: 2024-08-24 | End: 2024-08-26 | Stop reason: HOSPADM

## 2024-08-24 RX ADMIN — SODIUM CHLORIDE 500 ML: 9 INJECTION, SOLUTION INTRAVENOUS at 18:42

## 2024-08-24 RX ADMIN — SODIUM BICARBONATE 50 MEQ: 84 INJECTION INTRAVENOUS at 20:36

## 2024-08-24 RX ADMIN — ONDANSETRON 4 MG: 2 INJECTION INTRAMUSCULAR; INTRAVENOUS at 18:42

## 2024-08-24 RX ADMIN — ACETYLCYSTEINE 8780 MG: 6 INJECTION, SOLUTION INTRAVENOUS at 21:25

## 2024-08-24 RX ADMIN — SODIUM BICARBONATE 150 MEQ: 84 INJECTION, SOLUTION INTRAVENOUS at 20:34

## 2024-08-24 RX ADMIN — MORPHINE SULFATE 2 MG: 2 INJECTION, SOLUTION INTRAMUSCULAR; INTRAVENOUS at 18:42

## 2024-08-24 RX ADMIN — ACETYLCYSTEINE 2920 MG: 6 INJECTION, SOLUTION INTRAVENOUS at 22:58

## 2024-08-24 RX ADMIN — MORPHINE SULFATE 2 MG: 2 INJECTION, SOLUTION INTRAMUSCULAR; INTRAVENOUS at 20:32

## 2024-08-24 RX ADMIN — Medication 10 ML: at 21:26

## 2024-08-24 NOTE — ED PROVIDER NOTES
EMERGENCY DEPARTMENT ENCOUNTER    Pt Name: Tasha Yarbrough  MRN: [id number]  Pt :   1955  Room Number:  I08/1  Date of encounter:  2024  PCP: Terrence Baldwin MD  ED Provider: Tae Pérez PA-C    Historian: Patient      HPI:  Chief Complaint   Patient presents with    Shortness of Breath     PT states 4 days ago she started having SOA and chest pain. PT is nauseas, dizzy, has headache, and earache.           Context: Tasha Yarbrough is a 69 y.o. female who presents to the ED c/o nausea abdominal pain and shortness of breath.  History of COPD and pancreatitis in the past.  States 4 days ago began with the symptoms as well as left ear pain and headache but the ear pain and headache is resolved.  2 days ago had some brief sharp chest pains that have resolved and does not currently have chest pain.  No vomiting.      PAST MEDICAL HISTORY  Past Medical History:   Diagnosis Date    COPD (chronic obstructive pulmonary disease)     Hypertension     Impaired functional mobility, balance, gait, and endurance     Impaired mobility     Injury of back     Migraines     Multiple gastric ulcers     Pancreatitis     UTI (urinary tract infection)          PAST SURGICAL HISTORY  Past Surgical History:   Procedure Laterality Date    ABDOMINAL SURGERY      CHOLECYSTECTOMY      COLON SURGERY      COLON RUPTURED /  [id number] ?    COLONOSCOPY      ENDOSCOPY N/A 2024    Procedure: ESOPHAGOGASTRODUODENOSCOPY WITH BIOPSY;  Surgeon: Jairo Negro MD;  Location: [facility] ENDOSCOPY;  Service: Gastroenterology;  Laterality: N/A;    KNEE SURGERY Right     TOTAL REPLACEMENT    TUBAL ABDOMINAL LIGATION           FAMILY HISTORY  Family History   Problem Relation Age of Onset    Kidney disease Mother     Emphysema Mother     Heart disease Father     Lung cancer Sister     Heart disease Paternal Uncle          SOCIAL HISTORY  Social History     Socioeconomic History    Marital status:    Tobacco Use    Smoking status:  Every Day     Current packs/day: 1.00     Types: Cigarettes    Smokeless tobacco: Never   Vaping Use    Vaping status: Some Days    Substances: Flavoring   Substance and Sexual Activity    Alcohol use: Never    Drug use: Never    Sexual activity: Defer         ALLERGIES  Rocephin [ceftriaxone], Ciprofloxacin, Codeine, and Pineapple        REVIEW OF SYSTEMS  Review of Systems   Constitutional: Negative.    HENT: Negative.     Eyes: Negative.    Respiratory:  Positive for cough and shortness of breath.    Cardiovascular: Negative.    Gastrointestinal:  Positive for abdominal pain and nausea.   Genitourinary: Negative.    Musculoskeletal: Negative.    Skin: Negative.    Neurological: Negative.    Psychiatric/Behavioral: Negative.          All systems reviewed and negative except for those discussed in HPI.       PHYSICAL EXAM    I have reviewed the triage vital signs and nursing notes.    ED Triage Vitals [08/24/24 1719]   Temp Heart Rate Resp BP SpO2   97.8 °F (36.6 °C) 99 16 105/80 98 %      Temp src Heart Rate Source Patient Position BP Location FiO2 (%)   Oral Monitor Sitting Left arm --       Physical Exam  Vitals and nursing note reviewed.   Constitutional:       General: She is not in acute distress.     Appearance: She is well-developed. She is not ill-appearing, toxic-appearing or diaphoretic.   HENT:      Head: Normocephalic and atraumatic.   Eyes:      Extraocular Movements: Extraocular movements intact.   Cardiovascular:      Rate and Rhythm: Normal rate and regular rhythm.   Pulmonary:      Effort: Pulmonary effort is normal.      Breath sounds: Normal breath sounds. No decreased breath sounds, wheezing or rhonchi.   Abdominal:      General: Bowel sounds are normal.      Palpations: Abdomen is soft.      Tenderness:  in the epigastric area   Musculoskeletal:         General: Normal range of motion.      Right lower leg: No tenderness. No edema.      Left lower leg: No tenderness. No edema.   Skin:      General: Skin is warm and dry.   Neurological:      General: No focal deficit present.      Mental Status: She is alert.   Psychiatric:         Mood and Affect: Mood normal.         Behavior: Behavior normal.            LAB RESULTS  Recent Results (from the past 24 hour(s))   Comprehensive Metabolic Panel    Collection Time: 08/24/24  5:37 PM    Specimen: Blood   Result Value Ref Range    Glucose 118 (H) 65 - 99 mg/dL    BUN 56 (H) 8 - 23 mg/dL    Creatinine 1.98 (H) 0.57 - 1.00 mg/dL    Sodium 138 136 - 145 mmol/L    Potassium 4.6 3.5 - 5.2 mmol/L    Chloride 114 (H) 98 - 107 mmol/L    CO2 5.0 (C) 22.0 - 29.0 mmol/L    Calcium 10.4 8.6 - 10.5 mg/dL    Total Protein 8.6 (H) 6.0 - 8.5 g/dL    Albumin 5.0 3.5 - 5.2 g/dL    ALT (SGPT) 7 1 - 33 U/L    AST (SGOT) 16 1 - 32 U/L    Alkaline Phosphatase 173 (H) 39 - 117 U/L    Total Bilirubin 0.2 0.0 - 1.2 mg/dL    Globulin 3.6 gm/dL    A/G Ratio 1.4 g/dL    BUN/Creatinine Ratio 28.3 (H) 7.0 - 25.0    Anion Gap 19.0 (H) 5.0 - 15.0 mmol/L    eGFR 26.9 (L) >60.0 mL/min/1.73   BNP    Collection Time: 08/24/24  5:37 PM    Specimen: Blood   Result Value Ref Range    proBNP 321.2 0.0 - 900.0 pg/mL   Single High Sensitivity Troponin T    Collection Time: 08/24/24  5:37 PM    Specimen: Blood   Result Value Ref Range    HS Troponin T 20 (H) <14 ng/L   Green Top (Gel)    Collection Time: 08/24/24  5:37 PM   Result Value Ref Range    Extra Tube Hold for add-ons.    Lavender Top    Collection Time: 08/24/24  5:37 PM   Result Value Ref Range    Extra Tube hold for add-on    Gold Top - SST    Collection Time: 08/24/24  5:37 PM   Result Value Ref Range    Extra Tube Hold for add-ons.    Light Blue Top    Collection Time: 08/24/24  5:37 PM   Result Value Ref Range    Extra Tube Hold for add-ons.    CBC Auto Differential    Collection Time: 08/24/24  5:37 PM    Specimen: Blood   Result Value Ref Range    WBC 12.95 (H) 3.40 - 10.80 10*3/mm3    RBC 4.01 3.77 - 5.28 10*6/mm3    Hemoglobin 13.8  12.0 - 15.9 g/dL    Hematocrit 43.7 34.0 - 46.6 %    .0 (H) 79.0 - 97.0 fL    MCH 34.4 (H) 26.6 - 33.0 pg    MCHC 31.6 31.5 - 35.7 g/dL    RDW 15.9 (H) 12.3 - 15.4 %    RDW-SD 65.1 (H) 37.0 - 54.0 fl    MPV 10.9 6.0 - 12.0 fL    Platelets 279 140 - 450 10*3/mm3    Neutrophil % 79.5 (H) 42.7 - 76.0 %    Lymphocyte % 11.6 (L) 19.6 - 45.3 %    Monocyte % 6.8 5.0 - 12.0 %    Eosinophil % 0.8 0.3 - 6.2 %    Basophil % 0.8 0.0 - 1.5 %    Immature Grans % 0.5 0.0 - 0.5 %    Neutrophils, Absolute 10.29 (H) 1.70 - 7.00 10*3/mm3    Lymphocytes, Absolute 1.50 0.70 - 3.10 10*3/mm3    Monocytes, Absolute 0.88 0.10 - 0.90 10*3/mm3    Eosinophils, Absolute 0.10 0.00 - 0.40 10*3/mm3    Basophils, Absolute 0.11 0.00 - 0.20 10*3/mm3    Immature Grans, Absolute 0.07 (H) 0.00 - 0.05 10*3/mm3    nRBC 0.0 0.0 - 0.2 /100 WBC   Scan Slide    Collection Time: 08/24/24  5:37 PM    Specimen: Blood   Result Value Ref Range    Macrocytes Slight/1+ None Seen    WBC Morphology Normal Normal    Platelet Estimate Adequate Normal   Lipase    Collection Time: 08/24/24  5:37 PM    Specimen: Blood   Result Value Ref Range    Lipase 83 (H) 13 - 60 U/L   Lactic Acid, Plasma    Collection Time: 08/24/24  5:37 PM    Specimen: Blood   Result Value Ref Range    Lactate 1.1 0.5 - 2.0 mmol/L   Salicylate Level    Collection Time: 08/24/24  5:37 PM    Specimen: Blood   Result Value Ref Range    Salicylate <0.3 <=30.0 mg/dL   Acetaminophen Level    Collection Time: 08/24/24  5:37 PM    Specimen: Blood   Result Value Ref Range    Acetaminophen 48.8 (H) 0.0 - 30.0 mcg/mL   Ethanol    Collection Time: 08/24/24  5:37 PM    Specimen: Blood   Result Value Ref Range    Ethanol <10 0 - 10 mg/dL    Ethanol % <0.010 %   Blood Gas, Arterial With Co-Ox    Collection Time: 08/24/24  7:21 PM    Specimen: Arterial Blood   Result Value Ref Range    Site Right Brachial     Rafy's Test N/A     pH, Arterial 7.039 (C) 7.300 - 7.500 pH units    pCO2, Arterial 21.8 (C) 35.0 -  45.0 mm Hg    pO2, Arterial 106.0 (H) 75.0 - 100.0 mm Hg    HCO3, Arterial 5.9 (L) 22.0 - 28.0 mmol/L    Base Excess, Arterial -23.3 (L) 0.0 - 2.0 mmol/L    O2 Saturation, Arterial 98.2 94.0 - 100.0 %    Hematocrit, Blood Gas 39.4 %    Oxyhemoglobin 95.6 94 - 99 %    Methemoglobin 0.80 0.00 - 1.50 %    Carboxyhemoglobin 1.9 0 - 2 %    A-a DO2 14.1 mmHg    Barometric Pressure for Blood Gas 738 mmHg    Modality Room Air     FIO2 21 %    Ventilator Mode NA     Notified Who TM     Notified By [id number]     Notified Time 08/24/2024 19:24     Collected by LOU     pH, Temp Corrected      pCO2, Temperature Corrected      pO2, Temperature Corrected     Urinalysis With Culture If Indicated - Urine, Clean Catch    Collection Time: 08/24/24 10:04 PM    Specimen: Urine, Clean Catch   Result Value Ref Range    Color, UA Yellow Yellow, Straw    Appearance, UA Cloudy (A) Clear    pH, UA 5.5 5.0 - 8.0    Specific Gravity, UA 1.028 1.005 - 1.030    Glucose, UA Negative Negative    Ketones, UA Negative Negative    Bilirubin, UA Negative Negative    Blood, UA Negative Negative    Protein,  mg/dL (2+) (A) Negative    Leuk Esterase, UA Moderate (2+) (A) Negative    Nitrite, UA Negative Negative    Urobilinogen, UA 0.2 E.U./dL 0.2 - 1.0 E.U./dL   Sodium, Urine, Random - Urine, Clean Catch    Collection Time: 08/24/24 10:04 PM    Specimen: Urine, Clean Catch   Result Value Ref Range    Sodium, Urine 48 mmol/L   Potassium, Urine, Random - Urine, Clean Catch    Collection Time: 08/24/24 10:04 PM    Specimen: Urine, Clean Catch   Result Value Ref Range    Potassium, Urine 33.6 mmol/L   Urinalysis, Microscopic Only - Urine, Clean Catch    Collection Time: 08/24/24 10:04 PM    Specimen: Urine, Clean Catch   Result Value Ref Range    RBC, UA None Seen None Seen, 0-2 /HPF    WBC, UA 6-10 (A) None Seen, 0-2 /HPF    Bacteria, UA 2+ (A) None Seen /HPF    Squamous Epithelial Cells, UA 0-2 None Seen, 0-2 /HPF    Hyaline Casts, UA 3-6 None Seen /LPF     Methodology Manual Light Microscopy        If labs were ordered, I independently reviewed the results and considered them in treating the patient.        RADIOLOGY  CT Abdomen Pelvis Without Contrast    Result Date: 8/24/2024  FINAL REPORT TECHNIQUE: null CLINICAL HISTORY: diffuse abdominal pain, nausea COMPARISON: null FINDINGS: CT abdomen and pelvis without contrast Comparison: CT/SR - CT ABDOMEN PELVIS W CONTRAST - 07/30/2024 09:57 PM EDT Findings: Moderate hiatal hernia. Mild emphysematous changes of the lung bases. There are multiple bilateral kidney cysts, evaluated in limited fashion, grossly unchanged. 4 mm nonobstructive calculus within the right kidney. There are multiple small calcified granulomas within the spleen. No focal liver abnormality. Unremarkable pancreas and adrenal glands. Prior cholecystectomy. There are multiple ventral hernias containing fat and knuckles of bowel without obstruction or edema, similar in appearance to the prior study. There is a focus of anastomosis at the rectosigmoid junction. No bowel inflammation. 3.6 cm right ovarian cyst without change. Unremarkable uterus and urinary bladder. Appendix not visualized. No secondary findings to suggest appendicitis. There is a chronic fracture of the L2 level at the junction between the vertebral body and left pedicle grade 2 retrolisthesis of L1 on L2 without change. Additional compression fracture of the L2 vertebral body without change. Retropulsion of bone contributing to moderate central canal stenosis, without change. No acute fracture.     IMPRESSION: No acute abdominal disease. Chronic findings as above. Authenticated and Electronically Signed by Yenny Webber MD on 08/24/2024 08:13:19 PM    CT Chest Without Contrast Diagnostic    Result Date: 8/24/2024  FINAL REPORT TECHNIQUE: null CLINICAL HISTORY: soa, weakness COMPARISON: null FINDINGS: CT chest without contrast Comparison: None Findings: The heart is normal size.  Unremarkable thyroid gland. No mediastinal lymphadenopathy. Moderate hiatal hernia. Mild pulmonary emphysema. No consolidation or pleural effusion. Calcified granuloma within the left upper lobe There are calcified granulomas within the spleen. There are bilateral kidney cysts. Prior cholecystectomy. Surgical clips within the left upper quadrant. Small ventral hernia containing fat. Multiple chronic right-sided rib fractures. Chronic left-sided pedicle fracture at L2. Chronic L2 compression fracture. No acute fracture.     IMPRESSION: 1. No acute cardiopulmonary disease. 2. Moderate hiatal hernia. Authenticated and Electronically Signed by Yenny Webber MD on 08/24/2024 08:11:10 PM         PROCEDURES    Procedures    Interpretations    O2 Sat: The patients oxygen saturation was 98% on Room Air.  This was independently interpreted by me as Normal    EKG: I reviewed and independently interpreted the EKG as sinus rhythm with a rate of 92.  No ST segment changes.    Cardiac Monitoring: I reviewed and independently interpreted the Rhythm Strip as Normal Sinus rhythm rate of 81    Radiology: I ordered and independently reviewed the above noted radiographic studies.  I viewed images of Chest Xray which showed No pulmonary process per my independent interpretation. See radiologist's dictation for official interpretation.     MEDICATIONS GIVEN IN ER    Medications   sodium chloride 0.9 % flush 10 mL (has no administration in time range)   sodium bicarbonate 8.4 % 150 mEq in dextrose (D5W) 5 % 1,000 mL infusion (greater than 100 mEq) (150 mEq Intravenous New Bag 8/24/24 2034)   acetylcysteine (ACETADOTE) 8,780 mg in dextrose (D5W) 5 % 200 mL infusion (8,780 mg Intravenous New Bag 8/24/24 2125)     Followed by   acetylcysteine (ACETADOTE) 2,920 mg in dextrose (D5W) 5 % 500 mL infusion (2,920 mg Intravenous New Bag 8/24/24 2258)     Followed by   acetylcysteine (ACETADOTE) 5,860 mg in dextrose (D5W) 5 % 1,000 mL infusion (has  no administration in time range)   pancrelipase (Lip-Prot-Amyl) (CREON) capsule 12,000 units of lipase (has no administration in time range)   sertraline (ZOLOFT) tablet 50 mg (has no administration in time range)   nitroglycerin (NITROSTAT) SL tablet 0.4 mg (has no administration in time range)   sodium chloride 0.9 % flush 10 mL (10 mL Intravenous Given 8/24/24 2126)   sodium chloride 0.9 % flush 10 mL (has no administration in time range)   sodium chloride 0.9 % infusion 40 mL (has no administration in time range)   heparin (porcine) 5000 UNIT/ML injection 5,000 Units (has no administration in time range)   ondansetron ODT (ZOFRAN-ODT) disintegrating tablet 4 mg (has no administration in time range)     Or   ondansetron (ZOFRAN) injection 4 mg (has no administration in time range)   HYDROmorphone (DILAUDID) injection 0.5 mg (has no administration in time range)   Morphine sulfate (PF) injection 2 mg (has no administration in time range)   Morphine sulfate (PF) injection 2 mg (2 mg Intravenous Given 8/24/24 1842)   ondansetron (ZOFRAN) injection 4 mg (4 mg Intravenous Given 8/24/24 1842)   sodium chloride 0.9 % bolus 500 mL (0 mL Intravenous Stopped 8/24/24 1945)   Morphine sulfate (PF) injection 2 mg (2 mg Intravenous Given 8/24/24 2032)   sodium bicarbonate injection 8.4% 50 mEq (50 mEq Intravenous Given 8/24/24 2036)         MEDICAL DECISION MAKING, PROGRESS, and CONSULTS    All labs, if obtained, have been independently reviewed by me.  All radiology studies, if obtained, have been reviewed by me and the radiologist dictating the report.  All EKG's, if obtained, have been independently viewed and interpreted by me      Discussion below represents my analysis of pertinent findings related to patient's condition, differential diagnosis, treatment plan and final disposition.      Differential diagnosis:    Pancreatitis, enteritis, COPD exacerbation, pneumonia    Additional Sources:  None      Orders placed during  this visit:  Orders Placed This Encounter   Procedures    Urine Culture - Urine,    XR Chest 1 View    CT Chest Without Contrast Diagnostic    CT Abdomen Pelvis Without Contrast    [place] Draw    Comprehensive Metabolic Panel    BNP    Single High Sensitivity Troponin T    CBC Auto Differential    Scan Slide    Lipase    Urinalysis With Culture If Indicated - Urine, Clean Catch    Blood Gas, Arterial -With Co-Ox Panel: Yes    Blood Gas, Arterial With Co-Ox    Lactic Acid, Plasma    Osmolality, Serum    Sodium, Urine, Random - Urine, Clean Catch    Chloride, Urine, Random - Urine, Clean Catch    Potassium, Urine, Random - Urine, Clean Catch    Protein / Creatinine Ratio, Urine - Urine, Clean Catch    Salicylate Level    Beta Hydroxybutyrate Quantitative    Acetaminophen Level    Volatiles, Urine - Urine, Clean Catch    Ethanol    Acetaminophen Level    Basic Metabolic Panel    TSH    Acetaminophen Level    Urinalysis, Microscopic Only - Urine, Clean Catch    Diet: Liquid; Clear Liquid; Fluid Consistency: Thin (IDDSI 0)    Undress & Gown    Vital Signs    RN To Release Lab Order for Acetaminophen Level, AST & ALT After Acetylcysteine Administration Complete    Call Results of Acetaminophen Level, ALT & AST to Provider    Vital Signs Every Hour and Per Hospital Policy Based on Patient Condition    Telemetry - Place Orders & Notify Provider of Results When Patient Experiences Acute Chest Pain, Dysrhythmia or Respiratory Distress    Continuous Pulse Oximetry    Height & Weight    Daily Weights    Intake & Output    Oral Care - Patient Not on NPPV & Not Intubated    Target Arousal Level RASS 0 to -2    Use Mobility Guidelines for Advancement of Activity    Saline Lock & Maintain IV Access    Place Sequential Compression Device    Maintain Sequential Compression Device    Code Status and Medical Interventions: CPR (Attempt to Resuscitate); Full Support    Inpatient Nephrology Consult    Oxygen Therapy- Nasal Cannula;  Titrate 1-6 LPM Per SpO2; 90 - 95%    ECG 12 Lead ED Triage Standing Order; SOA    Insert Peripheral IV    Insert Peripheral IV    Inpatient Admission    CBC & Differential    Green Top (Gel)    Lavender Top    Gold Top - SST    Light Blue Top         Additional orders considered but not ordered:  None    ED Course:    Consultants:   Nephrology    ED Course as of 08/25/24 0024   Sat Aug 24, 2024   1800 I have reviewed the mid-level provider(s) note and verbally discussed the case/plan of care.  I was available for consultation as needed at all times during the patient's visit in the Emergency Department.  I agree with the clinical impression, plan, and disposition unless otherwise stated in the MDM below.    ATTENDING ATTESTATION  HPI: 59-year-old female with past medical history of chronic pancreatitis and medical noncompliance who presents with nausea, abdominal pain, and shortness of breath for the past 4 days.    MDM: ED workup reviewed.    EKG per my interpretation normal sinus rhythm, rate 72, no STEMI.        Labs reviewed.  CBC shows leukocytosis.  No anemia thrombocytopenia.  CMP shows serum creatinine 1.98.  CO2 5.0.  Troponin 20.  Lipase 83.  proBNP 321.  ABG shows combined metabolic and respiratory acidosis with pH 7.03, pCO2 21, bicarb 5.9.    Radiology reports reviewed.  CT chest abdomen pelvis negative for acute findings.    Neurology was consulted.  Plan for medical admission.   [JS]   1801 WBC(!): 12.95 [TM]   1929 pH, Arterial(!!): 7.039 [TM]   1929 pCO2, Arterial(!!): 21.8 [TM]   1929 pO2, Arterial(!): 106.0 [TM]   1929 HCO3, Arterial(!): 5.9 [TM]   1929 Base Excess(!): -23.3 [TM]   1947 Spoke with nephrology on-call and discussed the patient's case.  He states he will load in the computer momentarily to place orders for bicarb and bicarb drip and will consult.  Plan is for admission to the hospital. [TM]   2024 Discussed the case with Dr. Ballesteros who graciously except the patient for admission.  [TM]      ED Course User Index  [JS] Darron Austin DO  [TM] Tae Pérez PA-C           After my consideration of clinical presentation and any laboratory/radiology studies obtained, I discussed the findings with the patient/patient representative who is in agreement with the treatment plan and the final disposition. Risks and benefits of admission was discussed     AS OF 00:24 EDT VITALS:    BP - 114/71  HR - 80  TEMP - 97.7 °F (36.5 °C) (Oral)  O2 SATS - 96%    I reviewed the patients prescription monitoring report if available prior to discharge    DIAGNOSIS  Final diagnoses:   Metabolic acidosis         DISPOSITION  ED Disposition       ED Disposition   Decision to Admit    Condition   --    Comment   Level of Care: Critical Care [6]   Diagnosis: Metabolic acidosis [982292]   Admitting Physician: KATIE GREGORY [[id number]]   Attending Physician: KATIE GREGORY [[id number]]   Certification: I Certify That Inpatient Hospital Services Are Medically Necessary For Greater Than 2 Midnights                     Please note that portions of this document were completed with voice recognition software.        Tae Pérez PA-C  08/24/24 2025       Tae Pérez PA-C  08/25/24 0024

## 2024-08-24 NOTE — ED NOTES
[facility] called per MONIQUE Strong to request the on-call physician to return call. Awaiting return call from Dr. Fernandez.

## 2024-08-25 LAB
ALBUMIN SERPL-MCNC: 4 G/DL (ref 3.5–5.2)
ALP SERPL-CCNC: 132 U/L (ref 39–117)
ALT SERPL W P-5'-P-CCNC: 6 U/L (ref 1–33)
ANION GAP SERPL CALCULATED.3IONS-SCNC: 18.5 MMOL/L (ref 5–15)
ANION GAP SERPL CALCULATED.3IONS-SCNC: 20.6 MMOL/L (ref 5–15)
APAP SERPL-MCNC: <5 MCG/ML (ref 0–30)
AST SERPL-CCNC: 14 U/L (ref 1–32)
B-OH-BUTYR SERPL-SCNC: 0.17 MMOL/L (ref 0.02–0.27)
BILIRUB CONJ SERPL-MCNC: <0.2 MG/DL (ref 0–0.3)
BILIRUB INDIRECT SERPL-MCNC: ABNORMAL MG/DL
BILIRUB SERPL-MCNC: 0.3 MG/DL (ref 0–1.2)
BUN SERPL-MCNC: 47 MG/DL (ref 8–23)
BUN SERPL-MCNC: 49 MG/DL (ref 8–23)
BUN/CREAT SERPL: 33.1 (ref 7–25)
BUN/CREAT SERPL: 34.1 (ref 7–25)
CALCIUM SPEC-SCNC: 9.2 MG/DL (ref 8.6–10.5)
CALCIUM SPEC-SCNC: 9.5 MG/DL (ref 8.6–10.5)
CHLORIDE SERPL-SCNC: 106 MMOL/L (ref 98–107)
CHLORIDE SERPL-SCNC: 108 MMOL/L (ref 98–107)
CHLORIDE UR-SCNC: 30 MMOL/L
CO2 SERPL-SCNC: 10.4 MMOL/L (ref 22–29)
CO2 SERPL-SCNC: 12.5 MMOL/L (ref 22–29)
CREAT SERPL-MCNC: 1.38 MG/DL (ref 0.57–1)
CREAT SERPL-MCNC: 1.48 MG/DL (ref 0.57–1)
CREAT UR-MCNC: 77.2 MG/DL
EGFRCR SERPLBLD CKD-EPI 2021: 38.2 ML/MIN/1.73
EGFRCR SERPLBLD CKD-EPI 2021: 41.5 ML/MIN/1.73
GLUCOSE SERPL-MCNC: 108 MG/DL (ref 65–99)
GLUCOSE SERPL-MCNC: 81 MG/DL (ref 65–99)
INR PPP: 1.05 (ref 0.9–1.1)
MAGNESIUM SERPL-MCNC: 2.2 MG/DL (ref 1.6–2.4)
OSMOLALITY SERPL: 315 MOSM/KG (ref 280–301)
POTASSIUM SERPL-SCNC: 3 MMOL/L (ref 3.5–5.2)
POTASSIUM SERPL-SCNC: 3.9 MMOL/L (ref 3.5–5.2)
PROT ?TM UR-MCNC: 79.5 MG/DL
PROT SERPL-MCNC: 6.4 G/DL (ref 6–8.5)
PROT/CREAT UR: 1029.8 MG/G CREA (ref 0–200)
PROTHROMBIN TIME: 14.2 SECONDS (ref 12.3–15.1)
SODIUM SERPL-SCNC: 137 MMOL/L (ref 136–145)
SODIUM SERPL-SCNC: 139 MMOL/L (ref 136–145)
TSH SERPL DL<=0.05 MIU/L-ACNC: 1.22 UIU/ML (ref 0.27–4.2)

## 2024-08-25 PROCEDURE — 25010000002 HEPARIN (PORCINE) PER 1000 UNITS: Performed by: FAMILY MEDICINE

## 2024-08-25 PROCEDURE — 80143 DRUG ASSAY ACETAMINOPHEN: CPT | Performed by: FAMILY MEDICINE

## 2024-08-25 PROCEDURE — 84443 ASSAY THYROID STIM HORMONE: CPT | Performed by: FAMILY MEDICINE

## 2024-08-25 PROCEDURE — 84156 ASSAY OF PROTEIN URINE: CPT | Performed by: INTERNAL MEDICINE

## 2024-08-25 PROCEDURE — 80048 BASIC METABOLIC PNL TOTAL CA: CPT | Performed by: FAMILY MEDICINE

## 2024-08-25 PROCEDURE — 83735 ASSAY OF MAGNESIUM: CPT | Performed by: FAMILY MEDICINE

## 2024-08-25 PROCEDURE — 25010000002 POTASSIUM CHLORIDE 10 MEQ/100ML SOLUTION: Performed by: FAMILY MEDICINE

## 2024-08-25 PROCEDURE — 25010000002 ACETYLCYSTEINE PER 100 MG: Performed by: FAMILY MEDICINE

## 2024-08-25 PROCEDURE — 82570 ASSAY OF URINE CREATININE: CPT | Performed by: INTERNAL MEDICINE

## 2024-08-25 PROCEDURE — 82436 ASSAY OF URINE CHLORIDE: CPT | Performed by: INTERNAL MEDICINE

## 2024-08-25 PROCEDURE — 85610 PROTHROMBIN TIME: CPT | Performed by: INTERNAL MEDICINE

## 2024-08-25 PROCEDURE — 0 DEXTROSE 5 % SOLUTION 1,000 ML FLEX CONT: Performed by: FAMILY MEDICINE

## 2024-08-25 PROCEDURE — 80076 HEPATIC FUNCTION PANEL: CPT | Performed by: INTERNAL MEDICINE

## 2024-08-25 PROCEDURE — 25010000002 MORPHINE PER 10 MG: Performed by: FAMILY MEDICINE

## 2024-08-25 PROCEDURE — 99232 SBSQ HOSP IP/OBS MODERATE 35: CPT | Performed by: INTERNAL MEDICINE

## 2024-08-25 RX ORDER — TRAMADOL HYDROCHLORIDE 50 MG/1
50 TABLET ORAL EVERY 6 HOURS PRN
Status: DISCONTINUED | OUTPATIENT
Start: 2024-08-25 | End: 2024-08-26 | Stop reason: HOSPADM

## 2024-08-25 RX ORDER — POTASSIUM CHLORIDE 750 MG/1
40 CAPSULE, EXTENDED RELEASE ORAL ONCE
Status: COMPLETED | OUTPATIENT
Start: 2024-08-25 | End: 2024-08-25

## 2024-08-25 RX ORDER — POTASSIUM CHLORIDE 7.45 MG/ML
10 INJECTION INTRAVENOUS
Status: COMPLETED | OUTPATIENT
Start: 2024-08-25 | End: 2024-08-25

## 2024-08-25 RX ADMIN — ACETYLCYSTEINE 5860 MG: 6 INJECTION, SOLUTION INTRAVENOUS at 02:37

## 2024-08-25 RX ADMIN — SODIUM BICARBONATE 125 MEQ: 84 INJECTION, SOLUTION INTRAVENOUS at 18:32

## 2024-08-25 RX ADMIN — HEPARIN SODIUM 5000 UNITS: 5000 INJECTION INTRAVENOUS; SUBCUTANEOUS at 21:49

## 2024-08-25 RX ADMIN — POTASSIUM CHLORIDE 10 MEQ: 7.46 INJECTION, SOLUTION INTRAVENOUS at 11:36

## 2024-08-25 RX ADMIN — SERTRALINE 50 MG: 50 TABLET, FILM COATED ORAL at 09:27

## 2024-08-25 RX ADMIN — PANCRELIPASE 12000 UNITS OF LIPASE: 60000; 12000; 38000 CAPSULE, DELAYED RELEASE PELLETS ORAL at 18:32

## 2024-08-25 RX ADMIN — Medication 10 ML: at 21:50

## 2024-08-25 RX ADMIN — SODIUM BICARBONATE 125 MEQ: 84 INJECTION, SOLUTION INTRAVENOUS at 04:49

## 2024-08-25 RX ADMIN — POTASSIUM CHLORIDE 10 MEQ: 7.46 INJECTION, SOLUTION INTRAVENOUS at 06:16

## 2024-08-25 RX ADMIN — PANCRELIPASE 12000 UNITS OF LIPASE: 60000; 12000; 38000 CAPSULE, DELAYED RELEASE PELLETS ORAL at 11:35

## 2024-08-25 RX ADMIN — TRAMADOL HYDROCHLORIDE 50 MG: 50 TABLET, COATED ORAL at 11:35

## 2024-08-25 RX ADMIN — POTASSIUM CHLORIDE 40 MEQ: 750 CAPSULE, EXTENDED RELEASE ORAL at 04:48

## 2024-08-25 RX ADMIN — PANCRELIPASE 12000 UNITS OF LIPASE: 60000; 12000; 38000 CAPSULE, DELAYED RELEASE PELLETS ORAL at 09:34

## 2024-08-25 RX ADMIN — Medication 10 ML: at 09:27

## 2024-08-25 RX ADMIN — TRAMADOL HYDROCHLORIDE 50 MG: 50 TABLET, COATED ORAL at 18:38

## 2024-08-25 RX ADMIN — POTASSIUM CHLORIDE 10 MEQ: 7.46 INJECTION, SOLUTION INTRAVENOUS at 09:32

## 2024-08-25 RX ADMIN — MORPHINE SULFATE 2 MG: 2 INJECTION, SOLUTION INTRAMUSCULAR; INTRAVENOUS at 21:49

## 2024-08-25 RX ADMIN — POTASSIUM CHLORIDE 10 MEQ: 7.46 INJECTION, SOLUTION INTRAVENOUS at 04:49

## 2024-08-25 RX ADMIN — HEPARIN SODIUM 5000 UNITS: 5000 INJECTION INTRAVENOUS; SUBCUTANEOUS at 09:28

## 2024-08-25 NOTE — ED NOTES
Per the OK from Dr. Ballesteros patient is aloud to have clear liquids only. Pt provided ice water at this time. KATERINE Alejo at bedside attempting another IV placement.

## 2024-08-25 NOTE — PROGRESS NOTES
[facility]    PROGRESS NOTE    Name:  Tasha Yarbrough   Age:  69 y.o.  Sex:  female  :  1955  MRN:  [id number]   Visit Number:  [id number]  Admission Date:  2024  Date Of Service:  24  Primary Care Physician:  Terrence Baldwin MD     LOS: 1 day :    Chief Complaint:      Abdominal pain diarrhea weakness shortness of breath    Subjective:    Patient examined today.  Resting comfortably in bed.  Admits to taking significant amount of over-the-counter Tylenol in addition to her Norco.  Patient has a history of accidental acetaminophen overdose.  Provided medical education.  Denies complaints at this time.    Hospital Course:    Patient is a chronically ill 69-year-old with a medical history of chronic pancreatitis, mild torsion, chronic diarrhea, chronic tobacco abuse, COPD, who presented from home with increasing weakness, appetite, diarrhea, shortness of breath.  Symptoms have been progressing over the last 3 to 4 days.  She has had some upper respiratory symptoms as well.  Has felt headache at times.  She has no significant abdominal pain currently, nothing worse than her typical.  She has had ongoing diarrhea.       In the ER the patient was hemodynamically stable.  Labs were notable for acute on chronic kidney injury and metabolic acidosis.  CT imaging of the chest and abdomen was largely unremarkable for any acute abnormality.  ER provider discussed case with nephrology and patient was ordered sodium bicarbonate.  Will admit to the ICU.    Review of Systems:     All systems were reviewed and negative except as mentioned in subjective, assessment and plan.    Vital Signs:    Temp:  [97.7 °F (36.5 °C)-98.3 °F (36.8 °C)] 98.3 °F (36.8 °C)  Heart Rate:  [80-99] 92  Resp:  [14-21] 18  BP: ()/() 106/73    Intake and output:    I/O last 3 completed shifts:  In: 2811 [P.O.:140; I.V.:2171; IV Piggyback:500]  Out: 500 [Urine:500]  I/O this shift:  In: 600  [P.O.:600]  Out: 200 [Urine:200]    Physical Examination:    General Appearance:  Alert and cooperative.    Head:  Atraumatic and normocephalic.   Eyes: Conjunctivae and sclerae normal, no icterus. No pallor.   Throat: No oral lesions, no thrush, oral mucosa moist.   Neck: Supple, trachea midline, no thyromegaly.   Lungs:   Breath sounds heard bilaterally equally.  No wheezing or crackles. No Pleural rub or bronchial breathing.   Heart:  Normal S1 and S2, no murmur, no gallop, no rub. No JVD.   Abdomen:   Normal bowel sounds, no masses, no organomegaly. Soft, nontender, nondistended, no rebound tenderness.   Extremities: Supple, no edema, no cyanosis, no clubbing.   Skin: No bleeding or rash.   Neurologic: Alert and oriented x 3. No facial asymmetry. Moves all four limbs.      Laboratory results:    Results from last 7 days   Lab Units 08/25/24  0532 08/25/24  0146 08/24/24  1737   SODIUM mmol/L 137 139 138   POTASSIUM mmol/L 3.9 3.0* 4.6   CHLORIDE mmol/L 106 108* 114*   CO2 mmol/L 10.4* 12.5* 5.0*   BUN mg/dL 47* 49* 56*   CREATININE mg/dL 1.38* 1.48* 1.98*   CALCIUM mg/dL 9.2 9.5 10.4   BILIRUBIN mg/dL 0.3  --  0.2   ALK PHOS U/L 132*  --  173*   ALT (SGPT) U/L 6  --  7   AST (SGOT) U/L 14  --  16   GLUCOSE mg/dL 81 108* 118*     Results from last 7 days   Lab Units 08/24/24  1737   WBC 10*3/mm3 12.95*   HEMOGLOBIN g/dL 13.8   HEMATOCRIT % 43.7   PLATELETS 10*3/mm3 279     Results from last 7 days   Lab Units 08/25/24  0815   INR  1.05     Results from last 7 days   Lab Units 08/24/24  1737   HSTROP T ng/L 20*         Recent Labs     04/21/24  0919 05/31/24  1126 08/24/24  1921   PHART 7.199* 7.230* 7.039*   [id number] 25.5* 26.8* 21.8*   PO2ART 97.9 96.6 106.0*   [id number] 9.9* 11.2* 5.9*   BASEEXCESS -16.6* -14.9* -23.3*      I have reviewed the patient's laboratory results.    Radiology results:    XR Chest 1 View    Result Date: 8/25/2024  PROCEDURE: XR CHEST 1 VW-  INDICATION:  SOA Triage Protocol  FINDINGS:  A  portable view of the chest was obtained.  Comparison is made to a prior exam dated 07/31/2024.   Heart size is normal. The previously seen right central line is no longer present. Retrocardiac opacity containing air is consistent with a hiatal hernia. Lung volumes are low. Chronic increased interstitial markings are noted. No focal infiltrate, pleural effusion, or pneumothorax. There are several chronic appearing right lower rib fractures.      Impression: 1. No acute process. 2. Hiatal hernia.       CT Abdomen Pelvis Without Contrast    Result Date: 8/24/2024  FINAL REPORT TECHNIQUE: null CLINICAL HISTORY: diffuse abdominal pain, nausea COMPARISON: null FINDINGS: CT abdomen and pelvis without contrast Comparison: CT/SR - CT ABDOMEN PELVIS W CONTRAST - 07/30/2024 09:57 PM EDT Findings: Moderate hiatal hernia. Mild emphysematous changes of the lung bases. There are multiple bilateral kidney cysts, evaluated in limited fashion, grossly unchanged. 4 mm nonobstructive calculus within the right kidney. There are multiple small calcified granulomas within the spleen. No focal liver abnormality. Unremarkable pancreas and adrenal glands. Prior cholecystectomy. There are multiple ventral hernias containing fat and knuckles of bowel without obstruction or edema, similar in appearance to the prior study. There is a focus of anastomosis at the rectosigmoid junction. No bowel inflammation. 3.6 cm right ovarian cyst without change. Unremarkable uterus and urinary bladder. Appendix not visualized. No secondary findings to suggest appendicitis. There is a chronic fracture of the L2 level at the junction between the vertebral body and left pedicle grade 2 retrolisthesis of L1 on L2 without change. Additional compression fracture of the L2 vertebral body without change. Retropulsion of bone contributing to moderate central canal stenosis, without change. No acute fracture.     Impression: IMPRESSION: No acute abdominal disease.  Chronic findings as above. Authenticated and Electronically Signed by Yenny Webber MD on 08/24/2024 08:13:19 PM    CT Chest Without Contrast Diagnostic    Result Date: 8/24/2024  FINAL REPORT TECHNIQUE: null CLINICAL HISTORY: soa, weakness COMPARISON: null FINDINGS: CT chest without contrast Comparison: None Findings: The heart is normal size. Unremarkable thyroid gland. No mediastinal lymphadenopathy. Moderate hiatal hernia. Mild pulmonary emphysema. No consolidation or pleural effusion. Calcified granuloma within the left upper lobe There are calcified granulomas within the spleen. There are bilateral kidney cysts. Prior cholecystectomy. Surgical clips within the left upper quadrant. Small ventral hernia containing fat. Multiple chronic right-sided rib fractures. Chronic left-sided pedicle fracture at L2. Chronic L2 compression fracture. No acute fracture.     Impression: IMPRESSION: 1. No acute cardiopulmonary disease. 2. Moderate hiatal hernia. Authenticated and Electronically Signed by Yenny Webber MD on 08/24/2024 08:11:10 PM   I have reviewed the patient's radiology reports.    Medication Review:     I have reviewed the patient's active and prn medications.     Problem List:      Metabolic acidosis      Assessment:    Metabolic acidosis, POA  Dehydration  Unintentional acetaminophen toxicity  Acute on chronic renal failure stage III  Chronic pancreatitis  Chronic diarrhea  History of prior colon surgery with malabsorption  Chronic tobacco    Plan:    Metabolic acidosis  Dehydration  Suspect secondary to ongoing GI losses with chronic diarrhea and poor oral intake.  Complicated by chronic pancreatitis and chronic malabsorptive issues  Continue IV fluids and sodium bicarb drip  Unintentional acetaminophen toxicity  Patient has a history of taking significant amount of Tylenol in addition to Norco.  Acetaminophen level elevated.  Hepatic function panel within normal limits.  N-acetylcysteine protocol  started overnight, will continue till completion  Medical education  VIVIENNE on CKD stage III  Nephrology following and appreciate recommendation  Chronic pancreatitis  Chronic diarrhea  Follows locally with Dr. Fleming  Has been referred to [facility] previously    Further orders as clinical course dictates    DVT Prophylaxis: Heparin subcu  Code Status: Full  Diet: Clear liquid, advance as tolerated  Discharge Plan: Pending - possibly home in 1-2 days    Janak Damon DO  08/25/24  09:44 EDT    Dictated utilizing Dragon dictation.

## 2024-08-25 NOTE — PAYOR COMM NOTE
"  Inpatient clinicals; submitted in availity ref#[id number]  Ur manager; adrian kirby rn  [phone] and fax [phone]    NPI;  [id number]  TAX ID:  [id number]      Jaymie Yarbrough (69 y.o. Female)       Date of Birth   1955    Social Security Number       Address   [address] [place] [address]    Home Phone   [phone]    MRN   [id number]       [place]   None    Marital Status                               Admission Date   24    Admission Type   Emergency    Admitting Provider   Lacey Ballesteros DO    Attending Provider   Janak Damon DO    Department, Room/Bed   [facility] INTENSIVE CARE, I08/       Discharge Date       Discharge Disposition       Discharge Destination                                 Attending Provider: Janak Damon DO    Allergies: Rocephin [Ceftriaxone], Ciprofloxacin, Codeine, Pineapple    Isolation: None   Infection: Other (24)   Code Status: CPR    Ht: 160 cm (63\")   Wt: 57.1 kg (125 lb 14.1 oz)    Admission Cmt: None   Principal Problem: None                  Active Insurance as of 2024       Primary Coverage       Payor Plan Insurance Group Employer/Plan Group    ANTHEM MEDICARE REPLACEMENT ANTHEM MEDICARE ADVANTAGE [id number]       Payor Plan Address Payor Plan Phone Number Payor Plan Fax Number Effective Dates    [address] [phone]  2024 - None Entered    [facility] [id number]         Subscriber Name Subscriber Birth Date Member ID       JAYMIE YARBROUGH 1955 [id number]                     Emergency Contacts        (Rel.) Home Phone Work Phone Mobile Phone    ANTONIO YARBROUGH (Son) [phone] -- --    EAMONMONIE (Son) [phone] -- --                 History & Physical        Lacey Ballesteros DO at 24            [facility] HOSPITALIST   HISTORY AND PHYSICAL      Name:  Jaymie Yarbrough   Age:  69 y.o.  Sex:  female  :  1955  MRN:  " [id number]   Visit Number:  [id number]  Admission Date:  8/24/2024  Date Of Service:  08/24/24  Primary Care Physician:  Terrence Baldwin MD    Chief Complaint:     Abdominal pain diarrhea weakness shortness of breath    History Of Presenting Illness:      Patient is a chronically ill 69-year-old with a medical history of chronic pancreatitis, mild torsion, chronic diarrhea, chronic tobacco abuse, COPD, who presented from home with increasing weakness, appetite, diarrhea, shortness of breath.  Symptoms have been progressing over the last 3 to 4 days.  She has had some upper respiratory symptoms as well.  Has felt headache at times.  She has no significant abdominal pain currently, nothing worse than her typical.  She has had ongoing diarrhea.      In the ER the patient was hemodynamically stable.  Labs were notable for acute on chronic kidney injury and metabolic acidosis.  CT imaging of the chest and abdomen was largely unremarkable for any acute abnormality.  ER provider discussed case with nephrology and patient was ordered sodium bicarbonate.  Will admit to the ICU.    Review Of Systems:    All systems were reviewed and negative except as mentioned in history of presenting illness, assessment and plan.    Past Medical History: Patient  has a past medical history of COPD (chronic obstructive pulmonary disease), Hypertension, Impaired functional mobility, balance, gait, and endurance, Impaired mobility, Injury of back, Migraines, Multiple gastric ulcers, Pancreatitis, and UTI (urinary tract infection).    Past Surgical History: Patient  has a past surgical history that includes Tubal ligation; Colonoscopy; Colon surgery; Abdominal surgery; Esophagogastroduodenoscopy (N/A, 04/23/2024); Cholecystectomy; and Knee surgery (Right).    Social History: Patient  reports that she has been smoking cigarettes. She has never used smokeless tobacco. She reports that she does not drink alcohol and does not use drugs.    Family  History:  Patient's family history has been reviewed and found to be noncontributory.     Allergies:      Rocephin [ceftriaxone], Ciprofloxacin, Codeine, and Pineapple    Home Medications:    Prior to Admission Medications       Prescriptions Last Dose Informant Patient Reported? Taking?    famotidine (PEPCID) 20 MG tablet   Yes No    Take 1 tablet by mouth 2 (Two) Times a Day.    glucosamine-chondroitin 500-400 MG capsule capsule   Yes No    Take 1 capsule by mouth 2 (Two) Times a Day With Meals. Indications: Joint Damage causing Pain and Loss of Function    HYDROcodone-acetaminophen (NORCO) 7.5-325 MG per tablet   No No    Take 1 tablet by mouth Every 8 (Eight) Hours As Needed for Severe Pain or Moderate Pain.    methocarbamol (ROBAXIN) 750 MG tablet   Yes No    Take 1 tablet by mouth 4 (Four) Times a Day As Needed for Muscle Spasms.    ondansetron ODT (ZOFRAN-ODT) 4 MG disintegrating tablet   No No    Place 1 tablet on the tongue Every 8 (Eight) Hours As Needed for Nausea or Vomiting.    pancrelipase, Lip-Prot-Amyl, (CREON) 86116-40228 units capsule delayed-release particles capsule   Yes No    Take 1 capsule by mouth 3 (Three) Times a Day With Meals.    promethazine (PHENERGAN) 25 MG tablet   Yes No    Take 1 tablet by mouth As Needed.    sertraline (ZOLOFT) 50 MG tablet   Yes No    Take 1 tablet by mouth Daily.    sodium bicarbonate 650 MG tablet   No No    Take 1 tablet by mouth 2 (Two) Times a Day.          ED Medications:    Medications   sodium chloride 0.9 % flush 10 mL (has no administration in time range)   sodium bicarbonate 8.4 % 150 mEq in dextrose (D5W) 5 % 1,000 mL infusion (greater than 100 mEq) (has no administration in time range)   Morphine sulfate (PF) injection 2 mg (has no administration in time range)   sodium bicarbonate injection 8.4% 50 mEq (has no administration in time range)   Morphine sulfate (PF) injection 2 mg (2 mg Intravenous Given 8/24/24 1842)   ondansetron (ZOFRAN) injection 4  "mg (4 mg Intravenous Given 8/24/24 1842)   sodium chloride 0.9 % bolus 500 mL (0 mL Intravenous Stopped 8/24/24 1945)     Vital Signs:  Temp:  [97.8 °F (36.6 °C)-98 °F (36.7 °C)] 98 °F (36.7 °C)  Heart Rate:  [82-99] 82  Resp:  [14-19] 14  BP: (105-126)/(78-90) 126/78        08/24/24  1719   Weight: 58.5 kg (128 lb 15.5 oz)     Body mass index is 22.85 kg/m².    Physical Exam:     Most recent vital Signs: /78   Pulse 82   Temp 98 °F (36.7 °C) (Oral)   Resp 14   Ht 160 cm (63\")   Wt 58.5 kg (128 lb 15.5 oz)   SpO2 100%   BMI 22.85 kg/m²     Physical Exam  Constitutional:       General: She is not in acute distress.     Appearance: She is normal weight. She is not toxic-appearing.   HENT:      Mouth/Throat:      Mouth: Mucous membranes are dry.   Eyes:      Extraocular Movements: Extraocular movements intact.      Pupils: Pupils are equal, round, and reactive to light.   Cardiovascular:      Rate and Rhythm: Normal rate and regular rhythm.      Pulses: Normal pulses.      Heart sounds: No murmur heard.     No gallop.   Pulmonary:      Breath sounds: No wheezing or rales.   Abdominal:      General: Bowel sounds are normal.      Tenderness: There is abdominal tenderness. There is no guarding.      Hernia: A hernia is present.   Musculoskeletal:      Right lower leg: No edema.      Left lower leg: No edema.   Skin:     General: Skin is warm and dry.      Capillary Refill: Capillary refill takes less than 2 seconds.   Neurological:      General: No focal deficit present.      Mental Status: She is alert. Mental status is at baseline.      Motor: Weakness present.   Psychiatric:         Mood and Affect: Mood normal.         Thought Content: Thought content normal.         Laboratory data:    I have reviewed the labs done in the emergency room.    Results from last 7 days   Lab Units 08/24/24  1737   SODIUM mmol/L 138   POTASSIUM mmol/L 4.6   CHLORIDE mmol/L 114*   CO2 mmol/L 5.0*   BUN mg/dL 56*   CREATININE " "mg/dL 1.98*   CALCIUM mg/dL 10.4   BILIRUBIN mg/dL 0.2   ALK PHOS U/L 173*   ALT (SGPT) U/L 7   AST (SGOT) U/L 16   GLUCOSE mg/dL 118*     Results from last 7 days   Lab Units 08/24/24  1737   WBC 10*3/mm3 12.95*   HEMOGLOBIN g/dL 13.8   HEMATOCRIT % 43.7   PLATELETS 10*3/mm3 279         Results from last 7 days   Lab Units 08/24/24  1737   HSTROP T ng/L 20*     Results from last 7 days   Lab Units 08/24/24  1737   PROBNP pg/mL 321.2         Results from last 7 days   Lab Units 08/24/24  1737   LIPASE U/L 83*     Results from last 7 days   Lab Units 08/24/24  1921   PH, ARTERIAL pH units 7.039*   PO2 ART mm Hg 106.0*   PCO2, ARTERIAL mm Hg 21.8*   HCO3 ART mmol/L 5.9*           Invalid input(s): \"USDES\", \"NITRITITE\", \"BACT\", \"EP\"    Pain Management Panel          Latest Ref Rng & Units 8/6/2022   Pain Management Panel   Amphetamine, Urine Qual Negative Negative    Barbiturates Screen, Urine Negative Negative    Benzodiazepine Screen, Urine Negative Negative    Buprenorphine, Screen, Urine Negative Negative    Cocaine Screen, Urine Negative Negative    Methadone Screen , Urine Negative Negative    Methamphetamine, Ur Negative Negative       Details                   EKG:          Radiology:    CT Abdomen Pelvis Without Contrast    Result Date: 8/24/2024  FINAL REPORT TECHNIQUE: null CLINICAL HISTORY: diffuse abdominal pain, nausea COMPARISON: null FINDINGS: CT abdomen and pelvis without contrast Comparison: CT/SR - CT ABDOMEN PELVIS W CONTRAST - 07/30/2024 09:57 PM EDT Findings: Moderate hiatal hernia. Mild emphysematous changes of the lung bases. There are multiple bilateral kidney cysts, evaluated in limited fashion, grossly unchanged. 4 mm nonobstructive calculus within the right kidney. There are multiple small calcified granulomas within the spleen. No focal liver abnormality. Unremarkable pancreas and adrenal glands. Prior cholecystectomy. There are multiple ventral hernias containing fat and knuckles of bowel " without obstruction or edema, similar in appearance to the prior study. There is a focus of anastomosis at the rectosigmoid junction. No bowel inflammation. 3.6 cm right ovarian cyst without change. Unremarkable uterus and urinary bladder. Appendix not visualized. No secondary findings to suggest appendicitis. There is a chronic fracture of the L2 level at the junction between the vertebral body and left pedicle grade 2 retrolisthesis of L1 on L2 without change. Additional compression fracture of the L2 vertebral body without change. Retropulsion of bone contributing to moderate central canal stenosis, without change. No acute fracture.     IMPRESSION: No acute abdominal disease. Chronic findings as above. Authenticated and Electronically Signed by Yenny Webber MD on 08/24/2024 08:13:19 PM    CT Chest Without Contrast Diagnostic    Result Date: 8/24/2024  FINAL REPORT TECHNIQUE: null CLINICAL HISTORY: soa, weakness COMPARISON: null FINDINGS: CT chest without contrast Comparison: None Findings: The heart is normal size. Unremarkable thyroid gland. No mediastinal lymphadenopathy. Moderate hiatal hernia. Mild pulmonary emphysema. No consolidation or pleural effusion. Calcified granuloma within the left upper lobe There are calcified granulomas within the spleen. There are bilateral kidney cysts. Prior cholecystectomy. Surgical clips within the left upper quadrant. Small ventral hernia containing fat. Multiple chronic right-sided rib fractures. Chronic left-sided pedicle fracture at L2. Chronic L2 compression fracture. No acute fracture.     IMPRESSION: 1. No acute cardiopulmonary disease. 2. Moderate hiatal hernia. Authenticated and Electronically Signed by Yenny Webber MD on 08/24/2024 08:11:10 PM     Assessment:    Metabolic acidosis, POA  Dehydration  Acute on chronic renal failure stage III  Chronic pancreatitis  Chronic diarrhea  History of prior colon surgery with malabsorption  Chronic  tobacco    Plan:    Admit to ICU    Metabolic acidosis/VIVIENNE/dehydration:  Likely secondary to ongoing GI losses with chronic diarrhea, poor intake, history of pancreatitis, chronic diarrhea and malabsorption issues.  Nephrology consulted.  Will continue with sodium bicarb drip  And hydration.  Advance diet as tolerated.  Monitor urine output.  Will add urine electrolytes and osmolality.  Will also check salicylates and acetaminophen, patient has been known to take too much Tylenol in the past.    Chronic diarrhea/prior colon surgery/chronic tobacco abuse/COPD:  Will reconcile home medications.  Has been encouraged previously to quit smoking.  Has seen Dr. Fleming locally for pancreatitis and diarrhea issues, had been referred to  previously.    Patient otherwise meets inpatient level care anticipate stay greater than 2 midnights.  Further recommendations predicated upon improvement clinical condition.    Risk Assessment: High  DVT Prophylaxis: Hep  Code Status: Full  Diet: Clear liquid advance to    Advance Care Planning  ACP discussion was held with the patient during this visit. Patient does not have an advance directive, declines further assistance.           Lacey Ballesteros DO  08/24/24  20:31 EDT    Dictated utilizing Dragon dictation.    Electronically signed by Lacey Ballesteros DO at 08/24/24 2041          Emergency Department Notes        Jagdish Bloom at 08/24/24 2112          Per the OK from Dr. Ballesteros patient is aloud to have clear liquids only. Pt provided ice water at this time. Melo, RN at bedside attempting another IV placement.     Electronically signed by Jadgish Bloom at 08/24/24 2113       Jagdish Bloom at 08/24/24 1940          [facility] on the phone at this time for MONIQUE Strong. Call transferred.     Electronically signed by Jagdish Bloom at 08/24/24 1950       Jagdish Bloom at 08/24/24 1935          [facility] called per MONIQUE Strong to request the  on-call physician to return call. Awaiting return call from Dr. Fernandez.     Electronically signed by Jagdish Bloom at 08/24/24 1937       Tae Pérez PA-C at 08/24/24 1841       Attestation signed by Darron Austin DO at 08/25/24 1424      ED Course as of 08/25/24 1424   Sat Aug 24, 2024   1800 I have reviewed the mid-level provider(s) note and verbally discussed the case/plan of care.  I was available for consultation as needed at all times during the patient's visit in the Emergency Department.  I agree with the clinical impression, plan, and disposition unless otherwise stated in the MDM below.    ATTENDING ATTESTATION  HPI: 59-year-old female with past medical history of chronic pancreatitis and medical noncompliance who presents with nausea, abdominal pain, and shortness of breath for the past 4 days.    MDM: ED workup reviewed.    EKG per my interpretation normal sinus rhythm, rate 72, no STEMI.        Labs reviewed.  CBC shows leukocytosis.  No anemia thrombocytopenia.  CMP shows serum creatinine 1.98.  CO2 5.0.  Troponin 20.  Lipase 83.  proBNP 321.  ABG shows combined metabolic and respiratory acidosis with pH 7.03, pCO2 21, bicarb 5.9.    Radiology reports reviewed.  CT chest abdomen pelvis negative for acute findings.    Neurology was consulted.  Plan for medical admission.   [JS]   1801 WBC(!): 12.95 [TM]   1929 pH, Arterial(!!): 7.039 [TM]   1929 pCO2, Arterial(!!): 21.8 [TM]   1929 pO2, Arterial(!): 106.0 [TM]   1929 HCO3, Arterial(!): 5.9 [TM]   1929 Base Excess(!): -23.3 [TM]   1947 Spoke with nephrology on-call and discussed the patient's case.  He states he will load in the computer momentarily to place orders for bicarb and bicarb drip and will consult.  Plan is for admission to the hospital. [TM]   2024 Discussed the case with Dr. Ballesteros who graciously except the patient for admission. [TM]      ED Course User Index  [JS] Darron Austin DO  [TM] Tae Pérez,  MONIQUE Austin DO 2024 14:24 EDT                          EMERGENCY DEPARTMENT ENCOUNTER    Pt Name: Tasha Yarbrough  MRN: [id number]  Pt :   1955  Room Number:  I08/1  Date of encounter:  2024  PCP: Terrence Baldwin MD  ED Provider: Tae Pérez PA-C    Historian: Patient      HPI:  Chief Complaint   Patient presents with    Shortness of Breath     PT states 4 days ago she started having SOA and chest pain. PT is nauseas, dizzy, has headache, and earache.           Context: Tasha Yarbrough is a 69 y.o. female who presents to the ED c/o nausea abdominal pain and shortness of breath.  History of COPD and pancreatitis in the past.  States 4 days ago began with the symptoms as well as left ear pain and headache but the ear pain and headache is resolved.  2 days ago had some brief sharp chest pains that have resolved and does not currently have chest pain.  No vomiting.      PAST MEDICAL HISTORY  Past Medical History:   Diagnosis Date    COPD (chronic obstructive pulmonary disease)     Hypertension     Impaired functional mobility, balance, gait, and endurance     Impaired mobility     Injury of back     Migraines     Multiple gastric ulcers     Pancreatitis     UTI (urinary tract infection)          PAST SURGICAL HISTORY  Past Surgical History:   Procedure Laterality Date    ABDOMINAL SURGERY      CHOLECYSTECTOMY      COLON SURGERY      COLON RUPTURED /  [id number] ?    COLONOSCOPY      ENDOSCOPY N/A 2024    Procedure: ESOPHAGOGASTRODUODENOSCOPY WITH BIOPSY;  Surgeon: Jairo Negro MD;  Location: [facility] ENDOSCOPY;  Service: Gastroenterology;  Laterality: N/A;    KNEE SURGERY Right     TOTAL REPLACEMENT    TUBAL ABDOMINAL LIGATION           FAMILY HISTORY  Family History   Problem Relation Age of Onset    Kidney disease Mother     Emphysema Mother     Heart disease Father     Lung cancer Sister     Heart disease Paternal Uncle          SOCIAL HISTORY  Social History      Socioeconomic History    Marital status:    Tobacco Use    Smoking status: Every Day     Current packs/day: 1.00     Types: Cigarettes    Smokeless tobacco: Never   Vaping Use    Vaping status: Some Days    Substances: Flavoring   Substance and Sexual Activity    Alcohol use: Never    Drug use: Never    Sexual activity: Defer         ALLERGIES  Rocephin [ceftriaxone], Ciprofloxacin, Codeine, and Pineapple        REVIEW OF SYSTEMS  Review of Systems   Constitutional: Negative.    HENT: Negative.     Eyes: Negative.    Respiratory:  Positive for cough and shortness of breath.    Cardiovascular: Negative.    Gastrointestinal:  Positive for abdominal pain and nausea.   Genitourinary: Negative.    Musculoskeletal: Negative.    Skin: Negative.    Neurological: Negative.    Psychiatric/Behavioral: Negative.          All systems reviewed and negative except for those discussed in HPI.       PHYSICAL EXAM    I have reviewed the triage vital signs and nursing notes.    ED Triage Vitals [08/24/24 1719]   Temp Heart Rate Resp BP SpO2   97.8 °F (36.6 °C) 99 16 105/80 98 %      Temp src Heart Rate Source Patient Position BP Location FiO2 (%)   Oral Monitor Sitting Left arm --       Physical Exam  Vitals and nursing note reviewed.   Constitutional:       General: She is not in acute distress.     Appearance: She is well-developed. She is not ill-appearing, toxic-appearing or diaphoretic.   HENT:      Head: Normocephalic and atraumatic.   Eyes:      Extraocular Movements: Extraocular movements intact.   Cardiovascular:      Rate and Rhythm: Normal rate and regular rhythm.   Pulmonary:      Effort: Pulmonary effort is normal.      Breath sounds: Normal breath sounds. No decreased breath sounds, wheezing or rhonchi.   Abdominal:      General: Bowel sounds are normal.      Palpations: Abdomen is soft.      Tenderness:  in the epigastric area   Musculoskeletal:         General: Normal range of motion.      Right lower leg:  No tenderness. No edema.      Left lower leg: No tenderness. No edema.   Skin:     General: Skin is warm and dry.   Neurological:      General: No focal deficit present.      Mental Status: She is alert.   Psychiatric:         Mood and Affect: Mood normal.         Behavior: Behavior normal.            LAB RESULTS  Recent Results (from the past 24 hour(s))   Comprehensive Metabolic Panel    Collection Time: 08/24/24  5:37 PM    Specimen: Blood   Result Value Ref Range    Glucose 118 (H) 65 - 99 mg/dL    BUN 56 (H) 8 - 23 mg/dL    Creatinine 1.98 (H) 0.57 - 1.00 mg/dL    Sodium 138 136 - 145 mmol/L    Potassium 4.6 3.5 - 5.2 mmol/L    Chloride 114 (H) 98 - 107 mmol/L    CO2 5.0 (C) 22.0 - 29.0 mmol/L    Calcium 10.4 8.6 - 10.5 mg/dL    Total Protein 8.6 (H) 6.0 - 8.5 g/dL    Albumin 5.0 3.5 - 5.2 g/dL    ALT (SGPT) 7 1 - 33 U/L    AST (SGOT) 16 1 - 32 U/L    Alkaline Phosphatase 173 (H) 39 - 117 U/L    Total Bilirubin 0.2 0.0 - 1.2 mg/dL    Globulin 3.6 gm/dL    A/G Ratio 1.4 g/dL    BUN/Creatinine Ratio 28.3 (H) 7.0 - 25.0    Anion Gap 19.0 (H) 5.0 - 15.0 mmol/L    eGFR 26.9 (L) >60.0 mL/min/1.73   BNP    Collection Time: 08/24/24  5:37 PM    Specimen: Blood   Result Value Ref Range    proBNP 321.2 0.0 - 900.0 pg/mL   Single High Sensitivity Troponin T    Collection Time: 08/24/24  5:37 PM    Specimen: Blood   Result Value Ref Range    HS Troponin T 20 (H) <14 ng/L   Green Top (Gel)    Collection Time: 08/24/24  5:37 PM   Result Value Ref Range    Extra Tube Hold for add-ons.    Lavender Top    Collection Time: 08/24/24  5:37 PM   Result Value Ref Range    Extra Tube hold for add-on    Gold Top - SST    Collection Time: 08/24/24  5:37 PM   Result Value Ref Range    Extra Tube Hold for add-ons.    Light Blue Top    Collection Time: 08/24/24  5:37 PM   Result Value Ref Range    Extra Tube Hold for add-ons.    CBC Auto Differential    Collection Time: 08/24/24  5:37 PM    Specimen: Blood   Result Value Ref Range    WBC  12.95 (H) 3.40 - 10.80 10*3/mm3    RBC 4.01 3.77 - 5.28 10*6/mm3    Hemoglobin 13.8 12.0 - 15.9 g/dL    Hematocrit 43.7 34.0 - 46.6 %    .0 (H) 79.0 - 97.0 fL    MCH 34.4 (H) 26.6 - 33.0 pg    MCHC 31.6 31.5 - 35.7 g/dL    RDW 15.9 (H) 12.3 - 15.4 %    RDW-SD 65.1 (H) 37.0 - 54.0 fl    MPV 10.9 6.0 - 12.0 fL    Platelets 279 140 - 450 10*3/mm3    Neutrophil % 79.5 (H) 42.7 - 76.0 %    Lymphocyte % 11.6 (L) 19.6 - 45.3 %    Monocyte % 6.8 5.0 - 12.0 %    Eosinophil % 0.8 0.3 - 6.2 %    Basophil % 0.8 0.0 - 1.5 %    Immature Grans % 0.5 0.0 - 0.5 %    Neutrophils, Absolute 10.29 (H) 1.70 - 7.00 10*3/mm3    Lymphocytes, Absolute 1.50 0.70 - 3.10 10*3/mm3    Monocytes, Absolute 0.88 0.10 - 0.90 10*3/mm3    Eosinophils, Absolute 0.10 0.00 - 0.40 10*3/mm3    Basophils, Absolute 0.11 0.00 - 0.20 10*3/mm3    Immature Grans, Absolute 0.07 (H) 0.00 - 0.05 10*3/mm3    nRBC 0.0 0.0 - 0.2 /100 WBC   Scan Slide    Collection Time: 08/24/24  5:37 PM    Specimen: Blood   Result Value Ref Range    Macrocytes Slight/1+ None Seen    WBC Morphology Normal Normal    Platelet Estimate Adequate Normal   Lipase    Collection Time: 08/24/24  5:37 PM    Specimen: Blood   Result Value Ref Range    Lipase 83 (H) 13 - 60 U/L   Lactic Acid, Plasma    Collection Time: 08/24/24  5:37 PM    Specimen: Blood   Result Value Ref Range    Lactate 1.1 0.5 - 2.0 mmol/L   Salicylate Level    Collection Time: 08/24/24  5:37 PM    Specimen: Blood   Result Value Ref Range    Salicylate <0.3 <=30.0 mg/dL   Acetaminophen Level    Collection Time: 08/24/24  5:37 PM    Specimen: Blood   Result Value Ref Range    Acetaminophen 48.8 (H) 0.0 - 30.0 mcg/mL   Ethanol    Collection Time: 08/24/24  5:37 PM    Specimen: Blood   Result Value Ref Range    Ethanol <10 0 - 10 mg/dL    Ethanol % <0.010 %   Blood Gas, Arterial With Co-Ox    Collection Time: 08/24/24  7:21 PM    Specimen: Arterial Blood   Result Value Ref Range    Site Right Brachial     Rafy's Test N/A      pH, Arterial 7.039 (C) 7.300 - 7.500 pH units    pCO2, Arterial 21.8 (C) 35.0 - 45.0 mm Hg    pO2, Arterial 106.0 (H) 75.0 - 100.0 mm Hg    HCO3, Arterial 5.9 (L) 22.0 - 28.0 mmol/L    Base Excess, Arterial -23.3 (L) 0.0 - 2.0 mmol/L    O2 Saturation, Arterial 98.2 94.0 - 100.0 %    Hematocrit, Blood Gas 39.4 %    Oxyhemoglobin 95.6 94 - 99 %    Methemoglobin 0.80 0.00 - 1.50 %    Carboxyhemoglobin 1.9 0 - 2 %    A-a DO2 14.1 mmHg    Barometric Pressure for Blood Gas 738 mmHg    Modality Room Air     FIO2 21 %    Ventilator Mode NA     Notified Who TM     Notified By [id number]     Notified Time 08/24/2024 19:24     Collected by      pH, Temp Corrected      pCO2, Temperature Corrected      pO2, Temperature Corrected     Urinalysis With Culture If Indicated - Urine, Clean Catch    Collection Time: 08/24/24 10:04 PM    Specimen: Urine, Clean Catch   Result Value Ref Range    Color, UA Yellow Yellow, Straw    Appearance, UA Cloudy (A) Clear    pH, UA 5.5 5.0 - 8.0    Specific Gravity, UA 1.028 1.005 - 1.030    Glucose, UA Negative Negative    Ketones, UA Negative Negative    Bilirubin, UA Negative Negative    Blood, UA Negative Negative    Protein,  mg/dL (2+) (A) Negative    Leuk Esterase, UA Moderate (2+) (A) Negative    Nitrite, UA Negative Negative    Urobilinogen, UA 0.2 E.U./dL 0.2 - 1.0 E.U./dL   Sodium, Urine, Random - Urine, Clean Catch    Collection Time: 08/24/24 10:04 PM    Specimen: Urine, Clean Catch   Result Value Ref Range    Sodium, Urine 48 mmol/L   Potassium, Urine, Random - Urine, Clean Catch    Collection Time: 08/24/24 10:04 PM    Specimen: Urine, Clean Catch   Result Value Ref Range    Potassium, Urine 33.6 mmol/L   Urinalysis, Microscopic Only - Urine, Clean Catch    Collection Time: 08/24/24 10:04 PM    Specimen: Urine, Clean Catch   Result Value Ref Range    RBC, UA None Seen None Seen, 0-2 /HPF    WBC, UA 6-10 (A) None Seen, 0-2 /HPF    Bacteria, UA 2+ (A) None Seen /HPF    Squamous  Epithelial Cells, UA 0-2 None Seen, 0-2 /HPF    Hyaline Casts, UA 3-6 None Seen /LPF    Methodology Manual Light Microscopy        If labs were ordered, I independently reviewed the results and considered them in treating the patient.        RADIOLOGY  CT Abdomen Pelvis Without Contrast    Result Date: 8/24/2024  FINAL REPORT TECHNIQUE: null CLINICAL HISTORY: diffuse abdominal pain, nausea COMPARISON: null FINDINGS: CT abdomen and pelvis without contrast Comparison: CT/SR - CT ABDOMEN PELVIS W CONTRAST - 07/30/2024 09:57 PM EDT Findings: Moderate hiatal hernia. Mild emphysematous changes of the lung bases. There are multiple bilateral kidney cysts, evaluated in limited fashion, grossly unchanged. 4 mm nonobstructive calculus within the right kidney. There are multiple small calcified granulomas within the spleen. No focal liver abnormality. Unremarkable pancreas and adrenal glands. Prior cholecystectomy. There are multiple ventral hernias containing fat and knuckles of bowel without obstruction or edema, similar in appearance to the prior study. There is a focus of anastomosis at the rectosigmoid junction. No bowel inflammation. 3.6 cm right ovarian cyst without change. Unremarkable uterus and urinary bladder. Appendix not visualized. No secondary findings to suggest appendicitis. There is a chronic fracture of the L2 level at the junction between the vertebral body and left pedicle grade 2 retrolisthesis of L1 on L2 without change. Additional compression fracture of the L2 vertebral body without change. Retropulsion of bone contributing to moderate central canal stenosis, without change. No acute fracture.     IMPRESSION: No acute abdominal disease. Chronic findings as above. Authenticated and Electronically Signed by Yenny Webber MD on 08/24/2024 08:13:19 PM    CT Chest Without Contrast Diagnostic    Result Date: 8/24/2024  FINAL REPORT TECHNIQUE: null CLINICAL HISTORY: soa, weakness COMPARISON: null FINDINGS:  CT chest without contrast Comparison: None Findings: The heart is normal size. Unremarkable thyroid gland. No mediastinal lymphadenopathy. Moderate hiatal hernia. Mild pulmonary emphysema. No consolidation or pleural effusion. Calcified granuloma within the left upper lobe There are calcified granulomas within the spleen. There are bilateral kidney cysts. Prior cholecystectomy. Surgical clips within the left upper quadrant. Small ventral hernia containing fat. Multiple chronic right-sided rib fractures. Chronic left-sided pedicle fracture at L2. Chronic L2 compression fracture. No acute fracture.     IMPRESSION: 1. No acute cardiopulmonary disease. 2. Moderate hiatal hernia. Authenticated and Electronically Signed by Yenny Webber MD on 08/24/2024 08:11:10 PM         PROCEDURES    Procedures    Interpretations    O2 Sat: The patients oxygen saturation was 98% on Room Air.  This was independently interpreted by me as Normal    EKG: I reviewed and independently interpreted the EKG as sinus rhythm with a rate of 92.  No ST segment changes.    Cardiac Monitoring: I reviewed and independently interpreted the Rhythm Strip as Normal Sinus rhythm rate of 81    Radiology: I ordered and independently reviewed the above noted radiographic studies.  I viewed images of Chest Xray which showed No pulmonary process per my independent interpretation. See radiologist's dictation for official interpretation.     MEDICATIONS GIVEN IN ER    Medications   sodium chloride 0.9 % flush 10 mL (has no administration in time range)   sodium bicarbonate 8.4 % 150 mEq in dextrose (D5W) 5 % 1,000 mL infusion (greater than 100 mEq) (150 mEq Intravenous New Bag 8/24/24 2034)   acetylcysteine (ACETADOTE) 8,780 mg in dextrose (D5W) 5 % 200 mL infusion (8,780 mg Intravenous New Bag 8/24/24 2125)     Followed by   acetylcysteine (ACETADOTE) 2,920 mg in dextrose (D5W) 5 % 500 mL infusion (2,920 mg Intravenous New Bag 8/24/24 2258)     Followed by    acetylcysteine (ACETADOTE) 5,860 mg in dextrose (D5W) 5 % 1,000 mL infusion (has no administration in time range)   pancrelipase (Lip-Prot-Amyl) (CREON) capsule 12,000 units of lipase (has no administration in time range)   sertraline (ZOLOFT) tablet 50 mg (has no administration in time range)   nitroglycerin (NITROSTAT) SL tablet 0.4 mg (has no administration in time range)   sodium chloride 0.9 % flush 10 mL (10 mL Intravenous Given 8/24/24 2126)   sodium chloride 0.9 % flush 10 mL (has no administration in time range)   sodium chloride 0.9 % infusion 40 mL (has no administration in time range)   heparin (porcine) 5000 UNIT/ML injection 5,000 Units (has no administration in time range)   ondansetron ODT (ZOFRAN-ODT) disintegrating tablet 4 mg (has no administration in time range)     Or   ondansetron (ZOFRAN) injection 4 mg (has no administration in time range)   HYDROmorphone (DILAUDID) injection 0.5 mg (has no administration in time range)   Morphine sulfate (PF) injection 2 mg (has no administration in time range)   Morphine sulfate (PF) injection 2 mg (2 mg Intravenous Given 8/24/24 1842)   ondansetron (ZOFRAN) injection 4 mg (4 mg Intravenous Given 8/24/24 1842)   sodium chloride 0.9 % bolus 500 mL (0 mL Intravenous Stopped 8/24/24 1945)   Morphine sulfate (PF) injection 2 mg (2 mg Intravenous Given 8/24/24 2032)   sodium bicarbonate injection 8.4% 50 mEq (50 mEq Intravenous Given 8/24/24 2036)         MEDICAL DECISION MAKING, PROGRESS, and CONSULTS    All labs, if obtained, have been independently reviewed by me.  All radiology studies, if obtained, have been reviewed by me and the radiologist dictating the report.  All EKG's, if obtained, have been independently viewed and interpreted by me      Discussion below represents my analysis of pertinent findings related to patient's condition, differential diagnosis, treatment plan and final disposition.      Differential diagnosis:    Pancreatitis, enteritis,  COPD exacerbation, pneumonia    Additional Sources:  None      Orders placed during this visit:  Orders Placed This Encounter   Procedures    Urine Culture - Urine,    XR Chest 1 View    CT Chest Without Contrast Diagnostic    CT Abdomen Pelvis Without Contrast    [place] Draw    Comprehensive Metabolic Panel    BNP    Single High Sensitivity Troponin T    CBC Auto Differential    Scan Slide    Lipase    Urinalysis With Culture If Indicated - Urine, Clean Catch    Blood Gas, Arterial -With Co-Ox Panel: Yes    Blood Gas, Arterial With Co-Ox    Lactic Acid, Plasma    Osmolality, Serum    Sodium, Urine, Random - Urine, Clean Catch    Chloride, Urine, Random - Urine, Clean Catch    Potassium, Urine, Random - Urine, Clean Catch    Protein / Creatinine Ratio, Urine - Urine, Clean Catch    Salicylate Level    Beta Hydroxybutyrate Quantitative    Acetaminophen Level    Volatiles, Urine - Urine, Clean Catch    Ethanol    Acetaminophen Level    Basic Metabolic Panel    TSH    Acetaminophen Level    Urinalysis, Microscopic Only - Urine, Clean Catch    Diet: Liquid; Clear Liquid; Fluid Consistency: Thin (IDDSI 0)    Undress & Gown    Vital Signs    RN To Release Lab Order for Acetaminophen Level, AST & ALT After Acetylcysteine Administration Complete    Call Results of Acetaminophen Level, ALT & AST to Provider    Vital Signs Every Hour and Per Hospital Policy Based on Patient Condition    Telemetry - Place Orders & Notify Provider of Results When Patient Experiences Acute Chest Pain, Dysrhythmia or Respiratory Distress    Continuous Pulse Oximetry    Height & Weight    Daily Weights    Intake & Output    Oral Care - Patient Not on NPPV & Not Intubated    Target Arousal Level RASS 0 to -2    Use Mobility Guidelines for Advancement of Activity    Saline Lock & Maintain IV Access    Place Sequential Compression Device    Maintain Sequential Compression Device    Code Status and Medical Interventions: CPR (Attempt to Resuscitate);  Full Support    Inpatient Nephrology Consult    Oxygen Therapy- Nasal Cannula; Titrate 1-6 LPM Per SpO2; 90 - 95%    ECG 12 Lead ED Triage Standing Order; SOA    Insert Peripheral IV    Insert Peripheral IV    Inpatient Admission    CBC & Differential    Green Top (Gel)    Lavender Top    Gold Top - SST    Light Blue Top         Additional orders considered but not ordered:  None    ED Course:    Consultants:   Nephrology    ED Course as of 08/25/24 0024   Sat Aug 24, 2024   1800 I have reviewed the mid-level provider(s) note and verbally discussed the case/plan of care.  I was available for consultation as needed at all times during the patient's visit in the Emergency Department.  I agree with the clinical impression, plan, and disposition unless otherwise stated in the MDM below.    ATTENDING ATTESTATION  HPI: 59-year-old female with past medical history of chronic pancreatitis and medical noncompliance who presents with nausea, abdominal pain, and shortness of breath for the past 4 days.    MDM: ED workup reviewed.    EKG per my interpretation normal sinus rhythm, rate 72, no STEMI.        Labs reviewed.  CBC shows leukocytosis.  No anemia thrombocytopenia.  CMP shows serum creatinine 1.98.  CO2 5.0.  Troponin 20.  Lipase 83.  proBNP 321.  ABG shows combined metabolic and respiratory acidosis with pH 7.03, pCO2 21, bicarb 5.9.    Radiology reports reviewed.  CT chest abdomen pelvis negative for acute findings.    Neurology was consulted.  Plan for medical admission.   [JS]   1801 WBC(!): 12.95 [TM]   1929 pH, Arterial(!!): 7.039 [TM]   1929 pCO2, Arterial(!!): 21.8 [TM]   1929 pO2, Arterial(!): 106.0 [TM]   1929 HCO3, Arterial(!): 5.9 [TM]   1929 Base Excess(!): -23.3 [TM]   1947 Spoke with nephrology on-call and discussed the patient's case.  He states he will load in the computer momentarily to place orders for bicarb and bicarb drip and will consult.  Plan is for admission to the hospital. [TM]   2024  Discussed the case with Dr. Ballesteros who graciously except the patient for admission. [TM]      ED Course User Index  [JS] Darron Austin DO  [TM] Tae Pérez PA-C           After my consideration of clinical presentation and any laboratory/radiology studies obtained, I discussed the findings with the patient/patient representative who is in agreement with the treatment plan and the final disposition. Risks and benefits of admission was discussed     AS OF 00:24 EDT VITALS:    BP - 114/71  HR - 80  TEMP - 97.7 °F (36.5 °C) (Oral)  O2 SATS - 96%    I reviewed the patients prescription monitoring report if available prior to discharge    DIAGNOSIS  Final diagnoses:   Metabolic acidosis         DISPOSITION  ED Disposition       ED Disposition   Decision to Admit    Condition   --    Comment   Level of Care: Critical Care [6]   Diagnosis: Metabolic acidosis [394980]   Admitting Physician: KATIE BALLESTEROS [[id number]]   Attending Physician: KATIE BALLESTEROS [[id number]]   Certification: I Certify That Inpatient Hospital Services Are Medically Necessary For Greater Than 2 Midnights                     Please note that portions of this document were completed with voice recognition software.        Tae Pérez PA-C  08/24/24 2025       Tae Pérez PA-C  08/25/24 0024      Electronically signed by Darron Austin DO at 08/25/24 1424       Current Facility-Administered Medications   Medication Dose Route Frequency Provider Last Rate Last Admin    acetylcysteine (ACETADOTE) 5,860 mg in dextrose (D5W) 5 % 1,000 mL infusion  100 mg/kg Intravenous Once Katie Ballesteros DO   Held at 08/26/24 1222    heparin (porcine) 5000 UNIT/ML injection 5,000 Units  5,000 Units Subcutaneous Q12H Katie Ballesteros DO   5,000 Units at 08/25/24 0928    Morphine sulfate (PF) injection 2 mg  2 mg Intravenous Q4H PRN Katie Ballesteros DO        nitroglycerin (NITROSTAT) SL tablet  0.4 mg  0.4 mg Sublingual Q5 Min PRN Lacey Ballesterosus, DO        ondansetron ODT (ZOFRAN-ODT) disintegrating tablet 4 mg  4 mg Oral Q6H PRN Lacey Ballesteros DO        Or    ondansetron (ZOFRAN) injection 4 mg  4 mg Intravenous Q6H PRN Lacey Ballesteros, DO        pancrelipase (Lip-Prot-Amyl) (CREON) capsule 12,000 units of lipase  12,000 units of lipase Oral TID With Meals Lacey Ballesteros, DO   12,000 units of lipase at 24 1135    sertraline (ZOLOFT) tablet 50 mg  50 mg Oral Daily Lacey Ballesteros DO   50 mg at 24 0927    sodium bicarbonate 8.4 % 125 mEq in dextrose (D5W) 5 % 1,000 mL infusion (greater than 100 mEq)  125 mEq Intravenous Continuous Lacey Ballesteros DO 75 mL/hr at 24 0449 125 mEq at 24 0449    sodium chloride 0.9 % flush 10 mL  10 mL Intravenous PRN Lacey Ballesteros, DO        sodium chloride 0.9 % flush 10 mL  10 mL Intravenous Q12H Lacey Ballesteros, DO   10 mL at 24 0927    sodium chloride 0.9 % flush 10 mL  10 mL Intravenous PRN Lacey Ballesteros,         sodium chloride 0.9 % infusion 40 mL  40 mL Intravenous PRN Lacey Ballesteros DO        traMADol (ULTRAM) tablet 50 mg  50 mg Oral Q6H PRN Janak Damon DO   50 mg at 24 1135        Physician Progress Notes (last 24 hours)        Janak Damon DO at 24 0944              [facility]    [facility]    Name:  Tasha Yarbrough   Age:  69 y.o.  Sex:  female  :  1955  MRN:  [id number]   Visit Number:  [id number]  Admission Date:  2024  Date Of Service:  24  Primary Care Physician:  Terrence Baldwin MD     LOS: 1 day :    Chief Complaint:      Abdominal pain diarrhea weakness shortness of breath    Subjective:    Patient examined today.  Resting comfortably in bed.  Admits to taking significant amount of over-the-counter Tylenol in addition to her Norco.  Patient has a history of accidental  acetaminophen overdose.  Provided medical education.  Denies complaints at this time.    Hospital Course:    Patient is a chronically ill 69-year-old with a medical history of chronic pancreatitis, mild torsion, chronic diarrhea, chronic tobacco abuse, COPD, who presented from home with increasing weakness, appetite, diarrhea, shortness of breath.  Symptoms have been progressing over the last 3 to 4 days.  She has had some upper respiratory symptoms as well.  Has felt headache at times.  She has no significant abdominal pain currently, nothing worse than her typical.  She has had ongoing diarrhea.       In the ER the patient was hemodynamically stable.  Labs were notable for acute on chronic kidney injury and metabolic acidosis.  CT imaging of the chest and abdomen was largely unremarkable for any acute abnormality.  ER provider discussed case with nephrology and patient was ordered sodium bicarbonate.  Will admit to the ICU.    Review of Systems:     All systems were reviewed and negative except as mentioned in subjective, assessment and plan.    Vital Signs:    Temp:  [97.7 °F (36.5 °C)-98.3 °F (36.8 °C)] 98.3 °F (36.8 °C)  Heart Rate:  [80-99] 92  Resp:  [14-21] 18  BP: ()/() 106/73    Intake and output:    I/O last 3 completed shifts:  In: 2811 [P.O.:140; I.V.:2171; IV Piggyback:500]  Out: 500 [Urine:500]  I/O this shift:  In: 600 [P.O.:600]  Out: 200 [Urine:200]    Physical Examination:    General Appearance:  Alert and cooperative.    Head:  Atraumatic and normocephalic.   Eyes: Conjunctivae and sclerae normal, no icterus. No pallor.   Throat: No oral lesions, no thrush, oral mucosa moist.   Neck: Supple, trachea midline, no thyromegaly.   Lungs:   Breath sounds heard bilaterally equally.  No wheezing or crackles. No Pleural rub or bronchial breathing.   Heart:  Normal S1 and S2, no murmur, no gallop, no rub. No JVD.   Abdomen:   Normal bowel sounds, no masses, no organomegaly. Soft, nontender,  nondistended, no rebound tenderness.   Extremities: Supple, no edema, no cyanosis, no clubbing.   Skin: No bleeding or rash.   Neurologic: Alert and oriented x 3. No facial asymmetry. Moves all four limbs.      Laboratory results:    Results from last 7 days   Lab Units 08/25/24  0532 08/25/24  0146 08/24/24  1737   SODIUM mmol/L 137 139 138   POTASSIUM mmol/L 3.9 3.0* 4.6   CHLORIDE mmol/L 106 108* 114*   CO2 mmol/L 10.4* 12.5* 5.0*   BUN mg/dL 47* 49* 56*   CREATININE mg/dL 1.38* 1.48* 1.98*   CALCIUM mg/dL 9.2 9.5 10.4   BILIRUBIN mg/dL 0.3  --  0.2   ALK PHOS U/L 132*  --  173*   ALT (SGPT) U/L 6  --  7   AST (SGOT) U/L 14  --  16   GLUCOSE mg/dL 81 108* 118*     Results from last 7 days   Lab Units 08/24/24  1737   WBC 10*3/mm3 12.95*   HEMOGLOBIN g/dL 13.8   HEMATOCRIT % 43.7   PLATELETS 10*3/mm3 279     Results from last 7 days   Lab Units 08/25/24  0815   INR  1.05     Results from last 7 days   Lab Units 08/24/24  1737   HSTROP T ng/L 20*         Recent Labs     04/21/24  0919 05/31/24  1126 08/24/24  1921   PHART 7.199* 7.230* 7.039*   PAH2SHK 25.5* 26.8* 21.8*   PO2ART 97.9 96.6 106.0*   BTN5WVR 9.9* 11.2* 5.9*   BASEEXCESS -16.6* -14.9* -23.3*      I have reviewed the patient's laboratory results.    Radiology results:    XR Chest 1 View    Result Date: 8/25/2024  PROCEDURE: XR CHEST 1 VW-  INDICATION:  SOA Triage Protocol  FINDINGS:  A portable view of the chest was obtained.  Comparison is made to a prior exam dated 07/31/2024.   Heart size is normal. The previously seen right central line is no longer present. Retrocardiac opacity containing air is consistent with a hiatal hernia. Lung volumes are low. Chronic increased interstitial markings are noted. No focal infiltrate, pleural effusion, or pneumothorax. There are several chronic appearing right lower rib fractures.      Impression: 1. No acute process. 2. Hiatal hernia.       CT Abdomen Pelvis Without Contrast    Result Date: 8/24/2024  FINAL  REPORT TECHNIQUE: null CLINICAL HISTORY: diffuse abdominal pain, nausea COMPARISON: null FINDINGS: CT abdomen and pelvis without contrast Comparison: CT/SR - CT ABDOMEN PELVIS W CONTRAST - 07/30/2024 09:57 PM EDT Findings: Moderate hiatal hernia. Mild emphysematous changes of the lung bases. There are multiple bilateral kidney cysts, evaluated in limited fashion, grossly unchanged. 4 mm nonobstructive calculus within the right kidney. There are multiple small calcified granulomas within the spleen. No focal liver abnormality. Unremarkable pancreas and adrenal glands. Prior cholecystectomy. There are multiple ventral hernias containing fat and knuckles of bowel without obstruction or edema, similar in appearance to the prior study. There is a focus of anastomosis at the rectosigmoid junction. No bowel inflammation. 3.6 cm right ovarian cyst without change. Unremarkable uterus and urinary bladder. Appendix not visualized. No secondary findings to suggest appendicitis. There is a chronic fracture of the L2 level at the junction between the vertebral body and left pedicle grade 2 retrolisthesis of L1 on L2 without change. Additional compression fracture of the L2 vertebral body without change. Retropulsion of bone contributing to moderate central canal stenosis, without change. No acute fracture.     Impression: IMPRESSION: No acute abdominal disease. Chronic findings as above. Authenticated and Electronically Signed by Yenny Webber MD on 08/24/2024 08:13:19 PM    CT Chest Without Contrast Diagnostic    Result Date: 8/24/2024  FINAL REPORT TECHNIQUE: null CLINICAL HISTORY: soa, weakness COMPARISON: null FINDINGS: CT chest without contrast Comparison: None Findings: The heart is normal size. Unremarkable thyroid gland. No mediastinal lymphadenopathy. Moderate hiatal hernia. Mild pulmonary emphysema. No consolidation or pleural effusion. Calcified granuloma within the left upper lobe There are calcified granulomas  within the spleen. There are bilateral kidney cysts. Prior cholecystectomy. Surgical clips within the left upper quadrant. Small ventral hernia containing fat. Multiple chronic right-sided rib fractures. Chronic left-sided pedicle fracture at L2. Chronic L2 compression fracture. No acute fracture.     Impression: IMPRESSION: 1. No acute cardiopulmonary disease. 2. Moderate hiatal hernia. Authenticated and Electronically Signed by Yenny Webber MD on 08/24/2024 08:11:10 PM   I have reviewed the patient's radiology reports.    Medication Review:     I have reviewed the patient's active and prn medications.     Problem List:      Metabolic acidosis      Assessment:    Metabolic acidosis, POA  Dehydration  Unintentional acetaminophen toxicity  Acute on chronic renal failure stage III  Chronic pancreatitis  Chronic diarrhea  History of prior colon surgery with malabsorption  Chronic tobacco    Plan:    Metabolic acidosis  Dehydration  Suspect secondary to ongoing GI losses with chronic diarrhea and poor oral intake.  Complicated by chronic pancreatitis and chronic malabsorptive issues  Continue IV fluids and sodium bicarb drip  Unintentional acetaminophen toxicity  Patient has a history of taking significant amount of Tylenol in addition to Norco.  Acetaminophen level elevated.  Hepatic function panel within normal limits.  N-acetylcysteine protocol started overnight, will continue till completion  Medical education  VIVIENNE on CKD stage III  Nephrology following and appreciate recommendation  Chronic pancreatitis  Chronic diarrhea  Follows locally with Dr. Fleming  Has been referred to [facility] previously    Further orders as clinical course dictates    DVT Prophylaxis: Heparin subcu  Code Status: Full  Diet: Clear liquid, advance as tolerated  Discharge Plan: Pending - possibly home in 1-2 days    Janak Damon DO  08/25/24  09:44 EDT    Dictated utilizing Dragon dictation.        Electronically signed by  "Janak Damon, DO at 08/25/24 0948       Nnamdi Latif MD at 08/24/24 2003              Nephrology progress note - Chart review     Returned call from ER     Miss colindres is a 70 yo female COPD , CKD and  acute on chronic pancreatitis , presents with SOB , abdominal pain and decreased oral intake     /78   Pulse 82   Temp 98 °F (36.7 °C) (Oral)   Resp 14   Ht 160 cm (63\")   Wt 58.5 kg (128 lb 15.5 oz)   SpO2 100%   BMI 22.85 kg/m²       Results from last 7 days   Lab Units 08/24/24  1737   SODIUM mmol/L 138   POTASSIUM mmol/L 4.6   CHLORIDE mmol/L 114*   CO2 mmol/L 5.0*   BUN mg/dL 56*   CREATININE mg/dL 1.98*   CALCIUM mg/dL 10.4   BILIRUBIN mg/dL 0.2   ALK PHOS U/L 173*   ALT (SGPT) U/L 7   AST (SGOT) U/L 16   GLUCOSE mg/dL 118*     Patient with primary metabolic acidosis  ,w secondary respiratory acidosis and non GAP metabolic acidosis      Latest 06/22/24 20:52   pH, Venous 7.320 - 7.420 pH Units 7.114 (C)   pCO2, Venous 40.0 - 50.0 mm Hg 28.0 (L)   pO2, Venous 30.0 - 50.0 mm Hg 30.9   HCO3, Venous 22.0 - 28.0 mmol/L 9.0 (L)   Base Excess 0.0 - 2.0 mmol/L -19.1 (L)   O2 Saturation, Venous 45.0 - 75.0 % 59.8     Patient with primary metabolic acidosis  ,w secondary respiratory acidosis and non GAP metabolic acidosis     -Secondary to VIVIENNE  on CKD , and likely ketosis from decreased oral intake from pancreatitis and malabsorption w/ diarrhea  causing non anion GAP met acidosis associated  , respiratory acidosis from COPD   - acute on chronic Pancreatitis   - COPD exacerbation ,as per primary team     Plan    -  Sodium bicarbonate drip  + 50 mEq Amp   -  Complete work up , osmolality , acetaminophen  , salicylates , beta hydroxybutyrate , volatiles urine , urine Na, K and CL   -  Full consultation will follow     Thank you for allowing us to participate from this patient care     Electronically signed by Nnamdi Latif MD at 08/24/24 2020       "

## 2024-08-25 NOTE — H&P
[facility]   HISTORY AND PHYSICAL      Name:  Tasha Yarbrough   Age:  69 y.o.  Sex:  female  :  1955  MRN:  [id number]   Visit Number:  [id number]  Admission Date:  2024  Date Of Service:  24  Primary Care Physician:  Terrence Baldwin MD    Chief Complaint:     Abdominal pain diarrhea weakness shortness of breath    History Of Presenting Illness:      Patient is a chronically ill 69-year-old with a medical history of chronic pancreatitis, mild torsion, chronic diarrhea, chronic tobacco abuse, COPD, who presented from home with increasing weakness, appetite, diarrhea, shortness of breath.  Symptoms have been progressing over the last 3 to 4 days.  She has had some upper respiratory symptoms as well.  Has felt headache at times.  She has no significant abdominal pain currently, nothing worse than her typical.  She has had ongoing diarrhea.      In the ER the patient was hemodynamically stable.  Labs were notable for acute on chronic kidney injury and metabolic acidosis.  CT imaging of the chest and abdomen was largely unremarkable for any acute abnormality.  ER provider discussed case with nephrology and patient was ordered sodium bicarbonate.  Will admit to the ICU.    Review Of Systems:    All systems were reviewed and negative except as mentioned in history of presenting illness, assessment and plan.    Past Medical History: Patient  has a past medical history of COPD (chronic obstructive pulmonary disease), Hypertension, Impaired functional mobility, balance, gait, and endurance, Impaired mobility, Injury of back, Migraines, Multiple gastric ulcers, Pancreatitis, and UTI (urinary tract infection).    Past Surgical History: Patient  has a past surgical history that includes Tubal ligation; Colonoscopy; Colon surgery; Abdominal surgery; Esophagogastroduodenoscopy (N/A, 2024); Cholecystectomy; and Knee surgery (Right).    Social History: Patient  reports that she has  been smoking cigarettes. She has never used smokeless tobacco. She reports that she does not drink alcohol and does not use drugs.    Family History:  Patient's family history has been reviewed and found to be noncontributory.     Allergies:      Rocephin [ceftriaxone], Ciprofloxacin, Codeine, and Pineapple    Home Medications:    Prior to Admission Medications       Prescriptions Last Dose Informant Patient Reported? Taking?    famotidine (PEPCID) 20 MG tablet   Yes No    Take 1 tablet by mouth 2 (Two) Times a Day.    glucosamine-chondroitin 500-400 MG capsule capsule   Yes No    Take 1 capsule by mouth 2 (Two) Times a Day With Meals. Indications: Joint Damage causing Pain and Loss of Function    HYDROcodone-acetaminophen (NORCO) 7.5-325 MG per tablet   No No    Take 1 tablet by mouth Every 8 (Eight) Hours As Needed for Severe Pain or Moderate Pain.    methocarbamol (ROBAXIN) 750 MG tablet   Yes No    Take 1 tablet by mouth 4 (Four) Times a Day As Needed for Muscle Spasms.    ondansetron ODT (ZOFRAN-ODT) 4 MG disintegrating tablet   No No    Place 1 tablet on the tongue Every 8 (Eight) Hours As Needed for Nausea or Vomiting.    pancrelipase, Lip-Prot-Amyl, (CREON) 91616-91571 units capsule delayed-release particles capsule   Yes No    Take 1 capsule by mouth 3 (Three) Times a Day With Meals.    promethazine (PHENERGAN) 25 MG tablet   Yes No    Take 1 tablet by mouth As Needed.    sertraline (ZOLOFT) 50 MG tablet   Yes No    Take 1 tablet by mouth Daily.    sodium bicarbonate 650 MG tablet   No No    Take 1 tablet by mouth 2 (Two) Times a Day.          ED Medications:    Medications   sodium chloride 0.9 % flush 10 mL (has no administration in time range)   sodium bicarbonate 8.4 % 150 mEq in dextrose (D5W) 5 % 1,000 mL infusion (greater than 100 mEq) (has no administration in time range)   Morphine sulfate (PF) injection 2 mg (has no administration in time range)   sodium bicarbonate injection 8.4% 50 mEq (has no  "administration in time range)   Morphine sulfate (PF) injection 2 mg (2 mg Intravenous Given 8/24/24 1842)   ondansetron (ZOFRAN) injection 4 mg (4 mg Intravenous Given 8/24/24 1842)   sodium chloride 0.9 % bolus 500 mL (0 mL Intravenous Stopped 8/24/24 1945)     Vital Signs:  Temp:  [97.8 °F (36.6 °C)-98 °F (36.7 °C)] 98 °F (36.7 °C)  Heart Rate:  [82-99] 82  Resp:  [14-19] 14  BP: (105-126)/(78-90) 126/78        08/24/24 1719   Weight: 58.5 kg (128 lb 15.5 oz)     Body mass index is 22.85 kg/m².    Physical Exam:     Most recent vital Signs: /78   Pulse 82   Temp 98 °F (36.7 °C) (Oral)   Resp 14   Ht 160 cm (63\")   Wt 58.5 kg (128 lb 15.5 oz)   SpO2 100%   BMI 22.85 kg/m²     Physical Exam  Constitutional:       General: She is not in acute distress.     Appearance: She is normal weight. She is not toxic-appearing.   HENT:      Mouth/Throat:      Mouth: Mucous membranes are dry.   Eyes:      Extraocular Movements: Extraocular movements intact.      Pupils: Pupils are equal, round, and reactive to light.   Cardiovascular:      Rate and Rhythm: Normal rate and regular rhythm.      Pulses: Normal pulses.      Heart sounds: No murmur heard.     No gallop.   Pulmonary:      Breath sounds: No wheezing or rales.   Abdominal:      General: Bowel sounds are normal.      Tenderness: There is abdominal tenderness. There is no guarding.      Hernia: A hernia is present.   Musculoskeletal:      Right lower leg: No edema.      Left lower leg: No edema.   Skin:     General: Skin is warm and dry.      Capillary Refill: Capillary refill takes less than 2 seconds.   Neurological:      General: No focal deficit present.      Mental Status: She is alert. Mental status is at baseline.      Motor: Weakness present.   Psychiatric:         Mood and Affect: Mood normal.         Thought Content: Thought content normal.         Laboratory data:    I have reviewed the labs done in the emergency room.    Results from last 7 days " "  Lab Units 08/24/24  1737   SODIUM mmol/L 138   POTASSIUM mmol/L 4.6   CHLORIDE mmol/L 114*   CO2 mmol/L 5.0*   BUN mg/dL 56*   CREATININE mg/dL 1.98*   CALCIUM mg/dL 10.4   BILIRUBIN mg/dL 0.2   ALK PHOS U/L 173*   ALT (SGPT) U/L 7   AST (SGOT) U/L 16   GLUCOSE mg/dL 118*     Results from last 7 days   Lab Units 08/24/24  1737   WBC 10*3/mm3 12.95*   HEMOGLOBIN g/dL 13.8   HEMATOCRIT % 43.7   PLATELETS 10*3/mm3 279         Results from last 7 days   Lab Units 08/24/24  1737   HSTROP T ng/L 20*     Results from last 7 days   Lab Units 08/24/24  1737   PROBNP pg/mL 321.2         Results from last 7 days   Lab Units 08/24/24  1737   LIPASE U/L 83*     Results from last 7 days   Lab Units 08/24/24  1921   PH, ARTERIAL pH units 7.039*   PO2 ART mm Hg 106.0*   PCO2, ARTERIAL mm Hg 21.8*   HCO3 ART mmol/L 5.9*           Invalid input(s): \"USDES\", \"NITRITITE\", \"BACT\", \"EP\"    Pain Management Panel          Latest Ref Rng & Units 8/6/2022   Pain Management Panel   Amphetamine, Urine Qual Negative Negative    Barbiturates Screen, Urine Negative Negative    Benzodiazepine Screen, Urine Negative Negative    Buprenorphine, Screen, Urine Negative Negative    Cocaine Screen, Urine Negative Negative    Methadone Screen , Urine Negative Negative    Methamphetamine, Ur Negative Negative       Details                   EKG:          Radiology:    CT Abdomen Pelvis Without Contrast    Result Date: 8/24/2024  FINAL REPORT TECHNIQUE: null CLINICAL HISTORY: diffuse abdominal pain, nausea COMPARISON: null FINDINGS: CT abdomen and pelvis without contrast Comparison: CT/SR - CT ABDOMEN PELVIS W CONTRAST - 07/30/2024 09:57 PM EDT Findings: Moderate hiatal hernia. Mild emphysematous changes of the lung bases. There are multiple bilateral kidney cysts, evaluated in limited fashion, grossly unchanged. 4 mm nonobstructive calculus within the right kidney. There are multiple small calcified granulomas within the spleen. No focal liver " abnormality. Unremarkable pancreas and adrenal glands. Prior cholecystectomy. There are multiple ventral hernias containing fat and knuckles of bowel without obstruction or edema, similar in appearance to the prior study. There is a focus of anastomosis at the rectosigmoid junction. No bowel inflammation. 3.6 cm right ovarian cyst without change. Unremarkable uterus and urinary bladder. Appendix not visualized. No secondary findings to suggest appendicitis. There is a chronic fracture of the L2 level at the junction between the vertebral body and left pedicle grade 2 retrolisthesis of L1 on L2 without change. Additional compression fracture of the L2 vertebral body without change. Retropulsion of bone contributing to moderate central canal stenosis, without change. No acute fracture.     IMPRESSION: No acute abdominal disease. Chronic findings as above. Authenticated and Electronically Signed by Yenny Webber MD on 08/24/2024 08:13:19 PM    CT Chest Without Contrast Diagnostic    Result Date: 8/24/2024  FINAL REPORT TECHNIQUE: null CLINICAL HISTORY: soa, weakness COMPARISON: null FINDINGS: CT chest without contrast Comparison: None Findings: The heart is normal size. Unremarkable thyroid gland. No mediastinal lymphadenopathy. Moderate hiatal hernia. Mild pulmonary emphysema. No consolidation or pleural effusion. Calcified granuloma within the left upper lobe There are calcified granulomas within the spleen. There are bilateral kidney cysts. Prior cholecystectomy. Surgical clips within the left upper quadrant. Small ventral hernia containing fat. Multiple chronic right-sided rib fractures. Chronic left-sided pedicle fracture at L2. Chronic L2 compression fracture. No acute fracture.     IMPRESSION: 1. No acute cardiopulmonary disease. 2. Moderate hiatal hernia. Authenticated and Electronically Signed by Yenny Webber MD on 08/24/2024 08:11:10 PM     Assessment:    Metabolic acidosis, POA  Dehydration  Acute on  chronic renal failure stage III  Chronic pancreatitis  Chronic diarrhea  History of prior colon surgery with malabsorption  Chronic tobacco    Plan:    Admit to ICU    Metabolic acidosis/VIVIENNE/dehydration:  Likely secondary to ongoing GI losses with chronic diarrhea, poor intake, history of pancreatitis, chronic diarrhea and malabsorption issues.  Nephrology consulted.  Will continue with sodium bicarb drip  And hydration.  Advance diet as tolerated.  Monitor urine output.  Will add urine electrolytes and osmolality.  Will also check salicylates and acetaminophen, patient has been known to take too much Tylenol in the past.    Chronic diarrhea/prior colon surgery/chronic tobacco abuse/COPD:  Will reconcile home medications.  Has been encouraged previously to quit smoking.  Has seen Dr. Fleming locally for pancreatitis and diarrhea issues, had been referred to  previously.    Patient otherwise meets inpatient level care anticipate stay greater than 2 midnights.  Further recommendations predicated upon improvement clinical condition.    Risk Assessment: High  DVT Prophylaxis: Hep  Code Status: Full  Diet: Clear liquid advance to    Advance Care Planning   ACP discussion was held with the patient during this visit. Patient does not have an advance directive, declines further assistance.           Lacey Ballesteros DO  08/24/24  20:31 EDT    Dictated utilizing Dragon dictation.

## 2024-08-25 NOTE — CONSULTS
[facility] Consult Note      Patient Name: Tasha Yarbrough  : 1955  MRN: [id number]  Primary Care Physician:  Terrence Baldwin MD  Referring Physician: Darron Austin DO  Date of admission: 2024    Subjective     Reason for Consult: Metabolic acidosis    HPI:   Tasha Yarbrough is a 69 y.o. female with past medical history of active tobacco abuse with chronic obstructive pulm disease, hypertension, recurrent chronic pancreatitis on chronic pancreatic enzymes, malaise portioning her neck diarrhea, history of migraines, peptic ulcer disease with the gastroesophageal flux disease    Patient presents for upper abdominal discomfort accompanied with nausea and decreased oral intake quantified weight loss she manage with taking extra dose Tylenol up to 12 tablets a day.  Unfortunately her symptoms did not resolve with conservative treatment at home and she presented to the emergency department where she was found to have acute kidney injury with severe metabolic acidosis and found to have elevated acetaminophen levels that require ICU admission    Nephrology consultation been requested for the management    Review of Systems:   14 point review of systems is otherwise negative except for mentioned above on HPI    Personal History     Past Medical History:   Diagnosis Date    COPD (chronic obstructive pulmonary disease)     Hypertension     Impaired functional mobility, balance, gait, and endurance     Impaired mobility     Injury of back     Migraines     Multiple gastric ulcers     Pancreatitis     UTI (urinary tract infection)        Past Surgical History:   Procedure Laterality Date    ABDOMINAL SURGERY      CHOLECYSTECTOMY      COLON SURGERY      COLON RUPTURED /  [id number] ?    COLONOSCOPY      ENDOSCOPY N/A 2024    Procedure: ESOPHAGOGASTRODUODENOSCOPY WITH BIOPSY;  Surgeon: Jairo Negro MD;  Location: [facility] ENDOSCOPY;  Service: Gastroenterology;  Laterality: N/A;    KNEE  SURGERY Right     TOTAL REPLACEMENT    TUBAL ABDOMINAL LIGATION         Family History: family history includes Emphysema in her mother; Heart disease in her father and paternal uncle; Kidney disease in her mother; Lung cancer in her sister.    Social History:  reports that she has been smoking cigarettes. She has never used smokeless tobacco. She reports that she does not drink alcohol and does not use drugs.    Home Medications:  Prior to Admission medications    Medication Sig Start Date End Date Taking? Authorizing Provider   famotidine (PEPCID) 20 MG tablet Take 1 tablet by mouth 2 (Two) Times a Day.    Tza Keller MD   glucosamine-chondroitin 500-400 MG capsule capsule Take 1 capsule by mouth 2 (Two) Times a Day With Meals. Indications: Joint Damage causing Pain and Loss of Function    Taz Keller MD   HYDROcodone-acetaminophen (NORCO) 7.5-325 MG per tablet Take 1 tablet by mouth Every 8 (Eight) Hours As Needed for Severe Pain or Moderate Pain. 8/2/24   Mateo Storm DO   methocarbamol (ROBAXIN) 750 MG tablet Take 1 tablet by mouth 4 (Four) Times a Day As Needed for Muscle Spasms.    Taz Keller MD   ondansetron ODT (ZOFRAN-ODT) 4 MG disintegrating tablet Place 1 tablet on the tongue Every 8 (Eight) Hours As Needed for Nausea or Vomiting. 8/2/24   Mateo Storm DO   pancrelipase, Lip-Prot-Amyl, (CREON) 42795-37079 units capsule delayed-release particles capsule Take 1 capsule by mouth 3 (Three) Times a Day With Meals.    Taz Keller MD   promethazine (PHENERGAN) 25 MG tablet Take 1 tablet by mouth As Needed. 4/30/24 10/27/24  Taz Keller MD   sertraline (ZOLOFT) 50 MG tablet Take 1 tablet by mouth Daily. 5/1/24 10/28/24  Taz Keller MD   sodium bicarbonate 650 MG tablet Take 1 tablet by mouth 2 (Two) Times a Day. 6/2/24   Kerley, Brian Joseph, DO   metoprolol succinate XL (TOPROL-XL) 50 MG 24 hr tablet Take 50 mg by mouth Daily.  Patient  not taking: Reported on 7/27/2022  8/3/22  Provider, Historical, MD       Allergies:  Allergies   Allergen Reactions    Rocephin [Ceftriaxone] Anaphylaxis    Ciprofloxacin Dizziness    Codeine Itching    Pineapple Unknown - Low Severity       Objective     Vitals:   Temp:  [97.7 °F (36.5 °C)-98.3 °F (36.8 °C)] 98.3 °F (36.8 °C)  Heart Rate:  [80-99] 92  Resp:  [14-21] 18  BP: ()/() 106/73    Intake/Output Summary (Last 24 hours) at 8/25/2024 0943  Last data filed at 8/25/2024 0800  Gross per 24 hour   Intake 3411 ml   Output 700 ml   Net 2711 ml       Physical Exam:   Constitutional: Awake, alert, no acute distress.  HEENT: Sclera anicteric, no conjunctival injection  Neck: Supple, no thyromegaly, no lymphadenopathy, trachea at midline, no JVD  Respiratory: Fine crackles bibasal  Cardiovascular: RRR, no murmurs, no rubs or gallops, no carotid bruit  Gastrointestinal: Positive bowel sounds, abdomen is soft, nontender and nondistended  : No palpable bladder  Musculoskeletal: No edema, no clubbing or cyanosis  Psychiatric: Appropriate affect, cooperative  Neurologic: Oriented x3, no focalization        Scheduled Meds:     acetylcysteine, 100 mg/kg, Intravenous, Once  heparin (porcine), 5,000 Units, Subcutaneous, Q12H  pancrelipase (Lip-Prot-Amyl), 12,000 units of lipase, Oral, TID With Meals  sertraline, 50 mg, Oral, Daily  sodium chloride, 10 mL, Intravenous, Q12H      IV Meds:   sodium bicarbonate 8.4 % 125 mEq in dextrose (D5W) 5 % 1,000 mL infusion (greater than 100 mEq), 125 mEq, Last Rate: 125 mEq (08/25/24 0449)        Results Reviewed:   I have personally reviewed the results from the time of this admission to 8/25/2024 09:43 EDT     Lab Results   Component Value Date    GLUCOSE 81 08/25/2024    CALCIUM 9.2 08/25/2024     08/25/2024    K 3.9 08/25/2024    CO2 10.4 (L) 08/25/2024     08/25/2024    BUN 47 (H) 08/25/2024    CREATININE 1.38 (H) 08/25/2024    EGFRIFNONA 40 (L) 10/04/2020     BCR 34.1 (H) 08/25/2024    ANIONGAP 20.6 (H) 08/25/2024      Lab Results   Component Value Date    MG 2.2 08/25/2024    PHOS 2.6 07/15/2024    ALBUMIN 4.0 08/25/2024           Assessment / Plan       Metabolic acidosis      ASSESSMENT:    -Nonoliguric acute kidney injury on chronic kidney disease stage IIIa baseline creatinine 1-1.2.  Acute etiology secondary to prerenal state increased GI losses inability to tolerate oral intake with intractable nausea loose bowel movements currently responding to gentle hydration.  Urinalysis proteinuria bacteriuria with pyuria CT scan bilateral renal cyst.  With nephrolithiasis without hydronephrosis  -Chronic kidney disease stage IIIa secondary to hypertensive nephrosclerosis recurrent VIVIENNE's and standing tobacco abuse.  -Severe combined anion gap metabolic acidosis and non-anion gap metabolic acidosis secondary to renal failure and loose bowel movements from malabsorption from pancreatic insufficiency .currently sodium bicarbonate drip following CO2 trend gradually improving  -Longstanding tobacco abuse with emphysema.  As per primary team  -Pancreatic sufficiency Patient has been noncompliant with her pancreatic enzymes  -Acetaminophen toxicity.  Initial levels of 48 . Patient has been taking up to 12 tablets a day to control her abdominal discomfort aggravating her nausea and emesis currently on acetylcysteine protocol    PLAN:  -Continue sodium bicarbonate drip and titrate based on CO2 due to her underlying COPD/emphysema, careful hydration to avoid volume overload  -Continue surveillance labs    Discussed with nursing staff    Thank you for involving us in the care of Tasha Yarbrough.  Please feel free to call with any questions.    Nnamdi Latif MD  08/25/24  09:43 EDT    [facility]  [phone]      Please note that portions of this note were completed with a voice recognition program.

## 2024-08-25 NOTE — CASE MANAGEMENT/SOCIAL WORK
Discharge Planning Assessment  [facility]     Patient Name: Tasha Yarbrough  MRN: [id number]  Today's Date: 8/25/2024    Admit Date: 8/24/2024    Plan: Met with pt, familiar to me from prevoius admission, for dcp. She provided demographics. She does not have an AD, POA, or financial stressors. Home DME includes cane, walker, bsc, shower chair. Dr. Terrence Baldwin is her primary and [facility] is preferred pharmacy. She has been in current home 15 years, her 2 adult sons live with her and provide assistance and transportation. She has no dc concerns, hoping to return home tomorrow with Rj, son, breanna.   Discharge Needs Assessment       Row Name 08/25/24 1143       Living Environment    People in Home child(debra), adult    Name(s) of People in Home Rj and Brett Yarbrough - sons    Current Living Arrangements home    Duration at Residence 15 yrs    Potentially Unsafe Housing Conditions none    In the past 12 months has the electric, gas, oil, or water company threatened to shut off services in your home? No    Primary Care Provided by self    Provides Primary Care For no one, unable/limited ability to care for self    Family Caregiver if Needed child(debra), adult    Quality of Family Relationships helpful;involved    Able to Return to Prior Arrangements yes       Resource/Environmental Concerns    Resource/Environmental Concerns none    Transportation Concerns none       Transportation Needs    In the past 12 months, has lack of transportation kept you from medical appointments or from getting medications? no    In the past 12 months, has lack of transportation kept you from meetings, work, or from getting things needed for daily living? No       Food Insecurity    Within the past 12 months, you worried that your food would run out before you got the money to buy more. Never true    Within the past 12 months, the food you bought just didn't last and you didn't have money to get more. Never true       Discharge Needs  Assessment    Readmission Within the Last 30 Days current reason for admission unrelated to previous admission    Concerns to be Addressed denies needs/concerns at this time    Anticipated Changes Related to Illness none    Equipment Needed After Discharge none                   Discharge Plan       Row Name 08/25/24 0456       Plan    Plan Met with pt, familiar to me from prevoius admission, for dcp. She provided demographics. She does not have an AD, POA, or financial stressors. Home DME includes cane, walker, bsc, shower chair. Dr. Terrence Baldwin is her primary and [facility] is preferred pharmacy. She has been in current home 15 years, her 2 adult sons live with her and provide assistance and transportation. She has no dc concerns, hoping to return home tomorrow with Rj, son, transporting.                  Continued Care and Services - Admitted Since 8/24/2024    No active coordination exists for this encounter.       Selected Continued Care - Prior Encounters Includes continued care and service providers with selected services from prior encounters from 5/26/2024 to 8/25/2024      Discharged on 6/2/2024 Admission date: 5/30/2024 - Discharge disposition: Home or Self Care      Community Resources       Service Provider Selected Services Address Phone Fax Patient Preferred    [facility] PATIENTS ONLY [employer] [address] [address] [phone] -- --                             Demographic Summary       Row Name 08/25/24 1138       General Information    Admission Type inpatient    Arrived From emergency department    Required Notices Provided Important Message from Medicare    Referral Source admission list    Reason for Consult discharge planning    Preferred Language English       Contact Information    Permission Granted to Share Info With family/designee                   Functional Status       Row Name 08/25/24 1130       Functional Status    Usual Activity Tolerance  fair    Current Activity Tolerance fair       Physical Activity    On average, how many days per week do you engage in moderate to strenuous exercise (like a brisk walk)? 0 days    On average, how many minutes do you engage in exercise at this level? 0 min    Number of minutes of exercise per week 0       Functional Status, IADL    Medications independent    Meal Preparation assistive equipment    Housekeeping assistive equipment and person    Laundry assistive equipment and person    Shopping assistive equipment and person       Mental Status    General Appearance WDL WDL       Mental Status Summary    Recent Changes in Mental Status/Cognitive Functioning no changes       Employment/    Employment Status retired                   Psychosocial    No documentation.                  Abuse/Neglect    No documentation.                  Legal    No documentation.                  Substance Abuse    No documentation.                  Patient Forms    No documentation.                     Heather Rubio RN

## 2024-08-25 NOTE — PROGRESS NOTES
"    Nephrology progress note - Chart review     Returned call from ER     Miss colindres is a 68 yo female COPD , CKD and  acute on chronic pancreatitis , presents with SOB , abdominal pain and decreased oral intake     /78   Pulse 82   Temp 98 °F (36.7 °C) (Oral)   Resp 14   Ht 160 cm (63\")   Wt 58.5 kg (128 lb 15.5 oz)   SpO2 100%   BMI 22.85 kg/m²       Results from last 7 days   Lab Units 08/24/24  1737   SODIUM mmol/L 138   POTASSIUM mmol/L 4.6   CHLORIDE mmol/L 114*   CO2 mmol/L 5.0*   BUN mg/dL 56*   CREATININE mg/dL 1.98*   CALCIUM mg/dL 10.4   BILIRUBIN mg/dL 0.2   ALK PHOS U/L 173*   ALT (SGPT) U/L 7   AST (SGOT) U/L 16   GLUCOSE mg/dL 118*     Patient with primary metabolic acidosis  ,w secondary respiratory acidosis and non GAP metabolic acidosis      Latest 06/22/24 20:52   pH, Venous 7.320 - 7.420 pH Units 7.114 (C)   pCO2, Venous 40.0 - 50.0 mm Hg 28.0 (L)   pO2, Venous 30.0 - 50.0 mm Hg 30.9   HCO3, Venous 22.0 - 28.0 mmol/L 9.0 (L)   Base Excess 0.0 - 2.0 mmol/L -19.1 (L)   O2 Saturation, Venous 45.0 - 75.0 % 59.8     Patient with primary metabolic acidosis  ,w secondary respiratory acidosis and non GAP metabolic acidosis     -Secondary to VIVIENNE  on CKD , and likely ketosis from decreased oral intake from pancreatitis and malabsorption w/ diarrhea  causing non anion GAP met acidosis associated  , respiratory acidosis from COPD   - acute on chronic Pancreatitis   - COPD exacerbation ,as per primary team     Plan    -  Sodium bicarbonate drip  + 50 mEq Amp   -  Complete work up , osmolality , acetaminophen  , salicylates , beta hydroxybutyrate , volatiles urine , urine Na, K and CL   -  Full consultation will follow     Thank you for allowing us to participate from this patient care   "

## 2024-08-25 NOTE — PLAN OF CARE
Goal Outcome Evaluation:  Plan of Care Reviewed With: (P) patient            Patient received education regarding tylenol use. Acetylcysteine administered throughout the shift. Potassium replacement & bicarb was administered and labs improved. Patient reports pain in abdomen, knee, shoulder, and back. Ultram was added PRN .Patient was up to chair twice during the shift.  Patient vitals remained stable throughout the shift and client is on room air.

## 2024-08-26 ENCOUNTER — READMISSION MANAGEMENT (OUTPATIENT)
Dept: CALL CENTER | Facility: HOSPITAL | Age: 69
End: 2024-08-26
Payer: MEDICARE

## 2024-08-26 VITALS
SYSTOLIC BLOOD PRESSURE: 110 MMHG | BODY MASS INDEX: 22.3 KG/M2 | HEIGHT: 63 IN | RESPIRATION RATE: 18 BRPM | OXYGEN SATURATION: 94 % | WEIGHT: 125.88 LBS | TEMPERATURE: 98.6 F | DIASTOLIC BLOOD PRESSURE: 69 MMHG | HEART RATE: 82 BPM

## 2024-08-26 LAB
ANION GAP SERPL CALCULATED.3IONS-SCNC: 9.8 MMOL/L (ref 5–15)
BACTERIA SPEC AEROBE CULT: ABNORMAL
BACTERIA SPEC AEROBE CULT: ABNORMAL
BUN SERPL-MCNC: 25 MG/DL (ref 8–23)
BUN/CREAT SERPL: 22.9 (ref 7–25)
CALCIUM SPEC-SCNC: 9.6 MG/DL (ref 8.6–10.5)
CHLORIDE SERPL-SCNC: 110 MMOL/L (ref 98–107)
CHLORIDE UR-SCNC: <20 MMOL/L
CO2 SERPL-SCNC: 22.2 MMOL/L (ref 22–29)
CREAT SERPL-MCNC: 1.09 MG/DL (ref 0.57–1)
CREAT UR-MCNC: 45.6 MG/DL
DEPRECATED RDW RBC AUTO: 60.8 FL (ref 37–54)
EGFRCR SERPLBLD CKD-EPI 2021: 55.1 ML/MIN/1.73
ERYTHROCYTE [DISTWIDTH] IN BLOOD BY AUTOMATED COUNT: 15.7 % (ref 12.3–15.4)
GLUCOSE SERPL-MCNC: 90 MG/DL (ref 65–99)
HCT VFR BLD AUTO: 27.3 % (ref 34–46.6)
HGB BLD-MCNC: 9.1 G/DL (ref 12–15.9)
INR PPP: 1.06 (ref 0.9–1.1)
MCH RBC QN AUTO: 34.9 PG (ref 26.6–33)
MCHC RBC AUTO-ENTMCNC: 33.3 G/DL (ref 31.5–35.7)
MCV RBC AUTO: 104.6 FL (ref 79–97)
PLATELET # BLD AUTO: 128 10*3/MM3 (ref 140–450)
PMV BLD AUTO: 11.3 FL (ref 6–12)
POTASSIUM SERPL-SCNC: 3.3 MMOL/L (ref 3.5–5.2)
PROT ?TM UR-MCNC: 15.5 MG/DL
PROT/CREAT UR: 339.9 MG/G CREA (ref 0–200)
PROTHROMBIN TIME: 14.3 SECONDS (ref 12.3–15.1)
RBC # BLD AUTO: 2.61 10*6/MM3 (ref 3.77–5.28)
SODIUM SERPL-SCNC: 142 MMOL/L (ref 136–145)
WBC NRBC COR # BLD AUTO: 5.55 10*3/MM3 (ref 3.4–10.8)

## 2024-08-26 PROCEDURE — 80048 BASIC METABOLIC PNL TOTAL CA: CPT | Performed by: INTERNAL MEDICINE

## 2024-08-26 PROCEDURE — 85610 PROTHROMBIN TIME: CPT | Performed by: INTERNAL MEDICINE

## 2024-08-26 PROCEDURE — 85027 COMPLETE CBC AUTOMATED: CPT | Performed by: INTERNAL MEDICINE

## 2024-08-26 PROCEDURE — 25010000002 HEPARIN (PORCINE) PER 1000 UNITS: Performed by: FAMILY MEDICINE

## 2024-08-26 PROCEDURE — 99239 HOSP IP/OBS DSCHRG MGMT >30: CPT | Performed by: INTERNAL MEDICINE

## 2024-08-26 RX ORDER — POTASSIUM CHLORIDE 750 MG/1
40 CAPSULE, EXTENDED RELEASE ORAL ONCE
Status: COMPLETED | OUTPATIENT
Start: 2024-08-26 | End: 2024-08-26

## 2024-08-26 RX ORDER — TRAMADOL HYDROCHLORIDE 50 MG/1
25 TABLET ORAL EVERY 12 HOURS PRN
Qty: 4 TABLET | Refills: 0 | Status: SHIPPED | OUTPATIENT
Start: 2024-08-26

## 2024-08-26 RX ORDER — SODIUM BICARBONATE 650 MG/1
650 TABLET ORAL 3 TIMES DAILY
Status: DISCONTINUED | OUTPATIENT
Start: 2024-08-26 | End: 2024-08-26

## 2024-08-26 RX ORDER — SODIUM BICARBONATE 650 MG/1
650 TABLET ORAL 2 TIMES DAILY
Status: DISCONTINUED | OUTPATIENT
Start: 2024-08-27 | End: 2024-08-26 | Stop reason: HOSPADM

## 2024-08-26 RX ADMIN — POTASSIUM CHLORIDE 40 MEQ: 750 CAPSULE, EXTENDED RELEASE ORAL at 09:03

## 2024-08-26 RX ADMIN — PANCRELIPASE 12000 UNITS OF LIPASE: 60000; 12000; 38000 CAPSULE, DELAYED RELEASE PELLETS ORAL at 11:41

## 2024-08-26 RX ADMIN — Medication 10 ML: at 09:04

## 2024-08-26 RX ADMIN — PANCRELIPASE 12000 UNITS OF LIPASE: 60000; 12000; 38000 CAPSULE, DELAYED RELEASE PELLETS ORAL at 09:03

## 2024-08-26 RX ADMIN — SERTRALINE 50 MG: 50 TABLET, FILM COATED ORAL at 09:04

## 2024-08-26 RX ADMIN — HEPARIN SODIUM 5000 UNITS: 5000 INJECTION INTRAVENOUS; SUBCUTANEOUS at 09:04

## 2024-08-26 NOTE — PROGRESS NOTES
[facility] Progress Note      Patient Name: Tasha Yarbrough  : 1955  MRN: [id number]  Primary Care Physician:  Terrence Baldwin MD  Date of admission: 2024    Subjective     Interval History:     No event overnight  Patient denies any chest pain, shortness of breath, palpitations  Patient lying in bed comfortable  Improved abdominal discomfort  No further tinnitus  Tolerating oral intake    Urinating spontaneously    No fevers or chills  Review of Systems:   As noted above    Objective     Vitals:   Temp:  [97.8 °F (36.6 °C)-98.6 °F (37 °C)] 97.8 °F (36.6 °C)  Heart Rate:  [] 80  Resp:  [14-19] 19  BP: ()/(52-93) 96/74    Intake/Output Summary (Last 24 hours) at 2024 0747  Last data filed at 2024 0623  Gross per 24 hour   Intake 5651 ml   Output 1800 ml   Net 3851 ml       Physical Exam:      Constitutional: Awake, alert, no acute distress.  HEENT: Sclera anicteric, no conjunctival injection  Neck: Supple, no thyromegaly, no lymphadenopathy, trachea at midline, no JVD  Respiratory: Fine crackles bibasal  Cardiovascular: RRR, no murmurs, no rubs or gallops, no carotid bruit  Gastrointestinal: Positive bowel sounds, abdomen is soft, nontender and nondistended  : No palpable bladder  Musculoskeletal: No edema, no clubbing or cyanosis  Psychiatric: Appropriate affect, cooperative  Neurologic: Oriented x3, no focalization    Scheduled Meds:     heparin (porcine), 5,000 Units, Subcutaneous, Q12H  pancrelipase (Lip-Prot-Amyl), 12,000 units of lipase, Oral, TID With Meals  potassium chloride, 40 mEq, Oral, Once  sertraline, 50 mg, Oral, Daily  [START ON 2024] sodium bicarbonate, 650 mg, Oral, BID  sodium chloride, 10 mL, Intravenous, Q12H      IV Meds:        Results Reviewed:   I have personally reviewed the results from the time of this admission to 2024 07:47 EDT     Results from last 7 days   Lab Units 24  0432 24  0532 24  0146  08/24/24  1737   SODIUM mmol/L 142 137 139 138   POTASSIUM mmol/L 3.3* 3.9 3.0* 4.6   CHLORIDE mmol/L 110* 106 108* 114*   CO2 mmol/L 22.2 10.4* 12.5* 5.0*   BUN mg/dL 25* 47* 49* 56*   CREATININE mg/dL 1.09* 1.38* 1.48* 1.98*   CALCIUM mg/dL 9.6 9.2 9.5 10.4   BILIRUBIN mg/dL  --  0.3  --  0.2   ALK PHOS U/L  --  132*  --  173*   ALT (SGPT) U/L  --  6  --  7   AST (SGOT) U/L  --  14  --  16   GLUCOSE mg/dL 90 81 108* 118*       Estimated Creatinine Clearance: 43.9 mL/min (A) (by C-G formula based on SCr of 1.09 mg/dL (H)).    Results from last 7 days   Lab Units 08/25/24  0146   MAGNESIUM mg/dL 2.2             Results from last 7 days   Lab Units 08/26/24  0432 08/24/24  1737   WBC 10*3/mm3 5.55 12.95*   HEMOGLOBIN g/dL 9.1* 13.8   PLATELETS 10*3/mm3 128* 279       Results from last 7 days   Lab Units 08/26/24  0432 08/25/24  0815   INR  1.06 1.05       Assessment / Plan     ASSESSMENT:    -Nonoliguric acute kidney injury on chronic kidney disease stage IIIa baseline creatinine 1-1.2.  Acute etiology secondary to prerenal state increased GI losses inability to tolerate oral intake with intractable nausea loose bowel movements currently responding to gentle hydration.  Urinalysis proteinuria bacteriuria with pyuria CT scan bilateral renal cyst.  With nephrolithiasis without hydronephrosis  -Chronic kidney disease stage IIIa secondary to hypertensive nephrosclerosis recurrent VIVIENNE's and standing tobacco abuse.  -Severe combined anion gap metabolic acidosis and non-anion gap metabolic acidosis secondary to renal failure and loose bowel movements from malabsorption from pancreatic insufficiency .currently sodium bicarbonate drip following CO2 trend gradually normalizing switch to oral tablets.   -Longstanding tobacco abuse with emphysema.  As per primary team  -Pancreatic sufficiency Patient has been noncompliant with her pancreatic enzymes  -Acetaminophen toxicity.  Initial levels of 48 .  Required acetylcysteine  protocol currently completed     PLAN:  Will discontinue her IV bicarbonate and we will switch her to oral tablets starting tomorrow.  Renal function and electrolytes stable borderline hypokalemia will order KCl replacement  From renal point of view patient can be followed closely renal clinic as an outpatient  Continue surveillance labs    Discussed with nursing staff      Thank you for involving us in the care of Tasha Yarbrough.  Please feel free to call with any questions.    Nnamdi Latif MD  08/26/24  07:47 EDT    [facility]  [phone]    Please note that portions of this note were completed with a voice recognition program.

## 2024-08-26 NOTE — DISCHARGE SUMMARY
[facility]      Name:  Tasha Yarbrough   Age:  69 y.o.  Sex:  female  :  1955  MRN:  [id number]   Visit Number:  [id number]    Admission Date:  2024  Date of Discharge:  2024  Primary Care Physician:  Terrence Baldwin MD    Discharge Diagnoses:     Metabolic acidosis, POA  Dehydration  Unintentional acetaminophen toxicity  Acute on chronic renal failure stage III  Chronic pancreatitis  Chronic diarrhea  History of prior colon surgery with malabsorption  Chronic tobacco    Problem List:     Active Hospital Problems    Diagnosis  POA    Metabolic acidosis [E87.20]  Yes      Resolved Hospital Problems   No resolved problems to display.     Presenting Problem:    Chief Complaint   Patient presents with    Shortness of Breath     PT states 4 days ago she started having SOA and chest pain. PT is nauseas, dizzy, has headache, and earache.       Consults:     Consulting Physician(s)         Provider   Role Specialty     Nnamdi Latif MD      Consulting Physician Nephrology          Procedures Performed:        History of presenting illness/Hospital Course:    Patient is a chronically ill 69-year-old with a medical history of chronic pancreatitis, mild torsion, chronic diarrhea, chronic tobacco abuse, COPD, who presented from home with increasing weakness, appetite, diarrhea, shortness of breath.  Symptoms have been progressing over the last 3 to 4 days.  She has had some upper respiratory symptoms as well.  Has felt headache at times.  She has no significant abdominal pain currently, nothing worse than her typical.  She has had ongoing diarrhea.       In the ER the patient was hemodynamically stable.  Labs were notable for acute on chronic kidney injury and metabolic acidosis.  CT imaging of the chest and abdomen was largely unremarkable for any acute abnormality.  ER provider discussed case with nephrology and patient was ordered sodium bicarbonate.   Patient admitted to the ICU for further care.    Metabolic acidosis  Dehydration  Suspect secondary to ongoing GI losses with chronic diarrhea and poor oral intake.  Complicated by chronic pancreatitis and chronic malabsorptive issues  IV fluids and sodium bicarb drip  Unintentional acetaminophen toxicity  Patient has a history of taking significant amount of Tylenol in addition to Norco.  Acetaminophen level elevated.  Hepatic function panel within normal limits.  N-acetylcysteine protocol completed  Medical education  VIVIENNE on CKD stage III  Nephrology consulted; renal function improved to baseline  Chronic pancreatitis  Chronic diarrhea  Follows locally with Dr. Fleming  Has been referred to  gastroenterology previously    Patient discharged home.  Advised against excessive Tylenol use.  Prescribed short course Ultram.  Medication education.  Follow-up primary physician within 1 week.    Vital Signs:    Temp:  [97.8 °F (36.6 °C)-98.6 °F (37 °C)] 97.8 °F (36.6 °C)  Heart Rate:  [] 80  Resp:  [14-19] 19  BP: ()/(52-93) 96/74    Physical Exam:    General Appearance:  Alert and cooperative.    Head:  Atraumatic and normocephalic.   Eyes: Conjunctivae and sclerae normal, no icterus. No pallor.   Ears:  Ears with no abnormalities noted.   Throat: No oral lesions, no thrush, oral mucosa moist.   Neck: Supple, trachea midline, no thyromegaly.   Back:   No kyphoscoliosis present. No tenderness to palpation.   Lungs:   Breath sounds heard bilaterally equally.  No crackles or wheezing. No Pleural rub or bronchial breathing.   Heart:  Normal S1 and S2, no murmur, no gallop, no rub. No JVD.   Abdomen:   Normal bowel sounds, no masses, no organomegaly. Soft, nontender, nondistended, no rebound tenderness.   Extremities: Supple, no edema, no cyanosis, no clubbing.   Pulses: Pulses palpable bilaterally.   Skin: No bleeding or rash.   Neurologic: Alert and oriented x 3. No facial asymmetry. Moves all four limbs. No  tremors.     Pertinent Lab Results:     Results from last 7 days   Lab Units 08/26/24  0432 08/25/24  0532 08/25/24  0146 08/24/24  1737   SODIUM mmol/L 142 137 139 138   POTASSIUM mmol/L 3.3* 3.9 3.0* 4.6   CHLORIDE mmol/L 110* 106 108* 114*   CO2 mmol/L 22.2 10.4* 12.5* 5.0*   BUN mg/dL 25* 47* 49* 56*   CREATININE mg/dL 1.09* 1.38* 1.48* 1.98*   CALCIUM mg/dL 9.6 9.2 9.5 10.4   BILIRUBIN mg/dL  --  0.3  --  0.2   ALK PHOS U/L  --  132*  --  173*   ALT (SGPT) U/L  --  6  --  7   AST (SGOT) U/L  --  14  --  16   GLUCOSE mg/dL 90 81 108* 118*     Results from last 7 days   Lab Units 08/26/24  0432 08/24/24  1737   WBC 10*3/mm3 5.55 12.95*   HEMOGLOBIN g/dL 9.1* 13.8   HEMATOCRIT % 27.3* 43.7   PLATELETS 10*3/mm3 128* 279     Results from last 7 days   Lab Units 08/26/24  0432 08/25/24  0815   INR  1.06 1.05     Results from last 7 days   Lab Units 08/24/24  1737   HSTROP T ng/L 20*     Results from last 7 days   Lab Units 08/24/24  1737   PROBNP pg/mL 321.2         Results from last 7 days   Lab Units 08/24/24  1737   LIPASE U/L 83*     Results from last 7 days   Lab Units 08/24/24  1921   PH, ARTERIAL pH units 7.039*   PO2 ART mm Hg 106.0*   PCO2, ARTERIAL mm Hg 21.8*   HCO3 ART mmol/L 5.9*     Results from last 7 days   Lab Units 08/24/24  2204   URINECX  Culture in progress       Pertinent Radiology Results:    Imaging Results (All)       Procedure Component Value Units Date/Time    XR Chest 1 View [[id number]] Collected: 08/25/24 0736     Updated: 08/25/24 1014    Narrative:      PROCEDURE: XR CHEST 1 VW-     INDICATION:  SOA Triage Protocol     FINDINGS:  A portable view of the chest was obtained.  Comparison is  made to a prior exam dated 07/31/2024.   Heart size is normal. The  previously seen right central line is no longer present. Retrocardiac  opacity containing air is consistent with a hiatal hernia. Lung volumes  are low. Chronic increased interstitial markings are noted. No focal  infiltrate, pleural  effusion, or pneumothorax. There are several chronic  appearing right lower rib fractures.       Impression:      1. No acute process.  2. Hiatal hernia.        This report was signed and finalized on 8/25/2024 10:12 AM by Arin Hunter MD.       CT Abdomen Pelvis Without Contrast [[id number]] Collected: 08/24/24 2013     Updated: 08/24/24 2015    Narrative:      FINAL REPORT    TECHNIQUE:  null    CLINICAL HISTORY:  diffuse abdominal pain, nausea    COMPARISON:  null    FINDINGS:  CT abdomen and pelvis without contrast    Comparison: CT/SR - CT ABDOMEN PELVIS W CONTRAST - 07/30/2024 09:57 PM EDT    Findings:    Moderate hiatal hernia. Mild emphysematous changes of the lung bases.    There are multiple bilateral kidney cysts, evaluated in limited fashion, grossly unchanged. 4 mm nonobstructive calculus within the right kidney. There are multiple small calcified granulomas within the spleen. No focal liver abnormality. Unremarkable   pancreas and adrenal glands. Prior cholecystectomy.    There are multiple ventral hernias containing fat and knuckles of bowel without obstruction or edema, similar in appearance to the prior study. There is a focus of anastomosis at the rectosigmoid junction. No bowel inflammation.    3.6 cm right ovarian cyst without change. Unremarkable uterus and urinary bladder. Appendix not visualized. No secondary findings to suggest appendicitis.    There is a chronic fracture of the L2 level at the junction between the vertebral body and left pedicle grade 2 retrolisthesis of L1 on L2 without change. Additional compression fracture of the L2 vertebral body without change. Retropulsion of bone   contributing to moderate central canal stenosis, without change. No acute fracture.      Impression:      IMPRESSION:    No acute abdominal disease. Chronic findings as above.    Authenticated and Electronically Signed by Yenny Webber MD on  08/24/2024 08:13:19 PM    CT Chest Without Contrast  Diagnostic [[id number]] Collected: 08/24/24 2011     Updated: 08/24/24 2012    Narrative:      FINAL REPORT    TECHNIQUE:  null    CLINICAL HISTORY:  soa, weakness    COMPARISON:  null    FINDINGS:  CT chest without contrast    Comparison: None    Findings:    The heart is normal size.    Unremarkable thyroid gland. No mediastinal lymphadenopathy. Moderate hiatal hernia.    Mild pulmonary emphysema. No consolidation or pleural effusion. Calcified granuloma within the left upper lobe    There are calcified granulomas within the spleen. There are bilateral kidney cysts. Prior cholecystectomy. Surgical clips within the left upper quadrant. Small ventral hernia containing fat.    Multiple chronic right-sided rib fractures. Chronic left-sided pedicle fracture at L2. Chronic L2 compression fracture. No acute fracture.      Impression:      IMPRESSION:    1. No acute cardiopulmonary disease.    2. Moderate hiatal hernia.    Authenticated and Electronically Signed by Yenny Webber MD on  08/24/2024 08:11:10 PM            Echo:      Condition on Discharge:      Stable.    Code status during the hospital stay:    Code Status and Medical Interventions: CPR (Attempt to Resuscitate); Full Support   Ordered at: 08/24/24 2056     Code Status (Patient has no pulse and is not breathing):    CPR (Attempt to Resuscitate)     Medical Interventions (Patient has pulse or is breathing):    Full Support     Discharge Disposition:    Home or Self Care    Discharge Medications:       Discharge Medications        New Medications        Instructions Start Date   naloxone 4 MG/0.1ML nasal spray  Commonly known as: NARCAN   Call 911. Don't prime. [place] in 1 nostril for overdose. Repeat in 2-3 minutes in other nostril if no or minimal breathing/responsiveness.      traMADol 50 MG tablet  Commonly known as: ULTRAM   25 mg, Oral, Every 12 Hours PRN             Continue These Medications        Instructions Start Date   famotidine 20 MG  tablet  Commonly known as: PEPCID   20 mg, Oral, 2 Times Daily      glucosamine-chondroitin 500-400 MG capsule capsule   1 capsule, Oral, 2 Times Daily With Meals      HYDROcodone-acetaminophen 7.5-325 MG per tablet  Commonly known as: NORCO   1 tablet, Oral, Every 8 Hours PRN      methocarbamol 750 MG tablet  Commonly known as: ROBAXIN   750 mg, Oral, 4 Times Daily PRN      ondansetron ODT 4 MG disintegrating tablet  Commonly known as: ZOFRAN-ODT   4 mg, Translingual, Every 8 Hours PRN      pancrelipase (Lip-Prot-Amyl) 08687-57157 units capsule delayed-release particles capsule  Commonly known as: CREON   12,000 units of lipase, Oral, 3 Times Daily With Meals      promethazine 25 MG tablet  Commonly known as: PHENERGAN   25 mg, Oral, As Needed      sertraline 50 MG tablet  Commonly known as: ZOLOFT   50 mg, Oral, Daily      sodium bicarbonate 650 MG tablet   650 mg, Oral, 2 Times Daily             Discharge Diet:     Diet Instructions       Diet: Regular/House Diet; Regular (IDDSI 7); Thin (IDDSI 0)      Discharge Diet: Regular/House Diet    Texture: Regular (IDDSI 7)    Fluid Consistency: Thin (IDDSI 0)          Activity at Discharge:     Activity Instructions       Activity as Tolerated            Follow-up Appointments:    Additional Instructions for the Follow-ups that You Need to Schedule       Discharge Follow-up with PCP   As directed       Currently Documented PCP:    Terrence Baldwin MD    PCP Phone Number:    [phone]     Follow Up Details: 1 week               Follow-up Information       Terrence Baldwin MD .    Specialty: Family Medicine  Why: 1 week  Contact information:  [address] [place] [address] [address] TSAI [address] [place] [id number]  [phone]                           Future Appointments   Date Time Provider Department Center   9/3/2024  3:45 PM Tyree Fleming MD [facility]     Test Results Pending at Discharge:    Pending Labs       Order Current Status    Chloride, Urine, Random - Urine, Clean Catch  In process    Protein / Creatinine Ratio, Urine - Urine, Clean Catch In process    Volatiles, Urine - Urine, Clean Catch In process    Urine Culture - Urine, Urine, Clean Catch Preliminary result               Janak Damon DO  08/26/24  08:32 EDT    Time: I spent >30 minutes on this discharge activity which included: face-to-face encounter with the patient, reviewing the data in the system, coordination of the care with the nursing staff as well as consultants, documentation, and entering orders.     Dictated utilizing Dragon dictation.

## 2024-08-26 NOTE — CASE MANAGEMENT/SOCIAL WORK
Case Management Discharge Note      Final Note: DC home with son to transport. Denied any needs/services.         Selected Continued Care - Discharged on 8/26/2024 Admission date: 8/24/2024 - Discharge disposition: Home or Self Care      Destination    No services have been selected for the patient.                Durable Medical Equipment    No services have been selected for the patient.                Dialysis/Infusion    No services have been selected for the patient.                Home Medical Care    No services have been selected for the patient.                Therapy    No services have been selected for the patient.                Community Resources    No services have been selected for the patient.                Community & DME    No services have been selected for the patient.                    Selected Continued Care - Prior Encounters Includes continued care and service providers with selected services from prior encounters from 5/26/2024 to 8/26/2024      Discharged on 6/2/2024 Admission date: 5/30/2024 - Discharge disposition: Home or Self Care      Community Resources       Service Provider Selected Services Address Phone Fax Patient Preferred    [facility] PATIENTS ONLY [facility] [employer] [address] [facility] [address] [phone] -- --                          Transportation Services  Private: Car    Final Discharge Disposition Code: 01 - home or self-care

## 2024-08-27 LAB
ACETONE UR-MCNC: <.02 %
ETHANOL UR-MCNC: <.02 %
ISOPROPANOL UR-MCNC: <.02 %
METHANOL UR-MCNC: <.02 %

## 2024-08-27 NOTE — PAYOR COMM NOTE
"To:  [place]  From: Mia Stallworth RN  Phone: [phone]  Fax: [phone]  NPI: [id number]  TIN: [id number]  Member ID: [id number]   MRN: [id number]    Jaymie Yarbrough (69 y.o. Female)       Date of Birth   1955    Social Security Number       Address   [address] [place] [address]    Home Phone   [phone]    MRN   [id number]       Yarsanism   None    Marital Status                               Admission Date   24    Admission Type   Emergency    Admitting Provider   Lacey Ballesteros DO    Attending Provider       Department, Room/Bed   [facility] INTENSIVE CARE, I08/       Discharge Date   2024    Discharge Disposition   Home or Self Care    Discharge Destination                                 Attending Provider: (none)   Allergies: Rocephin [Ceftriaxone], Ciprofloxacin, Codeine, Pineapple    Isolation: None   Infection: Other (24)   Code Status: Prior    Ht: 160 cm (63\")   Wt: 57.1 kg (125 lb 14.1 oz)    Admission Cmt: None   Principal Problem: None                  Active Insurance as of 2024       Primary Coverage       Payor Plan Insurance Group Employer/Plan Group    ANTHEM MEDICARE REPLACEMENT ANTHEM MEDICARE ADVANTAGE [id number]       Payor Plan Address Payor Plan Phone Number Payor Plan Fax Number Effective Dates    [address] [phone]  2024 - None Entered    [facility] [id number]         Subscriber Name Subscriber Birth Date Member ID       JAYMIE YARBROUGH 1955 [id number]                     Emergency Contacts        (Rel.) Home Phone Work Phone Mobile Phone    EAMONANTONIO WORTHINGTON (Son) [phone] -- --    MONIE YARBROUGH (Son) [phone] -- --                 Discharge Summary        Janak Damon DO at 24 0831              [facility] HOSPITALIST   DISCHARGE SUMMARY      Name:  Jaymie Yarbrough   Age:  69 y.o.  Sex:  female  :  1955  MRN:  [id number]   Visit Number:  " [id number]    Admission Date:  8/24/2024  Date of Discharge:  8/26/2024  Primary Care Physician:  Terrence Baldwin MD    Discharge Diagnoses:     Metabolic acidosis, POA  Dehydration  Unintentional acetaminophen toxicity  Acute on chronic renal failure stage III  Chronic pancreatitis  Chronic diarrhea  History of prior colon surgery with malabsorption  Chronic tobacco    Problem List:     Active Hospital Problems    Diagnosis  POA    Metabolic acidosis [E87.20]  Yes      Resolved Hospital Problems   No resolved problems to display.     Presenting Problem:    Chief Complaint   Patient presents with    Shortness of Breath     PT states 4 days ago she started having SOA and chest pain. PT is nauseas, dizzy, has headache, and earache.       Consults:     Consulting Physician(s)         Provider   Role Specialty     Nnamdi Latif MD      Consulting Physician Nephrology          Procedures Performed:        History of presenting illness/Hospital Course:    Patient is a chronically ill 69-year-old with a medical history of chronic pancreatitis, mild torsion, chronic diarrhea, chronic tobacco abuse, COPD, who presented from home with increasing weakness, appetite, diarrhea, shortness of breath.  Symptoms have been progressing over the last 3 to 4 days.  She has had some upper respiratory symptoms as well.  Has felt headache at times.  She has no significant abdominal pain currently, nothing worse than her typical.  She has had ongoing diarrhea.       In the ER the patient was hemodynamically stable.  Labs were notable for acute on chronic kidney injury and metabolic acidosis.  CT imaging of the chest and abdomen was largely unremarkable for any acute abnormality.  ER provider discussed case with nephrology and patient was ordered sodium bicarbonate.  Patient admitted to the ICU for further care.    Metabolic acidosis  Dehydration  Suspect secondary to ongoing GI losses with chronic diarrhea and poor oral intake.   Complicated by chronic pancreatitis and chronic malabsorptive issues  IV fluids and sodium bicarb drip  Unintentional acetaminophen toxicity  Patient has a history of taking significant amount of Tylenol in addition to Norco.  Acetaminophen level elevated.  Hepatic function panel within normal limits.  N-acetylcysteine protocol completed  Medical education  VIVIENNE on CKD stage III  Nephrology consulted; renal function improved to baseline  Chronic pancreatitis  Chronic diarrhea  Follows locally with Dr. Fleming  Has been referred to  gastroenterology previously    Patient discharged home.  Advised against excessive Tylenol use.  Prescribed short course Ultram.  Medication education.  Follow-up primary physician within 1 week.    Vital Signs:    Temp:  [97.8 °F (36.6 °C)-98.6 °F (37 °C)] 97.8 °F (36.6 °C)  Heart Rate:  [] 80  Resp:  [14-19] 19  BP: ()/(52-93) 96/74    Physical Exam:    General Appearance:  Alert and cooperative.    Head:  Atraumatic and normocephalic.   Eyes: Conjunctivae and sclerae normal, no icterus. No pallor.   Ears:  Ears with no abnormalities noted.   Throat: No oral lesions, no thrush, oral mucosa moist.   Neck: Supple, trachea midline, no thyromegaly.   Back:   No kyphoscoliosis present. No tenderness to palpation.   Lungs:   Breath sounds heard bilaterally equally.  No crackles or wheezing. No Pleural rub or bronchial breathing.   Heart:  Normal S1 and S2, no murmur, no gallop, no rub. No JVD.   Abdomen:   Normal bowel sounds, no masses, no organomegaly. Soft, nontender, nondistended, no rebound tenderness.   Extremities: Supple, no edema, no cyanosis, no clubbing.   Pulses: Pulses palpable bilaterally.   Skin: No bleeding or rash.   Neurologic: Alert and oriented x 3. No facial asymmetry. Moves all four limbs. No tremors.     Pertinent Lab Results:     Results from last 7 days   Lab Units 08/26/24  0432 08/25/24  0532 08/25/24  0146 08/24/24  1737   SODIUM mmol/L 142 137 139  138   POTASSIUM mmol/L 3.3* 3.9 3.0* 4.6   CHLORIDE mmol/L 110* 106 108* 114*   CO2 mmol/L 22.2 10.4* 12.5* 5.0*   BUN mg/dL 25* 47* 49* 56*   CREATININE mg/dL 1.09* 1.38* 1.48* 1.98*   CALCIUM mg/dL 9.6 9.2 9.5 10.4   BILIRUBIN mg/dL  --  0.3  --  0.2   ALK PHOS U/L  --  132*  --  173*   ALT (SGPT) U/L  --  6  --  7   AST (SGOT) U/L  --  14  --  16   GLUCOSE mg/dL 90 81 108* 118*     Results from last 7 days   Lab Units 08/26/24  0432 08/24/24  1737   WBC 10*3/mm3 5.55 12.95*   HEMOGLOBIN g/dL 9.1* 13.8   HEMATOCRIT % 27.3* 43.7   PLATELETS 10*3/mm3 128* 279     Results from last 7 days   Lab Units 08/26/24  0432 08/25/24  0815   INR  1.06 1.05     Results from last 7 days   Lab Units 08/24/24  1737   HSTROP T ng/L 20*     Results from last 7 days   Lab Units 08/24/24  1737   PROBNP pg/mL 321.2         Results from last 7 days   Lab Units 08/24/24  1737   LIPASE U/L 83*     Results from last 7 days   Lab Units 08/24/24  1921   PH, ARTERIAL pH units 7.039*   PO2 ART mm Hg 106.0*   PCO2, ARTERIAL mm Hg 21.8*   HCO3 ART mmol/L 5.9*     Results from last 7 days   Lab Units 08/24/24  2204   URINECX  Culture in progress       Pertinent Radiology Results:    Imaging Results (All)       Procedure Component Value Units Date/Time    XR Chest 1 View [[id number]] Collected: 08/25/24 0736     Updated: 08/25/24 1014    Narrative:      PROCEDURE: XR CHEST 1 VW-     INDICATION:  SOA Triage Protocol     FINDINGS:  A portable view of the chest was obtained.  Comparison is  made to a prior exam dated 07/31/2024.   Heart size is normal. The  previously seen right central line is no longer present. Retrocardiac  opacity containing air is consistent with a hiatal hernia. Lung volumes  are low. Chronic increased interstitial markings are noted. No focal  infiltrate, pleural effusion, or pneumothorax. There are several chronic  appearing right lower rib fractures.       Impression:      1. No acute process.  2. Hiatal hernia.        This  report was signed and finalized on 8/25/2024 10:12 AM by Arin Hunter MD.       CT Abdomen Pelvis Without Contrast [[id number]] Collected: 08/24/24 2013     Updated: 08/24/24 2015    Narrative:      FINAL REPORT    TECHNIQUE:  null    CLINICAL HISTORY:  diffuse abdominal pain, nausea    COMPARISON:  null    FINDINGS:  CT abdomen and pelvis without contrast    Comparison: CT/SR - CT ABDOMEN PELVIS W CONTRAST - 07/30/2024 09:57 PM EDT    Findings:    Moderate hiatal hernia. Mild emphysematous changes of the lung bases.    There are multiple bilateral kidney cysts, evaluated in limited fashion, grossly unchanged. 4 mm nonobstructive calculus within the right kidney. There are multiple small calcified granulomas within the spleen. No focal liver abnormality. Unremarkable   pancreas and adrenal glands. Prior cholecystectomy.    There are multiple ventral hernias containing fat and knuckles of bowel without obstruction or edema, similar in appearance to the prior study. There is a focus of anastomosis at the rectosigmoid junction. No bowel inflammation.    3.6 cm right ovarian cyst without change. Unremarkable uterus and urinary bladder. Appendix not visualized. No secondary findings to suggest appendicitis.    There is a chronic fracture of the L2 level at the junction between the vertebral body and left pedicle grade 2 retrolisthesis of L1 on L2 without change. Additional compression fracture of the L2 vertebral body without change. Retropulsion of bone   contributing to moderate central canal stenosis, without change. No acute fracture.      Impression:      IMPRESSION:    No acute abdominal disease. Chronic findings as above.    Authenticated and Electronically Signed by Yenny Webber MD on  08/24/2024 08:13:19 PM    CT Chest Without Contrast Diagnostic [[id number]] Collected: 08/24/24 2011     Updated: 08/24/24 2012    Narrative:      FINAL REPORT    TECHNIQUE:  null    CLINICAL HISTORY:  soa,  weakness    COMPARISON:  null    FINDINGS:  CT chest without contrast    Comparison: None    Findings:    The heart is normal size.    Unremarkable thyroid gland. No mediastinal lymphadenopathy. Moderate hiatal hernia.    Mild pulmonary emphysema. No consolidation or pleural effusion. Calcified granuloma within the left upper lobe    There are calcified granulomas within the spleen. There are bilateral kidney cysts. Prior cholecystectomy. Surgical clips within the left upper quadrant. Small ventral hernia containing fat.    Multiple chronic right-sided rib fractures. Chronic left-sided pedicle fracture at L2. Chronic L2 compression fracture. No acute fracture.      Impression:      IMPRESSION:    1. No acute cardiopulmonary disease.    2. Moderate hiatal hernia.    Authenticated and Electronically Signed by Yenny Webber MD on  08/24/2024 08:11:10 PM            Echo:      Condition on Discharge:      Stable.    Code status during the hospital stay:    Code Status and Medical Interventions: CPR (Attempt to Resuscitate); Full Support   Ordered at: 08/24/24 2056     Code Status (Patient has no pulse and is not breathing):    CPR (Attempt to Resuscitate)     Medical Interventions (Patient has pulse or is breathing):    Full Support     Discharge Disposition:    Home or Self Care    Discharge Medications:       Discharge Medications        New Medications        Instructions Start Date   naloxone 4 MG/0.1ML nasal spray  Commonly known as: NARCAN   Call 911. Don't prime. Custar in 1 nostril for overdose. Repeat in 2-3 minutes in other nostril if no or minimal breathing/responsiveness.      traMADol 50 MG tablet  Commonly known as: ULTRAM   25 mg, Oral, Every 12 Hours PRN             Continue These Medications        Instructions Start Date   famotidine 20 MG tablet  Commonly known as: PEPCID   20 mg, Oral, 2 Times Daily      glucosamine-chondroitin 500-400 MG capsule capsule   1 capsule, Oral, 2 Times Daily With Meals       HYDROcodone-acetaminophen 7.5-325 MG per tablet  Commonly known as: NORCO   1 tablet, Oral, Every 8 Hours PRN      methocarbamol 750 MG tablet  Commonly known as: ROBAXIN   750 mg, Oral, 4 Times Daily PRN      ondansetron ODT 4 MG disintegrating tablet  Commonly known as: ZOFRAN-ODT   4 mg, Translingual, Every 8 Hours PRN      pancrelipase (Lip-Prot-Amyl) 79166-59669 units capsule delayed-release particles capsule  Commonly known as: CREON   12,000 units of lipase, Oral, 3 Times Daily With Meals      promethazine 25 MG tablet  Commonly known as: PHENERGAN   25 mg, Oral, As Needed      sertraline 50 MG tablet  Commonly known as: ZOLOFT   50 mg, Oral, Daily      sodium bicarbonate 650 MG tablet   650 mg, Oral, 2 Times Daily             Discharge Diet:     Diet Instructions       Diet: Regular/House Diet; Regular (IDDSI 7); Thin (IDDSI 0)      Discharge Diet: Regular/House Diet    Texture: Regular (IDDSI 7)    Fluid Consistency: Thin (IDDSI 0)          Activity at Discharge:     Activity Instructions       Activity as Tolerated            Follow-up Appointments:    Additional Instructions for the Follow-ups that You Need to Schedule       Discharge Follow-up with PCP   As directed       Currently Documented PCP:    Terrence Baldwin MD    PCP Phone Number:    [phone]     Follow Up Details: 1 week               Follow-up Information       Terrence Baldwin MD .    Specialty: Family Medicine  Why: 1 week  Contact information:  [facility]  [facility] [address]  [phone]                           Future Appointments   Date Time Provider Department Center   9/3/2024  3:45 PM Tyree Fleming MD [facility]     Test Results Pending at Discharge:    Pending Labs       Order Current Status    Chloride, Urine, Random - Urine, Clean Catch In process    Protein / Creatinine Ratio, Urine - Urine, Clean Catch In process    Volatiles, Urine - Urine, Clean Catch In process    Urine Culture - Urine, Urine,  Clean Catch Preliminary result               Janak Damon DO  08/26/24  08:32 EDT    Time: I spent >30 minutes on this discharge activity which included: face-to-face encounter with the patient, reviewing the data in the system, coordination of the care with the nursing staff as well as consultants, documentation, and entering orders.     Dictated utilizing Dragon dictation.        Electronically signed by Janak Damon DO at 08/26/24 6460

## 2024-08-28 ENCOUNTER — READMISSION MANAGEMENT (OUTPATIENT)
Dept: CALL CENTER | Facility: HOSPITAL | Age: 69
End: 2024-08-28
Payer: MEDICARE

## 2024-08-28 NOTE — OUTREACH NOTE
Medical Week 1 Survey      Flowsheet Row Responses   [facility] facility patient discharged from? [facility]   Does the patient have one of the following disease processes/diagnoses(primary or secondary)? Other   Week 1 attempt successful? No   Unsuccessful attempts Attempt 1  [All numbers listed were attempted-no answer]            Radha H - Registered Nurse

## 2024-09-05 ENCOUNTER — APPOINTMENT (OUTPATIENT)
Dept: CT IMAGING | Facility: HOSPITAL | Age: 69
End: 2024-09-05
Payer: MEDICARE

## 2024-09-05 ENCOUNTER — HOSPITAL ENCOUNTER (EMERGENCY)
Facility: HOSPITAL | Age: 69
Discharge: HOME | End: 2024-09-06
Attending: STUDENT IN AN ORGANIZED HEALTH CARE EDUCATION/TRAINING PROGRAM
Payer: MEDICARE

## 2024-09-05 ENCOUNTER — READMISSION MANAGEMENT (OUTPATIENT)
Dept: CALL CENTER | Facility: HOSPITAL | Age: 69
End: 2024-09-05
Payer: MEDICARE

## 2024-09-05 DIAGNOSIS — R10.10 CHRONIC UPPER ABDOMINAL PAIN: Primary | ICD-10-CM

## 2024-09-05 DIAGNOSIS — G89.29 CHRONIC UPPER ABDOMINAL PAIN: Primary | ICD-10-CM

## 2024-09-05 DIAGNOSIS — R06.00 DYSPNEA, UNSPECIFIED TYPE: ICD-10-CM

## 2024-09-05 LAB
ANISOCYTOSIS BLD QL: NORMAL
ATMOSPHERIC PRESS: 737 MMHG
BACTERIA UR QL AUTO: ABNORMAL /HPF
BASE EXCESS BLDV CALC-SCNC: 1.9 MMOL/L (ref 0–2)
BASOPHILS # BLD AUTO: 0.04 10*3/MM3 (ref 0–0.2)
BASOPHILS NFR BLD AUTO: 0.5 % (ref 0–1.5)
BDY SITE: NORMAL
BILIRUB UR QL STRIP: NEGATIVE
CLARITY UR: ABNORMAL
COHGB MFR BLD: 4.7 % (ref 0–5)
COLOR UR: YELLOW
DEPRECATED RDW RBC AUTO: 59.7 FL (ref 37–54)
EOSINOPHIL # BLD AUTO: 0.18 10*3/MM3 (ref 0–0.4)
EOSINOPHIL NFR BLD AUTO: 2.1 % (ref 0.3–6.2)
ERYTHROCYTE [DISTWIDTH] IN BLOOD BY AUTOMATED COUNT: 14.8 % (ref 12.3–15.4)
GLUCOSE UR STRIP-MCNC: NEGATIVE MG/DL
HCO3 BLDV-SCNC: 27.4 MMOL/L (ref 22–28)
HCT VFR BLD AUTO: 31.6 % (ref 34–46.6)
HGB BLD-MCNC: 10.1 G/DL (ref 12–15.9)
HGB UR QL STRIP.AUTO: NEGATIVE
HOLD SPECIMEN: NORMAL
HOLD SPECIMEN: NORMAL
HYALINE CASTS UR QL AUTO: ABNORMAL /LPF
IMM GRANULOCYTES # BLD AUTO: 0.04 10*3/MM3 (ref 0–0.05)
IMM GRANULOCYTES NFR BLD AUTO: 0.5 % (ref 0–0.5)
INHALED O2 CONCENTRATION: 51 %
KETONES UR QL STRIP: NEGATIVE
LEUKOCYTE ESTERASE UR QL STRIP.AUTO: ABNORMAL
LIPASE SERPL-CCNC: 45 U/L (ref 13–60)
LYMPHOCYTES # BLD AUTO: 1.55 10*3/MM3 (ref 0.7–3.1)
LYMPHOCYTES NFR BLD AUTO: 17.8 % (ref 19.6–45.3)
Lab: NORMAL
MACROCYTES BLD QL SMEAR: NORMAL
MCH RBC QN AUTO: 34.8 PG (ref 26.6–33)
MCHC RBC AUTO-ENTMCNC: 32 G/DL (ref 31.5–35.7)
MCV RBC AUTO: 109 FL (ref 79–97)
METHGB BLD QL: 0.7 % (ref 0–3)
MODALITY: NORMAL
MONOCYTES # BLD AUTO: 0.52 10*3/MM3 (ref 0.1–0.9)
MONOCYTES NFR BLD AUTO: 6 % (ref 5–12)
NEUTROPHILS NFR BLD AUTO: 6.36 10*3/MM3 (ref 1.7–7)
NEUTROPHILS NFR BLD AUTO: 73.1 % (ref 42.7–76)
NITRITE UR QL STRIP: NEGATIVE
NRBC BLD AUTO-RTO: 0 /100 WBC (ref 0–0.2)
OXYHGB MFR BLDV: 58.4 % (ref 40–70)
PCO2 BLDV: 46.3 MM HG (ref 40–50)
PH BLDV: 7.38 PH UNITS (ref 7.32–7.42)
PH UR STRIP.AUTO: 6 [PH] (ref 5–8)
PLATELET # BLD AUTO: 178 10*3/MM3 (ref 140–450)
PMV BLD AUTO: 12 FL (ref 6–12)
PO2 BLDV: 32.5 MM HG (ref 30–50)
PROT UR QL STRIP: ABNORMAL
RBC # BLD AUTO: 2.9 10*6/MM3 (ref 3.77–5.28)
RBC # UR STRIP: ABNORMAL /HPF
REF LAB TEST METHOD: ABNORMAL
SAO2 % BLDCOV: 61.7 % (ref 45–75)
SMALL PLATELETS BLD QL SMEAR: ADEQUATE
SP GR UR STRIP: 1.02 (ref 1–1.03)
SQUAMOUS #/AREA URNS HPF: ABNORMAL /HPF
TROPONIN T SERPL HS-MCNC: 12 NG/L
UROBILINOGEN UR QL STRIP: ABNORMAL
VENTILATOR MODE: NORMAL
WBC # UR STRIP: ABNORMAL /HPF
WBC MORPH BLD: NORMAL
WBC NRBC COR # BLD AUTO: 8.69 10*3/MM3 (ref 3.4–10.8)
WHOLE BLOOD HOLD COAG: NORMAL
WHOLE BLOOD HOLD SPECIMEN: NORMAL

## 2024-09-05 PROCEDURE — 85007 BL SMEAR W/DIFF WBC COUNT: CPT | Performed by: STUDENT IN AN ORGANIZED HEALTH CARE EDUCATION/TRAINING PROGRAM

## 2024-09-05 PROCEDURE — 96374 THER/PROPH/DIAG INJ IV PUSH: CPT

## 2024-09-05 PROCEDURE — 85025 COMPLETE CBC W/AUTO DIFF WBC: CPT | Performed by: STUDENT IN AN ORGANIZED HEALTH CARE EDUCATION/TRAINING PROGRAM

## 2024-09-05 PROCEDURE — 25010000002 ONDANSETRON PER 1 MG: Performed by: STUDENT IN AN ORGANIZED HEALTH CARE EDUCATION/TRAINING PROGRAM

## 2024-09-05 PROCEDURE — 71275 CT ANGIOGRAPHY CHEST: CPT

## 2024-09-05 PROCEDURE — 80053 COMPREHEN METABOLIC PANEL: CPT | Performed by: STUDENT IN AN ORGANIZED HEALTH CARE EDUCATION/TRAINING PROGRAM

## 2024-09-05 PROCEDURE — 99285 EMERGENCY DEPT VISIT HI MDM: CPT

## 2024-09-05 PROCEDURE — 96375 TX/PRO/DX INJ NEW DRUG ADDON: CPT

## 2024-09-05 PROCEDURE — 25810000003 LACTATED RINGERS SOLUTION: Performed by: STUDENT IN AN ORGANIZED HEALTH CARE EDUCATION/TRAINING PROGRAM

## 2024-09-05 PROCEDURE — 84484 ASSAY OF TROPONIN QUANT: CPT | Performed by: STUDENT IN AN ORGANIZED HEALTH CARE EDUCATION/TRAINING PROGRAM

## 2024-09-05 PROCEDURE — 81001 URINALYSIS AUTO W/SCOPE: CPT | Performed by: STUDENT IN AN ORGANIZED HEALTH CARE EDUCATION/TRAINING PROGRAM

## 2024-09-05 PROCEDURE — 74177 CT ABD & PELVIS W/CONTRAST: CPT

## 2024-09-05 PROCEDURE — 82805 BLOOD GASES W/O2 SATURATION: CPT

## 2024-09-05 PROCEDURE — 25510000001 IOPAMIDOL 61 % SOLUTION: Performed by: STUDENT IN AN ORGANIZED HEALTH CARE EDUCATION/TRAINING PROGRAM

## 2024-09-05 PROCEDURE — 82820 HEMOGLOBIN-OXYGEN AFFINITY: CPT

## 2024-09-05 PROCEDURE — 36415 COLL VENOUS BLD VENIPUNCTURE: CPT

## 2024-09-05 PROCEDURE — 25010000002 MORPHINE PER 10 MG: Performed by: STUDENT IN AN ORGANIZED HEALTH CARE EDUCATION/TRAINING PROGRAM

## 2024-09-05 PROCEDURE — 93005 ELECTROCARDIOGRAM TRACING: CPT | Performed by: STUDENT IN AN ORGANIZED HEALTH CARE EDUCATION/TRAINING PROGRAM

## 2024-09-05 PROCEDURE — 83690 ASSAY OF LIPASE: CPT | Performed by: STUDENT IN AN ORGANIZED HEALTH CARE EDUCATION/TRAINING PROGRAM

## 2024-09-05 RX ORDER — SODIUM CHLORIDE 0.9 % (FLUSH) 0.9 %
10 SYRINGE (ML) INJECTION AS NEEDED
Status: DISCONTINUED | OUTPATIENT
Start: 2024-09-05 | End: 2024-09-06 | Stop reason: HOSPADM

## 2024-09-05 RX ORDER — IOPAMIDOL 612 MG/ML
100 INJECTION, SOLUTION INTRAVASCULAR
Status: COMPLETED | OUTPATIENT
Start: 2024-09-06 | End: 2024-09-05

## 2024-09-05 RX ORDER — MORPHINE SULFATE 2 MG/ML
2 INJECTION, SOLUTION INTRAMUSCULAR; INTRAVENOUS ONCE
Status: COMPLETED | OUTPATIENT
Start: 2024-09-05 | End: 2024-09-05

## 2024-09-05 RX ORDER — ONDANSETRON 2 MG/ML
4 INJECTION INTRAMUSCULAR; INTRAVENOUS ONCE
Status: COMPLETED | OUTPATIENT
Start: 2024-09-05 | End: 2024-09-05

## 2024-09-05 RX ADMIN — MORPHINE SULFATE 2 MG: 2 INJECTION, SOLUTION INTRAMUSCULAR; INTRAVENOUS at 23:19

## 2024-09-05 RX ADMIN — ONDANSETRON 4 MG: 2 INJECTION INTRAMUSCULAR; INTRAVENOUS at 23:19

## 2024-09-05 RX ADMIN — IOPAMIDOL 100 ML: 612 INJECTION, SOLUTION INTRAVENOUS at 23:53

## 2024-09-05 RX ADMIN — SODIUM CHLORIDE, POTASSIUM CHLORIDE, SODIUM LACTATE AND CALCIUM CHLORIDE 1000 ML: 600; 310; 30; 20 INJECTION, SOLUTION INTRAVENOUS at 23:24

## 2024-09-05 NOTE — OUTREACH NOTE
Medical Week 2 Survey      Flowsheet Row Responses   [facility] patient discharged from? [facility]   Does the patient have one of the following disease processes/diagnoses(primary or secondary)? Other   Week 2 attempt successful? Yes   Discharge diagnosis Metabolic acidosis   Call end time 1730   Person spoke with today (if not patient) and relationship patient   Meds reviewed with patient/caregiver? Yes   Is the patient having any side effects they believe may be caused by any medication additions or changes? No   Does the patient have all medications ordered at discharge? Yes   Is the patient taking all medications as directed (includes completed medication regime)? Yes   Does the patient have a primary care provider?  Yes   Does the patient have an appointment with their PCP within 7 days of discharge? Yes   Has the patient kept scheduled appointments due by today? Yes   Psychosocial issues? No   Did the patient receive a copy of their discharge instructions? Yes   Nursing interventions Reviewed instructions with patient   What is the patient's perception of their health status since discharge? Same  [Patient is still having abdominal pain and nausea. She has used Phenergan today for relief of nausea. She has abdominal bloating as well.]   Is the patient/caregiver able to teach back signs and symptoms related to disease process for when to call PCP? Yes   Is the patient/caregiver able to teach back signs and symptoms related to disease process for when to call 911? Yes   Is the patient/caregiver able to teach back the hierarchy of who to call/visit for symptoms/problems? PCP, Specialist, Home health nurse, Urgent Care, ED, 911 Yes   Week 2 Call Completed? Yes   Did the patient feel the follow up calls were helpful during their recovery period? No   Was the number of calls appropriate? No   Graduated/Revoked comments If no improvement patient will return to ER.   Call end time 1730            Anderson Enriquez  Registered Nurse

## 2024-09-06 VITALS
OXYGEN SATURATION: 95 % | BODY MASS INDEX: 26.68 KG/M2 | WEIGHT: 145 LBS | SYSTOLIC BLOOD PRESSURE: 166 MMHG | DIASTOLIC BLOOD PRESSURE: 98 MMHG | HEIGHT: 62 IN | HEART RATE: 65 BPM | TEMPERATURE: 98.1 F | RESPIRATION RATE: 18 BRPM

## 2024-09-06 LAB
ALBUMIN SERPL-MCNC: 4.1 G/DL (ref 3.5–5.2)
ALBUMIN/GLOB SERPL: 1.4 G/DL
ALP SERPL-CCNC: 105 U/L (ref 39–117)
ALT SERPL W P-5'-P-CCNC: 7 U/L (ref 1–33)
ANION GAP SERPL CALCULATED.3IONS-SCNC: 13.4 MMOL/L (ref 5–15)
AST SERPL-CCNC: 18 U/L (ref 1–32)
BILIRUB SERPL-MCNC: 0.2 MG/DL (ref 0–1.2)
BUN SERPL-MCNC: 18 MG/DL (ref 8–23)
BUN/CREAT SERPL: 13.6 (ref 7–25)
CALCIUM SPEC-SCNC: 10.2 MG/DL (ref 8.6–10.5)
CHLORIDE SERPL-SCNC: 107 MMOL/L (ref 98–107)
CO2 SERPL-SCNC: 23.6 MMOL/L (ref 22–29)
CREAT SERPL-MCNC: 1.32 MG/DL (ref 0.57–1)
EGFRCR SERPLBLD CKD-EPI 2021: 43.8 ML/MIN/1.73
GLOBULIN UR ELPH-MCNC: 2.9 GM/DL
GLUCOSE SERPL-MCNC: 81 MG/DL (ref 65–99)
POTASSIUM SERPL-SCNC: 4.2 MMOL/L (ref 3.5–5.2)
PROT SERPL-MCNC: 7 G/DL (ref 6–8.5)
SODIUM SERPL-SCNC: 144 MMOL/L (ref 136–145)

## 2024-09-06 NOTE — ED PROVIDER NOTES
EMERGENCY DEPARTMENT ENCOUNTER    Pt Name: Tasha Yarbrough  MRN: [id number]  Pt :   1955  Room Number:    Date of encounter:  2024  PCP: Terrence Baldwin MD  ED Provider: Ho Clark MD    Historian: Patient      HPI:  Chief Complaint: Abdominal pain shortness of breath        Context: Tasha Yarbrough is a 69 y.o. female who presents to the ED c/o abdominal pain and shortness of breath.  Patient has had issues with frequent pancreatitis of past several months.  Has been home from hospital for little over a week and started having pain over the past 2 days.  Also was having shortness of breath.  Had episode of vomiting today so came back to the emergency department.  Denies any diarrhea or fevers.  Scheduled to see gastroenterology due to her frequent pancreatitis but unsure when the appointment is.      PAST MEDICAL HISTORY  Past Medical History:   Diagnosis Date    COPD (chronic obstructive pulmonary disease)     Hypertension     Impaired functional mobility, balance, gait, and endurance     Impaired mobility     Injury of back     Migraines     Multiple gastric ulcers     Pancreatitis     UTI (urinary tract infection)          PAST SURGICAL HISTORY  Past Surgical History:   Procedure Laterality Date    ABDOMINAL SURGERY      CHOLECYSTECTOMY      COLON SURGERY      COLON RUPTURED /  [id number] ?    COLONOSCOPY      ENDOSCOPY N/A 2024    Procedure: ESOPHAGOGASTRODUODENOSCOPY WITH BIOPSY;  Surgeon: Jairo Negro MD;  Location: [facility] ENDOSCOPY;  Service: Gastroenterology;  Laterality: N/A;    KNEE SURGERY Right     TOTAL REPLACEMENT    TUBAL ABDOMINAL LIGATION           FAMILY HISTORY  Family History   Problem Relation Age of Onset    Kidney disease Mother     Emphysema Mother     Heart disease Father     Lung cancer Sister     Heart disease Paternal Uncle          SOCIAL HISTORY  Social History     Socioeconomic History    Marital status:    Tobacco Use    Smoking status: Every Day      Current packs/day: 1.00     Types: Cigarettes    Smokeless tobacco: Never   Vaping Use    Vaping status: Some Days    Substances: Flavoring   Substance and Sexual Activity    Alcohol use: Never    Drug use: Never    Sexual activity: Defer         ALLERGIES  Rocephin [ceftriaxone], Ciprofloxacin, Codeine, and Pineapple        REVIEW OF SYSTEMS  Review of Systems     All systems reviewed and negative except for those discussed in HPI.       PHYSICAL EXAM    I have reviewed the triage vital signs and nursing notes.    ED Triage Vitals [09/05/24 2128]   Temp Heart Rate Resp BP SpO2   98.1 °F (36.7 °C) 73 18 (!) 150/107 95 %      Temp src Heart Rate Source Patient Position BP Location FiO2 (%)   Oral Monitor Sitting Left arm --       Physical Exam    General:  Awake, alert, elderly appearing, no acute distress  HEENT: Atraumatic, normocephalic, EOMI, PERRLA, mucous membranes moist  NECK:  Supple, atraumatic  Cardiovascular:  Regular rate, regular rhythm, no murmurs, rubs, or gallops.  Extremities well perfused   Respiratory:  Regular rate, clear lungs to auscultation bilaterally.  No rhonchi, rales, wheezing  Abdominal:  Soft, nondistended,  tenderness to palpation of left upper quadrant.  No guarding or rebound.  No palpable masses  Extremity:  No visible bony abnormalities in all 4 extremities.  Full range of motion of all extremities.  Skin:  Warm and dry.  No rashes  Neuro:  AAOx3, GCS 15. Cranial nerves 2-12 grossly intact.  No focal strength or sensation deficits.  Psych:  Mood and affect appropriate.        LAB RESULTS  Recent Results (from the past 24 hour(s))   Urinalysis With Microscopic If Indicated (No Culture) - Urine, Clean Catch    Collection Time: 09/05/24 10:07 PM    Specimen: Urine, Clean Catch   Result Value Ref Range    Color, UA Yellow Yellow, Straw    Appearance, UA Cloudy (A) Clear    pH, UA 6.0 5.0 - 8.0    Specific Gravity, UA 1.024 1.005 - 1.030    Glucose, UA Negative Negative    Ketones, UA  Negative Negative    Bilirubin, UA Negative Negative    Blood, UA Negative Negative    Protein, UA 30 mg/dL (1+) (A) Negative    Leuk Esterase, UA Moderate (2+) (A) Negative    Nitrite, UA Negative Negative    Urobilinogen, UA 0.2 E.U./dL 0.2 - 1.0 E.U./dL   Urinalysis, Microscopic Only - Urine, Clean Catch    Collection Time: 09/05/24 10:07 PM    Specimen: Urine, Clean Catch   Result Value Ref Range    RBC, UA None Seen None Seen, 0-2 /HPF    WBC, UA 6-10 (A) None Seen, 0-2 /HPF    Bacteria, UA 2+ (A) None Seen /HPF    Squamous Epithelial Cells, UA 3-6 (A) None Seen, 0-2 /HPF    Hyaline Casts, UA 0-2 None Seen /LPF    Methodology Manual Light Microscopy    Lavender Top    Collection Time: 09/05/24 10:38 PM   Result Value Ref Range    Extra Tube hold for add-on    CBC Auto Differential    Collection Time: 09/05/24 10:38 PM    Specimen: Blood   Result Value Ref Range    WBC 8.69 3.40 - 10.80 10*3/mm3    RBC 2.90 (L) 3.77 - 5.28 10*6/mm3    Hemoglobin 10.1 (L) 12.0 - 15.9 g/dL    Hematocrit 31.6 (L) 34.0 - 46.6 %    .0 (H) 79.0 - 97.0 fL    MCH 34.8 (H) 26.6 - 33.0 pg    MCHC 32.0 31.5 - 35.7 g/dL    RDW 14.8 12.3 - 15.4 %    RDW-SD 59.7 (H) 37.0 - 54.0 fl    MPV 12.0 6.0 - 12.0 fL    Platelets 178 140 - 450 10*3/mm3    Neutrophil % 73.1 42.7 - 76.0 %    Lymphocyte % 17.8 (L) 19.6 - 45.3 %    Monocyte % 6.0 5.0 - 12.0 %    Eosinophil % 2.1 0.3 - 6.2 %    Basophil % 0.5 0.0 - 1.5 %    Immature Grans % 0.5 0.0 - 0.5 %    Neutrophils, Absolute 6.36 1.70 - 7.00 10*3/mm3    Lymphocytes, Absolute 1.55 0.70 - 3.10 10*3/mm3    Monocytes, Absolute 0.52 0.10 - 0.90 10*3/mm3    Eosinophils, Absolute 0.18 0.00 - 0.40 10*3/mm3    Basophils, Absolute 0.04 0.00 - 0.20 10*3/mm3    Immature Grans, Absolute 0.04 0.00 - 0.05 10*3/mm3    nRBC 0.0 0.0 - 0.2 /100 WBC   Scan Slide    Collection Time: 09/05/24 10:38 PM    Specimen: Blood   Result Value Ref Range    Anisocytosis Mod/2+ None Seen    Macrocytes Slight/1+ None Seen    WBC  Morphology Normal Normal    Platelet Estimate Adequate Normal   Blood Gas, Venous With Co-Ox    Collection Time: 09/05/24 11:30 PM    Specimen: Venous Blood   Result Value Ref Range    Site OTHER     pH, Venous 7.381 7.320 - 7.420 pH Units    pCO2, Venous 46.3 40.0 - 50.0 mm Hg    pO2, Venous 32.5 30.0 - 50.0 mm Hg    HCO3, Venous 27.4 22.0 - 28.0 mmol/L    Base Excess, Venous 1.9 0.0 - 2.0 mmol/L    O2 Saturation, Venous 61.7 45.0 - 75.0 %    Oxyhemoglobin Venous 58.4 40.0 - 70.0 %    Methemoglobin Venous 0.7 0.0 - 3.0 %    Carboxyhemoglobin Venous 4.7 0.0 - 5.0 %    Barometric Pressure for Blood Gas 737 mmHg    Modality Room Air     FIO2 51 %    Ventilator Mode NA     Collected by [id number]    Comprehensive Metabolic Panel    Collection Time: 09/05/24 11:31 PM    Specimen: Blood   Result Value Ref Range    Glucose 81 65 - 99 mg/dL    BUN 18 8 - 23 mg/dL    Creatinine 1.32 (H) 0.57 - 1.00 mg/dL    Sodium 144 136 - 145 mmol/L    Potassium 4.2 3.5 - 5.2 mmol/L    Chloride 107 98 - 107 mmol/L    CO2 23.6 22.0 - 29.0 mmol/L    Calcium 10.2 8.6 - 10.5 mg/dL    Total Protein 7.0 6.0 - 8.5 g/dL    Albumin 4.1 3.5 - 5.2 g/dL    ALT (SGPT) 7 1 - 33 U/L    AST (SGOT) 18 1 - 32 U/L    Alkaline Phosphatase 105 39 - 117 U/L    Total Bilirubin 0.2 0.0 - 1.2 mg/dL    Globulin 2.9 gm/dL    A/G Ratio 1.4 g/dL    BUN/Creatinine Ratio 13.6 7.0 - 25.0    Anion Gap 13.4 5.0 - 15.0 mmol/L    eGFR 43.8 (L) >60.0 mL/min/1.73   Lipase    Collection Time: 09/05/24 11:31 PM    Specimen: Blood   Result Value Ref Range    Lipase 45 13 - 60 U/L   Single High Sensitivity Troponin T    Collection Time: 09/05/24 11:31 PM    Specimen: Blood   Result Value Ref Range    HS Troponin T 12 <14 ng/L   Green Top (Gel)    Collection Time: 09/05/24 11:31 PM   Result Value Ref Range    Extra Tube Hold for add-ons.    Gold Top - SST    Collection Time: 09/05/24 11:31 PM   Result Value Ref Range    Extra Tube Hold for add-ons.    Light Blue Top    Collection  Time: 09/05/24 11:31 PM   Result Value Ref Range    Extra Tube Hold for add-ons.        If labs were ordered, I independently evaluated the results and considered them in treating the patient.        RADIOLOGY  CT Angiogram Chest Pulmonary Embolism    Result Date: 9/6/2024  FINAL REPORT TECHNIQUE: null CLINICAL HISTORY: Shortness of breath, upper abdominal pain COMPARISON: null FINDINGS: CT Angiography Chest W Contrast 3D Postprocessing COMPARISON: CT/SR - CT CHEST WO CONTRAST - 08/24/2024 07:34 PM EDT FINDINGS: No pulmonary embolism. No thoracic aortic aneurysm or dissection. No consolidation. No mass. Centrilobular and paraseptal emphysematous changes. Mild atelectasis. Mild bilateral bronchial wall thickening. Trace left pleural effusion. No pneumothorax. Mild cardiomegaly. No pericardial effusion. No pathologically enlarged lymph nodes. No acute fracture. Chronic right rib fractures. Moderate hiatal hernia containing a portion of the stomach. Elevation of the right hemidiaphragm.     IMPRESSION: No pulmonary embolism. Possible bronchitis. Trace left pleural effusion. Nonemergent/incidental findings above. Authenticated and Electronically Signed by Umang Real MD on 09/06/2024 12:44:30 AM    CT Abdomen Pelvis With Contrast    Result Date: 9/6/2024  FINAL REPORT TECHNIQUE: null CLINICAL HISTORY: Upper abdominal pain and vomiting, frequent pancreatitis COMPARISON: null FINDINGS: CT Abdomen and Pelvis W Contrast COMPARISON: CT/SR - CT ABDOMEN PELVIS WO CONTRAST - 08/24/2024 07:34 PM EDT FINDINGS: Moderate hiatal hernia containing a portion of the stomach. Mild bibasilar atelectasis. Hepatomegaly. Calcified granulomas in the spleen. Bilateral renal cysts and subcentimeter hypodensities too small to accurately characterize. Nonobstructing right renal calculi. No visible ureteral calculi. No hydronephrosis. Normal adrenal glands. Normal pancreas. Status post cholecystectomy. No abnormal biliary dilation. No evidence  of bowel obstruction. Left supraumbilical ventral hernia containing a knuckle of bowel without evidence of obstruction, similar to prior. Fat containing right supraumbilical ventral hernia. Status post partial distal colectomy. Air-fluid levels in nondistended small bowel. No mucosal thickening. Status post appendectomy. Unremarkable bladder. Unremarkable uterus. No ascites. No pneumoperitoneum. No lymphadenopathy. No acute fracture. Chronic L2 fracture. Chronic grade 1/2 L1-L2 retrolisthesis. Degenerative changes in the spine. Lumbar dextroscoliosis. Chronic rib fractures. No abdominal aortic aneurysm.     IMPRESSION: Air-fluid levels in nondistended small bowel, which could be due to enteritis or dysmotility. Nonemergent/incidental findings above. Authenticated and Electronically Signed by Umang Real MD on 09/06/2024 12:43:34 AM     I ordered and independently evaluated the above noted radiographic studies.     See radiologist's dictation for official interpretation.        PROCEDURES    Procedures    ECG 12 Lead ED Triage Standing Order; Abdominal Pain (Upper)   Final Result          MEDICATIONS GIVEN IN ER    Medications   sodium chloride 0.9 % flush 10 mL (has no administration in time range)   lactated ringers bolus 1,000 mL ( Intravenous Currently Infusing 9/6/24 0036)   Morphine sulfate (PF) injection 2 mg (2 mg Intravenous Given 9/5/24 2319)   ondansetron (ZOFRAN) injection 4 mg (4 mg Intravenous Given 9/5/24 2319)   iopamidol (ISOVUE-300) 61 % injection 100 mL (100 mL Intravenous Given 9/5/24 8703)         MEDICAL DECISION MAKING, PROGRESS, and CONSULTS    All labs, if obtained, have been independently reviewed by me.  All radiology studies, if obtained, have been evaluated by me and the radiologist dictating the report.  All EKG's, if obtained, have been independently viewed and interpreted by me.      Discussion below represents my analysis of pertinent findings related to patient's condition,  differential diagnosis, treatment plan and final disposition.    Tasha Yarbrough is a 69 y.o. female who presents to the ED c/o abdominal pain and shortness of breath.  Hemodynamically stable and nontoxic in appearance on arrival.  Differential includes was not limited to recurrent pancreatitis, pancreatic abscess, bowel obstruction, gastroenteritis, pneumonia, pulmonary embolism, acute on chronic COPD.  Extensive labs ordered and all documentation from last hospitalization reviewed.    Patient given bolus of lactated ringer IV fluids.  Patient also given 2 mg of IV morphine for pain control and 4 mg of IV Zofran for nausea.    EKG obtained which was personally interpreted showing normal sinus rhythm with rate of 66 no evidence of acute ischemic changes or significant interval abnormalities.    CT pulmonary embolism study obtained which was negative for PE or pneumonia showing evidence of only possible bronchitis and trace left-sided pleural effusion but no significant cardiopulmonary abnormalities otherwise.    CT abdomen pelvis shows no acute critical findings other than mild air-fluid levels in nondistended small bowel, but no significant bowel obstruction or acute evidence of pancreatitis.    Symptoms well-controlled while in emergency department and patient maintaining oxygen saturations of 95% on room air.  Discussed all results with patient along with need for close follow-up with primary care provider.  Patient agreeable with this plan was discharged.                             Orders placed during this visit:  Orders Placed This Encounter   Procedures    CT Abdomen Pelvis With Contrast    CT Angiogram Chest Pulmonary Embolism    [place] Draw    Comprehensive Metabolic Panel    Lipase    Single High Sensitivity Troponin T    Urinalysis With Microscopic If Indicated (No Culture) - Urine, Clean Catch    CBC Auto Differential    Blood Gas, Venous -With Co-Ox Panel: Yes    Urinalysis, Microscopic Only - Urine,  Clean Catch    Scan Slide    Blood Gas, Venous With Co-Ox    NPO Diet NPO Type: Strict NPO    Undress & Gown    Continuous Pulse Oximetry    Vital Signs    ECG 12 Lead ED Triage Standing Order; Abdominal Pain (Upper)    Insert Peripheral IV    CBC & Differential    Green Top (Gel)    Lavender Top    Gold Top - SST    Light Blue Top         ED Course:    Consultants:                  Shared Decision Making:  After my consideration of clinical presentation and any laboratory/radiology studies obtained, I discussed the findings with the patient/patient representative who is in agreement with the treatment plan and the final disposition.   Risks and benefits of discharge and/or observation/admission were discussed.      AS OF 00:58 EDT VITALS:    BP - (!) 150/107  HR - 73  TEMP - 98.1 °F (36.7 °C) (Oral)  O2 SATS - 95%                  DIAGNOSIS  Final diagnoses:   Chronic upper abdominal pain   Dyspnea, unspecified type         DISPOSITION  Discharge      Please note that portions of this document were completed with voice recognition software.        Ho Clark MD  09/06/24 0408

## 2024-09-06 NOTE — DISCHARGE INSTRUCTIONS
Avoid fatty foods/greasy foods as this can make your symptoms worse.  Follow-up closely with primary care provider and gastroenterology as an outpatient.

## 2024-09-12 ENCOUNTER — READMISSION MANAGEMENT (OUTPATIENT)
Dept: CALL CENTER | Facility: HOSPITAL | Age: 69
End: 2024-09-12
Payer: MEDICARE

## 2024-09-12 NOTE — OUTREACH NOTE
Medical Week 3 Survey      Flowsheet Row Responses   [facility] facility patient discharged from? [facility]   Does the patient have one of the following disease processes/diagnoses(primary or secondary)? Other   Week 3 attempt successful? No   Unsuccessful attempts Attempt 1            Melissa PERRY - Registered Nurse

## 2024-11-07 ENCOUNTER — HOSPITAL ENCOUNTER (OUTPATIENT)
Dept: GENERAL RADIOLOGY | Facility: HOSPITAL | Age: 69
Discharge: HOME | End: 2024-11-07
Admitting: FAMILY MEDICINE
Payer: MEDICARE

## 2024-11-07 ENCOUNTER — TRANSCRIBE ORDERS (OUTPATIENT)
Dept: GENERAL RADIOLOGY | Facility: HOSPITAL | Age: 69
End: 2024-11-07
Payer: MEDICARE

## 2024-11-07 DIAGNOSIS — G89.29 CHRONIC MIDLINE LOW BACK PAIN WITH LEFT-SIDED SCIATICA: Primary | ICD-10-CM

## 2024-11-07 DIAGNOSIS — G89.29 CHRONIC MIDLINE LOW BACK PAIN WITH LEFT-SIDED SCIATICA: ICD-10-CM

## 2024-11-07 DIAGNOSIS — M54.42 CHRONIC MIDLINE LOW BACK PAIN WITH LEFT-SIDED SCIATICA: Primary | ICD-10-CM

## 2024-11-07 DIAGNOSIS — M54.42 CHRONIC MIDLINE LOW BACK PAIN WITH LEFT-SIDED SCIATICA: ICD-10-CM

## 2024-11-07 PROCEDURE — 72100 X-RAY EXAM L-S SPINE 2/3 VWS: CPT

## 2025-01-01 ENCOUNTER — HOSPITAL ENCOUNTER (EMERGENCY)
Facility: HOSPITAL | Age: 70
End: 2025-04-11
Attending: EMERGENCY MEDICINE
Payer: MEDICARE

## 2025-01-01 DIAGNOSIS — I46.9 CARDIAC ARREST: Primary | ICD-10-CM

## 2025-01-01 PROCEDURE — 92950 HEART/LUNG RESUSCITATION CPR: CPT

## 2025-01-01 PROCEDURE — 99285 EMERGENCY DEPT VISIT HI MDM: CPT | Performed by: EMERGENCY MEDICINE

## 2025-04-11 NOTE — ED PROVIDER NOTES
[facility] EMERGENCY DEPARTMENT  Emergency Department Encounter  Emergency Medicine Physician Note     Pt Name:Tasha Yarbrough  MRN: [id number]  Birthdate 1955  Date of evaluation: 4/11/2025  PCP:  Terrence Baldwin MD  Note Started: 3:55 PM EDT      CHIEF COMPLAINT       Chief Complaint   Patient presents with    Cardiac Arrest       HISTORY OF PRESENT ILLNESS  (Location/Symptom, Timing/Onset, Context/Setting, Quality, Duration, Modifying Factors, Severity.)      Tasha Yarbrough is a 70 y.o. female who presents with cardiac arrest.  Patient noted to have bystander CPR for approximately 20 minutes and then EMS CPR for another 40 minutes, received 4 doses of epinephrine, has been in asystole throughout entire management.  Airway was obtained by EMS.  No other medical history noted.    PAST MEDICAL / SURGICAL / SOCIAL / FAMILY HISTORY     Past Medical History:   Diagnosis Date    COPD (chronic obstructive pulmonary disease)     Hypertension     Impaired functional mobility, balance, gait, and endurance     Impaired mobility     Injury of back     Migraines     Multiple gastric ulcers     Pancreatitis     UTI (urinary tract infection)      No additional pertinent       Past Surgical History:   Procedure Laterality Date    ABDOMINAL SURGERY      CHOLECYSTECTOMY      COLON SURGERY      COLON RUPTURED /  [id number] ?    COLONOSCOPY      ENDOSCOPY N/A 04/23/2024    Procedure: ESOPHAGOGASTRODUODENOSCOPY WITH BIOPSY;  Surgeon: Jairo Negro MD;  Location: [facility];  Service: Gastroenterology;  Laterality: N/A;    KNEE SURGERY Right     TOTAL REPLACEMENT    TUBAL ABDOMINAL LIGATION       No additional pertinent       Social History     Socioeconomic History    Marital status:    Tobacco Use    Smoking status: Every Day     Current packs/day: 1.00     Types: Cigarettes    Smokeless tobacco: Never   Vaping Use    Vaping status: Some Days    Substances: Flavoring   Substance and Sexual Activity     Alcohol use: Never    Drug use: Never    Sexual activity: Defer       Family History   Problem Relation Age of Onset    Kidney disease Mother     Emphysema Mother     Heart disease Father     Lung cancer Sister     Heart disease Paternal Uncle        Allergies:  Rocephin [ceftriaxone], Ciprofloxacin, Codeine, and Pineapple    Home Medications:  Prior to Admission medications    Medication Sig Start Date End Date Taking? Authorizing Provider   famotidine (PEPCID) 20 MG tablet Take 1 tablet by mouth 2 (Two) Times a Day.    ProviderTaz MD   glucosamine-chondroitin 500-400 MG capsule capsule Take 1 capsule by mouth 2 (Two) Times a Day With Meals. Indications: Joint Damage causing Pain and Loss of Function    ProviderTaz MD   HYDROcodone-acetaminophen (NORCO) 7.5-325 MG per tablet Take 1 tablet by mouth Every 8 (Eight) Hours As Needed for Severe Pain or Moderate Pain. 8/2/24   Mateo Storm,    methocarbamol (ROBAXIN) 750 MG tablet Take 1 tablet by mouth 4 (Four) Times a Day As Needed for Muscle Spasms.    ProviderTaz MD   naloxone (NARCAN) 4 MG/0.1ML nasal spray Call 911. Don't prime. [place] in 1 nostril for overdose. Repeat in 2-3 minutes in other nostril if no or minimal breathing/responsiveness. 8/26/24   Janak Damon,    ondansetron ODT (ZOFRAN-ODT) 4 MG disintegrating tablet Place 1 tablet on the tongue Every 8 (Eight) Hours As Needed for Nausea or Vomiting. 8/2/24   Mateo Storm,    pancrelipase, Lip-Prot-Amyl, (CREON) 62045-71658 units capsule delayed-release particles capsule Take 1 capsule by mouth 3 (Three) Times a Day With Meals.    Taz Keller MD   sertraline (ZOLOFT) 50 MG tablet Take 1 tablet by mouth Daily. 5/1/24 10/28/24  Taz Keller MD   sodium bicarbonate 650 MG tablet Take 1 tablet by mouth 2 (Two) Times a Day. 6/2/24   Kerley, Brian Joseph,    traMADol (ULTRAM) 50 MG tablet Take 0.5 tablets by mouth Every 12 (Twelve) Hours As  Needed for Moderate Pain for up to 8 doses. 8/26/24   Janak Damon,    metoprolol succinate XL (TOPROL-XL) 50 MG 24 hr tablet Take 50 mg by mouth Daily.  Patient not taking: Reported on 7/27/2022  8/3/22  Provider, MD Taz         REVIEW OF SYSTEMS       Review of Systems   Unable to perform ROS: Patient unresponsive       PHYSICAL EXAM      INITIAL VITALS:   Pulse (!) 0   Resp (!) 0     Physical Exam  Constitutional:       Appearance: She is ill-appearing and toxic-appearing.   HENT:      Head: Normocephalic.   Cardiovascular:      Comments: Asystole and pulseless  Pulmonary:      Comments: Bilateral breath sounds with bagging  Abdominal:      General: There is distension.   Skin:     Coloration: Skin is pale.      Comments: No signs of outward trauma   Neurological:      GCS: GCS eye subscore is 1. GCS verbal subscore is 1. GCS motor subscore is 1.   Psychiatric:      Comments: Unable to assess           DDX/DIAGNOSTIC RESULTS / EMERGENCY DEPARTMENT COURSE / MDM     Differential Diagnosis included but not limited: Cardiac arrest due to unknown cause, acute cardiac event, pulmonary embolism, pericardial effusion, electrolyte abnormalities, severe infection    Diagnoses Considered but Do Not Suspect: Patient cardiac arrest, broad differential    Decision Rules/Scores utilized: N/A     Tests considered but not ordered and why:  N/A     MIPS: N/A     Code Status Discussion:  Not Discussed    Additional Patient Education Provided: None     Medical Decision Making    Medical Decision Making  Patient presenting in cardiac arrest, extensive management with ACLS by EMS with providers.  Patient in asystole with multiple rounds of CPR.  No further interventions that can be identified at this time.  Patient pronounced dead at 1026.  Discussion had with family.  Patient's case discussed with .    Problems Addressed:  Cardiac arrest: acute illness or injury    Amount and/or Complexity of Data  Reviewed  Independent Historian: EMS        See ED COURSE for additional MDM statements    EKG  Asystole    All EKG's are interpreted by the Emergency Department Physician who either signs or Co-signs this chart in the absence of a cardiologist.    Additional Scores                   EMERGENCY DEPARTMENT COURSE:    ED Course as of 25 1603      1040 Patient with end-tidal of 3 on arrival with active CPR going on for approximately an hour of bystander CPR for 20 minutes.  Patient had 2 rounds of CPR, asystole both times, patient did already received 4 doses of epinephrine.  No history of dialysis that is known.  Patient in active cardiac arrest with prolonged code with extremely low end-tidal CO2, feel that any other interventions at this time would not benefit the patient and time of death was pronounced at 1026. [CR]      ED Course User Index  [CR] Juan Luis Chan,        PROCEDURES:  None Performed   Procedures    DATA FOR LAB AND RADIOLOGY TESTS ORDERED BELOW ARE REVIEWED BY THE ED CLINICIAN:    RADIOLOGY: All x-rays, CT, MRI, and formal ultrasound images (except ED bedside ultrasound) are read by the radiologist, see reports below, unless otherwise noted in MDM or here.  Reports below are reviewed by myself.  No orders to display       LABS: Lab orders shown below, the results are reviewed by myself, and all abnormals are listed below.  Labs Reviewed - No data to display    Vitals Reviewed:    Vitals:    25 1033   Pulse: (!) 0   Resp: (!) 0       MEDICATIONS GIVEN TO PATIENT THIS ENCOUNTER:  Medications - No data to display    CONSULTS:  None    CRITICAL CARE:  There was significant risk of life threatening deterioration of patient's condition requiring my direct management. Critical care time 25 minutes, excluding any documented procedures.    FINAL IMPRESSION      1. Cardiac arrest          DISPOSITION / PLAN     ED Disposition       ED Disposition       Condition    --    Comment   --               PATIENT REFERRED TO:  No follow-up provider specified.    DISCHARGE MEDICATIONS:     Medication List        ASK your doctor about these medications      famotidine 20 MG tablet  Commonly known as: PEPCID     glucosamine-chondroitin 500-400 MG capsule capsule     HYDROcodone-acetaminophen 7.5-325 MG per tablet  Commonly known as: NORCO  Take 1 tablet by mouth Every 8 (Eight) Hours As Needed for Severe Pain or Moderate Pain.     methocarbamol 750 MG tablet  Commonly known as: ROBAXIN     naloxone 4 MG/0.1ML nasal spray  Commonly known as: NARCAN  Call 911. Don't prime. [place] in 1 nostril for overdose. Repeat in 2-3 minutes in other nostril if no or minimal breathing/responsiveness.     ondansetron ODT 4 MG disintegrating tablet  Commonly known as: ZOFRAN-ODT  Place 1 tablet on the tongue Every 8 (Eight) Hours As Needed for Nausea or Vomiting.     pancrelipase (Lip-Prot-Amyl) 87247-43458 units capsule delayed-release particles capsule  Commonly known as: CREON     sertraline 50 MG tablet  Commonly known as: ZOLOFT     sodium bicarbonate 650 MG tablet  Take 1 tablet by mouth 2 (Two) Times a Day.     traMADol 50 MG tablet  Commonly known as: ULTRAM  Take 0.5 tablets by mouth Every 12 (Twelve) Hours As Needed for Moderate Pain for up to 8 doses.              Electronically signed by Juan Luis Chan DO, 04/11/25, 3:55 PM EDT.    Emergency Medicine Physician  [facility]  (Please note that portions of thisnote were completed with a voice recognition program.  Efforts were made to edit the dictations but occasionally words are mis-transcribed.)       Juan Luis Chan DO  04/11/25 1602       Juan Luis Chan DO  04/11/25 1603       Juan Luis Chan DO  04/11/25 1604

## 2025-05-30 NOTE — ED PROVIDER NOTES
EMERGENCY DEPARTMENT ENCOUNTER    Pt Name: Tasha Yarbrough  MRN: [id number]  Pt :   1955  Room Number:    Date of encounter:  2024  PCP: Terrence Baldwin MD  ED Provider: Ho Clark MD    Historian: Patient, son, and EMS      HPI:  Chief Complaint: Shortness of air        Context: Tasha Yarbrough is a 68 y.o. female who presents to the ED c/o shortness of air.  Patient reportedly has been sick for approximately 5 or 6 days with cough and difficulty breathing.  Symptoms got much worse 2 days ago.  Patient had refused to come to emergency department until tonight when she was confused and patient son decided for her to call EMS.  Patient had COVID-19 approximately 2 or 3 weeks ago but seem to improve prior to this week.  Patient oriented to year but not month.  Patient also does not recall recent events and having difficulty describing her symptoms.  Reports abdominal pain nausea and decreased appetite as well as her difficulty breathing.  EMS reported that patient was initially satting around 100% on room air but then seemed to desat while in route with oxygen saturation of 86%.  EMS provided patient with DuoNeb treatment while in route with some improvement.  No previous oxygen requirement at home.      PAST MEDICAL HISTORY  Past Medical History:   Diagnosis Date    Hypertension     Impaired mobility     Injury of back          PAST SURGICAL HISTORY  Past Surgical History:   Procedure Laterality Date    ABDOMINAL SURGERY      COLON SURGERY      COLONOSCOPY      TUBAL ABDOMINAL LIGATION           FAMILY HISTORY  History reviewed. No pertinent family history.      SOCIAL HISTORY  Social History     Socioeconomic History    Marital status:    Tobacco Use    Smoking status: Every Day     Packs/day: 1     Types: Cigarettes    Smokeless tobacco: Never   Vaping Use    Vaping Use: Never used   Substance and Sexual Activity    Alcohol use: Never    Drug use: Never    Sexual activity: Defer  Re-Assessment / Progress Note    Patient: Enrrique Alejandre   : 1955  Diagnosis/ICD-10 Code:  Acute pain of right knee [M25.561]  Referring practitioner: LUIZ Gaffney  Date of Initial Visit: Episode Type: THERAPY  Noted: 2025    Today's Date: 2025  Patient seen for 10 sessions.      Subjective:   Enrrique Alejandre reports: still with persistent stiffness in his right knee in addition to some swelling.  Reports PT sessions and helpful for ROM but has to consistently be moving to prevent his right knee from becoming still.   Subjective Questionnaire: Knee Outcome Survey: To complete next visit  Clinical Progress: improved  Home Program Compliance: Yes  Treatment has included: therapeutic exercise, neuromuscular re-education, manual therapy, therapeutic activity, gait training, electrical stimulation, moist heat, and cryotherapy    Subjective   Objective          Active Range of Motion     Right Knee   Flexion: 110 degrees   Extensor la degrees     Patellar Mobility     Right Knee Patellar tendons within functional limits include the medial and lateral. Hypomobile in the superior and inferior patellar tendon(s).     Strength/Myotome Testing     Right Knee   Flexion: 5 (5-/5)  Extension: 4  Quadriceps contraction: good    Ambulation     Comments   Ambulate w/o assistive device and slight limits knee FL during stance and mild limits push off R LE    Functional Assessment     Comments  TUG: to complete next visit  FTSST: to complete next visit      Assessment & Plan       Assessment  Impairments: abnormal gait, abnormal or restricted ROM, impaired physical strength and pain with function   Functional limitations: walking (Standing, stairs, prolonged positioning)  Assessment details: Presents with continued but improved limits in R knee ROM, strength, gait and activity tolerance.  Consistent ROM limits between and improved post PT visit and with HEP.  Educated on expected timeframe for ROM limits          ALLERGIES  Ciprofloxacin, Codeine, and Pineapple        REVIEW OF SYSTEMS  Review of Systems     All systems reviewed and negative except for those discussed in HPI.       PHYSICAL EXAM    I have reviewed the triage vital signs and nursing notes.    ED Triage Vitals   Temp Pulse Resp BP SpO2   -- -- -- -- --      Temp src Heart Rate Source Patient Position BP Location FiO2 (%)   -- -- -- -- --       Physical Exam    General:  Awake, alert, elderly, visible respiratory distress/increased work of breathing  HEENT: Atraumatic, normocephalic, EOMI, PERRLA, mucous membranes moist  NECK:  Supple, atraumatic  Cardiovascular:  Regular rate, regular rhythm, no murmurs, rubs, or gallops.  Extremities well perfused   Respiratory: Tachypneic.  Mild expiratory wheezing.  No significant rhonchi.  Abdominal:  Soft, nondistended, nonfocal tenderness.  No guarding or rebound.  No palpable masses  Extremity:  No visible bony abnormalities in all 4 extremities.  Full range of motion of all extremities.  Skin:  Warm and dry.  No rashes  Neuro:  AAOx3, GCS 15. Cranial nerves 2-12 grossly intact.  No focal strength or sensation deficits.  Psych:  Mood and affect appropriate.        LAB RESULTS  Recent Results (from the past 24 hour(s))   Respiratory Panel PCR w/COVID-19(SARS-CoV-2) [facility]/AMA/KIM/PAD/[facility] In-House, NP Swab in UTM/VTM, 2 HR TAT - Swab, Nasopharynx    Collection Time: 02/24/24 11:39 PM    Specimen: Nasopharynx; Swab   Result Value Ref Range    ADENOVIRUS, PCR Not Detected Not Detected    Coronavirus 229E Not Detected Not Detected    Coronavirus HKU1 Not Detected Not Detected    Coronavirus NL63 Not Detected Not Detected    Coronavirus OC43 Not Detected Not Detected    COVID19 Not Detected Not Detected - Ref. Range    Human Metapneumovirus Not Detected Not Detected    Human Rhinovirus/Enterovirus Not Detected Not Detected    Influenza A PCR Not Detected Not Detected    Influenza B PCR Not Detected Not Detected     and continued focus on regular HEP in addition to activity limits per swelling/pain.  He would benefit from continued PT to meet LTG.       Progress toward previous goals: All met or progressed towards    Goals    Short-term goals (STG):   1. R knee AROM 5-110 over 2 weeks. - Met  2. R knee pain decreased 20% or more with walking/standing over 2 weeks. - Met  3. Independent and compliant with HEP over 2 weeks. - Met      Long-term goals (LTG):   1. R knee AROM 0-120 to allow good gait mechanics and ability for reciprocal stair ascent/descent over 12 weeks. - Progressed towards  2. TUG and Five Time Sit to Stand time at or below age adjusted limits over 12 weeks. - Progressed towards   3. R knee strength 5/5 both FL/EXT over 12 weeks. - Progressed towards  4. Gait mechanics normal w/o use of assistive device over 12 weeks - Progressed towards         Recommendations: Continue with recommendations to follow up with MD and plans to resume PT if he feels needed  Timeframe: 1 month  Prognosis to achieve goals: good    PT Signature: Zach Mendiola, PT, DPT          Based upon review of the patient's progress and continued therapy plan, it is my medical opinion that Enrrique Alejandre should continue physical therapy treatment at [facility] PHYS[facility]  [address] [place] [id number] [phone].        Timed:         Manual Therapy:    10     mins  [id number];     Therapeutic Exercise:    15     mins  [id number];         Un-Timed:  Re-Eval                           10    mins  [id number]      Timed Treatment:   25   mins   Total Treatment:     35   mins     Parainfluenza Virus 1 Not Detected Not Detected    Parainfluenza Virus 2 Not Detected Not Detected    Parainfluenza Virus 3 Not Detected Not Detected    Parainfluenza Virus 4 Not Detected Not Detected    RSV, PCR Not Detected Not Detected    Bordetella pertussis pcr Not Detected Not Detected    Bordetella parapertussis PCR Not Detected Not Detected    Chlamydophila pneumoniae PCR Not Detected Not Detected    Mycoplasma pneumo by PCR Not Detected Not Detected   Blood Gas, Arterial With Co-Ox    Collection Time: 02/24/24 11:40 PM    Specimen: Arterial Blood   Result Value Ref Range    Site Left Radial     Rafy's Test Positive     pH, Arterial 6.886 (C) 7.350 - 7.450 pH units    pCO2, Arterial 11.8 (C) 35.0 - 45.0 mm Hg    pO2, Arterial 136.0 (H) 75.0 - 100.0 mm Hg    HCO3, Arterial 2.2 (L) 22.0 - 28.0 mmol/L    Base Excess, Arterial -29.6 (L) 0.0 - 2.0 mmol/L    O2 Saturation, Arterial 98.8 94.0 - 100.0 %    Hematocrit, Blood Gas 44.8 %    Oxyhemoglobin 97.5 94 - 99 %    Methemoglobin 0.60 0.00 - 1.50 %    Carboxyhemoglobin 0.7 0 - 2 %    A-a DO2      Barometric Pressure for Blood Gas 736 mmHg    Modality Room Air     FIO2 21 %    Ventilator Mode NA     Notified Who MD     Notified By [id number]     Notified Time 02/25/2024 00:53     pH, Temp Corrected      pCO2, Temperature Corrected      pO2, Temperature Corrected     Lactic Acid, Plasma    Collection Time: 02/24/24 11:52 PM    Specimen: Blood   Result Value Ref Range    Lactate 2.3 (C) 0.5 - 2.0 mmol/L   Comprehensive Metabolic Panel    Collection Time: 02/24/24 11:54 PM    Specimen: Blood   Result Value Ref Range    Glucose 126 (H) 65 - 99 mg/dL    BUN 63 (H) 8 - 23 mg/dL    Creatinine 3.31 (H) 0.57 - 1.00 mg/dL    Sodium 135 (L) 136 - 145 mmol/L    Potassium 4.4 3.5 - 5.2 mmol/L    Chloride 108 (H) 98 - 107 mmol/L    CO2 3.1 (L) 22.0 - 29.0 mmol/L    Calcium 10.9 (H) 8.6 - 10.5 mg/dL    Total Protein 8.3 6.0 - 8.5 g/dL    Albumin 4.7 3.5 - 5.2 g/dL    ALT (SGPT) 12  1 - 33 U/L    AST (SGOT) 20 1 - 32 U/L    Alkaline Phosphatase 134 (H) 39 - 117 U/L    Total Bilirubin <0.2 0.0 - 1.2 mg/dL    Globulin 3.6 gm/dL    A/G Ratio 1.3 g/dL    BUN/Creatinine Ratio 19.0 7.0 - 25.0    Anion Gap 23.9 (H) 5.0 - 15.0 mmol/L    eGFR 14.6 (L) >60.0 mL/min/1.73   BNP    Collection Time: 02/24/24 11:54 PM    Specimen: Blood   Result Value Ref Range    proBNP 2,545.0 (H) 0.0 - 900.0 pg/mL   CBC Auto Differential    Collection Time: 02/24/24 11:54 PM    Specimen: Blood   Result Value Ref Range    WBC 28.79 (H) 3.40 - 10.80 10*3/mm3    RBC 4.15 3.77 - 5.28 10*6/mm3    Hemoglobin 15.2 12.0 - 15.9 g/dL    Hematocrit 45.9 34.0 - 46.6 %    .6 (H) 79.0 - 97.0 fL    MCH 36.6 (H) 26.6 - 33.0 pg    MCHC 33.1 31.5 - 35.7 g/dL    RDW 14.1 12.3 - 15.4 %    RDW-SD 58.4 (H) 37.0 - 54.0 fl    MPV 10.7 6.0 - 12.0 fL    Platelets 317 140 - 450 10*3/mm3    Neutrophil % 86.6 (H) 42.7 - 76.0 %    Lymphocyte % 3.6 (L) 19.6 - 45.3 %    Monocyte % 7.0 5.0 - 12.0 %    Eosinophil % 0.0 (L) 0.3 - 6.2 %    Basophil % 0.4 0.0 - 1.5 %    Immature Grans % 2.4 (H) 0.0 - 0.5 %    Neutrophils, Absolute 24.93 (H) 1.70 - 7.00 10*3/mm3    Lymphocytes, Absolute 1.04 0.70 - 3.10 10*3/mm3    Monocytes, Absolute 2.02 (H) 0.10 - 0.90 10*3/mm3    Eosinophils, Absolute 0.00 0.00 - 0.40 10*3/mm3    Basophils, Absolute 0.11 0.00 - 0.20 10*3/mm3    Immature Grans, Absolute 0.69 (H) 0.00 - 0.05 10*3/mm3    nRBC 0.1 0.0 - 0.2 /100 WBC   Lipase    Collection Time: 02/24/24 11:54 PM    Specimen: Blood   Result Value Ref Range    Lipase >3,000 (H) 13 - 60 U/L   Green Top (Gel)    Collection Time: 02/24/24 11:54 PM   Result Value Ref Range    Extra Tube Hold for add-ons.    Lavender Top    Collection Time: 02/24/24 11:54 PM   Result Value Ref Range    Extra Tube hold for add-on    Gold Top - SST    Collection Time: 02/24/24 11:54 PM   Result Value Ref Range    Extra Tube Hold for add-ons.    Light Blue Top    Collection Time: 02/24/24 11:54  PM   Result Value Ref Range    Extra Tube Hold for add-ons.    Scan Slide    Collection Time: 02/24/24 11:54 PM    Specimen: Blood   Result Value Ref Range    Macrocytes Slight/1+ None Seen    WBC Morphology Normal Normal    Platelet Estimate Adequate Normal   Procalcitonin    Collection Time: 02/24/24 11:54 PM    Specimen: Blood   Result Value Ref Range    Procalcitonin 0.44 (H) 0.00 - 0.25 ng/mL   Blood Gas, Arterial With Co-Ox    Collection Time: 02/25/24  2:26 AM    Specimen: Arterial Blood   Result Value Ref Range    Site Right Brachial     Rafy's Test Positive     pH, Arterial 6.925 (C) 7.350 - 7.450 pH units    pCO2, Arterial 15.1 (C) 35.0 - 45.0 mm Hg    pO2, Arterial 135.0 (H) 75.0 - 100.0 mm Hg    HCO3, Arterial 3.1 (L) 22.0 - 28.0 mmol/L    Base Excess, Arterial -27.9 (L) 0.0 - 2.0 mmol/L    O2 Saturation, Arterial 98.9 94.0 - 100.0 %    Hematocrit, Blood Gas 38.3 %    Oxyhemoglobin 97.7 94 - 99 %    Methemoglobin 0.50 0.00 - 1.50 %    Carboxyhemoglobin 0.7 0 - 2 %    A-a DO2      Barometric Pressure for Blood Gas 736 mmHg    Modality Room Air     FIO2 21 %    Ventilator Mode NA     Notified Who MD     Notified By [id number]     Notified Time 02/25/2024 03:39     Collected by [id number]     pH, Temp Corrected      pCO2, Temperature Corrected      pO2, Temperature Corrected         If labs were ordered, I independently reviewed the results and considered them in treating the patient.        RADIOLOGY  CT Abdomen Pelvis Without Contrast    Result Date: 2/25/2024  FINAL REPORT TECHNIQUE: null CLINICAL HISTORY: diffuse Abdominal pain, sepsis, confusion, SOA; family states symptoms began 5-6 days ago COMPARISON: null FINDINGS: CT abdomen and pelvis without contrast Comparison: CT/SR - CT ABDOMEN PELVIS W CONTRAST - 09/22/2023 08:48 PM EDT Findings: No consolidation or effusion. Liver and adrenal glands are unremarkable. Calcified granulomas in the spleen. Gallbladder is absent. Peripancreatic stranding with  retroperitoneal effusion. Both kidneys are similar in size without hydronephrosis. Small nonobstructing bilateral renal stones. No ureteral calculi. Hypodense bilateral renal cysts. Moderate-large hiatal hernia. No bowel obstruction, pneumoperitoneum, or pneumatosis. The appendix is not seen. Calcified plaque in the nonaneurysmal aorta and iliac arteries. There are no enlarged abdominal or pelvic lymph nodes. Distended bladder without wall thickening. Uterus is unremarkable. Stable hypodense 2.3 x 2.1 cm right ovarian cyst. Degenerative dextroscoliosis of the thoracolumbar spine with chronic L2 compression deformity.     IMPRESSION: 1. Mild acute pancreatitis with retroperitoneal effusion. No abscess. 2. Nonobstructing bilateral renal stones. 3. Moderate-large hiatal hernia. Authenticated and Electronically Signed by Yonathan Coles MD on 02/25/2024 01:22:17 AM     I ordered and independently reviewed the above noted radiographic studies.     See radiologist's dictation for official interpretation.        PROCEDURES    Critical Care    Performed by: Ho Clark MD  Authorized by: Ho Clark MD    Critical care provider statement:     Critical care time (minutes):  40    Critical care time was exclusive of:  Separately billable procedures and treating other patients and teaching time    Critical care was necessary to treat or prevent imminent or life-threatening deterioration of the following conditions:  Renal failure, respiratory failure, sepsis, shock and metabolic crisis    Critical care was time spent personally by me on the following activities:  Re-evaluation of patient's condition, pulse oximetry, ordering and review of radiographic studies, ordering and review of laboratory studies, ordering and performing treatments and interventions, development of treatment plan with patient or surrogate, evaluation of patient's response to treatment, examination of patient, interpretation of cardiac output  measurements and obtaining history from patient or surrogate    I assumed direction of critical care for this patient from another provider in my specialty: no      Care discussed with: admitting provider        No orders to display       MEDICATIONS GIVEN IN ER    Medications   sodium chloride 0.9 % flush 10 mL (has no administration in time range)   sodium bicarbonate 8.4 % 150 mEq in dextrose (D5W) 5 % 1,000 mL infusion (greater than 75 mEq) (150 mEq Intravenous New Bag 2/25/24 0126)   morphine injection 4 mg (has no administration in time range)   ondansetron (ZOFRAN) injection 4 mg (has no administration in time range)   sodium chloride 0.9 % bolus 500 mL (0 mL Intravenous Stopped 2/25/24 0048)   ondansetron (ZOFRAN) injection 4 mg (4 mg Intravenous Given 2/25/24 0001)   ipratropium-albuterol (DUO-NEB) nebulizer solution 3 mL (3 mL Nebulization Given 2/25/24 0005)   dexAMETHasone sodium phosphate injection 10 mg (10 mg Intravenous Given 2/25/24 0003)   sodium chloride 0.9 % bolus 1,000 mL (0 mL Intravenous Stopped 2/25/24 0235)   piperacillin-tazobactam (ZOSYN) 3.375 g in iso-osmotic dextrose 50 ml (premix) (0 g Intravenous Stopped 2/25/24 0142)   vancomycin 1250 mg/250 mL 0.9% NS IVPB ([facility]) (1,250 mg Intravenous New Bag 2/25/24 0142)   sodium chloride 0.9 % bolus 500 mL (500 mL Intravenous New Bag 2/25/24 0238)         MEDICAL DECISION MAKING, PROGRESS, and CONSULTS    All labs, if obtained, have been independently reviewed by me.  All radiology studies, if obtained, have been reviewed by me and the radiologist dictating the report.  All EKG's, if obtained, have been independently viewed and interpreted by me.      Discussion below represents my analysis of pertinent findings related to patient's condition, differential diagnosis, treatment plan and final disposition.    Tasha Yarbrough is a 68 y.o. female who presents to the ED c/o difficulty breathing/shortness of air.  Brought in by EMS.  Hemodynamically  stable on arrival satting 100% on room air.  Differential includes was not limited to viral upper respiratory infection, pneumonia, pancreatitis, constipation, cholecystitis, hypercarbia, metabolic imbalance, pulmonary embolism.  Extensive labs and chest x-ray ordered along with ABG.  Patient given bolus of IV fluids.  Patient also given Zofran for nausea and dexamethasone for possible COPD flare with DuoNeb treatment.    Chest x-ray shows no significant cardiopulmonary abnormality on independent exam other than chronic COPD.  Labs show multiple critical abnormalities including leukocytosis of nearly 29, pH of 6.88 with hypocarbia with pCO2 of 11, acute renal failure with creatinine of 3.3 and elevated BUN, lipase over 3000 concerning for acute pancreatitis.  Patient is already had cholecystectomy making choledocholithiasis/gallstone pancreatitis less likely.  CT of abdomen pelvis without contrast to be obtained given patient's severe renal injury no contrast to be given.  Patient given additional IV fluid bolus, totaling over 30 mL/kg per sepsis guidelines.  Also empirically started on IV Zosyn and IV vancomycin.  Started on bicarb drip due to severe metabolic acidosis.  Discussed severity of lab results with patient and family at bedside who voiced understanding.    CT of abdomen pelvis shows pancreatitis but no other significant infectious abnormalities noted by radiology.  Only mildly elevated lactate.  Blood cultures obtained and pending.  Discussed patient's case with hospitalist who is agreeable with plan for admission for further treatment.  Family also agreeable with this plan.                               Orders placed during this visit:  Orders Placed This Encounter   Procedures    Critical Care    Respiratory Panel PCR w/COVID-19(SARS-CoV-2) J[facility]YN/AMA/KIM/PAD/COR/[facility]ALICK In-House, NP Swab in UTM/VTM, 2 HR TAT - Swab, Nasopharynx    Blood Culture - Blood,    Blood Culture - Blood,    XR Chest 1 View    CT  Abdomen Pelvis Without Contrast    Comprehensive Metabolic Panel    BNP    Blood Gas, Arterial -With Co-Ox Panel: Yes    CBC Auto Differential    Lipase    Urinalysis With Microscopic If Indicated (No Culture) - Urine, Clean Catch    Blood Gas, Arterial With Co-Ox    [place] Draw    Scan Slide    Lactic Acid, Plasma    Procalcitonin    STAT Lactic Acid, Reflex    Blood Gas, Arterial -With Co-Ox Panel: Yes    Blood Gas, Arterial With Co-Ox    Insert Peripheral IV    Inpatient Admission    CBC & Differential    Green Top (Gel)    Lavender Top    Gold Top - SST    Light Blue Top         ED Course:    Consultants:                  Shared Decision Making:  After my consideration of clinical presentation and any laboratory/radiology studies obtained, I discussed the findings with the patient/patient representative who is in agreement with the treatment plan and the final disposition.   Risks and benefits of discharge and/or observation/admission were discussed.      AS OF 03:18 EST VITALS:    BP - 148/78  HR - 91  TEMP - 94.2 °F (34.6 °C)  O2 SATS - 100%                  DIAGNOSIS  Final diagnoses:   Acute pancreatitis, unspecified complication status, unspecified pancreatitis type   Leukocytosis, unspecified type   Metabolic acidosis   Epigastric pain   Acute renal failure, unspecified acute renal failure type   Dehydration   Sepsis with acute renal failure without septic shock, due to unspecified organism, unspecified acute renal failure type         DISPOSITION  Admit      Please note that portions of this document were completed with voice recognition software.        Ho Clark MD  02/25/24 [id number]

## (undated) DEVICE — Device

## (undated) DEVICE — ENDOSCOPY PORT CONNECTOR FOR OLYMPUS® SCOPES: Brand: ERBE

## (undated) DEVICE — FRCP BX RADJAW4 NDL 2.8 240 STD OG

## (undated) DEVICE — VLV SXN AIR/H2O ORCAPOD3 1P/U STRL

## (undated) DEVICE — CONMED SCOPE SAVER BITE BLOCK, 20X27 MM: Brand: SCOPE SAVER

## (undated) DEVICE — HYBRID CO2 TUBING/CAP SET FOR OLYMPUS® SCOPES & CO2 SOURCE: Brand: ERBE